# Patient Record
Sex: FEMALE | Race: WHITE | NOT HISPANIC OR LATINO | Employment: OTHER | URBAN - METROPOLITAN AREA
[De-identification: names, ages, dates, MRNs, and addresses within clinical notes are randomized per-mention and may not be internally consistent; named-entity substitution may affect disease eponyms.]

---

## 2017-04-05 ENCOUNTER — ALLSCRIPTS OFFICE VISIT (OUTPATIENT)
Dept: OTHER | Facility: OTHER | Age: 82
End: 2017-04-05

## 2017-09-01 DIAGNOSIS — E55.9 VITAMIN D DEFICIENCY: ICD-10-CM

## 2017-09-01 DIAGNOSIS — E83.52 HYPERCALCEMIA: ICD-10-CM

## 2017-09-01 DIAGNOSIS — E78.5 HYPERLIPIDEMIA: ICD-10-CM

## 2017-09-01 DIAGNOSIS — I10 ESSENTIAL (PRIMARY) HYPERTENSION: ICD-10-CM

## 2017-09-11 ENCOUNTER — APPOINTMENT (OUTPATIENT)
Dept: LAB | Facility: CLINIC | Age: 82
End: 2017-09-11
Payer: MEDICARE

## 2017-09-11 ENCOUNTER — TRANSCRIBE ORDERS (OUTPATIENT)
Dept: LAB | Facility: CLINIC | Age: 82
End: 2017-09-11

## 2017-09-11 DIAGNOSIS — E55.9 VITAMIN D DEFICIENCY: ICD-10-CM

## 2017-09-11 DIAGNOSIS — E83.52 HYPERCALCEMIA: ICD-10-CM

## 2017-09-11 DIAGNOSIS — E78.5 HYPERLIPIDEMIA: ICD-10-CM

## 2017-09-11 DIAGNOSIS — I10 ESSENTIAL (PRIMARY) HYPERTENSION: ICD-10-CM

## 2017-09-11 LAB
25(OH)D3 SERPL-MCNC: 34.6 NG/ML (ref 30–100)
ALBUMIN SERPL BCP-MCNC: 4.2 G/DL (ref 3.5–5)
ALP SERPL-CCNC: 89 U/L (ref 46–116)
ALT SERPL W P-5'-P-CCNC: 14 U/L (ref 12–78)
ANION GAP SERPL CALCULATED.3IONS-SCNC: 6 MMOL/L (ref 4–13)
AST SERPL W P-5'-P-CCNC: 12 U/L (ref 5–45)
BILIRUB SERPL-MCNC: 0.48 MG/DL (ref 0.2–1)
BUN SERPL-MCNC: 25 MG/DL (ref 5–25)
CALCIUM ALBUM COR SERPL-MCNC: 10.6 MG/DL (ref 8.3–10.1)
CALCIUM SERPL-MCNC: 10.8 MG/DL (ref 8.3–10.1)
CHLORIDE SERPL-SCNC: 105 MMOL/L (ref 100–108)
CHOLEST SERPL-MCNC: 214 MG/DL (ref 50–200)
CO2 SERPL-SCNC: 27 MMOL/L (ref 21–32)
CREAT SERPL-MCNC: 1.09 MG/DL (ref 0.6–1.3)
GFR SERPL CREATININE-BSD FRML MDRD: 45 ML/MIN/1.73SQ M
GLUCOSE P FAST SERPL-MCNC: 92 MG/DL (ref 65–99)
HDLC SERPL-MCNC: 37 MG/DL (ref 40–60)
LDLC SERPL CALC-MCNC: 132 MG/DL (ref 0–100)
POTASSIUM SERPL-SCNC: 4.2 MMOL/L (ref 3.5–5.3)
PROT SERPL-MCNC: 7.6 G/DL (ref 6.4–8.2)
SODIUM SERPL-SCNC: 138 MMOL/L (ref 136–145)
TRIGL SERPL-MCNC: 224 MG/DL
TSH SERPL DL<=0.05 MIU/L-ACNC: 2.41 UIU/ML (ref 0.36–3.74)

## 2017-09-11 PROCEDURE — 80061 LIPID PANEL: CPT

## 2017-09-11 PROCEDURE — 82306 VITAMIN D 25 HYDROXY: CPT

## 2017-09-11 PROCEDURE — 36415 COLL VENOUS BLD VENIPUNCTURE: CPT

## 2017-09-11 PROCEDURE — 84443 ASSAY THYROID STIM HORMONE: CPT

## 2017-09-11 PROCEDURE — 80053 COMPREHEN METABOLIC PANEL: CPT

## 2017-10-03 ENCOUNTER — ALLSCRIPTS OFFICE VISIT (OUTPATIENT)
Dept: OTHER | Facility: OTHER | Age: 82
End: 2017-10-03

## 2017-10-03 DIAGNOSIS — M25.561 PAIN IN RIGHT KNEE: ICD-10-CM

## 2017-10-03 DIAGNOSIS — Z13.820 ENCOUNTER FOR SCREENING FOR OSTEOPOROSIS: ICD-10-CM

## 2017-10-03 DIAGNOSIS — M25.562 PAIN IN LEFT KNEE: ICD-10-CM

## 2017-10-04 NOTE — PROGRESS NOTES
Assessment  1  Screening for osteoporosis (V82 81) (Z13 820)   2  Chronic pain of both knees (171 84,893 56) (M25 561,M25 562,G89 29)   3  Benign essential hypertension (401 1) (I10)   4  Hyperlipidemia (272 4) (E78 5)   5  Hypercalcemia (275 42) (E83 52)    Plan  Benign essential hypertension    · Lisinopril 40 MG Oral Tablet; Take 1 tablet daily  Chronic pain of both knees    · * XR KNEE 3 VW LEFT NON INJURY; Status:Active; Requested MEQ:[id number];    · * XR KNEE 3 VW RIGHT NON INJURY; Status:Active; Requested OII:[id number];    · 1 - James LUCIANO, Zena Silva (Orthopedic Surgery) Co-Management  *  Status: Active   Requested for: 54VUU6280  Care Summary provided  : Yes  Gout    · Gabapentin 300 MG Oral Capsule; take 1 capsule by mouth at bedtime  Health Maintenance    · Doxepin HCl - 75 MG Oral Capsule; one p o  at bedtime  Hyperlipidemia    · Ezetimibe 10 MG Oral Tablet; take 1 tablet by mouth daily  Need for influenza vaccination    · Fluzone High-Dose 0 5 ML Intramuscular Suspension Prefilled Syringe  Peripheral neuropathy    · Gabapentin 100 MG Oral Capsule; TAKE two  CAPSULES IN AM and 4 pm  Screening for osteoporosis    · * DXA BONE DENSITY SPINE HIP AND PELVIS; Status:Hold For - Scheduling; Requested  for:03Oct2017;     Discussion/Summary    Patient is here for blood pressure check  Labs are up-to-date and reviewed  HDL is low triglycerides are slightly elevated but improved  Patient's main complaint is knee symptoms  Discussed x-ray and updating baseline DEXA scan  Patient will be referred to Dr Sharri Da Silva of [address]  The patient, patient's family was counseled regarding instructions for management,-impressions,-importance of compliance with treatment  total time of encounter was 30 minutes-and-50% minutes was spent counseling  The treatment plan was reviewed with the patient/guardian   The patient/guardian understands and agrees with the treatment plan      Chief Complaint  flu shot, wants to discuss surgery for her knees  Patient is here for blood pressure checkup and to review labs  Patient is here today for follow up of chronic conditions described in HPI  History of Present Illness  The patient is being seen for Bilateral a knee problem , initial evaluation  She sustained an injury to the left knee, right knee and No injury  She was previously evaluated by me and First evaluation  Symptoms:  pain,-popping sound at injury-and-instability, but-no swelling-and-no redness  The patient is currently experiencing symptoms  Knee pain: Symptoms are located in the bilateral knees  The patient describes the pain as dull and aching  Onset was gradual  The symptoms occur intermittently  The patient describes symptoms as moderate in severity-and-worsening  Exacerbating factors:  walking  Relieving factors:  rest-and-Patient has utilized Biofreeze and Voltaren gel with some improvement  Current treatment includes use of a walker  By report, there is fair symptom control  The patient is currently able to do activities of daily living with limitations  Patient complains of bilateral knee discomfort and decreased difficulty with ambulation  She is using a walker and this has helped avoid any falls  Labs are reviewed everything is within normal limits except a low HDL and slight elevation of the triglycerides  Patient is limited in her mobility due to her knee problems  She has never seen Orthopedic and has had not x-rays previously  Calcium level is elevated but patient has a history of familial hyper calcium me a  She has been evaluated by Nephrology for this  She states she had a test on her parathyroid gland and that was normal  She will be getting the flu shot today  The patient is being seen for a routine clinic follow-up of hypercalcemia  The patient is currently asymptomatic  Associated symptoms:  Patient will need to be screened for osteoporosis  Has not had a baseline that she can recall  , but-no blood in urine,-no bone pain-and-no kidney stones  The patient states her hyperlipidemia has been stable since the last visit  Comorbid Illnesses: hypertension  Symptoms: The patient is currently asymptomatic  Associated symptoms include no focal neurologic deficits-and-no memory loss  Medications: the patient is adherent with her medication regimen  the patient's LDL goal is 130 mg/dL  -the patient's last LDL was 132 mg/dL  Additional History: Patient is a 51-year-old and currently is not displaying any cardiac symptomatology  The patient presents for follow-up of essential hypertension  The patient states she has been stable with her blood pressure control since the last visit  Comorbid Illnesses: Hypercalcemia  Symptoms: The patient is currently asymptomatic  denies chest pain-and-denies intermittent leg claudication  Associated symptoms include no headache-and-no focal neurologic deficits  Home monitoring: The patient checks her blood pressure sporadically  Blood pressure control has been good  Lifestyle: Diet: She is on a diet and is generally adherent  The patient is due for all labs updated  Review of Systems    Constitutional: not feeling tired  Eyes: Patient had recent eye exam and Vision is actually improving, but-no eyesight problems  ENT: no nasal discharge  Cardiovascular: no chest pain-and-no palpitations  Respiratory: no cough-and-no shortness of breath during exertion  Gastrointestinal: no nausea-and-no constipation  Genitourinary: no incontinence  Musculoskeletal: as noted in HPI  Psychiatric: no sleep disturbances  ROS reviewed  Active Problems  1  Benign essential hypertension (401 1) (I10)   2  Chronic pain of right knee (978 14,546 90) (M25 561,G89 29)   3  Esophageal reflux (530 81) (K21 9)   4  Gout (274 9) (M10 9)   5  Hypercalcemia (275 42) (E83 52)   6  Hyperlipidemia (272 4) (E78 5)   7  Macular degeneration (362 50) (H35 30)   8  Osteoarthritis (715 90) (M19 90)   9  Peripheral neuropathy (356 9) (G62 9)   10  Vitamin D deficiency (268 9) (E55 9)    Past Medical History  1  History of Colon cancer screening (V76 51) (Z12 11)   2  History of Cough (786 2) (R05)   3  History of Depression (311) (F32 9)   4  History of Encounter for screening mammogram for malignant neoplasm of breast   (V76 12) (Z12 31)   5  History of Glaucoma screening (V80 1) (Z13 5)   6  History of H/O vaginal hysterectomy (V88 01) (Z90 710)   7  History of fever (V13 89) (Z87 898)   8  History of gout (V12 29) (Z87 39)   9  History of shortness of breath (V13 89) (Z87 898)   10  History of upper respiratory infection (V12 09) (Z87 09)   11  History of Need for influenza vaccination (V04 81) (Z23)   12  History of Skin cancer (173 90) (C44 90)   13  History of Sore throat (462) (J02 9)    The active problems and past medical history were reviewed and updated today  Surgical History  1  History of Cataract Surgery   2  History of Total Abdominal Hysterectomy With Removal Of Both Ovaries    The surgical history was reviewed and updated today  Family History  Mother    1  Family history of myocardial infarction (V17 3) (Z82 49)  Father    2  Family history of myocardial infarction (V17 3) (Z82 49)  Sister    3  Family history of hypertension (V17 49) (Z82 49)   4  Family history of malignant neoplasm of stomach (V16 0) (Z80 0)  Brother    5  Family history of hypertension (V17 49) (Z82 49)    The family history was reviewed and updated today  Social History   · Never a smoker   · No alcohol use   ·   The social history was reviewed and updated today  Current Meds   1  Aspirin 81 MG TABS; Therapy: 78GKZ7942 to Recorded   2  CholestOff Plus CAPS; take 1 capsule daily; Therapy: (Recorded:03Oct2017) to Recorded   3  CoQ-10 10 MG CAPS; Therapy: 54NDA5058 to Recorded   4   Diclofenac Sodium 1 % Transdermal Gel; aply 2-3 times daily as directed; Therapy: 98XOG8671 to (Last Rx:30Jan2017)  Requested for: 30Jan2017 Ordered   5  Doxepin HCl - 75 MG Oral Capsule; one p o  at bedtime; Therapy: 40AKW3084 to (Leia Tran)  Requested for: 05Apr2017; Last   Rx:05Apr2017 Ordered   6  Ezetimibe 10 MG Oral Tablet; take 1 tablet by mouth daily; Therapy: 64FBH2600 to (Jean Pierre Simms)  Requested for: 96Jjx8315; Last   Rx:75Sum6761 Ordered   7  Gabapentin 100 MG Oral Capsule; TAKE two  CAPSULES IN AM and 4 pm;   Therapy: 12EGC9916 to (Evaluate:31Mar2018)  Requested for: 74SFB6304; Last   Rx:05Apr2017 Ordered   8  Gabapentin 300 MG Oral Capsule; take 1 capsule by mouth at bedtime; Therapy: 32OXF6027 to (Leia Tran)  Requested for: 05Apr2017; Last   Rx:05Apr2017 Ordered   9  Lisinopril 40 MG Oral Tablet; Take 1 tablet daily; Therapy: 45PXG7571 to (Evaluate:31Mar2018)  Requested for: 05Apr2017; Last   Rx:05Apr2017 Ordered   10  Multiple Vitamins Oral Tablet; Therapy: 67SBQ4508 to Recorded   11  Omeprazole 20 MG Oral Capsule Delayed Release; TAKE 1 CAPSULE BY MOUTH    EVERY DAY; Therapy: 87QRD5616 to (Leia Tran)  Requested for: 05Apr2017; Last    Rx:05Apr2017 Ordered   12  PreserVision AREDS 2+Multi Vit Oral Capsule; Take 1 capsule twice daily; Therapy: () to Recorded   13  Probiotic Oral Capsule; Therapy: 74QUG3977 to Recorded   14  Vitamin C 500 MG Oral Capsule; Therapy: 38EGK7226 to Recorded   15  Vitamin D3 1000 UNIT Oral Capsule; Therapy: 83NYB7968 to Recorded    The medication list was reviewed and updated today  Allergies  1  Allopurinol TABS    Vitals  Vital Signs    Recorded: 33OSR0934 09:47AM Recorded: 96OEM0390 18:69EE   Systolic 159    Diastolic 90    Height  5 ft 2 in   Weight  151 lb 4 oz   BMI Calculated  27 66   BSA Calculated  1 7     Physical Exam    Constitutional   General appearance: No acute distress, well appearing and well nourished    well developed,-overweight-and-appearance reflects stated age  Ears, Nose, Mouth, and Throat   Otoscopic examination: Tympanic membranes translucent with normal light reflex  Canals patent without erythema  Oropharynx: Normal with no erythema, edema, exudate or lesions  Pulmonary   Auscultation of lungs: Clear to auscultation  Cardiovascular   Auscultation of heart: Normal rate and rhythm, normal S1 and S2, without murmurs  A grade 2 systolic murmur was heard at the LLSB  Abdomen   Abdomen: Non-tender, no masses  Liver and spleen: No hepatomegaly or splenomegaly  Musculoskeletal   Gait and station: Abnormal   Gait evaluation demonstrated antalgia bilaterally  Inspection/palpation of joints, bones, and muscles: Abnormal   Palpation - bilateral knee crepitus -Valgus gait  Patient has bilateral flat feet  Significant supination at ankle  Distal pulses are intact  Neurologic   Sensation: No sensory loss  Psychiatric   Orientation to person, place, and time: Normal     Mood and affect: Normal          Results/Data  PHQ-2 Adult Depression Screening 18LCQ7514 09:30AM User, [employer]     Test Name Result Flag Reference   PHQ-2 Adult Depression Score 0     Over the last two weeks, how often have you been bothered by any of the following problems?   Little interest or pleasure in doing things: Not at all - 0  Feeling down, depressed, or hopeless: Not at all - 0   PHQ-2 Adult Depression Screening Negative       (1) COMPREHENSIVE METABOLIC PANEL 30EJR8518 64:03TA Caitlyn Pulse Order Number: [id number]     Test Name Result Flag Reference   SODIUM 138 mmol/L  136-145   POTASSIUM 4 2 mmol/L  3 5-5 3   CHLORIDE 105 mmol/L  100-108   CARBON DIOXIDE 27 mmol/L  21-32   ANION GAP (CALC) 6 mmol/L  4-13   BLOOD UREA NITROGEN 25 mg/dL  5-25   CREATININE 1 09 mg/dL  0 60-1 30   Standardized to IDMS reference method   CALCIUM 10 8 mg/dL H 8 3-10 1   BILI, TOTAL 0 48 mg/dL  0 20-1 00   ALK PHOSPHATAS 89 U/L     ALT (SGPT) 14 U/L  12-78 Specimen collection should occur prior to Sulfasalazine and/or Sulfapyridine administration due to the potential for falsely depressed results  AST(SGOT) 12 U/L  5-45   Specimen collection should occur prior to Sulfasalazine administration due to the potential for falsely depressed results  ALBUMIN 4 2 g/dL  3 5-5 0   TOTAL PROTEIN 7 6 g/dL  6 4-8 2   CORRECTED CALCIUM 10 6 mg/dL H 8 3-10 1   eGFR 45 ml/min/1 73sq m     [facility] Disease Education Program recommendations are as follows:  GFR calculation is accurate only with a steady state creatinine  Chronic Kidney disease less than 60 ml/min/1 73 sq  meters  Kidney failure less than 15 ml/min/1 73 sq  meters  GLUCOSE FASTING 92 mg/dL  65-99   Specimen collection should occur prior to Sulfasalazine administration due to the potential for falsely depressed results  Specimen collection should occur prior to Sulfapyridine administration due to the potential for falsely elevated results  (1) LIPID PANEL, FASTING 70BMY0156 79:26DK Khalif Estrellao Order Number: [id number]     Test Name Result Flag Reference   CHOLESTEROL 214 mg/dL H    HDL,DIRECT 37 mg/dL L 40-60   Specimen collection should occur prior to Metamizole administration due to the potential for falsley depressed results  LDL CHOLESTEROL CALCULATED 132 mg/dL H 0-100   - Patient Instructions: This is a fasting blood test  Water,black tea or black  coffee only after 9:00pm the night before test   Drink 2 glasses of water the morning of test       Triglyceride:        Normal ??? ??? ??? ??? ??? ??? ??? <150 mg/dl   ??? ??? ???Borderline High ??? ??? 150-199 mg/dl   ??? ??? ? ?? High ??? ??? ??? ??? ??? ??? ??? 200-499 mg/dl   ??? ??? ? ??Very High ??? ??? ??? ??? ??? >499 mg/dl      Cholesterol:       Desirable ??? ??? ??? ??? <200 mg/dl   ??? ??? Borderline High ??? 200-239 mg/dl   ??? ???  High ??? ??? ??? ??? ??? ??? >239 mg/dl      HDL Cholesterol:       High ??? ???>59 mg/dL   ??? ??? Low ??? ??? <41 mg/dL      This screening LDL is a calculated result  It does not have the accuracy of the Direct Measured LDL in the monitoring of patients with hyperlipidemia and/or statin therapy  Direct Measure LDL (WZR113) must be ordered separately in these patients  TRIGLYCERIDES 224 mg/dL H <=150   Specimen collection should occur prior to N-Acetylcysteine or Metamizole administration due to the potential for falsely depressed results  (1) TSH 24KPV6437 91:35ST [facility] Order Number: [id number]     Test Name Result Flag Reference   TSH 2 410 uIU/mL  0 358-3 740   - Patient Instructions: This bloodwork is non-fasting  Please drink two glasses of water morning of bloodwork  Patients undergoing fluorescein dye angiography may retain small amounts of fluorescein in the body for 48-72 hours post procedure  Samples containing fluorescein can produce falsely depressed TSH values  If the patient had this procedure,a specimen should be resubmitted post fluorescein clearance  The recommended reference ranges for TSH during pregnancy are as follows:  First trimester 0 1 to 2 5 uIU/mL  Second trimester  0 2 to 3 0 uIU/mL  Third trimester 0 3 to 3 0 uIU/m     (1) VITAMIN D 25-HYDROXY 34AOL8986 66:35KZ [facility] Order Number: [id number]     Test Name Result Flag Reference   VIT D 25-HYDROX 34 6 ng/mL  30 0-100 0   This assay is a certified procedure of the CDC Vitamin D Standardization Certification Program (VDSCP)     Deficiency <20ng/ml   Insufficiency 20-30ng/ml   Sufficient  ng/ml     *Patients undergoing fluorescein dye angiography may retain small amounts of fluorescein in the body for 48-72 hours post procedure  Samples containing fluorescein can produce falsely elevated Vitamin D values  If the patient had this procedure, a specimen should be resubmitted post fluorescein clearance       (1) CBC/ PLT (NO DIFF) 46MPT5401 01: 99FP Kira Gallardo Order Number: [id number]     Test Name Result Flag Reference   HEMATOCRIT 36 6 %  34 8-46 1   HEMOGLOBIN 12 1 g/dL  11 5-15 4   MCHC 33 1 g/dL  31 4-37 4   MCH 27 4 pg  26 8-34 3   MCV 83 fL  82-98   PLATELET COUNT 104 Thousands/uL  149-390   RBC COUNT 4 42 Million/uL  3 81-5 12   RDW 18 0 % H 11 6-15 1   WBC COUNT 4 43 Thousand/uL  4 31-10 16   MPV 12 0 fL  8 9-12 7     Signatures   Electronically signed by : Brendon Haynes DO; Oct  3 7649 10:22AM EST                       (Author)

## 2017-10-05 ENCOUNTER — HOSPITAL ENCOUNTER (OUTPATIENT)
Dept: BONE DENSITY | Facility: MEDICAL CENTER | Age: 82
Discharge: HOME | End: 2017-10-05
Payer: MEDICARE

## 2017-10-05 ENCOUNTER — APPOINTMENT (OUTPATIENT)
Dept: RADIOLOGY | Facility: MEDICAL CENTER | Age: 82
End: 2017-10-05
Payer: MEDICARE

## 2017-10-05 DIAGNOSIS — Z13.820 ENCOUNTER FOR SCREENING FOR OSTEOPOROSIS: ICD-10-CM

## 2017-10-05 DIAGNOSIS — M25.561 PAIN IN RIGHT KNEE: ICD-10-CM

## 2017-10-05 DIAGNOSIS — M25.562 PAIN IN LEFT KNEE: ICD-10-CM

## 2017-10-05 PROCEDURE — 73562 X-RAY EXAM OF KNEE 3: CPT

## 2017-10-05 PROCEDURE — 77080 DXA BONE DENSITY AXIAL: CPT

## 2017-10-16 ENCOUNTER — ALLSCRIPTS OFFICE VISIT (OUTPATIENT)
Dept: OTHER | Facility: OTHER | Age: 82
End: 2017-10-16

## 2017-10-17 NOTE — PROGRESS NOTES
Assessment  1  Primary localized osteoarthritis of right knee (715 16) (M17 11)   2  Primary localized osteoarthritis of left knee (715 16) (M17 12)    Plan  Primary localized osteoarthritis of left knee    · Depo-Medrol 40 MG/ML Injection Suspension (MethylPREDNISolone  Acetate)  Primary localized osteoarthritis of left knee, Primary localized osteoarthritis of right knee    · Follow-up visit in 6 weeks Evaluation and Treatment  Follow-up  Status: Hold For -  Scheduling  Requested for: [id number]   · Physical Therapy Home Safety Evaluation and Strengthening Exercises, PT OT Evaluate  and Treat Co-Management  *  Status: Active  Requested for: [id number]  Care Summary provided  : Yes    Discussion/Summary    This sellar received bilateral steroid injections today  She should ice her knees avoid strenuous activity to 2 days if needed  She will continue with Tylenol as needed for pain control  She will have home visiting physical therapy  She will follow up in 6 weeks or sooner if needed  Chief Complaint  Complains of bilateral knee pain      History of Present Illness  HPI: 80-year-old female presents with bilateral knee pain  Her left knee pain is worse than her right knee  She denies any falls or trauma  Her left knee pain started about 2 years ago  He has been gradually worsening  Initially she used a cane for her left knee pain and now she is a walker she feels unsteady  She admits instability  Her pain is generalized and constant  Her right knee pain began last summer  It is generalized she describes as an achy pain  Getting up from a seated position worsens her pain  She also feels rubbing  She does stairs infrequently  She has not had physical therapy  She has not had injections or surgery on her bilateral knees  She has tried Tylenol without relief  She recently received Voltaren gel but has not tried yet  She has tried a knee brace in the past  She has a history of gout        Review of Systems    Constitutional: no fever  Cardiovascular: no chest pain  Respiratory: no shortness of breath  Gastrointestinal: no constipation  Genitourinary: no incontinence  Musculoskeletal: as noted in HPI  Integumentary: no rashes  Neurological: no dizziness  Endocrine: no excessive thirst    Psychiatric: no depression  Active Problems  1  Benign essential hypertension (401 1) (I10)   2  Chronic pain of both knees (628 88,511 38) (M25 561,M25 562,G89 29)   3  Chronic pain of right knee (843 71,212 50) (M25 561,G89 29)   4  Esophageal reflux (530 81) (K21 9)   5  Gout (274 9) (M10 9)   6  Hypercalcemia (275 42) (E83 52)   7  Hyperlipidemia (272 4) (E78 5)   8  Macular degeneration (362 50) (H35 30)   9  Need for influenza vaccination (V04 81) (Z23)   10  Osteoarthritis (715 90) (M19 90)   11  Peripheral neuropathy (356 9) (G62 9)   12  Screening for osteoporosis (V82 81) (Z13 820)   13  Vitamin D deficiency (268 9) (E55 9)    Past Medical History   · History of Colon cancer screening (V76 51) (Z12 11)   · History of Cough (786 2) (R05)   · History of Depression (311) (F32 9)   · History of Encounter for screening mammogram for malignant neoplasm of breast  (V76 12) (Z12 31)   · History of Glaucoma screening (V80 1) (Z13 5)   · History of H/O vaginal hysterectomy (V88 01) (Z90 710)   · History of fever (V13 89) (Z40 216)   · History of gout (V12 29) (Z87 39)   · History of shortness of breath (V13 89) (R98 008)   · History of upper respiratory infection (V12 09) (Z87 09)   · History of Need for influenza vaccination (V04 81) (Z23)   · History of Skin cancer (173 90) (C44 90)   · History of Sore throat (462) (J02 9)    The active problems and past medical history were reviewed and updated today  Surgical History   · History of Cataract Surgery   · History of Total Abdominal Hysterectomy With Removal Of Both Ovaries    The surgical history was reviewed and updated today         Family History  Mother    · Family history of myocardial infarction (V17 3) (Z80 55)  Father    · Family history of myocardial infarction (V17 3) (Z82 49)  Sister    · Family history of hypertension (V17 49) (Z82 49)   · Family history of malignant neoplasm of stomach (V16 0) (Z80 0)  Brother    · Family history of hypertension (V17 49) (Z82 49)    The family history was reviewed and updated today  Social History   · Never a smoker   · No alcohol use   ·   The social history was reviewed and updated today  Current Meds   1  Aspirin 81 MG TABS; Therapy: 25OEE6109 to Recorded   2  CholestOff Plus CAPS; take 1 capsule daily; Therapy: (Recorded:03Oct2017) to Recorded   3  CoQ-10 10 MG CAPS; Therapy: 13FQH3894 to Recorded   4  Diclofenac Sodium 1 % Transdermal Gel; aply 2-3 times daily as directed; Therapy: 03EGX9057 to (Last Rx:30Jan2017)  Requested for: 30Jan2017 Ordered   5  Doxepin HCl - 75 MG Oral Capsule; one p o  at bedtime; Therapy: 46NKX8176 to (Evaluate:28Sep2018)  Requested for: 25GRA4080; Last   Rx:03Oct2017 Ordered   6  Ezetimibe 10 MG Oral Tablet; take 1 tablet by mouth daily; Therapy: 51AJE9376 to (Evaluate:01Jan2018)  Requested for: 31XZZ3413; Last   Rx:03Oct2017 Ordered   7  Gabapentin 100 MG Oral Capsule; TAKE two  CAPSULES IN AM and 4 pm;   Therapy: 53DBI4210 to (Evaluate:28Sep2018)  Requested for: 78VEM1471; Last   Rx:03Oct2017 Ordered   8  Gabapentin 300 MG Oral Capsule; take 1 capsule by mouth at bedtime; Therapy: 65JWJ0365 to (Evaluate:28Sep2018)  Requested for: 54SXT4427; Last   Rx:03Oct2017 Ordered   9  Lisinopril 40 MG Oral Tablet; Take 1 tablet daily; Therapy: 14PQF4368 to (Evaluate:28Sep2018)  Requested for: 92APW2054; Last   Rx:03Oct2017 Ordered   10  Multiple Vitamins Oral Tablet; Therapy: 83CGM0447 to Recorded   11  Omeprazole 20 MG Oral Capsule Delayed Release; TAKE 1 CAPSULE BY MOUTH    EVERY DAY;     Therapy: 75EQX3413 to Marleni Fox)  Requested for: 39NUK5129; Last    Rx:20Zrw8776 Ordered   12  PreserVision AREDS 2+Multi Vit Oral Capsule; Take 1 capsule twice daily; Therapy: [phone]) to Recorded   13  Probiotic Oral Capsule; Therapy: 48ZIL3029 to Recorded   14  Vitamin C 500 MG Oral Capsule; Therapy: 61PKL0244 to Recorded   15  Vitamin D3 1000 UNIT Oral Capsule; Therapy: 85AWG0718 to Recorded    The medication list was reviewed and updated today  Allergies  1  Allopurinol TABS    Vitals  Signs   Heart Rate: 74  Systolic: 832  Diastolic: 82  Height: 5 ft 2 in  Weight: 152 lb   BMI Calculated: 27 8  BSA Calculated: 1 7    Physical Exam  Hearing intact, no audible wheezing, regular rate, no discharge from eyes   Right Knee: Appearance: Normal except mild swelling  Tenderness: not over the lateral joint line-- and-- not over the medial joint line  Palpatory findings include crepitus-- and-- no warmth  ROM: Full  Motor: Normal  Special Test: no laxity on Valgus Stress-- and-- no laxity on Varus Stress  (no hip pain with ROM)    (no hip pain with ROM)      Left Knee: Appearance: Normal except mild swelling  Tenderness: not over the lateral joint line-- and-- not over the medial joint line  Palpatory findings include crepitus-- and-- no warmth  ROM: Full  Motor: Normal except as noted: decreased DF  Special Test: no laxity on Valgus Stress-- and-- no laxity on Varus Stress  Constitutional - General appearance: Normal    Musculoskeletal - Lower extremity compartments: Normal    Cardiovascular - Pulses: Normal  Dorsalis pedis pulse was 2+ on the left  Neurologic - Lower extremity peripheral neuro exam: Normal  Peripheral sensation findings: light touch intact on both lower legs, ankles, and feet  Neuromuscular strength examination findings: decreased DF L foot  Psychiatric - Orientation to person, place, and time: Normal -- Mood and affect: Normal       Results/Data  I personally reviewed the films/images/results in the office today  My interpretation follows  X-ray Review X-ray right knee: Severe DJDleft knee: Severe DJD with deformity  Procedure    Procedure: Injection of bilateral knee joint  Indication:  Joint pain-- and-- Osteoarthritis  Potential complications include bleeding,-- infection-- and-- allergic reaction  Risk, benefits and alternatives were discussed with the patient  Verbal consent was obtained prior to the procedure  Alcohol was used to prep the area  ethyl chloride spray was used as a topical anesthetic  Using sterile technique, the aspiration/injection needle was then directed from a Anterolateral aspect  A 22-gauge was used to inject 3mL x 2 mL of 1% Lidocaine-- and-- 2mL x 2 mL of 40mg/mL methylprednisolone  A bandage was applied  the patient tolerated the procedure well  Complications: none  Patient instructed to avoid strenuous activity for 1-2 day(s)        Future Appointments    Date/Time Provider Specialty Site   47/03/2740 26:56 AM Anna Mcbride DO Family Medicine [facility]     Signatures   Electronically signed by : Maryann Chicas DO; Oct 16 2017 10:53AM EST                       (Author)

## 2018-01-13 VITALS
DIASTOLIC BLOOD PRESSURE: 82 MMHG | BODY MASS INDEX: 27.97 KG/M2 | HEART RATE: 74 BPM | WEIGHT: 152 LBS | SYSTOLIC BLOOD PRESSURE: 137 MMHG | HEIGHT: 62 IN

## 2018-01-14 VITALS
BODY MASS INDEX: 27.83 KG/M2 | SYSTOLIC BLOOD PRESSURE: 160 MMHG | HEIGHT: 62 IN | WEIGHT: 151.25 LBS | DIASTOLIC BLOOD PRESSURE: 90 MMHG

## 2018-01-14 VITALS
WEIGHT: 160.25 LBS | BODY MASS INDEX: 29.49 KG/M2 | DIASTOLIC BLOOD PRESSURE: 68 MMHG | SYSTOLIC BLOOD PRESSURE: 158 MMHG | HEIGHT: 62 IN

## 2018-02-25 DIAGNOSIS — E78.01 FAMILIAL HYPERCHOLESTEROLEMIA: Primary | ICD-10-CM

## 2018-02-25 RX ORDER — EZETIMIBE 10 MG/1
TABLET ORAL
Qty: 90 TABLET | Refills: 0 | Status: SHIPPED | OUTPATIENT
Start: 2018-02-25 | End: 2018-04-04 | Stop reason: SDUPTHER

## 2018-04-03 RX ORDER — GABAPENTIN 300 MG/1
1 CAPSULE ORAL
COMMUNITY
Start: 2014-10-28 | End: 2018-04-04 | Stop reason: SDUPTHER

## 2018-04-03 RX ORDER — DOXEPIN HYDROCHLORIDE 75 MG/1
CAPSULE ORAL
COMMUNITY
Start: 2015-02-27 | End: 2018-04-04 | Stop reason: SDUPTHER

## 2018-04-03 RX ORDER — MULTIVIT-MIN/IRON/FOLIC ACID/K 18-600-40
CAPSULE ORAL
COMMUNITY
Start: 2015-03-02 | End: 2019-12-20

## 2018-04-03 RX ORDER — GABAPENTIN 100 MG/1
CAPSULE ORAL
COMMUNITY
Start: 2015-03-02 | End: 2018-04-04 | Stop reason: DRUGHIGH

## 2018-04-03 RX ORDER — BIOTIN 1 MG
TABLET ORAL
COMMUNITY
Start: 2015-03-02 | End: 2019-07-30 | Stop reason: HOSPADM

## 2018-04-03 RX ORDER — OMEPRAZOLE 20 MG/1
1 CAPSULE, DELAYED RELEASE ORAL DAILY
COMMUNITY
Start: 2014-10-28 | End: 2018-04-04 | Stop reason: SDUPTHER

## 2018-04-03 RX ORDER — LISINOPRIL 40 MG/1
1 TABLET ORAL DAILY
COMMUNITY
Start: 2015-02-13 | End: 2018-04-04 | Stop reason: SDUPTHER

## 2018-04-04 ENCOUNTER — OFFICE VISIT (OUTPATIENT)
Dept: FAMILY MEDICINE CLINIC | Facility: CLINIC | Age: 83
End: 2018-04-04
Payer: MEDICARE

## 2018-04-04 VITALS
DIASTOLIC BLOOD PRESSURE: 80 MMHG | BODY MASS INDEX: 27.53 KG/M2 | WEIGHT: 149.6 LBS | SYSTOLIC BLOOD PRESSURE: 158 MMHG | HEIGHT: 62 IN

## 2018-04-04 DIAGNOSIS — I10 BENIGN ESSENTIAL HYPERTENSION: ICD-10-CM

## 2018-04-04 DIAGNOSIS — E78.2 MIXED HYPERLIPIDEMIA: ICD-10-CM

## 2018-04-04 DIAGNOSIS — M15.9 PRIMARY OSTEOARTHRITIS INVOLVING MULTIPLE JOINTS: ICD-10-CM

## 2018-04-04 DIAGNOSIS — K21.9 GASTROESOPHAGEAL REFLUX DISEASE WITHOUT ESOPHAGITIS: ICD-10-CM

## 2018-04-04 DIAGNOSIS — G60.9 IDIOPATHIC PERIPHERAL NEUROPATHY: ICD-10-CM

## 2018-04-04 DIAGNOSIS — Z00.00 ENCOUNTER FOR MEDICARE ANNUAL WELLNESS EXAM: Primary | ICD-10-CM

## 2018-04-04 DIAGNOSIS — F32.A DEPRESSION, UNSPECIFIED DEPRESSION TYPE: ICD-10-CM

## 2018-04-04 DIAGNOSIS — E78.01 FAMILIAL HYPERCHOLESTEROLEMIA: ICD-10-CM

## 2018-04-04 DIAGNOSIS — E55.9 VITAMIN D DEFICIENCY: ICD-10-CM

## 2018-04-04 PROBLEM — G89.29 CHRONIC PAIN OF BOTH KNEES: Status: ACTIVE | Noted: 2017-10-03

## 2018-04-04 PROBLEM — M25.562 CHRONIC PAIN OF BOTH KNEES: Status: ACTIVE | Noted: 2017-10-03

## 2018-04-04 PROBLEM — M17.12 PRIMARY LOCALIZED OSTEOARTHRITIS OF LEFT KNEE: Status: ACTIVE | Noted: 2017-10-16

## 2018-04-04 PROBLEM — M17.11 PRIMARY LOCALIZED OSTEOARTHRITIS OF RIGHT KNEE: Status: ACTIVE | Noted: 2017-10-16

## 2018-04-04 PROBLEM — M25.561 CHRONIC PAIN OF BOTH KNEES: Status: ACTIVE | Noted: 2017-10-03

## 2018-04-04 PROCEDURE — G0439 PPPS, SUBSEQ VISIT: HCPCS | Performed by: FAMILY MEDICINE

## 2018-04-04 RX ORDER — LISINOPRIL 40 MG/1
40 TABLET ORAL DAILY
Qty: 90 TABLET | Refills: 3 | Status: SHIPPED | OUTPATIENT
Start: 2018-04-04 | End: 2018-11-06 | Stop reason: SDUPTHER

## 2018-04-04 RX ORDER — DOXEPIN HYDROCHLORIDE 75 MG/1
75 CAPSULE ORAL
Qty: 90 CAPSULE | Refills: 3 | Status: SHIPPED | OUTPATIENT
Start: 2018-04-04 | End: 2019-04-04 | Stop reason: SDUPTHER

## 2018-04-04 RX ORDER — GABAPENTIN 300 MG/1
300 CAPSULE ORAL 3 TIMES DAILY
Qty: 270 CAPSULE | Refills: 3 | Status: SHIPPED | OUTPATIENT
Start: 2018-04-04 | End: 2019-04-04 | Stop reason: SDUPTHER

## 2018-04-04 RX ORDER — OMEPRAZOLE 20 MG/1
20 CAPSULE, DELAYED RELEASE ORAL DAILY
Qty: 90 CAPSULE | Refills: 3 | Status: SHIPPED | OUTPATIENT
Start: 2018-04-04 | End: 2019-05-18 | Stop reason: SDUPTHER

## 2018-04-04 RX ORDER — EZETIMIBE 10 MG/1
10 TABLET ORAL DAILY
Qty: 90 TABLET | Refills: 3 | Status: SHIPPED | OUTPATIENT
Start: 2018-04-04 | End: 2019-05-18 | Stop reason: SDUPTHER

## 2018-04-04 RX ORDER — PREDNISONE 1 MG/1
5 TABLET ORAL DAILY
Qty: 90 TABLET | Refills: 0 | Status: SHIPPED | OUTPATIENT
Start: 2018-04-04 | End: 2019-04-04 | Stop reason: ALTCHOICE

## 2018-04-04 NOTE — PROGRESS NOTES
HPI:  Ramya Sheppard is a 80 y o  female here for her Subsequent Wellness Visit      Patient Active Problem List   Diagnosis    Mixed hyperlipidemia    Benign essential hypertension    Chronic kidney disease, stage II (mild)    Chronic pain of both knees    Esophageal reflux    Gout    Hypercalcemia    Macular degeneration    Osteoarthritis    Peripheral neuropathy    Primary localized osteoarthritis of left knee    Primary localized osteoarthritis of right knee    Vitamin D deficiency    Depression     Past Medical History:   Diagnosis Date    Depression     Gout     H/O vaginal hysterectomy     resolved-4/17/2015    Skin cancer      Past Surgical History:   Procedure Laterality Date    CATARACT EXTRACTION      TOTAL ABDOMINAL HYSTERECTOMY      with removal of both ovaries    VAGINAL HYSTERECTOMY      resolved-4/17/2015     Family History   Problem Relation Age of Onset    Heart attack Mother      MI    Heart attack Father      MI    Hypertension Sister     Stomach cancer Sister     Hypertension Brother      History   Smoking Status    Never Smoker   Smokeless Tobacco    Not on file     History   Alcohol Use No      History   Drug use: Unknown     /80   Ht 5' 2" (1 575 m)   Wt 67 9 kg (149 lb 9 6 oz)   BMI 27 36 kg/m²       Current Outpatient Prescriptions   Medication Sig Dispense Refill    Ascorbic Acid (VITAMIN C) 500 MG CAPS Take by mouth      aspirin 81 MG tablet Take by mouth      BIOTIN PO Take by mouth      Cholecalciferol (VITAMIN D3) 1000 units CAPS Take by mouth      Coenzyme Q10 (COQ-10) 10 MG CAPS Take by mouth      diclofenac sodium (VOLTAREN) 1 % Apply 2 g topically 4 (four) times a day 100 g 5    doxepin (SINEquan) 75 MG capsule Take 1 capsule (75 mg total) by mouth daily at bedtime 90 capsule 3    ezetimibe (ZETIA) 10 mg tablet Take 1 tablet (10 mg total) by mouth daily 90 tablet 3    Flaxseed, Linseed, (FLAX SEED OIL PO) Take by mouth      gabapentin (NEURONTIN) 300 mg capsule Take 1 capsule (300 mg total) by mouth 3 (three) times a day 270 capsule 3    lisinopril (ZESTRIL) 40 mg tablet Take 1 tablet (40 mg total) by mouth daily 90 tablet 3    MAGNESIUM CITRATE PO Take by mouth      Multiple Vitamins-Minerals (PRESERVISION AREDS 2+MULTI VIT PO) Take 1 capsule by mouth 2 (two) times a day      omeprazole (PriLOSEC) 20 mg delayed release capsule Take 1 capsule (20 mg total) by mouth daily 90 capsule 3    Plant Sterols and Stanols (CHOLESTOFF PLUS) 450 MG CAPS Take 1 capsule by mouth daily      Probiotic Product (PROBIOTIC-10) CAPS Take by mouth      predniSONE 5 mg tablet Take 1 tablet (5 mg total) by mouth daily 90 tablet 0     No current facility-administered medications for this visit  Allergies   Allergen Reactions    Allopurinol Rash     Category:  Allergy;      Immunization History   Administered Date(s) Administered    Influenza Split High Dose Preservative Free IM 10/20/2014, 10/02/2015, 09/28/2016, 10/03/2017    Pneumococcal Polysaccharide PPV23 04/20/2005, 04/20/2015    Zoster 03/25/2008, 04/20/2011       Patient Care Team:  Magdy Weathers, DO as PCP - General  Maryann Chicas DO    Medicare Screening Tests and Risk Assessments:  AW Clinical     ISAR:   Previous hospitalizations?:  No       Once in a Lifetime Medicare Screening:       Medicare Screening Tests and Risk Assessment:   AAA Risk Assessment     Family history of AAA:  Yes   Osteoporosis Risk Assessment    HIV Risk Assessment        Drug and Alcohol Use:   Tobacco use    Cigarettes:  never smoker    Smokeless:  never used smokeless tobacco    Tobacco use duration    Tobacco Cessation Readiness    Alcohol use    Alcohol use:  never    Alcohol Treatment Readiness   Illicit Drug Use    Drug use:  never        Diet & Exercise:   Diet   What is your diet?:  Regular   How many servings a day of the following:   Fruits and Vegetables:  3-4 Meat:  1-2   Whole Grains:  2 Simple Carbs:  0   Dairy:  1 Soda:  0   Coffee:  1 Tea:  1   Exercise    Do you currently exercise?:  unable to exercise       Cognitive Impairment Screening:   Cognitive Impairment Screening    Do you have difficulty learning or retaining new information?:  Yes Do you have difficulty handling new tasks?:  No   Do you have difficulty with reasoning?:  No Do you have difficulty with spatial ability and orientation?:  No   Do you have difficulty with language?:  No Do you have difficulty with behavior?:  No       Functional Ability/Level of Safety:   Hearing    Hearing difficulties:  Yes    Left:  slightly decreased Right:  significantly decreased   Hearing aid:  No    Hearing Impairment Assessment    Hearing status:  No impairment   Do your family members ever complain that you turn on the radio or [employer]  too loudly?:  No Do you find that other people have to repeat themselves when talking to you?:  Yes   Do you have difficulty hearing while talking on the phone?:  No Has anyone ever told you that you are speaking too loudly when talking with them?:  No   Do you have trouble hearing the doorbell or phone ringing?:  No Do you have difficulty hearing such that you feel frustrated talking to people?:  Yes   Do you feel sad because you cannot hear well?:  No Do you feel inconvienced due to your hearing problem?:  No   Do you think you would be a happier person if you could hear better?:  No Would you be willing to go for a hearing aid fitting if suggested?:  No   Current Activities    Help needed with the folllowing:    Using the phone:  No Transportation:  No   Shopping:  No Preparing Meals:  No   Doing Housework:  No Doing Laundry:  No   Managing Medications:  No Managing Money:  No   ADL    Feeding:  Independant   Oral hygiene and Facial grooming:  Independant   Bathing:  Independant   Upper Body Dressing:  Independant   Lower Body Dressing:  Independant   Toileting:  Independant   Bed Mobility:  Independant   Fall Risk Have you fallen in the last 12 months?:  No Are you unsteady on your feet?:  Yes    Are you taking any medications that may cause fatigue or dizziness?:  No   Do you have any chronic conditions that may contribute to a fall?:  Arthritis, Joint disease, Visual Disturbances Do you rush to the bathroom potentially risking a fall?:  No   Injury History       Home Safety:   Are there hazards in your environment?:  No   If you fell, would you need help to get back up from the ground?:  Yes Do you have problems or concerns getting in/out of a bed, chair, tub, or toilet?:  No   Do you feel unsteady when walking?:  Yes Is your activity limited by pain?:  Yes   Do you have handrails and grab-bars in the home?:  No Are emergency numbers kept by the phone and regularly updated?:  Yes   Are you and/or family members aware of the dangers of smoking in bed?:  Yes    Do you have working smoke alarms and fire extinguisher?:  Yes Do all household members know how to use them?:  Yes   Have you left the stove on unsupervised?:  No    Home Safety Risk Factors   Unfamilar with surroundings:  No Uneven floors:  No   Stairs or handrail saftey risk:  Yes Loose rugs:  No   Household clutter:  No Poor household lighting:  No   No grab bars in bathroom:  Yes Further evaluation needed:  No       Advanced Directives:   Advanced Directives    Living Will:  Yes Durable POA for healthcare:   Yes   Advanced directive:  Yes    Patient's End of Life Decisions        Urinary Incontinence:   Do you have urinary incontinence?:  Yes Do you have incomplete emptying?:  No   Do you urinate frequently?:  No Do you have urinary urgency?:  Yes   Do you have urinary hesitancy?:  Yes Do you have dysuria (painful and/or difficult urination)?:  No   Do you have nocturia (waking up to urinate)?:  Yes Do you strain when urinating (have to push to urinate)?:  No   Do you have a weak stream when urinating?:  No Do you have intermittent streaming when urinating?:  No Do you dribble urine after finishing?:  No    Do you have vaginal pressure?:  No Do you have vaginal dryness?:  No       Glaucoma:            Provider Screening     Preventative Screening/Counseling:   Cardiovascular Screening/Counseling:   (Labs Q5 years, EKG optional one-time)   General:  Screening Current           Diabetes Screening/Counseling:   (2 tests/year if Pre-Diabetes or 1 test/year if no Diabetes)   General:  Screening Current           Colorectal Cancer Screening/Counseling:   (FOBT Q1 yr; Flex Sig Q4 yrs or Q10 yrs after Screening Colonoscopy; Screening Colonoscpy Q2 yrs High Risk or Q10 yrs Low Risk; Barium Enema Q2 yrs High Risk or Q4 yrs Low Risk)   General:  Screening Not Indicated           Prostate Cancer Screening/Counseling:   (Annual)          Breast Cancer Screening/Counseling:   (Baseline Age 28 - 43; Annual Age 36+)   General:  Patient Declines          Cervical Cancer Screening/Counseling:   (Annual for High Risk or Childbearing Age with Abnormal Pap in Last 3 yrs; Every 2 all others)   General:  Screening Not Indicated           Osteoporosis Screening/Counseling:   (Every 2 Yrs if at risk or more if medically necessary)   General:  Screening Current           AAA Screening/Counseling:   (Once per Lifetime with risk factors)    Family History of AAA:  Yes     General:  Patient Declines           Glaucoma Screening/Counseling:   (Annual)   General:  Risks and Benefits Discussed          HIV Screening/Counseling:   (Voluntary; Once annually for high risk OR 3 times for Pregnancy at diagnosis of IUP; 3rd trimester; and at Labor         Hepatitis C Screening:   Hepatitis C Counseling Provided: Yes               Immunizations:        Other Preventative Couseling (Non-Medicare Wellness Visit Required):       Referrals (Non-Medicare Wellness Visit Required):       Medical Equipment/Suppliers:           No exam data present    Physical Exam :  Vitals:    04/04/18 0955 04/04/18 1053   BP: 158/80   Weight: 67 9 kg (149 lb 9 6 oz)    Height: 5' 2" (1 575 m)      Physical Exam    Reviewed Updated [facility] Prior Wellness Visits:   Last Medicare wellness visit information was reviewed, patient interviewed , no change since last AWVyes  Last Medicare wellness visit information was reviewed, patient interviewed and updates made to the record today no    Assessment and Plan:  1  Encounter for Medicare annual wellness exam     2  Primary osteoarthritis involving multiple joints  predniSONE 5 mg tablet   3  Depression, unspecified depression type  diclofenac sodium (VOLTAREN) 1 %    doxepin (SINEquan) 75 MG capsule   4  Familial hypercholesterolemia  ezetimibe (ZETIA) 10 mg tablet   5  Mixed hyperlipidemia  ezetimibe (ZETIA) 10 mg tablet    Lipid Panel with Direct LDL reflex   6  Idiopathic peripheral neuropathy  gabapentin (NEURONTIN) 300 mg capsule    Vitamin B12   7  Benign essential hypertension  lisinopril (ZESTRIL) 40 mg tablet    Comprehensive metabolic panel   8  Gastroesophageal reflux disease without esophagitis  omeprazole (PriLOSEC) 20 mg delayed release capsule   9   Vitamin D deficiency  Vitamin D 25 hydroxy       Health Maintenance Due   Topic Date Due    DTaP,Tdap,and Td Vaccines (1 - Tdap) 10/04/1949

## 2018-09-26 ENCOUNTER — APPOINTMENT (OUTPATIENT)
Dept: LAB | Facility: CLINIC | Age: 83
End: 2018-09-26
Payer: MEDICARE

## 2018-09-26 DIAGNOSIS — E55.9 VITAMIN D DEFICIENCY: ICD-10-CM

## 2018-09-26 DIAGNOSIS — G60.9 IDIOPATHIC PERIPHERAL NEUROPATHY: ICD-10-CM

## 2018-09-26 DIAGNOSIS — E78.2 MIXED HYPERLIPIDEMIA: ICD-10-CM

## 2018-09-26 DIAGNOSIS — I10 BENIGN ESSENTIAL HYPERTENSION: ICD-10-CM

## 2018-09-26 LAB
25(OH)D3 SERPL-MCNC: 38.2 NG/ML (ref 30–100)
CHOLEST SERPL-MCNC: 203 MG/DL (ref 50–200)
HDLC SERPL-MCNC: 40 MG/DL (ref 40–60)
LDLC SERPL CALC-MCNC: 120 MG/DL (ref 0–100)
TRIGL SERPL-MCNC: 216 MG/DL
VIT B12 SERPL-MCNC: 3493 PG/ML (ref 100–900)

## 2018-09-26 PROCEDURE — 82306 VITAMIN D 25 HYDROXY: CPT

## 2018-09-26 PROCEDURE — 82607 VITAMIN B-12: CPT

## 2018-09-26 PROCEDURE — 36415 COLL VENOUS BLD VENIPUNCTURE: CPT

## 2018-09-26 PROCEDURE — 80061 LIPID PANEL: CPT

## 2018-10-04 ENCOUNTER — OFFICE VISIT (OUTPATIENT)
Dept: FAMILY MEDICINE CLINIC | Facility: CLINIC | Age: 83
End: 2018-10-04
Payer: MEDICARE

## 2018-10-04 ENCOUNTER — TELEPHONE (OUTPATIENT)
Dept: OBGYN CLINIC | Facility: HOSPITAL | Age: 83
End: 2018-10-04

## 2018-10-04 ENCOUNTER — TELEPHONE (OUTPATIENT)
Dept: FAMILY MEDICINE CLINIC | Facility: CLINIC | Age: 83
End: 2018-10-04

## 2018-10-04 VITALS
WEIGHT: 156.4 LBS | BODY MASS INDEX: 28.78 KG/M2 | SYSTOLIC BLOOD PRESSURE: 152 MMHG | HEIGHT: 62 IN | DIASTOLIC BLOOD PRESSURE: 78 MMHG

## 2018-10-04 DIAGNOSIS — M25.561 CHRONIC PAIN OF BOTH KNEES: ICD-10-CM

## 2018-10-04 DIAGNOSIS — I10 BENIGN ESSENTIAL HYPERTENSION: Primary | ICD-10-CM

## 2018-10-04 DIAGNOSIS — Z23 NEED FOR INFLUENZA VACCINATION: ICD-10-CM

## 2018-10-04 DIAGNOSIS — Z23 NEED FOR PNEUMOCOCCAL VACCINATION: ICD-10-CM

## 2018-10-04 DIAGNOSIS — M25.511 CHRONIC RIGHT SHOULDER PAIN: ICD-10-CM

## 2018-10-04 DIAGNOSIS — G89.29 CHRONIC RIGHT SHOULDER PAIN: ICD-10-CM

## 2018-10-04 DIAGNOSIS — M25.562 CHRONIC PAIN OF BOTH KNEES: ICD-10-CM

## 2018-10-04 DIAGNOSIS — Z23 NEED FOR SHINGLES VACCINE: ICD-10-CM

## 2018-10-04 DIAGNOSIS — G89.29 CHRONIC PAIN OF BOTH KNEES: ICD-10-CM

## 2018-10-04 PROCEDURE — G0009 ADMIN PNEUMOCOCCAL VACCINE: HCPCS | Performed by: FAMILY MEDICINE

## 2018-10-04 PROCEDURE — 99214 OFFICE O/P EST MOD 30 MIN: CPT | Performed by: FAMILY MEDICINE

## 2018-10-04 PROCEDURE — 90662 IIV NO PRSV INCREASED AG IM: CPT | Performed by: FAMILY MEDICINE

## 2018-10-04 PROCEDURE — 90670 PCV13 VACCINE IM: CPT | Performed by: FAMILY MEDICINE

## 2018-10-04 PROCEDURE — 20605 DRAIN/INJ JOINT/BURSA W/O US: CPT | Performed by: FAMILY MEDICINE

## 2018-10-04 PROCEDURE — G0008 ADMIN INFLUENZA VIRUS VAC: HCPCS | Performed by: FAMILY MEDICINE

## 2018-10-04 RX ORDER — TRAMADOL HYDROCHLORIDE 50 MG/1
TABLET ORAL
Qty: 30 TABLET | Refills: 0 | Status: SHIPPED | OUTPATIENT
Start: 2018-10-04 | End: 2019-04-04 | Stop reason: ALTCHOICE

## 2018-10-04 NOTE — PROGRESS NOTES
Assessment/Plan:     Diagnoses and all orders for this visit:    Benign essential hypertension    Chronic right shoulder pain  -     traMADol (ULTRAM) 50 mg tablet; Take 1/2 or 1 nightly prn OA  -     Medium joint arthrocentesis    Chronic pain of both knees  -     Cancel: Ambulatory referral to Orthopedic Surgery; Future  -     Ambulatory referral to Orthopedic Surgery; Future  -     traMADol (ULTRAM) 50 mg tablet; Take 1/2 or 1 nightly prn OA    Need for influenza vaccination  -     influenza vaccine, 8213-1629, high-dose, PF 0 5 mL, for patients 65 yr+ (FLUZONE HIGH-DOSE)    Need for pneumococcal vaccination  -     PNEUMOCOCCAL CONJUGATE VACCINE 13-VALENT GREATER THAN 6 MONTHS    Need for shingles vaccine  -     Zoster Vac Recomb Adjuvanted ([facility]) 50 MCG SUSR; Inject 50 mcg into a muscle once for 1 dose          Subjective:   Chief Complaint   Patient presents with    Follow-up     6 month checkup  Would like to discuss pain in buttocks  Also has pain in right shoulder   Flu Vaccine     would like flu, and wants to discuss pneumonia vaccine and tetanus      Patient ID: Manuel Johnston is a 80 y o  female      HPI    The following portions of the patient's history were reviewed and updated as appropriate: allergies, current medications, past family history, past medical history, past social history, past surgical history and problem list       Review of Systems      Objective:  /78   Ht 5' 1 5" (1 562 m)   Wt 70 9 kg (156 lb 6 4 oz)   BMI 29 07 kg/m²        Physical Exam  Medium joint arthrocentesis  Date/Time: 10/4/2018 1:35 PM  Consent given by: patient  Supporting Documentation  Indications: pain   Procedure Details  Location: shoulder - R acromioclavicular  Preparation: Patient was prepped and draped in the usual sterile fashion  Needle size: 22 G  Ultrasound guidance: no  Approach: anteromedial  Medication group details: depomedrol 40mg with 1/2 cc Lidocaine 1%    Patient tolerance: patient tolerated the procedure well with no immediate complications  Dressing:  Sterile dressing applied

## 2018-10-04 NOTE — TELEPHONE ENCOUNTER
Caller- patients   - [phone]  Caller reports patients family doctor sent electronic correspondence about the patient needing gel shots for both of her knee's

## 2018-10-04 NOTE — TELEPHONE ENCOUNTER
Pepito Bueno from [facility] called and states that the Tramadol and Doxepin have an interaction  Ok to still take?     MR#[phone]

## 2018-10-05 NOTE — TELEPHONE ENCOUNTER
Please schedule a follow up for patient if she is having knee pain before we order any injections   Pt hasn't been seen for a year

## 2018-10-06 NOTE — TELEPHONE ENCOUNTER
Called and left a v/m this morning for patient advising her to make a f/u appt with Dr Shandra Farley  Left scheduling # [phone] to make a f/u appt next week incase she cannot reach me directly  She is to call back & schedule f/u appt at her earliest convenience

## 2018-10-11 ENCOUNTER — APPOINTMENT (OUTPATIENT)
Dept: RADIOLOGY | Facility: CLINIC | Age: 83
End: 2018-10-11
Payer: MEDICARE

## 2018-10-11 ENCOUNTER — OFFICE VISIT (OUTPATIENT)
Dept: OBGYN CLINIC | Facility: MEDICAL CENTER | Age: 83
End: 2018-10-11
Payer: MEDICARE

## 2018-10-11 VITALS
HEART RATE: 68 BPM | WEIGHT: 156.2 LBS | SYSTOLIC BLOOD PRESSURE: 120 MMHG | BODY MASS INDEX: 28.74 KG/M2 | DIASTOLIC BLOOD PRESSURE: 74 MMHG | HEIGHT: 62 IN

## 2018-10-11 DIAGNOSIS — M17.11 PRIMARY OSTEOARTHRITIS OF RIGHT KNEE: ICD-10-CM

## 2018-10-11 DIAGNOSIS — M25.562 LEFT KNEE PAIN, UNSPECIFIED CHRONICITY: ICD-10-CM

## 2018-10-11 DIAGNOSIS — M17.12 PRIMARY OSTEOARTHRITIS OF LEFT KNEE: ICD-10-CM

## 2018-10-11 DIAGNOSIS — M25.561 RIGHT KNEE PAIN, UNSPECIFIED CHRONICITY: Primary | ICD-10-CM

## 2018-10-11 DIAGNOSIS — M25.561 RIGHT KNEE PAIN, UNSPECIFIED CHRONICITY: ICD-10-CM

## 2018-10-11 PROCEDURE — 99213 OFFICE O/P EST LOW 20 MIN: CPT | Performed by: ORTHOPAEDIC SURGERY

## 2018-10-11 PROCEDURE — 73564 X-RAY EXAM KNEE 4 OR MORE: CPT

## 2018-10-11 NOTE — PROGRESS NOTES
Assessment/Plan:  1  Right knee pain, unspecified chronicity    2  Left knee pain, unspecified chronicity    3  Primary osteoarthritis of right knee    4  Primary osteoarthritis of left knee      Orders Placed This Encounter   Procedures    XR knee 4+ vw left injury    XR knee 4+ vw right injury    Injection procedure prior authorization   · Continue with stretching exercises to keep the ROM going  · Continuing using heat for pain relief, can try ice as well  · Will contact patient when [facility]rel League is done for Eufflexxa injection for B/L knee     Return for Will contant patient when Garrel League has been approved  I answered all of the patient's questions during the visit and provided education of the patient's condition during the visit  The patient verbalized understanding of the information given and agrees with the plan  This note was dictated using [employer] software  It may contain errors including improperly dictated words  Please contact physician directly for any questions  Subjective   Chief Complaint:   Chief Complaint   Patient presents with    Left Knee - Follow-up    Right Knee - Follow-up       Mickey Burdick is a 80 y o  female who presents for follow up for right knee pain  Patient states she had no relief from the right knee injection on 10/16/2017  Patient states her pain has been getting worse bilateral knee  She was wearing a knee brace with no relief  Patient was on Prednisone for arthritis and other problems which also gave her no relief  Her pain is with activity but no pain at rest  She is taking Tramadol for pain with not much relief  She has been using heat which is helping  Review of Systems  ROS:    See HPI for musculoskeletal review     All other systems reviewed are negative     History:  Past Medical History:   Diagnosis Date    Depression     Gout     H/O vaginal hysterectomy     resolved-4/17/2015    Skin cancer      Past Surgical History:   Procedure Laterality Date    CATARACT EXTRACTION      TOTAL ABDOMINAL HYSTERECTOMY      with removal of both ovaries    VAGINAL HYSTERECTOMY      resolved-4/17/2015     Social History   History   Alcohol Use No     History   Drug Use No     History   Smoking Status    Never Smoker   Smokeless Tobacco    Never Used     Family History:   Family History   Problem Relation Age of Onset    Heart attack Mother         MI    Heart attack Father         MI    Hypertension Sister     Stomach cancer Sister     Hypertension Brother        Meds/Allergies     (Not in a hospital admission)  Allergies   Allergen Reactions    Allopurinol Rash     Category:  Allergy;           Objective     /74   Pulse 68   Ht 5' 1 5" (1 562 m)   Wt 70 9 kg (156 lb 3 2 oz)   BMI 29 04 kg/m²      PE:  AAOx 3  WDWN  Hearing intact, no drainage from eyes  no audible wheezing  no abdominal distension  LE compartments soft, skin intact    Ortho Exam:  right Knee:   No erythema  No generalized swelling  no effusion  no warmth  AROM:   Stable to varus/valgus stress  Valgus Deformity     left Knee:   No erythema  Mild generalized  swelling  no effusion  no warmth  AROM:   Stable to varus/valgus stress  Varus Deformity     Imaging Studies: I have personally reviewed pertinent films in PACS  XR Bilateral knee:  Severe DJD

## 2018-10-16 ENCOUNTER — TELEPHONE (OUTPATIENT)
Dept: OBGYN CLINIC | Facility: MEDICAL CENTER | Age: 83
End: 2018-10-16

## 2018-10-16 NOTE — TELEPHONE ENCOUNTER
Pt's bilateral knee Euflexxa has been received in Ortho office today   Pt has all 3 of her appts scheduled starting on 10/18/18

## 2018-10-18 ENCOUNTER — OFFICE VISIT (OUTPATIENT)
Dept: OBGYN CLINIC | Facility: MEDICAL CENTER | Age: 83
End: 2018-10-18
Payer: MEDICARE

## 2018-10-18 VITALS
DIASTOLIC BLOOD PRESSURE: 76 MMHG | BODY MASS INDEX: 28.71 KG/M2 | HEART RATE: 76 BPM | WEIGHT: 156 LBS | HEIGHT: 62 IN | SYSTOLIC BLOOD PRESSURE: 144 MMHG

## 2018-10-18 DIAGNOSIS — M17.12 PRIMARY LOCALIZED OSTEOARTHRITIS OF LEFT KNEE: Primary | ICD-10-CM

## 2018-10-18 DIAGNOSIS — M17.11 PRIMARY LOCALIZED OSTEOARTHRITIS OF RIGHT KNEE: ICD-10-CM

## 2018-10-18 PROCEDURE — 20610 DRAIN/INJ JOINT/BURSA W/O US: CPT | Performed by: PHYSICIAN ASSISTANT

## 2018-10-18 PROCEDURE — 20610 DRAIN/INJ JOINT/BURSA W/O US: CPT | Performed by: FAMILY MEDICINE

## 2018-10-18 RX ORDER — HYALURONATE SODIUM 10 MG/ML
20 SYRINGE (ML) INTRAARTICULAR
Status: COMPLETED | OUTPATIENT
Start: 2018-10-18 | End: 2018-10-18

## 2018-10-18 RX ADMIN — Medication 20 MG: at 13:28

## 2018-10-18 RX ADMIN — Medication 20 MG: at 13:27

## 2018-10-18 NOTE — PROGRESS NOTES
Assessment/Plan:  1  Primary localized osteoarthritis of left knee    2  Primary localized osteoarthritis of right knee      Orders Placed This Encounter   Procedures    Large joint arthrocentesis    Large joint arthrocentesis     -patient received bilateral Euflexxa injections today  She should avoid strenuous activities rest and ice her knee for 1-2 days   -continue with Tylenol and tramadol as needed for pain control  -continue with home exercises  Return for 1 week   Subjective   Chief Complaint:   Chief Complaint   Patient presents with    Left Knee - Follow-up    Right Knee - Follow-up       Lydia Weston is a 80 y o  female who presents for follow up for bilateral knee pain today  She states her knee pain is all over the anterior knee  Her left is worse than her right  Her left knee is unstable  Her right knee is not unstable  She does walk with a walker at baseline  She takes tramadol and Tylenol as needed for pain control  She was doing some stretching and home exercises which caused her knee pain to increase  He has had a steroid injection in the past without relief  Review of Systems  ROS:    See HPI for musculoskeletal review     All other systems reviewed are negative     History:  Past Medical History:   Diagnosis Date    Depression     Gout     H/O vaginal hysterectomy     resolved-4/17/2015    Skin cancer      Past Surgical History:   Procedure Laterality Date    CATARACT EXTRACTION      TOTAL ABDOMINAL HYSTERECTOMY      with removal of both ovaries    VAGINAL HYSTERECTOMY      resolved-4/17/2015     Social History   History   Alcohol Use No     History   Drug Use No     History   Smoking Status    Never Smoker   Smokeless Tobacco    Never Used     Family History:   Family History   Problem Relation Age of Onset    Heart attack Mother         MI    Heart attack Father         MI    Hypertension Sister     Stomach cancer Sister     Hypertension Brother Meds/Allergies     (Not in a hospital admission)  Allergies   Allergen Reactions    Allopurinol Rash     Category:  Allergy;           Objective     /76   Pulse 76   Ht 5' 1 5" (1 562 m)   Wt 70 8 kg (156 lb)   BMI 29 00 kg/m²      PE:  AAOx 3  WDWN  Hearing intact, no drainage from eyes  no audible wheezing  no abdominal distension  LE compartments soft, skin intact    Ortho Exam:  bilateral Knee:   No erythema  no swelling  no effusion  no warmth  AROM: full  TTP over lateral joint line   Stable to varus/valgus stress  Large joint arthrocentesis  Date/Time: 10/18/2018 1:27 PM  Consent given by: patient  Site marked: site marked  Timeout: Immediately prior to procedure a time out was called to verify the correct patient, procedure, equipment, support staff and site/side marked as required   Supporting Documentation  Indications: pain   Procedure Details  Location: knee - R knee  Preparation: Patient was prepped and draped in the usual sterile fashion  Needle size: 22 G  Ultrasound guidance: no  Approach: anterolateral  Medications administered: 20 mg Sodium Hyaluronate 20 MG/2ML    Patient tolerance: patient tolerated the procedure well with no immediate complications  Dressing:  Sterile dressing applied  Large joint arthrocentesis  Date/Time: 10/18/2018 1:28 PM  Consent given by: patient  Site marked: site marked  Timeout: Immediately prior to procedure a time out was called to verify the correct patient, procedure, equipment, support staff and site/side marked as required   Supporting Documentation  Indications: pain   Procedure Details  Location: knee - L knee  Preparation: Patient was prepped and draped in the usual sterile fashion  Needle size: 22 G  Ultrasound guidance: no  Approach: anterolateral  Medications administered: 20 mg Sodium Hyaluronate 20 MG/2ML    Patient tolerance: patient tolerated the procedure well with no immediate complications  Dressing:  Sterile dressing applied

## 2018-10-25 ENCOUNTER — OFFICE VISIT (OUTPATIENT)
Dept: OBGYN CLINIC | Facility: MEDICAL CENTER | Age: 83
End: 2018-10-25
Payer: MEDICARE

## 2018-10-25 VITALS
DIASTOLIC BLOOD PRESSURE: 84 MMHG | SYSTOLIC BLOOD PRESSURE: 148 MMHG | BODY MASS INDEX: 28.71 KG/M2 | HEART RATE: 84 BPM | WEIGHT: 156 LBS | HEIGHT: 62 IN

## 2018-10-25 DIAGNOSIS — M17.12 PRIMARY OSTEOARTHRITIS OF LEFT KNEE: ICD-10-CM

## 2018-10-25 DIAGNOSIS — M17.12 PRIMARY LOCALIZED OSTEOARTHRITIS OF LEFT KNEE: Primary | ICD-10-CM

## 2018-10-25 DIAGNOSIS — M17.11 PRIMARY OSTEOARTHRITIS OF RIGHT KNEE: ICD-10-CM

## 2018-10-25 DIAGNOSIS — M17.11 PRIMARY LOCALIZED OSTEOARTHRITIS OF RIGHT KNEE: ICD-10-CM

## 2018-10-25 PROCEDURE — 20610 DRAIN/INJ JOINT/BURSA W/O US: CPT | Performed by: ORTHOPAEDIC SURGERY

## 2018-10-25 RX ORDER — HYALURONATE SODIUM 10 MG/ML
20 SYRINGE (ML) INTRAARTICULAR
Status: COMPLETED | OUTPATIENT
Start: 2018-10-25 | End: 2018-10-25

## 2018-10-25 RX ADMIN — Medication 20 MG: at 12:46

## 2018-10-25 NOTE — PROGRESS NOTES
Assessment/Plan:  1  Primary localized osteoarthritis of left knee    2  Primary localized osteoarthritis of right knee    3  Primary osteoarthritis of right knee    4  Primary osteoarthritis of left knee      No orders of the defined types were placed in this encounter  Received 2nd out of 3 Euflexxa injections today bilateral knees  Patient would ice and elevate as needed  Continue Tylenol as needed for pain control  She will return in 1 week for her 3rd injection  Return in about 1 week (around 11/1/2018) for for 3rd Euflexxa injection        I answered all of the patient's questions during the visit and provided education of the patient's condition during the visit  The patient verbalized understanding of the information given and agrees with the plan  This note was dictated using [employer] software  It may contain errors including improperly dictated words  Please contact physician directly for any questions  Subjective   Chief Complaint:   Chief Complaint   Patient presents with    Left Knee - Follow-up    Right Knee - Follow-up       Pineda Aldana is a 80 y o  female who presents for follow up for bilateral knee pain and 2nd bilateral Euflexxa injection  Today she complains of left knee burning sensation and lesser right knee pain  She can feel unstable with both knees  She did tolerate the previous Euflexxa injection yet has not found any relief  She does use a walker for ambulation  She does use tylenol for her pain  She had steroid injections in the past without relief  Review of Systems  ROS:    See HPI for musculoskeletal review     All other systems reviewed are negative     History:  Past Medical History:   Diagnosis Date    Depression     Gout     H/O vaginal hysterectomy     resolved-4/17/2015    Skin cancer      Past Surgical History:   Procedure Laterality Date    CATARACT EXTRACTION      TOTAL ABDOMINAL HYSTERECTOMY      with removal of both ovaries    VAGINAL HYSTERECTOMY      resolved-4/17/2015     Social History   History   Alcohol Use No     History   Drug Use No     History   Smoking Status    Never Smoker   Smokeless Tobacco    Never Used     Family History:   Family History   Problem Relation Age of Onset    Heart attack Mother         MI    Heart attack Father         MI    Hypertension Sister     Stomach cancer Sister     Hypertension Brother        Meds/Allergies     (Not in a hospital admission)  Allergies   Allergen Reactions    Allopurinol Rash     Category:  Allergy;           Objective     /84   Pulse 84   Ht 5' 1 5" (1 562 m)   Wt 70 8 kg (156 lb)   BMI 29 00 kg/m²      PE:  AAOx 3  WDWN  Hearing intact, no drainage from eyes  no audible wheezing  no abdominal distension  LE compartments soft, skin intact    Ortho Exam:  bilateral Knee:   No erythema  no swelling  no effusion  no warmth      Large joint arthrocentesis  Date/Time: 10/25/2018 12:46 PM  Consent given by: patient  Site marked: site marked  Timeout: Immediately prior to procedure a time out was called to verify the correct patient, procedure, equipment, support staff and site/side marked as required   Supporting Documentation  Indications: pain   Procedure Details  Location: knee - L knee  Preparation: Patient was prepped and draped in the usual sterile fashion  Needle size: 22 G  Ultrasound guidance: no  Approach: anterolateral  Medications administered: 20 mg Sodium Hyaluronate 20 MG/2ML    Patient tolerance: patient tolerated the procedure well with no immediate complications  Dressing:  Sterile dressing applied  Large joint arthrocentesis  Date/Time: 10/25/2018 12:46 PM  Consent given by: patient  Site marked: site marked  Timeout: Immediately prior to procedure a time out was called to verify the correct patient, procedure, equipment, support staff and site/side marked as required   Supporting Documentation  Indications: pain   Procedure Details  Location: knee - R knee  Preparation: Patient was prepped and draped in the usual sterile fashion  Needle size: 22 G  Ultrasound guidance: no  Approach: anterolateral  Medications administered: 20 mg Sodium Hyaluronate 20 MG/2ML    Patient tolerance: patient tolerated the procedure well with no immediate complications  Dressing:  Sterile dressing applied

## 2018-11-01 ENCOUNTER — OFFICE VISIT (OUTPATIENT)
Dept: OBGYN CLINIC | Facility: MEDICAL CENTER | Age: 83
End: 2018-11-01
Payer: MEDICARE

## 2018-11-01 VITALS
SYSTOLIC BLOOD PRESSURE: 142 MMHG | HEART RATE: 76 BPM | DIASTOLIC BLOOD PRESSURE: 84 MMHG | HEIGHT: 62 IN | BODY MASS INDEX: 28.71 KG/M2 | WEIGHT: 156 LBS

## 2018-11-01 DIAGNOSIS — M25.561 RIGHT KNEE PAIN, UNSPECIFIED CHRONICITY: ICD-10-CM

## 2018-11-01 DIAGNOSIS — M25.562 LEFT KNEE PAIN, UNSPECIFIED CHRONICITY: ICD-10-CM

## 2018-11-01 DIAGNOSIS — M17.12 PRIMARY LOCALIZED OSTEOARTHRITIS OF LEFT KNEE: Primary | ICD-10-CM

## 2018-11-01 DIAGNOSIS — M17.11 PRIMARY LOCALIZED OSTEOARTHRITIS OF RIGHT KNEE: ICD-10-CM

## 2018-11-01 PROCEDURE — 20610 DRAIN/INJ JOINT/BURSA W/O US: CPT | Performed by: ORTHOPAEDIC SURGERY

## 2018-11-01 RX ORDER — HYALURONATE SODIUM 10 MG/ML
20 SYRINGE (ML) INTRAARTICULAR
Status: COMPLETED | OUTPATIENT
Start: 2018-11-01 | End: 2018-11-01

## 2018-11-01 RX ADMIN — Medication 20 MG: at 11:57

## 2018-11-01 NOTE — PROGRESS NOTES
Assessment/Plan:  1  Primary localized osteoarthritis of left knee    2  Primary localized osteoarthritis of right knee    3  Left knee pain, unspecified chronicity    4  Right knee pain, unspecified chronicity      Orders Placed This Encounter   Procedures    Large joint arthrocentesis    Large joint arthrocentesis     · Discussed the possibility of TKA and risks associated  · 3rd Bilateral Euflexxa injection today  · Patient declines physical therapy-previously tried with aggravation of symptoms  · Follow up PRN    Return if symptoms worsen or fail to improve  I answered all of the patient's questions during the visit and provided education of the patient's condition during the visit  The patient verbalized understanding of the information given and agrees with the plan  This note was dictated using [employer] software  It may contain errors including improperly dictated words  Please contact physician directly for any questions  Subjective   Chief Complaint:   Chief Complaint   Patient presents with    Left Knee - Follow-up    Right Knee - Follow-up       Yas Varela is a 80 y o  female who presents for follow up for 3rd bilateral Euflexxa injection  Today she complains of bilateral knee pain  Her right knee feels worse  She can feel unstable and uses a walker to ambulate  She did tolerate the previous Euflexxa injections yet has not found any relief  She does use tyelnol for her pain  Again, she had previous steroid injections without benefit  Review of Systems  ROS:    See HPI for musculoskeletal review     All other systems reviewed are negative     History:  Past Medical History:   Diagnosis Date    Depression     Gout     H/O vaginal hysterectomy     resolved-4/17/2015    Skin cancer      Past Surgical History:   Procedure Laterality Date    CATARACT EXTRACTION      TOTAL ABDOMINAL HYSTERECTOMY      with removal of both ovaries    VAGINAL HYSTERECTOMY      resolved-4/17/2015 Social History   History   Alcohol Use No     History   Drug Use No     History   Smoking Status    Never Smoker   Smokeless Tobacco    Never Used     Family History:   Family History   Problem Relation Age of Onset    Heart attack Mother         MI    Heart attack Father         MI    Hypertension Sister     Stomach cancer Sister     Hypertension Brother        Meds/Allergies     (Not in a hospital admission)  Allergies   Allergen Reactions    Allopurinol Rash     Category:  Allergy;           Objective     /84   Pulse 76   Ht 5' 1 5" (1 562 m)   Wt 70 8 kg (156 lb)   BMI 29 00 kg/m²      PE:  AAOx 3  WDWN  Hearing intact, no drainage from eyes  no audible wheezing  no abdominal distension  LE compartments soft, skin intact    Ortho Exam:  bilateral Knee:   No erythema  no swelling  no effusion  no warmth    Imaging Studies: none    Large joint arthrocentesis  Date/Time: 11/1/2018 11:57 AM  Consent given by: patient  Site marked: site marked  Timeout: Immediately prior to procedure a time out was called to verify the correct patient, procedure, equipment, support staff and site/side marked as required   Supporting Documentation  Indications: pain   Procedure Details  Location: knee - R knee  Needle size: 22 G  Approach: anterolateral  Medications administered: 20 mg Sodium Hyaluronate 20 MG/2ML    Patient tolerance: patient tolerated the procedure well with no immediate complications  Dressing:  Sterile dressing applied  Large joint arthrocentesis  Date/Time: 11/1/2018 11:57 AM  Consent given by: patient  Site marked: site marked  Timeout: Immediately prior to procedure a time out was called to verify the correct patient, procedure, equipment, support staff and site/side marked as required   Supporting Documentation  Indications: pain   Procedure Details  Location: knee - L knee  Needle size: 22 G  Approach: anterolateral  Medications administered: 20 mg Sodium Hyaluronate 20 MG/2ML    Patient tolerance: patient tolerated the procedure well with no immediate complications  Dressing:  Sterile dressing applied        Scribe Attestation    I,:   Chaz Boyer am acting as a scribe while in the presence of the attending physician :        I,:   Cassia Hand DO personally performed the services described in this documentation    as scribed in my presence :

## 2018-11-06 DIAGNOSIS — I10 BENIGN ESSENTIAL HYPERTENSION: ICD-10-CM

## 2018-11-06 RX ORDER — LISINOPRIL 40 MG/1
40 TABLET ORAL DAILY
Qty: 90 TABLET | Refills: 3 | Status: SHIPPED | OUTPATIENT
Start: 2018-11-06 | End: 2019-04-04 | Stop reason: SINTOL

## 2018-12-10 DIAGNOSIS — F32.A DEPRESSION, UNSPECIFIED DEPRESSION TYPE: ICD-10-CM

## 2019-04-04 ENCOUNTER — OFFICE VISIT (OUTPATIENT)
Dept: FAMILY MEDICINE CLINIC | Facility: CLINIC | Age: 84
End: 2019-04-04
Payer: MEDICARE

## 2019-04-04 DIAGNOSIS — E78.2 MIXED HYPERLIPIDEMIA: ICD-10-CM

## 2019-04-04 DIAGNOSIS — G60.9 IDIOPATHIC PERIPHERAL NEUROPATHY: ICD-10-CM

## 2019-04-04 DIAGNOSIS — M75.01 ADHESIVE CAPSULITIS OF RIGHT SHOULDER: ICD-10-CM

## 2019-04-04 DIAGNOSIS — F32.A DEPRESSION, UNSPECIFIED DEPRESSION TYPE: ICD-10-CM

## 2019-04-04 DIAGNOSIS — E55.9 VITAMIN D DEFICIENCY: ICD-10-CM

## 2019-04-04 DIAGNOSIS — E53.8 VITAMIN B 12 DEFICIENCY: ICD-10-CM

## 2019-04-04 DIAGNOSIS — I10 BENIGN ESSENTIAL HYPERTENSION: Primary | ICD-10-CM

## 2019-04-04 PROCEDURE — 99214 OFFICE O/P EST MOD 30 MIN: CPT | Performed by: FAMILY MEDICINE

## 2019-04-04 RX ORDER — DOXEPIN HYDROCHLORIDE 50 MG/1
50 CAPSULE ORAL
Qty: 90 CAPSULE | Refills: 3 | Status: SHIPPED | OUTPATIENT
Start: 2019-04-04 | End: 2019-09-09 | Stop reason: SDUPTHER

## 2019-04-04 RX ORDER — TELMISARTAN 20 MG/1
20 TABLET ORAL DAILY
Qty: 90 TABLET | Refills: 3 | Status: SHIPPED | OUTPATIENT
Start: 2019-04-04 | End: 2019-09-20 | Stop reason: SDUPTHER

## 2019-04-04 RX ORDER — GABAPENTIN 300 MG/1
300 CAPSULE ORAL 3 TIMES DAILY
Qty: 360 CAPSULE | Refills: 3 | Status: SHIPPED | OUTPATIENT
Start: 2019-04-04 | End: 2020-05-12 | Stop reason: SDUPTHER

## 2019-04-14 VITALS
DIASTOLIC BLOOD PRESSURE: 78 MMHG | WEIGHT: 165 LBS | BODY MASS INDEX: 30.36 KG/M2 | HEIGHT: 62 IN | SYSTOLIC BLOOD PRESSURE: 122 MMHG

## 2019-05-18 DIAGNOSIS — E78.01 FAMILIAL HYPERCHOLESTEROLEMIA: ICD-10-CM

## 2019-05-18 DIAGNOSIS — E78.2 MIXED HYPERLIPIDEMIA: ICD-10-CM

## 2019-05-18 DIAGNOSIS — K21.9 GASTROESOPHAGEAL REFLUX DISEASE WITHOUT ESOPHAGITIS: ICD-10-CM

## 2019-05-20 RX ORDER — OMEPRAZOLE 20 MG/1
CAPSULE, DELAYED RELEASE ORAL
Qty: 90 CAPSULE | Refills: 3 | Status: SHIPPED | OUTPATIENT
Start: 2019-05-20 | End: 2019-10-21 | Stop reason: SDUPTHER

## 2019-05-20 RX ORDER — EZETIMIBE 10 MG/1
TABLET ORAL
Qty: 90 TABLET | Refills: 3 | Status: SHIPPED | OUTPATIENT
Start: 2019-05-20 | End: 2019-09-09 | Stop reason: SDUPTHER

## 2019-07-17 ENCOUNTER — APPOINTMENT (OUTPATIENT)
Dept: RADIOLOGY | Age: 84
End: 2019-07-17
Payer: MEDICARE

## 2019-07-17 DIAGNOSIS — M75.01 ADHESIVE CAPSULITIS OF RIGHT SHOULDER: ICD-10-CM

## 2019-07-17 PROCEDURE — 73030 X-RAY EXAM OF SHOULDER: CPT

## 2019-07-22 ENCOUNTER — TELEPHONE (OUTPATIENT)
Dept: FAMILY MEDICINE CLINIC | Facility: CLINIC | Age: 84
End: 2019-07-22

## 2019-07-22 DIAGNOSIS — S46.001A INJURY OF RIGHT ROTATOR CUFF, INITIAL ENCOUNTER: Primary | ICD-10-CM

## 2019-07-22 NOTE — TELEPHONE ENCOUNTER
----- Message from Gamaliel Dewitt DO sent at 6/72/9703  9:13 AM EDT -----  Regarding: X-ray report  Right shoulder x-ray was done  It was ordered at her last visit  It is positive for likely right rotator cuff injury with joint effusion  She has seen [place] [facility] Dr Елена Hansen  I put a referral in already    Advise the patient/son

## 2019-07-22 NOTE — TELEPHONE ENCOUNTER
That the tough call  Radiologist makes no mention either way  There certainly is some significant degenerative changes which do not happen overnight  There is fluid and sometimes that is more recent but orthopedics will probably be able to given a better opinion

## 2019-07-22 NOTE — TELEPHONE ENCOUNTER
Pt's son  Jayro Dodd was given message was asking if the fluid and injury are two different problems I informed him I can not answer that I can certainly ask dr Regan he stated he will ask orthopedics

## 2019-07-22 NOTE — TELEPHONE ENCOUNTER
Pt's son Cierra Hammer was given the details and the number for Dr Nolvia Hammer was asking if you could give your opinion if her right rotator cuff injury is new and or old?  His number is [phone]

## 2019-07-23 ENCOUNTER — APPOINTMENT (INPATIENT)
Dept: MRI IMAGING | Facility: HOSPITAL | Age: 84
DRG: 101 | End: 2019-07-23
Payer: MEDICARE

## 2019-07-23 ENCOUNTER — HOSPITAL ENCOUNTER (INPATIENT)
Facility: HOSPITAL | Age: 84
LOS: 2 days | DRG: 101 | End: 2019-07-25
Attending: EMERGENCY MEDICINE | Admitting: INTERNAL MEDICINE
Payer: MEDICARE

## 2019-07-23 ENCOUNTER — APPOINTMENT (EMERGENCY)
Dept: RADIOLOGY | Facility: HOSPITAL | Age: 84
DRG: 101 | End: 2019-07-23
Payer: MEDICARE

## 2019-07-23 ENCOUNTER — APPOINTMENT (EMERGENCY)
Dept: CT IMAGING | Facility: HOSPITAL | Age: 84
DRG: 101 | End: 2019-07-23
Payer: MEDICARE

## 2019-07-23 DIAGNOSIS — I63.9 CVA (CEREBRAL VASCULAR ACCIDENT) (HCC): Primary | ICD-10-CM

## 2019-07-23 DIAGNOSIS — I10 HIGH BLOOD PRESSURE: ICD-10-CM

## 2019-07-23 DIAGNOSIS — R47.01 APHASIA: ICD-10-CM

## 2019-07-23 DIAGNOSIS — R47.9 SPEECH DISTURBANCE: ICD-10-CM

## 2019-07-23 DIAGNOSIS — N28.9 RENAL INSUFFICIENCY: ICD-10-CM

## 2019-07-23 PROBLEM — I44.1 MOBITZ TYPE 2 SECOND DEGREE AV BLOCK: Status: ACTIVE | Noted: 2019-07-23

## 2019-07-23 PROBLEM — R41.0 CONFUSED: Status: ACTIVE | Noted: 2019-07-23

## 2019-07-23 PROBLEM — N17.9 AKI (ACUTE KIDNEY INJURY) (HCC): Status: ACTIVE | Noted: 2019-07-23

## 2019-07-23 PROBLEM — R77.8 ELEVATED TROPONIN I LEVEL: Status: ACTIVE | Noted: 2019-07-23

## 2019-07-23 PROBLEM — R79.89 ELEVATED TROPONIN I LEVEL: Status: ACTIVE | Noted: 2019-07-23
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
## 2019-07-23 PROBLEM — N18.9 ACUTE RENAL FAILURE SUPERIMPOSED ON CHRONIC KIDNEY DISEASE (HCC): Status: ACTIVE | Noted: 2019-07-23

## 2019-07-23 LAB
ALBUMIN SERPL BCP-MCNC: 4.4 G/DL (ref 3.5–5)
ALP SERPL-CCNC: 87 U/L (ref 46–116)
ALT SERPL W P-5'-P-CCNC: 18 U/L (ref 12–78)
ANION GAP SERPL CALCULATED.3IONS-SCNC: 12 MMOL/L (ref 4–13)
APAP SERPL-MCNC: <2 UG/ML (ref 10–20)
APTT PPP: 30 SECONDS (ref 23–37)
AST SERPL W P-5'-P-CCNC: 17 U/L (ref 5–45)
BASOPHILS # BLD AUTO: 0.03 THOUSANDS/ΜL (ref 0–0.1)
BASOPHILS NFR BLD AUTO: 1 % (ref 0–1)
BILIRUB SERPL-MCNC: 0.63 MG/DL (ref 0.2–1)
BUN SERPL-MCNC: 26 MG/DL (ref 5–25)
CALCIUM SERPL-MCNC: 11.1 MG/DL (ref 8.3–10.1)
CHLORIDE SERPL-SCNC: 106 MMOL/L (ref 100–108)
CK MB SERPL-MCNC: 1.9 % (ref 0–2.5)
CK MB SERPL-MCNC: 2.7 NG/ML (ref 0–5)
CK SERPL-CCNC: 140 U/L (ref 26–192)
CO2 SERPL-SCNC: 24 MMOL/L (ref 21–32)
CREAT SERPL-MCNC: 1.32 MG/DL (ref 0.6–1.3)
EOSINOPHIL # BLD AUTO: 0.02 THOUSAND/ΜL (ref 0–0.61)
EOSINOPHIL NFR BLD AUTO: 0 % (ref 0–6)
ERYTHROCYTE [DISTWIDTH] IN BLOOD BY AUTOMATED COUNT: 14.8 % (ref 11.6–15.1)
ETHANOL SERPL-MCNC: <3 MG/DL (ref 0–3)
GFR SERPL CREATININE-BSD FRML MDRD: 36 ML/MIN/1.73SQ M
GLUCOSE SERPL-MCNC: 112 MG/DL (ref 65–140)
HCT VFR BLD AUTO: 36.7 % (ref 34.8–46.1)
HGB BLD-MCNC: 11.7 G/DL (ref 11.5–15.4)
IMM GRANULOCYTES # BLD AUTO: 0.02 THOUSAND/UL (ref 0–0.2)
IMM GRANULOCYTES NFR BLD AUTO: 0 % (ref 0–2)
INR PPP: 1.03 (ref 0.84–1.19)
LYMPHOCYTES # BLD AUTO: 0.78 THOUSANDS/ΜL (ref 0.6–4.47)
LYMPHOCYTES NFR BLD AUTO: 14 % (ref 14–44)
MCH RBC QN AUTO: 29.2 PG (ref 26.8–34.3)
MCHC RBC AUTO-ENTMCNC: 31.9 G/DL (ref 31.4–37.4)
MCV RBC AUTO: 92 FL (ref 82–98)
MONOCYTES # BLD AUTO: 0.38 THOUSAND/ΜL (ref 0.17–1.22)
MONOCYTES NFR BLD AUTO: 7 % (ref 4–12)
NEUTROPHILS # BLD AUTO: 4.22 THOUSANDS/ΜL (ref 1.85–7.62)
NEUTS SEG NFR BLD AUTO: 78 % (ref 43–75)
NRBC BLD AUTO-RTO: 0 /100 WBCS
PLATELET # BLD AUTO: 160 THOUSANDS/UL (ref 149–390)
PMV BLD AUTO: 11.7 FL (ref 8.9–12.7)
POTASSIUM SERPL-SCNC: 4.5 MMOL/L (ref 3.5–5.3)
PROT SERPL-MCNC: 7.7 G/DL (ref 6.4–8.2)
PROTHROMBIN TIME: 13.6 SECONDS (ref 11.6–14.5)
RBC # BLD AUTO: 4.01 MILLION/UL (ref 3.81–5.12)
SALICYLATES SERPL-MCNC: 3.1 MG/DL (ref 3–20)
SODIUM SERPL-SCNC: 142 MMOL/L (ref 136–145)
TROPONIN I SERPL-MCNC: 0.11 NG/ML
TROPONIN I SERPL-MCNC: 0.16 NG/ML
TROPONIN I SERPL-MCNC: 0.17 NG/ML
WBC # BLD AUTO: 5.45 THOUSAND/UL (ref 4.31–10.16)

## 2019-07-23 PROCEDURE — 80320 DRUG SCREEN QUANTALCOHOLS: CPT | Performed by: PHYSICIAN ASSISTANT

## 2019-07-23 PROCEDURE — 71046 X-RAY EXAM CHEST 2 VIEWS: CPT

## 2019-07-23 PROCEDURE — 99285 EMERGENCY DEPT VISIT HI MDM: CPT | Performed by: EMERGENCY MEDICINE

## 2019-07-23 PROCEDURE — 82550 ASSAY OF CK (CPK): CPT | Performed by: EMERGENCY MEDICINE

## 2019-07-23 PROCEDURE — 93005 ELECTROCARDIOGRAM TRACING: CPT

## 2019-07-23 PROCEDURE — 80053 COMPREHEN METABOLIC PANEL: CPT | Performed by: EMERGENCY MEDICINE

## 2019-07-23 PROCEDURE — 84484 ASSAY OF TROPONIN QUANT: CPT | Performed by: PHYSICIAN ASSISTANT

## 2019-07-23 PROCEDURE — 70450 CT HEAD/BRAIN W/O DYE: CPT

## 2019-07-23 PROCEDURE — 1123F ACP DISCUSS/DSCN MKR DOCD: CPT | Performed by: EMERGENCY MEDICINE

## 2019-07-23 PROCEDURE — 36415 COLL VENOUS BLD VENIPUNCTURE: CPT | Performed by: EMERGENCY MEDICINE

## 2019-07-23 PROCEDURE — 99223 1ST HOSP IP/OBS HIGH 75: CPT | Performed by: INTERNAL MEDICINE

## 2019-07-23 PROCEDURE — 85610 PROTHROMBIN TIME: CPT | Performed by: EMERGENCY MEDICINE

## 2019-07-23 PROCEDURE — 82553 CREATINE MB FRACTION: CPT | Performed by: EMERGENCY MEDICINE

## 2019-07-23 PROCEDURE — 84484 ASSAY OF TROPONIN QUANT: CPT | Performed by: EMERGENCY MEDICINE

## 2019-07-23 PROCEDURE — 80329 ANALGESICS NON-OPIOID 1 OR 2: CPT | Performed by: PHYSICIAN ASSISTANT

## 2019-07-23 PROCEDURE — 96360 HYDRATION IV INFUSION INIT: CPT

## 2019-07-23 PROCEDURE — 85025 COMPLETE CBC W/AUTO DIFF WBC: CPT | Performed by: EMERGENCY MEDICINE

## 2019-07-23 PROCEDURE — 99285 EMERGENCY DEPT VISIT HI MDM: CPT

## 2019-07-23 PROCEDURE — 72125 CT NECK SPINE W/O DYE: CPT

## 2019-07-23 PROCEDURE — 85730 THROMBOPLASTIN TIME PARTIAL: CPT | Performed by: EMERGENCY MEDICINE

## 2019-07-23 RX ORDER — ASPIRIN 81 MG/1
81 TABLET, CHEWABLE ORAL DAILY
Status: DISCONTINUED | OUTPATIENT
Start: 2019-07-24 | End: 2019-07-25 | Stop reason: HOSPADM

## 2019-07-23 RX ORDER — DOXEPIN HYDROCHLORIDE 50 MG/1
50 CAPSULE ORAL
Status: DISCONTINUED | OUTPATIENT
Start: 2019-07-23 | End: 2019-07-24

## 2019-07-23 RX ORDER — ATORVASTATIN CALCIUM 40 MG/1
40 TABLET, FILM COATED ORAL EVERY EVENING
Status: DISCONTINUED | OUTPATIENT
Start: 2019-07-23 | End: 2019-07-25 | Stop reason: HOSPADM

## 2019-07-23 RX ORDER — SODIUM CHLORIDE 9 MG/ML
50 INJECTION, SOLUTION INTRAVENOUS CONTINUOUS
Status: DISCONTINUED | OUTPATIENT
Start: 2019-07-23 | End: 2019-07-25 | Stop reason: HOSPADM

## 2019-07-23 RX ORDER — PANTOPRAZOLE SODIUM 20 MG/1
20 TABLET, DELAYED RELEASE ORAL
Status: DISCONTINUED | OUTPATIENT
Start: 2019-07-24 | End: 2019-07-25 | Stop reason: HOSPADM

## 2019-07-23 RX ORDER — LORAZEPAM 2 MG/ML
0.5 INJECTION INTRAMUSCULAR ONCE
Status: DISCONTINUED | OUTPATIENT
Start: 2019-07-23 | End: 2019-07-23

## 2019-07-23 RX ORDER — HEPARIN SODIUM 5000 [USP'U]/ML
5000 INJECTION, SOLUTION INTRAVENOUS; SUBCUTANEOUS EVERY 8 HOURS SCHEDULED
Status: DISCONTINUED | OUTPATIENT
Start: 2019-07-23 | End: 2019-07-25 | Stop reason: HOSPADM

## 2019-07-23 RX ORDER — LOSARTAN POTASSIUM 25 MG/1
25 TABLET ORAL ONCE
Status: COMPLETED | OUTPATIENT
Start: 2019-07-23 | End: 2019-07-23

## 2019-07-23 RX ORDER — LORAZEPAM 2 MG/ML
0.5 INJECTION INTRAMUSCULAR ONCE
Status: COMPLETED | OUTPATIENT
Start: 2019-07-23 | End: 2019-07-23

## 2019-07-23 RX ORDER — ASPIRIN 81 MG/1
324 TABLET, CHEWABLE ORAL ONCE
Status: COMPLETED | OUTPATIENT
Start: 2019-07-23 | End: 2019-07-23

## 2019-07-23 RX ADMIN — SODIUM CHLORIDE 50 ML/HR: 0.9 INJECTION, SOLUTION INTRAVENOUS at 18:56

## 2019-07-23 RX ADMIN — ATORVASTATIN CALCIUM 40 MG: 40 TABLET, FILM COATED ORAL at 18:56

## 2019-07-23 RX ADMIN — HEPARIN SODIUM 5000 UNITS: 5000 INJECTION INTRAVENOUS; SUBCUTANEOUS at 18:56

## 2019-07-23 RX ADMIN — ASPIRIN 81 MG 324 MG: 81 TABLET ORAL at 14:39

## 2019-07-23 RX ADMIN — LORAZEPAM 0.5 MG: 2 INJECTION INTRAMUSCULAR; INTRAVENOUS at 21:22

## 2019-07-23 RX ADMIN — DOXEPIN HYDROCHLORIDE 50 MG: 50 CAPSULE ORAL at 21:44

## 2019-07-23 RX ADMIN — SODIUM CHLORIDE 1000 ML: 0.9 INJECTION, SOLUTION INTRAVENOUS at 14:42

## 2019-07-23 RX ADMIN — LOSARTAN POTASSIUM 25 MG: 25 TABLET, FILM COATED ORAL at 14:58

## 2019-07-23 NOTE — ASSESSMENT & PLAN NOTE
Suspected on heart monitor  Given bifascicular block and pt being found down consider possible syncopal episode  Monitor on telemetry, consult cardiology for evaluation

## 2019-07-23 NOTE — ED PROVIDER NOTES
History  Chief Complaint   Patient presents with    Aphasia     Pt lives alone, pt son went to visit pt and found her on the ground by her bed  Per ems pt was last known well 18 hours ago  Pt is usually alert x 4 but now has aphasia  Per ems pt is not on blood thinners      5year-old female presents for evaluation of aphasia and altered mental status  The patient was found on the ground next to her bed  She lives alone and her son visited her today  She was last known to be normal approximately 18 hours ago  Reportedly, the patient is normally awake alert and oriented  The patient was noted to have a marked aphasia per EMS  Prior to Admission Medications   Prescriptions Last Dose Informant Patient Reported? Taking?    Ascorbic Acid (VITAMIN C) 500 MG CAPS   Yes No   Sig: Take by mouth   BIOTIN PO  Self Yes No   Sig: Take by mouth   Cholecalciferol (VITAMIN D3) 1000 units CAPS   Yes No   Sig: Take by mouth   Coenzyme Q10 (COQ-10) 10 MG CAPS   Yes No   Sig: Take by mouth   Flaxseed, Linseed, (FLAX SEED OIL PO)  Self Yes No   Sig: Take by mouth   MAGNESIUM CITRATE PO  Self Yes No   Sig: Take by mouth   Multiple Vitamins-Minerals (PRESERVISION AREDS 2+MULTI VIT PO)   Yes No   Sig: Take 1 capsule by mouth 2 (two) times a day   Plant Sterols and Stanols (CHOLESTOFF PLUS) 450 MG CAPS   Yes No   Sig: Take 1 capsule by mouth daily   Probiotic Product (PROBIOTIC-10) CAPS   Yes No   Sig: Take by mouth   aspirin 81 MG tablet   Yes No   Sig: Take by mouth   diclofenac sodium (VOLTAREN) 1 %   No No   Sig: Apply 2 g topically 4 (four) times a day   doxepin (SINEquan) 50 mg capsule   No No   Sig: Take 1 capsule (50 mg total) by mouth daily at bedtime   ezetimibe (ZETIA) 10 mg tablet   No No   Sig: TAKE 1 TABLET BY MOUTH EVERY DAY   gabapentin (NEURONTIN) 300 mg capsule   No No   Sig: Take 1 capsule (300 mg total) by mouth 3 (three) times a day And may add an extra daily p r n    omeprazole (PriLOSEC) 20 mg delayed release capsule   No No   Sig: TAKE 1 CAPSULE BY MOUTH EVERY DAY   telmisartan (MICARDIS) 20 MG tablet   No No   Sig: Take 1 tablet (20 mg total) by mouth daily      Facility-Administered Medications: None       Past Medical History:   Diagnosis Date    Depression     Gout     H/O vaginal hysterectomy     resolved-4/17/2015    Skin cancer        Past Surgical History:   Procedure Laterality Date    CATARACT EXTRACTION      TOTAL ABDOMINAL HYSTERECTOMY      with removal of both ovaries    VAGINAL HYSTERECTOMY      resolved-4/17/2015       Family History   Problem Relation Age of Onset    Heart attack Mother         MI    Heart attack Father         MI    Hypertension Sister     Stomach cancer Sister     Hypertension Brother      I have reviewed and agree with the history as documented  Social History     Tobacco Use    Smoking status: Never Smoker    Smokeless tobacco: Never Used   Substance Use Topics    Alcohol use: No    Drug use: No        Review of Systems   Constitutional: Negative for chills and fever  Respiratory: Negative for chest tightness and shortness of breath  Cardiovascular: Negative for chest pain  Gastrointestinal: Negative for abdominal pain and vomiting  Neurological: Negative for headaches  All other systems reviewed and are negative  Physical Exam  Physical Exam   Constitutional: She appears well-developed and well-nourished  No distress  HENT:   Head: Normocephalic and atraumatic  Right Ear: External ear normal    Left Ear: External ear normal    Eyes: Pupils are equal, round, and reactive to light  Conjunctivae and EOM are normal  No scleral icterus  Neck: Normal range of motion  Cardiovascular: Normal rate, regular rhythm and normal heart sounds  Pulmonary/Chest: Effort normal and breath sounds normal  No respiratory distress  Abdominal: Soft  Bowel sounds are normal  There is no tenderness  There is no rebound and no guarding     Musculoskeletal: Normal range of motion  She exhibits no edema  Neurological: She is alert  She is disoriented ( Time)  A cranial nerve deficit is present  No sensory deficit  GCS eye subscore is 4  GCS verbal subscore is 5  GCS motor subscore is 6  Patient with bilateral lower extremity weakness as she is unable to lift them off the bed  She has no sensory deficit lower legs  The patient is able to speak appropriately in complete sentences however she has difficulty with word finding  She was unable to name a pen or a wrist watch on direct questioning   Skin: Skin is warm and dry  No rash noted  Psychiatric: She has a normal mood and affect  Nursing note and vitals reviewed        Vital Signs  ED Triage Vitals   Temperature Pulse Respirations Blood Pressure SpO2   07/23/19 1228 07/23/19 1226 07/23/19 1226 07/23/19 1226 07/23/19 1226   98 8 °F (37 1 °C) 74 18 169/71 94 %      Temp Source Heart Rate Source Patient Position - Orthostatic VS BP Location FiO2 (%)   07/23/19 1228 07/23/19 1226 07/23/19 1226 07/23/19 1226 --   Oral Monitor Sitting Right arm       Pain Score       07/23/19 1357       5           Vitals:    07/24/19 1504 07/24/19 1938 07/24/19 2005 07/25/19 0002   BP: 160/83 (!) 185/86 164/82 146/78   Pulse: 77 78  (!) 51   Patient Position - Orthostatic VS: Lying Lying Lying Lying         Visual Acuity  Visual Acuity      Most Recent Value   L Pupil Size (mm)  3   R Pupil Size (mm)  3   L Pupil Shape  Round   R Pupil Shape  Round          ED Medications  Medications   pantoprazole (PROTONIX) EC tablet 20 mg (20 mg Oral Given 7/24/19 0530)   atorvastatin (LIPITOR) tablet 40 mg (40 mg Oral Given 7/24/19 1649)   sodium chloride 0 9 % infusion (50 mL/hr Intravenous New Bag 7/24/19 1719)   aspirin chewable tablet 81 mg (81 mg Oral Given 7/24/19 1156)   heparin (porcine) subcutaneous injection 5,000 Units (5,000 Units Subcutaneous Given 7/24/19 2151)   LORazepam (ATIVAN) 2 mg/mL injection 1 mg (1 mg Intravenous Not Given 7/24/19 1153)   levETIRAcetam (KEPPRA) tablet 375 mg (375 mg Oral Given 7/24/19 2150)   doxepin (SINEquan) capsule 25 mg (25 mg Oral Given 7/24/19 2150)   acetaminophen (TYLENOL) tablet 650 mg (has no administration in time range)   gabapentin (NEURONTIN) capsule 300 mg (300 mg Oral Given 7/24/19 2151)   sodium chloride 0 9 % bolus 1,000 mL (0 mL Intravenous Stopped 7/23/19 1537)   aspirin chewable tablet 324 mg (324 mg Oral Given 7/23/19 1439)   losartan (COZAAR) tablet 25 mg (25 mg Oral Given 7/23/19 1458)   LORazepam (ATIVAN) 2 mg/mL injection 0 5 mg (0 5 mg Intravenous Given 7/23/19 2122)   levETIRAcetam (KEPPRA) 1,000 mg in sodium chloride 0 9 % 100 mL IVPB (1,000 mg Intravenous New Bag 7/24/19 1625)       Diagnostic Studies  Results Reviewed     Procedure Component Value Units Date/Time    CKMB [[id number]]  (Normal) Collected:  07/23/19 1254    Lab Status:  Final result Specimen:  Blood from Arm, Right Updated:  07/23/19 1447     CK-MB Index 1 9 %      CK-MB 2 7 ng/mL     CK (with reflex to MB) [[id number]]  (Normal) Collected:  07/23/19 1254    Lab Status:  Final result Specimen:  Blood from Arm, Right Updated:  07/23/19 1446     Total  U/L     Comprehensive metabolic panel [[id number]]  (Abnormal) Collected:  07/23/19 1254    Lab Status:  Final result Specimen:  Blood from Arm, Right Updated:  07/23/19 1404     Sodium 142 mmol/L      Potassium 4 5 mmol/L      Chloride 106 mmol/L      CO2 24 mmol/L      ANION GAP 12 mmol/L      BUN 26 mg/dL      Creatinine 1 32 mg/dL      Glucose 112 mg/dL      Calcium 11 1 mg/dL      AST 17 U/L      ALT 18 U/L      Alkaline Phosphatase 87 U/L      Total Protein 7 7 g/dL      Albumin 4 4 g/dL      Total Bilirubin 0 63 mg/dL      eGFR 36 ml/min/1 73sq m     Narrative:       [place] guidelines for Chronic Kidney Disease (CKD):     Stage 1 with normal or high GFR (GFR > 90 mL/min/1 73 square meters)    Stage 2 Mild CKD (GFR = 60-89 mL/min/1 73 square meters)    Stage 3A Moderate CKD (GFR = 45-59 mL/min/1 73 square meters)    Stage 3B Moderate CKD (GFR = 30-44 mL/min/1 73 square meters)    Stage 4 Severe CKD (GFR = 15-29 mL/min/1 73 square meters)    Stage 5 End Stage CKD (GFR <15 mL/min/1 73 square meters)  Note: GFR calculation is accurate only with a steady state creatinine    Troponin I [[id number]]  (Abnormal) Collected:  07/23/19 1254    Lab Status:  Final result Specimen:  Blood from Arm, Right Updated:  07/23/19 1325     Troponin I 0 11 ng/mL     Protime-INR [[id number]]  (Normal) Collected:  07/23/19 1254    Lab Status:  Final result Specimen:  Blood from Arm, Right Updated:  07/23/19 1311     Protime 13 6 seconds      INR 1 03    APTT [[id number]]  (Normal) Collected:  07/23/19 1254    Lab Status:  Final result Specimen:  Blood from Arm, Right Updated:  07/23/19 1311     PTT 30 seconds     CBC and differential [[id number]]  (Abnormal) Collected:  07/23/19 1254    Lab Status:  Final result Specimen:  Blood from Arm, Right Updated:  07/23/19 1301     WBC 5 45 Thousand/uL      RBC 4 01 Million/uL      Hemoglobin 11 7 g/dL      Hematocrit 36 7 %      MCV 92 fL      MCH 29 2 pg      MCHC 31 9 g/dL      RDW 14 8 %      MPV 11 7 fL      Platelets 318 Thousands/uL      nRBC 0 /100 WBCs      Neutrophils Relative 78 %      Immat GRANS % 0 %      Lymphocytes Relative 14 %      Monocytes Relative 7 %      Eosinophils Relative 0 %      Basophils Relative 1 %      Neutrophils Absolute 4 22 Thousands/µL      Immature Grans Absolute 0 02 Thousand/uL      Lymphocytes Absolute 0 78 Thousands/µL      Monocytes Absolute 0 38 Thousand/µL      Eosinophils Absolute 0 02 Thousand/µL      Basophils Absolute 0 03 Thousands/µL                  MRI brain wo contrast   Final Result by Jmiena Montgomery MD (07/24 1202)      No acute disease  Minor, age-appropriate volume loss, mild degree of chronic small vessel disease        Workstation performed: [id number] CT head without contrast   Final Result by Tori Odom MD (07/23 1411)      No acute intracranial abnormality  Workstation performed: [id number]         CT cervical spine without contrast   Final Result by Tori Odom MD (07/23 1413)      No cervical spine fracture or traumatic malalignment  Workstation performed: [id number]         XR chest 2 views   ED Interpretation by Sanna Buitrago DO (07/23 1407)   ED Provider X-ray Interpretation      My X-ray interpretation of the Chest: was negative for infiltrate, effusion, pneumothorax, or wide mediastinum      Sanna Buitrago DO      Final Result by Brayton Osgood, MD (07/23 1429)      No acute cardiopulmonary disease  Workstation performed: [id number]                    Procedures  ECG 12 Lead Documentation Only  Date/Time: 7/23/2019 12:44 PM  Performed by: Sanna Buitrago DO  Authorized by: Sanna Buitrago DO     ECG reviewed by me, the ED Provider: yes    Patient location:  ED  Previous ECG:     Previous ECG:  Compared to current    Comparison ECG info:  7/23/2013    Similarity:  No change  Interpretation:     Interpretation: abnormal    Rate:     ECG rate:  69    ECG rate assessment: normal    Rhythm:     Rhythm: sinus rhythm    Ectopy:     Ectopy: none    QRS:     QRS axis:  Normal    QRS intervals:  Normal  Conduction:     Conduction: abnormal      Abnormal conduction: bifascicular block    ST segments:     ST segments:  Normal  T waves:     T waves: non-specific             ED Course  ED Course as of Jul 25 0709   Tue Jul 23, 2019   1437 Patient's BG improved but not resolved  The patient still has speech difficulties per the son  I discussed the plan for admission as patient also has an elevated troponin  I will give aspirin for that  The patient's blood pressure is elevated 199/95 and antihypertensive will be given    Fluids for the elevated creatinine      1520 I discussed the case with the hospitalist  We reviewed the HPI, pertinent PMH, ED course and workup  Hospitalist agreed with plan and will admit the patient to the hospital                 Stroke Assessment     Row Name 07/23/19 1241             NIH Stroke Scale    Interval  Baseline      Level of Consciousness (1a )  0      LOC Questions (1b )  1      LOC Commands (1c )  0      Best Gaze (2 )  0      Visual (3 )  0      Facial Palsy (4 )  0      Motor Arm, Left (5a )  0      Motor Arm, Right (5b )  0      Motor Leg, Left (6a )  2      Motor Leg, Right (6b )  2      Limb Ataxia (7 )  0      Sensory (8 )  0      Best Language (9 )  1      Dysarthria (10 )  0      Extinction and Inattention (11 ) (Formerly Neglect)  0      Total  6          First Filed Value   TPA Decision  Patient not a TPA candidate  Patient is not a candidate options  Unclear time of onset outside appropriate time window  MDM  Number of Diagnoses or Management Options  CVA (cerebral vascular accident) [facility]): new and requires workup  High blood pressure: new and requires workup  Renal insufficiency: new and requires workup  Speech disturbance: new and requires workup  Diagnosis management comments:  The plan is to evaluate the patient for stroke, rhabdomyolysis, infection, dehydration, myocardial infarction, traumatic head injury, other    All labs reviewed and utilized in the medical decision making process    All radiology studies independently viewed by me and interpreted by the radiologist          Amount and/or Complexity of Data Reviewed  Clinical lab tests: ordered and reviewed  Tests in the radiology section of CPT®: reviewed and ordered  Tests in the medicine section of CPT®: reviewed and ordered  Review and summarize past medical records: yes  Discuss the patient with other providers: yes  Independent visualization of images, tracings, or specimens: yes        Disposition  Final diagnoses:   CVA (cerebral vascular accident) ([facility] [address] )   Speech disturbance   High blood pressure   Renal insufficiency     Time reflects when diagnosis was documented in both MDM as applicable and the Disposition within this note     Time User Action Codes Description Comment    7/23/2019  3:21 PM Azalea Wilson Add [I63 9] CVA (cerebral vascular accident) ([facility] [address] )     7/23/2019  3:21 PM Azalea Wilson Add [R47 9] Speech disturbance     7/23/2019  3:21 PM Azalea Wilson Add [I10] High blood pressure     7/23/2019  3:21 PM Azalea Wilson Add [N28 9] Renal insufficiency     7/23/2019  3:27 PM Jade Bloom Add [R47 01] Aphasia       ED Disposition     ED Disposition Condition Date/Time Comment    Admit Stable Tue Jul 23, 2019  3:21 PM Case was discussed with Dr Lyle Tucker and the patient's admission status was agreed to be Admission Status: inpatient status to the service of Dr Lyle Tucker   Follow-up Information    None         Current Discharge Medication List      CONTINUE these medications which have NOT CHANGED    Details   Ascorbic Acid (VITAMIN C) 500 MG CAPS Take by mouth      aspirin 81 MG tablet Take by mouth      BIOTIN PO Take by mouth      Cholecalciferol (VITAMIN D3) 1000 units CAPS Take by mouth      Coenzyme Q10 (COQ-10) 10 MG CAPS Take by mouth      diclofenac sodium (VOLTAREN) 1 % Apply 2 g topically 4 (four) times a day  Qty: 100 g, Refills: 1    Associated Diagnoses: Depression, unspecified depression type      doxepin (SINEquan) 50 mg capsule Take 1 capsule (50 mg total) by mouth daily at bedtime  Qty: 90 capsule, Refills: 3    Comments: New dose  Associated Diagnoses: Depression, unspecified depression type      ezetimibe (ZETIA) 10 mg tablet TAKE 1 TABLET BY MOUTH EVERY DAY  Qty: 90 tablet, Refills: 3    Associated Diagnoses: Familial hypercholesterolemia;  Mixed hyperlipidemia      Flaxseed, Linseed, (FLAX SEED OIL PO) Take by mouth      gabapentin (NEURONTIN) 300 mg capsule Take 1 capsule (300 mg total) by mouth 3 (three) times a day And may add an extra daily p r n  Qty: 360 capsule, Refills: 3    Associated Diagnoses: Idiopathic peripheral neuropathy      MAGNESIUM CITRATE PO Take by mouth      Multiple Vitamins-Minerals (PRESERVISION AREDS 2+MULTI VIT PO) Take 1 capsule by mouth 2 (two) times a day      omeprazole (PriLOSEC) 20 mg delayed release capsule TAKE 1 CAPSULE BY MOUTH EVERY DAY  Qty: 90 capsule, Refills: 3    Associated Diagnoses: Gastroesophageal reflux disease without esophagitis      Plant Sterols and Stanols (CHOLESTOFF PLUS) 450 MG CAPS Take 1 capsule by mouth daily      Probiotic Product (PROBIOTIC-10) CAPS Take by mouth      telmisartan (MICARDIS) 20 MG tablet Take 1 tablet (20 mg total) by mouth daily  Qty: 90 tablet, Refills: 3    Comments: Discontinuing lisinopril  Associated Diagnoses: Benign essential hypertension           No discharge procedures on file      ED Provider  Electronically Signed by           Judson Correa DO  07/25/19 6622

## 2019-07-23 NOTE — ED NOTES
Orthostatic vitals were completed, excluding standing orthostatic vital signs due to Pt  Not being able to stand  Pt 's family member stated that she normally cannot stand        Deejay Perez  07/23/19 3329

## 2019-07-23 NOTE — ASSESSMENT & PLAN NOTE
W/o cp  May be nonspecific elevation vs type 2 MI from htn  ekg is nonischemic   Demonstrates stable rbbb and lafb and questionable type 2 mobitz  Trend troponins for completeness

## 2019-07-23 NOTE — H&P
H&P- Eloy Meigs 10/4/1928, 80 y o  female MRN: [id number]    Unit/Bed#: E4 -01 Encounter: [id number]    Primary Care Provider: Trini Fraser DO   Date and time admitted to hospital: 7/23/2019 12:23 PM      History and Physical - [facility] Internal Medicine    Patient Information: Eloy Meigs 80 y o  female MRN: [id number]  Unit/Bed#: E4 -01 Encounter: [id number]  Admitting Physician: Abelino Ballard PA-C  PCP: Trini Fraser DO  Date of Admission:  07/23/19    Assessment/Plan:    Hospital Problem List:     Principal Problem:    Aphasia  Active Problems:    Fall    Elevated troponin I level    Confused    Mobitz type 2 second degree AV block    Mixed hyperlipidemia    Benign essential hypertension    Chronic pain of both knees    Acute renal failure superimposed on chronic kidney disease ([facility] [address] )        * Aphasia  Assessment & Plan  Word finding difficulties w/pt being found down for unknown time  Superimposed on hard of hearing status  May be stroke vs acute metabolic encephalopathy vs concussion  Pt is alert and oriented to hospital by name and knows president  She demonstrates echolalia  No other focal neurodeficits although exam limited by knee pain and right shoulder pain which are both chronic  Ct head negative for ischemia/hemorrhage/midline shift  For now given rf admit for stroke workup  rec'd asa 325mg in ed  Hold bp meds, start neurochecks, check mri flp hgb a1c and tte  Consult neuro      Confused  Assessment & Plan  May be 2* stroke vs concussion vs acute metabolic encephalopathy  Ct head negative for ischemia  Workup as above      Elevated troponin I level  Assessment & Plan  W/o cp  May be nonspecific elevation vs type 2 MI from htn  ekg is nonischemic  Demonstrates stable rbbb and lafb and questionable type 2 mobitz  Trend troponins for completeness    Fall  Assessment & Plan  Pt found on knees facing bed w/arms on bed unable to get up down for max of 16h    Ck negative, ct head negative no evidence of trauma on skeletal survey by myself  Given unwitnessed fall and confusion cannot r/o syncope  Consult pt/ot  Monitor on tele, check orthostatics    Mobitz type 2 second degree AV block  Assessment & Plan  Suspected on heart monitor  Given bifascicular block and pt being found down consider possible syncopal episode  Monitor on telemetry, consult cardiology for evaluation    Acute renal failure superimposed on chronic kidney disease ([facility] [address] )  Assessment & Plan  Baseline creatinine is 1 0  Monitor w/gentle ivf hydration    Chronic pain of both knees  Assessment & Plan  Hold gabapentin given confusion word finding difficulties and renal dysfn    Benign essential hypertension  Assessment & Plan  Hold arb for permissive htn    Mixed hyperlipidemia  Assessment & Plan  Hold zetia given david        ·     VTE Prophylaxis: Heparin  / sequential compression device   Code Status: level 3 dni/dnr  POLST: There is no POLST form on file for this patient (pre-hospital)    Anticipated Length of Stay:  Patient will be admitted on an Inpatient basis with an anticipated length of stay of  Greater than 2 midnights  Justification for Hospital Stay: cva    Total Time for Visit, including Counseling / Coordination of Care: 45 minutes  Greater than 50% of this total time spent on direct patient counseling and coordination of care  Chief Complaint:   ams speech difficulties found down on knees by bed    History of Present Illness:    Nyle Hamman is a 80 y o  female who presents with PMH of hypertension, chronic knee pain coming to the hospital for word-finding difficulties and confusion  Patient is presently a poor historian  HPI is constructed by discussion with the patient's son who is at bedside  Patient was last seen well in the afternoon of the day pta    The morning of the admission pt was found next to her bed on her knees with her arms on the bed, facing the bed unable to get up from the floor  She was unable to tell her son what had happened  She did not recall getting on the floor  She cannot recall anything of what happened the day prior  She reports only b/l knee pain which is chronic when asked about any pain post fall  She has no cp/sob/n/v/f/c  At baseline pt is rather functional   Per son she can do her own checkbook  She occasionally goes shopping with her daughter  No hx of cva  Review of Systems:    Review of Systems   Respiratory: Negative for shortness of breath  Cardiovascular: Negative for chest pain  Gastrointestinal: Negative for abdominal pain, diarrhea, nausea and vomiting  Psychiatric/Behavioral: Positive for confusion  All other systems reviewed and are negative  Past Medical and Surgical History:     Past Medical History:   Diagnosis Date    Depression     Gout     H/O vaginal hysterectomy     resolved-4/17/2015    Skin cancer        Past Surgical History:   Procedure Laterality Date    CATARACT EXTRACTION      TOTAL ABDOMINAL HYSTERECTOMY      with removal of both ovaries    VAGINAL HYSTERECTOMY      resolved-4/17/2015       Meds/Allergies:    Prior to Admission medications    Medication Sig Start Date End Date Taking?  Authorizing Provider   Ascorbic Acid (VITAMIN C) 500 MG CAPS Take by mouth 3/2/15   Historical Provider, MD   aspirin 81 MG tablet Take by mouth 3/2/15   Historical Provider, MD   BIOTIN PO Take by mouth    Historical Provider, MD   Cholecalciferol (VITAMIN D3) 1000 units CAPS Take by mouth 3/2/15   Historical Provider, MD   Coenzyme Q10 (COQ-10) 10 MG CAPS Take by mouth 3/2/15   Historical Provider, MD   diclofenac sodium (VOLTAREN) 1 % Apply 2 g topically 4 (four) times a day 32/74/90   Galen Croft DO   doxepin (SINEquan) 50 mg capsule Take 1 capsule (50 mg total) by mouth daily at bedtime 9/7/27   Galen Croft DO   ezetimibe (ZETIA) 10 mg tablet TAKE 1 TABLET BY MOUTH EVERY DAY 0/40/69   Galen Croft DO Flaxseed, Linseed, (FLAX SEED OIL PO) Take by mouth    Historical Provider, MD   gabapentin (NEURONTIN) 300 mg capsule Take 1 capsule (300 mg total) by mouth 3 (three) times a day And may add an extra daily p r n  5/4/62   Galen Croft DO   MAGNESIUM CITRATE PO Take by mouth    Historical Provider, MD   Multiple Vitamins-Minerals (PRESERVISION AREDS 2+MULTI VIT PO) Take 1 capsule by mouth 2 (two) times a day    Historical Provider, MD   omeprazole (PriLOSEC) 20 mg delayed release capsule TAKE 1 CAPSULE BY MOUTH EVERY DAY 8/20/08   Galen Croft DO   Plant Sterols and Stanols (CHOLESTOFF PLUS) 450 MG CAPS Take 1 capsule by mouth daily    Historical Provider, MD   Probiotic Product (PROBIOTIC-10) CAPS Take by mouth 3/2/15   Historical Provider, MD   telmisartan (MICARDIS) 20 MG tablet Take 1 tablet (20 mg total) by mouth daily 6/3/99   Galen Croft DO     I have reviewed home medications with patient personally  Allergies: Allergies   Allergen Reactions    Allopurinol Rash     Category: Allergy;        Social History:     Marital Status:     Occupation:   Patient Pre-hospital Living Situation:   Patient Pre-hospital Level of Mobility:   Patient Pre-hospital Diet Restrictions:   Substance Use History:   Social History     Substance and Sexual Activity   Alcohol Use No     Social History     Tobacco Use   Smoking Status Never Smoker   Smokeless Tobacco Never Used     Social History     Substance and Sexual Activity   Drug Use No       Family History:    Family History   Problem Relation Age of Onset    Heart attack Mother         MI    Heart attack Father         MI    Hypertension Sister     Stomach cancer Sister     Hypertension Brother        Physical Exam:     Vitals:   Blood Pressure: (!) 195/89 (07/23/19 1523)  Pulse: 59 (07/23/19 1523)  Temperature: 98 8 °F (37 1 °C) (07/23/19 1228)  Temp Source: Oral (07/23/19 1228)  Respirations: 22 (07/23/19 1523)  Weight - Scale: 70 4 kg (155 lb 3 3 oz) (07/23/19 1226)  SpO2: 99 % (07/23/19 1523)    Physical Exam   Constitutional: She appears well-developed and well-nourished  No distress  HENT:   Head: Normocephalic and atraumatic  Right Ear: External ear normal    Left Ear: External ear normal    Eyes: Conjunctivae are normal    Neck: Normal range of motion  Cardiovascular: Regular rhythm  Exam reveals no gallop and no friction rub  Murmur heard  Bradycardiac and slightly irregular   Pulmonary/Chest: Effort normal  No respiratory distress  She has no wheezes  She has no rales  Abdominal: Soft  She exhibits no distension and no mass  There is no tenderness  There is no guarding  Musculoskeletal: She exhibits no edema  Neurological: She is alert  She exhibits abnormal muscle tone ( strength 4/5 b/l in UE shoulder strength limited by shoulder pain     right leg weakness limited to knee pain)  Bradyphrenic  Single episode of echolalia  Unable to have pt perform pronator drift due to shoulder pain   Pt unable to verbalize either side of body parts examined under extinction testing   Skin: Skin is warm and dry  She is not diaphoretic  Psychiatric: She has a normal mood and affect  Vitals reviewed  CN II/III PERRL  CN III/IV/VI EOMs intact w/no nystagmus or palsy or ptosis  CN V crude sensation in v1-3 intact good perioral strength  CN VII facial symmetry w/expression  CN VIII deferred  CN IX/X/XII soft palate rise intact  No noted tongue or uvula deviation  CN XI SCM strength is equal b/l  Equal shoulder shrug    (  Be Sure to Include Physical Exam: Delete this entire line when you have entered your exam)    Additional Data:     Lab Results: I have personally reviewed pertinent reports        Results from last 7 days   Lab Units 07/23/19  1254   WBC Thousand/uL 5 45   HEMOGLOBIN g/dL 11 7   HEMATOCRIT % 36 7   PLATELETS Thousands/uL 160   NEUTROS PCT % 78*   LYMPHS PCT % 14   MONOS PCT % 7   EOS PCT % 0     Results from last 7 days   Lab Units 07/23/19  1254   POTASSIUM mmol/L 4 5   CHLORIDE mmol/L 106   CO2 mmol/L 24   BUN mg/dL 26*   CREATININE mg/dL 1 32*   CALCIUM mg/dL 11 1*   ALK PHOS U/L 87   ALT U/L 18   AST U/L 17     Results from last 7 days   Lab Units 07/23/19  1254   INR  1 03       Imaging: I have personally reviewed pertinent reports  Xr Chest 2 Views    Result Date: 7/23/2019  Narrative: CHEST INDICATION:   weakness  COMPARISON:  July 22, 2013 EXAM PERFORMED/VIEWS:  XR CHEST PA & LATERAL FINDINGS: Cardiomediastinal silhouette appears unremarkable  The lungs are clear  No pneumothorax or pleural effusion  Osseous structures appear within normal limits for patient age  Impression: No acute cardiopulmonary disease  Workstation performed: [id number]     Xr Shoulder 2+ Vw Right    Result Date: 7/22/2019  Narrative: RIGHT SHOULDER INDICATION:   M75 01: Adhesive capsulitis of right shoulder  Pain for one year  COMPARISON:  None VIEWS:  XR SHOULDER 2+ VW RIGHT Images: 3 FINDINGS: There is no acute fracture or dislocation  Osseous structures demineralized  Severe narrowing of the glenohumeral articulation  Subchondral sclerosis and subchondral cystic changes in the humeral head and glenoid  Osteophytosis of the inferior glenoid  Superior elevation of the humeral head with respect to the glenoid  Narrowing of the acromiohumeral distance  Hypertrophic changes at the acromioclavicular joint  No lytic or blastic lesions are seen  Significant soft tissue swelling overlying the lateral aspect of the deltoid region  Findings likely represent underlying joint effusion  Impression: 1  Advanced degenerative changes of the right shoulder and acromioclavicular joint  Findings suggest the presence of underlying rotator cuff injury  2   Significant soft tissue swelling overlying the lateral deltoid region  Findings likely represent underlying joint effusion  Follow-up orthopedic consultation recommended    Consider follow-up MRI  The study was marked in EPIC for significant notification  Workstation performed: [id number]     Ct Head Without Contrast    Result Date: 7/23/2019  Narrative: CT BRAIN - WITHOUT CONTRAST INDICATION:   found on ground, mild aphasia  COMPARISON:  None  TECHNIQUE:  CT examination of the brain was performed  In addition to axial images, coronal 2D reformatted images were created and submitted for interpretation  Radiation dose length product (DLP) for this visit:  967 mGy-cm   This examination, like all CT scans performed in the [facility], was performed utilizing techniques to minimize radiation dose exposure, including the use of iterative reconstruction and automated exposure control  IMAGE QUALITY:  Diagnostic  FINDINGS: PARENCHYMA: Decreased attenuation is noted in periventricular and subcortical white matter demonstrating an appearance that is statistically most likely to represent mild microangiopathic change  No CT signs of acute infarction  No intracranial mass, mass effect or midline shift  No acute parenchymal hemorrhage  VENTRICLES AND EXTRA-AXIAL SPACES:  Normal for the patient's age  VISUALIZED ORBITS AND PARANASAL SINUSES:  Unremarkable  CALVARIUM AND EXTRACRANIAL SOFT TISSUES:  Normal      Impression: No acute intracranial abnormality  Workstation performed: [id number]     Ct Cervical Spine Without Contrast    Result Date: 7/23/2019  Narrative: CT CERVICAL SPINE - WITHOUT CONTRAST INDICATION:   found on ground  COMPARISON:  None  TECHNIQUE:  CT examination of the cervical spine was performed without intravenous contrast   Contiguous axial images were obtained  Sagittal and coronal reconstructions were performed  Radiation dose length product (DLP) for this visit:  500 mGy-cm     This examination, like all CT scans performed in the [facility], was performed utilizing techniques to minimize radiation dose exposure, including the use of iterative reconstruction and automated exposure control  IMAGE QUALITY:  Diagnostic  FINDINGS: ALIGNMENT:  Normal alignment of the cervical spine  No subluxation  VERTEBRAL BODIES:  No fracture  DEGENERATIVE CHANGES:  Moderate multilevel cervical degenerative changes are noted  No critical central canal stenosis  PREVERTEBRAL AND PARASPINAL SOFT TISSUES:  Unremarkable  THORACIC INLET:  Normal      Impression: No cervical spine fracture or traumatic malalignment  Workstation performed: [id number]       EKG, Pathology, and Other Studies Reviewed on Admission:   · EKG: lafb and rbbb  Questionable second degree heart block    Allscripts / Epic Records Reviewed: Yes     ** Please Note: This note has been constructed using a voice recognition system   **

## 2019-07-23 NOTE — ASSESSMENT & PLAN NOTE
Pt found on knees facing bed w/arms on bed unable to get up down for max of 16h    Ck negative, ct head negative no evidence of trauma on skeletal survey by myself  Given unwitnessed fall and confusion cannot r/o syncope  Consult pt/ot  Monitor on tele, check orthostatics

## 2019-07-23 NOTE — PLAN OF CARE
Problem: Potential for Falls  Goal: Patient will remain free of falls  Description  INTERVENTIONS:  - Assess patient frequently for physical needs  -  Identify cognitive and physical deficits and behaviors that affect risk of falls    -  [place] fall precautions as indicated by assessment   - Educate patient/family on patient safety including physical limitations  - Instruct patient to call for assistance with activity based on assessment  - Modify environment to reduce risk of injury  - Consider OT/PT consult to assist with strengthening/mobility  Outcome: Progressing     Problem: Prexisting or High Potential for Compromised Skin Integrity  Goal: Skin integrity is maintained or improved  Description  INTERVENTIONS:  - Identify patients at risk for skin breakdown  - Assess and monitor skin integrity  - Assess and monitor nutrition and hydration status  - Monitor labs (i e  albumin)  - Assess for incontinence   - Turn and reposition patient  - Assist with mobility/ambulation  - Relieve pressure over bony prominences  - Avoid friction and shearing  - Provide appropriate hygiene as needed including keeping skin clean and dry  - Evaluate need for skin moisturizer/barrier cream  - Collaborate with interdisciplinary team (i e  Nutrition, Rehabilitation, etc )   - Patient/family teaching  Outcome: Progressing     Problem: PAIN - ADULT  Goal: Verbalizes/displays adequate comfort level or baseline comfort level  Description  Interventions:  - Encourage patient to monitor pain and request assistance  - Assess pain using appropriate pain scale  - Administer analgesics based on type and severity of pain and evaluate response  - Implement non-pharmacological measures as appropriate and evaluate response  - Consider cultural and social influences on pain and pain management  - Notify physician/advanced practitioner if interventions unsuccessful or patient reports new pain  Outcome: Progressing     Problem: SAFETY ADULT  Goal: Maintain or return to baseline ADL function  Description  INTERVENTIONS:  -  Assess patient's ability to carry out ADLs; assess patient's baseline for ADL function and identify physical deficits which impact ability to perform ADLs (bathing, care of mouth/teeth, toileting, grooming, dressing, etc )  - Assess/evaluate cause of self-care deficits   - Assess range of motion  - Assess patient's mobility; develop plan if impaired  - Assess patient's need for assistive devices and provide as appropriate  - Encourage maximum independence but intervene and supervise when necessary  ¯ Involve family in performance of ADLs  ¯ Assess for home care needs following discharge   ¯ Request OT consult to assist with ADL evaluation and planning for discharge  ¯ Provide patient education as appropriate  Outcome: Progressing  Goal: Maintain or return mobility status to optimal level  Description  INTERVENTIONS:  - Assess patient's baseline mobility status (ambulation, transfers, stairs, etc )    - Identify cognitive and physical deficits and behaviors that affect mobility  - Identify mobility aids required to assist with transfers and/or ambulation (gait belt, sit-to-stand, lift, walker, cane, etc )  - [place] fall precautions as indicated by assessment  - Record patient progress and toleration of activity level on Mobility SBAR; progress patient to next Phase/Stage  - Instruct patient to call for assistance with activity based on assessment  - Request Rehabilitation consult to assist with strengthening/weightbearing, etc   Outcome: Progressing     Problem: DISCHARGE PLANNING  Goal: Discharge to home or other facility with appropriate resources  Description  INTERVENTIONS:  - Identify barriers to discharge w/patient and caregiver  - Arrange for needed discharge resources and transportation as appropriate  - Identify discharge learning needs (meds, wound care, etc )  - Arrange for interpretive services to assist at discharge as needed  - Refer to Case Management Department for coordinating discharge planning if the patient needs post-hospital services based on physician/advanced practitioner order or complex needs related to functional status, cognitive ability, or social support system  Outcome: Progressing     Problem: Knowledge Deficit  Goal: Patient/family/caregiver demonstrates understanding of disease process, treatment plan, medications, and discharge instructions  Description  Complete learning assessment and assess knowledge base  Interventions:  - Provide teaching at level of understanding  - Provide teaching via preferred learning methods  Outcome: Progressing     Problem: Neurological Deficit  Goal: Neurological status is stable or improving  Description  Interventions:  - Monitor and assess patient's level of consciousness, motor function, sensory function, and level of assistance needed for ADLs  - Monitor and report changes from baseline  Collaborate with interdisciplinary team to initiate plan and implement interventions as ordered  - Provide and maintain a safe environment  - Utilize seizure precautions  - Utilize fall precautions  - Utilize aspiration precautions  - Utilize bleeding precautions  Outcome: Progressing     Problem: Activity Intolerance/Impaired Mobility  Goal: Mobility/activity is maintained at optimum level for patient  Description  Interventions:  - Assess and monitor patient  barriers to mobility and need for assistive/adaptive devices  - Assess patient's emotional response to limitations  - Collaborate with interdisciplinary team and initiate plans and interventions as ordered  - Encourage independent activity per ability   - Maintain proper body alignment  - Perform active/passive rom as tolerated/ordered    - Plan activities to conserve energy   - Turn patient  Outcome: Progressing

## 2019-07-23 NOTE — ASSESSMENT & PLAN NOTE
May be 2* stroke vs concussion vs acute metabolic encephalopathy  Ct head negative for ischemia  Workup as above

## 2019-07-23 NOTE — PLAN OF CARE
Problem: Potential for Falls  Goal: Patient will remain free of falls  Description  INTERVENTIONS:  - Assess patient frequently for physical needs  -  Identify cognitive and physical deficits and behaviors that affect risk of falls    -  [place] fall precautions as indicated by assessment   - Educate patient/family on patient safety including physical limitations  - Instruct patient to call for assistance with activity based on assessment  - Modify environment to reduce risk of injury  - Consider OT/PT consult to assist with strengthening/mobility  Outcome: Progressing     Problem: Prexisting or High Potential for Compromised Skin Integrity  Goal: Skin integrity is maintained or improved  Description  INTERVENTIONS:  - Identify patients at risk for skin breakdown  - Assess and monitor skin integrity  - Assess and monitor nutrition and hydration status  - Monitor labs (i e  albumin)  - Assess for incontinence   - Turn and reposition patient  - Assist with mobility/ambulation  - Relieve pressure over bony prominences  - Avoid friction and shearing  - Provide appropriate hygiene as needed including keeping skin clean and dry  - Evaluate need for skin moisturizer/barrier cream  - Collaborate with interdisciplinary team (i e  Nutrition, Rehabilitation, etc )   - Patient/family teaching  Outcome: Progressing     Problem: PAIN - ADULT  Goal: Verbalizes/displays adequate comfort level or baseline comfort level  Description  Interventions:  - Encourage patient to monitor pain and request assistance  - Assess pain using appropriate pain scale  - Administer analgesics based on type and severity of pain and evaluate response  - Implement non-pharmacological measures as appropriate and evaluate response  - Consider cultural and social influences on pain and pain management  - Notify physician/advanced practitioner if interventions unsuccessful or patient reports new pain  Outcome: Progressing     Problem: SAFETY ADULT  Goal: Maintain or return to baseline ADL function  Description  INTERVENTIONS:  -  Assess patient's ability to carry out ADLs; assess patient's baseline for ADL function and identify physical deficits which impact ability to perform ADLs (bathing, care of mouth/teeth, toileting, grooming, dressing, etc )  - Assess/evaluate cause of self-care deficits   - Assess range of motion  - Assess patient's mobility; develop plan if impaired  - Assess patient's need for assistive devices and provide as appropriate  - Encourage maximum independence but intervene and supervise when necessary  ¯ Involve family in performance of ADLs  ¯ Assess for home care needs following discharge   ¯ Request OT consult to assist with ADL evaluation and planning for discharge  ¯ Provide patient education as appropriate  Outcome: Progressing  Goal: Maintain or return mobility status to optimal level  Description  INTERVENTIONS:  - Assess patient's baseline mobility status (ambulation, transfers, stairs, etc )    - Identify cognitive and physical deficits and behaviors that affect mobility  - Identify mobility aids required to assist with transfers and/or ambulation (gait belt, sit-to-stand, lift, walker, cane, etc )  - [place] fall precautions as indicated by assessment  - Record patient progress and toleration of activity level on Mobility SBAR; progress patient to next Phase/Stage  - Instruct patient to call for assistance with activity based on assessment  - Request Rehabilitation consult to assist with strengthening/weightbearing, etc   Outcome: Progressing     Problem: DISCHARGE PLANNING  Goal: Discharge to home or other facility with appropriate resources  Description  INTERVENTIONS:  - Identify barriers to discharge w/patient and caregiver  - Arrange for needed discharge resources and transportation as appropriate  - Identify discharge learning needs (meds, wound care, etc )  - Arrange for interpretive services to assist at discharge as needed  - Refer to Case Management Department for coordinating discharge planning if the patient needs post-hospital services based on physician/advanced practitioner order or complex needs related to functional status, cognitive ability, or social support system  Outcome: Progressing     Problem: Knowledge Deficit  Goal: Patient/family/caregiver demonstrates understanding of disease process, treatment plan, medications, and discharge instructions  Description  Complete learning assessment and assess knowledge base  Interventions:  - Provide teaching at level of understanding  - Provide teaching via preferred learning methods  Outcome: Progressing     Problem: Neurological Deficit  Goal: Neurological status is stable or improving  Description  Interventions:  - Monitor and assess patient's level of consciousness, motor function, sensory function, and level of assistance needed for ADLs  - Monitor and report changes from baseline  Collaborate with interdisciplinary team to initiate plan and implement interventions as ordered  - Provide and maintain a safe environment  - Utilize seizure precautions  - Utilize fall precautions  - Utilize aspiration precautions  - Utilize bleeding precautions  Outcome: Progressing     Problem: Activity Intolerance/Impaired Mobility  Goal: Mobility/activity is maintained at optimum level for patient  Description  Interventions:  - Assess and monitor patient  barriers to mobility and need for assistive/adaptive devices  - Assess patient's emotional response to limitations  - Collaborate with interdisciplinary team and initiate plans and interventions as ordered  - Encourage independent activity per ability   - Maintain proper body alignment  - Perform active/passive rom as tolerated/ordered    - Plan activities to conserve energy   - Turn patient  Outcome: Progressing

## 2019-07-24 ENCOUNTER — APPOINTMENT (INPATIENT)
Dept: MRI IMAGING | Facility: HOSPITAL | Age: 84
DRG: 101 | End: 2019-07-24
Payer: MEDICARE

## 2019-07-24 ENCOUNTER — APPOINTMENT (INPATIENT)
Dept: NON INVASIVE DIAGNOSTICS | Facility: HOSPITAL | Age: 84
DRG: 101 | End: 2019-07-24
Payer: MEDICARE

## 2019-07-24 LAB
ATRIAL RATE: 72 BPM
ATRIAL RATE: 80 BPM
ATRIAL RATE: 91 BPM
CHOLEST SERPL-MCNC: 157 MG/DL (ref 50–200)
EST. AVERAGE GLUCOSE BLD GHB EST-MCNC: 108 MG/DL
HBA1C MFR BLD: 5.4 % (ref 4.2–6.3)
HDLC SERPL-MCNC: 32 MG/DL (ref 40–60)
LDLC SERPL CALC-MCNC: 89 MG/DL (ref 0–100)
P AXIS: 75 DEGREES
P AXIS: 75 DEGREES
P AXIS: 76 DEGREES
PR INTERVAL: 194 MS
QRS AXIS: -57 DEGREES
QRS AXIS: -61 DEGREES
QRS AXIS: -61 DEGREES
QRSD INTERVAL: 128 MS
QRSD INTERVAL: 130 MS
QRSD INTERVAL: 134 MS
QT INTERVAL: 424 MS
QT INTERVAL: 460 MS
QT INTERVAL: 468 MS
QTC INTERVAL: 451 MS
QTC INTERVAL: 464 MS
QTC INTERVAL: 475 MS
T WAVE AXIS: -22 DEGREES
T WAVE AXIS: -47 DEGREES
T WAVE AXIS: -55 DEGREES
TRIGL SERPL-MCNC: 182 MG/DL
VENTRICULAR RATE: 58 BPM
VENTRICULAR RATE: 62 BPM
VENTRICULAR RATE: 72 BPM

## 2019-07-24 PROCEDURE — 92523 SPEECH SOUND LANG COMPREHEN: CPT

## 2019-07-24 PROCEDURE — G8988 SELF CARE GOAL STATUS: HCPCS

## 2019-07-24 PROCEDURE — G8978 MOBILITY CURRENT STATUS: HCPCS | Performed by: PHYSICAL THERAPIST

## 2019-07-24 PROCEDURE — 92610 EVALUATE SWALLOWING FUNCTION: CPT

## 2019-07-24 PROCEDURE — 97163 PT EVAL HIGH COMPLEX 45 MIN: CPT | Performed by: PHYSICAL THERAPIST

## 2019-07-24 PROCEDURE — 99232 SBSQ HOSP IP/OBS MODERATE 35: CPT | Performed by: INTERNAL MEDICINE

## 2019-07-24 PROCEDURE — G8996 SWALLOW CURRENT STATUS: HCPCS

## 2019-07-24 PROCEDURE — G8987 SELF CARE CURRENT STATUS: HCPCS

## 2019-07-24 PROCEDURE — 80061 LIPID PANEL: CPT | Performed by: PHYSICIAN ASSISTANT

## 2019-07-24 PROCEDURE — 70551 MRI BRAIN STEM W/O DYE: CPT

## 2019-07-24 PROCEDURE — 99223 1ST HOSP IP/OBS HIGH 75: CPT | Performed by: PSYCHIATRY & NEUROLOGY

## 2019-07-24 PROCEDURE — G8997 SWALLOW GOAL STATUS: HCPCS

## 2019-07-24 PROCEDURE — 97167 OT EVAL HIGH COMPLEX 60 MIN: CPT

## 2019-07-24 PROCEDURE — G8979 MOBILITY GOAL STATUS: HCPCS | Performed by: PHYSICAL THERAPIST

## 2019-07-24 PROCEDURE — 83036 HEMOGLOBIN GLYCOSYLATED A1C: CPT | Performed by: PHYSICIAN ASSISTANT

## 2019-07-24 PROCEDURE — 93010 ELECTROCARDIOGRAM REPORT: CPT | Performed by: INTERNAL MEDICINE

## 2019-07-24 RX ORDER — DOXEPIN HYDROCHLORIDE 25 MG/1
25 CAPSULE ORAL
Status: DISCONTINUED | OUTPATIENT
Start: 2019-07-24 | End: 2019-07-25 | Stop reason: HOSPADM

## 2019-07-24 RX ORDER — LEVETIRACETAM 250 MG/1
375 TABLET ORAL EVERY 12 HOURS SCHEDULED
Status: DISCONTINUED | OUTPATIENT
Start: 2019-07-24 | End: 2019-07-25 | Stop reason: HOSPADM

## 2019-07-24 RX ORDER — ACETAMINOPHEN 325 MG/1
650 TABLET ORAL EVERY 6 HOURS PRN
Status: DISCONTINUED | OUTPATIENT
Start: 2019-07-24 | End: 2019-07-25 | Stop reason: HOSPADM

## 2019-07-24 RX ORDER — GABAPENTIN 300 MG/1
300 CAPSULE ORAL 3 TIMES DAILY
Status: DISCONTINUED | OUTPATIENT
Start: 2019-07-24 | End: 2019-07-25 | Stop reason: HOSPADM

## 2019-07-24 RX ORDER — LORAZEPAM 2 MG/ML
1 INJECTION INTRAMUSCULAR ONCE
Status: DISCONTINUED | OUTPATIENT
Start: 2019-07-24 | End: 2019-07-25 | Stop reason: HOSPADM

## 2019-07-24 RX ADMIN — ASPIRIN 81 MG 81 MG: 81 TABLET ORAL at 11:56

## 2019-07-24 RX ADMIN — LEVETIRACETAM 375 MG: 250 TABLET, FILM COATED ORAL at 21:50

## 2019-07-24 RX ADMIN — GABAPENTIN 300 MG: 300 CAPSULE ORAL at 17:27

## 2019-07-24 RX ADMIN — GABAPENTIN 300 MG: 300 CAPSULE ORAL at 21:51

## 2019-07-24 RX ADMIN — DOXEPIN HYDROCHLORIDE 25 MG: 25 CAPSULE ORAL at 21:50

## 2019-07-24 RX ADMIN — ATORVASTATIN CALCIUM 40 MG: 40 TABLET, FILM COATED ORAL at 16:49

## 2019-07-24 RX ADMIN — HEPARIN SODIUM 5000 UNITS: 5000 INJECTION INTRAVENOUS; SUBCUTANEOUS at 21:51

## 2019-07-24 RX ADMIN — PANTOPRAZOLE SODIUM 20 MG: 20 TABLET, DELAYED RELEASE ORAL at 05:30

## 2019-07-24 RX ADMIN — HEPARIN SODIUM 5000 UNITS: 5000 INJECTION INTRAVENOUS; SUBCUTANEOUS at 13:39

## 2019-07-24 RX ADMIN — LEVETIRACETAM 1000 MG: 100 INJECTION, SOLUTION INTRAVENOUS at 16:25

## 2019-07-24 RX ADMIN — SODIUM CHLORIDE 50 ML/HR: 0.9 INJECTION, SOLUTION INTRAVENOUS at 17:19

## 2019-07-24 RX ADMIN — HEPARIN SODIUM 5000 UNITS: 5000 INJECTION INTRAVENOUS; SUBCUTANEOUS at 05:30

## 2019-07-24 NOTE — PLAN OF CARE
Problem: PHYSICAL THERAPY ADULT  Goal: Performs mobility at highest level of function for planned discharge setting  See evaluation for individualized goals  Description             See flowsheet documentation for full assessment, interventions and recommendations  Note:   Prognosis: Fair  Problem List: Decreased strength, Decreased range of motion, Decreased endurance, Impaired balance, Decreased mobility, Decreased coordination, Decreased safety awareness, Impaired vision, Impaired hearing, Pain, Impaired judgement, Decreased cognition  Assessment: Matthias Caldwell is a 80 y o  female who presents to IP PT secondary to fall where patient unable to recall how she fell and circumstances  Pt PLOF living alone in single story home and independent with ADL's, received help from son and daughter for transportation, with pt son checking on pt every day  PT evaluated patient while in bed, pt did not want to get out of bed in order to assess transfers and gait at this time, defer to OT evaluation for information on transfers  Patient presents with the following deficits: increased pain and fall risk, decreased cognition, decreased strength, endurance, balance, and tolerance to activities  Patient would benefit from skilled PT in order to address these deficits in order to be discharged to rehab facility when medically stable  Barriers to Discharge: Decreased caregiver support     Recommendation: (rehab when medically stable)          See flowsheet documentation for full assessment

## 2019-07-24 NOTE — PHYSICIAN ADVISOR
Current patient class: Inpatient  The patient is currently on Hospital Day: 2 at [facility]      The patient was admitted to the hospital at [phone] on 7/23/19 for the following diagnosis:  Aphasia [R47 01]  High blood pressure [I10]  Speech disturbance [R47 9]  Confused [R41 0]  CVA (cerebral vascular accident) ([address] ) [I63 9]  Renal insufficiency [N28 9]       There is documentation in the medical record of an expected length of stay of at least 2 midnights  The patient is therefore expected to satisfy the 2 midnight benchmark and given the 2 midnight presumption is appropriate for INPATIENT ADMISSION  Given this expectation of a satisfying stay, CMS instructs us that the patient is most often appropriate for inpatient admission under part A provided medical necessity is documented in the chart  After review of the relevant documentation, labs, vital signs and test results, the patient is appropriate for INPATIENT ADMISSION  Admission to the hospital as an inpatient is a complex decision making process which requires the practitioner to consider the patients presenting complaint, history and physical examination and all relevant testing  With this in mind, in this case, the patient was deemed appropriate for INPATIENT ADMISSION  After review of the documentation and testing available at the time of the admission I concur with this clinical determination of medical necessity  Rationale is as follows:    Santos Kaur is a 80 y o  female who presents with PMH of hypertension, chronic knee pain coming to the hospital for word-finding difficulties and confusion  Patient is presently a poor historian  HPI is constructed by discussion with the patient's son who is at bedside  Patient was last seen well in the afternoon of the day pta  The morning of the admission pt was found next to her bed on her knees with her arms on the bed, facing the bed unable to get up from the floor  She was unable to tell her son what had happened  She did not recall getting on the floor  She cannot recall anything of what happened the day prior  She reports only b/l knee pain which is chronic when asked about any pain post fall  She has no cp/sob/n/v/f/c        At baseline pt is rather functional   Per son she can do her own checkbook  She occasionally goes shopping with her daughter  No hx of cva  She was slightly hypertensive upon admission and labs showed an elevated troponin and cr elevated to 1 32 BUN 26  CT head showed no acute abnormality and neurology was consulted for possible stroke  MRI brain  was ordered and she was started lipitor and asa continued and stroke path  Troponins will be trended and with possible [place] type 2 second degree heart block cardiology consult is considered  PT/OT and speech consults are pending        The patients vitals on arrival were ED Triage Vitals   Temperature Pulse Respirations Blood Pressure SpO2   07/23/19 1228 07/23/19 1226 07/23/19 1226 07/23/19 1226 07/23/19 1226   98 8 °F (37 1 °C) 74 18 169/71 94 %      Temp Source Heart Rate Source Patient Position - Orthostatic VS BP Location FiO2 (%)   07/23/19 1228 07/23/19 1226 07/23/19 1226 07/23/19 1226 --   Oral Monitor Sitting Right arm       Pain Score       07/23/19 1357       5           Past Medical History:   Diagnosis Date    Depression     Gout     H/O vaginal hysterectomy     resolved-4/17/2015    Skin cancer      Past Surgical History:   Procedure Laterality Date    CATARACT EXTRACTION      TOTAL ABDOMINAL HYSTERECTOMY      with removal of both ovaries    VAGINAL HYSTERECTOMY      resolved-4/17/2015           Consults have been placed to:   IP CONSULT TO NEUROLOGY  IP CONSULT TO CASE MANAGEMENT  IP CONSULT TO NUTRITION SERVICES    Vitals:    07/24/19 0245 07/24/19 0445 07/24/19 0659 07/24/19 1100   BP: 146/76 (!) 193/77 151/77 165/79   BP Location: Left arm Left arm Left arm Left arm Pulse: (!) 53 56 77 77   Resp: 20 20 18 22   Temp: 98 °F (36 7 °C) 98 5 °F (36 9 °C) 97 9 °F (36 6 °C) 97 9 °F (36 6 °C)   TempSrc: Tympanic Tympanic Temporal Temporal   SpO2: 95% 96% 96% 99%   Weight:       Height:           Most recent labs:    Recent Labs     07/23/19  1254  07/23/19  2313   WBC 5 45  --   --    HGB 11 7  --   --    HCT 36 7  --   --      --   --    K 4 5  --   --    CALCIUM 11 1*  --   --    BUN 26*  --   --    CREATININE 1 32*  --   --    INR 1 03  --   --    TROPONINI 0 11*   < > 0 17*   CKTOTAL 140  --   --    AST 17  --   --    ALT 18  --   --    ALKPHOS 87  --   --     < > = values in this interval not displayed  Scheduled Meds:  Current Facility-Administered Medications:  aspirin 81 mg Oral Daily Diana Potter PA-C    atorvastatin 40 mg Oral QPM Diana Potter PA-C    doxepin 50 mg Oral HS Diana Potter PA-C    heparin (porcine) 5,000 Units Subcutaneous [facility] Diana Potter PA-C    LORazepam 1 mg Intravenous Once Atmos MARIELA Maxwell    pantoprazole 20 mg Oral Early Morning Diana Potter PA-C    sodium chloride 50 mL/hr Intravenous Continuous Diana Potter PA-C Last Rate: Stopped (07/24/19 9137)     Continuous Infusions:  sodium chloride 50 mL/hr Last Rate: Stopped (07/24/19 0956)     PRN Meds:      Surgical procedures (if appropriate):

## 2019-07-24 NOTE — CONSULTS
Consultation - Neurology   Jacy Alcaraz 80 y o  female MRN: [id number]  Unit/Bed#: E4 -01 Encounter: [id number]      Assessment/Plan   Assessment:  3 80year old with a history of HTN and HLD was found down by her son and was experiencing altered mental status and aphasia  Patients last known well was 18 hours prior to admission  CT head was unremarkable  Patient's speech improved last night, but worsened again this morning  MRI brain was negative for acute infarction, suspect possible intermittent partial seizure activity  Will initiate Keppra and obtain routine EEG and to further evaluate  Plan:  1  Recommend routine EEG  2  Will give Keppra 1000mg X 1, and then start maintenance dose of Keppra 375mg BID  3  Continue Aspirin 81mg   4  Continue Atorvastatin 40mg  5  Echocardiogram pending   6  Telemetry  7  PT/OT/ST  8  Seizure precautions  9  Stat CT head with acute worsening in neuro exam/mental status  10  Notify neurology with any changes in examination  Results:   1  CT head: no acute intracranial abnormality  2  Lipid panel: LDL of 89  3  HgbA1c: 5 4    History of Present Illness     Reason for Consult / Principal Problem: Aphasia  Hx and PE limited by: Lenore historian, aphasia  HPI: Jacy Alcaraz is a 80 y o  female with a PMH of HTN, HLD, CKD, OA, macular degeneration, and depression who presented to [address] [place] [address] on 7/23 for aphasia and altered mental status  Patient lives alone and son came to visit her and found her on her knees with her arms on the bed  Patient's son believes she was attempting to make her bed  Patient kept repeating "get me up" Patient's last known normal was 18 hours prior to ED arrival  EMS noted aphasia  Patient does not recall how she got to the floor or recall prior events leading up to ED arrival  Patient was continuing to exhibit word finding difficulties in the ED and was unable to name pen or wrist watch   Patient was admitted to the stroke pathway and received ASA 325mg  CT head was unremarkable  Patient's son reports that her speech and memory improved significantly last night  MRI brain was attempted to be obtained and Ativan 0 5mg IV was given but patient was unable to tolerate the procedure  Patient's son reports that at baseline she lives alone and is very functional and independent  He denies any memory issues at baseline  Today, patient is alert and is only able to state her first name  Patient had trouble attempting to say her last name  Patient kept repeating her first name throughout examination  Patient can name simple objects like rubber gloves and colors  She is unable to state the location or date  As per patient's son, patient's speech has significantly worsened compared to last night  Upon reassessment with attending physician, patient's speech has improved  Patient is able to state her name, location, name of the hospital, children's names, and grandchildren's names  Patient is able to name simple objects appropriately  Inpatient consult to Neurology  Consult performed by: Norma Khan PA-C  Consult ordered by: Elida Cruz PA-C        Review of Systems   Reason unable to perform ROS: A 12 point review of systems was performed but difficult to assess due to altered mental status and aphasia         Historical Information   Past Medical History:   Diagnosis Date    Depression     Gout     H/O vaginal hysterectomy     resolved-4/17/2015    Skin cancer      Past Surgical History:   Procedure Laterality Date    CATARACT EXTRACTION      TOTAL ABDOMINAL HYSTERECTOMY      with removal of both ovaries    VAGINAL HYSTERECTOMY      resolved-4/17/2015     Social History   Social History     Substance and Sexual Activity   Alcohol Use No     Social History     Substance and Sexual Activity   Drug Use No     Social History     Tobacco Use   Smoking Status Never Smoker   Smokeless Tobacco Never Used     Family History:   Family History Problem Relation Age of Onset    Heart attack Mother         MI    Heart attack Father         MI    Hypertension Sister     Stomach cancer Sister     Hypertension Brother        Review of previous medical records was completed  Meds/Allergies   current meds:   Current Facility-Administered Medications   Medication Dose Route Frequency    aspirin chewable tablet 81 mg  81 mg Oral Daily    atorvastatin (LIPITOR) tablet 40 mg  40 mg Oral QPM    doxepin (SINEquan) capsule 50 mg  50 mg Oral HS    heparin (porcine) subcutaneous injection 5,000 Units  5,000 Units Subcutaneous Q[address]H [address]    pantoprazole (PROTONIX) EC tablet 20 mg  20 mg Oral Early Morning    sodium chloride 0 9 % infusion  50 mL/hr Intravenous Continuous    and PTA meds:   Prior to Admission Medications   Prescriptions Last Dose Informant Patient Reported? Taking?    Ascorbic Acid (VITAMIN C) 500 MG CAPS   Yes No   Sig: Take by mouth   BIOTIN PO  Self Yes No   Sig: Take by mouth   Cholecalciferol (VITAMIN D3) 1000 units CAPS   Yes No   Sig: Take by mouth   Coenzyme Q10 (COQ-10) 10 MG CAPS   Yes No   Sig: Take by mouth   Flaxseed, Linseed, (FLAX SEED OIL PO)  Self Yes No   Sig: Take by mouth   MAGNESIUM CITRATE PO  Self Yes No   Sig: Take by mouth   Multiple Vitamins-Minerals (PRESERVISION AREDS 2+MULTI VIT PO)   Yes No   Sig: Take 1 capsule by mouth 2 (two) times a day   Plant Sterols and Stanols (CHOLESTOFF PLUS) 450 MG CAPS   Yes No   Sig: Take 1 capsule by mouth daily   Probiotic Product (PROBIOTIC-10) CAPS   Yes No   Sig: Take by mouth   aspirin 81 MG tablet   Yes No   Sig: Take by mouth   diclofenac sodium (VOLTAREN) 1 %   No No   Sig: Apply 2 g topically 4 (four) times a day   doxepin (SINEquan) 50 mg capsule   No No   Sig: Take 1 capsule (50 mg total) by mouth daily at bedtime   ezetimibe (ZETIA) 10 mg tablet   No No   Sig: TAKE 1 TABLET BY MOUTH EVERY DAY   gabapentin (NEURONTIN) 300 mg capsule   No No   Sig: Take 1 capsule (300 mg total) by mouth 3 (three) times a day And may add an extra daily p r n    omeprazole (PriLOSEC) 20 mg delayed release capsule   No No   Sig: TAKE 1 CAPSULE BY MOUTH EVERY DAY   telmisartan (MICARDIS) 20 MG tablet   No No   Sig: Take 1 tablet (20 mg total) by mouth daily      Facility-Administered Medications: None       Allergies   Allergen Reactions    Fish-Derived Products      GI upset    Allopurinol Rash     Category: Allergy;        Objective   Vitals:Blood pressure 151/77, pulse 77, temperature 97 9 °F (36 6 °C), temperature source Temporal, resp  rate 18, height 5' 2" (1 575 m), weight 70 1 kg (154 lb 8 7 oz), SpO2 96 %  ,Body mass index is 28 27 kg/m²  Intake/Output Summary (Last 24 hours) at 7/24/2019 0835  Last data filed at 7/24/2019 0631  Gross per 24 hour   Intake 804 17 ml   Output 750 ml   Net 54 17 ml       Invasive Devices: Invasive Devices     Peripheral Intravenous Line            Peripheral IV 07/23/19 Right Antecubital less than 1 day              Physical Exam   Constitutional:   Patient is sitting comfortably in bedside chair  HENT:   Head: Normocephalic and atraumatic  Right Ear: External ear normal    Left Ear: External ear normal    Nose: Nose normal    Eyes: Pupils are equal, round, and reactive to light  EOM are normal  Right eye exhibits no discharge  Left eye exhibits no discharge  Neck: Neck supple  Cardiovascular: Normal rate and normal heart sounds  Pulmonary/Chest: Effort normal  No respiratory distress  Musculoskeletal: She exhibits tenderness  Neurological: She is alert  Reflex Scores:       Bicep reflexes are 2+ on the right side and 2+ on the left side  Brachioradialis reflexes are 2+ on the right side and 2+ on the left side  Patellar reflexes are 0 on the right side and 0 on the left side  Achilles reflexes are 0 on the right side and 0 on the left side  Skin: Skin is warm and dry       Neurologic Exam     Mental Status   Patient is alert and oriented to first name  Patient continued to repeat her first name throughout mental status examination  Patient had trouble trying to state her last name  Patient was unable to state location, month, year, season  Patient was able to name rubber gloves and blue  Patient was able to state son's name, but unable to state that he was her son  Exhibited mixed expressive and receptive aphasia    Upon reassessment with attending physician later in the day, patient did not exhibit aphasia  Patient is able to state name, children's names, grandchildren's names, able to state that we are at [address]       Cranial Nerves     CN III, IV, VI   Pupils are equal, round, and reactive to light  Extraocular motions are normal      CN VII   Right facial weakness: Slight right facial asymmetry  CN VIII   Hearing: impaired    CN IX, X   CN IX normal      CN XI   Right trapezius strength: normal  Left trapezius strength: normal    CN XII   Tongue deviation: right  Unclear if patient has a R visual field deficit due to aphasia     Motor Exam   Left arm pronator drift: absentUnable to hold RUE lifted against gravity, likely secondary to R shoulder pain    R biceps and triceps strength: 5/5  L biceps and triceps strength: 5/5  Bilateral  strength: 5-/5    Bilateral hip flexion strength: 4+/5     Sensory Exam   Difficult to assess sensation secondary to aphasia     Gait, Coordination, and Reflexes     Tremor   Resting tremor: absent    Reflexes   Right brachioradialis: 2+  Left brachioradialis: 2+  Right biceps: 2+  Left biceps: 2+  Right patellar: 0  Left patellar: 0  Right achilles: 0  Left achilles: 0    Lab Results: I have personally reviewed pertinent reports  Imaging Studies: I have personally reviewed pertinent reports  and I have personally reviewed pertinent films in PACS  EKG, Pathology, and Other Studies: I have personally reviewed pertinent reports      VTE Prophylaxis: Sequential compression device (Venodyne)  and Heparin    Code Status: Level 1 - Full Code  Advance Directive and Living Will: Yes    Power of :    POLST:

## 2019-07-24 NOTE — PLAN OF CARE
Problem: Potential for Falls  Goal: Patient will remain free of falls  Description  INTERVENTIONS:  - Assess patient frequently for physical needs  -  Identify cognitive and physical deficits and behaviors that affect risk of falls    -  [place] fall precautions as indicated by assessment   - Educate patient/family on patient safety including physical limitations  - Instruct patient to call for assistance with activity based on assessment  - Modify environment to reduce risk of injury  - Consider OT/PT consult to assist with strengthening/mobility  Outcome: Progressing     Problem: Prexisting or High Potential for Compromised Skin Integrity  Goal: Skin integrity is maintained or improved  Description  INTERVENTIONS:  - Identify patients at risk for skin breakdown  - Assess and monitor skin integrity  - Assess and monitor nutrition and hydration status  - Monitor labs (i e  albumin)  - Assess for incontinence   - Turn and reposition patient  - Assist with mobility/ambulation  - Relieve pressure over bony prominences  - Avoid friction and shearing  - Provide appropriate hygiene as needed including keeping skin clean and dry  - Evaluate need for skin moisturizer/barrier cream  - Collaborate with interdisciplinary team (i e  Nutrition, Rehabilitation, etc )   - Patient/family teaching  Outcome: Progressing     Problem: PAIN - ADULT  Goal: Verbalizes/displays adequate comfort level or baseline comfort level  Description  Interventions:  - Encourage patient to monitor pain and request assistance  - Assess pain using appropriate pain scale  - Administer analgesics based on type and severity of pain and evaluate response  - Implement non-pharmacological measures as appropriate and evaluate response  - Consider cultural and social influences on pain and pain management  - Notify physician/advanced practitioner if interventions unsuccessful or patient reports new pain  Outcome: Progressing     Problem: SAFETY ADULT  Goal: Maintain or return to baseline ADL function  Description  INTERVENTIONS:  -  Assess patient's ability to carry out ADLs; assess patient's baseline for ADL function and identify physical deficits which impact ability to perform ADLs (bathing, care of mouth/teeth, toileting, grooming, dressing, etc )  - Assess/evaluate cause of self-care deficits   - Assess range of motion  - Assess patient's mobility; develop plan if impaired  - Assess patient's need for assistive devices and provide as appropriate  - Encourage maximum independence but intervene and supervise when necessary  ¯ Involve family in performance of ADLs  ¯ Assess for home care needs following discharge   ¯ Request OT consult to assist with ADL evaluation and planning for discharge  ¯ Provide patient education as appropriate  Outcome: Progressing  Goal: Maintain or return mobility status to optimal level  Description  INTERVENTIONS:  - Assess patient's baseline mobility status (ambulation, transfers, stairs, etc )    - Identify cognitive and physical deficits and behaviors that affect mobility  - Identify mobility aids required to assist with transfers and/or ambulation (gait belt, sit-to-stand, lift, walker, cane, etc )  - [place] fall precautions as indicated by assessment  - Record patient progress and toleration of activity level on Mobility SBAR; progress patient to next Phase/Stage  - Instruct patient to call for assistance with activity based on assessment  - Request Rehabilitation consult to assist with strengthening/weightbearing, etc   Outcome: Progressing     Problem: DISCHARGE PLANNING  Goal: Discharge to home or other facility with appropriate resources  Description  INTERVENTIONS:  - Identify barriers to discharge w/patient and caregiver  - Arrange for needed discharge resources and transportation as appropriate  - Identify discharge learning needs (meds, wound care, etc )  - Arrange for interpretive services to assist at discharge as needed  - Refer to Case Management Department for coordinating discharge planning if the patient needs post-hospital services based on physician/advanced practitioner order or complex needs related to functional status, cognitive ability, or social support system  Outcome: Progressing     Problem: Knowledge Deficit  Goal: Patient/family/caregiver demonstrates understanding of disease process, treatment plan, medications, and discharge instructions  Description  Complete learning assessment and assess knowledge base  Interventions:  - Provide teaching at level of understanding  - Provide teaching via preferred learning methods  Outcome: Progressing     Problem: Neurological Deficit  Goal: Neurological status is stable or improving  Description  Interventions:  - Monitor and assess patient's level of consciousness, motor function, sensory function, and level of assistance needed for ADLs  - Monitor and report changes from baseline  Collaborate with interdisciplinary team to initiate plan and implement interventions as ordered  - Provide and maintain a safe environment  - Utilize seizure precautions  - Utilize fall precautions  - Utilize aspiration precautions  - Utilize bleeding precautions  Outcome: Progressing     Problem: Activity Intolerance/Impaired Mobility  Goal: Mobility/activity is maintained at optimum level for patient  Description  Interventions:  - Assess and monitor patient  barriers to mobility and need for assistive/adaptive devices  - Assess patient's emotional response to limitations  - Collaborate with interdisciplinary team and initiate plans and interventions as ordered  - Encourage independent activity per ability   - Maintain proper body alignment  - Perform active/passive rom as tolerated/ordered    - Plan activities to conserve energy   - Turn patient  Outcome: Progressing

## 2019-07-24 NOTE — SPEECH THERAPY NOTE
Speech Language/Pathology  Speech/Language Pathology  Assessment    Patient Name: Rich Loomis  RJBJE'V Date: 7/24/2019     Problem List  Patient Active Problem List   Diagnosis    Mixed hyperlipidemia    Benign essential hypertension    Chronic kidney disease, stage II (mild)    Chronic pain of both knees    Esophageal reflux    Gout    Hypercalcemia    Macular degeneration    Osteoarthritis    Peripheral neuropathy    Primary localized osteoarthritis of left knee    Primary localized osteoarthritis of right knee    Vitamin D deficiency    Depression    Fall    Aphasia    Elevated troponin I level    Acute renal failure superimposed on chronic kidney disease (HCC)    Confused    Mobitz type 2 second degree AV block     Past Medical History  Past Medical History:   Diagnosis Date    Depression     Gout     H/O vaginal hysterectomy     resolved-4/17/2015    Skin cancer      Past Surgical History  Past Surgical History:   Procedure Laterality Date    CATARACT EXTRACTION      TOTAL ABDOMINAL HYSTERECTOMY      with removal of both ovaries    VAGINAL HYSTERECTOMY      resolved-4/17/2015     Please refer to bedside swallow eval for pmh and admit info  Pt presenting w/ receptive and expressive aphasia, however MRI was negative  ? Source  Speech-Language Evaluation    Impression:  Pt presents w/ mod/severe receptive and expressive language deficits  Noted MRI negative  ? etiology  Recommendation:  F/u for language impairment if no improvement in status    Therapy Prognosis:unknown at this time  Pt is very pleasant and cooperative however  family appear very supportive  Frequency: tbd    Patient's goal:  None stated  Goals: To follow   (for improved basic comprehension and expression)  For now, son educated on keeping communication very basic  Asking y/n ?;s or giving choice of 2  Rephrasing ?'s if pt does not understand  Adding environmental cues/visual cues to improve comprehension  Pt is a 80 yof admitted after being found down by son  Social:  Home alone  Independent  Orientation:  City:responded "yes" to [place]  Month: unable to state  WM:"saturday" incorrect  Hospital:no response  Reason for hospitalization: no response  Able to tell me her month of birth w/ choice of 2  Auditory Comprehension:  One step commands: unable to follow oral [facility] command  Personal y/n ?'s: responded "yes" when asked if she was a man, then started laughing  Chance level /50%  Basic y/n ?'s: inconsistent/chance    Reading Comprehension:  Name recognition:)oral reading)  Able to read her name and son's name when presented one word at a time w/ large print  Simple direction following: absent w/out cue    Oral Expression:  Auto Sequences:  able to count 1-9 w/ visual cue of fingers and prompt to begin  Perseverated on "6' after counting up to 9  WM:2/7 w/ cues  Word Repetition: able to select a word in choice of 2 but not repeating on request  Object Namin/5  Conversation: unable to finish a sentence   Neologisms, paraphasias, word-finding deficits, word substitutions,  perseveration    Written Expression:  dnt  Math:  wnt  Motor Speech:  Dysarthria:none  Also noted:  ? Gaze preference to the left  Phonemic paraphasias  Semantic paraphasias  Perseveration  Partially aware of deficits:    Results discussed with:  Family/nurse

## 2019-07-24 NOTE — PLAN OF CARE
Problem: Potential for Falls  Goal: Patient will remain free of falls  Description  INTERVENTIONS:  - Assess patient frequently for physical needs  -  Identify cognitive and physical deficits and behaviors that affect risk of falls    -  [place] fall precautions as indicated by assessment   - Educate patient/family on patient safety including physical limitations  - Instruct patient to call for assistance with activity based on assessment  - Modify environment to reduce risk of injury  - Consider OT/PT consult to assist with strengthening/mobility  Outcome: Progressing     Problem: Prexisting or High Potential for Compromised Skin Integrity  Goal: Skin integrity is maintained or improved  Description  INTERVENTIONS:  - Identify patients at risk for skin breakdown  - Assess and monitor skin integrity  - Assess and monitor nutrition and hydration status  - Monitor labs (i e  albumin)  - Assess for incontinence   - Turn and reposition patient  - Assist with mobility/ambulation  - Relieve pressure over bony prominences  - Avoid friction and shearing  - Provide appropriate hygiene as needed including keeping skin clean and dry  - Evaluate need for skin moisturizer/barrier cream  - Collaborate with interdisciplinary team (i e  Nutrition, Rehabilitation, etc )   - Patient/family teaching  Outcome: Progressing     Problem: PAIN - ADULT  Goal: Verbalizes/displays adequate comfort level or baseline comfort level  Description  Interventions:  - Encourage patient to monitor pain and request assistance  - Assess pain using appropriate pain scale  - Administer analgesics based on type and severity of pain and evaluate response  - Implement non-pharmacological measures as appropriate and evaluate response  - Consider cultural and social influences on pain and pain management  - Notify physician/advanced practitioner if interventions unsuccessful or patient reports new pain  Outcome: Progressing     Problem: SAFETY ADULT  Goal: Maintain or return to baseline ADL function  Description  INTERVENTIONS:  -  Assess patient's ability to carry out ADLs; assess patient's baseline for ADL function and identify physical deficits which impact ability to perform ADLs (bathing, care of mouth/teeth, toileting, grooming, dressing, etc )  - Assess/evaluate cause of self-care deficits   - Assess range of motion  - Assess patient's mobility; develop plan if impaired  - Assess patient's need for assistive devices and provide as appropriate  - Encourage maximum independence but intervene and supervise when necessary  ¯ Involve family in performance of ADLs  ¯ Assess for home care needs following discharge   ¯ Request OT consult to assist with ADL evaluation and planning for discharge  ¯ Provide patient education as appropriate  Outcome: Progressing  Goal: Maintain or return mobility status to optimal level  Description  INTERVENTIONS:  - Assess patient's baseline mobility status (ambulation, transfers, stairs, etc )    - Identify cognitive and physical deficits and behaviors that affect mobility  - Identify mobility aids required to assist with transfers and/or ambulation (gait belt, sit-to-stand, lift, walker, cane, etc )  - [place] fall precautions as indicated by assessment  - Record patient progress and toleration of activity level on Mobility SBAR; progress patient to next Phase/Stage  - Instruct patient to call for assistance with activity based on assessment  - Request Rehabilitation consult to assist with strengthening/weightbearing, etc   Outcome: Progressing     Problem: DISCHARGE PLANNING  Goal: Discharge to home or other facility with appropriate resources  Description  INTERVENTIONS:  - Identify barriers to discharge w/patient and caregiver  - Arrange for needed discharge resources and transportation as appropriate  - Identify discharge learning needs (meds, wound care, etc )  - Arrange for interpretive services to assist at discharge as needed  - Refer to Case Management Department for coordinating discharge planning if the patient needs post-hospital services based on physician/advanced practitioner order or complex needs related to functional status, cognitive ability, or social support system  Outcome: Progressing     Problem: Knowledge Deficit  Goal: Patient/family/caregiver demonstrates understanding of disease process, treatment plan, medications, and discharge instructions  Description  Complete learning assessment and assess knowledge base  Interventions:  - Provide teaching at level of understanding  - Provide teaching via preferred learning methods  Outcome: Progressing     Problem: Neurological Deficit  Goal: Neurological status is stable or improving  Description  Interventions:  - Monitor and assess patient's level of consciousness, motor function, sensory function, and level of assistance needed for ADLs  - Monitor and report changes from baseline  Collaborate with interdisciplinary team to initiate plan and implement interventions as ordered  - Provide and maintain a safe environment  - Utilize seizure precautions  - Utilize fall precautions  - Utilize aspiration precautions  - Utilize bleeding precautions  Outcome: Progressing     Problem: Activity Intolerance/Impaired Mobility  Goal: Mobility/activity is maintained at optimum level for patient  Description  Interventions:  - Assess and monitor patient  barriers to mobility and need for assistive/adaptive devices  - Assess patient's emotional response to limitations  - Collaborate with interdisciplinary team and initiate plans and interventions as ordered  - Encourage independent activity per ability   - Maintain proper body alignment  - Perform active/passive rom as tolerated/ordered    - Plan activities to conserve energy   - Turn patient  Outcome: Progressing

## 2019-07-24 NOTE — PROGRESS NOTES
Progress Note - Lissette Díaz 80 y o  female MRN: [id number]    Unit/Bed#: E4 -01 Encounter: [id number]      Subjective: The patient has some difficulty with word finding at the moment  She says that she is feeling well however  She denies chest pain, shortness of breath, dizziness, diplopia, or focal weakness  Physical Exam:   Temp:  [97 3 °F (36 3 °C)-98 8 °F (37 1 °C)] 98 8 °F (37 1 °C)  HR:  [45-81] 77  Resp:  [18-22] 20  BP: (135-193)/() 160/83    Gen:  Well-developed, frail, in no distress  Neck:  Supple  No lymphadenopathy, goiter, or bruit  Heart:  Regular rhythm  No murmur, gallop, or rub  Lungs:  Clear to auscultation and percussion  No wheezing, rales, or rhonchi    Abd:  Soft with active bowel sounds  No mass, tenderness, or organomegaly  Extremities:  No clubbing, cyanosis, or edema  No calf tenderness  Neuro:  Alert  She knew that she was in the hospital   She knew that it was the summer  She seemed know that it was July but had trouble getting the word out  She moves all limbs equally  Skin:  Warm and dry      LABS:  No new labs    Patient Active Problem List   Diagnosis    Mixed hyperlipidemia    Benign essential hypertension    Chronic kidney disease, stage II (mild)    Chronic pain of both knees    Esophageal reflux    Gout    Hypercalcemia    Macular degeneration    Osteoarthritis    Peripheral neuropathy    Primary localized osteoarthritis of left knee    Primary localized osteoarthritis of right knee    Vitamin D deficiency    Depression    Fall    Aphasia    Elevated troponin I level    Acute renal failure superimposed on chronic kidney disease (HCC)    Confused    Mobitz type 2 second degree AV block       Assessment/Plan:  1  Speech disturbance, rule out stroke  2  Second-degree heart block, Mobitz type 2  3  Hypertension  4  Hyperlipidemia  5  Hypercalcemia, likely primary hyperparathyroidism  6  History of gout  7   Mildly elevated creatinine, chronicity unknown    The patient will be getting an MRI  Neurology evaluation has been requested  She is currently on antiplatelet therapy  We are monitoring her heart rhythm because of her second-degree heart block  She will continue atorvastatin  Further intervention will be planned after discussing the case with Neurology  Parathyroid hormone level will be obtained and we will continue to monitor her calcium      VTE Pharmacologic Prophylaxis: Heparin  VTE Mechanical Prophylaxis: sequential compression device

## 2019-07-24 NOTE — OCCUPATIONAL THERAPY NOTE
[address]  Evaluation     Patient Name: Jeane Dumont  [id number] Date: 7/24/2019  Problem List  Patient Active Problem List   Diagnosis    Mixed hyperlipidemia    Benign essential hypertension    Chronic kidney disease, stage II (mild)    Chronic pain of both knees    Esophageal reflux    Gout    Hypercalcemia    Macular degeneration    Osteoarthritis    Peripheral neuropathy    Primary localized osteoarthritis of left knee    Primary localized osteoarthritis of right knee    Vitamin D deficiency    Depression    Fall    Aphasia    Elevated troponin I level    Acute renal failure superimposed on chronic kidney disease (HCC)    Confused    Mobitz type 2 second degree AV block     Past Medical History  Past Medical History:   Diagnosis Date    Depression     Gout     H/O vaginal hysterectomy     resolved-4/17/2015    Skin cancer      Past Surgical History  Past Surgical History:   Procedure Laterality Date    CATARACT EXTRACTION      TOTAL ABDOMINAL HYSTERECTOMY      with removal of both ovaries    VAGINAL HYSTERECTOMY      resolved-4/17/2015 07/24/19 4220   Note Type   Note type Eval/Treat   Restrictions/Precautions   Weight Bearing Precautions Per Order No   Other Precautions Chair Alarm;Cognitive; Bed Alarm; Fall Risk;Pain;Multiple lines;Telemetry;Hard of hearing;Visual impairment   Pain Assessment   Pain Assessment FLACC   Pain Location Knee   Pain Orientation Bilateral   Hospital Pain Intervention(s) Ambulation/increased activity;Repositioned; Emotional support   Response to Interventions tolerated   Pain Rating: FLACC (Rest) - Face 1   Pain Rating: FLACC (Rest) - Legs 1   Pain Rating: FLACC (Rest) - Activity 1   Pain Rating: FLACC (Rest) - Cry 1   Pain Rating: FLACC (Rest) - Consolability 1   Score: FLACC (Rest) 5   Pain Rating: FLACC (Activity) - Face 1   Pain Rating: FLACC (Activity) - Legs 1   Pain Rating: FLACC (Activity) - Activity 1   Pain Rating: FLACC (Activity) - Cry 1   Pain Rating: FLACC (Activity) - Consolability 1   Score: FLACC (Activity) 5   Home Living   Type of [address] One level; Laundry in basement  (stairglide to basement)   Bathroom Shower/Tub Walk-in shower   Bathroom Toilet Standard   Bathroom Equipment Grab bars in shower; Shower chair;Grab bars around toilet   [address] Walker;Electric scooter   Additional Comments pt lives alone; son checks on daily; per son pt does own medications and laundry   Prior Function   Level of [place] Independent with ADLs and functional mobility   Lives With [address] [place] Help From Family   ADL Assistance Independent   IADLs Independent   Falls in the last 6 months 1 to 4   Vocational Retired   Comments pt son and daughter provide transportation; pt w/ use of RW short distances in the home and use of scooter for longer distances due to OA pain   Lifestyle   Autonomy per pt independent w/ ADLS, independent w/ functional transfers and mobility w/ RW or scooter, independent w/ IADLs, assist transportation   Reciprocal Relationships son and daughter   Service to Others retired housewife   Intrinsic Gratification sleeping   Subjective   Subjective "That was a workout"   ADL   Where Assessed Edge of bed   Eating Assistance 4  Minimal Assistance   Grooming Assistance 3  Moderate Assistance   UB [address] 3  Moderate Assistance   LB [address] 2  Maximal [facility] 3  Moderate Assistance   LB Dressing Assistance 2  Maximal [address]  2  Maximal Assistance   Toileting Deficit Setup;Steadying;Verbal cueing;Supervison/safety; Increased time to complete;Clothing management down;Clothing management up;Grab bar use;Perineal hygiene   Bed Mobility   Supine to Sit 4  Minimal assistance   Additional items Assist x 1; Increased time required;Verbal cues;LE management; Bedrails;HOB elevated; Impulsive  (increased time and cues)   Sit to Supine 3  Moderate assistance   Additional items Assist x 1; Increased time required;Verbal cues;LE management   Additional Comments increased time to reach EOB; pt w/ mission in mind of going to [facility], decreased assistance needed   Transfers   Sit to Stand 3  Moderate assistance   Additional items Assist x 1; Increased time required;Verbal cues; Bedrails;Assist x 2  (SBA of 2nd for safety)   Stand to Sit 3  Moderate assistance   Additional items Assist x 1; Increased time required;Verbal cues;Armrests  (SBA of 2nd for safety)   Toilet transfer 3  Moderate assistance   Additional items Assist x 1; Increased time required;Verbal cues;Standard toilet;Commode;Armrests;Assist x 2  (MAX VCs; SBA of 2nd for safety)   Additional Comments cues for hand placement and positioning; increased time and momentum needed to achieve stance; pt w/ increased processing time and difficulty understanding concept of BSC and then ambulated to bathroom w/ assist to transfer to toilet   Functional Mobility   Functional Mobility 3  Moderate assistance   Additional Comments assist x1-2 w/ assist for line management; assist w/ maneuvering RW, increased time to complete   Additional items Rolling walker   Balance   Static Sitting Fair   Dynamic Sitting Fair -   Static Standing Poor +   Dynamic Standing Poor   Ambulatory Poor   Activity Tolerance   Activity Tolerance Patient limited by pain; Patient limited by fatigue;Treatment limited secondary to medical complications (Comment)  (expressive aphasia)   Nurse Made Aware appropriate to see per RN, Libia Valente present and assisting w/ transfers and mobility   RUE Assessment   RUE Assessment X  (limited elevation 2* RTC &arthritis, distal 3+/5,  3+/5)   LUE Assessment   LUE Assessment X  (distal 3+/5,  3+/5, limited shoulder elevation)   Hand Function   Gross Motor Coordination Functional  (increased time on L UE, slight dysmetria noted)   Fine Motor Coordination (difficult finger to thumb opposition; dysdiadochokinesia L)   Sensation   Light Touch   (unable to accurately assess 2* aphasia)   Proprioception   Proprioception   (unable to accurately assess 2* aphasia)   Vision-Basic Assessment   Visual History Macular degeneration   Patient Visual Report   (son reports pt w/ poor vision at baseline)   Vision - Complex Assessment   Ocular Range of Motion WFL   Head Position WDL   Tracking   (difficulty at times)   Acuity Able to read clock/calendar on wall without difficulty  (w/ increased time able to state time on clock)   Additional Comments pt w/ gaze to left, appears w/ possible vision deficits on R side   Perception   Inattention/Neglect Cues to attend left visual field  (w/ gaze to left)   Motor Planning   (increased time and cues)   Cognition   Overall Cognitive Status Impaired   Arousal/Participation Responsive; Cooperative   Attention Attends with cues to redirect   Orientation Level Oriented to person;Oriented to place; Disoriented to time;Disoriented to situation  (oriented to month and year; not oriented to specific date)   Memory Decreased recall of precautions;Decreased recall of recent events;Decreased short term memory   Following Commands Follows one step commands with increased time or repetition   Comments pt w/ expressive aphasia, increased processing time, decreased insight and safety awareness, decreased carryover   Assessment   Limitation Decreased ADL status; Decreased UE strength;Decreased Safe judgement during ADL;Decreased endurance;Decreased cognition;Decreased high-level ADLs; Decreased self-care trans;Decreased fine motor control;Visual deficit; Decreased sensation;Decreased UE ROM   Prognosis Fair;Good   Assessment Pt is a 80 y o  female seen for OT evaluation s/p admit to [facility] on 7/23/2019 w/ expressive Aphasia and was found down on ground by son at home  Comorbidities affecting pt's functional performance at time of assessment include:  JEN on CKD II, macular degeneration, OA b/l knees, peripheral neuropathy, adhesive capsulitis of R shoulder, morbitz type 2 2nd degree AV block  Personal factors affecting pt at time of IE include: lives alone  Prior to admission, pt was living alone and reports independent w/ ADLs, independent w/ functional transfers and mobility w/ RW and long distance mobility w/ scooter, independent w/ IADLs, son assists w/ heavier IADLs at times  Upon evaluation: Pt requires MIN assist sit>supine bed mobility w/ increased time to complete, MOD assist x1-2 sit<>Stand w/ VCs for hand placement and positioning, MOD assist x1-2 functional mobility w/ RW and assist for maneuvering RW, MAX assist toileting, MAX assist LB ADLS, MOD assist UB ADLs, MOD assist grooming 2* the following deficits impacting occupational performance: decreased strength and endurance, limited shoulder elevation 2* RTC and arthritis, impaired balance, possible visual deficits w/ gaze to left noted, dysdiadochokinesia, increased time and slight dysmetria noted L UE, impaired functional reach, impaired cognition (impulsive, decreased safety awareness, impaired insight into deficits), expressive aphasia  Pt to benefit from continued skilled OT tx while in the hospital to address deficits as defined above and maximize level of functional independence w ADL's and functional mobility  Occupational Performance areas to address include: grooming, bathing/shower, toilet hygiene, dressing, health maintenance, functional mobility, clothing management, cleaning and meal prep, formal cognitive assessment, safety education, visual assessment  From OT standpoint, recommendation at time of d/c would be short term rehab when medically stable  Goals   Patient Goals "to go home"   LTG Time Frame 10-14   Long Term Goal please see below goals   Plan   Treatment Interventions ADL retraining;Functional transfer training; Endurance training;Cognitive reorientation;Equipment evaluation/education;Patient/family training;UE strengthening/ROM; Neuromuscular reeducation; Fine motor coordination activities; Compensatory technique education; Energy conservation; Activityengagement   Goal Expiration Date 08/07/19   OT Frequency 3-5x/wk   Recommendation   OT Discharge Recommendation Short Term Rehab   OT - OK to Discharge   (to rehab when medically stable)   Barthel Index   Feeding 5   Bathing 0   Grooming Score 0   Dressing Score 0   Bladder Score 10   Bowels Score 10   Toilet Use Score 5   Transfers (Bed/Chair) Score 5   Mobility (Level Surface) Score 0   Stairs Score 0   Barthel Index Score 35   Modified Madison Scale   Modified Madison Scale 4      Occupational Therapy Goals to be met in 10-14 days:  1) Pt will improve activity tolerance to G for min 30 min txment sessions to enhance ADLs  2) Pt will complete ADLs/self care w/ min assist  3) Pt will complete toileting w/ min assist w/ G hygiene/thoroughness using DME PRN  4) Pt will improve functional transfers on/off all surfaces using DME PRN w/ G balance/safety including toileting w/ min assist  5) Pt will improve fx'l mobility during I/ADl/leisure tasks using DME PRN w/ g balance/safety w/ min assist  6) Pt will engage in ongoing cognitive assessment w/ G participation to A w/ safe d/c planning/recommendations  7) Pt will demonstrate G carryover of pt/caregiver education and training as appropriate w/ mod I  w/ G tolerance  8) Pt will engage in depression screen/leisure interest checklist w/ G participation to monitor s/s depression and ID 3 positive coping strategies to A w/ emotional regulation and management  9) Pt will demonstrate 100% carryover of E C  techniques w/ mod I t/o fx'l I/ADL/leisure tasks w/o cues s/p skilled education  10) Pt will demonstrate improved bed mobility to supervision to enhance ADLs  11) Pt will demonstrate 100% carryover of LHAE for LB ADLs/self care and leisure s/p skilled education w/ mod I and G participation  12) Pt will demonstrate improved standing tolerance to 3-5 minutes during functional tasks w/ no LOB to enhance ADL performance  13) Pt will engage in ongoing  assessments, screens, and activities t/o functional tasks w/ good participation to assist w/ adaptation and accommodations or rule out visual perceptual impairments    Documentation completed by: Nathaniel Davies MS, OTR/L

## 2019-07-24 NOTE — UTILIZATION REVIEW
Initial Clinical Review    Admission: Date/Time/Statement: 7/23/19 @ 1522     Orders Placed This Encounter   Procedures    Inpatient Admission     Standing Status:   Standing     Number of Occurrences:   1     Order Specific Question:   Admitting Physician     Answer:   Cecilio Arnold     Order Specific Question:   Level of Care     Answer:   Med Surg [16]     Order Specific Question:   Estimated length of stay     Answer:   More than 2 Midnights     Order Specific Question:   Certification     Answer:   I certify that inpatient services are medically necessary for this patient for a duration of greater than two midnights  See H&P and MD Progress Notes for additional information about the patient's course of treatment  ED Arrival Information     Expected Arrival Acuity Means of Arrival Escorted By Service Admission Type    - 7/23/2019 12:23 Emergent Ambulance [address] [facility] Emergency    Arrival Complaint    -        Chief Complaint   Patient presents with    Aphasia     Pt lives alone, pt son went to visit pt and found her on the ground by her bed  Per ems pt was last known well 18 hours ago  Pt is usually alert x 4 but now has aphasia  Per ems pt is not on blood thinners      Assessment/Plan: 80 y o  female who presents with PMH of hypertension, chronic knee pain coming to the hospital for word-finding difficulties and confusion  Patient is presently a poor historian  HPI is constructed by discussion with the patient's son who is at bedside  Patient was last seen well in the afternoon of the day pta  The morning of the admission pt was found next to her bed on her knees with her arms on the bed, facing the bed unable to get up from the floor  She was unable to tell her son what had happened  She did not recall getting on the floor  She cannot recall anything of what happened the day prior    She reports only b/l knee pain which is chronic when asked about any pain post fall  She has no cp/sob/n/v/f/c  * Aphasia  Assessment & Plan  Word finding difficulties w/pt being found down for unknown time  Superimposed on hard of hearing status  May be stroke vs acute metabolic encephalopathy vs concussion  Pt is alert and oriented to hospital by name and knows president  She demonstrates echolalia  No other focal neurodeficits although exam limited by knee pain and right shoulder pain which are both chronic  Ct head negative for ischemia/hemorrhage/midline shift  For now given rf admit for stroke workup  rec'd asa 325mg in ed  Hold bp meds, start neurochecks, check mri flp hgb a1c and tte  Consult neuro   Confused  Assessment & Plan  May be 2* stroke vs concussion vs acute metabolic encephalopathy  Ct head negative for ischemia  Workup as above   Elevated troponin I level  Assessment & Plan  W/o cp  May be nonspecific elevation vs type 2 MI from htn  ekg is nonischemic  Demonstrates stable rbbb and lafb and questionable type 2 mobitz  Trend troponins for completeness   Fall  Assessment & Plan  Pt found on knees facing bed w/arms on bed unable to get up down for max of 16h    Ck negative, ct head negative no evidence of trauma on skeletal survey by myself  Given unwitnessed fall and confusion cannot r/o syncope  Consult pt/ot  Monitor on tele, check orthostatics     Mobitz type 2 second degree AV block  Assessment & Plan  Suspected on heart monitor  Given bifascicular block and pt being found down consider possible syncopal episode  Monitor on telemetry, consult cardiology for evaluation     Acute renal failure superimposed on chronic kidney disease ([facility] [address] )  Assessment & Plan  Baseline creatinine is 1 0  Monitor w/gentle ivf hydration   Chronic pain of both knees  Assessment & Plan  Hold gabapentin given confusion word finding difficulties and renal dysfn   Benign essential hypertension  Assessment & Plan  Hold arb for permissive htn   Mixed hyperlipidemia  Assessment & Plan  Hold zetia given david   VTE Prophylaxis: Heparin  / sequential compression device   Code Status: level 3 dni/dnr  POLST: There is no POLST form on file for this patient (pre-hospital)     Anticipated Length of Stay:  Patient will be admitted on an Inpatient basis with an anticipated length of stay of  Greater than 2 midnights     Justification for Hospital Stay: cva  ED Triage Vitals   Temperature Pulse Respirations Blood Pressure SpO2   07/23/19 1228 07/23/19 1226 07/23/19 1226 07/23/19 1226 07/23/19 1226   98 8 °F (37 1 °C) 74 18 169/71 94 %      Temp Source Heart Rate Source Patient Position - Orthostatic VS BP Location FiO2 (%)   07/23/19 1228 07/23/19 1226 07/23/19 1226 07/23/19 1226 --   Oral Monitor Sitting Right arm       Pain Score       07/23/19 1357       5        Wt Readings from Last 1 Encounters:   07/23/19 70 1 kg (154 lb 8 7 oz)     Additional Vital Signs:   07/24/19 0726            None (Room air)     07/24/19 0659  97 9 °F (36 6 °C)  77  18  151/77  96 %  None (Room air)  Lying   07/24/19 0445  98 5 °F (36 9 °C)  56  20  193/77Abnormal   96 %  None (Room air)  Lying   07/24/19 0245  98 °F (36 7 °C)  53Abnormal   20  146/76  95 %  None (Room air)  Lying   07/24/19 0045  97 9 °F (36 6 °C)  79  18  163/103Abnormal   100 %    Lying   07/23/19 2245  98 7 °F (37 1 °C)  77  18  156/73  95 %  None (Room air)  Lying   07/23/19 2045  97 8 °F (36 6 °C)  45Abnormal   20  135/66  93 %  None (Room air)  Lying   07/23/19 1945  97 3 °F (36 3 °C)Abnormal   49Abnormal   20  166/70  98 %  None (Room air)  Lying   07/23/19 1845    75    175/82Abnormal       Lying   07/23/19 1745  97 3 °F (36 3 °C)Abnormal   81  20  179/96Abnormal     None (Room air)  Lying   07/23/19 1658  98 1 °F (36 7 °C)  69  18  164/88  97 %  None (Room air)  Sitting   07/23/19 1523    59  22  195/89Abnormal   99 %  None (Room air)  Sitting   07/23/19 1408    73  22  199/95Abnormal   91 %  None (Room air)  Sitting - Orthostatic VS   07/23/19 1402   58  22  185/75Abnormal   94 %  None (Room air)  Lying - Orthostatic VS   07/23/19 1357    64  20  170/70  97 %  None (Room air)  Sitting       Pertinent Labs/Diagnostic Test Results:   7/23 CXR -wnl   7/23 CT head- wnl  7/23 CT cervical spine - wnl  7/23 EKG- lafb and rbbb  7/23 Echo -pending   7/24 MRI brain -pending   Results from last 7 days   Lab Units 07/23/19  1254   WBC Thousand/uL 5 45   HEMOGLOBIN g/dL 11 7   HEMATOCRIT % 36 7   PLATELETS Thousands/uL 160   NEUTROS ABS Thousands/µL 4 22     Results from last 7 days   Lab Units 07/23/19  1254   SODIUM mmol/L 142   POTASSIUM mmol/L 4 5   CHLORIDE mmol/L 106   CO2 mmol/L 24   ANION GAP mmol/L 12   BUN mg/dL 26*   CREATININE mg/dL 1 32*   EGFR ml/min/1 73sq m 36   CALCIUM mg/dL 11 1*     Results from last 7 days   Lab Units 07/23/19  1254   AST U/L 17   ALT U/L 18   ALK PHOS U/L 87   TOTAL PROTEIN g/dL 7 7   ALBUMIN g/dL 4 4   TOTAL BILIRUBIN mg/dL 0 63     Results from last 7 days   Lab Units 07/23/19  1254   GLUCOSE RANDOM mg/dL 112     Results from last 7 days   Lab Units 07/23/19  1254   CK TOTAL U/L 140   CK MB INDEX % 1 9   CK MB ng/mL 2 7     Results from last 7 days   Lab Units 07/23/19  2313 07/23/19 2004 07/23/19  1254   TROPONIN I ng/mL 0 17* 0 16* 0 11*     Results from last 7 days   Lab Units 07/23/19  1254   PROTIME seconds 13 6   INR  1 03   PTT seconds 30       Results from last 7 days   Lab Units 07/23/19 2004 07/23/19  1901   ETHANOL LVL mg/dL <3  --    ACETAMINOPHEN LVL ug/mL  --  <2*   SALICYLATE LVL mg/dL  --  3 1       ED Treatment:   Medication Administration from 07/23/2019 1223 to 07/23/2019 1639       Date/Time Order Dose Route Action     07/23/2019 1442 sodium chloride 0 9 % bolus 1,000 mL 1,000 mL Intravenous New Bag     07/23/2019 1439 aspirin chewable tablet 324 mg 324 mg Oral Given     07/23/2019 1458 losartan (COZAAR) tablet 25 mg 25 mg Oral Given        Past Medical History:   Diagnosis Date    Depression     Gout     H/O vaginal hysterectomy     resolved-4/17/2015    Skin cancer      Present on Admission:   Mixed hyperlipidemia   Benign essential hypertension   Chronic pain of both knees      Admitting Diagnosis: Aphasia [R47 01]  High blood pressure [I10]  Speech disturbance [R47 9]  Confused [R41 0]  CVA (cerebral vascular accident) ([facility] [address] ) [I63 9]  Renal insufficiency [N28 9]  Age/Sex: 80 y o  female  Admission Orders:    Current Facility-Administered Medications:  aspirin 81 mg Oral Daily     atorvastatin 40 mg Oral QPM     doxepin 50 mg Oral HS     heparin (porcine) 5,000 Units Subcutaneous Q8H [facility]     pantoprazole 20 mg Oral Early Morning     sodium chloride 50 mL/hr Intravenous Continuous       SCD  Neuro checks   OT PT eval   Speech eval   Reg diet  IS   Tele   IP CONSULT TO NEUROLOGY  IP CONSULT TO CASE MANAGEMENT  IP CONSULT TO [address] [place] [address] Utilization Review Department  Phone: [phone]; Fax [phone]  [email or web address]  ATTENTION: Please call with any questions or concerns to [phone]  and carefully listen to the prompts so that you are directed to the right person  Send all requests for admission clinical reviews, approved or denied determinations and any other requests to fax [phone]   All voicemails are confidential

## 2019-07-24 NOTE — CONSULTS
Stroke consult  Difficulty obtaining diet hx d/t aphasia, but pt stated "I don't eat too much" multiple times  Reviewed wt hx records, no significant wt changes  Pt reported no issues chewing or swallowing  Pending ST evaluation  Pt reported eating a muffin, fruit, juice, coffee for B, observed pt's lunch was untouched, pt stated "I'm not hungry"  Offered supplements to assist w/ meeting pro, jackelin needs during times of decreased appetite and pt agreed  Ordered ensure enlive BID  Pt wasn't appropriate for diet education  Will continue to monitor po intake

## 2019-07-24 NOTE — PHYSICAL THERAPY NOTE
PHYSICAL THERAPY NOTE          Patient Name: Maria Esther Forrester  DQGUS'I Date: 7/24/2019  Time In: 1545  Time Out: 1600       07/24/19 1500   Note Type   Note type Eval only   Pain Assessment   Pain Location Knee   Pain Orientation Bilateral   Home Living   Type of [address] One level   Bathroom Shower/Tub Walk-in shower   Bathroom Toilet Standard   Bathroom Equipment Grab bars in shower; Shower chair;Grab bars around toilet   [address] Walker;Electric scooter   Additional Comments pt lives alone, but son does check on her daily   Prior Function   Level of [place] Independent with ADLs and functional mobility   Lives With Alone   Receives Help From Family   ADL Assistance Independent   IADLs Independent   Falls in the last 6 months 1 to 4   Vocational Retired   Comments pt son and daughter transport pt as needed   Restrictions/Precautions   Other Precautions Cognitive; Chair Alarm; Bed Alarm; Fall Risk;Pain;Hard of hearing;Visual impairment   Cognition   Overall Cognitive Status Impaired   Arousal/Participation Lethargic   Attention Attends with cues to redirect   Orientation Level Oriented to person;Oriented to place; Disoriented to time;Disoriented to situation   Memory Decreased short term memory;Decreased recall of recent events;Decreased recall of precautions   Following Commands Follows one step commands with increased time or repetition   Comments expressive aphasia, processing time   RLE Assessment   RLE Assessment X   Strength RLE   R Hip Flexion 2/5   R Hip ABduction 2/5   R Hip ADduction 2/5   R Knee Flexion 2+/5   R Knee Extension 2+/5   R Ankle Dorsiflexion 2/5   R Ankle Plantar Flexion 3/5   LLE Assessment   LLE Assessment X   Strength LLE   L Hip Flexion 3/5   L Hip ABduction 2+/5   L Hip ADduction 2+/5   L Knee Flexion 2+/5   L Knee Extension 2+/5   L Ankle Dorsiflexion 3/5   L Ankle Plantar Flexion 3/5   Bed Mobility   Additional Comments Pt did not want to get out of bed, all MMT performed with HOB elevated, unable to assess transfers   Transfers   Additional Comments Pt did not want to get out of bed, all MMT performed with HOB elevated, unable to assess transfers   Ambulation/Elevation   Additional items Not tested  (Pt did not want to get out of bed, gait not performed)   Balance   Static Sitting   (Pt did not want to get out of bed, unable to assess balance)   Activity Tolerance   Activity Tolerance   (Pt refused to get out of bed, IE limited as a result)   Medical Staff Made Aware [address]   Nurse Made Aware [facility], [place]   Assessment   Prognosis Fair   Problem List Decreased strength;Decreased range of motion;Decreased endurance; Impaired balance;Decreased mobility; Decreased coordination;Decreased safety awareness; Impaired vision; Impaired hearing;Pain; Impaired judgement;Decreased cognition   Assessment Devi Katz is a 80 y o  female who presents to IP PT secondary to fall where patient unable to recall how she fell and circumstances  Pt PLOF living alone in single story home and independent with ADL's, received help from son and daughter for transportation, with pt son checking on pt every day  PT evaluated patient while in bed, pt did not want to get out of bed in order to assess transfers and gait at this time, defer to OT evaluation for information on transfers  Patient presents with the following deficits: increased pain and fall risk, decreased cognition, decreased strength, endurance, balance, and tolerance to activities  Patient would benefit from skilled PT in order to address these deficits in order to be discharged to rehab facility when medically stable  Barriers to Discharge Decreased caregiver support   Goals   STG Expiration Date 08/05/19   Short Term Goal #1 1)    Pt will perform bed mobility with min A demonstrating appropriate technique the time in order to improve function  2)  Perform all transfers with min A demonstrating safe and appropriate technique in order to improve ability to negotiate safely in home environment  3) Amb with least restrictive AD > 150'x1 with min A in order to demonstrate ability to negotiate in home environment  4)  Improve overall strength and balance 1/2 grade in order to optimize ability to perform functional tasks and reduce fall risk  5) Increase activity tolerance to 45 minutes in order to improve endurance to functional tasks  6)  Negotiate stairs using most appropriate technique and S in order to be able to negotiate safely in home environment  7) PT for ongoing patient and family/caregiver education, DME needs and d/c planning in order to promote highest level of function in least restrictive environment     Recommendation   Recommendation   (rehab when medically stable)   Barthel Index   Feeding 5   Bathing 0   Grooming Score 0   Dressing Score 0   Bladder Score 10   Bowels Score 10   Toilet Use Score 5   Transfers (Bed/Chair) Score 5   Mobility (Level Surface) Score 0   Stairs Score 0   Barthel Index Score 35     Hx/personal factors: co-morbidities, dec caregiver support, home alone, advanced age, use of AD, dec cognition, pain, h/o of falls and fall risk  Examination: dec mobility, dec balance, dec endurance, dec amb, high fall risk, pain  Clinical: unpredictable (ongoing medical status, abnormal lab values and high fall risk)  Complexity: high     Carito Pickup, PT

## 2019-07-24 NOTE — PLAN OF CARE
Problem: Potential for Falls  Goal: Patient will remain free of falls  Description  INTERVENTIONS:  - Assess patient frequently for physical needs  -  Identify cognitive and physical deficits and behaviors that affect risk of falls    -  [place] fall precautions as indicated by assessment   - Educate patient/family on patient safety including physical limitations  - Instruct patient to call for assistance with activity based on assessment  - Modify environment to reduce risk of injury  - Consider OT/PT consult to assist with strengthening/mobility  7/24/2019 1151 by Melita Mccoy RN  Outcome: Progressing  7/24/2019 1150 by Melita Mccoy RN  Outcome: Progressing     Problem: Prexisting or High Potential for Compromised Skin Integrity  Goal: Skin integrity is maintained or improved  Description  INTERVENTIONS:  - Identify patients at risk for skin breakdown  - Assess and monitor skin integrity  - Assess and monitor nutrition and hydration status  - Monitor labs (i e  albumin)  - Assess for incontinence   - Turn and reposition patient  - Assist with mobility/ambulation  - Relieve pressure over bony prominences  - Avoid friction and shearing  - Provide appropriate hygiene as needed including keeping skin clean and dry  - Evaluate need for skin moisturizer/barrier cream  - Collaborate with interdisciplinary team (i e  Nutrition, Rehabilitation, etc )   - Patient/family teaching  7/24/2019 1151 by Melita Mccoy RN  Outcome: Progressing  7/24/2019 1150 by Melita Mccoy RN  Outcome: Progressing     Problem: PAIN - ADULT  Goal: Verbalizes/displays adequate comfort level or baseline comfort level  Description  Interventions:  - Encourage patient to monitor pain and request assistance  - Assess pain using appropriate pain scale  - Administer analgesics based on type and severity of pain and evaluate response  - Implement non-pharmacological measures as appropriate and evaluate response  - Consider cultural and social influences on pain and pain management  - Notify physician/advanced practitioner if interventions unsuccessful or patient reports new pain  7/24/2019 1151 by Aidan Griggs RN  Outcome: Progressing  7/24/2019 1150 by Aidan Griggs RN  Outcome: Progressing     Problem: SAFETY ADULT  Goal: Maintain or return to baseline ADL function  Description  INTERVENTIONS:  -  Assess patient's ability to carry out ADLs; assess patient's baseline for ADL function and identify physical deficits which impact ability to perform ADLs (bathing, care of mouth/teeth, toileting, grooming, dressing, etc )  - Assess/evaluate cause of self-care deficits   - Assess range of motion  - Assess patient's mobility; develop plan if impaired  - Assess patient's need for assistive devices and provide as appropriate  - Encourage maximum independence but intervene and supervise when necessary  ¯ Involve family in performance of ADLs  ¯ Assess for home care needs following discharge   ¯ Request OT consult to assist with ADL evaluation and planning for discharge  ¯ Provide patient education as appropriate  7/24/2019 1151 by Aidan Griggs RN  Outcome: Progressing  7/24/2019 1150 by Aidan Griggs RN  Outcome: Progressing  Goal: Maintain or return mobility status to optimal level  Description  INTERVENTIONS:  - Assess patient's baseline mobility status (ambulation, transfers, stairs, etc )    - Identify cognitive and physical deficits and behaviors that affect mobility  - Identify mobility aids required to assist with transfers and/or ambulation (gait belt, sit-to-stand, lift, walker, cane, etc )  - [place] fall precautions as indicated by assessment  - Record patient progress and toleration of activity level on Mobility SBAR; progress patient to next Phase/Stage  - Instruct patient to call for assistance with activity based on assessment  - Request Rehabilitation consult to assist with strengthening/weightbearing, etc   7/24/2019 1151 by [place], RN  Outcome: Progressing  7/24/2019 1150 by Prasanna Albarran RN  Outcome: Progressing     Problem: DISCHARGE PLANNING  Goal: Discharge to home or other facility with appropriate resources  Description  INTERVENTIONS:  - Identify barriers to discharge w/patient and caregiver  - Arrange for needed discharge resources and transportation as appropriate  - Identify discharge learning needs (meds, wound care, etc )  - Arrange for interpretive services to assist at discharge as needed  - Refer to Case Management Department for coordinating discharge planning if the patient needs post-hospital services based on physician/advanced practitioner order or complex needs related to functional status, cognitive ability, or social support system  7/24/2019 1151 by Prasanna Albarran RN  Outcome: Progressing  7/24/2019 1150 by Prasanna Albarran RN  Outcome: Progressing     Problem: Knowledge Deficit  Goal: Patient/family/caregiver demonstrates understanding of disease process, treatment plan, medications, and discharge instructions  Description  Complete learning assessment and assess knowledge base  Interventions:  - Provide teaching at level of understanding  - Provide teaching via preferred learning methods  7/24/2019 1151 by Prasanna Albarran RN  Outcome: Progressing  7/24/2019 1150 by Prasanna Albarran RN  Outcome: Progressing     Problem: Neurological Deficit  Goal: Neurological status is stable or improving  Description  Interventions:  - Monitor and assess patient's level of consciousness, motor function, sensory function, and level of assistance needed for ADLs  - Monitor and report changes from baseline  Collaborate with interdisciplinary team to initiate plan and implement interventions as ordered  - Provide and maintain a safe environment  - Utilize seizure precautions  - Utilize fall precautions  - Utilize aspiration precautions  - Utilize bleeding precautions    7/24/2019 1151 by Prasanna Albarran RN  Outcome: Progressing  7/24/2019 1150 by Sukhdev Hobbs RN  Outcome: Progressing     Problem: Activity Intolerance/Impaired Mobility  Goal: Mobility/activity is maintained at optimum level for patient  Description  Interventions:  - Assess and monitor patient  barriers to mobility and need for assistive/adaptive devices  - Assess patient's emotional response to limitations  - Collaborate with interdisciplinary team and initiate plans and interventions as ordered  - Encourage independent activity per ability   - Maintain proper body alignment  - Perform active/passive rom as tolerated/ordered    - Plan activities to conserve energy   - Turn patient  7/24/2019 1151 by Sukhdev Hobbs RN  Outcome: Progressing  7/24/2019 1150 by Sukhdev Hobbs RN  Outcome: Progressing

## 2019-07-24 NOTE — NURSING NOTE
Pt taken down for MRI and per MRI staff pt upset and tearful sounding like she was trying to express she was claustrophobic  Admitting SLIM MARIELA notified and 0 5mg of IV Ativan ordered and administered  MRI staff attempted MRI again with no success  Pt brought back into room tearful but relieved to not be in the machine  Call bell within reach, will continue to monitor closely

## 2019-07-24 NOTE — SPEECH THERAPY NOTE
Speech Language/Pathology  Speech/Language Pathology  Assessment    Patient Name: Wally Alcala  LILPI'B Date: 7/24/2019     Problem List  Patient Active Problem List   Diagnosis    Mixed hyperlipidemia    Benign essential hypertension    Chronic kidney disease, stage II (mild)    Chronic pain of both knees    Esophageal reflux    Gout    Hypercalcemia    Macular degeneration    Osteoarthritis    Peripheral neuropathy    Primary localized osteoarthritis of left knee    Primary localized osteoarthritis of right knee    Vitamin D deficiency    Depression    Fall    Aphasia    Elevated troponin I level    Acute renal failure superimposed on chronic kidney disease (HCC)    Confused    Mobitz type 2 second degree AV block     Past Medical History  Past Medical History:   Diagnosis Date    Depression     Gout     H/O vaginal hysterectomy     resolved-4/17/2015    Skin cancer      Past Surgical History  Past Surgical History:   Procedure Laterality Date    CATARACT EXTRACTION      TOTAL ABDOMINAL HYSTERECTOMY      with removal of both ovaries    VAGINAL HYSTERECTOMY      resolved-4/17/2015     Chief Complaint:   ams speech difficulties found down on knees by bed  History of Present Illness:  Wally Alcala is a 80 y o  female who presents with PMH of hypertension, chronic knee pain coming to the hospital for word-finding difficulties and confusion  Patient is presently a poor historian  HPI is constructed by discussion with the patient's son who is at bedside  Patient was last seen well in the afternoon of the day pta  The morning of the admission pt was found next to her bed on her knees with her arms on the bed, facing the bed unable to get up from the floor  She was unable to tell her son what had happened  She did not recall getting on the floor  She cannot recall anything of what happened the day prior    She reports only b/l knee pain which is chronic when asked about any pain post fall  She has no cp/sob/n/v/f/c  At baseline pt is rather functional   Per son she can do her own checkbook  She occasionally goes shopping with her daughter  No hx of cva  Assessment/Plan:  Hospital Problem List:   Principal Problem:    Aphasia  Active Problems:    Fall    Elevated troponin I level    Confused    Mobitz type 2 second degree AV block    Mixed hyperlipidemia    Benign essential hypertension    Chronic pain of both knees    Acute renal failure superimposed on chronic kidney disease (HCC)      Ct and MRI head neg acute  CXR nad      ? Seizure       Bedside Swallow Evaluation:    Summary:  Pt presented w/ very reduced intake at lunch but no s/s oral or pharyngeal dysphagia w/ hard solids and thin liquids taken  Nurse reported breakfast was tolerated this am      Recommendations:  Diet:regular  ? F/u x 1  Liquid:thin  Meds:as tolerated  Supervision:close for now  Positioning:Upright  Strategies: Pt to take PO/Meds only when fully alert and upright  Oral care  Aspiration precautions  Reflux precautions    Therapy Prognosis: favorable  Prognosis considerations: no issues reported, though son stated Rigo Canales declined since she was brought in  Frequency: ? F/u x 1 for swallow  See language eval      Patient's goal: none stated    Consider consult w/:  Rehab  Nutrition    Reason for consult:  R/o aspiration  Determine safest and least restrictive diet  Change in mental status  New neuro event  Current diet:  regular  Premorbid diet[de-identified]  regular  Previous VBS:  none  O2 requirement:  none  Voice/Speech:  receptive and expressive aphasia, ? Etiology  No dysarthria     Social:  Home alone  Follows commands:  Poorly/inconsistent/needs cues                       Cognitive Status:  Alert, laughs w/ speech errors  Oral mech exam:  Dentition:natural  Labial strength and ROM:unable to follow oral mech exam but appears wnl  Lingual strength and ROM:+  Mandibular strength and ROM:+  Secretion management:+    Items administered:  Refused soup and sandwich on lunch tray  agreeable to lucero carrillo and jairo  family report she likes sweet things  Oral stage:  wnl w/ min amount  Lip closure:+  Mastication:+  Bolus formation:+  Bolus control:+  Transfer:+  Oral residue:-  Pocketing:-    Pharyngeal stage:  No overt s/s  Swallow promptness: +  Laryngeal rise:+  Wet voice:-  Throat clear:-  Cough:-  Secondary swallows:-  Audible swallows:-  No overt s/s aspiration    Esophageal stage:  No s/s reported  -  Aspiration precautions posted    Results d/w:  Pt, nursing, family    Goal(s):  Pt will tolerate least restrictive diet w/out s/s aspiration or oral/pharyngeal difficulties

## 2019-07-24 NOTE — PLAN OF CARE
Problem: OCCUPATIONAL THERAPY ADULT  Goal: Performs self-care activities at highest level of function for planned discharge setting  See evaluation for individualized goals  Description  Treatment Interventions: ADL retraining, Functional transfer training, Endurance training, Cognitive reorientation, Equipment evaluation/education, Patient/family training, UE strengthening/ROM, Neuromuscular reeducation, Fine motor coordination activities, Compensatory technique education, Energy conservation, Activityengagement          See flowsheet documentation for full assessment, interventions and recommendations  Note:   Limitation: Decreased ADL status, Decreased UE strength, Decreased Safe judgement during ADL, Decreased endurance, Decreased cognition, Decreased high-level ADLs, Decreased self-care trans, Decreased fine motor control, Visual deficit, Decreased sensation, Decreased UE ROM  Prognosis: Fair, Good  Assessment: Pt is a 80 y o  female seen for OT evaluation s/p admit to [facility] on 7/23/2019 w/ expressive Aphasia and was found down on ground by son at home  Comorbidities affecting pt's functional performance at time of assessment include: JEN on CKD II, macular degeneration, OA b/l knees, peripheral neuropathy, adhesive capsulitis of R shoulder, morbitz type 2 2nd degree AV block  Personal factors affecting pt at time of IE include: lives alone  Prior to admission, pt was living alone and reports independent w/ ADLs, independent w/ functional transfers and mobility w/ RW and long distance mobility w/ scooter, independent w/ IADLs, son assists w/ heavier IADLs at times   Upon evaluation: Pt requires MIN assist sit>supine bed mobility w/ increased time to complete, MOD assist x1-2 sit<>Stand w/ VCs for hand placement and positioning, MOD assist x1-2 functional mobility w/ RW and assist for maneuvering RW, MAX assist toileting, MAX assist LB ADLS, MOD assist UB ADLs, MOD assist grooming 2* the following deficits impacting occupational performance: decreased strength and endurance, limited shoulder elevation 2* RTC and arthritis, impaired balance, possible visual deficits w/ gaze to left noted, dysdiadochokinesia, increased time and slight dysmetria noted L UE, impaired functional reach, impaired cognition (impulsive, decreased safety awareness, impaired insight into deficits), expressive aphasia  Pt to benefit from continued skilled OT tx while in the hospital to address deficits as defined above and maximize level of functional independence w ADL's and functional mobility  Occupational Performance areas to address include: grooming, bathing/shower, toilet hygiene, dressing, health maintenance, functional mobility, clothing management, cleaning and meal prep, formal cognitive assessment, safety education, visual assessment  From OT standpoint, recommendation at time of d/c would be short term rehab when medically stable         OT Discharge Recommendation: Short Term Rehab  OT - OK to Discharge: (to rehab when medically stable)

## 2019-07-25 ENCOUNTER — HOSPITAL ENCOUNTER (INPATIENT)
Facility: HOSPITAL | Age: 84
LOS: 5 days | Discharge: NURSING, REHAB OR LONG-TERM CARE | DRG: 101 | End: 2019-07-30
Attending: INTERNAL MEDICINE | Admitting: INTERNAL MEDICINE
Payer: MEDICARE

## 2019-07-25 ENCOUNTER — APPOINTMENT (INPATIENT)
Dept: NEUROLOGY | Facility: HOSPITAL | Age: 84
DRG: 101 | End: 2019-07-25
Payer: MEDICARE

## 2019-07-25 ENCOUNTER — APPOINTMENT (INPATIENT)
Dept: NON INVASIVE DIAGNOSTICS | Facility: HOSPITAL | Age: 84
DRG: 101 | End: 2019-07-25
Payer: MEDICARE

## 2019-07-25 VITALS
HEART RATE: 80 BPM | OXYGEN SATURATION: 98 % | TEMPERATURE: 100.3 F | DIASTOLIC BLOOD PRESSURE: 81 MMHG | RESPIRATION RATE: 20 BRPM | BODY MASS INDEX: 28.44 KG/M2 | HEIGHT: 62 IN | WEIGHT: 154.54 LBS | SYSTOLIC BLOOD PRESSURE: 164 MMHG

## 2019-07-25 DIAGNOSIS — M25.511 RIGHT SHOULDER PAIN: ICD-10-CM

## 2019-07-25 DIAGNOSIS — N18.2 CHRONIC KIDNEY DISEASE, STAGE II (MILD): ICD-10-CM

## 2019-07-25 DIAGNOSIS — R06.89 ACUTE RESPIRATORY INSUFFICIENCY: ICD-10-CM

## 2019-07-25 DIAGNOSIS — R47.01 APHASIA: ICD-10-CM

## 2019-07-25 DIAGNOSIS — G89.29 CHRONIC PAIN OF BOTH KNEES: ICD-10-CM

## 2019-07-25 DIAGNOSIS — M25.562 CHRONIC PAIN OF BOTH KNEES: ICD-10-CM

## 2019-07-25 DIAGNOSIS — R41.0 CONFUSED: Primary | ICD-10-CM

## 2019-07-25 DIAGNOSIS — M25.561 CHRONIC PAIN OF BOTH KNEES: ICD-10-CM

## 2019-07-25 PROCEDURE — RECHECK: Performed by: INTERNAL MEDICINE

## 2019-07-25 PROCEDURE — 95819 EEG AWAKE AND ASLEEP: CPT | Performed by: PSYCHIATRY & NEUROLOGY

## 2019-07-25 PROCEDURE — 95816 EEG AWAKE AND DROWSY: CPT

## 2019-07-25 PROCEDURE — 99239 HOSP IP/OBS DSCHRG MGMT >30: CPT | Performed by: INTERNAL MEDICINE

## 2019-07-25 PROCEDURE — 99233 SBSQ HOSP IP/OBS HIGH 50: CPT | Performed by: PSYCHIATRY & NEUROLOGY

## 2019-07-25 RX ORDER — DOXEPIN HYDROCHLORIDE 25 MG/1
25 CAPSULE ORAL
Status: CANCELLED | OUTPATIENT
Start: 2019-07-25

## 2019-07-25 RX ORDER — ACETAMINOPHEN 325 MG/1
650 TABLET ORAL EVERY 6 HOURS PRN
Status: DISCONTINUED | OUTPATIENT
Start: 2019-07-25 | End: 2019-07-30 | Stop reason: HOSPADM

## 2019-07-25 RX ORDER — PANTOPRAZOLE SODIUM 20 MG/1
20 TABLET, DELAYED RELEASE ORAL
Status: DISCONTINUED | OUTPATIENT
Start: 2019-07-26 | End: 2019-07-30 | Stop reason: HOSPADM

## 2019-07-25 RX ORDER — GABAPENTIN 300 MG/1
300 CAPSULE ORAL 3 TIMES DAILY
Status: CANCELLED | OUTPATIENT
Start: 2019-07-25

## 2019-07-25 RX ORDER — DOXEPIN HYDROCHLORIDE 25 MG/1
25 CAPSULE ORAL
Status: DISCONTINUED | OUTPATIENT
Start: 2019-07-26 | End: 2019-07-30 | Stop reason: HOSPADM

## 2019-07-25 RX ORDER — GABAPENTIN 300 MG/1
300 CAPSULE ORAL 3 TIMES DAILY
Status: DISCONTINUED | OUTPATIENT
Start: 2019-07-26 | End: 2019-07-30 | Stop reason: HOSPADM

## 2019-07-25 RX ORDER — LOSARTAN POTASSIUM 50 MG/1
50 TABLET ORAL DAILY
Status: CANCELLED | OUTPATIENT
Start: 2019-07-25

## 2019-07-25 RX ORDER — HEPARIN SODIUM 5000 [USP'U]/ML
5000 INJECTION, SOLUTION INTRAVENOUS; SUBCUTANEOUS EVERY 8 HOURS SCHEDULED
Status: CANCELLED | OUTPATIENT
Start: 2019-07-25

## 2019-07-25 RX ORDER — LOSARTAN POTASSIUM 50 MG/1
50 TABLET ORAL DAILY
Status: DISCONTINUED | OUTPATIENT
Start: 2019-07-26 | End: 2019-07-30 | Stop reason: HOSPADM

## 2019-07-25 RX ORDER — ATORVASTATIN CALCIUM 40 MG/1
40 TABLET, FILM COATED ORAL EVERY EVENING
Status: CANCELLED | OUTPATIENT
Start: 2019-07-25

## 2019-07-25 RX ORDER — ATORVASTATIN CALCIUM 40 MG/1
40 TABLET, FILM COATED ORAL EVERY EVENING
Status: DISCONTINUED | OUTPATIENT
Start: 2019-07-26 | End: 2019-07-30 | Stop reason: HOSPADM

## 2019-07-25 RX ORDER — ASPIRIN 81 MG/1
81 TABLET, CHEWABLE ORAL DAILY
Status: CANCELLED | OUTPATIENT
Start: 2019-07-26

## 2019-07-25 RX ORDER — SODIUM CHLORIDE 9 MG/ML
50 INJECTION, SOLUTION INTRAVENOUS CONTINUOUS
Status: DISCONTINUED | OUTPATIENT
Start: 2019-07-26 | End: 2019-07-26

## 2019-07-25 RX ORDER — ACETAMINOPHEN 325 MG/1
650 TABLET ORAL EVERY 6 HOURS PRN
Status: CANCELLED | OUTPATIENT
Start: 2019-07-25

## 2019-07-25 RX ORDER — HEPARIN SODIUM 5000 [USP'U]/ML
5000 INJECTION, SOLUTION INTRAVENOUS; SUBCUTANEOUS EVERY 8 HOURS SCHEDULED
Status: DISCONTINUED | OUTPATIENT
Start: 2019-07-26 | End: 2019-07-30 | Stop reason: HOSPADM

## 2019-07-25 RX ORDER — LEVETIRACETAM 750 MG/1
375 TABLET ORAL EVERY 12 HOURS SCHEDULED
Status: DISCONTINUED | OUTPATIENT
Start: 2019-07-26 | End: 2019-07-30 | Stop reason: HOSPADM

## 2019-07-25 RX ORDER — PANTOPRAZOLE SODIUM 20 MG/1
20 TABLET, DELAYED RELEASE ORAL
Status: CANCELLED | OUTPATIENT
Start: 2019-07-26

## 2019-07-25 RX ORDER — LOSARTAN POTASSIUM 50 MG/1
50 TABLET ORAL DAILY
Status: DISCONTINUED | OUTPATIENT
Start: 2019-07-25 | End: 2019-07-25 | Stop reason: HOSPADM

## 2019-07-25 RX ORDER — ASPIRIN 81 MG/1
81 TABLET, CHEWABLE ORAL DAILY
Status: DISCONTINUED | OUTPATIENT
Start: 2019-07-26 | End: 2019-07-30 | Stop reason: HOSPADM

## 2019-07-25 RX ORDER — SODIUM CHLORIDE 9 MG/ML
50 INJECTION, SOLUTION INTRAVENOUS CONTINUOUS
Status: CANCELLED | OUTPATIENT
Start: 2019-07-25

## 2019-07-25 RX ORDER — LEVETIRACETAM 250 MG/1
375 TABLET ORAL EVERY 12 HOURS SCHEDULED
Status: CANCELLED | OUTPATIENT
Start: 2019-07-25

## 2019-07-25 RX ADMIN — ASPIRIN 81 MG 81 MG: 81 TABLET ORAL at 08:43

## 2019-07-25 RX ADMIN — DOXEPIN HYDROCHLORIDE 25 MG: 25 CAPSULE ORAL at 21:28

## 2019-07-25 RX ADMIN — ATORVASTATIN CALCIUM 40 MG: 40 TABLET, FILM COATED ORAL at 17:18

## 2019-07-25 RX ADMIN — GABAPENTIN 300 MG: 300 CAPSULE ORAL at 21:28

## 2019-07-25 RX ADMIN — HEPARIN SODIUM 5000 UNITS: 5000 INJECTION INTRAVENOUS; SUBCUTANEOUS at 15:00

## 2019-07-25 RX ADMIN — LOSARTAN POTASSIUM 50 MG: 50 TABLET ORAL at 17:28

## 2019-07-25 RX ADMIN — SODIUM CHLORIDE 50 ML/HR: 0.9 INJECTION, SOLUTION INTRAVENOUS at 17:23

## 2019-07-25 RX ADMIN — LEVETIRACETAM 375 MG: 250 TABLET, FILM COATED ORAL at 21:28

## 2019-07-25 RX ADMIN — LEVETIRACETAM 375 MG: 250 TABLET, FILM COATED ORAL at 08:43

## 2019-07-25 RX ADMIN — HEPARIN SODIUM 5000 UNITS: 5000 INJECTION INTRAVENOUS; SUBCUTANEOUS at 21:29

## 2019-07-25 RX ADMIN — GABAPENTIN 300 MG: 300 CAPSULE ORAL at 08:43

## 2019-07-25 RX ADMIN — GABAPENTIN 300 MG: 300 CAPSULE ORAL at 17:00

## 2019-07-25 NOTE — SPEECH THERAPY NOTE
Speech Language/Pathology  Entered pt's room this am  Tray was in front of her  Noted she had not eaten anything  Some OJ was taken  I asked if she wanted some breakfast, stated "no!" and closed her eyes  EEG tech was then coming in  Will f/u as able

## 2019-07-25 NOTE — PROGRESS NOTES
Progress Note - Neurology   Crescencio Nunez 80 y o  female [id number]  Unit/Bed#: [facility] 4 /E4 MS 26-*    Assessment:  3 80year-old female with a PMH of HTN and HLD who presented with altered mental status and aphasia  Patient's mental status and aphasia continues to fluctuate  Imaging was negative for acute infarction  Concern for possible intermittent partial seizure activity, however, routine EEG was normal  Will continue Keppra and consider transfer for vEEG monitoring  Plan:  1  Continue Keppra 375mg BID  2  Continue aspirin and statin therapy  3  Seizure precautions   4  Telemetry  5  PT/OT/ST  6  Encourage good sleep wake cycles  7  Delirium precautions  8  Notify Neurology with any changes in neuro examination  9  Further recommendations as per attending Neurologist    Results:  1  Routine EEG: This Routine EEG recorded during drowsiness, sleep and brief wakefulness is normal  A few sharply contoured theta activities over the left or right temporal regions are benign variants  2  MRI brain: No acute disease  Minor, age-appropriate volume loss, mild degree of chronic small vessel disease  Subjective:   As per nursing staff, report patient has been waxing and waning with orientation and mood  Nurses state patient was agitated and combative overnight  Nurses report that last night it was patient was A&O X 4, however, today the nurse reports that patient is alert and only oriented to self  Today on examination, patient is alert and pleasant  She is oriented to self and date  Patient is able to answer most questions correctly and appropriately  Decreased concentration observed  Patient denies experiencing a headache  She reports she ate turkey and chocolate pudding today       Past Medical History:   Diagnosis Date    Depression     Gout     H/O vaginal hysterectomy     resolved-4/17/2015    Skin cancer      Past Surgical History:   Procedure Laterality Date    CATARACT EXTRACTION      TOTAL ABDOMINAL HYSTERECTOMY      with removal of both ovaries    VAGINAL HYSTERECTOMY      resolved-4/17/2015     Family History   Problem Relation Age of Onset    Heart attack Mother         MI    Heart attack Father         MI    Hypertension Sister     Stomach cancer Sister     Hypertension Brother      Social History     Socioeconomic History    Marital status:      Spouse name: None    Number of children: None    Years of education: None    Highest education level: None   Occupational History    None   Social Needs    Financial resource strain: None    Food insecurity:     Worry: None     Inability: None    Transportation needs:     Medical: None     Non-medical: None   Tobacco Use    Smoking status: Never Smoker    Smokeless tobacco: Never Used   Substance and Sexual Activity    Alcohol use: No    Drug use: No    Sexual activity: None   Lifestyle    Physical activity:     Days per week: None     Minutes per session: None    Stress: None   Relationships    Social connections:     Talks on phone: None     Gets together: None     Attends Caodaism service: None     Active member of club or organization: None     Attends meetings of clubs or organizations: None     Relationship status: None    Intimate partner violence:     Fear of current or ex partner: None     Emotionally abused: None     Physically abused: None     Forced sexual activity: None   Other Topics Concern    None   Social History Narrative    None     Medications:   All current active meds have been reviewed and current meds:  Scheduled Meds:  Current Facility-Administered Medications:  acetaminophen 650 mg Oral Q6H PRN Angela Cao MD    aspirin 81 mg Oral Daily Diana Potter PA-C    atorvastatin 40 mg Oral QPM Diana Potter PA-C    doxepin 25 mg Oral HS Angela Cao MD    gabapentin 300 mg Oral TID Angela Cao MD    heparin (porcine) 5,000 Units Subcutaneous Q8H [facility] levETIRAcetam 375 mg Oral Q12H [facility] Juana Link PA-C    LORazepam 1 mg Intravenous Once Colonel Alphonso PA-C    pantoprazole 20 mg Oral Early Morning Diana Potter PA-C    sodium chloride 50 mL/hr Intravenous Continuous Diana Potter PA-C Last Rate: 50 mL/hr (07/24/19 1719)     Continuous Infusions:  sodium chloride 50 mL/hr Last Rate: 50 mL/hr (07/24/19 1719)     PRN Meds:   acetaminophen     ROS:   A 12 point ROS was completed  Other than the above mentioned complaints in the HPI, all remaining systems were negative  Vitals:   /65 (BP Location: Left arm)   Pulse 81   Temp 97 9 °F (36 6 °C) (Tympanic)   Resp (!) 24   Ht 5' 2" (1 575 m)   Wt 70 1 kg (154 lb 8 7 oz)   SpO2 96%   BMI 28 27 kg/m²     Physical Exam:   Physical Exam   Constitutional: No distress  HENT:   Right Ear: External ear normal    Left Ear: External ear normal    Nose: Nose normal    Eyes: EOM are normal    Neck: Neck supple  Cardiovascular: Normal rate  Pulmonary/Chest: Effort normal  No respiratory distress  Neurological: She is alert  She has a normal Finger-Nose-Finger Test    Skin: Skin is warm and dry  She is not diaphoretic  Psychiatric:   Pleasant and cooperative     Neurologic Exam     Mental Status   Concentration: decreased  Patient is alert and oriented to self, month, day, and year  First, patient stated that she was in a nursing home  When given multiple choice, patient was able to [facility] hospital  Patient is able to recognize son  Patient is able to state how many kid's she has, which child is older, and able to state her son's birthday  Patient is able to do simple calculations and repeat simple sentences but is not able to do complex calculations or sentences  Patient is able to name simple objects  Patient follows most commands appropriately  No evidence of aphasia noted on examination     Cranial Nerves     CN II   Visual fields full to confrontation       CN III, IV, VI Extraocular motions are normal    Nystagmus: none   Conjugate gaze: present    CN VII   Facial expression full, symmetric  CN VIII   Hearing: impaired    CN XII   CN XII normal      Motor Exam   Muscle bulk: normal  Overall muscle tone: normal  Normal and symmetric BUE strength  No abnormal movements or convulsions observed during examination     Sensory Exam   Light touch normal      Gait, Coordination, and Reflexes     Coordination   Finger to nose coordination: normal    Tremor   Resting tremor: absent    Labs: I have personally reviewed pertinent reports  No results found for this or any previous visit (from the past 24 hour(s))  Imaging: I have personally reviewed pertinent imaging and I have personally reviewed PACS reports  EKG, Pathology, and Other Studies: I have personally reviewed pertinent reports  VTE Prophylaxis: Sequential compression device (Venodyne)  and Heparin    Total time spent today 30 minutes  Greater than 50% of total time was spent with the patient and / or family counseling and / or coordination of care   A description of the counseling / coordination of care: Discussed with patient and son: medication regimen, routine EEG testing, plans of care

## 2019-07-25 NOTE — SOCIAL WORK
Met with son to explain role and complete assessment  PCP is Dr Seb Cain  Pt lives in [facility] alone in a 3 story house with no steps to enter  Pt has first floor set up with bathroom  Pt uses chair lift to go to basement to do wash  Pt was independent with ADLs, meals and her own laundry  Pt uses scooter in house because pt had knee issues  DME:  RW, scooter, SPC, and chair lift  Pt was no open with any VNA or HHA  Pt uses [employer] [address]  Pt hs hx of depression, but has never had [facility] Mental [facility] stay  No hx of D&A  Pt has POA  [place] Will - delia French Yamel   is delia Antunez and he will transport home  Pt has been confused in the hospital and waiting on EEG results and PT/OT evals  Will continue to follow for discharge planning

## 2019-07-25 NOTE — PLAN OF CARE
Problem: Potential for Falls  Goal: Patient will remain free of falls  Description  INTERVENTIONS:  - Assess patient frequently for physical needs  -  Identify cognitive and physical deficits and behaviors that affect risk of falls  -  Evart fall precautions as indicated by assessment   - Educate patient/family on patient safety including physical limitations  - Instruct patient to call for assistance with activity based on assessment  - Modify environment to reduce risk of injury  - Consider OT/PT consult to assist with strengthening/mobility  Outcome: Progressing     Problem: Prexisting or High Potential for Compromised Skin Integrity  Goal: Skin integrity is maintained or improved  Description  INTERVENTIONS:  - Identify patients at risk for skin breakdown  - Assess and monitor skin integrity  - Assess and monitor nutrition and hydration status  - Monitor labs (i e  albumin)  - Assess for incontinence   - Turn and reposition patient  - Assist with mobility/ambulation  - Relieve pressure over bony prominences  - Avoid friction and shearing  - Provide appropriate hygiene as needed including keeping skin clean and dry  - Evaluate need for skin moisturizer/barrier cream  - Collaborate with interdisciplinary team (i e  Nutrition, Rehabilitation, etc )   - Patient/family teaching  Outcome: Progressing     Problem: Neurological Deficit  Goal: Neurological status is stable or improving  Description  Interventions:  - Monitor and assess patient's level of consciousness, motor function, sensory function, and level of assistance needed for ADLs  - Monitor and report changes from baseline  Collaborate with interdisciplinary team to initiate plan and implement interventions as ordered  - Provide and maintain a safe environment  - Utilize seizure precautions  - Utilize fall precautions  - Utilize aspiration precautions  - Utilize bleeding precautions    Outcome: Not Progressing     Problem: Nutrition/Hydration-ADULT  Goal: Nutrient/Hydration intake appropriate for improving, restoring or maintaining nutritional needs  Description  Monitor and assess patient's nutrition/hydration status for malnutrition (ex- brittle hair, bruises, dry skin, pale skin and conjunctiva, muscle wasting, smooth red tongue, and disorientation)  Collaborate with interdisciplinary team and initiate plan and interventions as ordered  Monitor patient's weight and dietary intake as ordered or per policy  Utilize nutrition screening tool and intervene per policy  Determine patient's food preferences and provide high-protein, high-caloric foods as appropriate       INTERVENTIONS:  - Monitor oral intake, urinary output, labs, and treatment plans  - Assess nutrition and hydration status and recommend course of action  - Evaluate amount of meals eaten  - Assist patient with eating if necessary   - Allow adequate time for meals  - Recommend/ encourage appropriate diets, oral nutritional supplements, and vitamin/mineral supplements  - Order, calculate, and assess calorie counts as needed  - Recommend, monitor, and adjust tube feedings and TPN/PPN based on assessed needs  - Assess need for intravenous fluids  - Provide specific nutrition/hydration education as appropriate  - Include patient/family/caregiver in decisions related to nutrition  Outcome: Progressing     Problem: DISCHARGE PLANNING  Goal: Discharge to home or other facility with appropriate resources  Description  INTERVENTIONS:  - Identify barriers to discharge w/patient and caregiver  - Arrange for needed discharge resources and transportation as appropriate  - Identify discharge learning needs (meds, wound care, etc )  - Arrange for interpretive services to assist at discharge as needed  - Refer to Case Management Department for coordinating discharge planning if the patient needs post-hospital services based on physician/advanced practitioner order or complex needs related to functional status, cognitive ability, or social support system  Outcome: Progressing

## 2019-07-25 NOTE — DISCHARGE SUMMARY
Discharge Summary - Berlin Sequeira 10/4/1928, [id number]        Admission Date: 7/23/2019  Discharge Date:  7/25/2019    Admitting Diagnosis: Aphasia [R47 01]  High blood pressure [I10]  Speech disturbance [R47 9]  Confused [R41 0]  CVA (cerebral vascular accident) [facility]) [I63 9]  Renal insufficiency [N28 9]    Discharge Diagnosis:   1  Intermittent aphasia, rule out seizure  2  Second-degree heart block, Mobitz type 2  3  Hypertension  4  Hyperlipidemia  5  Hypercalcemia, likely primary hyperparathyroidism  6  Gout  7  Mildly elevated creatinine, chronicity uncertain     Consulting Physicians:  Dr Rupesh Khan, neurology    Procedures Performed:   None    HPI:  The patient is a 68-year-old woman who was in her usual state until the day of admission  She was found by her son to be confused and having difficulty with word finding  She was brought to the emergency room for further evaluation  She was admitted for further evaluation of stroke  Hospital Course: The patient was admitted to the hospital and observed carefully on telemetry  She was noted to have second-degree heart block, Mobitz type 2, which was asymptomatic  Her aphasia seem to wax and wane during her hospitalization  Stroke evaluation was negative  She was seen in consultation by Dr Her jones but who raise the possibility of seizure  After discussing the situation with the patient's family, empiric Keppra was started  This was not immediately successful in improving the patient's symptoms  Therefore, it was elected to transfer the patient to [facility] for epilepsy monitoring before deciding about additional pharmacologic intervention  Patient has a history of hypertension and is usually on telmisartan  Losartan was substituted because of formulary considerations  The patient was noted to be mildly hypercalcemic    I suspect that she has primary hyperparathyroidism in a parathyroid hormone level has been requested    The patient's creatinine was mildly elevated  It is not clear if this is chronic or acute  A repeat creatinine was requested  On the day of discharge the patient appeared comfortable  She was able to answer some questions but seemed in attentive  Vital signs were stable  Lungs were clear  Cardiac exam revealed an irregular rhythm  The abdomen was soft and nontender  There was no edema  She is able to move all limbs  Disposition:  The patient was transferred to [facility] for further epilepsy evaluation on July 25th  She will be out of bed with assistance  Her diet will be as tolerated  When she is ultimately discharged home, she will resume primary care with Dr Katherine Thapa  Discharge instructions/Information to patient and family:   See after visit summary for information provided to patient and family  Provisions for Follow-Up Care:  See after visit summary for information related to follow-up care and any pertinent home health orders  Planned Readmission: No    Discharge Statement   I spent 45 minutes discharging the patient  This time was spent on the day of discharge  I had direct contact with the patient on the day of discharge  Discharge Medications:  See after visit summary for reconciled discharge medications provided to patient and family

## 2019-07-25 NOTE — PROGRESS NOTES
Progress Note - Frances Zacarias 80 y o  female MRN: [id number]    Unit/Bed#: E4 -01 Encounter: [id number]      Subjective: The patient states that she feels okay  However, she was somewhat inattentive during my interview  Her alertness waxed and waned  She appeared comfortable  Physical Exam:   Temp:  [97 9 °F (36 6 °C)-99 1 °F (37 3 °C)] 98 4 °F (36 9 °C)  HR:  [50-81] 76  Resp:  [18-24] 22  BP: (146-197)/(65-91) 164/65    Gen:  Well-developed, well-nourished, in no distress  Neck:  Supple  No lymphadenopathy, goiter, or bruit  Heart:  Regular rhythm  No murmur, gallop, or rub  Lungs:  Clear to auscultation and percussion  No wheezing, rales, or rhonchi    Abd:  Soft with active bowel sounds  No mass, tenderness, organomegaly  Extremities:  No clubbing, cyanosis, or edema  No calf tenderness  Neuro:  Alert and oriented  No focal sign  Skin:  Warm and dry      LABS:  No new labs        Assessment/Plan:  1  Intermittent speech disturbance, rule out seizure  2  Second-degree heart block, Mobitz type 2, asymptomatic  3  Hypertension  4  Hyperlipidemia  5  Hypercalcemia, likely hyperparathyroidism  6  Gout  7  Mildly elevated creatinine, chronicity uncertain    Neurology recommendations are much appreciated  She is now on Keppra but clinically things have not changed much  The possibility of transfer to [place] for epilepsy monitoring is being investigated  Parathyroid hormone level is pending  Calcium and creatinine will be recheck tomorrow        VTE Pharmacologic Prophylaxis: Heparin  VTE Mechanical Prophylaxis: sequential compression device

## 2019-07-26 ENCOUNTER — APPOINTMENT (INPATIENT)
Dept: NEUROLOGY | Facility: AMBULATORY SURGERY CENTER | Age: 84
DRG: 101 | End: 2019-07-26
Payer: MEDICARE

## 2019-07-26 ENCOUNTER — APPOINTMENT (INPATIENT)
Dept: RADIOLOGY | Facility: HOSPITAL | Age: 84
DRG: 101 | End: 2019-07-26
Payer: MEDICARE

## 2019-07-26 PROBLEM — R06.4 HYPERVENTILATION: Status: ACTIVE | Noted: 2019-07-26

## 2019-07-26 LAB
ANION GAP SERPL CALCULATED.3IONS-SCNC: 7 MMOL/L (ref 4–13)
ARTERIAL PATENCY WRIST A: YES
BACTERIA UR QL AUTO: ABNORMAL /HPF
BASE EXCESS BLDA CALC-SCNC: -1.5 MMOL/L
BILIRUB UR QL STRIP: NEGATIVE
BUN SERPL-MCNC: 17 MG/DL (ref 5–25)
CALCIUM SERPL-MCNC: 9.5 MG/DL (ref 8.3–10.1)
CHLORIDE SERPL-SCNC: 110 MMOL/L (ref 100–108)
CLARITY UR: CLEAR
CO2 SERPL-SCNC: 24 MMOL/L (ref 21–32)
COLOR UR: YELLOW
CREAT SERPL-MCNC: 0.96 MG/DL (ref 0.6–1.3)
FOLATE SERPL-MCNC: >20 NG/ML (ref 3.1–17.5)
GFR SERPL CREATININE-BSD FRML MDRD: 52 ML/MIN/1.73SQ M
GLUCOSE SERPL-MCNC: 103 MG/DL (ref 65–140)
GLUCOSE UR STRIP-MCNC: NEGATIVE MG/DL
HCO3 BLDA-SCNC: 21.3 MMOL/L (ref 22–28)
HGB UR QL STRIP.AUTO: ABNORMAL
HYALINE CASTS #/AREA URNS LPF: ABNORMAL /LPF
KETONES UR STRIP-MCNC: NEGATIVE MG/DL
LEUKOCYTE ESTERASE UR QL STRIP: NEGATIVE
NITRITE UR QL STRIP: NEGATIVE
NON VENT ROOM AIR: 21 %
NON-SQ EPI CELLS URNS QL MICRO: ABNORMAL /HPF
O2 CT BLDA-SCNC: 15 ML/DL (ref 16–23)
OXYHGB MFR BLDA: 94.7 % (ref 94–97)
PCO2 BLDA: 29.8 MM HG (ref 36–44)
PH BLDA: 7.47 [PH] (ref 7.35–7.45)
PH UR STRIP.AUTO: 6 [PH]
PLATELET # BLD AUTO: 133 THOUSANDS/UL (ref 149–390)
PMV BLD AUTO: 13.2 FL (ref 8.9–12.7)
PO2 BLDA: 73.1 MM HG (ref 75–129)
POTASSIUM SERPL-SCNC: 3.1 MMOL/L (ref 3.5–5.3)
PROT UR STRIP-MCNC: ABNORMAL MG/DL
PTH-INTACT SERPL-MCNC: 78.6 PG/ML (ref 18.4–80.1)
RBC #/AREA URNS AUTO: ABNORMAL /HPF
SODIUM SERPL-SCNC: 141 MMOL/L (ref 136–145)
SP GR UR STRIP.AUTO: 1.01 (ref 1–1.03)
SPECIMEN SOURCE: ABNORMAL
TSH SERPL DL<=0.05 MIU/L-ACNC: 1.68 UIU/ML (ref 0.36–3.74)
UROBILINOGEN UR QL STRIP.AUTO: 0.2 E.U./DL
VIT B12 SERPL-MCNC: 2583 PG/ML (ref 100–900)
WBC #/AREA URNS AUTO: ABNORMAL /HPF

## 2019-07-26 PROCEDURE — G9163 LANG EXPRESS GOAL STATUS: HCPCS

## 2019-07-26 PROCEDURE — 82607 VITAMIN B-12: CPT | Performed by: PHYSICIAN ASSISTANT

## 2019-07-26 PROCEDURE — 95951 HB EEG MONITORING/VIDEORECORD: CPT

## 2019-07-26 PROCEDURE — 36600 WITHDRAWAL OF ARTERIAL BLOOD: CPT

## 2019-07-26 PROCEDURE — G8979 MOBILITY GOAL STATUS: HCPCS

## 2019-07-26 PROCEDURE — 99222 1ST HOSP IP/OBS MODERATE 55: CPT | Performed by: INTERNAL MEDICINE

## 2019-07-26 PROCEDURE — 83918 ORGANIC ACIDS TOTAL QUANT: CPT | Performed by: PHYSICIAN ASSISTANT

## 2019-07-26 PROCEDURE — 97163 PT EVAL HIGH COMPLEX 45 MIN: CPT

## 2019-07-26 PROCEDURE — 80048 BASIC METABOLIC PNL TOTAL CA: CPT | Performed by: INTERNAL MEDICINE

## 2019-07-26 PROCEDURE — 84443 ASSAY THYROID STIM HORMONE: CPT | Performed by: PHYSICIAN ASSISTANT

## 2019-07-26 PROCEDURE — G8987 SELF CARE CURRENT STATUS: HCPCS

## 2019-07-26 PROCEDURE — 99232 SBSQ HOSP IP/OBS MODERATE 35: CPT | Performed by: PSYCHIATRY & NEUROLOGY

## 2019-07-26 PROCEDURE — G8988 SELF CARE GOAL STATUS: HCPCS

## 2019-07-26 PROCEDURE — G8978 MOBILITY CURRENT STATUS: HCPCS

## 2019-07-26 PROCEDURE — 82746 ASSAY OF FOLIC ACID SERUM: CPT | Performed by: PHYSICIAN ASSISTANT

## 2019-07-26 PROCEDURE — 85049 AUTOMATED PLATELET COUNT: CPT | Performed by: INTERNAL MEDICINE

## 2019-07-26 PROCEDURE — 83970 ASSAY OF PARATHORMONE: CPT | Performed by: INTERNAL MEDICINE

## 2019-07-26 PROCEDURE — 92523 SPEECH SOUND LANG COMPREHEN: CPT

## 2019-07-26 PROCEDURE — 71045 X-RAY EXAM CHEST 1 VIEW: CPT

## 2019-07-26 PROCEDURE — 81001 URINALYSIS AUTO W/SCOPE: CPT | Performed by: NURSE PRACTITIONER

## 2019-07-26 PROCEDURE — NC001 PR NO CHARGE: Performed by: PSYCHIATRY & NEUROLOGY

## 2019-07-26 PROCEDURE — G9164 LANG EXPRESS D/C STATUS: HCPCS

## 2019-07-26 PROCEDURE — 97167 OT EVAL HIGH COMPLEX 60 MIN: CPT

## 2019-07-26 PROCEDURE — G9162 LANG EXPRESS CURRENT STATUS: HCPCS

## 2019-07-26 PROCEDURE — 86592 SYPHILIS TEST NON-TREP QUAL: CPT | Performed by: PHYSICIAN ASSISTANT

## 2019-07-26 PROCEDURE — 99232 SBSQ HOSP IP/OBS MODERATE 35: CPT | Performed by: NURSE PRACTITIONER

## 2019-07-26 PROCEDURE — 82805 BLOOD GASES W/O2 SATURATION: CPT | Performed by: NURSE PRACTITIONER

## 2019-07-26 RX ORDER — POTASSIUM CHLORIDE 20 MEQ/1
40 TABLET, EXTENDED RELEASE ORAL ONCE
Status: COMPLETED | OUTPATIENT
Start: 2019-07-26 | End: 2019-07-26

## 2019-07-26 RX ADMIN — GABAPENTIN 300 MG: 300 CAPSULE ORAL at 09:21

## 2019-07-26 RX ADMIN — SODIUM CHLORIDE 50 ML/HR: 0.9 INJECTION, SOLUTION INTRAVENOUS at 00:25

## 2019-07-26 RX ADMIN — GABAPENTIN 300 MG: 300 CAPSULE ORAL at 16:15

## 2019-07-26 RX ADMIN — ASPIRIN 81 MG 81 MG: 81 TABLET ORAL at 09:21

## 2019-07-26 RX ADMIN — PANTOPRAZOLE SODIUM 20 MG: 40 TABLET, DELAYED RELEASE ORAL at 05:41

## 2019-07-26 RX ADMIN — GABAPENTIN 300 MG: 300 CAPSULE ORAL at 21:12

## 2019-07-26 RX ADMIN — LOSARTAN POTASSIUM 50 MG: 50 TABLET ORAL at 09:21

## 2019-07-26 RX ADMIN — ATORVASTATIN CALCIUM 40 MG: 40 TABLET, FILM COATED ORAL at 16:16

## 2019-07-26 RX ADMIN — HEPARIN SODIUM 5000 UNITS: 5000 INJECTION INTRAVENOUS; SUBCUTANEOUS at 05:41

## 2019-07-26 RX ADMIN — LEVETIRACETAM 375 MG: 750 TABLET ORAL at 21:12

## 2019-07-26 RX ADMIN — ACETAMINOPHEN 650 MG: 325 TABLET ORAL at 08:00

## 2019-07-26 RX ADMIN — HEPARIN SODIUM 5000 UNITS: 5000 INJECTION INTRAVENOUS; SUBCUTANEOUS at 21:13

## 2019-07-26 RX ADMIN — DOXEPIN HYDROCHLORIDE 25 MG: 25 CAPSULE ORAL at 21:13

## 2019-07-26 RX ADMIN — LEVETIRACETAM 375 MG: 750 TABLET ORAL at 09:21

## 2019-07-26 RX ADMIN — POTASSIUM CHLORIDE 40 MEQ: 1500 TABLET, EXTENDED RELEASE ORAL at 16:15

## 2019-07-26 RX ADMIN — HEPARIN SODIUM 5000 UNITS: 5000 INJECTION INTRAVENOUS; SUBCUTANEOUS at 16:15

## 2019-07-26 NOTE — PLAN OF CARE
Problem: PHYSICAL THERAPY ADULT  Goal: Performs mobility at highest level of function for planned discharge setting  See evaluation for individualized goals  Description  Treatment/Interventions: Functional transfer training, LE strengthening/ROM, Therapeutic exercise, Endurance training, Patient/family training, Equipment eval/education, Bed mobility, Gait training, Spoke to nursing, OT  Equipment Recommended: Kylie Miller, Wheelchair       See flowsheet documentation for full assessment, interventions and recommendations  Note:   Prognosis: Fair  Problem List: Decreased strength, Decreased range of motion, Decreased endurance, Impaired balance, Decreased mobility, Decreased cognition, Decreased safety awareness, Pain, Impaired hearing  Assessment: Pt is a 80 y o  Female admitted to [facility] on 7/25/19 after being found by family member to be acutely confused with expressive aphasia, imaging negative for acute infarction, suspecting seizure activity  Pt on continuous video EEG monitoring during evaluation  She has a significant PMH including depression, gout, and skin cancer  Pt was seen today for a high complexity PT evaluation, found supine in bed agreeable to therapy  She lives in a 1 story home alone and is completely ind with ADLs and mobility with use of RW and power scooter PTA  Pts son provides daily checks and her dtr visits from out of state every week or two to take her to the grocery store  She is currently functioning at a supervision level for bed mobility and a mod A level for transfers and ambulation bed to chair  She demonstrates COG deficits including decreased recall of recent events, decreased processing and decreased command following  She requires mod VC during functional mobility  Pt was left in bed side recliner with all needs within reach and all questions answered chair alarm in place and functioning  Recommend use of RW for transfers and short distance ambulation   PT recommendation is to d/c to inpt rehab  Pt is OK to d/c to rehab when medically cleared  Barriers to Discharge: Decreased caregiver support  Barriers to Discharge Comments: lives alone  Recommendation: (inpt rehab)     PT - OK to Discharge: Yes    See flowsheet documentation for full assessment

## 2019-07-26 NOTE — OCCUPATIONAL THERAPY NOTE
[address]  Evaluation     Patient Name: Pola Lugo  RWZBN'S Date: 7/26/2019  Problem List  Patient Active Problem List   Diagnosis    Mixed hyperlipidemia    Benign essential hypertension    Chronic kidney disease, stage II (mild)    Chronic pain of both knees    Esophageal reflux    Gout    Hypercalcemia    Macular degeneration    Osteoarthritis    Peripheral neuropathy    Primary localized osteoarthritis of left knee    Primary localized osteoarthritis of right knee    Vitamin D deficiency    Depression    Fall    Aphasia    Elevated troponin I level    Acute renal failure superimposed on chronic kidney disease (HCC)    Confused    Mobitz type 2 second degree AV block    Cerebrovascular accident (CVA) ([facility] [address] )     Past Medical History  Past Medical History:   Diagnosis Date    Depression     Gout     H/O vaginal hysterectomy     resolved-4/17/2015    Skin cancer      Past Surgical History  Past Surgical History:   Procedure Laterality Date    CATARACT EXTRACTION      TOTAL ABDOMINAL HYSTERECTOMY      with removal of both ovaries    VAGINAL HYSTERECTOMY      resolved-4/17/2015 07/26/19 1103   Note Type   Note type Eval only   Restrictions/Precautions   Weight Bearing Precautions Per Order No   Other Precautions Cognitive; Chair Alarm; Bed Alarm;Multiple lines;Telemetry; Fall Risk;Pain;Hard of hearing   Pain Assessment   Pain Assessment 0-10   Pain Score 3   Pain Type Chronic pain   Pain Location Shoulder   Pain Orientation Right   Home Living   [place]  [facility]   [place] One level; Laundry in basement  (stair glide to basement)   Bathroom Shower/Tub Walk-in shower   Bathroom Toilet Standard   Bathroom Equipment Grab bars in shower; Shower chair;Grab bars around toilet   [address] Walker;Electric scooter   Additional Comments Information obtained from chart 2* pt's cognitive deficits and questionable historian    Pt resides alone in a [facility] and her son provides her with daily checks  Prior Function   Level of [place] Independent with ADLs and functional mobility   Lives With Alone   Receives Help From Family   ADL Assistance Independent   IADLs Independent   Falls in the last 6 months 1 to 4   Vocational Retired   Lifestyle   Autonomy Pt reports being I with ADLs, IADLs, and functional mobility without use of DME PTA  Pt is not a   Reciprocal Relationships son checks daily; daughter lives in [place] but drives her to the grocery store every 2 weeks   Service to Others retired   Intrinsic Gratification sedentary   Psychosocial   Psychosocial (WDL) [address]  4  Minimal Assistance   Grooming Assistance 4  [facility] 4  Minimal Assistance   LB [address] 3  Moderate Assistance   [address] 3  Moderate Assistance   [address] 2  Maximal [address]  4  Minimal Assistance   Bed Mobility   Supine to Sit 5  Supervision   Additional items Increased time required;Assist x 1   Additional Comments Pt left seated in recliner with all needs within reach, SCDs reapplied, and chair alarm activated  Transfers   Sit to Stand 3  Moderate assistance   Additional items Assist x 1; Increased time required;Verbal cues   Stand to Sit 3  Moderate assistance   Additional items Assist x 1; Increased time required;Verbal cues   Stand pivot 3  Moderate assistance   Additional items Assist x 1; Increased time required;Verbal cues   Balance   Static Sitting Good   Dynamic Sitting Fair +   Static Standing Fair   Dynamic Standing [address] 3,4, & 5 -   Activity Tolerance   Activity Tolerance Patient tolerated treatment well   Medical Staff Made Aware PT Rella Bamberger, PT student [employer]; spoke with CM   Nurse Made Aware Pt cleared by RN prior to OT evaluation   RUE Assessment   RUE Assessment X  (AROM shoulder ~40 deg, strength 3/5 grossly)   LUE Assessment   LUE Assessment X  (4-/5 grossly)   Hand Function   Gross Motor Coordination Functional   Sensation   Light Touch No apparent deficits   Vision - Complex Assessment   Ocular Range of Motion WFL   Cognition   Overall Cognitive Status Impaired   Arousal/Participation Alert; Cooperative;Responsive   Attention Attends with cues to redirect   Orientation Level Oriented to person;Oriented to place;Oriented to time;Disoriented to situation   Memory Decreased recall of precautions;Decreased recall of recent events;Decreased short term memory   Following Commands Follows one step commands with increased time or repetition   Comments Pt is slow to respond and requires cues for attention  Plan to complete cognitive assessment during pt's POC   Assessment   Limitation Decreased ADL status; Decreased UE strength;Decreased Safe judgement during ADL;Decreased UE ROM; Decreased cognition;Decreased endurance;Decreased self-care trans;Decreased high-level ADLs   Prognosis Fair   Assessment Pt is a R- handed 80year old female seen for initial OT evaluation s/p admission to [facility] 7/25/19 with confusion, R gaze preference, and possible aphasia  Imaging negative for acute CVA  Etiology r/o seizures  Pt on continuous video EEG monitoring during OT evaluation  PMHx includes HTN, HLD, gout, primary hyperparathyroidism, and CKD  Pt with active OT orders and cleared by RN for OT evaluation and OOB mobility  Pt is a questionable historian secondary to cognitive deficits, and home set-up obtained from chart  Pt resides alone in a [facility] and was completely I with ADLs, IADLs, and functional mobility with use of rw and electric scooter  Pt's son provides daily checks  Pt is not a   Pt is currently demonstrating the following occupational deficits: eating with Sharri, grooming with Sharri, UB bathing with Sharri, LB bathing with modA, UB dressing with modA, LB dressing with maxA, toileting with Sharri, bed mobility with supervision, and functional transfers with Marcela Tesfaye  Factors currently limiting pt's independence in these areas include: cognitive deficits, generalized weakness, functional mobility, endurance, and unsupportive home environment  Overall, pt scored 25/100 on the Barthel Index  From an OT standpoint recommend STR upon d/c  Pt is to continue to benefit from skilled occupational therapy services while in the hospital to maximize functioning and independence with daily activities  See below for OT goals to be addressed during pt's POC  Goals   Patient Goals none stated   Plan   Treatment Interventions ADL retraining;Functional transfer training;UE strengthening/ROM; Endurance training;Cognitive reorientation;Patient/family training;Equipment evaluation/education; Compensatory technique education;Continued evaluation; Activityengagement   Goal Expiration Date 08/06/19   Treatment Day 1   OT Frequency 3-5x/wk   Recommendation   OT Discharge Recommendation Short Term Rehab   OT - OK to Discharge Yes  (to STR )   Barthel Index   Feeding 5   Bathing 0   Grooming Score 0   Dressing Score 0   Bladder Score 5   Bowels Score 5   Toilet Use Score 5   Transfers (Bed/Chair) Score 5   Mobility (Level Surface) Score 0   Stairs Score 0   Barthel Index Score 25     GOALS    1) Pt will improve activity tolerance to G for min 30 min txment sessions for increase engagement in functional tasks    2) Pt will complete UB/LB dressing/self care w/ mod I using adaptive device and DME as needed    3) Pt will complete bathing w/ Mod I w/ use of AE and DME as needed    4) Pt will complete toileting w/ mod I w/ G hygiene/thoroughness using DME as needed    5) Pt will improve functional transfers to Mod I on/off all surfaces using DME as needed w/ G balance/safety     6) Pt will improve functional mobility during ADL/IADL/leisure tasks to Mod I using DME as needed w/ G balance/safety     7) Pt will participate in simulated IADL management task to increase independence to Mod I w/ G safety and endurance    8) Pt will be attentive 100% of the time during ongoing cognitive assessment w/ G participation to assist w/ safe d/c planning/recommendations    9) Pt will demonstrate G carryover of pt/caregiver education and training as appropriate w/o cues w/ good tolerance to increase safety during functional tasks    10) Pt will be alert and oriented x4 for all OT treatment sessions with use of environmental cues as necessary  11) Pt will attend to a functional activity for at least 10 minutes with no more than 2 cues for attention/redirection  12) Pt will maintain standing with SBA for at least 10 minutes while completing a dynamic functional activity with good safety, balance, and endurance      Charley Holstein, MOT, OTR/L

## 2019-07-26 NOTE — SPEECH THERAPY NOTE
Speech/Language Assessment    Patient Name: Emmanuel Hernandez 80 y o  Today's Date: 7/26/2019    Problem List  Patient Active Problem List   Diagnosis    Mixed hyperlipidemia    Benign essential hypertension    Chronic kidney disease, stage II (mild)    Chronic pain of both knees    Esophageal reflux    Gout    Hypercalcemia    Macular degeneration    Osteoarthritis    Peripheral neuropathy    Primary localized osteoarthritis of left knee    Primary localized osteoarthritis of right knee    Vitamin D deficiency    Depression    Fall    Aphasia    Elevated troponin I level    Acute renal failure superimposed on chronic kidney disease (HCC)    Confused    Mobitz type 2 second degree AV block    Cerebrovascular accident (CVA) ([address] )     Past Medical History  Past Medical History:   Diagnosis Date    Depression     Gout     H/O vaginal hysterectomy     resolved-4/17/2015    Skin cancer      Past Surgical History  Past Surgical History:   Procedure Laterality Date    CATARACT EXTRACTION      TOTAL ABDOMINAL HYSTERECTOMY      with removal of both ovaries    VAGINAL HYSTERECTOMY      resolved-4/17/2015       CURRENT MEDICAL per MARIELA Hadley's neuro progress note 7/26/2019: In summary, the patient is a 24-year-old female with vascular risk factors who originally present and with altered mental status and aphasia  Her symptoms have fluctuated  They did not improve with addition of Keppra  She has since been transferred to [facility] for video monitoring  She has been hypertensive since admission  CURRENT COGNITIVE:  Reported change in mental status, pt was alert and cooperative for ST evaluation     CURRENT PAIN & RESPONSE TO INTERVENTIONS:  No indication of pain     Pt was assessed using the Western Aphasia Battery revised Bedside with the following findings/results:     AUDITORY COMPREHENSION:  Body part ID:100%  Object/Picture ID: 100%  Personal yes/no ? 's: 100%  Simple yes/no ?'s: 100%  Complex y/n ?'s: 75%  One step commands: 100%  Two step commands: 50%  Complex or 3 step commands: 100%     READING COMPREHENSION/FLUENCY:  Unable to read paragraph due to poor eye sight  Able to read large print from menu in room [facility]     VERBAL EXPRESSION/EXPR LANGUAGE:  Word/phrase repetition: 80%  Confrontation Namin%  Picture Description: 80%  Conversation: Spontaneous speech fluency judged to be nearly WNL, occasional slight hesitations   Ability to make needs known: WFL     WRITTEN EXPRESSION:  Name: 100%  Sentence to dictation: 100%  Sentence formulation: 90%     ORAL MOTOR/SPEECH MECHANISM EXAM:  WFL     MOTOR SPEECH:  Dysarthria:              Imprecise artic: no              Rate: WFL              Nasality: WFL              Breath support: WFL              Volume:WFL  Apraxia:              Oral: no              Verbal: no     Needs further motor speech evaluation: No    Symptoms noted:  Aware of deficits: partially     WAB Bedside:  Bedside Aphasia Score 85/100  Bedside Language Score - incomplete assessment d/t inability to complete reading portion of evaluation     Summary/Impression:  Pt presents with mild expressive/receptive aphasia vs communication difficulty due to poor hearing and limited vision  Communication overall is [facility] for expression of wants/needs  Pt is occasionally slow to respond, again suspect due to hearing loss  Occasional mild paraphasias noted which pt was able to self-correct (I e  "He's falling off the ladder, I mean chair ")  In addition to suspected mild aphasia, suspect pt may require more assistance at home at this time  She was unable to read print unless very large/bold, yet stated she is still managing her medications and bills   Pt stated "my bank book is a mess " When discussing concern for pt taking pills without being able to read the bottles, she said "I have them memorized by how they look and how many I take "      Treatment Recommended and Frequency:   No additional ST f/u recommended as communication is felt to be functional, although pt may require slightly more time when communicating  Pt benefits from loud, direct speech and simplified questions/directions   Would recommend increased assistance at home

## 2019-07-26 NOTE — SOCIAL WORK
This CM was not aware pt was transfer to [facility] for epilepsy monitoring yesterday evening  CM completed the medical necessity form in Epic today

## 2019-07-26 NOTE — PLAN OF CARE
Problem: Potential for Falls  Goal: Patient will remain free of falls  Description  INTERVENTIONS:  - Assess patient frequently for physical needs  -  Identify cognitive and physical deficits and behaviors that affect risk of falls    -  [place] fall precautions as indicated by assessment   - Educate patient/family on patient safety including physical limitations  - Instruct patient to call for assistance with activity based on assessment  - Modify environment to reduce risk of injury  - Consider OT/PT consult to assist with strengthening/mobility  Outcome: Progressing     Problem: Prexisting or High Potential for Compromised Skin Integrity  Goal: Skin integrity is maintained or improved  Description  INTERVENTIONS:  - Identify patients at risk for skin breakdown  - Assess and monitor skin integrity  - Assess and monitor nutrition and hydration status  - Monitor labs (i e  albumin)  - Assess for incontinence   - Turn and reposition patient  - Assist with mobility/ambulation  - Relieve pressure over bony prominences  - Avoid friction and shearing  - Provide appropriate hygiene as needed including keeping skin clean and dry  - Evaluate need for skin moisturizer/barrier cream  - Collaborate with interdisciplinary team (i e  Nutrition, Rehabilitation, etc )   - Patient/family teaching  Outcome: Progressing

## 2019-07-26 NOTE — ASSESSMENT & PLAN NOTE
Along with reported "confusion"   Questionable differential diagnosis of possible seizure  The patient was loaded and continued with Keppra, continue till complet  However transferred here to [facility] for further monitoring, seizure protocol and for video EEG  Continued neurology consult  Seizure precautions  EEG completed: This Routine EEG recorded during drowsiness, sleep and brief wakefulness is normal    A few sharply contoured theta activities over the left or right temporal regions are benign variants

## 2019-07-26 NOTE — SOCIAL WORK
Met with pt and explained CM role  Pt live alone in a 2 story house with 2 YOAV and 1st floor setup  Pt was independent PTA with ADL and ambulation, but also uses a scooter  Pt does not drive  Pt's PCP is Dr Nikolas Castañeda  Pt has a chair lift to basement, RW, cane, and scooter  No hx of HHC or rehab  Pt has a hx of depression and denies inpt BH stay  No hx of drug or alcohol use  Pt has a prescription plan and uses [facility] [address] in [facility] for her prescriptions  Primary contact is pt's son Anna Kapadia, contact # H) [phone] or (c) [phone]  Pt's son will provide her with transportation home upon discharge  Pt's son Ace Trujillo and pt's dgt Loraine Alejandro are her POAs  Document in pt's medical record  CM reviewed d/c planning process including the following: identifying help at home, patient preference for d/c planning needs, Discharge Lounge, [facility] Meds to Bed program, availability of treatment team to discuss questions or concerns patient and/or family may have regarding understanding medications and recognizing signs and symptoms once discharged  CM also encouraged patient to follow up with all recommended appointments after discharge  Patient advised of importance for patient and family to participate in managing patients medical well being  Patient/caregiver received discharge checklist  Content reviewed  Patient/caregiver encouraged to participate in discharge plan of care prior to discharge home

## 2019-07-26 NOTE — PROGRESS NOTES
Progress Note - Nyle Hamman 10/4/1928, 80 y o  female MRN: [id number]    Unit/Bed#: [facility] 720-01 Encounter: [id number]    Primary Care Provider: Gabriella Mckeon DO   Date and time admitted to hospital: 7/25/2019 11:41 PM        * Aphasia  Assessment & Plan  Along with reported "confusion"   Questionable differential diagnosis of possible seizure  The patient was loaded and continued with Keppra, continue till complet  However transferred here to [facility] for further monitoring, seizure protocol and for video EEG  Continued neurology consult  Seizure precautions  EEG completed: This Routine EEG recorded during drowsiness, sleep and brief wakefulness is normal    A few sharply contoured theta activities over the left or right temporal regions are benign variants  Hyperventilation  Assessment & Plan  Pt notably blowing off air at this time on room air 99%  Pt has forced exp wheeze   Ordered resp protocol  Stat portable chest xray reviewed   abg ordered   ivf fluids stopped with noted murmur     Peripheral neuropathy  Assessment & Plan  Continue Neurontin 300 mg t i d  Chronic kidney disease, stage II (mild)  Assessment & Plan  Stable creatinine at 1 32  Now 0 96  With hypokalemia    Benign essential hypertension  Assessment & Plan  Currently on Cozaar 50 mg daily    Mixed hyperlipidemia  Assessment & Plan  Continue Lipitor and Zetia  VTE Pharmacologic Prophylaxis:   Pharmacologic: Heparin  Mechanical VTE Prophylaxis in Place: Yes    Patient Centered Rounds: I have performed bedside rounds with nursing staff today  Discussions with Specialists or Other Care Team Provider: nursing     Education and Discussions with Family / Patient: patient     Time Spent for Care: 45 minutes  More than 50% of total time spent on counseling and coordination of care as described above  Current Length of Stay: 1 day(s)    Current Patient Status: Inpatient   Certification Statement:  The patient will continue to require additional inpatient hospital stay due to ongoing workup and treatment     Discharge Plan: on video eeg     Code Status: Level 1 - Full Code      Subjective:   Pt is sitting in chair blowing outwards with exp wheeze   Son at bedside reports she doesn't usually do this   Pt on iv fluids stopped at this time pt with loud murmur  Pt is confused but pleasant  She denies sob she is [place]   Objective:     Vitals:   Temp (24hrs), Av °F (37 2 °C), Min:97 7 °F (36 5 °C), Max:100 9 °F (38 3 °C)    Temp:  [97 7 °F (36 5 °C)-100 9 °F (38 3 °C)] 100 9 °F (38 3 °C)  HR:  [55-88] 55  Resp:  [18-20] 20  BP: (126-169)/(61-92) 126/61  SpO2:  [96 %-99 %] 96 %  There is no height or weight on file to calculate BMI  Input and Output Summary (last 24 hours): Intake/Output Summary (Last 24 hours) at 20193  Last data filed at 2019 1947  Gross per 24 hour   Intake 320 ml   Output 600 ml   Net -280 ml       Physical Exam:     Physical Exam   Constitutional: No distress  HENT:   Head: Normocephalic and atraumatic  With eeg leads on    Eyes: Right eye exhibits no discharge  Left eye exhibits no discharge  No scleral icterus  Neck: No tracheal deviation present  No thyromegaly present  Cardiovascular: Exam reveals no gallop and no friction rub  Murmur heard  Pulmonary/Chest: No stridor  She is in respiratory distress (pt appears to be blowing off air )  She has wheezes (exp wheeze)  She has no rales  She exhibits no tenderness  Abdominal: She exhibits no distension and no mass  There is no tenderness  There is no rebound and no guarding  No hernia  Musculoskeletal: She exhibits no edema, tenderness or deformity  Lymphadenopathy:     She has no cervical adenopathy  Neurological: She is alert  Pt is clearly confused but able to hold some sentence   Skin: No rash noted  She is not diaphoretic  No erythema  No pallor  Psychiatric: She has a normal mood and affect  Pt with episodes of staring during evaluation          Additional Data:     Labs:    Results from last 7 days   Lab Units 07/26/19  0538 07/23/19  1254   WBC Thousand/uL  --  5 45   HEMOGLOBIN g/dL  --  11 7   HEMATOCRIT %  --  36 7   PLATELETS Thousands/uL 133* 160   NEUTROS PCT %  --  78*   LYMPHS PCT %  --  14   MONOS PCT %  --  7   EOS PCT %  --  0     Results from last 7 days   Lab Units 07/26/19  0537 07/23/19  1254   SODIUM mmol/L 141 142   POTASSIUM mmol/L 3 1* 4 5   CHLORIDE mmol/L 110* 106   CO2 mmol/L 24 24   BUN mg/dL 17 26*   CREATININE mg/dL 0 96 1 32*   ANION GAP mmol/L 7 12   CALCIUM mg/dL 9 5 11 1*   ALBUMIN g/dL  --  4 4   TOTAL BILIRUBIN mg/dL  --  0 63   ALK PHOS U/L  --  87   ALT U/L  --  18   AST U/L  --  17   GLUCOSE RANDOM mg/dL 103 112     Results from last 7 days   Lab Units 07/23/19  1254   INR  1 03         Results from last 7 days   Lab Units 07/24/19  0504   HEMOGLOBIN A1C % 5 4               * I Have Reviewed All Lab Data Listed Above  * Additional Pertinent Lab Tests Reviewed: All Labs Within Last 24 Hours Reviewed    Imaging:    Imaging Reports Reviewed Today Include: reviewed     Recent Cultures (last 7 days):           Last 24 Hours Medication List:     Current Facility-Administered Medications:  acetaminophen 650 mg Oral Q6H PRN Darius Smith MD   aspirin 81 mg Oral Daily Darius Smith MD   atorvastatin 40 mg Oral QPM Darius Smith MD   doxepin 25 mg Oral HS Darius Smith MD   gabapentin 300 mg Oral TID Darius Smith MD   heparin (porcine) 5,000 Units Subcutaneous Q8H [address] Darius Smith MD   levETIRAcetam 375 mg Oral Q12H [address] Darius Smith MD   losartan 50 mg Oral Daily Darius Smith MD   pantoprazole 20 mg Oral Early Morning Darius Smith MD        Today, Patient Was Seen By: DEANDRA Hairston    ** Please Note: Dictation voice to text software may have been used in the creation of this document   **

## 2019-07-26 NOTE — PROGRESS NOTES
Progress Note - Neurology   Loly Smiley 80 y o  female [id number]  [facility]    Assessment:  Loly Smiley is a 80 y o  female who presented after being found down by her son, with altered mental status and aphasia that has fluctuated in severity since admission  Stroke workup unremarkable, and no improvement in fluctuations with initiation of Keppra  Patient currently on video monitoring  1  Fluctuating Altered Mental Status, Aphasia- rule out intermittent partial seizures  - Continue Keppra 375 mg BID   - Continue video EEG monitoring - Per Dr Donald Hart, no abnormalities on EEG since initiation this morning    - Labs pending: Vitamin B12, Folate, RPR, MMA, TSH   - Conservative management of delirium - minimize noise, maintain day/night cycle, provide frequent redirection, avoid restraints if possible, etc   - Seizure precautions  - Continue telemetry   - PT/OT/Speech   - STAT CT with any acute neurologic changes     2  Peripheral Neuropathy  - Continue gabapentin 300 mg TID    3  Hypertension   - Continue Cozaar   - Medical management per primary team     4  Hyperlipidemia  - Continue Lipitor, Zetia     Subjective: In summary, the patient is a 49-year-old female with vascular risk factors who originally present and with altered mental status and aphasia  Her symptoms have fluctuated  They did not improve with addition of Keppra  She has since been transferred to [facility] for video monitoring  She has been hypertensive since admission  Patient examined with son at bedside  The son at bedside was the person who found her originally before admission with altered mental status  He states he has not seen anything to the degree that she was at home since admission  Since he has been with her today, he has not noted any fluctuations in her mental status or her speech  The patient reports that she does feel somewhat confused at times    She is mostly concerned about the weakness in her bilateral legs today  She denies any side effects from the [address]  Past Medical History:   Diagnosis Date    Depression     Gout     H/O vaginal hysterectomy     resolved-4/17/2015    Skin cancer      Past Surgical History:   Procedure Laterality Date    CATARACT EXTRACTION      TOTAL ABDOMINAL HYSTERECTOMY      with removal of both ovaries    VAGINAL HYSTERECTOMY      resolved-4/17/2015     Family History   Problem Relation Age of Onset    Heart attack Mother         MI    Heart attack Father         MI    Hypertension Sister     Stomach cancer Sister     Hypertension Brother      Social History     Socioeconomic History    Marital status:       Spouse name: Not on file    Number of children: Not on file    Years of education: Not on file    Highest education level: Not on file   Occupational History    Not on file   Social Needs    Financial resource strain: Not on file    Food insecurity:     Worry: Not on file     Inability: Not on file    Transportation needs:     Medical: Not on file     Non-medical: Not on file   Tobacco Use    Smoking status: Never Smoker    Smokeless tobacco: Never Used   Substance and Sexual Activity    Alcohol use: Not Currently    Drug use: No    Sexual activity: Not on file   Lifestyle    Physical activity:     Days per week: Not on file     Minutes per session: Not on file    Stress: Not on file   Relationships    Social connections:     Talks on phone: Not on file     Gets together: Not on file     Attends Cheondo[employer] service: Not on file     Active member of club or organization: Not on file     Attends meetings of clubs or organizations: Not on file     Relationship status: Not on file    Intimate partner violence:     Fear of current or ex partner: Not on file     Emotionally abused: Not on file     Physically abused: Not on file     Forced sexual activity: Not on file   Other Topics Concern    Not on file   Social History Narrative    Not on file         Medications: All current active meds have been reviewed and current meds:  Scheduled Meds:  Current Facility-Administered Medications:  acetaminophen 650 mg Oral Q6H PRN Marcella Coronado MD    aspirin 81 mg Oral Daily Marcella Coronado MD    atorvastatin 40 mg Oral QPM Marcella Coronado MD    doxepin 25 mg Oral HS Marcella Coronado MD    gabapentin 300 mg Oral TID Marcella Coronado MD    heparin (porcine) 5,000 Units Subcutaneous [facility] Marcella Coronado MD    levETIRAcetam 375 mg Oral Q12H [facility] Marcella Coronado MD    losartan 50 mg Oral Daily Marcella Coronado MD    pantoprazole 20 mg Oral Early Morning Marcella Coronado MD    sodium chloride 50 mL/hr Intravenous Continuous Marcella Coronado MD Last Rate: 50 mL/hr (07/26/19 0901)     Continuous Infusions:  sodium chloride 50 mL/hr Last Rate: 50 mL/hr (07/26/19 0901)     PRN Meds:   acetaminophen       ROS:   Review of Systems  A 12 point ROS was completed  Other than the above mentioned complaints in the HPI and those commented on below, all remaining systems were negative:  - Intermittent episodes of confusion   - Bilateral leg weakness  - Hearing impaired         Vitals:   /84   Pulse 76   Temp 97 7 °F (36 5 °C)   Resp 19   SpO2 97%     Physical Exam:   Physical Exam   Constitutional: She is oriented to person, place, and time  She appears well-developed and well-nourished  No distress  HENT:   Head: Normocephalic and atraumatic  Right Ear: External ear normal    Left Ear: External ear normal    Nose: Nose normal    Mouth/Throat: Oropharynx is clear and moist    EEG leads in place  Eyes: Pupils are equal, round, and reactive to light  Conjunctivae and EOM are normal  Right eye exhibits no discharge  Left eye exhibits no discharge  No scleral icterus  Neck: Normal range of motion  Neck supple  Cardiovascular: Normal rate, regular rhythm and normal heart sounds  Exam reveals no gallop and no friction rub     No murmur heard   Pulmonary/Chest: Effort normal and breath sounds normal  No stridor  No respiratory distress  She has no wheezes  Musculoskeletal: Normal range of motion  She exhibits no edema, tenderness or deformity  Neurological: She is alert and oriented to person, place, and time  No sensory deficit  She has a normal Finger-Nose-Finger Test    Skin: Skin is warm and dry  No rash noted  She is not diaphoretic  No erythema  No pallor  Psychiatric: She has a normal mood and affect  Her behavior is normal  Judgment and thought content normal    Nursing note and vitals reviewed  Neurologic Exam     Mental Status   Oriented to person, place, and time  Patient is awake, alert, and oriented to person, place, month, and year  Able to state the current and past president  Able to name simple objects and repeat phrases  Able to spell WORLD forwards and backwards  No dysarthria noted  No aphasia  Cranial Nerves     CN II   Visual acuity: (Grossly intact)    CN III, IV, VI   Pupils are equal, round, and reactive to light  Extraocular motions are normal    Nystagmus: none   Conjugate gaze: present    CN V   Facial sensation intact  CN VII   Facial expression full, symmetric  CN VIII   Hearing: impaired    CN XI   Right sternocleidomastoid strength: normal  Left sternocleidomastoid strength: normal    CN XII   Tongue: not atrophic  Fasciculations: absent  Tongue deviation: none    Motor Exam   Muscle bulk: normal  Full strength B/L UE  Bilateral weakness in lower extremities  Difficult to elevate legs off chair  Limited ROM of left ankle  Sensory Exam   Light touch normal      Gait, Coordination, and Reflexes     Coordination   Finger to nose coordination: normal    Tremor   Resting tremor: absent  Intention tremor: absent  Action tremor: absent          Labs: I have personally reviewed pertinent reports     Recent Results (from the past 24 hour(s))   Basic metabolic panel    Collection Time: 07/26/19  5:37 AM   Result Value Ref Range    Sodium 141 136 - 145 mmol/L    Potassium 3 1 (L) 3 5 - 5 3 mmol/L    Chloride 110 (H) 100 - 108 mmol/L    CO2 24 21 - 32 mmol/L    ANION GAP 7 4 - 13 mmol/L    BUN 17 5 - 25 mg/dL    Creatinine 0 96 0 60 - 1 30 mg/dL    Glucose 103 65 - 140 mg/dL    Calcium 9 5 8 3 - 10 1 mg/dL    eGFR 52 ml/min/1 73sq m   Platelet count    Collection Time: 07/26/19  5:38 AM   Result Value Ref Range    Platelets 404 (L) 547 - 390 Thousands/uL    MPV 13 2 (H) 8 9 - 12 7 fL       Imaging: I have personally reviewed pertinent imaging and I have personally reviewed PACS reports  EKG, Pathology, and Other Studies: I have personally reviewed pertinent reports  VTE Prophylaxis: Heparin        Counseling / Coordination of Care  Total time spent today 25 minutes  Greater than 50% of total time was spent with the patient and/or family counseling and/or coordination of care  A description of the counseling/coordination of care:  Patient was seen and evaluated  Discussed with patient and son at bedside the purpose of vEEG monitoring and use of Keppra  Chart reviewed thoroughly including laboratory and imaging studies    Plan of care discussed with attending neurologist and primary team

## 2019-07-26 NOTE — H&P
H&P- Eloy Meigs 10/4/1928, 80 y o  female MRN: [id number]    Unit/Bed#: [facility] 720-01 Encounter: [id number]    Primary Care Provider: Trini Fraser DO   Date and time admitted to hospital: 7/25/2019 11:41 PM        Aphasia  Assessment & Plan  Questionable differential diagnosis of possible seizure  The patient was loaded and continued with Keppra  However transferred here to [facility] for further monitoring, seizure protocol and for video EEG  Continued neurology consult  Seizure precautions  Peripheral neuropathy  Assessment & Plan  Continue Neurontin 300 mg t i d  Chronic kidney disease, stage II (mild)  Assessment & Plan  Stable creatinine at 1 32  Benign essential hypertension  Assessment & Plan  Currently on Cozaar 50 mg daily    Mixed hyperlipidemia  Assessment & Plan  Continue Lipitor and Zetia  VTE Prophylaxis: Heparin  / sequential compression device   Code Status: Level 1 - Full Code   POLST: There is no POLST form on file for this patient (pre-hospital)    Anticipated Length of Stay:  Patient will be admitted on an Inpatient basis with an anticipated length of stay of  greater than 2 midnights  Justification for Hospital Stay: Please see detailed plans noted above  Chief Complaint:     Intermittent aphasic episodes  History of Present Illness:  Eloy Meigs is a 80 y o  female who has a past medical history significant for hypertension, hyperlipidemia, gout, primary hyperparathyroidism and CKD that is mild  Patient comes from [facility] where she was first admitted as there were complaints of becoming confused or possible aphasia  She was brought in by her son who noted that she was having difficulty with word finding  She was also seen to have bradycardia however deemed asymptomatic  Patient was seen by Neurology (Dr Cely Soria); and advocated putting patient on Keppra as it may be seizure    The patient was therefore sent [facility] and seizure precautions for video EEG  Currently, patient communicative although slow which she claims to be secondary to hard of hearing  She had somewhat has a preferential gaze towards the right side although visual fields seem to be intact when tested  She denies any fever or cough or cold or abdominal pain she has shoulder pain and hip pain which she says has been bothering her for the longest time (nothing new)      Review of Systems:    Constitutional:  Denies fever or chills   Eyes:  Denies change in visual acuity   HENT:  Denies nasal congestion or sore throat   Respiratory:  Denies cough or shortness of breath   Cardiovascular:  Denies chest pain or edema   GI:  Denies abdominal pain, nausea, vomiting, bloody stools or diarrhea   :  Denies dysuria   Musculoskeletal:  Joint pain located at the right shoulder as well as right knee  Integument:  Denies rash   Neurologic:  Denies headache, focal weakness or sensory changes   Endocrine:  Denies polyuria or polydipsia   Lymphatic:  Denies swollen glands   Psychiatric:  Denies depression or anxiety     Past Medical and Surgical History:   Past Medical History:   Diagnosis Date    Depression     Gout     H/O vaginal hysterectomy     resolved-4/17/2015    Skin cancer      Past Surgical History:   Procedure Laterality Date    CATARACT EXTRACTION      TOTAL ABDOMINAL HYSTERECTOMY      with removal of both ovaries    VAGINAL HYSTERECTOMY      resolved-4/17/2015       Meds/Allergies:  Medications Prior to Admission   Medication    Ascorbic Acid (VITAMIN C) 500 MG CAPS    aspirin 81 MG tablet    BIOTIN PO    Cholecalciferol (VITAMIN D3) 1000 units CAPS    Coenzyme Q10 (COQ-10) 10 MG CAPS    diclofenac sodium (VOLTAREN) 1 %    doxepin (SINEquan) 50 mg capsule    ezetimibe (ZETIA) 10 mg tablet    Flaxseed, Linseed, (FLAX SEED OIL PO)    gabapentin (NEURONTIN) 300 mg capsule    MAGNESIUM CITRATE PO    Multiple Vitamins-Minerals (PRESERVISION AREDS 2+MULTI VIT PO)    omeprazole (PriLOSEC) 20 mg delayed release capsule    Plant Sterols and Stanols (CHOLESTOFF PLUS) 450 MG CAPS    Probiotic Product (PROBIOTIC-10) CAPS    telmisartan (MICARDIS) 20 MG tablet       Allergies: Allergies   Allergen Reactions    Fish-Derived Products      GI upset    Allopurinol Rash     Category: Allergy; History:  Marital Status:    Occupation:  Worked as a seamstress  Patient Pre-hospital Living Situation:  Lives at home  Patient Pre-hospital Level of Mobility:  Wheelchair bound  Patient Pre-hospital Diet Restrictions:  Regular  Substance Use History:   Social History     Substance and Sexual Activity   Alcohol Use Not Currently     Social History     Tobacco Use   Smoking Status Never Smoker   Smokeless Tobacco Never Used     Social History     Substance and Sexual Activity   Drug Use No       Family History:  Family History   Problem Relation Age of Onset    Heart attack Mother         MI    Heart attack Father         MI    Hypertension Sister     Stomach cancer Sister     Hypertension Brother        Physical Exam:     Vitals:   Blood Pressure: 166/84 (07/25/19 2353)  Temperature: 98 6 °F (37 °C) (07/25/19 2353)  Temp Source: Oral (07/25/19 2353)  Respirations: 18 (07/25/19 2353)    Constitutional:  Well developed, well nourished, no acute distress, non-toxic appearance and hard of hearing  Eyes:  PERRL, conjunctiva normal ; noted pupils are equal reactive however has preferential gaze towards the right side than left  She can move her neck from side to side when asked  HENT:  Atraumatic, external ears normal, nose normal, oropharynx moist, no pharyngeal exudates   Neck- normal range of motion, no tenderness, supple   Respiratory:  No respiratory distress, normal breath sounds, no rales, no wheezing   Cardiovascular:  Normal rate, normal rhythm, no murmurs, no gallops, no rubs   GI:  Soft, nondistended, normal bowel sounds, nontender, no organomegaly, no mass, no rebound, no guarding   :  No costovertebral angle tenderness   Musculoskeletal:  No edema, no tenderness, no deformities  Back- no tenderness  Integument:  Well hydrated, no rash   Lymphatic:  No lymphadenopathy noted   Neurologic:  Alert &awake, communicative, CN 2-12 normal, no preferential movement noted  Able to put bilateral arms forward but with difficulty on the right side due to frozen shoulder? Psychiatric:  Speech and behavior appropriate for age      Lab Results: I have personally reviewed pertinent reports  Results from last 7 days   Lab Units 07/23/19  1254   WBC Thousand/uL 5 45   HEMOGLOBIN g/dL 11 7   HEMATOCRIT % 36 7   PLATELETS Thousands/uL 160   NEUTROS PCT % 78*   LYMPHS PCT % 14   MONOS PCT % 7   EOS PCT % 0     Results from last 7 days   Lab Units 07/23/19  1254   POTASSIUM mmol/L 4 5   CHLORIDE mmol/L 106   CO2 mmol/L 24   BUN mg/dL 26*   CREATININE mg/dL 1 32*   CALCIUM mg/dL 11 1*   ALK PHOS U/L 87   ALT U/L 18   AST U/L 17     Results from last 7 days   Lab Units 07/23/19  1254   INR  1 03       EKG:  Normal sinus rhythm with right bundle branch block and left anterior fascicular block; nonspecific T-wave abnormalities  Imaging: I have personally reviewed pertinent films in PACS    Xr Chest 2 Views    Result Date: 7/23/2019  Narrative: CHEST INDICATION:   weakness  COMPARISON:  July 22, 2013 EXAM PERFORMED/VIEWS:  XR CHEST PA & LATERAL FINDINGS: Cardiomediastinal silhouette appears unremarkable  The lungs are clear  No pneumothorax or pleural effusion  Osseous structures appear within normal limits for patient age  Impression: No acute cardiopulmonary disease  Workstation performed: [id number]     Xr Shoulder 2+ Vw Right    Result Date: 7/22/2019  Narrative: RIGHT SHOULDER INDICATION:   M75 01: Adhesive capsulitis of right shoulder  Pain for one year   COMPARISON:  None VIEWS:  XR SHOULDER 2+ VW RIGHT Images: 3 FINDINGS: There is no acute fracture or dislocation  Osseous structures demineralized  Severe narrowing of the glenohumeral articulation  Subchondral sclerosis and subchondral cystic changes in the humeral head and glenoid  Osteophytosis of the inferior glenoid  Superior elevation of the humeral head with respect to the glenoid  Narrowing of the acromiohumeral distance  Hypertrophic changes at the acromioclavicular joint  No lytic or blastic lesions are seen  Significant soft tissue swelling overlying the lateral aspect of the deltoid region  Findings likely represent underlying joint effusion  Impression: 1  Advanced degenerative changes of the right shoulder and acromioclavicular joint  Findings suggest the presence of underlying rotator cuff injury  2   Significant soft tissue swelling overlying the lateral deltoid region  Findings likely represent underlying joint effusion  Follow-up orthopedic consultation recommended  Consider follow-up MRI  The study was marked in EPIC for significant notification  Workstation performed: [id number]     Ct Head Without Contrast    Result Date: 7/23/2019  Narrative: CT BRAIN - WITHOUT CONTRAST INDICATION:   found on ground, mild aphasia  COMPARISON:  None  TECHNIQUE:  CT examination of the brain was performed  In addition to axial images, coronal 2D reformatted images were created and submitted for interpretation  Radiation dose length product (DLP) for this visit:  967 mGy-cm   This examination, like all CT scans performed in the [facility], was performed utilizing techniques to minimize radiation dose exposure, including the use of iterative reconstruction and automated exposure control  IMAGE QUALITY:  Diagnostic  FINDINGS: PARENCHYMA: Decreased attenuation is noted in periventricular and subcortical white matter demonstrating an appearance that is statistically most likely to represent mild microangiopathic change   No CT signs of acute infarction  No intracranial mass, mass effect or midline shift  No acute parenchymal hemorrhage  VENTRICLES AND EXTRA-AXIAL SPACES:  Normal for the patient's age  VISUALIZED ORBITS AND PARANASAL SINUSES:  Unremarkable  CALVARIUM AND EXTRACRANIAL SOFT TISSUES:  Normal      Impression: No acute intracranial abnormality  Workstation performed: [id number]     Ct Cervical Spine Without Contrast    Result Date: 7/23/2019  Narrative: CT CERVICAL SPINE - WITHOUT CONTRAST INDICATION:   found on ground  COMPARISON:  None  TECHNIQUE:  CT examination of the cervical spine was performed without intravenous contrast   Contiguous axial images were obtained  Sagittal and coronal reconstructions were performed  Radiation dose length product (DLP) for this visit:  500 mGy-cm   This examination, like all CT scans performed in the [facility], was performed utilizing techniques to minimize radiation dose exposure, including the use of iterative reconstruction and automated exposure control  IMAGE QUALITY:  Diagnostic  FINDINGS: ALIGNMENT:  Normal alignment of the cervical spine  No subluxation  VERTEBRAL BODIES:  No fracture  DEGENERATIVE CHANGES:  Moderate multilevel cervical degenerative changes are noted  No critical central canal stenosis  PREVERTEBRAL AND PARASPINAL SOFT TISSUES:  Unremarkable  THORACIC INLET:  Normal      Impression: No cervical spine fracture or traumatic malalignment  Workstation performed: [id number]     Mri Brain Wo Contrast    Result Date: 7/24/2019  Narrative: MRI BRAIN WITHOUT CONTRAST INDICATION: r/o cva, aphasi  Confusion COMPARISON:   CT 7/23/2019 TECHNIQUE:  Sagittal T1, axial T2, axial FLAIR, axial T1, axial T2* and axial diffusion imaging  IMAGE QUALITY:  Diagnostic  FINDINGS: BRAIN PARENCHYMA:  No acute disease  There is no acute ischemia, parenchymal mass or mass effect  No edema    Minor, age-appropriate volume loss and relatively minor degree of chronic small vessel disease  There is a 11 x 5 mm right posterior parietal bone extra-axial mass without significant mass effect, series 7 image 22  This may represent an enostosis versus a small incidental meningioma  VENTRICLES:  The ventricles are normal in size and contour  SELLA AND PITUITARY GLAND:  Normal  ORBITS:  Normal  PARANASAL SINUSES:  Trace sinus mucosal disease  VASCULATURE:  Evaluation of the major intracranial vasculature demonstrates appropriate flow voids  CALVARIUM AND SKULL BASE:  Minor pannus, severe degenerative change right temporomandibular joint  EXTRACRANIAL SOFT TISSUES:  Normal      Impression: No acute disease  Minor, age-appropriate volume loss, mild degree of chronic small vessel disease  Workstation performed: [id number]         ** Please Note: Dragon 360 Dictation voice to text software was used in the creation of this document   **

## 2019-07-26 NOTE — PLAN OF CARE
Problem: OCCUPATIONAL THERAPY ADULT  Goal: Performs self-care activities at highest level of function for planned discharge setting  See evaluation for individualized goals  Description  Treatment Interventions: ADL retraining, Functional transfer training, UE strengthening/ROM, Endurance training, Cognitive reorientation, Patient/family training, Equipment evaluation/education, Compensatory technique education, Continued evaluation, Activityengagement          See flowsheet documentation for full assessment, interventions and recommendations  Note:   Limitation: Decreased ADL status, Decreased UE strength, Decreased Safe judgement during ADL, Decreased UE ROM, Decreased cognition, Decreased endurance, Decreased self-care trans, Decreased high-level ADLs  Prognosis: Fair  Assessment: Pt is a R- handed 80year old female seen for initial OT evaluation s/p admission to [facility] 7/25/19 with confusion, R gaze preference, and possible aphasia  Imaging negative for acute CVA  Etiology r/o seizures  Pt on continuous video EEG monitoring during OT evaluation  PMHx includes HTN, HLD, gout, primary hyperparathyroidism, and CKD  Pt with active OT orders and cleared by RN for OT evaluation and OOB mobility  Pt is a questionable historian secondary to cognitive deficits, and home set-up obtained from chart  Pt resides alone in a [facility] and was completely I with ADLs, IADLs, and functional mobility with use of rw and electric scooter  Pt's son provides daily checks  Pt is not a   Pt is currently demonstrating the following occupational deficits: eating with Sharri, grooming with Sharri, UB bathing with Sharri, LB bathing with modA, UB dressing with modA, LB dressing with maxA, toileting with Sharri, bed mobility with supervision, and functional transfers with [place]    Factors currently limiting pt's independence in these areas include: cognitive deficits, generalized weakness, functional mobility, endurance, and unsupportive home environment  Overall, pt scored 25/100 on the Barthel Index  From an OT standpoint recommend STR upon d/c  Pt is to continue to benefit from skilled occupational therapy services while in the hospital to maximize functioning and independence with daily activities  See below for OT goals to be addressed during pt's POC       OT Discharge Recommendation: Short Term Rehab  OT - OK to Discharge: Yes(to STR )

## 2019-07-26 NOTE — RESPIRATORY THERAPY NOTE
RT Protocol Note  Darrick Stark 80 y o  female MRN: [id number]  Unit/Bed#: [facility] 720-01 Encounter: [id number]    Assessment    Principal Problem:    Aphasia  Active Problems:    Mixed hyperlipidemia    Benign essential hypertension    Chronic kidney disease, stage II (mild)    Peripheral neuropathy      Home Pulmonary Medications:  none       Past Medical History:   Diagnosis Date    Depression     Gout     H/O vaginal hysterectomy     resolved-4/17/2015    Skin cancer      Social History     Socioeconomic History    Marital status:       Spouse name: Not on file    Number of children: Not on file    Years of education: Not on file    Highest education level: Not on file   Occupational History    Not on file   Social Needs    Financial resource strain: Not on file    Food insecurity:     Worry: Not on file     Inability: Not on file    Transportation needs:     Medical: Not on file     Non-medical: Not on file   Tobacco Use    Smoking status: Never Smoker    Smokeless tobacco: Never Used   Substance and Sexual Activity    Alcohol use: Not Currently    Drug use: No    Sexual activity: Not on file   Lifestyle    Physical activity:     Days per week: Not on file     Minutes per session: Not on file    Stress: Not on file   Relationships    Social connections:     Talks on phone: Not on file     Gets together: Not on file     Attends [employer] service: Not on file     Active member of club or organization: Not on file     Attends meetings of clubs or organizations: Not on file     Relationship status: Not on file    Intimate partner violence:     Fear of current or ex partner: Not on file     Emotionally abused: Not on file     Physically abused: Not on file     Forced sexual activity: Not on file   Other Topics Concern    Not on file   Social History Narrative    Not on file       Subjective         Objective    Physical Exam:   Assessment Type: Assess only  General Appearance: Alert, Awake  Respiratory Pattern: Normal  Chest Assessment: Chest expansion symmetrical  Bilateral Breath Sounds: Clear, Diminished  R Breath Sounds: Clear, Diminished  L Breath Sounds: Clear, Diminished    Vitals:  Blood pressure 161/92, pulse 88, temperature 98 8 °F (37 1 °C), resp  rate 20, SpO2 96 %  Imaging and other studies: I have personally reviewed pertinent reports  Plan    Respiratory Plan: Discontinue Protocol        Resp Comments: Pt evaluated per resp protocol  Pt denies any plumonary history or bronchodilator use  VSS, BS clear bilateral and no dyspnea  Will d/c resp protocol

## 2019-07-26 NOTE — TRANSPORTATION MEDICAL NECESSITY
Section I - General Information    Name of Patient: Rich Loomis                 : 10/4/1928    Medicare #: [id number]  Transport Date: 2019 (PCS is valid for round trips on this date and for all repetitive trips in the 60-day range as noted below )  Origin: [address]                                                         Destination: [facility]  Is the pt's stay covered under Medicare Part A (PPS/DRG)   [x]     Closest appropriate facility? If no, why is transport to more distant facility required? Yes  If hospice pt, is this transport related to pt's terminal illness? NA       Section II - Medical Necessity Questionnaire  Ambulance transportation is medically necessary only if other means of transport are contraindicated or would be potentially harmful to the patient  To meet this requirement, the patient must either be "bed confined" or suffer from a condition such that transport by means other than ambulance is contraindicated by the patient's condition  The following questions must be answered by the medical professional signing below for this form to be valid:    1)  Describe the MEDICAL CONDITION (physical and/or mental) of this patient AT [address] [place] [address] that requires the patient to be transported in an ambulance and why transport by other means is contraindicated by the patient's condition: Patient needs to be transfer to higher level of care for epilepsy monitoring that can not be completed at current hospital  Patient has impaired balance, impaired vision, impaired judgement, decreased safety awareness, fall risk, pain, and decreased cognition  Patient is not safe to ride in w/c [facility] alone  2) Is the patient "bed confined" as defined below?      No  To be "be confined" the patient must satisfy all three of the following conditions: (1) unable to get up from bed without Assistance; AND (2) unable to ambulate; AND (3) unable to sit in a chair or wheelchair  3) Can this patient safely be transported by car or wheelchair van (i e , seated during transport without a medical attendant or monitoring)? No    4) In addition to completing questions 1-3 above, please check any of the following conditions that apply*:   *Note: supporting documentation for any boxes checked must be maintained in the patient's medical records  If hosp-hosp transfer, describe services needed at 2nd facility not available at 1st facility? Epilepsy Monitoring    Patient is confused  Moderate/severe pain on movement   Medical attendant required   Unable to tolerate seated position for time needed to transport       Section III - Signature of Physician or Healthcare Professional  I certify that the above information is true and correct based on my evaluation of this patient, and represent that the patient requires transport by ambulance and that other forms of transport are contraindicated  I understand that this information will be used by the Centers for Medicare and Medicaid Services (CMS) to support the determination of medical necessity for ambulance services, and I represent that I have personal knowledge of the patient's condition at time of transport  []  If this box is checked, I also certify that the patient is physically or mentally incapable of signing the ambulance service's claim and that the institution with which I am affiliated has furnished care, services, or assistance to the patient  My signature below is made on behalf of the patient pursuant to 42 CFR §424 36(b)(4)  In accordance with 42 CFR §424 37, the specific reason(s) that the patient is physically or mentally incapable of signing the claim form is as follows        Signature of Physician* or Healthcare Professional__Vidya Quiros____________________________________________________________  Signature Date 07/25/2019 (For scheduled repetitive transports, this form is not valid for transports performed more than 60 days after this date)    Printed Name & Credentials of Physician or Healthcare Professional (MD, DO, RN, etc )_____Vidya Best, KEV, JULIO___________________________  *Form must be signed by patient's attending physician for scheduled, repetitive transports   For non-repetitive, unscheduled ambulance transports, if unable to obtain the signature of the attending physician, any of the following may sign (choose appropriate option below)  [] Physician Assistant []  Clinical Nurse Specialist []  Registered Nurse  []  Nurse Practitioner  [x] Discharge Planner

## 2019-07-26 NOTE — PHYSICAL THERAPY NOTE
Physical Therapy Evaluation     Patient's Name: Berlin Sequeira    Admitting Diagnosis  Aphasia [R47 01]    Problem List  Patient Active Problem List   Diagnosis    Mixed hyperlipidemia    Benign essential hypertension    Chronic kidney disease, stage II (mild)    Chronic pain of both knees    Esophageal reflux    Gout    Hypercalcemia    Macular degeneration    Osteoarthritis    Peripheral neuropathy    Primary localized osteoarthritis of left knee    Primary localized osteoarthritis of right knee    Vitamin D deficiency    Depression    Fall    Aphasia    Elevated troponin I level    Acute renal failure superimposed on chronic kidney disease ([facility] [address] [facility]    Confused    Mobitz type 2 second degree AV block    Cerebrovascular accident (CVA) ([facility] [address] )       Past Medical History  Past Medical History:   Diagnosis Date    Depression     Gout     H/O vaginal hysterectomy     resolved-4/17/2015    Skin cancer        Past Surgical History  Past Surgical History:   Procedure Laterality Date    CATARACT EXTRACTION      TOTAL ABDOMINAL HYSTERECTOMY      with removal of both ovaries    VAGINAL HYSTERECTOMY      resolved-4/17/2015 07/26/19 1100   Note Type   Note type Eval only   Pain Assessment   Pain Assessment 0-10   Pain Score 3   Pain Type Chronic pain   Pain Location Shoulder   Pain Orientation Right   Hospital Pain Intervention(s) Repositioned; Ambulation/increased activity   Home Living   Type of [address] [place] [address] One level; Laundry in basement   [employer] Grab bars in shower;Grab bars around toilet; Shower chair   [address];Electric scooter;Stair glide   Additional Comments pt poor historian, info gathered from chart   Prior Function   Level of [place] Independent with ADLs and functional mobility   Lives With Alone   Receives Help From Family   ADL Assistance Independent   IADLs Independent   Falls in the last 6 months 1 to 4   Vocational Retired   Comments pts son provides daily checks and son and dtr provide transport, use of RW for short distance and scooter for longer distances due to OA pain   Restrictions/Precautions   Weight Bearing Precautions Per Order No   Other Precautions Cognitive; Chair Alarm; Bed Alarm;Multiple lines; Fall Risk;Pain;Hard of hearing   General   Family/Caregiver Present No   Cognition   Overall Cognitive Status Impaired   Arousal/Participation Arousable   Attention Attends with cues to redirect   Orientation Level Oriented to person;Oriented to time;Oriented to place; Disoriented to situation   Memory Decreased recall of precautions;Decreased recall of recent events;Decreased short term memory   Following Commands Follows one step commands with increased time or repetition   Comments pt is agreeable to therapy however slow to respond and requiring repeated instructions and cues for attention   RLE Assessment   RLE Assessment X   Strength RLE   RLE Overall Strength 2+/5   LLE Assessment   LLE Assessment X   Strength LLE   LLE Overall Strength 2+/5   Coordination   Movements are Fluid and Coordinated 1   Sensation WFL   Bed Mobility   Supine to Sit 5  Supervision   Additional items Increased time required;Verbal cues   Sit to Supine Unable to assess  (pt left seated in bedside recliner)   Transfers   Sit to Stand 3  Moderate assistance   Additional items Assist x 1; Increased time required;Verbal cues;Armrests   Stand to Sit 3  Moderate assistance   Additional items Assist x 1; Armrests; Increased time required;Verbal cues   Stand pivot 3  Moderate assistance   Additional items Assist x 1; Increased time required;Verbal cues;Armrests   Ambulation/Elevation   Gait pattern Excessively slow; Step to;Short stride; Inconsistent kaye; Shuffling;Decreased foot clearance;Narrow HÉCTOR; Forward Flexion   Gait Assistance 3  Moderate assist   Additional items Assist x 1;Verbal cues; Tactile cues   Assistive Device Rolling walker   Distance 3ft bed to chair    Stair Management Assistance Not tested   Ramp Technique Not tested   Balance   Static Sitting Good   Dynamic Sitting Fair +   Static Standing Fair   Dynamic Standing Fair -   Ambulatory Fair -   Endurance Deficit   Endurance Deficit Yes   Endurance Deficit Description LE weakness and fatigue   Activity Tolerance   Activity Tolerance Patient limited by fatigue   Medical Staff Made Aware OT   Nurse Made Aware yes, appropriate to see per nsg   Assessment   Prognosis Fair   Problem List Decreased strength;Decreased range of motion;Decreased endurance; Impaired balance;Decreased mobility; Decreased cognition;Decreased safety awareness;Pain; Impaired hearing   Assessment Pt is a 80 y o  Female admitted to [facility] on 7/25/19 after being found by family member to be acutely confused with expressive aphasia, imaging negative for acute infarction, suspecting seizure activity  Pt on continuous video EEG monitoring during evaluation  She has a significant PMH including depression, gout, and skin cancer  Pt was seen today for a high complexity PT evaluation, found supine in bed agreeable to therapy  She lives in a 1 story home alone and is completely ind with ADLs and mobility with use of RW and power scooter PTA  Pts son provides daily checks and her dtr visits from out of state every week or two to take her to the grocery store  She is currently functioning at a supervision level for bed mobility and a mod A level for transfers and ambulation bed to chair  She demonstrates COG deficits including decreased recall of recent events, decreased processing and decreased command following  She requires mod VC during functional mobility  Pt was left in bed side recliner with all needs within reach and all questions answered chair alarm in place and functioning  Recommend use of RW for transfers and short distance ambulation   PT recommendation is to d/c to inpt rehab  Pt is OK to d/c to rehab when medically cleared  Barriers to Discharge Decreased caregiver support   Barriers to Discharge Comments lives alone   Goals   Patient Goals no stated goals   STG Expiration Date 08/05/19   Short Term Goal #1 1) Pt will complete bed mobility mod I to reduce burden of care  2) Pt will transfer mod I with RW to reduce burden of care  3) Pt will ambulate 25ft supervision with RW to increase functional mobility within the home  4) Pt will improve standing balance to fair to reduce risk of falls during upright mobility  5) Pt will increase LE strength to 4/5 to improve functional mobility  Plan   Treatment/Interventions Functional transfer training;LE strengthening/ROM; Therapeutic exercise; Endurance training;Patient/family training;Equipment eval/education; Bed mobility;Gait training;Spoke to nursing;OT   PT Frequency   (3-5x/wk)   Recommendation   Recommendation   (inpt rehab)   Equipment Recommended Bertin Mu; Wheelchair   PT - OK to Discharge Yes   Additional Comments pt is OK to d/c to inpt rehab when medically stable    Modified Leyda Scale   Modified Briscoe Scale 4   Barthel Index   Feeding 5   Bathing 0   Grooming Score 0   Dressing Score 0   Bladder Score 5   Bowels Score 5   Toilet Use Score 5   Transfers (Bed/Chair) Score 5   Mobility (Level Surface) Score 0   Stairs Score 0   Barthel Index Score 25     Cuate Pinedo, SPT

## 2019-07-26 NOTE — SOCIAL WORK
Met with pt and her son Fermín Arevalo to discuss pt's discharge plan  Informed them of therapy's recommendation for inpt rehab  Discussed both acute rehab and subacute rehab  Pt's son does not feel at this time that pt will be able to tolerate the intense rehab of acute rehab  A post acute care recommendation was made by your care team for STR  Discussed [place] of Choice with patient's son  List of facilities given to patient's son via in person  patient's son aware the list is custom filtered for them by zip code location and that [facility] post acute providers are designated  Pt's son stated that he would like to review the list with his sister prior to providing CM with choices

## 2019-07-27 ENCOUNTER — APPOINTMENT (INPATIENT)
Dept: RADIOLOGY | Facility: HOSPITAL | Age: 84
DRG: 101 | End: 2019-07-27
Payer: MEDICARE

## 2019-07-27 PROBLEM — R01.1 MURMUR, CARDIAC: Status: ACTIVE | Noted: 2019-07-27

## 2019-07-27 PROBLEM — R50.9 FEVER: Status: ACTIVE | Noted: 2019-07-26

## 2019-07-27 PROBLEM — M25.511 RIGHT SHOULDER PAIN: Status: ACTIVE | Noted: 2019-07-27

## 2019-07-27 LAB
ALBUMIN SERPL BCP-MCNC: 2.9 G/DL (ref 3.5–5)
ALP SERPL-CCNC: 68 U/L (ref 46–116)
ALT SERPL W P-5'-P-CCNC: 18 U/L (ref 12–78)
ANION GAP SERPL CALCULATED.3IONS-SCNC: 10 MMOL/L (ref 4–13)
ARTERIAL PATENCY WRIST A: YES
AST SERPL W P-5'-P-CCNC: 13 U/L (ref 5–45)
BASE EXCESS BLDA CALC-SCNC: -1 MMOL/L
BASOPHILS # BLD AUTO: 0.03 THOUSANDS/ΜL (ref 0–0.1)
BASOPHILS NFR BLD AUTO: 0 % (ref 0–1)
BILIRUB SERPL-MCNC: 0.64 MG/DL (ref 0.2–1)
BUN SERPL-MCNC: 18 MG/DL (ref 5–25)
CALCIUM SERPL-MCNC: 9.4 MG/DL (ref 8.3–10.1)
CHLORIDE SERPL-SCNC: 113 MMOL/L (ref 100–108)
CO2 SERPL-SCNC: 21 MMOL/L (ref 21–32)
CREAT SERPL-MCNC: 1.1 MG/DL (ref 0.6–1.3)
EOSINOPHIL # BLD AUTO: 0.02 THOUSAND/ΜL (ref 0–0.61)
EOSINOPHIL NFR BLD AUTO: 0 % (ref 0–6)
ERYTHROCYTE [DISTWIDTH] IN BLOOD BY AUTOMATED COUNT: 15.4 % (ref 11.6–15.1)
GFR SERPL CREATININE-BSD FRML MDRD: 44 ML/MIN/1.73SQ M
GLUCOSE SERPL-MCNC: 94 MG/DL (ref 65–140)
HCO3 BLDA-SCNC: 22.2 MMOL/L (ref 22–28)
HCT VFR BLD AUTO: 28.9 % (ref 34.8–46.1)
HGB BLD-MCNC: 8.9 G/DL (ref 11.5–15.4)
IMM GRANULOCYTES # BLD AUTO: 0.06 THOUSAND/UL (ref 0–0.2)
IMM GRANULOCYTES NFR BLD AUTO: 1 % (ref 0–2)
LYMPHOCYTES # BLD AUTO: 1.41 THOUSANDS/ΜL (ref 0.6–4.47)
LYMPHOCYTES NFR BLD AUTO: 21 % (ref 14–44)
MCH RBC QN AUTO: 28.3 PG (ref 26.8–34.3)
MCHC RBC AUTO-ENTMCNC: 30.8 G/DL (ref 31.4–37.4)
MCV RBC AUTO: 92 FL (ref 82–98)
MONOCYTES # BLD AUTO: 1.5 THOUSAND/ΜL (ref 0.17–1.22)
MONOCYTES NFR BLD AUTO: 22 % (ref 4–12)
NEUTROPHILS # BLD AUTO: 3.74 THOUSANDS/ΜL (ref 1.85–7.62)
NEUTS SEG NFR BLD AUTO: 56 % (ref 43–75)
NON VENT ROOM AIR: 21 %
NRBC BLD AUTO-RTO: 0 /100 WBCS
O2 CT BLDA-SCNC: 14 ML/DL (ref 16–23)
OXYHGB MFR BLDA: 95.2 % (ref 94–97)
PCO2 BLDA: 31.7 MM HG (ref 36–44)
PH BLDA: 7.46 [PH] (ref 7.35–7.45)
PLATELET # BLD AUTO: 130 THOUSANDS/UL (ref 149–390)
PMV BLD AUTO: 13.7 FL (ref 8.9–12.7)
PO2 BLDA: 76 MM HG (ref 75–129)
POTASSIUM SERPL-SCNC: 3.5 MMOL/L (ref 3.5–5.3)
PROT SERPL-MCNC: 6.3 G/DL (ref 6.4–8.2)
RBC # BLD AUTO: 3.15 MILLION/UL (ref 3.81–5.12)
SODIUM SERPL-SCNC: 144 MMOL/L (ref 136–145)
SPECIMEN SOURCE: ABNORMAL
WBC # BLD AUTO: 6.76 THOUSAND/UL (ref 4.31–10.16)

## 2019-07-27 PROCEDURE — 82805 BLOOD GASES W/O2 SATURATION: CPT | Performed by: PHYSICIAN ASSISTANT

## 2019-07-27 PROCEDURE — 99232 SBSQ HOSP IP/OBS MODERATE 35: CPT | Performed by: PSYCHIATRY & NEUROLOGY

## 2019-07-27 PROCEDURE — 36600 WITHDRAWAL OF ARTERIAL BLOOD: CPT

## 2019-07-27 PROCEDURE — 87040 BLOOD CULTURE FOR BACTERIA: CPT | Performed by: INTERNAL MEDICINE

## 2019-07-27 PROCEDURE — 80053 COMPREHEN METABOLIC PANEL: CPT | Performed by: NURSE PRACTITIONER

## 2019-07-27 PROCEDURE — 99232 SBSQ HOSP IP/OBS MODERATE 35: CPT | Performed by: INTERNAL MEDICINE

## 2019-07-27 PROCEDURE — 95951 PR EEG MONITORING/VIDEORECORD: CPT | Performed by: PSYCHIATRY & NEUROLOGY

## 2019-07-27 PROCEDURE — 71045 X-RAY EXAM CHEST 1 VIEW: CPT

## 2019-07-27 PROCEDURE — 94760 N-INVAS EAR/PLS OXIMETRY 1: CPT

## 2019-07-27 PROCEDURE — 94640 AIRWAY INHALATION TREATMENT: CPT

## 2019-07-27 PROCEDURE — 85025 COMPLETE CBC W/AUTO DIFF WBC: CPT | Performed by: NURSE PRACTITIONER

## 2019-07-27 RX ORDER — LIDOCAINE 50 MG/G
1 PATCH TOPICAL DAILY
Status: DISCONTINUED | OUTPATIENT
Start: 2019-07-27 | End: 2019-07-30 | Stop reason: HOSPADM

## 2019-07-27 RX ORDER — LEVALBUTEROL 1.25 MG/.5ML
1.25 SOLUTION, CONCENTRATE RESPIRATORY (INHALATION) EVERY 8 HOURS PRN
Status: DISCONTINUED | OUTPATIENT
Start: 2019-07-27 | End: 2019-07-30 | Stop reason: HOSPADM

## 2019-07-27 RX ORDER — SODIUM CHLORIDE FOR INHALATION 0.9 %
3 VIAL, NEBULIZER (ML) INHALATION EVERY 8 HOURS PRN
Status: DISCONTINUED | OUTPATIENT
Start: 2019-07-27 | End: 2019-07-30 | Stop reason: HOSPADM

## 2019-07-27 RX ADMIN — DOXEPIN HYDROCHLORIDE 25 MG: 25 CAPSULE ORAL at 21:42

## 2019-07-27 RX ADMIN — ACETAMINOPHEN 650 MG: 325 TABLET ORAL at 15:11

## 2019-07-27 RX ADMIN — ATORVASTATIN CALCIUM 40 MG: 40 TABLET, FILM COATED ORAL at 17:55

## 2019-07-27 RX ADMIN — PANTOPRAZOLE SODIUM 20 MG: 40 TABLET, DELAYED RELEASE ORAL at 05:41

## 2019-07-27 RX ADMIN — HEPARIN SODIUM 5000 UNITS: 5000 INJECTION INTRAVENOUS; SUBCUTANEOUS at 21:41

## 2019-07-27 RX ADMIN — GABAPENTIN 300 MG: 300 CAPSULE ORAL at 15:15

## 2019-07-27 RX ADMIN — HEPARIN SODIUM 5000 UNITS: 5000 INJECTION INTRAVENOUS; SUBCUTANEOUS at 13:19

## 2019-07-27 RX ADMIN — LOSARTAN POTASSIUM 50 MG: 50 TABLET ORAL at 09:06

## 2019-07-27 RX ADMIN — LIDOCAINE 1 PATCH: 50 PATCH CUTANEOUS at 15:15

## 2019-07-27 RX ADMIN — LEVALBUTEROL 1.25 MG: 1.25 SOLUTION, CONCENTRATE RESPIRATORY (INHALATION) at 22:59

## 2019-07-27 RX ADMIN — LEVETIRACETAM 375 MG: 750 TABLET ORAL at 09:08

## 2019-07-27 RX ADMIN — HEPARIN SODIUM 5000 UNITS: 5000 INJECTION INTRAVENOUS; SUBCUTANEOUS at 05:41

## 2019-07-27 RX ADMIN — GABAPENTIN 300 MG: 300 CAPSULE ORAL at 09:06

## 2019-07-27 RX ADMIN — ISODIUM CHLORIDE 3 ML: 0.03 SOLUTION RESPIRATORY (INHALATION) at 22:59

## 2019-07-27 RX ADMIN — ACETAMINOPHEN 650 MG: 325 TABLET ORAL at 22:21

## 2019-07-27 RX ADMIN — GABAPENTIN 300 MG: 300 CAPSULE ORAL at 21:42

## 2019-07-27 RX ADMIN — LEVETIRACETAM 375 MG: 750 TABLET ORAL at 21:41

## 2019-07-27 RX ADMIN — ACETAMINOPHEN 650 MG: 325 TABLET ORAL at 00:20

## 2019-07-27 RX ADMIN — ASPIRIN 81 MG 81 MG: 81 TABLET ORAL at 09:07

## 2019-07-27 NOTE — PROGRESS NOTES
Progress Note - Neurology   Arcadio Dominguez 80 y o  female [id number]  Unit/Bed#: [facility]-01    Assessment:  Arcadio Dominguez is a 80 y o  female with fluctuating mental status and aphasia since admission  vEEG has been unremarkable for seizure events and patient is continuing on Keppra 375 mg BID  Suspicious for seizure as no clear toxic-metabolic process identified, especially in the setting of baseline cognitive impairment  1  Confusion, Aphasia  - Will D/C vEEG- No aphasic events have been noted while monitoring, but patient has actually improved significantly since admission    - Continue Keppra 375 mg BID  - Labs   · Vitamin B12, folate elevated  · TSH normal  · MMA pending   · RPR pending   - Conservative management of delirium: Provide frequent redirection, avoid restraints, maintain day/night cycle  - Seizure precautions   - Telemetry  - PT/OT- Will likely benefit from rehab  - Follow up with neurology as an outpatient  The neurology office will call to schedule an appointment  2  Peripheral neuropathy   - Continue gabapentin 300 mg TID- tolerating well     3  Hypertension  - Continue Cozaar  - Medical management per primary team     4  Hyperlipidemia   - Continue Lipitor, Zetia     5  Right Shoulder Pain  - Recent Shoulder XRay on 7/17 noted advanced degenerative changes, underlying rotator cuff injury, and underlying joint effusion    - Will defer medical management to primary team   - Likely follow up as an outpatient  Subjective:   No electrographic seizures noted on vEEG monitoring overnight  The patient was noted to be hyperventilating in the room on primary team's evaluation yesterday, which is not normal for her  A murmur was noted on exam and IV fluids were stopped  There was no documentation overnight regarding any significant episodes of confusion  She reports she has not noted any of those episodes overnight  She states that she mostly feels at baseline    She is reporting pain in her right shoulder today, for which she states she recently got an x-ray which revealed arthritis and fluid buildup in the shoulder region  She reports that she was planning to use see someone to evaluated it, but ended up in the hospital   Her son at bedside reports that she appears to be much better than yesterday, well rested, and demonstrating no cognitive impairments  Past Medical History:   Diagnosis Date    Depression     Gout     H/O vaginal hysterectomy     resolved-4/17/2015    Skin cancer      Past Surgical History:   Procedure Laterality Date    CATARACT EXTRACTION      TOTAL ABDOMINAL HYSTERECTOMY      with removal of both ovaries    VAGINAL HYSTERECTOMY      resolved-4/17/2015     Family History   Problem Relation Age of Onset    Heart attack Mother         MI    Heart attack Father         MI    Hypertension Sister     Stomach cancer Sister     Hypertension Brother      Social History     Socioeconomic History    Marital status:       Spouse name: Not on file    Number of children: Not on file    Years of education: Not on file    Highest education level: Not on file   Occupational History    Not on file   Social Needs    Financial resource strain: Not on file    Food insecurity:     Worry: Not on file     Inability: Not on file    Transportation needs:     Medical: Not on file     Non-medical: Not on file   Tobacco Use    Smoking status: Never Smoker    Smokeless tobacco: Never Used   Substance and Sexual Activity    Alcohol use: Not Currently    Drug use: No    Sexual activity: Not on file   Lifestyle    Physical activity:     Days per week: Not on file     Minutes per session: Not on file    Stress: Not on file   Relationships    Social connections:     Talks on phone: Not on file     Gets together: Not on file     Attends [employer] service: Not on file     Active member of club or organization: Not on file     Attends meetings of clubs or organizations: Not on file     Relationship status: Not on file    Intimate partner violence:     Fear of current or ex partner: Not on file     Emotionally abused: Not on file     Physically abused: Not on file     Forced sexual activity: Not on file   Other Topics Concern    Not on file   Social History Narrative    Not on file         Medications: All current active meds have been reviewed and current meds:  Scheduled Meds:  Current Facility-Administered Medications:  acetaminophen 650 mg Oral Q6H PRN Sacha Posada MD   aspirin 81 mg Oral Daily Sacha Posada MD   atorvastatin 40 mg Oral QPM Sacha Posada MD   doxepin 25 mg Oral HS Sacha Posada MD   gabapentin 300 mg Oral TID Sacha Posada MD   heparin (porcine) 5,000 Units Subcutaneous [facility] Sacha Posada MD   levETIRAcetam 375 mg Oral Q12H [facility] Sacha Posada MD   losartan 50 mg Oral Daily Sacha Posada MD   pantoprazole 20 mg Oral Early Morning Sacha Posada MD     Continuous Infusions:   PRN Meds:   acetaminophen       ROS:   Review of Systems   Constitutional: Negative for appetite change, chills, fatigue and fever  HENT: Negative for trouble swallowing  Eyes: Negative for photophobia and visual disturbance  Respiratory: Positive for cough  Negative for chest tightness and wheezing  Cardiovascular: Negative for chest pain and palpitations  Gastrointestinal: Negative for diarrhea, nausea and vomiting  Endocrine: Negative for cold intolerance and heat intolerance  Musculoskeletal: Positive for arthralgias, gait problem and myalgias  Negative for neck pain  Skin: Positive for wound  Negative for pallor and rash  Neurological: Positive for weakness  Negative for dizziness, tremors, syncope, facial asymmetry, speech difficulty, light-headedness, numbness and headaches  Psychiatric/Behavioral: Negative for confusion and sleep disturbance               Vitals:   /67   Pulse 84   Temp 99 °F (37 2 °C) Resp 18   Ht 5' 2" (1 575 m)   Wt 75 2 kg (165 lb 12 8 oz)   SpO2 97%   BMI 30 33 kg/m²     Physical Exam:   Physical Exam   Constitutional: She is oriented to person, place, and time  She appears well-developed and well-nourished  No distress  Resting comfortably in bed  HENT:   Head: Normocephalic and atraumatic  Right Ear: External ear normal    Left Ear: External ear normal    Nose: Nose normal    Mouth/Throat: Oropharynx is clear and moist    Eyes: Pupils are equal, round, and reactive to light  Conjunctivae and EOM are normal  Right eye exhibits no discharge  Left eye exhibits no discharge  No scleral icterus  Neck: Normal range of motion  Neck supple  Cardiovascular: Normal rate  Pulmonary/Chest: Effort normal  No respiratory distress  No evidence of hyperventilation as noted yesterday  Musculoskeletal: Normal range of motion  She exhibits tenderness  She exhibits no edema or deformity  Neurological: She is alert and oriented to person, place, and time  Oriented to person and that she is in a hospital  Neurologic exam as detailed below  Skin: Skin is warm and dry  No rash noted  She is not diaphoretic  No erythema  No pallor  Psychiatric: She has a normal mood and affect  Her behavior is normal  Judgment and thought content normal    Nursing note and vitals reviewed  Neurologic Exam     Mental Status   Oriented to person, place, and time  Level of consciousness: alert  Awake and alert  Oriented to person, place, and why she was admitted to the hospital  No dysarthria  Cranial Nerves     CN II   Visual fields full to confrontation  Visual acuity: (Grossly intact)    CN III, IV, VI   Pupils are equal, round, and reactive to light  Extraocular motions are normal    Right pupil: Shape: regular  Left pupil: Shape: regular  Nystagmus: none   Conjugate gaze: present    CN V   Facial sensation intact  CN VII   Facial expression full, symmetric       CN VIII Hearing: impaired    CN IX, X   Palate: symmetric    CN XI   Right sternocleidomastoid strength: normal  Left sternocleidomastoid strength: normal    CN XII   Tongue: not atrophic  Fasciculations: absent  Tongue deviation: none    Motor Exam Slight weakness noted in bilateral hip flexors  Able to maintain against gravity, but difficult to maintain with resistance  Limited range of motion of left ankle as noted yesterday, weak dorsiflexion  Sensory Exam   Light touch normal      Gait, Coordination, and Reflexes     Gait  Gait: (Deferred)    Tremor   Resting tremor: absent  Intention tremor: absent  Action tremor: absent          Labs: I have personally reviewed pertinent reports     Recent Results (from the past 24 hour(s))   Vitamin B12    Collection Time: 07/26/19  5:19 PM   Result Value Ref Range    Vitamin B-12 2,583 (H) 100 - 900 pg/mL   Folate    Collection Time: 07/26/19  5:19 PM   Result Value Ref Range    Folate >20 0 (H) 3 1 - 17 5 ng/mL   TSH, 3rd generation    Collection Time: 07/26/19  5:19 PM   Result Value Ref Range    TSH 3RD GENERATON 1 680 0 358 - 3 740 uIU/mL   Blood gas, arterial    Collection Time: 07/26/19  6:03 PM   Result Value Ref Range    pH, Arterial 7 472 (H) 7 350 - 7 450    pCO2, Arterial 29 8 (LL) 36 0 - 44 0 mm Hg    pO2, Arterial 73 1 (L) 75 0 - 129 0 mm Hg    HCO3, Arterial 21 3 (L) 22 0 - 28 0 mmol/L    Base Excess, Arterial -1 5 mmol/L    O2 Content, Arterial 15 0 (L) 16 0 - 23 0 mL/dL    O2 HGB,Arterial  94 7 94 0 - 97 0 %    SOURCE Radial, Right     HEIDI TEST Yes     ROOM AIR FIO2 21 %   Urinalysis with microscopic    Collection Time: 07/26/19  7:45 PM   Result Value Ref Range    Clarity, UA Clear     Color, UA Yellow     Specific [place], UA 1 015 1 003 - 1 030    pH, UA 6 0 4 5, 5 0, 5 5, 6 0, 6 5, 7 0, 7 5, 8 0    Glucose, UA Negative Negative mg/dl    Ketones, UA Negative Negative mg/dl    Blood, UA Trace (A) Negative    Protein,  (2+) (A) Negative mg/dl Nitrite, UA Negative Negative    Bilirubin, UA Negative Negative    Urobilinogen, UA 0 2 0 2, 1 0 E U /dl E U /dl    Leukocytes, UA Negative Negative    WBC, UA None Seen None Seen, 0-5, 5-55, 5-65 /hpf    RBC, UA 2-4 (A) None Seen, 0-5 /hpf    Hyaline Casts, UA None Seen None Seen /lpf    Bacteria, UA None Seen None Seen, Occasional /hpf    Epithelial Cells None Seen None Seen, Occasional /hpf   Comprehensive metabolic panel    Collection Time: 07/27/19  5:32 AM   Result Value Ref Range    Sodium 144 136 - 145 mmol/L    Potassium 3 5 3 5 - 5 3 mmol/L    Chloride 113 (H) 100 - 108 mmol/L    CO2 21 21 - 32 mmol/L    ANION GAP 10 4 - 13 mmol/L    BUN 18 5 - 25 mg/dL    Creatinine 1 10 0 60 - 1 30 mg/dL    Glucose 94 65 - 140 mg/dL    Calcium 9 4 8 3 - 10 1 mg/dL    AST 13 5 - 45 U/L    ALT 18 12 - 78 U/L    Alkaline Phosphatase 68 46 - 116 U/L    Total Protein 6 3 (L) 6 4 - 8 2 g/dL    Albumin 2 9 (L) 3 5 - 5 0 g/dL    Total Bilirubin 0 64 0 20 - 1 00 mg/dL    eGFR 44 ml/min/1 73sq m   CBC and differential    Collection Time: 07/27/19  5:32 AM   Result Value Ref Range    WBC 6 76 4 31 - 10 16 Thousand/uL    RBC 3 15 (L) 3 81 - 5 12 Million/uL    Hemoglobin 8 9 (L) 11 5 - 15 4 g/dL    Hematocrit 28 9 (L) 34 8 - 46 1 %    MCV 92 82 - 98 fL    MCH 28 3 26 8 - 34 3 pg    MCHC 30 8 (L) 31 4 - 37 4 g/dL    RDW 15 4 (H) 11 6 - 15 1 %    MPV 13 7 (H) 8 9 - 12 7 fL    Platelets 137 (L) 736 - 390 Thousands/uL    nRBC 0 /100 WBCs    Neutrophils Relative 56 43 - 75 %    Immat GRANS % 1 0 - 2 %    Lymphocytes Relative 21 14 - 44 %    Monocytes Relative 22 (H) 4 - 12 %    Eosinophils Relative 0 0 - 6 %    Basophils Relative 0 0 - 1 %    Neutrophils Absolute 3 74 1 85 - 7 62 Thousands/µL    Immature Grans Absolute 0 06 0 00 - 0 20 Thousand/uL    Lymphocytes Absolute 1 41 0 60 - 4 47 Thousands/µL    Monocytes Absolute 1 50 (H) 0 17 - 1 22 Thousand/µL    Eosinophils Absolute 0 02 0 00 - 0 61 Thousand/µL    Basophils Absolute 0 03 0 00 - 0 10 Thousands/µL       Imaging: I have personally reviewed pertinent imaging and I have personally reviewed PACS reports  EKG, Pathology, and Other Studies: I have personally reviewed pertinent reports  VTE Prophylaxis: Heparin        Counseling / Coordination of Care  Total time spent today 25 minutes  Greater than 50% of total time was spent with the patient and/or family counseling and/or coordination of care  A description of the counseling/coordination of care:  Patient was seen and evaluated  Discussed need for vEEG monitoring and results at this point, discussed use of Keppra  Chart reviewed thoroughly including laboratory and imaging studies    Plan of care discussed with attending neurologist and primary team

## 2019-07-27 NOTE — PLAN OF CARE
Problem: Potential for Falls  Goal: Patient will remain free of falls  Description  INTERVENTIONS:  - Assess patient frequently for physical needs  -  Identify cognitive and physical deficits and behaviors that affect risk of falls  -  [place] fall precautions as indicated by assessment   - Educate patient/family on patient safety including physical limitations  - Instruct patient to call for assistance with activity based on assessment  - Modify environment to reduce risk of injury  - Consider OT/PT consult to assist with strengthening/mobility  Outcome: Progressing     Problem: Prexisting or High Potential for Compromised Skin Integrity  Goal: Skin integrity is maintained or improved  Description  INTERVENTIONS:  - Identify patients at risk for skin breakdown  - Assess and monitor skin integrity  - Assess and monitor nutrition and hydration status  - Monitor labs (i e  albumin)  - Assess for incontinence   - Turn and reposition patient  - Assist with mobility/ambulation  - Relieve pressure over bony prominences  - Avoid friction and shearing  - Provide appropriate hygiene as needed including keeping skin clean and dry  - Evaluate need for skin moisturizer/barrier cream  - Collaborate with interdisciplinary team (i e  Nutrition, Rehabilitation, etc )   - Patient/family teaching  Outcome: Progressing     Problem: Nutrition/Hydration-ADULT  Goal: Nutrient/Hydration intake appropriate for improving, restoring or maintaining nutritional needs  Description  Monitor and assess patient's nutrition/hydration status for malnutrition (ex- brittle hair, bruises, dry skin, pale skin and conjunctiva, muscle wasting, smooth red tongue, and disorientation)  Collaborate with interdisciplinary team and initiate plan and interventions as ordered  Monitor patient's weight and dietary intake as ordered or per policy  Utilize nutrition screening tool and intervene per policy   Determine patient's food preferences and provide high-protein, high-caloric foods as appropriate       INTERVENTIONS:  - Monitor oral intake, urinary output, labs, and treatment plans  - Assess nutrition and hydration status and recommend course of action  - Evaluate amount of meals eaten  - Assist patient with eating if necessary   - Allow adequate time for meals  - Recommend/ encourage appropriate diets, oral nutritional supplements, and vitamin/mineral supplements  - Order, calculate, and assess calorie counts as needed  - Recommend, monitor, and adjust tube feedings and TPN/PPN based on assessed needs  - Assess need for intravenous fluids  - Provide specific nutrition/hydration education as appropriate  - Include patient/family/caregiver in decisions related to nutrition  Outcome: Progressing

## 2019-07-27 NOTE — PROGRESS NOTES
Progress Note - Rich Loomis 10/4/1928, 80 y o  female MRN: [id number]    Unit/Bed#: [facility]-01 Encounter: [id number]    Primary Care Provider: Arminda Garcia DO   Date and time admitted to hospital: 7/25/2019 11:41 PM        * Aphasia  Assessment & Plan  Along with reported "confusion"   We do EEG negative for seizures  The patient was loaded and continued with Keppra  Continue with low-dose Keppra per Neurology, appreciate Neurology input  Seizure precautions  Follow RPR and MMA  Fever  Assessment & Plan  Low-grade fever overnight at 100 9  Chest x-ray and UA negative for infection this admission  Recent right shoulder x-ray showed possible rotator cuff injury and fluid collection in patient reports right shoulder pain  Follow echocardiogram  Obtain and follow blood cultures  Incentive spirometry  Will obtain orthopedic consultation  Trend temperature curve  Monitor off antibiotics for now  Murmur, cardiac  Assessment & Plan  No echocardiogram found in chart  Obtain echocardiogram     Right shoulder pain  Assessment & Plan  Recent shoulder x-ray on 07/17 showed advanced degenerative changes with possible underlying rotator cuff injury and underlying joint effusion  Will obtain orthopedic consultation  Lidocaine patch, aqua K, Tylenol for pain control  Continue home dose gabapentin  Peripheral neuropathy  Assessment & Plan  Continue Neurontin 300 mg t i d  Chronic kidney disease, stage II (mild)  Assessment & Plan  Stable creatinine at 1 32   1 1 today  Trend BMP  Benign essential hypertension  Assessment & Plan  Slightly on the higher side  Currently on Cozaar 50 mg daily    Mixed hyperlipidemia  Assessment & Plan  Continue Lipitor and Zetia  VTE Pharmacologic Prophylaxis:   Pharmacologic: Heparin  Mechanical VTE Prophylaxis in Place: Yes    Patient Centered Rounds: I have performed bedside rounds with nursing staff today      Discussions with Specialists or Other Care Team Provider:  Neurology,     Education and Discussions with Family / Patient:  Discussed plan of care with patient and son at bedside    Time Spent for Care: 30 minutes  More than 50% of total time spent on counseling and coordination of care as described above  Current Length of Stay: 2 day(s)    Current Patient Status: Inpatient   Certification Statement: The patient will continue to require additional inpatient hospital stay due to Not medically stable    Discharge Plan:  Not medically stable    Code Status: Level 1 - Full Code      Subjective:   Noted fever of 100 9 overnight  Patient reports some dry cough but no sputum production  No urinary symptoms, no nausea, vomiting, abdominal pain, diarrhea  Reports right shoulder pain  Objective:     Vitals:   Temp (24hrs), Av 4 °F (37 4 °C), Min:98 8 °F (37 1 °C), Max:100 9 °F (38 3 °C)    Temp:  [98 8 °F (37 1 °C)-100 9 °F (38 3 °C)] 99 °F (37 2 °C)  HR:  [55-88] 84  Resp:  [18-20] 18  BP: (126-161)/(61-92) 158/67  SpO2:  [95 %-99 %] 97 %  Body mass index is 30 33 kg/m²  Input and Output Summary (last 24 hours): Intake/Output Summary (Last 24 hours) at 2019 1452  Last data filed at 2019 1300  Gross per 24 hour   Intake 590 ml   Output 350 ml   Net 240 ml       Physical Exam:     Physical Exam   Constitutional: No distress  Eyes: Pupils are equal, round, and reactive to light  Cardiovascular: Normal rate and regular rhythm  Murmur heard  Pulmonary/Chest: Breath sounds normal  No respiratory distress  She has no wheezes  She has no rales  Abdominal: Soft  Bowel sounds are normal  She exhibits no distension  There is no tenderness  Musculoskeletal: She exhibits no edema  Neurological: She is alert  Awake and communicative  Nonfocal   Skin: Skin is warm           Additional Data:     Labs:    Results from last 7 days   Lab Units 19  0532   WBC Thousand/uL 6 76   HEMOGLOBIN g/dL 8 9*   HEMATOCRIT % 28 9*   PLATELETS Thousands/uL 130*   NEUTROS PCT % 56   LYMPHS PCT % 21   MONOS PCT % 22*   EOS PCT % 0     Results from last 7 days   Lab Units 07/27/19  0532   SODIUM mmol/L 144   POTASSIUM mmol/L 3 5   CHLORIDE mmol/L 113*   CO2 mmol/L 21   BUN mg/dL 18   CREATININE mg/dL 1 10   ANION GAP mmol/L 10   CALCIUM mg/dL 9 4   ALBUMIN g/dL 2 9*   TOTAL BILIRUBIN mg/dL 0 64   ALK PHOS U/L 68   ALT U/L 18   AST U/L 13   GLUCOSE RANDOM mg/dL 94     Results from last 7 days   Lab Units 07/23/19  1254   INR  1 03         Results from last 7 days   Lab Units 07/24/19  0504   HEMOGLOBIN A1C % 5 4               * I Have Reviewed All Lab Data Listed Above  * Additional Pertinent Lab Tests Reviewed: All Labs Within Last 24 Hours Reviewed    Imaging:    XR chest portable   Final Result by Ramírez Sanches MD (07/26 1625)      No acute cardiopulmonary disease  Workstation performed: [id number]           Recent Cultures (last 7 days):           Last 24 Hours Medication List:     Current Facility-Administered Medications:  acetaminophen 650 mg Oral Q6H PRN Ana May MD   aspirin 81 mg Oral Daily Ana May MD   atorvastatin 40 mg Oral QPM Ana May MD   doxepin 25 mg Oral HS Ana May MD   gabapentin 300 mg Oral TID Ana May MD   heparin (porcine) 5,000 Units Subcutaneous [facility] Ana May MD   levETIRAcetam 375 mg Oral Q12H [facility] Ana May MD   lidocaine 1 patch Topical Daily Katerine Gamez MD   losartan 50 mg Oral Daily Ana May MD   pantoprazole 20 mg Oral Early Morning Ana May MD        Today, Patient Was Seen By: Katerine Gamez MD    ** Please Note: Dictation voice to text software may have been used in the creation of this document   **

## 2019-07-27 NOTE — ASSESSMENT & PLAN NOTE
Pt notably blowing off air at this time on room air 99%  Pt has forced exp wheeze   Ordered resp protocol  Stat portable chest xray reviewed   abg ordered   ivf fluids stopped with noted murmur

## 2019-07-27 NOTE — ASSESSMENT & PLAN NOTE
Recent shoulder x-ray on 07/17 showed advanced degenerative changes with possible underlying rotator cuff injury and underlying joint effusion  Will obtain orthopedic consultation  Lidocaine patch, aqua K, Tylenol for pain control  Continue home dose gabapentin

## 2019-07-27 NOTE — CONSULTS
Orthopedics   Ted Garay 80 y o  female MRN: [id number]  Unit/Bed#: [facility] 720-01      Chief Complaint:   right shoulder pain    HPI:  80 y  o female complaining of chronic right shoulder pain  The patient was admitted for a stroke work up, but also reported that she has pain with movement of the shoulder and has not been able to use it well for some time  One year ago the patient received a steroid injection into the right shoulder which did not relieve the pain and the patient reports she is unable to tolerate NSAIDs  She has been followed by her PCP who 2 weeks ago took films of the right shoulder suggestive of a rotator cuff tear  She is planning on following up with her regular orthopedist who she sees for her bilateral knee arthritis  She does not have numbness, or tingling  She is a community ambulator  Review Of Systems:   · Skin: Normal  · Neuro: See HPI  · Musculoskeletal: See HPI  · 14 point review of systems negative except as stated above     Past Medical History:   Past Medical History:   Diagnosis Date    Depression     Gout     H/O vaginal hysterectomy     resolved-4/17/2015    Skin cancer        Past Surgical History:   Past Surgical History:   Procedure Laterality Date    CATARACT EXTRACTION      TOTAL ABDOMINAL HYSTERECTOMY      with removal of both ovaries    VAGINAL HYSTERECTOMY      resolved-4/17/2015       Family History:  Family history reviewed and non-contributory  Family History   Problem Relation Age of Onset    Heart attack Mother         MI    Heart attack Father         MI    Hypertension Sister     Stomach cancer Sister     Hypertension Brother        Social History:  Social History     Socioeconomic History    Marital status:       Spouse name: Not on file    Number of children: Not on file    Years of education: Not on file    Highest education level: Not on file   Occupational History    Not on file   Social Needs    Financial resource strain: Not on file    Food insecurity:     Worry: Not on file     Inability: Not on file    Transportation needs:     Medical: Not on file     Non-medical: Not on file   Tobacco Use    Smoking status: Never Smoker    Smokeless tobacco: Never Used   Substance and Sexual Activity    Alcohol use: Not Currently    Drug use: No    Sexual activity: Not on file   Lifestyle    Physical activity:     Days per week: Not on file     Minutes per session: Not on file    Stress: Not on file   Relationships    Social connections:     Talks on phone: Not on file     Gets together: Not on file     Attends Yarsani service: Not on file     Active member of club or organization: Not on file     Attends meetings of clubs or organizations: Not on file     Relationship status: Not on file    Intimate partner violence:     Fear of current or ex partner: Not on file     Emotionally abused: Not on file     Physically abused: Not on file     Forced sexual activity: Not on file   Other Topics Concern    Not on file   Social History Narrative    Not on file       Allergies: Allergies   Allergen Reactions    Fish-Derived Products      GI upset    Allopurinol Rash     Category:  Allergy;            Labs:  0   Lab Value Date/Time    HCT 28 9 (L) 07/27/2019 0532    HCT 36 7 07/23/2019 1254    HCT 36 6 09/28/2016 1310    HGB 8 9 (L) 07/27/2019 0532    HGB 11 7 07/23/2019 1254    HGB 12 1 09/28/2016 1310    INR 1 03 07/23/2019 1254    WBC 6 76 07/27/2019 0532    WBC 5 45 07/23/2019 1254    WBC 4 43 09/28/2016 1310       Meds:    Current Facility-Administered Medications:     acetaminophen (TYLENOL) tablet 650 mg, 650 mg, Oral, Q6H PRN, Tommy Saleem MD, 650 mg at 07/27/19 1511    aspirin chewable tablet 81 mg, 81 mg, Oral, Daily, Tommy Saleem MD, 81 mg at 07/27/19 0907    atorvastatin (LIPITOR) tablet 40 mg, 40 mg, Oral, QPM, Tommy Saleem MD, 40 mg at 07/26/19 1616    doxepin (SINEquan) capsule 25 mg, 25 mg, Oral, , Horace Mindy Kendall MD, 25 mg at 07/26/19 2113    gabapentin (NEURONTIN) capsule 300 mg, 300 mg, Oral, TID, Jayy Cardoso MD, 300 mg at 07/27/19 1515    heparin (porcine) subcutaneous injection 5,000 Units, 5,000 Units, Subcutaneous, Q8H [facility], 5,000 Units at 07/27/19 1319 **AND** [COMPLETED] Platelet count, , , Once, Jayy Cardoso MD    levETIRAcetam (KEPPRA) tablet 375 mg, 375 mg, Oral, Q12H [facility], Jayy Cardoso MD, 375 mg at 07/27/19 0908    lidocaine (LIDODERM) 5 % patch 1 patch, 1 patch, Topical, Daily, Marci Kussmaul, MD, 1 patch at 07/27/19 1515    losartan (COZAAR) tablet 50 mg, 50 mg, Oral, Daily, Jayy Cardoso MD, 50 mg at 07/27/19 0906    pantoprazole (PROTONIX) EC tablet 20 mg, 20 mg, Oral, Early Morning, Jayy Cardoso MD, 20 mg at 07/27/19 0541    Blood Culture:   No results found for: BLOODCX    Wound Culture:   No results found for: WOUNDCULT    Ins and Outs:  I/O last 24 hours: In: 910 [P O :910]  Out: 750 [Urine:750]          Physical Exam:   BP (!) 171/68   Pulse (!) 46   Temp 100 2 °F (37 9 °C)   Resp 22   Ht 5' 2" (1 575 m)   Wt 75 2 kg (165 lb 12 8 oz)   SpO2 96%   BMI 30 33 kg/m²   Gen: Alert and awake  HEENT: EOMI, eyes clear, moist mucus membranes, hearing intact  Respiratory: Bilateral chest rise  No audible wheezing found  Cardiovascular: non palpable arrythmias  Abdomen: soft nontender/nondistended  Musculoskeletal: right upper extremity  · Skin pink dry and intact  · Painful range of motion  · Active ROM 45 degrees abduction and 45 degrees forward flexion  · Passive ROM 60 degrees abduction and 45 degrees forward flexion  · She had weakness and pain with resisted internal and external rotation  · Sensation intact to axillary, musculocutaneous, radial, ulna, median nerves  · Motor was intact in axillary, musculocutaneous, radial, ulna, median nerves  · Remainder of exam was limited by pain  Radiology:   I personally reviewed the films    X-rays of right shoulder shows arthritic changes suggestive of rotator cuff arthropathy    _*_*_*_*_*_*_*_*_*_*_*_*_*_*_*_*_*_*_*_*_*_*_*_*_*_*_*_*_*_*_*_*_*_*_*_*_*_*_*_*_*    Assessment:  89 y  o female with chronic  right shoulder pain whose exam, imaging and history is consistent with rotator cuff arthropathy  Plan:   · WBAT RUE  · Follow up with primary orthopedist as an outpatient  · May try oral NSAIDs if tolerated or may apply home prescription voltaren gel to shoulder  · Non-operative management at this time  · Body mass index is 30 33 kg/m²  mildly obese  Recommend behavior modifications, nutrition and physical activity    · Dispo: [id number] Carolina Casillas for discharge from [facility] Duarte Salvador MD

## 2019-07-27 NOTE — ASSESSMENT & PLAN NOTE
Along with reported "confusion"   We do EEG negative for seizures  The patient was loaded and continued with Keppra  Continue with low-dose Keppra per Neurology, appreciate Neurology input  Seizure precautions  Follow RPR and MMA

## 2019-07-27 NOTE — ASSESSMENT & PLAN NOTE
Low-grade fever overnight at 100 9  Chest x-ray and UA negative for infection this admission  Recent right shoulder x-ray showed possible rotator cuff injury and fluid collection in patient reports right shoulder pain  Follow echocardiogram  Obtain and follow blood cultures  Incentive spirometry  Will obtain orthopedic consultation  Trend temperature curve  Monitor off antibiotics for now

## 2019-07-28 PROBLEM — R06.89 ACUTE RESPIRATORY INSUFFICIENCY: Status: ACTIVE | Noted: 2019-07-28

## 2019-07-28 LAB
ANION GAP SERPL CALCULATED.3IONS-SCNC: 6 MMOL/L (ref 4–13)
BASOPHILS # BLD AUTO: 0.02 THOUSANDS/ΜL (ref 0–0.1)
BASOPHILS NFR BLD AUTO: 0 % (ref 0–1)
BUN SERPL-MCNC: 24 MG/DL (ref 5–25)
CALCIUM SERPL-MCNC: 9.3 MG/DL (ref 8.3–10.1)
CHLORIDE SERPL-SCNC: 109 MMOL/L (ref 100–108)
CO2 SERPL-SCNC: 24 MMOL/L (ref 21–32)
CREAT SERPL-MCNC: 1.24 MG/DL (ref 0.6–1.3)
EOSINOPHIL # BLD AUTO: 0.03 THOUSAND/ΜL (ref 0–0.61)
EOSINOPHIL NFR BLD AUTO: 0 % (ref 0–6)
ERYTHROCYTE [DISTWIDTH] IN BLOOD BY AUTOMATED COUNT: 15.5 % (ref 11.6–15.1)
GFR SERPL CREATININE-BSD FRML MDRD: 38 ML/MIN/1.73SQ M
GLUCOSE SERPL-MCNC: 111 MG/DL (ref 65–140)
HCT VFR BLD AUTO: 28.1 % (ref 34.8–46.1)
HGB BLD-MCNC: 8.7 G/DL (ref 11.5–15.4)
IMM GRANULOCYTES # BLD AUTO: 0.07 THOUSAND/UL (ref 0–0.2)
IMM GRANULOCYTES NFR BLD AUTO: 1 % (ref 0–2)
LYMPHOCYTES # BLD AUTO: 1.83 THOUSANDS/ΜL (ref 0.6–4.47)
LYMPHOCYTES NFR BLD AUTO: 26 % (ref 14–44)
MCH RBC QN AUTO: 28.2 PG (ref 26.8–34.3)
MCHC RBC AUTO-ENTMCNC: 31 G/DL (ref 31.4–37.4)
MCV RBC AUTO: 91 FL (ref 82–98)
MONOCYTES # BLD AUTO: 1.56 THOUSAND/ΜL (ref 0.17–1.22)
MONOCYTES NFR BLD AUTO: 22 % (ref 4–12)
NEUTROPHILS # BLD AUTO: 3.64 THOUSANDS/ΜL (ref 1.85–7.62)
NEUTS SEG NFR BLD AUTO: 51 % (ref 43–75)
NRBC BLD AUTO-RTO: 0 /100 WBCS
PLATELET # BLD AUTO: 146 THOUSANDS/UL (ref 149–390)
PMV BLD AUTO: 13.5 FL (ref 8.9–12.7)
POTASSIUM SERPL-SCNC: 3.7 MMOL/L (ref 3.5–5.3)
PROCALCITONIN SERPL-MCNC: 0.33 NG/ML
RBC # BLD AUTO: 3.08 MILLION/UL (ref 3.81–5.12)
SODIUM SERPL-SCNC: 139 MMOL/L (ref 136–145)
WBC # BLD AUTO: 7.15 THOUSAND/UL (ref 4.31–10.16)

## 2019-07-28 PROCEDURE — 85025 COMPLETE CBC W/AUTO DIFF WBC: CPT | Performed by: INTERNAL MEDICINE

## 2019-07-28 PROCEDURE — 84145 PROCALCITONIN (PCT): CPT | Performed by: PHYSICIAN ASSISTANT

## 2019-07-28 PROCEDURE — NC001 PR NO CHARGE: Performed by: ORTHOPAEDIC SURGERY

## 2019-07-28 PROCEDURE — 99222 1ST HOSP IP/OBS MODERATE 55: CPT | Performed by: ORTHOPAEDIC SURGERY

## 2019-07-28 PROCEDURE — 99232 SBSQ HOSP IP/OBS MODERATE 35: CPT | Performed by: INTERNAL MEDICINE

## 2019-07-28 PROCEDURE — 80048 BASIC METABOLIC PNL TOTAL CA: CPT | Performed by: INTERNAL MEDICINE

## 2019-07-28 RX ORDER — METHYLPREDNISOLONE 4 MG/1
12 TABLET ORAL DAILY
Status: DISCONTINUED | OUTPATIENT
Start: 2019-07-31 | End: 2019-07-30 | Stop reason: HOSPADM

## 2019-07-28 RX ORDER — AZITHROMYCIN 250 MG/1
500 TABLET, FILM COATED ORAL EVERY 24 HOURS
Status: DISCONTINUED | OUTPATIENT
Start: 2019-07-28 | End: 2019-07-30 | Stop reason: HOSPADM

## 2019-07-28 RX ORDER — METHYLPREDNISOLONE 4 MG/1
4 TABLET ORAL DAILY
Status: DISCONTINUED | OUTPATIENT
Start: 2019-08-02 | End: 2019-07-30 | Stop reason: HOSPADM

## 2019-07-28 RX ORDER — LIDOCAINE 50 MG/G
1 PATCH TOPICAL DAILY
Status: DISCONTINUED | OUTPATIENT
Start: 2019-07-28 | End: 2019-07-30 | Stop reason: HOSPADM

## 2019-07-28 RX ORDER — METHYLPREDNISOLONE 16 MG/1
16 TABLET ORAL DAILY
Status: COMPLETED | OUTPATIENT
Start: 2019-07-30 | End: 2019-07-30

## 2019-07-28 RX ORDER — METHYLPREDNISOLONE 4 MG/1
8 TABLET ORAL DAILY
Status: DISCONTINUED | OUTPATIENT
Start: 2019-08-01 | End: 2019-07-30 | Stop reason: HOSPADM

## 2019-07-28 RX ADMIN — LOSARTAN POTASSIUM 50 MG: 50 TABLET ORAL at 09:27

## 2019-07-28 RX ADMIN — HEPARIN SODIUM 5000 UNITS: 5000 INJECTION INTRAVENOUS; SUBCUTANEOUS at 13:27

## 2019-07-28 RX ADMIN — DOXEPIN HYDROCHLORIDE 25 MG: 25 CAPSULE ORAL at 21:44

## 2019-07-28 RX ADMIN — GABAPENTIN 300 MG: 300 CAPSULE ORAL at 17:36

## 2019-07-28 RX ADMIN — LIDOCAINE 1 PATCH: 50 PATCH CUTANEOUS at 09:28

## 2019-07-28 RX ADMIN — HEPARIN SODIUM 5000 UNITS: 5000 INJECTION INTRAVENOUS; SUBCUTANEOUS at 21:45

## 2019-07-28 RX ADMIN — GABAPENTIN 300 MG: 300 CAPSULE ORAL at 09:27

## 2019-07-28 RX ADMIN — LEVETIRACETAM 375 MG: 750 TABLET ORAL at 09:27

## 2019-07-28 RX ADMIN — GABAPENTIN 300 MG: 300 CAPSULE ORAL at 21:44

## 2019-07-28 RX ADMIN — LIDOCAINE 1 PATCH: 50 PATCH CUTANEOUS at 14:35

## 2019-07-28 RX ADMIN — HEPARIN SODIUM 5000 UNITS: 5000 INJECTION INTRAVENOUS; SUBCUTANEOUS at 06:16

## 2019-07-28 RX ADMIN — ASPIRIN 81 MG 81 MG: 81 TABLET ORAL at 09:28

## 2019-07-28 RX ADMIN — AZITHROMYCIN 500 MG: 250 TABLET, FILM COATED ORAL at 13:27

## 2019-07-28 RX ADMIN — METHYLPREDNISOLONE 24 MG: 16 TABLET ORAL at 14:36

## 2019-07-28 RX ADMIN — ATORVASTATIN CALCIUM 40 MG: 40 TABLET, FILM COATED ORAL at 17:36

## 2019-07-28 RX ADMIN — LEVETIRACETAM 375 MG: 750 TABLET ORAL at 21:44

## 2019-07-28 NOTE — PROGRESS NOTES
Subjective:  Patient has right shoulder pain has improved  She states her pain is not significant at rest   She denies any numbness or tingling  Lab Results   Component Value Date/Time    WBC 7 15 07/28/2019 06:01 AM    HGB 8 7 (L) 07/28/2019 06:01 AM       Vitals:    07/28/19 0747   BP: 146/69   Pulse: 87   Resp: 18   Temp: 99 9 °F (37 7 °C)   SpO2: 99%     Musculoskeletal:  Right upper extremity  Palpable effusion right glenohumeral joint  Mild to moderate tenderness palpation over anterolateral aspect of shoulder  Motor and sensation grossly intact distally  Limited range of motion both active and passive secondary to pain and weakness  Palpable radial pulse    Assessment: 80 y o  female with chronic right shoulder rotator cuff arthropathy    Plan:  Weightbearing as tolerated right upper extremity  Pain control- recommend lidocaine patch p r n  To right shoulder for symptomatic relief  PT/OT  No plans for intervention  May follow up as an outpatient with provider choosing if symptoms persist or worsen  Dispo: Will sign off    Please call with questions

## 2019-07-28 NOTE — SOCIAL WORK
CM t/c to pt's son [facility] [place] [phone] as follow up on rehab choices  Kunal informed CM he may be interested in referral to [facility], however would like to wait until after speaking with medical team tomorrow before sending referral  CM to follow up

## 2019-07-28 NOTE — PLAN OF CARE
Problem: Potential for Falls  Goal: Patient will remain free of falls  Description  INTERVENTIONS:  - Assess patient frequently for physical needs  -  Identify cognitive and physical deficits and behaviors that affect risk of falls  -  [place] fall precautions as indicated by assessment   - Educate patient/family on patient safety including physical limitations  - Instruct patient to call for assistance with activity based on assessment  - Modify environment to reduce risk of injury  - Consider OT/PT consult to assist with strengthening/mobility  Outcome: Progressing     Problem: Prexisting or High Potential for Compromised Skin Integrity  Goal: Skin integrity is maintained or improved  Description  INTERVENTIONS:  - Identify patients at risk for skin breakdown  - Assess and monitor skin integrity  - Assess and monitor nutrition and hydration status  - Monitor labs (i e  albumin)  - Assess for incontinence   - Turn and reposition patient  - Assist with mobility/ambulation  - Relieve pressure over bony prominences  - Avoid friction and shearing  - Provide appropriate hygiene as needed including keeping skin clean and dry  - Evaluate need for skin moisturizer/barrier cream  - Collaborate with interdisciplinary team (i e  Nutrition, Rehabilitation, etc )   - Patient/family teaching  Outcome: Progressing     Problem: Nutrition/Hydration-ADULT  Goal: Nutrient/Hydration intake appropriate for improving, restoring or maintaining nutritional needs  Description  Monitor and assess patient's nutrition/hydration status for malnutrition (ex- brittle hair, bruises, dry skin, pale skin and conjunctiva, muscle wasting, smooth red tongue, and disorientation)  Collaborate with interdisciplinary team and initiate plan and interventions as ordered  Monitor patient's weight and dietary intake as ordered or per policy  Utilize nutrition screening tool and intervene per policy   Determine patient's food preferences and provide high-protein, high-caloric foods as appropriate       INTERVENTIONS:  - Monitor oral intake, urinary output, labs, and treatment plans  - Assess nutrition and hydration status and recommend course of action  - Evaluate amount of meals eaten  - Assist patient with eating if necessary   - Allow adequate time for meals  - Recommend/ encourage appropriate diets, oral nutritional supplements, and vitamin/mineral supplements  - Order, calculate, and assess calorie counts as needed  - Recommend, monitor, and adjust tube feedings and TPN/PPN based on assessed needs  - Assess need for intravenous fluids  - Provide specific nutrition/hydration education as appropriate  - Include patient/family/caregiver in decisions related to nutrition  Outcome: Progressing

## 2019-07-28 NOTE — RESPIRATORY THERAPY NOTE
RT Protocol Note  Alfonso Nelson 80 y o  female MRN: [id number]  [facility] Encounter: [id number]    Assessment    Principal Problem:    Aphasia  Active Problems:    Mixed hyperlipidemia    Benign essential hypertension    Chronic kidney disease, stage II (mild)    Peripheral neuropathy    Fever    Right shoulder pain    Murmur, cardiac      Home Pulmonary Medications:         Past Medical History:   Diagnosis Date    Depression     Gout     H/O vaginal hysterectomy     resolved-4/17/2015    Skin cancer      Social History     Socioeconomic History    Marital status:       Spouse name: Not on file    Number of children: Not on file    Years of education: Not on file    Highest education level: Not on file   Occupational History    Not on file   Social Needs    Financial resource strain: Not on file    Food insecurity:     Worry: Not on file     Inability: Not on file    Transportation needs:     Medical: Not on file     Non-medical: Not on file   Tobacco Use    Smoking status: Never Smoker    Smokeless tobacco: Never Used   Substance and Sexual Activity    Alcohol use: Not Currently    Drug use: No    Sexual activity: Not on file   Lifestyle    Physical activity:     Days per week: Not on file     Minutes per session: Not on file    Stress: Not on file   Relationships    Social connections:     Talks on phone: Not on file     Gets together: Not on file     Attends [employer] service: Not on file     Active member of club or organization: Not on file     Attends meetings of clubs or organizations: Not on file     Relationship status: Not on file    Intimate partner violence:     Fear of current or ex partner: Not on file     Emotionally abused: Not on file     Physically abused: Not on file     Forced sexual activity: Not on file   Other Topics Concern    Not on file   Social History Narrative    Not on file       Subjective         Objective    Physical Exam:   Assessment Type: Assess only  General Appearance: Awake  Respiratory Pattern: Tachypneic  Chest Assessment: Chest expansion symmetrical  Bilateral Breath Sounds: Expiratory wheezes(Upper airways )  Cough: Dry    Vitals:  Blood pressure (!) 171/68, pulse (!) 46, temperature 100 2 °F (37 9 °C), resp  rate (!) 32, height 5' 2" (1 575 m), weight 75 2 kg (165 lb 12 8 oz), SpO2 97 %  Results from last 7 days   Lab Units 07/27/19  2255   PH ART  7 464*   PCO2 ART mm Hg 31 7*   PO2 ART mm Hg 76 0   HCO3 ART mmol/L 22 2   BASE EXC ART mmol/L -1 0   O2 CONTENT ART mL/dL 14 0*   O2 HGB, ARTERIAL % 95 2   ABG SOURCE  Radial, Right   HEIDI TEST  Yes   NON VENT ROOM AIR % 21       Imaging and other studies: I have personally reviewed pertinent reports  Plan    Respiratory Plan: Mild Distress pathway        Resp Comments: Pt admit for DX:Aphasia  Pt developed some mild resp distress  BS essentially clear except for upper airway wheezing  HR RR/SpO2=97/32/97% (room air)  ABGs ordered and sent  CXR ordered  Pt Respiratory Protocol'd for PRN bronchodilators

## 2019-07-28 NOTE — PROGRESS NOTES
Progress Note - Ramya Sheppard 10/4/1928, 80 y o  female MRN: [id number]    Unit/Bed#: [facility] 729-01 Encounter: [id number]    Primary Care Provider: Seb Cain DO   Date and time admitted to hospital: 7/25/2019 11:41 PM        * Aphasia  Assessment & Plan  Along with reported "confusion"   We do EEG negative for seizures  The patient was loaded and continued with Keppra  Continue with low-dose Keppra per Neurology, appreciate Neurology input  Seizure precautions  Follow RPR and MMA  Fever  Assessment & Plan  Related temperature spike on 07/27  No fever overnight  Chest x-ray and UA negative for infection this admission  Procalcitonin borderline elevated  Follow echocardiogram  Follow-up blood cultures  Incentive spirometry  Trend temperature curve  Trend procalcitonin in a m  Jacqueline Mort Short course of azithromycin p o  As mentioned  Acute respiratory insufficiency  Assessment & Plan  Noted overnight acute respiratory insufficiency with wheezing  Patient is on room air this morning and breathing is comfortable but she has mild wheezing on exam   Chest x-ray obtained again which is negative for any acute per my edema or infection  Echocardiogram is pending  Patient looks euvolemic on exam   Procalcitonin borderline elevated 0 33  Will give 5 day course of p o  Azithromycin and Medrol Dosepak  Incentive spirometry  Murmur, cardiac  Assessment & Plan  No echocardiogram found in chart  Obtain echocardiogram     Right shoulder pain  Assessment & Plan  Recent shoulder x-ray on 07/17 showed advanced degenerative changes with possible underlying rotator cuff injury and underlying joint effusion  Appreciate Orthopedic input, no need for surgical intervention  Outpatient follow-up  Lidocaine patch, aqua K, Tylenol for pain control  Continue home dose gabapentin  Peripheral neuropathy  Assessment & Plan  Continue Neurontin 300 mg t i d      Chronic kidney disease, stage II (mild)  Assessment & Plan  Creatinine between 1-1 3 at baseline  1 2 today  Trend BMP  Benign essential hypertension  Assessment & Plan  Slightly on the higher side  Currently on Cozaar 50 mg daily    Mixed hyperlipidemia  Assessment & Plan  Continue Lipitor and Zetia  VTE Pharmacologic Prophylaxis:   Pharmacologic: Heparin  Mechanical VTE Prophylaxis in Place: Yes    Patient Centered Rounds: I have performed bedside rounds with nursing staff today  Discussions with Specialists or Other Care Team Provider:      Education and Discussions with Family / Patient:  Discussed plan of care with the patient and son at bedside    Time Spent for Care: 30 minutes  More than 50% of total time spent on counseling and coordination of care as described above  Current Length of Stay: 3 day(s)    Current Patient Status: Inpatient   Certification Statement:  Not medically stable for discharge  Discharge Plan:  Likely will need rehab When medically stable for discharge    Code Status: Level 1 - Full Code      Subjective:   Overnight events of mild respiratory distress noted  Per nursing staff, patient had wheezing episodes with some distress  This morning, patient is comfortable and denies any dyspnea, chest pain  She reports that she had mild wheezing overnight  She denies dysphagia or increased secretion  No respiratory secretions per nursing staff  Patient is on room air  No chills  No fever overnight  Objective:     Vitals:   Temp (24hrs), Av 3 °F (37 4 °C), Min:97 8 °F (36 6 °C), Max:100 2 °F (37 9 °C)    Temp:  [97 8 °F (36 6 °C)-100 2 °F (37 9 °C)] 99 9 °F (37 7 °C)  HR:  [46-87] 87  Resp:  [18-32] 18  BP: (146-171)/(68-72) 146/69  SpO2:  [96 %-99 %] 99 %  Body mass index is 30 33 kg/m²  Input and Output Summary (last 24 hours):        Intake/Output Summary (Last 24 hours) at 2019 1230  Last data filed at 2019 1144  Gross per 24 hour   Intake 360 ml   Output 650 ml   Net -290 ml Physical Exam:     Physical Exam  Constitutional: No distress  Eyes: Pupils are equal, round, and reactive to light  Cardiovascular: Normal rate and regular rhythm  Murmur heard  Pulmonary/Chest: Breath sounds normal  No respiratory distress  She has no wheezes  She has no rales  Abdominal: Soft  Bowel sounds are normal  She exhibits no distension  There is no tenderness  Musculoskeletal: She exhibits no edema  Neurological: She is alert  Awake and communicative  Nonfocal   Skin: Skin is warm  Additional Data:     Labs:    Results from last 7 days   Lab Units 07/28/19  0601   WBC Thousand/uL 7 15   HEMOGLOBIN g/dL 8 7*   HEMATOCRIT % 28 1*   PLATELETS Thousands/uL 146*   NEUTROS PCT % 51   LYMPHS PCT % 26   MONOS PCT % 22*   EOS PCT % 0     Results from last 7 days   Lab Units 07/28/19  0601 07/27/19  0532   SODIUM mmol/L 139 144   POTASSIUM mmol/L 3 7 3 5   CHLORIDE mmol/L 109* 113*   CO2 mmol/L 24 21   BUN mg/dL 24 18   CREATININE mg/dL 1 24 1 10   ANION GAP mmol/L 6 10   CALCIUM mg/dL 9 3 9 4   ALBUMIN g/dL  --  2 9*   TOTAL BILIRUBIN mg/dL  --  0 64   ALK PHOS U/L  --  68   ALT U/L  --  18   AST U/L  --  13   GLUCOSE RANDOM mg/dL 111 94     Results from last 7 days   Lab Units 07/23/19  1254   INR  1 03         Results from last 7 days   Lab Units 07/24/19  0504   HEMOGLOBIN A1C % 5 4     Results from last 7 days   Lab Units 07/28/19  0601   PROCALCITONIN ng/ml 0 33*           * I Have Reviewed All Lab Data Listed Above  * Additional Pertinent Lab Tests Reviewed: All Labs Within Last 24 Hours Reviewed    Imaging:    XR chest portable   Final Result by Avtar Monroe DO (07/28 4018)      No acute cardiopulmonary disease  Workstation performed: [id number]         XR chest portable   Final Result by Shon Trujillo MD (07/26 9812)      No acute cardiopulmonary disease              Workstation performed: [id number]             Recent Cultures (last 7 days):           Last 24 Hours Medication List:     Current Facility-Administered Medications:  acetaminophen 650 mg Oral Q6H PRN Jaime Jaime MD   aspirin 81 mg Oral Daily Jaime Jaime MD   atorvastatin 40 mg Oral QPM Jaime Jaime MD   azithromycin 500 mg Oral Q24H James Ordaz MD   doxepin 25 mg Oral HS Jaime Jaime MD   gabapentin 300 mg Oral TID Jaime Jaime MD   heparin (porcine) 5,000 Units Subcutaneous [facility] Jaime Jaime MD   levalbuterol 1 25 mg Nebulization Q8H PRN James Ordaz MD   levETIRAcetam 375 mg Oral Q12H [address] Jaime Jaime MD   lidocaine 1 patch Topical Daily James Ordaz MD   losartan 50 mg Oral Daily Jaime Jaime MD   methylPREDNISolone 24 mg Oral Daily James Ordaz MD   Followed by       Amado Manning ON 7/29/2019] methylPREDNISolone 20 mg Oral Daily James Ordaz MD   Followed by       Amado Manning ON 7/30/2019] methylPREDNISolone 16 mg Oral Daily James Ordaz MD   Followed by       Amado Manning ON 7/31/2019] methylPREDNISolone 12 mg Oral Daily James Ordaz MD   Followed by       Amado Manning ON 8/1/2019] methylPREDNISolone 8 mg Oral Daily James Ordaz MD   Followed by       Amado Manning ON 8/2/2019] methylPREDNISolone 4 mg Oral Daily James Ordaz MD   pantoprazole 20 mg Oral Early Morning Jaime Jaime MD   sodium chloride 3 mL Nebulization Q8H PRN James Ordaz MD        Today, Patient Was Seen By: James Ordaz MD    ** Please Note: Dictation voice to text software may have been used in the creation of this document   **

## 2019-07-28 NOTE — ASSESSMENT & PLAN NOTE
Recent shoulder x-ray on 07/17 showed advanced degenerative changes with possible underlying rotator cuff injury and underlying joint effusion  Appreciate Orthopedic input, no need for surgical intervention  Outpatient follow-up  Lidocaine patch, aqua K, Tylenol for pain control  Continue home dose gabapentin

## 2019-07-28 NOTE — PROGRESS NOTES
Pt  Noted to be tachypenic and using abd muscles for breathing  Pt  Denies any chest pain or any discomfort  Pt  O2 saturation 97% ra,  SLIM made aware

## 2019-07-28 NOTE — RESPIRATORY THERAPY NOTE
Respiratory Care      07/27/19 5117   Respiratory Assessment   Resp Comments Post bronchodilator Tx RR and BS unchanged  (32/exp wheezes)     Additional Assessments   Pulse 68   Respirations (!) 32   SpO2 97 %

## 2019-07-28 NOTE — DISCHARGE INSTRUCTIONS
Please check BMP Thursday    Discharge Instructions - Ananda Bernard 80 y o  female MRN: [id number]  Unit/Bed#: [facility] 729-01    Weight Bearing Status:                                           Weight bearing as tolerated right upper extremity    Pain:  Continue analgesics as directed  May use lidocaine patch or voltaren gel to the affected area    PT/OT:  Continue PT/OT on outpatient basis as directed    Appt Instructions:   Please follow up with your regular orthopedist

## 2019-07-28 NOTE — ASSESSMENT & PLAN NOTE
Noted overnight acute respiratory insufficiency with wheezing  Patient is on room air this morning and breathing is comfortable but she has mild wheezing on exam   Chest x-ray obtained again which is negative for any acute per my edema or infection  Echocardiogram is pending  Patient looks euvolemic on exam   Procalcitonin borderline elevated 0 33  Will give 5 day course of p o  Azithromycin and Medrol Dosepak  Incentive spirometry

## 2019-07-29 ENCOUNTER — APPOINTMENT (INPATIENT)
Dept: NON INVASIVE DIAGNOSTICS | Facility: HOSPITAL | Age: 84
DRG: 101 | End: 2019-07-29
Payer: MEDICARE

## 2019-07-29 PROBLEM — F03.90 DEMENTIA (HCC): Status: ACTIVE | Noted: 2019-07-29

## 2019-07-29 LAB
ANION GAP SERPL CALCULATED.3IONS-SCNC: 9 MMOL/L (ref 4–13)
BASOPHILS # BLD AUTO: 0.01 THOUSANDS/ΜL (ref 0–0.1)
BASOPHILS NFR BLD AUTO: 0 % (ref 0–1)
BUN SERPL-MCNC: 27 MG/DL (ref 5–25)
CALCIUM SERPL-MCNC: 10.5 MG/DL (ref 8.3–10.1)
CHLORIDE SERPL-SCNC: 110 MMOL/L (ref 100–108)
CO2 SERPL-SCNC: 24 MMOL/L (ref 21–32)
CREAT SERPL-MCNC: 1.09 MG/DL (ref 0.6–1.3)
EOSINOPHIL # BLD AUTO: 0 THOUSAND/ΜL (ref 0–0.61)
EOSINOPHIL NFR BLD AUTO: 0 % (ref 0–6)
ERYTHROCYTE [DISTWIDTH] IN BLOOD BY AUTOMATED COUNT: 15 % (ref 11.6–15.1)
GFR SERPL CREATININE-BSD FRML MDRD: 45 ML/MIN/1.73SQ M
GLUCOSE SERPL-MCNC: 154 MG/DL (ref 65–140)
HCT VFR BLD AUTO: 30 % (ref 34.8–46.1)
HGB BLD-MCNC: 9.3 G/DL (ref 11.5–15.4)
IMM GRANULOCYTES # BLD AUTO: 0.05 THOUSAND/UL (ref 0–0.2)
IMM GRANULOCYTES NFR BLD AUTO: 1 % (ref 0–2)
LYMPHOCYTES # BLD AUTO: 0.49 THOUSANDS/ΜL (ref 0.6–4.47)
LYMPHOCYTES NFR BLD AUTO: 9 % (ref 14–44)
MCH RBC QN AUTO: 28 PG (ref 26.8–34.3)
MCHC RBC AUTO-ENTMCNC: 31 G/DL (ref 31.4–37.4)
MCV RBC AUTO: 90 FL (ref 82–98)
METHYLMALONATE SERPL-SCNC: 266 NMOL/L (ref 0–378)
MONOCYTES # BLD AUTO: 0.26 THOUSAND/ΜL (ref 0.17–1.22)
MONOCYTES NFR BLD AUTO: 5 % (ref 4–12)
NEUTROPHILS # BLD AUTO: 4.38 THOUSANDS/ΜL (ref 1.85–7.62)
NEUTS SEG NFR BLD AUTO: 85 % (ref 43–75)
NRBC BLD AUTO-RTO: 0 /100 WBCS
PLATELET # BLD AUTO: 173 THOUSANDS/UL (ref 149–390)
PMV BLD AUTO: 12.9 FL (ref 8.9–12.7)
POTASSIUM SERPL-SCNC: 3.9 MMOL/L (ref 3.5–5.3)
PROCALCITONIN SERPL-MCNC: 0.28 NG/ML
RBC # BLD AUTO: 3.32 MILLION/UL (ref 3.81–5.12)
RPR SER QL: NORMAL
SL AMB DISCLAIMER: NORMAL
SODIUM SERPL-SCNC: 143 MMOL/L (ref 136–145)
WBC # BLD AUTO: 5.19 THOUSAND/UL (ref 4.31–10.16)

## 2019-07-29 PROCEDURE — 93306 TTE W/DOPPLER COMPLETE: CPT | Performed by: INTERNAL MEDICINE

## 2019-07-29 PROCEDURE — 97110 THERAPEUTIC EXERCISES: CPT

## 2019-07-29 PROCEDURE — 80048 BASIC METABOLIC PNL TOTAL CA: CPT | Performed by: INTERNAL MEDICINE

## 2019-07-29 PROCEDURE — 94760 N-INVAS EAR/PLS OXIMETRY 1: CPT

## 2019-07-29 PROCEDURE — 85025 COMPLETE CBC W/AUTO DIFF WBC: CPT | Performed by: INTERNAL MEDICINE

## 2019-07-29 PROCEDURE — 97530 THERAPEUTIC ACTIVITIES: CPT

## 2019-07-29 PROCEDURE — 99232 SBSQ HOSP IP/OBS MODERATE 35: CPT | Performed by: INTERNAL MEDICINE

## 2019-07-29 PROCEDURE — 84145 PROCALCITONIN (PCT): CPT | Performed by: INTERNAL MEDICINE

## 2019-07-29 PROCEDURE — 93306 TTE W/DOPPLER COMPLETE: CPT

## 2019-07-29 RX ADMIN — ASPIRIN 81 MG 81 MG: 81 TABLET ORAL at 08:54

## 2019-07-29 RX ADMIN — HEPARIN SODIUM 5000 UNITS: 5000 INJECTION INTRAVENOUS; SUBCUTANEOUS at 14:38

## 2019-07-29 RX ADMIN — GABAPENTIN 300 MG: 300 CAPSULE ORAL at 08:54

## 2019-07-29 RX ADMIN — LEVETIRACETAM 375 MG: 750 TABLET ORAL at 08:57

## 2019-07-29 RX ADMIN — HEPARIN SODIUM 5000 UNITS: 5000 INJECTION INTRAVENOUS; SUBCUTANEOUS at 06:36

## 2019-07-29 RX ADMIN — AZITHROMYCIN 500 MG: 250 TABLET, FILM COATED ORAL at 12:17

## 2019-07-29 RX ADMIN — ATORVASTATIN CALCIUM 40 MG: 40 TABLET, FILM COATED ORAL at 17:13

## 2019-07-29 RX ADMIN — DOXEPIN HYDROCHLORIDE 25 MG: 25 CAPSULE ORAL at 21:23

## 2019-07-29 RX ADMIN — METHYLPREDNISOLONE 20 MG: 16 TABLET ORAL at 09:02

## 2019-07-29 RX ADMIN — PANTOPRAZOLE SODIUM 20 MG: 40 TABLET, DELAYED RELEASE ORAL at 06:35

## 2019-07-29 RX ADMIN — HEPARIN SODIUM 5000 UNITS: 5000 INJECTION INTRAVENOUS; SUBCUTANEOUS at 21:21

## 2019-07-29 RX ADMIN — LEVETIRACETAM 375 MG: 750 TABLET ORAL at 21:22

## 2019-07-29 RX ADMIN — GABAPENTIN 300 MG: 300 CAPSULE ORAL at 17:13

## 2019-07-29 RX ADMIN — LIDOCAINE 1 PATCH: 50 PATCH CUTANEOUS at 08:56

## 2019-07-29 RX ADMIN — LOSARTAN POTASSIUM 50 MG: 50 TABLET ORAL at 08:54

## 2019-07-29 RX ADMIN — LIDOCAINE 1 PATCH: 50 PATCH CUTANEOUS at 08:55

## 2019-07-29 RX ADMIN — GABAPENTIN 300 MG: 300 CAPSULE ORAL at 21:23

## 2019-07-29 NOTE — PROGRESS NOTES
Progress Note - [address]a [address] 10/4/1928, 80 y o  female MRN: [id number]    [facility] Encounter: [id number]    Primary Care Provider: Sydnie Ruiz DO   Date and time admitted to hospital: 7/25/2019 11:41 PM        * Aphasia  Assessment & Plan  Along with reported "confusion"   We do EEG negative for seizures  The patient was loaded and continued with Keppra  Continue with low-dose Keppra per Neurology, appreciate Neurology input  Seizure precautions  RPR and MMA are normal       Fever  Assessment & Plan  Related temperature spike on 07/27  No fever overnight  Chest x-ray and UA negative for infection this admission  Procalcitonin borderline elevated but trending down  0 33>> 0 26  Blood cultures negative for 24 hours  Incentive spirometry  Trend temperature curve  Short course of azithromycin p o  As mentioned  Acute respiratory insufficiency  Assessment & Plan  Noted overnight acute respiratory insufficiency with wheezing  Patient is on room air this morning and breathing is comfortable but she has mild wheezing on exam   Chest x-ray obtained again which is negative for any acute per my edema or infection  Echocardiogram is pending  Patient looks euvolemic on exam   Procalcitonin down to 0 33>> 0 26  Continue 5 day course of p o  Azithromycin and Medrol Dosepak  Incentive spirometry  Suspected Dementia  Assessment & Plan  Likely early stages of dementia in setting of aphasia evidenced by mental status fluctuations and confusion  Evaluated by Neurology  Outpatient follow-up with Neurology Clinic  Murmur, cardiac  Assessment & Plan  No echocardiogram found in chart  Murmur known to the patient  Echocardiogram done this morning, follow results  Right shoulder pain  Assessment & Plan  Recent shoulder x-ray on 07/17 showed advanced degenerative changes with possible underlying rotator cuff injury and underlying joint effusion    Appreciate Orthopedic input, no need for surgical intervention  Outpatient follow-up  Lidocaine patch, aqua K, Tylenol for pain control  Continue home dose gabapentin  Peripheral neuropathy  Assessment & Plan  Continue Neurontin 300 mg t i d  Chronic kidney disease, stage II (mild)  Assessment & Plan  Creatinine between 1-1 3 at baseline  1 2 today  Trend BMP  Benign essential hypertension  Assessment & Plan  Continue Cozaar 50 mg daily  Mixed hyperlipidemia  Assessment & Plan  Continue Lipitor and Zetia  VTE Pharmacologic Prophylaxis:   Pharmacologic: Enoxaparin (Lovenox)  Mechanical VTE Prophylaxis in Place: Yes    Patient Centered Rounds: I have performed bedside rounds with nursing staff today  Education and Discussions with Family / Patient:  Discussed plan of care with patient and son at bedside    Time Spent for Care: 30 minutes  More than 50% of total time spent on counseling and coordination of care as described above  Current Length of Stay: 4 day(s)    Current Patient Status: Inpatient   Certification Statement: The patient will continue to require additional inpatient hospital stay due to Not medically stable    Discharge Plan: To rehab in next 24-48 hours    Code Status: Level 1 - Full Code      Subjective:   No overnight events  Patient feels well and denies any acute pain or discomfort  Respiratory status is better  Objective:     Vitals:   Temp (24hrs), Av 6 °F (37 6 °C), Min:98 8 °F (37 1 °C), Max:101 1 °F (38 4 °C)    Temp:  [98 8 °F (37 1 °C)-101 1 °F (38 4 °C)] 98 8 °F (37 1 °C)  HR:  [89-95] 89  Resp:  [13-22] 13  BP: (123-192)/() 152/83  SpO2:  [94 %-96 %] 96 %  Body mass index is 30 33 kg/m²  Input and Output Summary (last 24 hours): Intake/Output Summary (Last 24 hours) at 2019 1258  Last data filed at 2019 0825  Gross per 24 hour   Intake 240 ml   Output    Net 240 ml       Physical Exam:     Physical Exam  Constitutional: No distress     Eyes: Pupils are equal, round, and reactive to light  Cardiovascular: Normal rate and regular rhythm    Murmur heard  Pulmonary/Chest: Breath sounds normal  No respiratory distress  She has no wheezes  She has no rales  Abdominal: Soft  Bowel sounds are normal  She exhibits no distension  There is no tenderness  Musculoskeletal: She exhibits no edema  Neurological: She is alert    Awake and communicative  Nonfocal   Skin: Skin is warm      Additional Data:     Labs:    Results from last 7 days   Lab Units 07/29/19  0608   WBC Thousand/uL 5 19   HEMOGLOBIN g/dL 9 3*   HEMATOCRIT % 30 0*   PLATELETS Thousands/uL 173   NEUTROS PCT % 85*   LYMPHS PCT % 9*   MONOS PCT % 5   EOS PCT % 0     Results from last 7 days   Lab Units 07/29/19  0608  07/27/19  0532   SODIUM mmol/L 143   < > 144   POTASSIUM mmol/L 3 9   < > 3 5   CHLORIDE mmol/L 110*   < > 113*   CO2 mmol/L 24   < > 21   BUN mg/dL 27*   < > 18   CREATININE mg/dL 1 09   < > 1 10   ANION GAP mmol/L 9   < > 10   CALCIUM mg/dL 10 5*   < > 9 4   ALBUMIN g/dL  --   --  2 9*   TOTAL BILIRUBIN mg/dL  --   --  0 64   ALK PHOS U/L  --   --  68   ALT U/L  --   --  18   AST U/L  --   --  13   GLUCOSE RANDOM mg/dL 154*   < > 94    < > = values in this interval not displayed  Results from last 7 days   Lab Units 07/23/19  1254   INR  1 03         Results from last 7 days   Lab Units 07/24/19  0504   HEMOGLOBIN A1C % 5 4     Results from last 7 days   Lab Units 07/29/19  0608 07/28/19  0601   PROCALCITONIN ng/ml 0 28* 0 33*           * I Have Reviewed All Lab Data Listed Above  * Additional Pertinent Lab Tests Reviewed: All Labs Within Last 24 Hours Reviewed    Imaging:    XR chest portable   Final Result by Simi Hendrickson DO (07/28 2666)      No acute cardiopulmonary disease  Workstation performed: [id number]         XR chest portable   Final Result by Anna Canales MD (07/26 3742)      No acute cardiopulmonary disease              Workstation performed: [id number] Recent Cultures (last 7 days):     Results from last 7 days   Lab Units 07/28/19  1701 07/27/19  1045   BLOOD CULTURE  No Growth at 24 hrs  No Growth at 24 hrs  Last 24 Hours Medication List:     Current Facility-Administered Medications:  acetaminophen 650 mg Oral Q6H PRN Gemma No MD   aspirin 81 mg Oral Daily Gemma No MD   atorvastatin 40 mg Oral QPM Gemma No MD   azithromycin 500 mg Oral Q24H Marek Sarmiento MD   doxepin 25 mg Oral HS Gemma No MD   gabapentin 300 mg Oral TID Gemma No MD   heparin (porcine) 5,000 Units Subcutaneous [facility] Gemma No MD   levalbuterol 1 25 mg Nebulization Q8H PRN Marek Sarmiento MD   levETIRAcetam 375 mg Oral Q12H [facility] Gemma No MD   lidocaine 1 patch Topical Daily Marek Sarmiento MD   lidocaine 1 patch Topical Daily Marek Sarmiento MD   losartan 50 mg Oral Daily Gemma No MD   [START ON 7/30/2019] methylPREDNISolone 16 mg Oral Daily Marek Sarmiento MD   Followed by       Albert Mota ON 7/31/2019] methylPREDNISolone 12 mg Oral Daily Marek Sarmiento MD   Followed by       Albert Mota ON 8/1/2019] methylPREDNISolone 8 mg Oral Daily Marek Sarmiento MD   Followed by       Albert Mota ON 8/2/2019] methylPREDNISolone 4 mg Oral Daily Marek Sarmiento MD   pantoprazole 20 mg Oral Early Morning Gemma No MD   sodium chloride 3 mL Nebulization Q8H PRN Marek Sarmiento MD        Today, Patient Was Seen By: Marek Sarmiento MD    ** Please Note: Dictation voice to text software may have been used in the creation of this document   **

## 2019-07-29 NOTE — ASSESSMENT & PLAN NOTE
No echocardiogram found in chart  Murmur known to the patient  Echocardiogram done this morning, follow results

## 2019-07-29 NOTE — PLAN OF CARE
Problem: Potential for Falls  Goal: Patient will remain free of falls  Description  INTERVENTIONS:  - Assess patient frequently for physical needs  -  Identify cognitive and physical deficits and behaviors that affect risk of falls  -  [place] fall precautions as indicated by assessment   - Educate patient/family on patient safety including physical limitations  - Instruct patient to call for assistance with activity based on assessment  - Modify environment to reduce risk of injury  - Consider OT/PT consult to assist with strengthening/mobility  Outcome: Progressing     Problem: Prexisting or High Potential for Compromised Skin Integrity  Goal: Skin integrity is maintained or improved  Description  INTERVENTIONS:  - Identify patients at risk for skin breakdown  - Assess and monitor skin integrity  - Assess and monitor nutrition and hydration status  - Monitor labs (i e  albumin)  - Assess for incontinence   - Turn and reposition patient  - Assist with mobility/ambulation  - Relieve pressure over bony prominences  - Avoid friction and shearing  - Provide appropriate hygiene as needed including keeping skin clean and dry  - Evaluate need for skin moisturizer/barrier cream  - Collaborate with interdisciplinary team (i e  Nutrition, Rehabilitation, etc )   - Patient/family teaching  Outcome: Progressing     Problem: Nutrition/Hydration-ADULT  Goal: Nutrient/Hydration intake appropriate for improving, restoring or maintaining nutritional needs  Description  Monitor and assess patient's nutrition/hydration status for malnutrition (ex- brittle hair, bruises, dry skin, pale skin and conjunctiva, muscle wasting, smooth red tongue, and disorientation)  Collaborate with interdisciplinary team and initiate plan and interventions as ordered  Monitor patient's weight and dietary intake as ordered or per policy  Utilize nutrition screening tool and intervene per policy   Determine patient's food preferences and provide high-protein, high-caloric foods as appropriate       INTERVENTIONS:  - Monitor oral intake, urinary output, labs, and treatment plans  - Assess nutrition and hydration status and recommend course of action  - Evaluate amount of meals eaten  - Assist patient with eating if necessary   - Allow adequate time for meals  - Recommend/ encourage appropriate diets, oral nutritional supplements, and vitamin/mineral supplements  - Order, calculate, and assess calorie counts as needed  - Recommend, monitor, and adjust tube feedings and TPN/PPN based on assessed needs  - Assess need for intravenous fluids  - Provide specific nutrition/hydration education as appropriate  - Include patient/family/caregiver in decisions related to nutrition  Outcome: Progressing     Problem: PAIN - ADULT  Goal: Verbalizes/displays adequate comfort level or baseline comfort level  Description  Interventions:  - Encourage patient to monitor pain and request assistance  - Assess pain using appropriate pain scale  - Administer analgesics based on type and severity of pain and evaluate response  - Implement non-pharmacological measures as appropriate and evaluate response  - Consider cultural and social influences on pain and pain management  - Notify physician/advanced practitioner if interventions unsuccessful or patient reports new pain  Outcome: Progressing     Problem: INFECTION - ADULT  Goal: Absence or prevention of progression during hospitalization  Description  INTERVENTIONS:  - Assess and monitor for signs and symptoms of infection  - Monitor lab/diagnostic results  - Monitor all insertion sites, i e  indwelling lines, tubes, and drains  - Monitor endotracheal (as able) and nasal secretions for changes in amount and color  - [place] appropriate cooling/warming therapies per order  - Administer medications as ordered  - Instruct and encourage patient and family to use good hand hygiene technique  - Identify and instruct in appropriate isolation precautions for identified infection/condition  Outcome: Progressing  Goal: Absence of fever/infection during neutropenic period  Description  INTERVENTIONS:  - Monitor WBC  - Implement neutropenic guidelines  Outcome: Progressing     Problem: SAFETY ADULT  Goal: Maintain or return to baseline ADL function  Description  INTERVENTIONS:  -  Assess patient's ability to carry out ADLs; assess patient's baseline for ADL function and identify physical deficits which impact ability to perform ADLs (bathing, care of mouth/teeth, toileting, grooming, dressing, etc )  - Assess/evaluate cause of self-care deficits   - Assess range of motion  - Assess patient's mobility; develop plan if impaired  - Assess patient's need for assistive devices and provide as appropriate  - Encourage maximum independence but intervene and supervise when necessary  ¯ Involve family in performance of ADLs  ¯ Assess for home care needs following discharge   ¯ Request OT consult to assist with ADL evaluation and planning for discharge  ¯ Provide patient education as appropriate  Outcome: Progressing  Goal: Maintain or return mobility status to optimal level  Description  INTERVENTIONS:  - Assess patient's baseline mobility status (ambulation, transfers, stairs, etc )    - Identify cognitive and physical deficits and behaviors that affect mobility  - Identify mobility aids required to assist with transfers and/or ambulation (gait belt, sit-to-stand, lift, walker, cane, etc )  - [place] fall precautions as indicated by assessment  - Record patient progress and toleration of activity level on Mobility SBAR; progress patient to next Phase/Stage  - Instruct patient to call for assistance with activity based on assessment  - Request Rehabilitation consult to assist with strengthening/weightbearing, etc   Outcome: Progressing     Problem: DISCHARGE PLANNING  Goal: Discharge to home or other facility with appropriate resources  Description  INTERVENTIONS:  - Identify barriers to discharge w/patient and caregiver  - Arrange for needed discharge resources and transportation as appropriate  - Identify discharge learning needs (meds, wound care, etc )  - Arrange for interpretive services to assist at discharge as needed  - Refer to Case Management Department for coordinating discharge planning if the patient needs post-hospital services based on physician/advanced practitioner order or complex needs related to functional status, cognitive ability, or social support system  Outcome: Progressing     Problem: Knowledge Deficit  Goal: Patient/family/caregiver demonstrates understanding of disease process, treatment plan, medications, and discharge instructions  Description  Complete learning assessment and assess knowledge base    Interventions:  - Provide teaching at level of understanding  - Provide teaching via preferred learning methods  Outcome: Progressing

## 2019-07-29 NOTE — ASSESSMENT & PLAN NOTE
Related temperature spike on 07/27  No fever overnight  Chest x-ray and UA negative for infection this admission  Procalcitonin borderline elevated but trending down  0 33>> 0 26  Blood cultures negative for 24 hours  Incentive spirometry  Trend temperature curve  Short course of azithromycin p o  As mentioned

## 2019-07-29 NOTE — PHYSICAL THERAPY NOTE
Physical Therapy Progress Note     07/29/19 1457   Pain Assessment   Pain Assessment 0-10   Pain Score 2   Pain Location Knee   Pain Orientation Left   Restrictions/Precautions   Other Precautions Chair Alarm; Fall Risk;Pain;Hard of hearing   General   Family/Caregiver Present Yes  (son)   Cognition   Overall Cognitive Status WFL   Arousal/Participation Alert; Cooperative   Bed Mobility   Supine to Sit 4  Minimal assistance   Additional items Assist x 1;LE management;Verbal cues   Transfers   Sit to Stand 3  Moderate assistance   Additional items Assist x 1;Verbal cues   Stand to Sit 3  Moderate assistance   Additional items Assist x 1   Ambulation/Elevation   Gait pattern   (slow, short step length)   Gait Assistance   (min to mod assist)   Additional items Assist x 1;Verbal cues  (Chair follow)   Assistive Device Rolling walker   Distance 20 feet x 2   Balance   Static Sitting Fair -   Static Standing Fair -   Dynamic Standing Poor +   Ambulatory Poor   Endurance Deficit   Endurance Deficit Yes   Endurance Deficit Description SOB and L knee pain   Activity Tolerance   Activity Tolerance Patient tolerated treatment well;Patient limited by pain   Nurse [address] to see per RN Neelam   Exercises   Hip Flexion Sitting;20 reps;AROM; Bilateral   Knee AROM Long Arc Quad Sitting;20 reps;AROM; Bilateral   Ankle Pumps Sitting;20 reps;Right;AROM   Assessment   Prognosis Fair   Problem List Decreased strength;Decreased endurance; Impaired balance;Decreased mobility; Decreased coordination;Pain; Impaired hearing; Impaired judgement   Assessment Pt is making slow but steady progress with the use of a rolling walker  Son present today  PCA present to assist with mobility and chair follow  Min to mod assist required with the use of a rolling walker  Limited by L knee pain and general weakness  SOB as well with activity however room air > 90%  Completed seated BLE exercises to conclude session    Chair alarm active post session  Pt would benefit from continued physical therapy to maximize functional mobility and safety  Goals   Patient Goals To get stronger   STG Expiration Date 08/05/19   Short Term Goal #1 1) Pt will complete bed mobility mod I to reduce burden of care  2) Pt will transfer mod I with RW to reduce burden of care  3) Pt will ambulate 25ft supervision with RW to increase functional mobility within the home  4) Pt will improve standing balance to fair to reduce risk of falls during upright mobility  5) Pt will increase LE strength to 4/5 to improve functional mobility  Treatment Day 1   Plan   Treatment/Interventions LE strengthening/ROM; Functional transfer training; Therapeutic exercise; Endurance training;Patient/family training;Bed mobility;Gait training;Spoke to nursing   Progress Progressing toward goals   PT Frequency   (3-5x/week)   Recommendation   Recommendation   (Rehab)   Equipment Recommended [place] [facility]in  ([facility])     Metta Salines, PTA

## 2019-07-29 NOTE — PLAN OF CARE
Problem: PHYSICAL THERAPY ADULT  Goal: Performs mobility at highest level of function for planned discharge setting  See evaluation for individualized goals  Description  Treatment/Interventions: Functional transfer training, LE strengthening/ROM, Therapeutic exercise, Endurance training, Patient/family training, Equipment eval/education, Bed mobility, Gait training, Spoke to nursing, OT  Equipment Recommended: Tommy Letters, Wheelchair       See flowsheet documentation for full assessment, interventions and recommendations  Outcome: Progressing  Note:   Prognosis: Fair  Problem List: Decreased strength, Decreased endurance, Impaired balance, Decreased mobility, Decreased coordination, Pain, Impaired hearing, Impaired judgement  Assessment: Pt is making slow but steady progress with the use of a rolling walker  Son present today  PCA present to assist with mobility and chair follow  Min to mod assist required with the use of a rolling walker  Limited by L knee pain and general weakness  SOB as well with activity however room air > 90%  Completed seated BLE exercises to conclude session  Chair alarm active post session  Pt would benefit from continued physical therapy to maximize functional mobility and safety  Barriers to Discharge: Decreased caregiver support  Barriers to Discharge Comments: lives alone  Recommendation: (Rehab)     PT - OK to Discharge: Yes    See flowsheet documentation for full assessment

## 2019-07-29 NOTE — SOCIAL WORK
Met with pt and pt's son Fermín Arevalo to discuss rehab choices  Fermín Arevalo states after reviewing list he prefers referrals to 1   [facility] and 2  [facility] referrals sent

## 2019-07-29 NOTE — ASSESSMENT & PLAN NOTE
Noted overnight acute respiratory insufficiency with wheezing  Patient is on room air this morning and breathing is comfortable but she has mild wheezing on exam   Chest x-ray obtained again which is negative for any acute per my edema or infection  Echocardiogram is pending  Patient looks euvolemic on exam   Procalcitonin down to 0 33>> 0 26  Continue 5 day course of p o  Azithromycin and Medrol Dosepak  Incentive spirometry

## 2019-07-29 NOTE — ASSESSMENT & PLAN NOTE
Likely early stages of dementia in setting of aphasia evidenced by mental status fluctuations and confusion  Evaluated by Neurology  Outpatient follow-up with Neurology Clinic

## 2019-07-29 NOTE — ASSESSMENT & PLAN NOTE
Along with reported "confusion"   We do EEG negative for seizures  The patient was loaded and continued with Keppra  Continue with low-dose Keppra per Neurology, appreciate Neurology input  Seizure precautions    RPR and MMA are normal

## 2019-07-30 VITALS
TEMPERATURE: 97.8 F | RESPIRATION RATE: 20 BRPM | HEART RATE: 95 BPM | SYSTOLIC BLOOD PRESSURE: 140 MMHG | OXYGEN SATURATION: 96 % | HEIGHT: 62 IN | DIASTOLIC BLOOD PRESSURE: 62 MMHG | BODY MASS INDEX: 23.74 KG/M2 | WEIGHT: 129 LBS

## 2019-07-30 PROBLEM — R50.9 FEVER: Status: RESOLVED | Noted: 2019-07-26 | Resolved: 2019-07-30

## 2019-07-30 PROBLEM — R06.89 ACUTE RESPIRATORY INSUFFICIENCY: Status: RESOLVED | Noted: 2019-07-28 | Resolved: 2019-07-30

## 2019-07-30 PROCEDURE — 99239 HOSP IP/OBS DSCHRG MGMT >30: CPT | Performed by: GENERAL PRACTICE

## 2019-07-30 RX ORDER — LEVALBUTEROL 1.25 MG/.5ML
1.25 SOLUTION, CONCENTRATE RESPIRATORY (INHALATION) EVERY 8 HOURS PRN
Refills: 0
Start: 2019-07-30 | End: 2019-10-08 | Stop reason: ALTCHOICE

## 2019-07-30 RX ORDER — ATORVASTATIN CALCIUM 40 MG/1
40 TABLET, FILM COATED ORAL EVERY EVENING
Refills: 0
Start: 2019-07-30 | End: 2019-09-09 | Stop reason: SDUPTHER

## 2019-07-30 RX ORDER — LIDOCAINE 50 MG/G
1 PATCH TOPICAL DAILY
Qty: 30 PATCH | Refills: 0
Start: 2019-07-31 | End: 2020-10-12

## 2019-07-30 RX ORDER — LEVETIRACETAM 750 MG/1
375 TABLET ORAL EVERY 12 HOURS SCHEDULED
Refills: 0
Start: 2019-07-30 | End: 2019-09-09 | Stop reason: SDUPTHER

## 2019-07-30 RX ORDER — AZITHROMYCIN 500 MG/1
500 TABLET, FILM COATED ORAL EVERY 24 HOURS
Qty: 3 TABLET | Refills: 0
Start: 2019-07-30 | End: 2019-08-02

## 2019-07-30 RX ORDER — SODIUM CHLORIDE FOR INHALATION 0.9 %
3 VIAL, NEBULIZER (ML) INHALATION EVERY 8 HOURS PRN
Refills: 0
Start: 2019-07-30 | End: 2019-08-23 | Stop reason: ALTCHOICE

## 2019-07-30 RX ORDER — METHYLPREDNISOLONE 4 MG/1
TABLET ORAL
Qty: 1 TABLET | Refills: 0
Start: 2019-07-31 | End: 2019-08-03

## 2019-07-30 RX ORDER — LIDOCAINE 50 MG/G
1 PATCH TOPICAL DAILY
Qty: 30 PATCH | Refills: 0
Start: 2019-07-31 | End: 2019-08-23 | Stop reason: ALTCHOICE

## 2019-07-30 RX ADMIN — AZITHROMYCIN 500 MG: 250 TABLET, FILM COATED ORAL at 11:50

## 2019-07-30 RX ADMIN — LEVETIRACETAM 375 MG: 750 TABLET ORAL at 08:46

## 2019-07-30 RX ADMIN — HEPARIN SODIUM 5000 UNITS: 5000 INJECTION INTRAVENOUS; SUBCUTANEOUS at 14:45

## 2019-07-30 RX ADMIN — LIDOCAINE 1 PATCH: 50 PATCH CUTANEOUS at 08:47

## 2019-07-30 RX ADMIN — GABAPENTIN 300 MG: 300 CAPSULE ORAL at 08:45

## 2019-07-30 RX ADMIN — LOSARTAN POTASSIUM 50 MG: 50 TABLET ORAL at 08:45

## 2019-07-30 RX ADMIN — METHYLPREDNISOLONE 16 MG: 16 TABLET ORAL at 08:51

## 2019-07-30 RX ADMIN — LIDOCAINE 1 PATCH: 50 PATCH CUTANEOUS at 08:46

## 2019-07-30 RX ADMIN — PANTOPRAZOLE SODIUM 20 MG: 40 TABLET, DELAYED RELEASE ORAL at 05:58

## 2019-07-30 RX ADMIN — ASPIRIN 81 MG 81 MG: 81 TABLET ORAL at 08:45

## 2019-07-30 RX ADMIN — GABAPENTIN 300 MG: 300 CAPSULE ORAL at 16:49

## 2019-07-30 RX ADMIN — HEPARIN SODIUM 5000 UNITS: 5000 INJECTION INTRAVENOUS; SUBCUTANEOUS at 05:58

## 2019-07-30 NOTE — SOCIAL WORK
CM was informed that pt is medically stable for dc today  CM spoke to Fransico Brown at [facility] who states pt is accepted and a bed is available today  CM called and spoke to pt's son Miranda Villarreal to discuss dc plan  CM informed Kunal that [facility] and [facility] are both able to accept  Miranda Cherelle states he prefers [facility]  CM discussed WC [facility] Hashimoto cost with Miranda Villarreal; he is agreeable to cost   CM arranged with [place] [facility] Idamae Hashimoto for a 4:30pm dc to [facility]  CM notified pt, pt's son, pt's bedside RN Alyssa Byers, and Zoraida at [facility] of dc time  Facility transfer form completed  Chart copy requested

## 2019-07-30 NOTE — RESTORATIVE TECHNICIAN NOTE
Restorative Specialist Mobility Note       Activity: Ambulate in room, Stand at bedside, Ambulate in marc, Bathroom privileges, Chair, Dangle(Educated/encouraged pt to ambulate with assistance 3-4 x's/day  Chair alarm on   Pt callbell, phone/tray within reach )     Assistive Device: Front wheel walker, Other (Comment)(Assist x1-2 with chair follow)       [employer] BS, Restorative Technician, [employer]

## 2019-07-31 NOTE — DISCHARGE SUMMARY
Discharge- Jeane Ill 10/4/1928, 80 y o  female MRN: [id number]    Unit/Bed#: [facility] 729-01 Encounter: [id number]    Primary Care Provider: Kenna Vargas DO   Date and time admitted to hospital: 7/25/2019 11:41 PM        Suspected Dementia  Assessment & Plan  Likely early stages of dementia in setting of aphasia evidenced by mental status fluctuations and confusion  Evaluated by Neurology  Outpatient follow-up with Neurology Clinic  Murmur, cardiac  Assessment & Plan  Echocardiogram unremarkable    Right shoulder pain  Assessment & Plan  Recent shoulder x-ray on 07/17 showed advanced degenerative changes with possible underlying rotator cuff injury and underlying joint effusion  Appreciate Orthopedic input, no need for surgical intervention  Outpatient follow-up  Lidocaine patch, aqua K, Tylenol for pain control  Continue home dose gabapentin  Peripheral neuropathy  Assessment & Plan  Continue Neurontin 300 mg t i d  Chronic kidney disease, stage II (mild)  Assessment & Plan  Creatinine between 1-1 3 at baseline  Benign essential hypertension  Assessment & Plan  Continue ARB    Mixed hyperlipidemia  Assessment & Plan  Continue Lipitor and Zetia  * Aphasia  Assessment & Plan  Along with reported "confusion"   EEG negative for seizures  The patient was loaded and continued with Keppra  Continue with low-dose Keppra per Neurology, appreciate Neurology input  Seizure precautions  RPR and MMA are normal       Acute respiratory insufficiencyresolved as of 7/30/2019  Assessment & Plan  Noted overnight acute respiratory insufficiency with wheezing  Patient is on room air this morning and breathing is comfortable but she has mild wheezing on exam   Chest x-ray obtained again which is negative for any acute pulm edema or infection  Echocardiogram is pending  Patient looks euvolemic on exam   Procalcitonin down to 0 33>> 0 26  Continue 5 day course of p o   Azithromycin and Medrol Dosepak  Incentive spirometry  Feverresolved as of 7/30/2019  Assessment & Plan  Related temperature spike on 07/27  No fever overnight  Chest x-ray and UA negative for infection this admission  Procalcitonin borderline elevated but trending down  0 33>> 0 26  Blood cultures negative  Incentive spirometry  Trend temperature curve  Short course of azithromycin p o  As mentioned  Discharging Physician / Practitioner: Ted Christianson DO  PCP: Galen Croft DO  Admission Date:   Admission Orders (From admission, onward)     Ordered        07/25/19 2355  Inpatient Admission  Once                   Discharge Date: 07/30/19    Resolved Problems  Date Reviewed: 7/30/2019          Resolved    Fever 7/30/2019     Resolved by  Ted Christianson DO    Acute respiratory insufficiency 7/30/2019     Resolved by  Ted Christianson DO          Consultations During Hospital Stay:  · Neuro  · Nutrition  · Ortho    Procedures Performed:   · none    Significant Findings / Test Results:   · See above    Incidental Findings:   · none     Test Results Pending at Discharge (will require follow up):   · none     Outpatient Tests Requested:  · BMP Thursday    Complications:  none    Reason for Admission: seizure needing VEEG    Hospital Course:     Kodak Lee is a 80 y o  female patient who originally presented to the hospital on 7/25/2019 due to seizure needing VEEG  No seizures noted but shymptoms improved on Keppra so Keppra continued  Please see above list of diagnoses and related plan for additional information       Condition at Discharge: stable     Discharge Day Visit / Exam:     Subjective:  No acute complaints  Vitals: Blood Pressure: 140/62 (07/30/19 1510)  Pulse: 95 (07/30/19 1510)  Temperature: 97 8 °F (36 6 °C) (07/30/19 1510)  Temp Source: Oral (07/25/19 2353)  Respirations: 20 (07/30/19 1510)  Height: 5' 2" (157 5 cm) (07/27/19 0100)  Weight - Scale: 58 5 kg (129 lb) (07/29/19 1644)  SpO2: 96 % (07/30/19 1510)  Exam:   Physical Exam   Constitutional: She is oriented to person, place, and time  No distress  HENT:   Head: Normocephalic and atraumatic  Eyes: Conjunctivae and EOM are normal    Neck: Normal range of motion  Neck supple  Cardiovascular: Normal rate and regular rhythm  Pulmonary/Chest: Effort normal and breath sounds normal  She has no wheezes  She has no rales  Abdominal: Soft  Bowel sounds are normal  She exhibits no distension  There is no tenderness  Musculoskeletal: Normal range of motion  She exhibits no edema  Neurological: She is alert and oriented to person, place, and time  Skin: Skin is warm and dry  She is not diaphoretic  Discussion with Family: no    Discharge instructions/Information to patient and family:   See after visit summary for information provided to patient and family  Provisions for Follow-Up Care:  See after visit summary for information related to follow-up care and any pertinent home health orders  Disposition:     Josué Benton at [facility]  to [facility]:   · Not Applicable to this Patient - Not Applicable to this Patient    Planned Readmission: no     Discharge Statement:  I spent 35 minutes discharging the patient  This time was spent on the day of discharge  I had direct contact with the patient on the day of discharge  Greater than 50% of the total time was spent examining patient, answering all patient questions, arranging and discussing plan of care with patient as well as directly providing post-discharge instructions  Additional time then spent on discharge activities  Discharge Medications:  See after visit summary for reconciled discharge medications provided to patient and family        ** Please Note: This note has been constructed using a voice recognition system **

## 2019-07-31 NOTE — ASSESSMENT & PLAN NOTE
Along with reported "confusion"   EEG negative for seizures  The patient was loaded and continued with Keppra  Continue with low-dose Keppra per Neurology, appreciate Neurology input  Seizure precautions    RPR and MMA are normal

## 2019-07-31 NOTE — ASSESSMENT & PLAN NOTE
Related temperature spike on 07/27  No fever overnight  Chest x-ray and UA negative for infection this admission  Procalcitonin borderline elevated but trending down  0 33>> 0 26  Blood cultures negative  Incentive spirometry  Trend temperature curve  Short course of azithromycin p o  As mentioned

## 2019-07-31 NOTE — ASSESSMENT & PLAN NOTE
Noted overnight acute respiratory insufficiency with wheezing  Patient is on room air this morning and breathing is comfortable but she has mild wheezing on exam   Chest x-ray obtained again which is negative for any acute pulm edema or infection  Echocardiogram is pending  Patient looks euvolemic on exam   Procalcitonin down to 0 33>> 0 26  Continue 5 day course of p o  Azithromycin and Medrol Dosepak  Incentive spirometry

## 2019-08-01 LAB
BACTERIA BLD CULT: NORMAL
BACTERIA BLD CULT: NORMAL

## 2019-08-19 ENCOUNTER — TELEPHONE (OUTPATIENT)
Dept: FAMILY MEDICINE CLINIC | Facility: CLINIC | Age: 84
End: 2019-08-19

## 2019-08-19 ENCOUNTER — TRANSITIONAL CARE MANAGEMENT (OUTPATIENT)
Dept: FAMILY MEDICINE CLINIC | Facility: CLINIC | Age: 84
End: 2019-08-19

## 2019-08-21 ENCOUNTER — TELEPHONE (OUTPATIENT)
Dept: FAMILY MEDICINE CLINIC | Facility: CLINIC | Age: 84
End: 2019-08-21

## 2019-08-21 DIAGNOSIS — R26.2 AMBULATORY DYSFUNCTION: Primary | ICD-10-CM

## 2019-08-21 NOTE — TELEPHONE ENCOUNTER
Salima from [address] called and asked if a script for a 2 wheeled walker can be faxed to [facility]   Her demographics need to be faxed along with it      HJR#[phone]

## 2019-08-23 ENCOUNTER — TELEPHONE (OUTPATIENT)
Dept: FAMILY MEDICINE CLINIC | Facility: CLINIC | Age: 84
End: 2019-08-23

## 2019-08-23 ENCOUNTER — OFFICE VISIT (OUTPATIENT)
Dept: FAMILY MEDICINE CLINIC | Facility: CLINIC | Age: 84
End: 2019-08-23
Payer: MEDICARE

## 2019-08-23 VITALS
DIASTOLIC BLOOD PRESSURE: 76 MMHG | SYSTOLIC BLOOD PRESSURE: 138 MMHG | BODY MASS INDEX: 26.54 KG/M2 | WEIGHT: 144.2 LBS | TEMPERATURE: 98 F | OXYGEN SATURATION: 97 % | HEIGHT: 62 IN | HEART RATE: 80 BPM

## 2019-08-23 DIAGNOSIS — F03.90 DEMENTIA WITHOUT BEHAVIORAL DISTURBANCE, UNSPECIFIED DEMENTIA TYPE (HCC): ICD-10-CM

## 2019-08-23 DIAGNOSIS — R26.2 AMBULATORY DYSFUNCTION: ICD-10-CM

## 2019-08-23 DIAGNOSIS — R47.01 APHASIA: Primary | ICD-10-CM

## 2019-08-23 DIAGNOSIS — I10 BENIGN ESSENTIAL HYPERTENSION: ICD-10-CM

## 2019-08-23 DIAGNOSIS — Z09 HOSPITAL DISCHARGE FOLLOW-UP: ICD-10-CM

## 2019-08-23 PROCEDURE — 99496 TRANSJ CARE MGMT HIGH F2F 7D: CPT | Performed by: FAMILY MEDICINE

## 2019-08-23 NOTE — PROGRESS NOTES
Assessment/Plan:     Diagnoses and all orders for this visit:    Aphasia  Comments:  CVA work up/seizure work up "negative but symptoms improved with Keppra  Orders:  -     Ambulatory referral to Neurology; Future    Dementia without behavioral disturbance, unspecified dementia type  -     Ambulatory referral to Neurology; Future    Benign essential hypertension    Hospital discharge follow-up  -     Ambulatory referral to Neurology; Future    Ambulatory dysfunction  Comments:  Recommend wheeled walker          Subjective:   Chief Complaint   Patient presents with    Transition of Care Management     [address], 07/23/2019-08/11/2019, Aphasia;     TCM Call (since 8/6/2019)     Date and time call was made  8/19/2019  8:57 AM    Hospital care reviewed  Records reviewed    Patient was hospitialized at  [address]    Date of Admission  07/23/19    Date of discharge  08/11/19    Diagnosis  Aphasia     Disposition  Rehabilitation center; Home    Were the patients medications reviewed and updated  Yes      TCM Call (since 8/6/2019)     Scheduled for follow up? Yes    Did you obtain your prescribed medications  Yes    Do you need help managing your prescriptions or medications  No    Is transportation to your appointment needed  No    I have advised the patient to call PCP with any new or worsening symptoms  Dina Langford Arrangements  Family members    Counseling  Family         Patient ID: Kendal Magana is a 80 y o  female  63-year-old female here for follow-up after admission to [facility] for suspected neurologic symptoms of aphasia questionable seizure  Workup negative for CVA/seizure but patient improved with Keppra  She was then transferred to TCU at [address]   patient initially was found at home by her son Mulugeta Laird to speak  Trauma workup negative  MRI brain was negative for acute stroke  Neurology raise the question of seizures    EEG did not reveal any definitive seizure but did reveal slowing consistent with encephalopathy  Cardiac echo unremarkable  Initial calcium level was elevated but then resolved after intravenous fluids  PTH was normal                               Patient in TCU from July 30th to August 9th  Improved with ambulation  Patient now back at home with follow-up PT OT    The following portions of the patient's history were reviewed and updated as appropriate: allergies, current medications, past family history, past medical history, past social history, past surgical history and problem list     Review of Systems   Constitutional: Positive for fatigue (trying to get back to baseline)  HENT: Negative for postnasal drip  Respiratory: Negative for shortness of breath  Cardiovascular: Negative for chest pain  Gastrointestinal: Negative  Genitourinary: Negative  Musculoskeletal: Positive for arthralgias (Knees remain symptomatic)  Neurological: Negative for light-headedness and headaches  Still unsteady on her feet  Does have a motorized chair in-home  Would benefit from regular wheeled walker  Psychiatric/Behavioral: Sleep disturbance:  intermittent  Objective:      /76   Pulse 80   Temp 98 °F (36 7 °C)   Ht 5' 2" (1 575 m)   Wt 65 4 kg (144 lb 3 2 oz)   SpO2 97%   BMI 26 37 kg/m²          Physical Exam   Constitutional: She appears well-developed and well-nourished  Pleasant 80-year-old female who is here for follow-up  Appears younger than her stated age with normal BMI  Mentation today is fairly clear  Patient has good recall of hospitalization  HENT:   Right Ear: External ear normal    Left Ear: External ear normal    Nose: Nose normal    Mouth/Throat: Oropharynx is clear and moist    Eyes: Pupils are equal, round, and reactive to light  Cardiovascular: Normal rate, regular rhythm and normal heart sounds  Pulmonary/Chest: Effort normal and breath sounds normal    Abdominal: Soft  Bowel sounds are normal    Musculoskeletal: She exhibits no edema  Gait assessed and slight shuffling wide-based using non wheeled walker  Somewhat status rigid movement   Neurological: She is alert  She displays normal reflexes  She exhibits normal muscle tone  Abnormal coordination: Patient not able to perform tandem  Psychiatric: Her behavior is normal  Her mood appears not anxious  Cognition and memory are impaired (Short-term)  Slightly flat affect  Patient has fairly good recall of hospital and post hospital events  Vitals reviewed

## 2019-08-23 NOTE — TELEPHONE ENCOUNTER
Damian Crabtree called from [address] she is a therapist and pt was in earlier they were asking for an extra home visit for pt tomorrow? To nurse to access medixcal changes and fill med planner  I asked Dr Ybarra she said ok to the extra nurse visit for tomorrow I ddi inform Damian Crabtree per Dr ybarra that everything was reviewed in full and no changes

## 2019-09-02 PROBLEM — N18.9 ACUTE RENAL FAILURE SUPERIMPOSED ON CHRONIC KIDNEY DISEASE (HCC): Status: RESOLVED | Noted: 2019-07-23 | Resolved: 2019-09-02

## 2019-09-02 PROBLEM — R41.0 CONFUSED: Status: RESOLVED | Noted: 2019-07-23 | Resolved: 2019-09-02

## 2019-09-02 PROBLEM — N17.9 ACUTE RENAL FAILURE SUPERIMPOSED ON CHRONIC KIDNEY DISEASE (HCC): Status: RESOLVED | Noted: 2019-07-23 | Resolved: 2019-09-02

## 2019-09-09 DIAGNOSIS — F32.A DEPRESSION, UNSPECIFIED DEPRESSION TYPE: ICD-10-CM

## 2019-09-09 DIAGNOSIS — E78.01 FAMILIAL HYPERCHOLESTEROLEMIA: ICD-10-CM

## 2019-09-09 DIAGNOSIS — E78.2 MIXED HYPERLIPIDEMIA: ICD-10-CM

## 2019-09-09 DIAGNOSIS — R47.01 APHASIA: ICD-10-CM

## 2019-09-10 RX ORDER — LEVETIRACETAM 750 MG/1
375 TABLET ORAL EVERY 12 HOURS SCHEDULED
Qty: 90 TABLET | Refills: 1 | Status: SHIPPED | OUTPATIENT
Start: 2019-09-10 | End: 2020-02-12 | Stop reason: ALTCHOICE

## 2019-09-10 RX ORDER — ATORVASTATIN CALCIUM 40 MG/1
40 TABLET, FILM COATED ORAL EVERY EVENING
Qty: 90 TABLET | Refills: 1 | Status: SHIPPED | OUTPATIENT
Start: 2019-09-10 | End: 2020-03-22

## 2019-09-10 RX ORDER — EZETIMIBE 10 MG/1
10 TABLET ORAL DAILY
Qty: 90 TABLET | Refills: 1 | Status: SHIPPED | OUTPATIENT
Start: 2019-09-10 | End: 2020-04-23 | Stop reason: SDUPTHER

## 2019-09-10 RX ORDER — DOXEPIN HYDROCHLORIDE 50 MG/1
50 CAPSULE ORAL
Qty: 90 CAPSULE | Refills: 0 | Status: SHIPPED | OUTPATIENT
Start: 2019-09-10 | End: 2019-12-12 | Stop reason: SDUPTHER

## 2019-09-20 DIAGNOSIS — I10 BENIGN ESSENTIAL HYPERTENSION: ICD-10-CM

## 2019-09-20 RX ORDER — TELMISARTAN 20 MG/1
20 TABLET ORAL DAILY
Qty: 90 TABLET | Refills: 3 | Status: SHIPPED | OUTPATIENT
Start: 2019-09-20 | End: 2020-10-12 | Stop reason: SDUPTHER

## 2019-10-08 ENCOUNTER — OFFICE VISIT (OUTPATIENT)
Dept: FAMILY MEDICINE CLINIC | Facility: CLINIC | Age: 84
End: 2019-10-08
Payer: MEDICARE

## 2019-10-08 DIAGNOSIS — Z23 ENCOUNTER FOR IMMUNIZATION: ICD-10-CM

## 2019-10-08 DIAGNOSIS — R47.01 APHASIA: ICD-10-CM

## 2019-10-08 DIAGNOSIS — F03.90 DEMENTIA WITHOUT BEHAVIORAL DISTURBANCE, UNSPECIFIED DEMENTIA TYPE (HCC): ICD-10-CM

## 2019-10-08 DIAGNOSIS — D50.9 IRON DEFICIENCY ANEMIA, UNSPECIFIED IRON DEFICIENCY ANEMIA TYPE: ICD-10-CM

## 2019-10-08 DIAGNOSIS — I10 BENIGN ESSENTIAL HYPERTENSION: Primary | ICD-10-CM

## 2019-10-08 DIAGNOSIS — F32.A DEPRESSION, UNSPECIFIED DEPRESSION TYPE: ICD-10-CM

## 2019-10-08 PROCEDURE — G0008 ADMIN INFLUENZA VIRUS VAC: HCPCS | Performed by: FAMILY MEDICINE

## 2019-10-08 PROCEDURE — 90662 IIV NO PRSV INCREASED AG IM: CPT | Performed by: FAMILY MEDICINE

## 2019-10-08 PROCEDURE — 99214 OFFICE O/P EST MOD 30 MIN: CPT | Performed by: FAMILY MEDICINE

## 2019-10-08 RX ORDER — DORZOLAMIDE HYDROCHLORIDE AND TIMOLOL MALEATE 20; 5 MG/ML; MG/ML
SOLUTION/ DROPS OPHTHALMIC
Refills: 3 | COMMUNITY
Start: 2019-09-25

## 2019-10-08 NOTE — PROGRESS NOTES
Assessment/Plan:     Diagnoses and all orders for this visit:    Benign essential hypertension    Depression, unspecified depression type    Dementia without behavioral disturbance, unspecified dementia type ([facility] [address] )    Iron deficiency anemia, unspecified iron deficiency anemia type    Aphasia    Encounter for immunization  -     influenza vaccine, [id number], high-dose, PF 0 5 mL (FLUZONE HIGH-DOSE)    Other orders  -     dorzolamide-timolol (COSOPT) 22 3-6 8 MG/ML ophthalmic solution; INSTILL 1 DROP IN [facility] EYE EVERY DAY  -     Pediatric Multiple Vit-C-FA (FLKHANHROCHELLECARLOS GUMMIES OMEGA-3 DHA PO); Take 1 tablet by mouth          Subjective:   Chief Complaint   Patient presents with    Follow-up     6 month followup and to review labs     Flu Vaccine      Patient ID: Aundrea Rosa is a 80 y o  female  Patient is a 80-year-old female who was previous schedule this as a 6 month follow-up but had interim admission to the hospital with CVA like symptoms aphasia  Patient had in-home PT which is completed  She feels that she is back to a good baseline  She does use walker  There has been no fall since hospital discharge  She has no complaints with regards to bowels and urine  Last CBC still shows anemia  Chemistry was stable improved creatinine  GFR 41  Patient is unsure what her doxepin bedtime doses  Currently we do have it as 50 mg  Documentation from short-term rehab does indicate that she may have been given 10 mg  She will check what dosage she has at home    The following portions of the patient's history were reviewed and updated as appropriate: allergies, current medications, past family history, past medical history, past social history, past surgical history and problem list     Review of Systems      Objective:      /78   Pulse 75   Temp 98 1 °F (36 7 °C) (Temporal)   Resp 16   Ht 5' 2" (1 575 m)   Wt 67 kg (147 lb 12 8 oz)   SpO2 98%   BMI 27 03 kg/m²          Physical Exam

## 2019-10-09 ENCOUNTER — TELEPHONE (OUTPATIENT)
Dept: FAMILY MEDICINE CLINIC | Facility: CLINIC | Age: 84
End: 2019-10-09

## 2019-10-09 NOTE — TELEPHONE ENCOUNTER
Okay   Let the patient know that the doxepin that was filled at [facility] was 50 mg  I would like her to have the doxepin bottle in front of her to confirm that what she has does say 10 mg on it? ??

## 2019-10-09 NOTE — TELEPHONE ENCOUNTER
Spoke w/ pt and she states that when she was in rehab they had her taking Doxepin 10 mg 1 tablet QHS  Mary Ellen Nash  Her current bottle of Doxepin is 50 mg 1 tablet QHS

## 2019-10-09 NOTE — TELEPHONE ENCOUNTER
Sandy Reyes called the office on behalf of his mother  regarding a neurologist referral that you placed 1 month ago pt's son was asking was this mailed to pt's home I informed him I can not gurantee that was done  Dr Ybarra pt's referral was discontinued can we place another one? Pt's son stated should his mom received paper work form neurology?       Pt's son Kunal's number is [phone]

## 2019-10-09 NOTE — TELEPHONE ENCOUNTER
Can we then confirm with [facility] pharmacy what MG they dispensed? It is it is documented in her chart that 50 mg of the doxepin is but we are prescribing

## 2019-10-09 NOTE — TELEPHONE ENCOUNTER
This is absolutely true  I did review records from [facility] and she was on doxepin 10 mg  They probably wanted her to be on the lowest dose to avoid higher chance of sedation/drowsiness/dizziness/unsteadiness  Ultimately it always is a good idea to use the lowest dose possible  The doxepin is taken at bedtime historically for her to help her sleep  It really would be up to her if she wants to try lower than the 50 mg that she currently has

## 2019-10-09 NOTE — TELEPHONE ENCOUNTER
The follow-up with Neurology was recommended at the time of discharge from the hospital   That is routine policy since they did evaluate the patient during that hospitalization  It is not an absolute must that the patient must follow up with Neurology  Patient seems to be doing fairly stable on her current medical regimen had and has not had any negative or recurrent symptoms    If the son feels she is also doing relatively well then there is no need to see the neurologist

## 2019-10-17 ENCOUNTER — TELEPHONE (OUTPATIENT)
Dept: FAMILY MEDICINE CLINIC | Facility: CLINIC | Age: 84
End: 2019-10-17

## 2019-10-17 VITALS
RESPIRATION RATE: 16 BRPM | SYSTOLIC BLOOD PRESSURE: 132 MMHG | BODY MASS INDEX: 27.2 KG/M2 | HEART RATE: 75 BPM | HEIGHT: 62 IN | WEIGHT: 147.8 LBS | OXYGEN SATURATION: 98 % | TEMPERATURE: 98.1 F | DIASTOLIC BLOOD PRESSURE: 78 MMHG

## 2019-10-17 PROBLEM — D50.9 IRON DEFICIENCY ANEMIA: Status: ACTIVE | Noted: 2019-10-17

## 2019-10-17 NOTE — TELEPHONE ENCOUNTER
----- Message from Jasmyn Canchola DO sent at [phone]  8:37 AM EDT -----  Regarding: Supplement  Based on the patient's last blood work will need to replace with some additional iron supplement    I recommend daily Flintstones chewable WITH IRON

## 2019-10-21 DIAGNOSIS — K21.9 GASTROESOPHAGEAL REFLUX DISEASE WITHOUT ESOPHAGITIS: ICD-10-CM

## 2019-10-21 RX ORDER — OMEPRAZOLE 20 MG/1
20 CAPSULE, DELAYED RELEASE ORAL DAILY
Qty: 90 CAPSULE | Refills: 1 | Status: SHIPPED | OUTPATIENT
Start: 2019-10-21 | End: 2020-04-23 | Stop reason: SDUPTHER

## 2019-12-12 DIAGNOSIS — F32.A DEPRESSION, UNSPECIFIED DEPRESSION TYPE: ICD-10-CM

## 2019-12-12 RX ORDER — DOXEPIN HYDROCHLORIDE 50 MG/1
50 CAPSULE ORAL
Qty: 90 CAPSULE | Refills: 0 | Status: SHIPPED | OUTPATIENT
Start: 2019-12-12 | End: 2020-03-22

## 2019-12-20 ENCOUNTER — APPOINTMENT (EMERGENCY)
Dept: RADIOLOGY | Facility: HOSPITAL | Age: 84
DRG: 243 | End: 2019-12-20
Payer: MEDICARE

## 2019-12-20 ENCOUNTER — HOSPITAL ENCOUNTER (INPATIENT)
Facility: HOSPITAL | Age: 84
LOS: 4 days | Discharge: NURSING, REHAB OR LONG-TERM CARE | DRG: 243 | End: 2019-12-24
Attending: EMERGENCY MEDICINE | Admitting: INTERNAL MEDICINE
Payer: MEDICARE

## 2019-12-20 ENCOUNTER — OFFICE VISIT (OUTPATIENT)
Dept: FAMILY MEDICINE CLINIC | Facility: CLINIC | Age: 84
End: 2019-12-20
Payer: MEDICARE

## 2019-12-20 VITALS
OXYGEN SATURATION: 97 % | SYSTOLIC BLOOD PRESSURE: 160 MMHG | TEMPERATURE: 97.9 F | BODY MASS INDEX: 27.23 KG/M2 | DIASTOLIC BLOOD PRESSURE: 50 MMHG | HEART RATE: 44 BPM | WEIGHT: 148 LBS | HEIGHT: 62 IN

## 2019-12-20 DIAGNOSIS — I45.9 HEART BLOCK: Primary | ICD-10-CM

## 2019-12-20 DIAGNOSIS — I44.1 2ND DEGREE AV BLOCK: ICD-10-CM

## 2019-12-20 DIAGNOSIS — R00.1 BRADYCARDIA: Primary | ICD-10-CM

## 2019-12-20 DIAGNOSIS — I50.9 HEART FAILURE (HCC): ICD-10-CM

## 2019-12-20 DIAGNOSIS — I10 ESSENTIAL HYPERTENSION: ICD-10-CM

## 2019-12-20 PROBLEM — N18.9 ACUTE KIDNEY INJURY SUPERIMPOSED ON CHRONIC KIDNEY DISEASE (HCC): Status: ACTIVE | Noted: 2019-07-23

## 2019-12-20 PROBLEM — I50.33 ACUTE ON CHRONIC DIASTOLIC CONGESTIVE HEART FAILURE (HCC): Status: ACTIVE | Noted: 2019-12-20

## 2019-12-20 PROBLEM — N17.9 ACUTE KIDNEY INJURY SUPERIMPOSED ON CHRONIC KIDNEY DISEASE (HCC): Status: ACTIVE | Noted: 2019-07-23

## 2019-12-20 LAB
ALBUMIN SERPL BCP-MCNC: 4.2 G/DL (ref 3.5–5)
ALP SERPL-CCNC: 217 U/L (ref 46–116)
ALT SERPL W P-5'-P-CCNC: 164 U/L (ref 12–78)
ANION GAP SERPL CALCULATED.3IONS-SCNC: 13 MMOL/L (ref 4–13)
AST SERPL W P-5'-P-CCNC: 67 U/L (ref 5–45)
BASOPHILS # BLD AUTO: 0.02 THOUSANDS/ΜL (ref 0–0.1)
BASOPHILS NFR BLD AUTO: 0 % (ref 0–1)
BILIRUB DIRECT SERPL-MCNC: 0.27 MG/DL (ref 0–0.2)
BILIRUB SERPL-MCNC: 0.81 MG/DL (ref 0.2–1)
BUN SERPL-MCNC: 37 MG/DL (ref 5–25)
CALCIUM SERPL-MCNC: 10.5 MG/DL (ref 8.3–10.1)
CHLORIDE SERPL-SCNC: 110 MMOL/L (ref 100–108)
CO2 SERPL-SCNC: 22 MMOL/L (ref 21–32)
CREAT SERPL-MCNC: 1.31 MG/DL (ref 0.6–1.3)
EOSINOPHIL # BLD AUTO: 0.09 THOUSAND/ΜL (ref 0–0.61)
EOSINOPHIL NFR BLD AUTO: 1 % (ref 0–6)
ERYTHROCYTE [DISTWIDTH] IN BLOOD BY AUTOMATED COUNT: 15.6 % (ref 11.6–15.1)
GFR SERPL CREATININE-BSD FRML MDRD: 36 ML/MIN/1.73SQ M
GLUCOSE SERPL-MCNC: 113 MG/DL (ref 65–140)
HCT VFR BLD AUTO: 34.8 % (ref 34.8–46.1)
HGB BLD-MCNC: 10.6 G/DL (ref 11.5–15.4)
IMM GRANULOCYTES # BLD AUTO: 0.05 THOUSAND/UL (ref 0–0.2)
IMM GRANULOCYTES NFR BLD AUTO: 1 % (ref 0–2)
LYMPHOCYTES # BLD AUTO: 1.57 THOUSANDS/ΜL (ref 0.6–4.47)
LYMPHOCYTES NFR BLD AUTO: 23 % (ref 14–44)
MCH RBC QN AUTO: 28.7 PG (ref 26.8–34.3)
MCHC RBC AUTO-ENTMCNC: 30.5 G/DL (ref 31.4–37.4)
MCV RBC AUTO: 94 FL (ref 82–98)
MONOCYTES # BLD AUTO: 1.23 THOUSAND/ΜL (ref 0.17–1.22)
MONOCYTES NFR BLD AUTO: 18 % (ref 4–12)
NEUTROPHILS # BLD AUTO: 3.74 THOUSANDS/ΜL (ref 1.85–7.62)
NEUTS SEG NFR BLD AUTO: 57 % (ref 43–75)
NRBC BLD AUTO-RTO: 0 /100 WBCS
NT-PROBNP SERPL-MCNC: 7010 PG/ML
PLATELET # BLD AUTO: 120 THOUSANDS/UL (ref 149–390)
PMV BLD AUTO: 12.8 FL (ref 8.9–12.7)
POTASSIUM SERPL-SCNC: 4.1 MMOL/L (ref 3.5–5.3)
PROT SERPL-MCNC: 7.6 G/DL (ref 6.4–8.2)
RBC # BLD AUTO: 3.69 MILLION/UL (ref 3.81–5.12)
SODIUM SERPL-SCNC: 145 MMOL/L (ref 136–145)
TROPONIN I SERPL-MCNC: 0.03 NG/ML
WBC # BLD AUTO: 6.7 THOUSAND/UL (ref 4.31–10.16)

## 2019-12-20 PROCEDURE — 85049 AUTOMATED PLATELET COUNT: CPT | Performed by: NURSE PRACTITIONER

## 2019-12-20 PROCEDURE — 80076 HEPATIC FUNCTION PANEL: CPT | Performed by: EMERGENCY MEDICINE

## 2019-12-20 PROCEDURE — 80048 BASIC METABOLIC PNL TOTAL CA: CPT | Performed by: EMERGENCY MEDICINE

## 2019-12-20 PROCEDURE — 36415 COLL VENOUS BLD VENIPUNCTURE: CPT | Performed by: EMERGENCY MEDICINE

## 2019-12-20 PROCEDURE — 93000 ELECTROCARDIOGRAM COMPLETE: CPT | Performed by: NURSE PRACTITIONER

## 2019-12-20 PROCEDURE — 99215 OFFICE O/P EST HI 40 MIN: CPT | Performed by: NURSE PRACTITIONER

## 2019-12-20 PROCEDURE — 84484 ASSAY OF TROPONIN QUANT: CPT | Performed by: EMERGENCY MEDICINE

## 2019-12-20 PROCEDURE — 99285 EMERGENCY DEPT VISIT HI MDM: CPT | Performed by: EMERGENCY MEDICINE

## 2019-12-20 PROCEDURE — 85025 COMPLETE CBC W/AUTO DIFF WBC: CPT | Performed by: EMERGENCY MEDICINE

## 2019-12-20 PROCEDURE — 93005 ELECTROCARDIOGRAM TRACING: CPT

## 2019-12-20 PROCEDURE — 83880 ASSAY OF NATRIURETIC PEPTIDE: CPT | Performed by: EMERGENCY MEDICINE

## 2019-12-20 PROCEDURE — 71046 X-RAY EXAM CHEST 2 VIEWS: CPT

## 2019-12-20 PROCEDURE — 99285 EMERGENCY DEPT VISIT HI MDM: CPT

## 2019-12-20 RX ORDER — LEVETIRACETAM 750 MG/1
375 TABLET ORAL EVERY 12 HOURS SCHEDULED
Status: DISCONTINUED | OUTPATIENT
Start: 2019-12-20 | End: 2019-12-24 | Stop reason: HOSPADM

## 2019-12-20 RX ORDER — ASPIRIN 81 MG/1
81 TABLET, CHEWABLE ORAL DAILY
Status: DISCONTINUED | OUTPATIENT
Start: 2019-12-21 | End: 2019-12-24 | Stop reason: HOSPADM

## 2019-12-20 RX ORDER — HEPARIN SODIUM 5000 [USP'U]/ML
5000 INJECTION, SOLUTION INTRAVENOUS; SUBCUTANEOUS EVERY 8 HOURS SCHEDULED
Status: DISCONTINUED | OUTPATIENT
Start: 2019-12-20 | End: 2019-12-24 | Stop reason: HOSPADM

## 2019-12-20 RX ORDER — DOXEPIN HYDROCHLORIDE 50 MG/1
50 CAPSULE ORAL
Status: DISCONTINUED | OUTPATIENT
Start: 2019-12-20 | End: 2019-12-24 | Stop reason: HOSPADM

## 2019-12-20 RX ORDER — LOSARTAN POTASSIUM 25 MG/1
25 TABLET ORAL DAILY
Status: DISCONTINUED | OUTPATIENT
Start: 2019-12-21 | End: 2019-12-22

## 2019-12-20 RX ORDER — FUROSEMIDE 10 MG/ML
40 INJECTION INTRAMUSCULAR; INTRAVENOUS ONCE
Status: COMPLETED | OUTPATIENT
Start: 2019-12-20 | End: 2019-12-20

## 2019-12-20 RX ORDER — CHLORHEXIDINE GLUCONATE 0.12 MG/ML
15 RINSE ORAL EVERY 12 HOURS SCHEDULED
Status: DISCONTINUED | OUTPATIENT
Start: 2019-12-20 | End: 2019-12-24 | Stop reason: HOSPADM

## 2019-12-20 RX ORDER — HYDRALAZINE HYDROCHLORIDE 20 MG/ML
10 INJECTION INTRAMUSCULAR; INTRAVENOUS EVERY 4 HOURS PRN
Status: DISCONTINUED | OUTPATIENT
Start: 2019-12-20 | End: 2019-12-24 | Stop reason: HOSPADM

## 2019-12-20 RX ORDER — PANTOPRAZOLE SODIUM 40 MG/1
40 TABLET, DELAYED RELEASE ORAL
Status: DISCONTINUED | OUTPATIENT
Start: 2019-12-21 | End: 2019-12-24 | Stop reason: HOSPADM

## 2019-12-20 RX ORDER — ATORVASTATIN CALCIUM 40 MG/1
40 TABLET, FILM COATED ORAL EVERY EVENING
Status: DISCONTINUED | OUTPATIENT
Start: 2019-12-20 | End: 2019-12-24 | Stop reason: HOSPADM

## 2019-12-20 RX ORDER — DORZOLAMIDE HYDROCHLORIDE AND TIMOLOL MALEATE 20; 5 MG/ML; MG/ML
1 SOLUTION/ DROPS OPHTHALMIC DAILY
Status: DISCONTINUED | OUTPATIENT
Start: 2019-12-21 | End: 2019-12-24 | Stop reason: HOSPADM

## 2019-12-20 RX ORDER — GABAPENTIN 300 MG/1
300 CAPSULE ORAL 3 TIMES DAILY
Status: DISCONTINUED | OUTPATIENT
Start: 2019-12-20 | End: 2019-12-24 | Stop reason: HOSPADM

## 2019-12-20 RX ADMIN — HEPARIN SODIUM 5000 UNITS: 5000 INJECTION INTRAVENOUS; SUBCUTANEOUS at 22:48

## 2019-12-20 RX ADMIN — FUROSEMIDE 40 MG: 10 INJECTION, SOLUTION INTRAMUSCULAR; INTRAVENOUS at 20:59

## 2019-12-20 RX ADMIN — ATORVASTATIN CALCIUM 40 MG: 40 TABLET, FILM COATED ORAL at 22:48

## 2019-12-20 RX ADMIN — LEVETIRACETAM 375 MG: 750 TABLET, FILM COATED ORAL at 23:20

## 2019-12-20 RX ADMIN — HYDRALAZINE HYDROCHLORIDE 10 MG: 20 INJECTION INTRAMUSCULAR; INTRAVENOUS at 23:54

## 2019-12-20 RX ADMIN — GABAPENTIN 300 MG: 300 CAPSULE ORAL at 22:48

## 2019-12-20 RX ADMIN — DOXEPIN HYDROCHLORIDE 50 MG: 50 CAPSULE ORAL at 23:20

## 2019-12-20 NOTE — ED PROVIDER NOTES
History  Chief Complaint   Patient presents with    Shortness of Breath     Reports shortness of breath and wheezing for a couple days, had  ECG at PCP and was referred for bradycardia  79 YO female presents with SOB that has been present for the last 2 days  States this is constant, worse with exertion  She has noticed some wheezing  Pt states this began during the night 2 days prior, she woke with some chest discomfort which resolved with a cough  Since this time she has felt like she cannot get a deep breath in  Pt denies further chest pain, she has not had palpitations or lightheadedness  Pt does not some swelling in the B/L LE that began today  Pt denies similar symptoms in the past, she does not recall any cardiac issues  Pt was seen by her PCP today and sent to the ED due to a new finding of bradycardia with a 2nd degree, Mobitz 2 heart block  Pt denies CP/F/C/N/V/D/C, no dysuria, burning on urination or blood in urine  History provided by:  Patient   used: No    Shortness of Breath   Severity:  Moderate  Onset quality:  Gradual  Duration:  2 days  Timing:  Constant  Progression:  Waxing and waning  Chronicity:  New  Context: activity    Relieved by:  Nothing  Worsened by:  Exertion  Ineffective treatments:  None tried  Associated symptoms: wheezing    Associated symptoms: no abdominal pain, no chest pain, no fever, no headaches, no rash and no vomiting    Wheezing:     Severity:  Moderate    Onset quality:  Gradual    Duration:  2 days    Timing:  Intermittent    Progression:  Waxing and waning    Chronicity:  New      Prior to Admission Medications   Prescriptions Last Dose Informant Patient Reported?  Taking?   aspirin 81 MG tablet   Yes Yes   Sig: Take by mouth   atorvastatin (LIPITOR) 40 mg tablet   No Yes   Sig: Take 1 tablet (40 mg total) by mouth every evening   dorzolamide-timolol (COSOPT) 22 3-6 8 MG/ML ophthalmic solution   Yes Yes   Sig: INSTILL 1 DROP IN [facility] EYE EVERY DAY   doxepin (SINEquan) 50 mg capsule   No Yes   Sig: Take 1 capsule (50 mg total) by mouth daily at bedtime   ezetimibe (ZETIA) 10 mg tablet   No Yes   Sig: Take 1 tablet (10 mg total) by mouth daily   gabapentin (NEURONTIN) 300 mg capsule   No Yes   Sig: Take 1 capsule (300 mg total) by mouth 3 (three) times a day And may add an extra daily p r n    levETIRAcetam (KEPPRA) 750 mg tablet   No Yes   Sig: Take 0 5 tablets (375 mg total) by mouth every 12 (twelve) hours   lidocaine (LIDODERM) 5 %   No Yes   Sig: Apply 1 patch topically daily Remove & Discard patch within 12 hours or as directed by MD   omeprazole (PriLOSEC) 20 mg delayed release capsule   No Yes   Sig: Take 1 capsule (20 mg total) by mouth daily   telmisartan (MICARDIS) 20 MG tablet   No Yes   Sig: Take 1 tablet (20 mg total) by mouth daily      Facility-Administered Medications: None       Past Medical History:   Diagnosis Date    Depression     Gout     H/O vaginal hysterectomy     resolved-4/17/2015    Skin cancer        Past Surgical History:   Procedure Laterality Date    CATARACT EXTRACTION      TOTAL ABDOMINAL HYSTERECTOMY      with removal of both ovaries    VAGINAL HYSTERECTOMY      resolved-4/17/2015       Family History   Problem Relation Age of Onset    Heart attack Mother         MI    Heart attack Father         MI    Hypertension Sister     Stomach cancer Sister     Hypertension Brother      I have reviewed and agree with the history as documented  Social History     Tobacco Use    Smoking status: Never Smoker    Smokeless tobacco: Never Used   Substance Use Topics    Alcohol use: Not Currently    Drug use: No        Review of Systems   Constitutional: Negative for chills, fatigue and fever  HENT: Negative for dental problem  Eyes: Negative for visual disturbance  Respiratory: Positive for shortness of breath and wheezing  Cardiovascular: Negative for chest pain     Gastrointestinal: Negative for abdominal pain, diarrhea and vomiting  Genitourinary: Negative for dysuria and frequency  Musculoskeletal: Negative for arthralgias  Skin: Negative for rash  Neurological: Negative for dizziness, weakness, light-headedness and headaches  Psychiatric/Behavioral: Negative for agitation, behavioral problems and confusion  All other systems reviewed and are negative  Physical Exam  Physical Exam   Constitutional: She is oriented to person, place, and time  She appears well-developed and well-nourished  HENT:   Head: Normocephalic and atraumatic  Eyes: Pupils are equal, round, and reactive to light  EOM are normal    Neck: Normal range of motion  Cardiovascular: Normal rate, regular rhythm and normal heart sounds  Pulmonary/Chest: Effort normal  She has wheezes  Abdominal: Soft  Musculoskeletal: Normal range of motion  Trace B/L LE edema  Neurological: She is alert and oriented to person, place, and time  Skin: Skin is warm and dry  Psychiatric: She has a normal mood and affect  Her behavior is normal  Thought content normal    Nursing note and vitals reviewed        Vital Signs  ED Triage Vitals [12/20/19 1656]   Temperature Pulse Respirations Blood Pressure SpO2   97 8 °F (36 6 °C) (!) 39 (!) 28 (!) 208/79 96 %      Temp Source Heart Rate Source Patient Position - Orthostatic VS BP Location FiO2 (%)   Oral Monitor Sitting Right arm --      Pain Score       9           Vitals:    12/21/19 0900 12/21/19 1000 12/21/19 1100 12/21/19 1200   BP: 131/55 144/56 133/56 134/78   Pulse: (!) 40 (!) 36 (!) 36 (!) 40   Patient Position - Orthostatic VS:             Visual Acuity  Visual Acuity      Most Recent Value   L Pupil Size (mm)  3   R Pupil Size (mm)  3   L Pupil Shape  Round   R Pupil Shape  Round          ED Medications  Medications   aspirin chewable tablet 81 mg (81 mg Oral Given 12/21/19 0906)   atorvastatin (LIPITOR) tablet 40 mg (40 mg Oral Given 12/20/19 2248)   dorzolamide-timolol (COSOPT) 22 3-6 8 MG/ML ophthalmic solution 1 drop ( Both Eyes Canceled Entry 12/21/19 1034)   doxepin (SINEquan) capsule 50 mg (50 mg Oral Given 12/20/19 2320)   gabapentin (NEURONTIN) capsule 300 mg (300 mg Oral Given 12/21/19 0906)   levETIRAcetam (KEPPRA) tablet 375 mg (375 mg Oral Given 12/21/19 0906)   pantoprazole (PROTONIX) EC tablet 40 mg (40 mg Oral Given 12/21/19 0525)   losartan (COZAAR) tablet 25 mg (25 mg Oral Given 12/21/19 0906)   chlorhexidine (PERIDEX) 0 12 % oral rinse 15 mL (15 mL Swish & Spit Given 12/21/19 0906)   heparin (porcine) subcutaneous injection 5,000 Units (5,000 Units Subcutaneous Given 12/21/19 0525)   hydrALAZINE (APRESOLINE) injection 10 mg (10 mg Intravenous Given 12/20/19 2354)   furosemide (LASIX) injection 40 mg (40 mg Intravenous Given 12/20/19 2059)       Diagnostic Studies  Results Reviewed     Procedure Component Value Units Date/Time    NT-BNP PRO [[id number]]  (Abnormal) Collected:  12/20/19 1709    Lab Status:  Final result Specimen:  Blood from Arm, Left Updated:  12/20/19 1819     NT-proBNP 7,010 pg/mL     Troponin I [[id number]]  (Normal) Collected:  12/20/19 1709    Lab Status:  Final result Specimen:  Blood from Arm, Left Updated:  12/20/19 1800     Troponin I 0 03 ng/mL     Hepatic function panel [[id number]]  (Abnormal) Collected:  12/20/19 1709    Lab Status:  Final result Specimen:  Blood from Arm, Left Updated:  12/20/19 1758     Total Bilirubin 0 81 mg/dL      Bilirubin, Direct 0 27 mg/dL      Alkaline Phosphatase 217 U/L      AST 67 U/L       U/L      Total Protein 7 6 g/dL      Albumin 4 2 g/dL     Basic metabolic panel [[id number]]  (Abnormal) Collected:  12/20/19 1709    Lab Status:  Final result Specimen:  Blood from Arm, Left Updated:  12/20/19 1758     Sodium 145 mmol/L      Potassium 4 1 mmol/L      Chloride 110 mmol/L      CO2 22 mmol/L      ANION GAP 13 mmol/L      BUN 37 mg/dL      Creatinine 1 31 mg/dL      Glucose 113 mg/dL      Calcium 10 5 mg/dL      eGFR 36 ml/min/1 73sq m     Narrative:       National Kid[employer] Disease Foundation guidelines for Chronic Kidney Disease (CKD):     Stage 1 with normal or high GFR (GFR > 90 mL/min/1 73 square meters)    Stage 2 Mild CKD (GFR = 60-89 mL/min/1 73 square meters)    Stage 3A Moderate CKD (GFR = 45-59 mL/min/1 73 square meters)    Stage 3B Moderate CKD (GFR = 30-44 mL/min/1 73 square meters)    Stage 4 Severe CKD (GFR = 15-29 mL/min/1 73 square meters)    Stage 5 End Stage CKD (GFR <15 mL/min/1 73 square meters)  Note: GFR calculation is accurate only with a steady state creatinine    CBC and differential [[id number]]  (Abnormal) Collected:  12/20/19 1709    Lab Status:  Final result Specimen:  Blood from Arm, Left Updated:  12/20/19 1721     WBC 6 70 Thousand/uL      RBC 3 69 Million/uL      Hemoglobin 10 6 g/dL      Hematocrit 34 8 %      MCV 94 fL      MCH 28 7 pg      MCHC 30 5 g/dL      RDW 15 6 %      MPV 12 8 fL      Platelets 410 Thousands/uL      nRBC 0 /100 WBCs      Neutrophils Relative 57 %      Immat GRANS % 1 %      Lymphocytes Relative 23 %      Monocytes Relative 18 %      Eosinophils Relative 1 %      Basophils Relative 0 %      Neutrophils Absolute 3 74 Thousands/µL      Immature Grans Absolute 0 05 Thousand/uL      Lymphocytes Absolute 1 57 Thousands/µL      Monocytes Absolute 1 23 Thousand/µL      Eosinophils Absolute 0 09 Thousand/µL      Basophils Absolute 0 02 Thousands/µL                  XR chest 2 views   Final Result by Jong Fu MD (12/21 8657)      Mild cardiomegaly with trace right pleural effusion              Workstation performed: [id number]                    Procedures  ECG 12 Lead Documentation Only  Date/Time: 12/21/2019 12:52 PM  Performed by: Earlene Bucio MD  Authorized by: Earlene Bucio MD     ECG reviewed by me, the ED Provider: yes    Patient location:  ED  Previous ECG:     Previous ECG:  Compared to current    Comparison ECG info:  7/19    Similarity: Changes noted  Interpretation:     Interpretation: abnormal    Rate:     ECG rate:  38    ECG rate assessment: bradycardic    Rhythm:     Rhythm: A-V block    QRS:     QRS axis:  Normal    QRS intervals:  Normal  Conduction:     Conduction: abnormal      Abnormal conduction: 2nd degree (Mobitz 2)    ST segments:     ST segments:  Normal  T waves:     T waves: normal               ED Course                               MDM  Number of Diagnoses or Management Options  Heart block: new and requires workup  Heart failure [facility]): new and requires workup  Diagnosis management comments: 1  Mobitz 2 heart block - Pt with new onset heart block, high grade  Mild symptoms include trace LE edema, SOB and some wheezing, likely heart failure 2/2 heart block  Will check troponin, metabolic panel for electrolyte abnormalities and dehydration, CBC, CXR  Will speak with cardiology to determine need for emergent temporary pacemaker vs observation         Amount and/or Complexity of Data Reviewed  Clinical lab tests: ordered and reviewed  Tests in the radiology section of CPT®: ordered and reviewed  Obtain history from someone other than the patient: yes  Review and summarize past medical records: yes  Discuss the patient with other providers: yes  Independent visualization of images, tracings, or specimens: yes    Patient Progress  Patient progress: stable        Disposition  Final diagnoses:   Heart block   Heart failure ([address] )     Time reflects when diagnosis was documented in both MDM as applicable and the Disposition within this note     Time User Action Codes Description Comment    12/20/2019  7:31 PM Den Xiao [I45 9] Heart block     12/20/2019  7:31 PM Den CHAUDHRY Add [I50 9] Heart failure [facility])       ED Disposition     ED Disposition Condition Date/Time Comment    Admit Stable Fri Dec 20, 2019  7:31 PM Case was discussed with Critical Care and the patient's admission status was agreed to be Admission Status: inpatient status to the service of Dr Diane Mahmood   Follow-up Information    None         Current Discharge Medication List      CONTINUE these medications which have NOT CHANGED    Details   aspirin 81 MG tablet Take by mouth      atorvastatin (LIPITOR) 40 mg tablet Take 1 tablet (40 mg total) by mouth every evening  Qty: 90 tablet, Refills: 1    Associated Diagnoses: Aphasia      dorzolamide-timolol (COSOPT) 22 3-6 8 MG/ML ophthalmic solution INSTILL 1 DROP IN EACH EYE EVERY DAY  Refills: 3      doxepin (SINEquan) 50 mg capsule Take 1 capsule (50 mg total) by mouth daily at bedtime  Qty: 90 capsule, Refills: 0    Comments: New dose  Associated Diagnoses: Depression, unspecified depression type      ezetimibe (ZETIA) 10 mg tablet Take 1 tablet (10 mg total) by mouth daily  Qty: 90 tablet, Refills: 1    Associated Diagnoses: Familial hypercholesterolemia; Mixed hyperlipidemia      gabapentin (NEURONTIN) 300 mg capsule Take 1 capsule (300 mg total) by mouth 3 (three) times a day And may add an extra daily p r n  Qty: 360 capsule, Refills: 3    Associated Diagnoses: Idiopathic peripheral neuropathy      levETIRAcetam (KEPPRA) 750 mg tablet Take 0 5 tablets (375 mg total) by mouth every 12 (twelve) hours  Qty: 90 tablet, Refills: 1    Associated Diagnoses: Aphasia      lidocaine (LIDODERM) 5 % Apply 1 patch topically daily Remove & Discard patch within 12 hours or as directed by MD  Qty: 30 patch, Refills: 0    Associated Diagnoses: Right shoulder pain      omeprazole (PriLOSEC) 20 mg delayed release capsule Take 1 capsule (20 mg total) by mouth daily  Qty: 90 capsule, Refills: 1    Associated Diagnoses: Gastroesophageal reflux disease without esophagitis      telmisartan (MICARDIS) 20 MG tablet Take 1 tablet (20 mg total) by mouth daily  Qty: 90 tablet, Refills: 3    Comments: Discontinuing lisinopril  Associated Diagnoses: Benign essential hypertension           No discharge procedures on file      ED Provider  Electronically Signed by           Slime Glover MD  12/21/19 5436

## 2019-12-20 NOTE — PROGRESS NOTES
Assessment/Plan:    Bradycardia/ 2nd degree Heartblock   Patient found to have heart rate in the 30s to 40s, EKG shows 2nd degree heartblock  Patient symptomatic and short of breath  Patient's family member comfortable with driving her to ER  Patient refuses wheel chair  Patient and family educated she needs ER / Cardiac evaluation for heartblock, discussed associated symptoms and concerns/ potential treatment  Please call the office if you are experiencing any worsening of symptoms or no symptom improvement  Patient verbalizes understand and agrees with treatment plan  Diagnoses and all orders for this visit:    Bradycardia  -     Transfer to other facility  -     POCT ECG    2nd degree AV block  -     Transfer to other facility                Subjective:        Patient ID: Chicho Sidhu is a 80 y o  female  Chief Complaint   Patient presents with    Cough     with wheezing, runny nose, watery eyes, fatigue; symptoms started 2 days ago  took Benadryl this AM       Patient presents to office today for evaluation of cough wheezing runny nose watery eyes and fatigue for the past 2 days  She states she woke up Wednesday morning 4:00 a m  With shortness of breath and wheezing  She does not tolerate laying down due to shortness of breath  She has tried Claritin which was helpful  The following portions of the patient's history were reviewed and updated as appropriate: allergies, current medications, past family history, past social history and problem list     Review of Systems   Constitutional: Negative for chills and fever  HENT: Positive for congestion and rhinorrhea  Negative for sinus pressure and sinus pain  Eyes: Negative for discharge  Watery eyes   Respiratory: Positive for shortness of breath and wheezing  Negative for cough  Cardiovascular: Negative for chest pain  Gastrointestinal: Negative for constipation and diarrhea     Genitourinary: Negative for difficulty urinating  Musculoskeletal: Negative for joint swelling  Skin: Negative for rash  Neurological: Negative for headaches  Hematological: Negative for adenopathy  Psychiatric/Behavioral: The patient is not nervous/anxious  Objective:  /50 (BP Location: Left arm, Patient Position: Sitting, Cuff Size: Standard)   Pulse (!) 44   Temp 97 9 °F (36 6 °C) (Temporal)   Ht 5' 2" (1 575 m)   Wt 67 1 kg (148 lb)   SpO2 97%   BMI 27 07 kg/m²      Physical Exam   Constitutional: She is oriented to person, place, and time  She appears well-developed  No distress  HENT:   Head: Normocephalic and atraumatic  Right Ear: Hearing, tympanic membrane, external ear and ear canal normal    Left Ear: Hearing, tympanic membrane, external ear and ear canal normal    Nose: Nose normal    Mouth/Throat: Uvula is midline, oropharynx is clear and moist and mucous membranes are normal    Eyes: Conjunctivae and lids are normal  Right eye exhibits no discharge  Left eye exhibits no discharge  Neck: Neck supple  Cardiovascular: Regular rhythm  No extrasystoles are present  Bradycardia present  Murmur heard  Systolic murmur is present with a grade of 3/6  Pulmonary/Chest: Effort normal and breath sounds normal  No respiratory distress  She has no wheezes  Musculoskeletal: She exhibits no deformity  Neurological: She is alert and oriented to person, place, and time  Skin: Skin is warm and dry  She is not diaphoretic  Psychiatric: She has a normal mood and affect  Her speech is normal and behavior is normal  Judgment and thought content normal  Cognition and memory are normal    Nursing note and vitals reviewed  BMI Counseling: Body mass index is 27 07 kg/m²  Follow-up plan was not completed due to patient being in urgent or emergent medical situation             Current Outpatient Medications:     Ascorbic Acid (VITAMIN C) 500 MG CAPS, Take by mouth, Disp: , Rfl:     aspirin 81 MG tablet, Take by mouth, Disp: , Rfl:     atorvastatin (LIPITOR) 40 mg tablet, Take 1 tablet (40 mg total) by mouth every evening, Disp: 90 tablet, Rfl: 1    BIOTIN PO, Take by mouth, Disp: , Rfl:     Coenzyme Q10 (COQ-10) 10 MG CAPS, Take by mouth, Disp: , Rfl:     diclofenac sodium (VOLTAREN) 1 %, Apply 2 g topically 4 (four) times a day, Disp: 100 g, Rfl: 1    dorzolamide-timolol (COSOPT) 22 3-6 8 MG/ML ophthalmic solution, INSTILL 1 DROP IN EACH EYE EVERY DAY, Disp: , Rfl: 3    doxepin (SINEquan) 50 mg capsule, Take 1 capsule (50 mg total) by mouth daily at bedtime, Disp: 90 capsule, Rfl: 0    ezetimibe (ZETIA) 10 mg tablet, Take 1 tablet (10 mg total) by mouth daily, Disp: 90 tablet, Rfl: 1    Flaxseed, Linseed, (FLAX SEED OIL PO), Take by mouth, Disp: , Rfl:     gabapentin (NEURONTIN) 300 mg capsule, Take 1 capsule (300 mg total) by mouth 3 (three) times a day And may add an extra daily p r n , Disp: 360 capsule, Rfl: 3    levETIRAcetam (KEPPRA) 750 mg tablet, Take 0 5 tablets (375 mg total) by mouth every 12 (twelve) hours, Disp: 90 tablet, Rfl: 1    lidocaine (LIDODERM) 5 %, Apply 1 patch topically daily Remove & Discard patch within 12 hours or as directed by MD, Disp: 30 patch, Rfl: 0    MAGNESIUM CITRATE PO, Take by mouth, Disp: , Rfl:     Multiple Vitamins-Minerals (PRESERVISION AREDS 2+MULTI VIT PO), Take 1 capsule by mouth 2 (two) times a day, Disp: , Rfl:     omeprazole (PriLOSEC) 20 mg delayed release capsule, Take 1 capsule (20 mg total) by mouth daily, Disp: 90 capsule, Rfl: 1    Pediatric Multiple Vit-C-FA (FLINSTONES GUMMIES OMEGA-3 DHA PO), Take 1 tablet by mouth, Disp: , Rfl:     Plant Sterols and Stanols (CHOLESTOFF PLUS) 450 MG CAPS, Take 1 capsule by mouth daily, Disp: , Rfl:     Probiotic Product (PROBIOTIC-10) CAPS, Take by mouth, Disp: , Rfl:     telmisartan (MICARDIS) 20 MG tablet, Take 1 tablet (20 mg total) by mouth daily, Disp: 90 tablet, Rfl: 3  Allergies   Allergen Reactions    Fish-Derived Products      GI upset    Allopurinol Rash     Category:  Allergy;

## 2019-12-20 NOTE — PATIENT INSTRUCTIONS
Please proceed to ER for evaluation of bradycardia (slow heart rate)  Please call the office if you are experiencing any worsening of symptoms or no symptom improvement

## 2019-12-21 PROBLEM — G40.909 SEIZURE DISORDER (HCC): Status: ACTIVE | Noted: 2019-12-21

## 2019-12-21 LAB
ALBUMIN SERPL BCP-MCNC: 3.3 G/DL (ref 3.5–5)
ALP SERPL-CCNC: 173 U/L (ref 46–116)
ALT SERPL W P-5'-P-CCNC: 115 U/L (ref 12–78)
ANION GAP SERPL CALCULATED.3IONS-SCNC: 11 MMOL/L (ref 4–13)
AST SERPL W P-5'-P-CCNC: 40 U/L (ref 5–45)
ATRIAL RATE: 77 BPM
BILIRUB SERPL-MCNC: 0.61 MG/DL (ref 0.2–1)
BUN SERPL-MCNC: 39 MG/DL (ref 5–25)
CALCIUM SERPL-MCNC: 9.9 MG/DL (ref 8.3–10.1)
CHLORIDE SERPL-SCNC: 112 MMOL/L (ref 100–108)
CO2 SERPL-SCNC: 23 MMOL/L (ref 21–32)
CREAT SERPL-MCNC: 1.3 MG/DL (ref 0.6–1.3)
GFR SERPL CREATININE-BSD FRML MDRD: 36 ML/MIN/1.73SQ M
GLUCOSE SERPL-MCNC: 95 MG/DL (ref 65–140)
MAGNESIUM SERPL-MCNC: 1.9 MG/DL (ref 1.6–2.6)
P AXIS: 57 DEGREES
PLATELET # BLD AUTO: 80 THOUSANDS/UL (ref 149–390)
PMV BLD AUTO: 13.3 FL (ref 8.9–12.7)
POTASSIUM SERPL-SCNC: 3.6 MMOL/L (ref 3.5–5.3)
PROT SERPL-MCNC: 6.1 G/DL (ref 6.4–8.2)
QRS AXIS: -34 DEGREES
QRSD INTERVAL: 132 MS
QT INTERVAL: 564 MS
QTC INTERVAL: 448 MS
SODIUM SERPL-SCNC: 146 MMOL/L (ref 136–145)
T WAVE AXIS: 44 DEGREES
T4 FREE SERPL-MCNC: 1.46 NG/DL (ref 0.76–1.46)
TSH SERPL DL<=0.05 MIU/L-ACNC: 2.75 UIU/ML (ref 0.36–3.74)
VENTRICULAR RATE: 38 BPM

## 2019-12-21 PROCEDURE — NC001 PR NO CHARGE: Performed by: NURSE PRACTITIONER

## 2019-12-21 PROCEDURE — 99223 1ST HOSP IP/OBS HIGH 75: CPT | Performed by: INTERNAL MEDICINE

## 2019-12-21 PROCEDURE — 84443 ASSAY THYROID STIM HORMONE: CPT | Performed by: INTERNAL MEDICINE

## 2019-12-21 PROCEDURE — 93005 ELECTROCARDIOGRAM TRACING: CPT

## 2019-12-21 PROCEDURE — 80053 COMPREHEN METABOLIC PANEL: CPT | Performed by: NURSE PRACTITIONER

## 2019-12-21 PROCEDURE — 93010 ELECTROCARDIOGRAM REPORT: CPT | Performed by: INTERNAL MEDICINE

## 2019-12-21 PROCEDURE — 84439 ASSAY OF FREE THYROXINE: CPT | Performed by: INTERNAL MEDICINE

## 2019-12-21 PROCEDURE — 99222 1ST HOSP IP/OBS MODERATE 55: CPT | Performed by: INTERNAL MEDICINE

## 2019-12-21 PROCEDURE — 83735 ASSAY OF MAGNESIUM: CPT | Performed by: NURSE PRACTITIONER

## 2019-12-21 RX ADMIN — PANTOPRAZOLE SODIUM 40 MG: 40 TABLET, DELAYED RELEASE ORAL at 05:25

## 2019-12-21 RX ADMIN — GABAPENTIN 300 MG: 300 CAPSULE ORAL at 09:06

## 2019-12-21 RX ADMIN — LOSARTAN POTASSIUM 25 MG: 25 TABLET, FILM COATED ORAL at 09:06

## 2019-12-21 RX ADMIN — ASPIRIN 81 MG 81 MG: 81 TABLET ORAL at 09:06

## 2019-12-21 RX ADMIN — LEVETIRACETAM 375 MG: 750 TABLET, FILM COATED ORAL at 09:06

## 2019-12-21 RX ADMIN — ATORVASTATIN CALCIUM 40 MG: 40 TABLET, FILM COATED ORAL at 17:11

## 2019-12-21 RX ADMIN — GABAPENTIN 300 MG: 300 CAPSULE ORAL at 17:11

## 2019-12-21 RX ADMIN — HEPARIN SODIUM 5000 UNITS: 5000 INJECTION INTRAVENOUS; SUBCUTANEOUS at 05:25

## 2019-12-21 RX ADMIN — DOXEPIN HYDROCHLORIDE 50 MG: 50 CAPSULE ORAL at 21:11

## 2019-12-21 RX ADMIN — LEVETIRACETAM 375 MG: 750 TABLET, FILM COATED ORAL at 21:11

## 2019-12-21 RX ADMIN — GABAPENTIN 300 MG: 300 CAPSULE ORAL at 21:11

## 2019-12-21 RX ADMIN — HEPARIN SODIUM 5000 UNITS: 5000 INJECTION INTRAVENOUS; SUBCUTANEOUS at 14:27

## 2019-12-21 RX ADMIN — CHLORHEXIDINE GLUCONATE 0.12% ORAL RINSE 15 ML: 1.2 LIQUID ORAL at 09:06

## 2019-12-21 RX ADMIN — HEPARIN SODIUM 5000 UNITS: 5000 INJECTION INTRAVENOUS; SUBCUTANEOUS at 21:11

## 2019-12-21 RX ADMIN — CHLORHEXIDINE GLUCONATE 0.12% ORAL RINSE 15 ML: 1.2 LIQUID ORAL at 21:13

## 2019-12-21 NOTE — CONSULTS
ENCOUNTER DATE: 12/21/19 9:41 AM  PATIENT NAME: Gwyn Salvador   10/4/1928    [id number]  Age: 80 y o  Sex: female  AUTHOR: Ragini Briceño MD  PRIMARYCARE PHYSICIAN: Russell Eduardo DO  INPATIENT ATTENDING PHYSICIAN: Lyn Cheney MD  *-*-*-*-*-*-*-*-*-*-*-*-*-*-*-*-*-*-*-*-*-*-*-*-*-*-*-*-*-*-*-*-*-*-*-*-*-*-*-*-*-*-*-*-*-*-*-*-*-*-*-*-*-*-  REASON Sam Lucas:  Suspected Mobitz type 2 second-degree av block    *-*-*-*-*-*-*-*-*-*-*-*-*-*-*-*-*-*-*-*-*-*-*-*-*-*-*-*-*-*-*-*-*-*-*-*-*-*-*-*-*-*-*-*-*-*-*-*-*-*-*-*-*-*-  HISTORY OF PRESENT ILLNESS:  Patient is a very pleasant 80-year-old  female with medical history significant for:  1  Benign essential hypertension  2  Stage 2 chronic kidney disease  3  Mixed dyslipidemia  4  Ambulatory dysfunction with history of falls  5  History of gout  6  Mention of pulmonary hypertension, aortic valve regurgitation, mild mitral valve regurgitation, mitral annular calcification and pulmonary hypertension (2006)  7  Osteoarthritis  8  Previously noted right bundle-branch block and 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  History is obtained from patient herself and from patient's son, Blaise Calderón, over the phone  Apparently patient workup on Thursday with substernal chest pain but the pain was resolved after a few minutes  She was having a runny nose and shortness of breath  Patient's son saw her yesterday at her home and she appeared to be fatigued and had was wheezing  He called her PCP and took her to office where she was noted to be She was noted to be bradycardic and ECG showed second-degree AV block  She was advised to go to emergency room  She elected to go to their with her son driving  She was noted to be in a 2-1 heart block with wide QRS complexes and was transferred to [facility] for further management    Since coming to [address] [facility] she has been in 2-1 av block with occurrence of atrial fibrillation with slow ventricular response transiently  Currently she is sitting in chair and denies lightheadedness or dizziness  Her reported wheezing is also resolved  She was admitted in July 2019 to [facility] with a syncopal event  She was worked up for possible seizure and was placed on Keppra  Since that time she has had no recurrence of syncope  *-*-*-*-*-*-*-*-*-*-*-*-*-*-*-*-*-*-*-*-*-*-*-*-*-*-*-*-*-*-*-*-*-*-*-*-*-*-*-*-*-*-*-*-*-*-*-*-*-*-*-*-*-*  PAST MEDICAL HISTORY:   Past Medical History:   Diagnosis Date    Depression     Gout     H/O vaginal hysterectomy     resolved-4/17/2015    Skin cancer        PAST SURGICAL HISTORY:   Past Surgical History:   Procedure Laterality Date    CATARACT EXTRACTION      TOTAL ABDOMINAL HYSTERECTOMY      with removal of both ovaries    VAGINAL HYSTERECTOMY      resolved-4/17/2015       FAMILY HISTORY:  Family History   Problem Relation Age of Onset    Heart attack Mother         MI    Heart attack Father         MI    Hypertension Sister     Stomach cancer Sister     Hypertension Brother        SOCIAL HISTORY:  [unfilled]  Social History     Tobacco Use   Smoking Status Never Smoker   Smokeless Tobacco Never Used     Social History     Substance and Sexual Activity   Alcohol Use Not Currently     Social History     Substance and Sexual Activity   Drug Use No     [id number]    *-*-*-*-*-*-*-*-*-*-*-*-*-*-*-*-*-*-*-*-*-*-*-*-*-*-*-*-*-*-*-*-*-*-*-*-*-*-*-*-*-*-*-*-*-*-*-*-*-*-*-*-*-*  ALLERGIES:  Allergies   Allergen Reactions    Fish-Derived Products      GI upset    Allopurinol Rash     Category:  Allergy;      *-*-*-*-*-*-*-*-*-*-*-*-*-*-*-*-*-*-*-*-*-*-*-*-*-*-*-*-*-*-*-*-*-*-*-*-*-*-*-*-*-*-*-*-*-*-*-*-*-*-*-*-*-*  CURRENT HOSPITAL MEDICATIONS:     Current Facility-Administered Medications:     aspirin chewable tablet 81 mg, 81 mg, Oral, Daily, Juli Naas Spatzer, CRNP, 81 mg at 12/21/19 0906    atorvastatin (LIPITOR) tablet 40 mg, 40 mg, Oral, QPM, Anabella Hidalgo Spatzer, CRNP, 40 mg at 12/20/19 2248    chlorhexidine (PERIDEX) 0 12 % oral rinse 15 mL, 15 mL, Swish & Spit, Q12H [address], Kate Clever Spatzer, CRNP, 15 mL at 12/21/19 8699    dorzolamide-timolol (COSOPT) 22 3-6 8 MG/ML ophthalmic solution 1 drop, 1 drop, Both Eyes, Daily, Kate Clever Spatzer, CRNP    doxepin (SINEquan) capsule 50 mg, 50 mg, Oral, HS, Kate Clever Spatzer, CRNP, 50 mg at 12/20/19 2320    gabapentin (NEURONTIN) capsule 300 mg, 300 mg, Oral, TID, Kate Clever Spatzer, CRNP, 300 mg at 12/21/19 7116    heparin (porcine) subcutaneous injection 5,000 Units, 5,000 Units, Subcutaneous, Q8H [address], 5,000 Units at 12/21/19 0525 **AND** [COMPLETED] Platelet count, , , Once, Kate Clever Spatzer, CRNP    hydrALAZINE (APRESOLINE) injection 10 mg, 10 mg, Intravenous, Q4H PRN, Kate Clever Spatzer, CRNP, 10 mg at 12/20/19 2354    levETIRAcetam (KEPPRA) tablet 375 mg, 375 mg, Oral, Q12H [address], Kate Clever Spatzer, CRNP, 375 mg at 12/21/19 2330    losartan (COZAAR) tablet 25 mg, 25 mg, Oral, Daily, Kate Clever Spatzer, CRNP, 25 mg at 12/21/19 0442    pantoprazole (PROTONIX) EC tablet 40 mg, 40 mg, Oral, Early Morning, Kate Clever Spatzer, CRNP, 40 mg at 12/21/19 6427    *-*-*-*-*-*-*-*-*-*-*-*-*-*-*-*-*-*-*-*-*-*-*-*-*-*-*-*-*-*-*-*-*-*-*-*-*-*-*-*-*-*-*-*-*-*-*-*-*-*-*-*-*-*  REVIEW OF SYMPTOMS:    Positive for:  Fatigue, shortness of breath, now resolved  Negative for: All remaining as reviewed below and in HPI      SYSTEM SYMPTOMS REVIEWED:  Generalweight change, fever, night sweats  Respiratoryl Wheezing, shortness of breath, cough, URI symptoms, sputum, blood  Cardiovascularchest pain, syncope, dyspnea on exertion, edema, decline in exercise tolerance, claudication   Gastrointestinalpersistent vomiting, diarrhea, abdominal distention, blood in stool   Muscular or skeletaljoint pain or swelling   Neurologicheadaches, syncope, abnormal movement  Hematologichistory of easy bruising and bleeding   Endocrinethyroid enlargement, heat or cold intolerance, polyuria   Psychiatricanxiety, depression     *-*-*-*-*-*-*-*-*-*-*-*-*-*-*-*-*-*-*-*-*-*-*-*-*-*-*-*-*-*-*-*-*-*-*-*-*-*-*-*-*-*-*-*-*-*-*-*-*-*-*-*-*-*-  VITAL SIGNS:  Vitals:    12/21/19 0700 12/21/19 0750 12/21/19 0800 12/21/19 0900   BP: 116/58 115/50 131/52 131/55   Pulse: (!) 46 (!) 32 (!) 36 (!) 40   Resp: 20 18 21 (!) 32   Temp: (!) 97 3 °F (36 3 °C)      TempSrc: Temporal      SpO2: 95% 93% 94% 96%   Weight:       Height:         Weight (last 2 days)     Date/Time   Weight    12/21/19 0539   61 1 (134 7)    12/20/19 2216   63 6 (140 21)    12/20/19 1656   66 8 (147 27)           ,   Wt Readings from Last 3 Encounters:   12/21/19 61 1 kg (134 lb 11 2 oz)   12/20/19 67 1 kg (148 lb)   10/08/19 67 kg (147 lb 12 8 oz)    , Body mass index is 24 64 kg/m²  *-*-*-*-*-*-*-*-*-*-*-*-*-*-*-*-*-*-*-*-*-*-*-*-*-*-*-*-*-*-*-*-*-*-*-*-*-*-*-*-*-*-*-*-*-*-*-*-*-*-*-*-*-*-  PHYSICAL EXAM:  General Appearance:  Elderly female, sitting in chair, in no respiratory distress   Head, Eyes, ENT:    No obvious abnormality, moist mucous mebranes  Neck:   Supple, no carotid bruit or JVD   Back:     Symmetric, no curvature  Lungs:     Respirations unlabored  Clear to auscultation bilaterally, no wheeze rhonchi or rales noted  Chest wall:    No tenderness or deformity   Heart:    Regular rate and rhythm, 2/6 harsh systolic murmur along right upper sternal border   Abdomen:     Soft, non-tender, No obvious masses, or organomegaly   Extremities:   Extremities normal, no cyanosis or edema    Skin:   Skin color, texture, turgor normal, no rashes or lesions     *-*-*-*-*-*-*-*-*-*-*-*-*-*-*-*-*-*-*-*-*-*-*-*-*-*-*-*-*-*-*-*-*-*-*-*-*-*-*-*-*-*-*-*-*-*-*-*-*-*-*-*-*-*-  LABORATORY DATA:  I have personally reviewed pertinent labs      CMP:   Results from last 7 days   Lab Units 12/21/19  0500 12/20/19  1709   SODIUM mmol/L 146* 145   POTASSIUM mmol/L 3 6 4 1   CHLORIDE mmol/L 112* 110*   CO2 mmol/L 23 22   BUN mg/dL 39* 37*   CREATININE mg/dL 1 30 1 31*   CALCIUM mg/dL 9 9 10 5*   ALK PHOS U/L 173* 217*   ALT U/L 115* 164*   AST U/L 40 67*       Cardiac Profile:   Troponin I   Date Value Ref Range Status   12/20/2019 0 03 <=0 04 ng/mL Final     Comment:       [employer] Chemistry analyzer 99% cutoff is > 0 04 ng/mL in network labs     o cTnI 99% cutoff is useful only when applied to patients in the clinical setting of myocardial ischemia   o cTnI 99% cutoff should be interpreted in the context of clinical history, ECG findings and possibly cardiac imaging to establish correct diagnosis  o cTnI 99% cutoff may be suggestive but clearly not indicative of a coronary event without the clinical setting of myocardial ischemia      07/23/2019 0 17 (H) <=0 04 ng/mL Final     Comment:       [employer] Chemistry analyzer 99% cutoff is > 0 04 ng/mL in network labs     o cTnI 99% cutoff is useful only when applied to patients in the clinical setting of myocardial ischemia   o cTnI 99% cutoff should be interpreted in the context of clinical history, ECG findings and possibly cardiac imaging to establish correct diagnosis  o cTnI 99% cutoff may be suggestive but clearly not indicative of a coronary event without the clinical setting of myocardial ischemia  Results indicate test should be repeated on new specimen collected within 4-6 hours of the original   07/23/2019 0 16 (H) <=0 04 ng/mL Final     Comment:       S[employer]ens Chemistry analyzer 99% cutoff is > 0 04 ng/mL in network labs     o cTnI 99% cutoff is useful only when applied to patients in the clinical setting of myocardial ischemia   o cTnI 99% cutoff should be interpreted in the context of clinical history, ECG findings and possibly cardiac imaging to establish correct diagnosis  o cTnI 99% cutoff may be suggestive but clearly not indicative of a coronary event without the clinical setting of myocardial ischemia      Results indicate test should be repeated on new specimen collected within 4-6 hours of the original     CK-MB   Date Value Ref Range Status   07/23/2019 2 7 0 0 - 5 0 ng/mL Final     NT-proBNP   Date Value Ref Range Status   12/20/2019 7,010 (H) <450 pg/mL Final       CBC:   Results from last 7 days   Lab Units 12/20/19  2345 12/20/19  1709   WBC Thousand/uL  --  6 70   HEMOGLOBIN g/dL  --  10 6*   HEMATOCRIT %  --  34 8   PLATELETS Thousands/uL 80* 120*       PT/INR: No results found for: PT, INR,     Magnesium:   Results from last 7 days   Lab Units 12/21/19  0500   MAGNESIUM mg/dL 1 9       Phosphorous:       Microbiology:              *-*-*-*-*-*-*-*-*-*-*-*-*-*-*-*-*-*-*-*-*-*-*-*-*-*-*-*-*-*-*-*-*-*-*-*-*-*-*-*-*-*-*-*-*-*-*-*-*-*-*-*-*-*-  RADIOLOGY RESULTS:  Xr Chest 2 Views    Result Date: 12/21/2019  Impression: Mild cardiomegaly with trace right pleural effusion  Workstation performed: [id number]       *-*-*-*-*-*-*-*-*-*-*-*-*-*-*-*-*-*-*-*-*-*-*-*-*-*-*-*-*-*-*-*-*-*-*-*-*-*-*-*-*-*-*-*-*-*-*-*-*-*-*-*-*-*-  CURRENT ECG:  ECG 1: 1705 hours yesterday: Sinus rhythm with 2nd degree A-V block with 2:1 A-V conduction  Left anterior fascicular block, Right bundle branch block, voltage lvh noted    ECG 2 :  10:30 p m  Last night:  Sinus rhythm with 2:1 HR Block, RBBB+LAFB- type 2 second-degree av block    ECG 3:  5:53 A M  This morning:  Atrial fibrillation with slow ventricular response, heart rate 55 beats per minute, right bundle-branch block with left anterior hemiblock      Results for orders placed or performed during the hospital encounter of 12/20/19   ECG 12 lead   Result Value    Ventricular Rate 38    Atrial Rate 77    CA Interval     QRSD Interval 132    QT Interval 564    QTC Interval 448    P Axis 57    QRS Axis -34    T Wave Axis 44    Narrative    Sinus rhythm with 2nd degree A-V block with 2:1 A-V conduction  Left anterior fascicular block  Right bundle branch block  voltage lvh noted  Poor anterior R wave progression is noted  Abnormal ECG  When compared with ECG of 2019 22:46,  second degree av block is now present  HR is 34 bpm less    Confirmed by Arlen Sheikh ([id number]) on 2019 7:46:40 AM       ECHOCARDIOGRAM AND OTHER CARDIOLOGY RESULTS:  Results for orders placed during the hospital encounter of 19   Echo complete with contrast if indicated    Narrative [address]  [place], [address] [facility]  [phone]    Transthoracic Echocardiogram  2D, M-mode, Doppler, and Color Doppler    Study date:  2019    Patient: Ulises Yo  MR number: [id number]  Account number: [de-identified]  : 04-Oct-1928  Age: 80 years  Gender: Female  Status: Inpatient  Location: Bedside  Height: 62 in  Weight: 154 lb  BP: 161/ 92 mmHg    Indications: Abnormal EKG    Diagnoses: R94 31 - Abnormal electrocardiogram [ECG] [EKG]    Sonographer:  FAYE Bender  Primary Physician:  DEANDRA Ann  Referring Physician:  DEANDRA Ann  Group:  [facility]  Cardiology Fellow:  Bobbi Freeman MD  Interpreting Physician:  Franky Barnes MD    SUMMARY    LEFT VENTRICLE:  Systolic function was normal  Ejection fraction was estimated to be 60 %  There were no regional wall motion abnormalities  Wall thickness was moderately increased  There was moderate concentric hypertrophy  Doppler parameters were consistent with abnormal left ventricular relaxation (grade 1 diastolic dysfunction)  MITRAL VALVE:  There was marked annular calcification  There was mild regurgitation  AORTIC VALVE:  Transaortic velocity was increased due to valvular stenosis  There was mild stenosis  There was trace regurgitation  Estimated aortic valve area (by Vmax) was 1 66 cmï¾²  TRICUSPID VALVE:  There was trace regurgitation  AORTA:  There was mild dilatation of the ascending aorta  The ascending aortic AP dimension was 39 mm      IVC, HEPATIC VEINS:  The inferior vena cava was mildly dilated  HISTORY: PRIOR HISTORY: HTN,CKD    PROCEDURE: The procedure was performed at the bedside  This was a routine study  The transthoracic approach was used  The study included complete 2D imaging, M-mode, complete spectral Doppler, and color Doppler  The heart rate was 83 bpm,  at the start of the study  Image quality was adequate  LEFT VENTRICLE: Size was normal  Systolic function was normal  Ejection fraction was estimated to be 60 %  There were no regional wall motion abnormalities  Wall thickness was moderately increased  There was moderate concentric  hypertrophy  DOPPLER: Doppler parameters were consistent with abnormal left ventricular relaxation (grade 1 diastolic dysfunction)  VENTRICULAR SEPTUM: There was sigmoid septal appearance  RIGHT VENTRICLE: The size was normal  Systolic function was normal  Wall thickness was normal     LEFT ATRIUM: Size was normal     RIGHT ATRIUM: Size was normal     MITRAL VALVE: There was marked annular calcification  Valve structure was normal  There was normal leaflet separation  DOPPLER: The transmitral velocity was within the normal range  There was no evidence for stenosis  There was mild  regurgitation  AORTIC VALVE: The valve was trileaflet  Leaflets exhibited mildly to moderately increased thickness, mild to moderate calcification, and mildly reduced cuspal separation  DOPPLER: Transaortic velocity was increased due to valvular  stenosis  There was mild stenosis  There was trace regurgitation  TRICUSPID VALVE: The valve structure was normal  There was normal leaflet separation  DOPPLER: The transtricuspid velocity was within the normal range  There was no evidence for stenosis  There was trace regurgitation  Pulmonary artery  systolic pressure was within the normal range  PULMONIC VALVE: Leaflets exhibited normal thickness, no calcification, and normal cuspal separation  DOPPLER: The transpulmonic velocity was within the normal range   There was no significant regurgitation  PERICARDIUM: There was no pericardial effusion  A pericardial fat pad was present  AORTA: The root exhibited normal size  There was mild dilatation of the ascending aorta  SYSTEMIC VEINS: IVC: The inferior vena cava was mildly dilated  Respirophasic changes were normal     MEASUREMENT TABLES    2D MEASUREMENTS  LVOT   (Reference normals)  Diam   21 mm   (--)  Aorta   (Reference normals)  AAo AP diam   39 mm   (--)    DOPPLER MEASUREMENTS  LVOT   (Reference normals)  Peak karine   113 cm/s   (--)  Mean karine   81 cm/s   (--)  VTI   21 cm   (--)  Peak gradient   5 mmHg   (--)  Mean gradient   3 mmHg   (--)  Stroke vol   72 74 ml   (--)  Stroke index   0 47 ml/mï¾²   (--)  Aortic valve   (Reference normals)  Peak karine   233 cm/s   (--)  Mean karine   170 cm/s   (--)  VTI   49 cm   (--)  Peak gradient   25 7 mmHg   (--)  Obstr index, VTI   0 43    (--)  Valve area, VTI   1 49 cmï¾²   (--)  Area index, VTI   0 87 cmï¾²/mï¾²   (--)  Obstr index, Vmax   0 48    (--)  Valve area, Vmax   1 66 cmï¾²   (--)  Area index, Vmax   0 97 cmï¾²/mï¾²   (--)  Obstr index, Vmean   0 48    (--)  Valve area, Vmean   1 66 cmï¾²   (--)  Area index, Vmean   0 97 cmï¾²/mï¾²   (--)    SYSTEM MEASUREMENT TABLES    2D  %FS: 31 5 %  Ao Diam: 2 99 cm  EDV(Teich): 43 16 ml  EF(Teich): 60 72 %  ESV(Teich): 16 95 ml  IVSd: 1 44 cm  LA Area: 18 05 cm2  LA Diam: 3 32 cm  LVEDV MOD A4C: 72 34 ml  LVEF MOD A4C: 71 64 %  LVESV MOD A4C: 20 51 ml  LVIDd: 3 27 cm  LVIDs: 2 24 cm  LVLd A4C: 7 68 cm  LVLs A4C: 5 98 cm  LVPWd: 1 68 cm  RA Area: 16 31 cm2  RVIDd: 4 18 cm  SV MOD A4C: 51 83 ml  SV(Teich): 26 21 ml    CW  AV Env  Ti: 267 59 ms  AV VTI: 42 73 cm  AV Vmax: 2 26 m/s  AV Vmean: 1 6 m/s  AV maxP 39 mmHg  AV meanP 33 mmHg  HR: 162 66 BPM  MV VTI: 31 01 cm  MV Vmax: 1 42 m/s  MV Vmean: 0 84 m/s  MV maxP 06 mmHg  MV meanPG: 3 2 mmHg  TR Vmax: 2 5 m/s  TR maxP 93 mmHg    MM  TAPSE: 1 5 cm    PW  E': 0 06 m/s  E/E': 13 14  LVOT Env  Ti: 262 98 ms  LVOT VTI: 21 34 cm  LVOT Vmax: 1 13 m/s  LVOT Vmean: 0 81 m/s  LVOT maxP 1 mmHg  LVOT meanP 85 mmHg  MV A [place]h: 1 46 m/s  MV Dec [place]: 4 08 m/s2  MV DecT: 191 81 ms  MV E [place]: 0 78 m/s  MV E/A Ratio: 0 54  MV PHT: 55 62 ms  MVA By PHT: 3 96 cm2    [employer] Accredited Echocardiography [facility]    Prepared and electronically signed by    Mireya Lebron MD  Signed 2019 16:03:33       No results found for this or any previous visit  No results found for this or any previous visit  No results found for this or any previous visit  *-*-*-*-*-*-*-*-*-*-*-*-*-*-*-*-*-*-*-*-*-*-*-*-*-*-*-*-*-*-*-*-*-*-*-*-*-*-*-*-*-*-*-*-*-*-*-*-*-*-*-*-*-*-  CARDIOLOGY ASSESSMENT & PLAN:    1  Suspected high-degree AV block with underlying right bundle-branch block and left anterior hemiblock  2  Mild aortic valve stenosis  3  Grade 1 diastolic dysfunction with initial decompensated heart failure now with improved status  4  Mild acute kidney injury superimposed over chronic kidney disease  5  Hypertension and hypertensive heart disease    Reviewed patient's ECGs from previous admissions  Patient is currently in 2:1 heart block rhythm suggestive of Mobitz type 2 av block  Current rhythm appears to be progression of her previously noted AV consuelo and infranodal conduction disease  Currently she is asymptomatic and hemodynamically stable  I verified again with the patient and her son and she did not have any syncopal event yesterday  Reviewed her case with critical care and interventional cardiology attendings  At this time as she is asymptomatic, and blood pressure is stable we will withhold from putting of temporary pacemaker       - Continued close monitoring (may be made step-down but should remain in ICU)  Please place trabscutaneous pacer pads and temporary transcutaneous pacing arrangement next to her    If she develops any symptoms during the course of the day or night or if she is noted to have higher degree AV block or afib with ventricular rhythm or pauses, then emergency temporary pacemaker will be put in      - Check TSH and FT4   - Withhold Cosopt or consider alternatives, due to concern for bradycardia due timolol component of this medication   - Continue Losartan, ASA and atorvastatin  Furosemide on PRN basis  - Permanent pacemaker on Monday  NPO aftermidnight Donnell except meds w sips of water  - limited transthoracic echocardiogram on Monday      Plan of care discussed with ICU team         *-*-*-*-*-*-*-*-*-*-*-*-*-*-*-*-*-*-*-*-*-*-*-*-*-*-*-*-*-*-*-*-*-*-*-*-*-*-*-*-*-*-*-*-*-*-*-*-*-*-*-*-*-*-  SIGNATURES:   @DRY@   Ragini Briceño MD     CC:   DO Elisa Paez C*

## 2019-12-21 NOTE — ASSESSMENT & PLAN NOTE
· Admit to inpatient status in the ICU as her condition is expected to require greater than a 2 midnight stay  · Etiology is unclear  Possibly secondary to heart failure  No other recent history of illness  Pt does not take beta blockers  · Largely non-symptomatic  NO ischemia on CXR  Blood pressure is stable  · Consult cardiology    Check echo

## 2019-12-21 NOTE — ASSESSMENT & PLAN NOTE
Wt Readings from Last 3 Encounters:   12/20/19 63 6 kg (140 lb 3 4 oz)   12/20/19 67 1 kg (148 lb)   10/08/19 67 kg (147 lb 12 8 oz)       · Echo from 7/2019 shows EF of 60% with grade 1 diastolic dysfunction  Will recheck as above  · Diuresed well in response to single dose of IV Lasix 40mg    Monitor fluid status closely and diurese prn

## 2019-12-21 NOTE — PROGRESS NOTES
Progress Note - ICU Transfer to SD/MS tele/MS   Aundrea Rosa 80 y o  female MRN: [id number]  [facility]   Unit/Bed#: ICU 12 Encounter: [id number]    Code Status: Level 1 - Full Code  Referring Physician: Maverick Crowder  Accepting Physician: Bre George  _____________________________________________________________________    Reason for ICU/SD admission:  Mobitz type 2 heart block with bradycardia    History of Present Illness:  44-year-old female with a known past medical history significant for hypertension and diastolic congestive heart failure recently has been complaining of shortness of breath  On Friday she woke up not feeling well and had chest pain which resolved on its own  Her family took her to her primary care physician who checked an EKG and found the patient to be in Mobitz type 2 heart block  She was then referred to the emergency department [address] [facility] for further evaluation treatment  Summary of clinical course: The patient was admitted to the intensive care unit where she has remained asymptomatic  She is bradycardic in the 40 beats per minute range  She was deemed medically fit for transfer to telemetry floor  However a cardiology's request she will remain in the ICU as a level 2 step-down under medicines care        Consultants:  Cardiology  _____________________________________________________________________    Diagnostic Data:  CBC:  Results from last 7 days   Lab Units 12/20/19  2345 12/20/19  1709   WBC Thousand/uL  --  6 70   HEMOGLOBIN g/dL  --  10 6*   HEMATOCRIT %  --  34 8   PLATELETS Thousands/uL 80* 120*      CMP:   Lab Results   Component Value Date    SODIUM 146 (H) 12/21/2019    K 3 6 12/21/2019     (H) 12/21/2019    CO2 23 12/21/2019    BUN 39 (H) 12/21/2019    CREATININE 1 30 12/21/2019    CALCIUM 9 9 12/21/2019    AST 40 12/21/2019     (H) 12/21/2019    ALKPHOS 173 (H) 12/21/2019    EGFR 36 12/21/2019   ,   PT/INR: No results found for: PT, INR,   Magnesium: No components found for: MAG,  Phosphorous: No results found for: PHOS    ABG: No results found for: PHART, IIX7EUV, PO2ART, CKS7INA, O3EGCQOB, BEART, SOURCE,     Microbiology:  No recent microbiology    Imaging: I have personally reviewed the pertinent imaging studies on the PACS system  Chest x-ray done 20 December 2019 demonstrates mild cardiomegaly with trace of right pleural effusion    Cardiac/EKG/telemetry/Echo:   Results from last 7 days   Lab Units 12/20/19  1709   TROPONIN I ng/mL 0 03   NT-PRO BNP pg/mL 7,010*     Bradycardia  _____________________________________________________________________    Recent or scheduled procedures:  None possible pacemaker placement    Outstanding/pending diagnostics:  None     Mobilization Plan: Mobilize    Spoke with Dr Laura Fischer  regarding transfer  Please call [phone] with any questions or concerns  Portions of the record may have been created with voice recognition software  Occasional wrong word or "sound a like" substitutions may have occurred due to the inherent limitations of voice recognition software  Read the chart carefully and recognize, using context, where substitutions have occurred      DEANDRA Brock

## 2019-12-21 NOTE — PLAN OF CARE
Problem: Potential for Falls  Goal: Patient will remain free of falls  Description  INTERVENTIONS:  - Assess patient frequently for physical needs  -  Identify cognitive and physical deficits and behaviors that affect risk of falls    -  [place] fall precautions as indicated by assessment   - Educate patient/family on patient safety including physical limitations  - Instruct patient to call for assistance with activity based on assessment  - Modify environment to reduce risk of injury  - Consider OT/PT consult to assist with strengthening/mobility  Outcome: Progressing     Problem: Prexisting or High Potential for Compromised Skin Integrity  Goal: Skin integrity is maintained or improved  Description  INTERVENTIONS:  - Identify patients at risk for skin breakdown  - Assess and monitor skin integrity  - Assess and monitor nutrition and hydration status  - Monitor labs   - Assess for incontinence   - Turn and reposition patient  - Assist with mobility/ambulation  - Relieve pressure over bony prominences  - Avoid friction and shearing  - Provide appropriate hygiene as needed including keeping skin clean and dry  - Evaluate need for skin moisturizer/barrier cream  - Collaborate with interdisciplinary team   - Patient/family teaching  - Consider wound care consult   Outcome: Progressing     Problem: PAIN - ADULT  Goal: Verbalizes/displays adequate comfort level or baseline comfort level  Description  Interventions:  - Encourage patient to monitor pain and request assistance  - Assess pain using appropriate pain scale  - Administer analgesics based on type and severity of pain and evaluate response  - Implement non-pharmacological measures as appropriate and evaluate response  - Consider cultural and social influences on pain and pain management  - Notify physician/advanced practitioner if interventions unsuccessful or patient reports new pain  Outcome: Progressing     Problem: INFECTION - ADULT  Goal: Absence or prevention of progression during hospitalization  Description  INTERVENTIONS:  - Assess and monitor for signs and symptoms of infection  - Monitor lab/diagnostic results  - Monitor all insertion sites, i e  indwelling lines, tubes, and drains  - Monitor endotracheal if appropriate and nasal secretions for changes in amount and color  - [place] appropriate cooling/warming therapies per order  - Administer medications as ordered  - Instruct and encourage patient and family to use good hand hygiene technique  - Identify and instruct in appropriate isolation precautions for identified infection/condition  Outcome: Progressing  Goal: Absence of fever/infection during neutropenic period  Description  INTERVENTIONS:  - Monitor WBC    Outcome: Progressing     Problem: SAFETY ADULT  Goal: Maintain or return to baseline ADL function  Description  INTERVENTIONS:  -  Assess patient's ability to carry out ADLs; assess patient's baseline for ADL function and identify physical deficits which impact ability to perform ADLs (bathing, care of mouth/teeth, toileting, grooming, dressing, etc )  - Assess/evaluate cause of self-care deficits   - Assess range of motion  - Assess patient's mobility; develop plan if impaired  - Assess patient's need for assistive devices and provide as appropriate  - Encourage maximum independence but intervene and supervise when necessary  - Involve family in performance of ADLs  - Assess for home care needs following discharge   - Consider OT consult to assist with ADL evaluation and planning for discharge  - Provide patient education as appropriate  Outcome: Progressing  Goal: Maintain or return mobility status to optimal level  Description  INTERVENTIONS:  - Assess patient's baseline mobility status (ambulation, transfers, stairs, etc )    - Identify cognitive and physical deficits and behaviors that affect mobility  - Identify mobility aids required to assist with transfers and/or ambulation (gait belt, sit-to-stand, lift, walker, cane, etc )  - [place] fall precautions as indicated by assessment  - Record patient progress and toleration of activity level on Mobility SBAR; progress patient to next Phase/Stage  - Instruct patient to call for assistance with activity based on assessment  - Consider rehabilitation consult to assist with strengthening/weightbearing, etc   Outcome: Progressing     Problem: DISCHARGE PLANNING  Goal: Discharge to home or other facility with appropriate resources  Description  INTERVENTIONS:  - Identify barriers to discharge w/patient and caregiver  - Arrange for needed discharge resources and transportation as appropriate  - Identify discharge learning needs (meds, wound care, etc )  - Arrange for interpretive services to assist at discharge as needed  - Refer to Case Management Department for coordinating discharge planning if the patient needs post-hospital services based on physician/advanced practitioner order or complex needs related to functional status, cognitive ability, or social support system  Outcome: Progressing     Problem: Knowledge Deficit  Goal: Patient/family/caregiver demonstrates understanding of disease process, treatment plan, medications, and discharge instructions  Description  Complete learning assessment and assess knowledge base    Interventions:  - Provide teaching at level of understanding  - Provide teaching via preferred learning methods  Outcome: Progressing

## 2019-12-21 NOTE — H&P
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  Assessment & Plan  · Admit to inpatient status in the ICU as her condition is expected to require greater than a 2 midnight stay  · Etiology is unclear  Possibly secondary to heart failure  No other recent history of illness  Pt does not take beta blockers  · Largely non-symptomatic  NO ischemia on CXR  Blood pressure is stable  · Consult cardiology  Check echo    Acute on chronic diastolic congestive heart failure [facility])  Assessment & Plan  Wt Readings from Last 3 Encounters:   12/20/19 63 6 kg (140 lb 3 4 oz)   12/20/19 67 1 kg (148 lb)   10/08/19 67 kg (147 lb 12 8 oz)       · Echo from 7/2019 shows EF of 60% with grade 1 diastolic dysfunction  Will recheck as above  · Diuresed well in response to single dose of IV Lasix 40mg  Monitor fluid status closely and diurese prn      Acute kidney injury superimposed on chronic kidney disease ([facility] [address] )  Assessment & Plan  · 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 1 1  · Creatinine slightly elevated likely secondary to hypoperfusion in the setting of bradycardia and mild fluid overload  · Trend renal indices and monitor intake and output  Essential hypertension  Assessment & Plan  · Continue home ARB  Hydralazine PRN        HPI:    Ya Pearson is a 80 y o  female with a history of htn and diastolic chf who presents with a chief complaint of shortness of breath  The patient states that she has has been experiencing some intermittent shortness of breath that has progressively worsened over the last 2 weeks, particularly noticeable with normal levels of exertion and ambulation  She denies any syncope or lightheadedness associated with this    At 0400 Friday morning the patient woke from sleep with midsternal chest pain associated with a coughing fit  Pain was non radiating and resolved with the resolution of coughing  She denies any significant sputum production  She then went to her PCP feeling that she might have bronchitis  During evaluation at the PCP she had an ECG done which showed sinus bradycardia, rate in the 30's, with a 2nd degree AV block and was subsequently brought to the ED by a family member for further evaluation  ECG in the ED again showed a 2nd degree AV block  Blood pressure was stable  CXR demonstrated mild pulmonary edema  The case was discussed between the ED doctor and cardiology who recommended admission to the ICU  Review of Systems:  Review of Systems   Constitutional: Negative for chills and fever  HENT: Negative for sinus pressure  Eyes: Negative for visual disturbance  Respiratory: Positive for shortness of breath  Negative for chest tightness  Cardiovascular: Positive for leg swelling (bilateral LE edema)  Negative for chest pain and palpitations  Gastrointestinal: Negative for abdominal pain, diarrhea, nausea and vomiting  Endocrine: Negative  Genitourinary: Negative for dysuria and flank pain  Musculoskeletal: Negative for joint swelling, myalgias and neck pain  Skin: Negative for rash and wound  Allergic/Immunologic: Negative  Neurological: Positive for weakness  Negative for seizures, syncope and light-headedness  Hematological: Negative  Psychiatric/Behavioral: Negative  Full 12 point review of systems was performed  Aside from what was mentioned in the HPI, it is otherwise negative        Historical Information   Past Medical History:   Diagnosis Date    Depression     Gout     H/O vaginal hysterectomy     resolved-4/17/2015    Skin cancer      Past Surgical History:   Procedure Laterality Date    CATARACT EXTRACTION      TOTAL ABDOMINAL HYSTERECTOMY      with removal of both ovaries    VAGINAL HYSTERECTOMY      resolved-4/17/2015     Social History   Social History     Substance and Sexual Activity   Alcohol Use Not Currently     Social History     Substance and Sexual Activity   Drug Use No     Social History     Tobacco Use   Smoking Status Never Smoker   Smokeless Tobacco Never Used       Family History:   Family History   Problem Relation Age of Onset    Heart attack Mother         MI    Heart attack Father         MI    Hypertension Sister     Stomach cancer Sister     Hypertension Brother        Medications:  Pertinent medications were reviewed    Current Facility-Administered Medications:  aspirin 81 mg Oral Daily Bonnell Rigg Spatzer, CRNP   atorvastatin 40 mg Oral QPM Bonnell Rigg Spatzer, CRNP   chlorhexidine 15 mL Swish & Spit Q12H [facility] Bonnell Rigg Spatzer, CRNP   dorzolamide-timolol 1 drop Both Eyes Daily Bonnell Rigg Spatzer, CRNP   doxepin 50 mg Oral HS Bonnell Rigg Spatzer, CRNP   gabapentin 300 mg Oral TID Bonnell Rigg Spatzer, CRNP   heparin (porcine) 5,000 Units Subcutaneous Q8H [facility] Bonnell Rigg Spatzer, CRNP   hydrALAZINE 10 mg Intravenous Q4H PRN Bonnell Rigg Spatzer, CRNP   levETIRAcetam 375 mg Oral Q12H [facility] Bonnell Rigg Spatzer, CRNP   losartan 25 mg Oral Daily Bonnell Rigg Spatzer, CRNP   pantoprazole 40 mg Oral Early Morning Bonnell Rigg Spatzer, CRNP         Allergies   Allergen Reactions    Fish-Derived Products      GI upset    Allopurinol Rash     Category: Allergy;          Vitals:   BP (!) 135/49   Pulse (!) 38   Temp 97 8 °F (36 6 °C) (Temporal)   Resp (!) 25   Wt 63 6 kg (140 lb 3 4 oz)   SpO2 96%   BMI 25 65 kg/m²   Body mass index is 25 65 kg/m²    SpO2: 96 %,   SpO2 Activity: At Rest,   O2 Device: None (Room air)      Intake/Output Summary (Last 24 hours) at 12/21/2019 0203  Last data filed at 12/21/2019 0030  Gross per 24 hour   Intake    Output 800 ml   Net -800 ml     Invasive Devices     Peripheral Intravenous Line            Peripheral IV 12/20/19 Right Antecubital less than 1 day Physical Exam:  Gen:  Awake, alert, appropriate, no acute distress  HE:  Atraumatic, normocephalic, extraocular movements intact, pupils 3 mm equal and reactive  ENT:  Oropharynx is clear without erythema or exudate  Neck/lymph:  Supple, trachea midline, no JVD, no lymphadenopathy  Chest:  Slightly diminished with some fine crackles in the bases posteriorly, fairly clear anteriorly, no wheeze  Cor:  Single S1/S2, no murmurs, rubs, gallops, bradycardic  Abd:  Soft, nontender, nondistended, bowel sounds normoactive  Ext:  2+ bilateral lower extremity edema, no clubbing or cyanosis  Neuro:  Oriented x3, cranial nerves 2-12 grossly intact, no focal deficits  Skin:  No rash, warm, dry      Diagnostic Data:  Lab: I have personally reviewed pertinent lab results  ,   CBC:  Results from last 7 days   Lab Units 12/20/19  2345 12/20/19  1709   WBC Thousand/uL  --  6 70   HEMOGLOBIN g/dL  --  10 6*   HEMATOCRIT %  --  34 8   PLATELETS Thousands/uL 80* 120*      CMP: Lab Results   Component Value Date    SODIUM 145 12/20/2019    K 4 1 12/20/2019     (H) 12/20/2019    CO2 22 12/20/2019    BUN 37 (H) 12/20/2019    CREATININE 1 31 (H) 12/20/2019    CALCIUM 10 5 (H) 12/20/2019    AST 67 (H) 12/20/2019     (H) 12/20/2019    ALKPHOS 217 (H) 12/20/2019    EGFR 36 12/20/2019   ,   PT/INR: No results found for: PT, INR,   Troponin:   Lab Results   Component Value Date    TROPONINI 0 03 12/20/2019   ,     Imaging: I have personally reviewed the pertinent imaging studies on the PACS system  CXR - Mild b/l pulmonary vascular congestion and interstitial prominence  Possible trace R pleural effusion  No focal infiltrates    EKG/telemetry/Echo:   Sinus bradycardia with 2nd degree AV block      VTE Prophylaxis: Sequential compression device (Venodyne)  and Heparin    Code Status: Level 1 - Full Code    Portions of the record may have been created with voice recognition software   Occasional wrong word or "sound a like" substitutions may have occurred due to the inherent limitations of voice recognition software  Read the chart carefully and recognize, using context, where substitutions have occurred

## 2019-12-21 NOTE — PROGRESS NOTES
Temporary pacing pads attached to patient, per verbal request from cardiologist  Patient resting OOB in chair, asymptomatic of HR of 39  No complaints at this time

## 2019-12-21 NOTE — PLAN OF CARE
Problem: Potential for Falls  Goal: Patient will remain free of falls  Description  INTERVENTIONS:  - Assess patient frequently for physical needs  -  Identify cognitive and physical deficits and behaviors that affect risk of falls    -  [place] fall precautions as indicated by assessment   - Educate patient/family on patient safety including physical limitations  - Instruct patient to call for assistance with activity based on assessment  - Modify environment to reduce risk of injury  - Consider OT/PT consult to assist with strengthening/mobility  Outcome: Progressing     Problem: Prexisting or High Potential for Compromised Skin Integrity  Goal: Skin integrity is maintained or improved  Description  INTERVENTIONS:  - Identify patients at risk for skin breakdown  - Assess and monitor skin integrity  - Assess and monitor nutrition and hydration status  - Monitor labs   - Assess for incontinence   - Turn and reposition patient  - Assist with mobility/ambulation  - Relieve pressure over bony prominences  - Avoid friction and shearing  - Provide appropriate hygiene as needed including keeping skin clean and dry  - Evaluate need for skin moisturizer/barrier cream  - Collaborate with interdisciplinary team   - Patient/family teaching  - Consider wound care consult   Outcome: Progressing     Problem: PAIN - ADULT  Goal: Verbalizes/displays adequate comfort level or baseline comfort level  Description  Interventions:  - Encourage patient to monitor pain and request assistance  - Assess pain using appropriate pain scale  - Administer analgesics based on type and severity of pain and evaluate response  - Implement non-pharmacological measures as appropriate and evaluate response  - Consider cultural and social influences on pain and pain management  - Notify physician/advanced practitioner if interventions unsuccessful or patient reports new pain  Outcome: Progressing     Problem: INFECTION - ADULT  Goal: Absence or prevention of progression during hospitalization  Description  INTERVENTIONS:  - Assess and monitor for signs and symptoms of infection  - Monitor lab/diagnostic results  - Monitor all insertion sites, i e  indwelling lines, tubes, and drains  - Monitor endotracheal if appropriate and nasal secretions for changes in amount and color  - [place] appropriate cooling/warming therapies per order  - Administer medications as ordered  - Instruct and encourage patient and family to use good hand hygiene technique  - Identify and instruct in appropriate isolation precautions for identified infection/condition  Outcome: Progressing     Problem: SAFETY ADULT  Goal: Maintain or return to baseline ADL function  Description  INTERVENTIONS:  -  Assess patient's ability to carry out ADLs; assess patient's baseline for ADL function and identify physical deficits which impact ability to perform ADLs (bathing, care of mouth/teeth, toileting, grooming, dressing, etc )  - Assess/evaluate cause of self-care deficits   - Assess range of motion  - Assess patient's mobility; develop plan if impaired  - Assess patient's need for assistive devices and provide as appropriate  - Encourage maximum independence but intervene and supervise when necessary  - Involve family in performance of ADLs  - Assess for home care needs following discharge   - Consider OT consult to assist with ADL evaluation and planning for discharge  - Provide patient education as appropriate  Outcome: Progressing  Goal: Maintain or return mobility status to optimal level  Description  INTERVENTIONS:  - Assess patient's baseline mobility status (ambulation, transfers, stairs, etc )    - Identify cognitive and physical deficits and behaviors that affect mobility  - Identify mobility aids required to assist with transfers and/or ambulation (gait belt, sit-to-stand, lift, walker, cane, etc )  - [place] fall precautions as indicated by assessment  - Record patient progress and toleration of activity level on Mobility SBAR; progress patient to next Phase/Stage  - Instruct patient to call for assistance with activity based on assessment  - Consider rehabilitation consult to assist with strengthening/weightbearing, etc   Outcome: Progressing     Problem: DISCHARGE PLANNING  Goal: Discharge to home or other facility with appropriate resources  Description  INTERVENTIONS:  - Identify barriers to discharge w/patient and caregiver  - Arrange for needed discharge resources and transportation as appropriate  - Identify discharge learning needs (meds, wound care, etc )  - Arrange for interpretive services to assist at discharge as needed  - Refer to Case Management Department for coordinating discharge planning if the patient needs post-hospital services based on physician/advanced practitioner order or complex needs related to functional status, cognitive ability, or social support system  Outcome: Progressing     Problem: Knowledge Deficit  Goal: Patient/family/caregiver demonstrates understanding of disease process, treatment plan, medications, and discharge instructions  Description  Complete learning assessment and assess knowledge base    Interventions:  - Provide teaching at level of understanding  - Provide teaching via preferred learning methods  Outcome: Progressing     Problem: CARDIOVASCULAR - ADULT  Goal: Maintains optimal cardiac output and hemodynamic stability  Description  INTERVENTIONS:  - Monitor I/O, vital signs and rhythm  - Monitor for S/S and trends of decreased cardiac output  - Administer and titrate ordered vasoactive medications to optimize hemodynamic stability  - Assess quality of pulses, skin color and temperature  - Assess for signs of decreased coronary artery perfusion  - Instruct patient to report change in severity of symptoms  Outcome: Progressing  Goal: Absence of cardiac dysrhythmias or at baseline rhythm  Description  INTERVENTIONS:  - Continuous cardiac monitoring, vital signs, obtain 12 lead EKG if ordered  - Administer antiarrhythmic and heart rate control medications as ordered  - Monitor electrolytes and administer replacement therapy as ordered  Outcome: Progressing     Problem: RESPIRATORY - ADULT  Goal: Achieves optimal ventilation and oxygenation  Description  INTERVENTIONS:  - Assess for changes in respiratory status  - Assess for changes in mentation and behavior  - Position to facilitate oxygenation and minimize respiratory effort  - Oxygen administered by appropriate delivery if ordered  - Initiate smoking cessation education as indicated  - Encourage broncho-pulmonary hygiene including cough, deep breathe, Incentive Spirometry  - Assess the need for suctioning and aspirate as needed  - Assess and instruct to report SOB or any respiratory difficulty  - Respiratory Therapy support as indicated  Outcome: Progressing     Problem: SKIN/TISSUE INTEGRITY - ADULT  Goal: Skin integrity remains intact  Description  INTERVENTIONS  - Identify patients at risk for skin breakdown  - Assess and monitor skin integrity  - Assess and monitor nutrition and hydration status  - Monitor labs (i e  albumin)  - Assess for incontinence   - Turn and reposition patient  - Assist with mobility/ambulation  - Relieve pressure over bony prominences  - Avoid friction and shearing  - Provide appropriate hygiene as needed including keeping skin clean and dry  - Evaluate need for skin moisturizer/barrier cream  - Collaborate with interdisciplinary team (i e  Nutrition, Rehabilitation, etc )   - Patient/family teaching  Outcome: Progressing

## 2019-12-21 NOTE — ASSESSMENT & PLAN NOTE
· 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 1 1  · Creatinine slightly elevated likely secondary to hypoperfusion in the setting of bradycardia and mild fluid overload  · Trend renal indices and monitor intake and output

## 2019-12-22 ENCOUNTER — APPOINTMENT (INPATIENT)
Dept: NON INVASIVE DIAGNOSTICS | Facility: HOSPITAL | Age: 84
DRG: 243 | End: 2019-12-22
Payer: MEDICARE

## 2019-12-22 LAB
ANION GAP SERPL CALCULATED.3IONS-SCNC: 9 MMOL/L (ref 4–13)
BUN SERPL-MCNC: 44 MG/DL (ref 5–25)
CALCIUM SERPL-MCNC: 10.2 MG/DL (ref 8.3–10.1)
CHLORIDE SERPL-SCNC: 111 MMOL/L (ref 100–108)
CO2 SERPL-SCNC: 25 MMOL/L (ref 21–32)
CREAT SERPL-MCNC: 1.46 MG/DL (ref 0.6–1.3)
GFR SERPL CREATININE-BSD FRML MDRD: 31 ML/MIN/1.73SQ M
GLUCOSE SERPL-MCNC: 95 MG/DL (ref 65–140)
POTASSIUM SERPL-SCNC: 3.5 MMOL/L (ref 3.5–5.3)
SODIUM SERPL-SCNC: 145 MMOL/L (ref 136–145)

## 2019-12-22 PROCEDURE — 93325 DOPPLER ECHO COLOR FLOW MAPG: CPT | Performed by: INTERNAL MEDICINE

## 2019-12-22 PROCEDURE — 93308 TTE F-UP OR LMTD: CPT

## 2019-12-22 PROCEDURE — 99232 SBSQ HOSP IP/OBS MODERATE 35: CPT | Performed by: INTERNAL MEDICINE

## 2019-12-22 PROCEDURE — 80048 BASIC METABOLIC PNL TOTAL CA: CPT | Performed by: INTERNAL MEDICINE

## 2019-12-22 PROCEDURE — 93308 TTE F-UP OR LMTD: CPT | Performed by: INTERNAL MEDICINE

## 2019-12-22 PROCEDURE — 93321 DOPPLER ECHO F-UP/LMTD STD: CPT | Performed by: INTERNAL MEDICINE

## 2019-12-22 RX ORDER — POTASSIUM CHLORIDE 20 MEQ/1
20 TABLET, EXTENDED RELEASE ORAL ONCE
Status: COMPLETED | OUTPATIENT
Start: 2019-12-22 | End: 2019-12-22

## 2019-12-22 RX ADMIN — PANTOPRAZOLE SODIUM 40 MG: 40 TABLET, DELAYED RELEASE ORAL at 05:11

## 2019-12-22 RX ADMIN — ATORVASTATIN CALCIUM 40 MG: 40 TABLET, FILM COATED ORAL at 17:11

## 2019-12-22 RX ADMIN — GABAPENTIN 300 MG: 300 CAPSULE ORAL at 17:11

## 2019-12-22 RX ADMIN — GABAPENTIN 300 MG: 300 CAPSULE ORAL at 21:12

## 2019-12-22 RX ADMIN — DOXEPIN HYDROCHLORIDE 50 MG: 50 CAPSULE ORAL at 21:13

## 2019-12-22 RX ADMIN — ASPIRIN 81 MG 81 MG: 81 TABLET ORAL at 08:00

## 2019-12-22 RX ADMIN — HEPARIN SODIUM 5000 UNITS: 5000 INJECTION INTRAVENOUS; SUBCUTANEOUS at 15:27

## 2019-12-22 RX ADMIN — LOSARTAN POTASSIUM 25 MG: 25 TABLET, FILM COATED ORAL at 08:00

## 2019-12-22 RX ADMIN — CHLORHEXIDINE GLUCONATE 0.12% ORAL RINSE 15 ML: 1.2 LIQUID ORAL at 08:00

## 2019-12-22 RX ADMIN — HEPARIN SODIUM 5000 UNITS: 5000 INJECTION INTRAVENOUS; SUBCUTANEOUS at 05:11

## 2019-12-22 RX ADMIN — GABAPENTIN 300 MG: 300 CAPSULE ORAL at 08:00

## 2019-12-22 RX ADMIN — LEVETIRACETAM 375 MG: 750 TABLET, FILM COATED ORAL at 08:00

## 2019-12-22 RX ADMIN — POTASSIUM CHLORIDE 20 MEQ: 1500 TABLET, EXTENDED RELEASE ORAL at 12:02

## 2019-12-22 RX ADMIN — CHLORHEXIDINE GLUCONATE 0.12% ORAL RINSE 15 ML: 1.2 LIQUID ORAL at 21:12

## 2019-12-22 RX ADMIN — HYDRALAZINE HYDROCHLORIDE 10 MG: 20 INJECTION INTRAMUSCULAR; INTRAVENOUS at 05:12

## 2019-12-22 RX ADMIN — LEVETIRACETAM 375 MG: 750 TABLET, FILM COATED ORAL at 21:13

## 2019-12-22 RX ADMIN — HEPARIN SODIUM 5000 UNITS: 5000 INJECTION INTRAVENOUS; SUBCUTANEOUS at 21:12

## 2019-12-22 NOTE — PLAN OF CARE
Problem: Potential for Falls  Goal: Patient will remain free of falls  Description  INTERVENTIONS:  - Assess patient frequently for physical needs  -  Identify cognitive and physical deficits and behaviors that affect risk of falls    -  [place] fall precautions as indicated by assessment   - Educate patient/family on patient safety including physical limitations  - Instruct patient to call for assistance with activity based on assessment  - Modify environment to reduce risk of injury  - Consider OT/PT consult to assist with strengthening/mobility  Outcome: Progressing     Problem: Prexisting or High Potential for Compromised Skin Integrity  Goal: Skin integrity is maintained or improved  Description  INTERVENTIONS:  - Identify patients at risk for skin breakdown  - Assess and monitor skin integrity  - Assess and monitor nutrition and hydration status  - Monitor labs   - Assess for incontinence   - Turn and reposition patient  - Assist with mobility/ambulation  - Relieve pressure over bony prominences  - Avoid friction and shearing  - Provide appropriate hygiene as needed including keeping skin clean and dry  - Evaluate need for skin moisturizer/barrier cream  - Collaborate with interdisciplinary team   - Patient/family teaching  - Consider wound care consult   Outcome: Progressing     Problem: PAIN - ADULT  Goal: Verbalizes/displays adequate comfort level or baseline comfort level  Description  Interventions:  - Encourage patient to monitor pain and request assistance  - Assess pain using appropriate pain scale  - Administer analgesics based on type and severity of pain and evaluate response  - Implement non-pharmacological measures as appropriate and evaluate response  - Consider cultural and social influences on pain and pain management  - Notify physician/advanced practitioner if interventions unsuccessful or patient reports new pain  Outcome: Progressing     Problem: INFECTION - ADULT  Goal: Absence or prevention of progression during hospitalization  Description  INTERVENTIONS:  - Assess and monitor for signs and symptoms of infection  - Monitor lab/diagnostic results  - Monitor all insertion sites, i e  indwelling lines, tubes, and drains  - Monitor endotracheal if appropriate and nasal secretions for changes in amount and color  - [place] appropriate cooling/warming therapies per order  - Administer medications as ordered  - Instruct and encourage patient and family to use good hand hygiene technique  - Identify and instruct in appropriate isolation precautions for identified infection/condition  Outcome: Progressing     Problem: SAFETY ADULT  Goal: Maintain or return to baseline ADL function  Description  INTERVENTIONS:  -  Assess patient's ability to carry out ADLs; assess patient's baseline for ADL function and identify physical deficits which impact ability to perform ADLs (bathing, care of mouth/teeth, toileting, grooming, dressing, etc )  - Assess/evaluate cause of self-care deficits   - Assess range of motion  - Assess patient's mobility; develop plan if impaired  - Assess patient's need for assistive devices and provide as appropriate  - Encourage maximum independence but intervene and supervise when necessary  - Involve family in performance of ADLs  - Assess for home care needs following discharge   - Consider OT consult to assist with ADL evaluation and planning for discharge  - Provide patient education as appropriate  Outcome: Progressing  Goal: Maintain or return mobility status to optimal level  Description  INTERVENTIONS:  - Assess patient's baseline mobility status (ambulation, transfers, stairs, etc )    - Identify cognitive and physical deficits and behaviors that affect mobility  - Identify mobility aids required to assist with transfers and/or ambulation (gait belt, sit-to-stand, lift, walker, cane, etc )  - [place] fall precautions as indicated by assessment  - Record patient progress and toleration of activity level on Mobility SBAR; progress patient to next Phase/Stage  - Instruct patient to call for assistance with activity based on assessment  - Consider rehabilitation consult to assist with strengthening/weightbearing, etc   Outcome: Progressing     Problem: DISCHARGE PLANNING  Goal: Discharge to home or other facility with appropriate resources  Description  INTERVENTIONS:  - Identify barriers to discharge w/patient and caregiver  - Arrange for needed discharge resources and transportation as appropriate  - Identify discharge learning needs (meds, wound care, etc )  - Arrange for interpretive services to assist at discharge as needed  - Refer to Case Management Department for coordinating discharge planning if the patient needs post-hospital services based on physician/advanced practitioner order or complex needs related to functional status, cognitive ability, or social support system  Outcome: Progressing     Problem: Knowledge Deficit  Goal: Patient/family/caregiver demonstrates understanding of disease process, treatment plan, medications, and discharge instructions  Description  Complete learning assessment and assess knowledge base    Interventions:  - Provide teaching at level of understanding  - Provide teaching via preferred learning methods  Outcome: Progressing     Problem: CARDIOVASCULAR - ADULT  Goal: Maintains optimal cardiac output and hemodynamic stability  Description  INTERVENTIONS:  - Monitor I/O, vital signs and rhythm  - Monitor for S/S and trends of decreased cardiac output  - Administer and titrate ordered vasoactive medications to optimize hemodynamic stability  - Assess quality of pulses, skin color and temperature  - Assess for signs of decreased coronary artery perfusion  - Instruct patient to report change in severity of symptoms  Outcome: Progressing  Goal: Absence of cardiac dysrhythmias or at baseline rhythm  Description  INTERVENTIONS:  - Continuous cardiac monitoring, vital signs, obtain 12 lead EKG if ordered  - Administer antiarrhythmic and heart rate control medications as ordered  - Monitor electrolytes and administer replacement therapy as ordered  Outcome: Progressing     Problem: RESPIRATORY - ADULT  Goal: Achieves optimal ventilation and oxygenation  Description  INTERVENTIONS:  - Assess for changes in respiratory status  - Assess for changes in mentation and behavior  - Position to facilitate oxygenation and minimize respiratory effort  - Oxygen administered by appropriate delivery if ordered  - Initiate smoking cessation education as indicated  - Encourage broncho-pulmonary hygiene including cough, deep breathe, Incentive Spirometry  - Assess the need for suctioning and aspirate as needed  - Assess and instruct to report SOB or any respiratory difficulty  - Respiratory Therapy support as indicated  Outcome: Progressing     Problem: SKIN/TISSUE INTEGRITY - ADULT  Goal: Skin integrity remains intact  Description  INTERVENTIONS  - Identify patients at risk for skin breakdown  - Assess and monitor skin integrity  - Assess and monitor nutrition and hydration status  - Monitor labs (i e  albumin)  - Assess for incontinence   - Turn and reposition patient  - Assist with mobility/ambulation  - Relieve pressure over bony prominences  - Avoid friction and shearing  - Provide appropriate hygiene as needed including keeping skin clean and dry  - Evaluate need for skin moisturizer/barrier cream  - Collaborate with interdisciplinary team (i e  Nutrition, Rehabilitation, etc )   - Patient/family teaching  Outcome: Progressing

## 2019-12-22 NOTE — PROGRESS NOTES
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  2  Grade 1 diastolic dysfunction without congestive heart failure  3  Acute kidney injury superimposed on chronic kidney disease  4  Hypertension and hypertensive heart disease    5  Hypokalemia    Though the P-P interval between the nonconducted P-wave and the following P-wave is greater than the P-P interval between conducted P-wave and the next nonconducted P-wave, the difference is very small and ilikely physiologic  The current rhythm is more likely progression of her previously noted Wenckebach AV block to now type 2 Mobitz heart block  Either way, patient has significant AV consuelo and conduction disease and a permanent pacemaker implantation is recommended  She is agreeable to undergo this procedure  I briefly discussed this procedure with her including its benefits and risks associated with the procedure  I also discussed with her son yesterday about this procedure and he is also agreeable  CARDIOLOGY PLAN:  - NPO after midnight except medications with sips of water  Avoid all AV consuelo blocking agents  - potassium 20 mEq oral x1 today  - will hold losartan due to worsening renal function  Will consider amlodipine if she is hypertensive  Continue aspirin and atorvastatin  *-*-*-*-*-*-*-*-*-*-*-*-*-*-*-*-*-*-*-*-*-*-*-*-*-*-*-*-*-*-*-*-*-*-*-*-*-*-*-*-*-*-*-*-*-*-*-*-*-*-*-*-*-*-  INTERVAL CHANGES / HISTORY OF PRESENT ILLNESS:  No acute events overnight    Patient remains in 2-1 heart block  Heart rate in mid to high 30s with occasionally in high 40s to 50  Patient has been asymptomatic with no dizziness or fatigue or shortness of breath  Has been out of bed sitting in chair  Underwent echocardiogram earlier today  REVIEW OF SYMPTOMS:    Positive for:  Denies any symptoms currently  Negative for: All remaining as reviewed below and in HPI      SYSTEM SYMPTOMS REVIEWED:  Generalweight change, fever, night sweats  Respiratoryl Wheezing, shortness of breath, cough, URI symptoms, sputum, blood  Cardiovascularchest pain, syncope, dyspnea on exertion, edema, decline in exercise tolerance, claudication   Gastrointestinalpersistent vomiting, diarrhea, abdominal distention, blood in stool   Muscular or skeletaljoint pain or swelling   Neurologicheadaches, syncope, abnormal movement  Hematologichistory of easy bruising and bleeding   Endocrinethyroid enlargement, heat or cold intolerance, polyuria   Psychiatricanxiety, depression   *-*-*-*-*-*-*-*-*-*-*-*-*-*-*-*-*-*-*-*-*-*-*-*-*-*-*-*-*-*-*-*-*-*-*-*-*-*-*-*-*-*-*-*-*-*-*-*-*-*-*-*-*-*-    VITAL SIGNS:  Vitals:    19 0700 19 0730 19 0800 19 0900   BP: 146/56  119/56 (!) 118/46   Pulse: (!) 38  (!) 44 (!) 40   Resp: (!) 23  (!) 25 (!) 24   Temp:  (!) 97 1 °F (36 2 °C)     TempSrc:  Temporal     SpO2: 97%  95% 98%   Weight:       Height:          Temp (24hrs), Av 8 °F (36 6 °C), Min:97 1 °F (36 2 °C), Max:98 4 °F (36 9 °C)  Current: Temperature: (!) 97 1 °F (36 2 °C)      Intake/Output Summary (Last 24 hours) at 2019 1049  Last data filed at 2019 0801  Gross per 24 hour   Intake 960 ml   Output 250 ml   Net 710 ml      Weight (last 2 days)     Date/Time   Weight    19 0500   63 7 (140 43)    19 0539   61 1 (134 7)    19 2216   63 6 (140 21)    19 1656   66 8 (147 27)           ,   Wt Readings from Last 3 Encounters:   19 63 7 kg (140 lb 6 9 oz)   19 67 1 kg (148 lb)   10/08/19 67 kg (147 lb 12 8 oz)    , Body mass index is 25 69 kg/m²  *-*-*-*-*-*-*-*-*-*-*-*-*-*-*-*-*-*-*-*-*-*-*-*-*-*-*-*-*-*-*-*-*-*-*-*-*-*-*-*-*-*-*-*-*-*-*-*-*-*-*-*-*-*-  PHYSICAL EXAM:  General Appearance:    Alert, cooperative, no distress, appears stated age, small built   Head, Eyes, ENT:    No obvious abnormality, moist mucous mebranes  Neck:   Supple, no carotid bruit or JVD   Back:     Symmetric, no curvature  Lungs:     Respirations unlabored  Clear to auscultation bilaterally,    Chest wall:    No tenderness or deformity   Heart:  Bradycardia, normal intensity heart sounds, systolic murmur in aortic area   Abdomen:     Soft, non-tender, No obvious masses, or organomegaly   Extremities:   Extremities normal, no cyanosis or edema    Skin:   Skin color, texture, turgor normal, no rashes or lesions   *-*-*-*-*-*-*-*-*-*-*-*-*-*-*-*-*-*-*-*-*-*-*-*-*-*-*-*-*-*-*-*-*-*-*-*-*-*-*-*-*-*-*-*-*-*-*-*-*-*-*-*-*-*-    Telemetry reviewed    Sinus rhythm with 2-1 av block with bundle-branch block  Last ECG     Results for orders placed or performed during the hospital encounter of 12/20/19   ECG 12 lead   Result Value    Ventricular Rate 38    Atrial Rate 77    IN Interval     QRSD Interval 132    QT Interval 564    QTC Interval 448    P Axis 57    QRS Axis -34    T Wave Axis 44    Narrative    Sinus rhythm with 2nd degree A-V block with 2:1 A-V conduction  Left anterior fascicular block  Right bundle branch block  voltage lvh noted  Poor anterior R wave progression is noted  Abnormal ECG  When compared with ECG of 23-JUL-2019 22:46,  second degree av block is now present  HR is 34 bpm less    Confirmed by David Gerber ([id number]) on 12/21/2019 7:46:40 AM       Medications reviewed         Current Facility-Administered Medications:     aspirin chewable tablet 81 mg, 81 mg, Oral, Daily, Ever Roll Spatzer, CRNP, 81 mg at 12/22/19 0800    atorvastatin (LIPITOR) tablet 40 mg, 40 mg, Oral, QPM, Missy Brandowater Spatzer, KRISNP, 40 mg at 12/21/19 1711    chlorhexidine (PERIDEX) 0 12 % oral rinse 15 mL, 15 mL, Swish & Spit, Q12H [facility], CRNP, 15 mL at 12/22/19 0800    dorzolamide-timolol (COSOPT) 22 3-6 8 MG/ML ophthalmic solution 1 drop, 1 drop, Both Eyes, Daily, [facility] Spatzer, CRNP    doxepin (SINEquan) capsule 50 mg, 50 mg, Oral, HS, [facility], CRNP, 50 mg at 12/21/19 2111    gabapentin (NEURONTIN) capsule 300 mg, 300 mg, Oral, TID, [facility], CRNP, 300 mg at 12/22/19 0800    heparin (porcine) subcutaneous injection 5,000 Units, 5,000 Units, Subcutaneous, Q8H [facility], 5,000 Units at 12/22/19 0511 **AND** [COMPLETED] Platelet count, , , Once, Missy Brandowater Spatzer, CRNP    hydrALAZINE (APRESOLINE) injection 10 mg, 10 mg, Intravenous, Q4H PRN, Missy Brandowater Spatzer, KRISNP, 10 mg at 12/22/19 0250    levETIRAcetam (KEPPRA) tablet 375 mg, 375 mg, Oral, Q12H [facility], CRNP, 375 mg at 12/22/19 0800    losartan (COZAAR) tablet 25 mg, 25 mg, Oral, Daily, Missy Brandowater Spatzer, CRNP, 25 mg at 12/22/19 0800    pantoprazole (PROTONIX) EC tablet 40 mg, 40 mg, Oral, Early Morning, Missy Brandowater Spatzer, CRNP, 40 mg at 12/22/19 9823    Imaging studies results reviewed    No procedure found  *-*-*-*-*-*-*-*-*-*-*-*-*-*-*-*-*-*-*-*-*-*-*-*-*-*-*-*-*-*-*-*-*-*-*-*-*-*-*-*-*-*-*-*-*-*-*-*-*-*-*-*-*-*-    LABORATORY DATA:  I have personally reviewed pertinent labs      CMP:   Results from last 7 days   Lab Units 12/22/19  0519 12/21/19  0500 12/20/19  1709   POTASSIUM mmol/L 3 5 3 6 4 1   CHLORIDE mmol/L 111* 112* 110*   CO2 mmol/L 25 23 22   BUN mg/dL 44* 39* 37*   CREATININE mg/dL 1 46* 1 30 1 31*   CALCIUM mg/dL 10 2* 9 9 10 5*   ALK PHOS U/L  --  173* 217*   ALT U/L  --  115* 164*   AST U/L  --  40 67*       Cardiac Profile:   Troponin I   Date Value Ref Range Status   12/20/2019 0 03 <=0 04 ng/mL Final     Comment:       [employer] Chemistry analyzer 99% cutoff is > 0 04 ng/mL in network labs o cTnI 99% cutoff is useful only when applied to patients in the clinical setting of myocardial ischemia   o cTnI 99% cutoff should be interpreted in the context of clinical history, ECG findings and possibly cardiac imaging to establish correct diagnosis  o cTnI 99% cutoff may be suggestive but clearly not indicative of a coronary event without the clinical setting of myocardial ischemia      07/23/2019 0 17 (H) <=0 04 ng/mL Final     Comment:       S[employer]ens Chemistry analyzer 99% cutoff is > 0 04 ng/mL in network labs     o cTnI 99% cutoff is useful only when applied to patients in the clinical setting of myocardial ischemia   o cTnI 99% cutoff should be interpreted in the context of clinical history, ECG findings and possibly cardiac imaging to establish correct diagnosis  o cTnI 99% cutoff may be suggestive but clearly not indicative of a coronary event without the clinical setting of myocardial ischemia  Results indicate test should be repeated on new specimen collected within 4-6 hours of the original   07/23/2019 0 16 (H) <=0 04 ng/mL Final     Comment:       Siemens Chemistry analyzer 99% cutoff is > 0 04 ng/mL in network labs     o cTnI 99% cutoff is useful only when applied to patients in the clinical setting of myocardial ischemia   o cTnI 99% cutoff should be interpreted in the context of clinical history, ECG findings and possibly cardiac imaging to establish correct diagnosis  o cTnI 99% cutoff may be suggestive but clearly not indicative of a coronary event without the clinical setting of myocardial ischemia      Results indicate test should be repeated on new specimen collected within 4-6 hours of the original     CK-MB   Date Value Ref Range Status   07/23/2019 2 7 0 0 - 5 0 ng/mL Final     NT-proBNP   Date Value Ref Range Status   12/20/2019 7,010 (H) <450 pg/mL Final       CBC:   Results from last 7 days   Lab Units 12/20/19  2345 12/20/19  1709   WBC Thousand/uL  --  6 70   HEMOGLOBIN g/dL  --  10 6*   HEMATOCRIT %  --  34 8   PLATELETS Thousands/uL 80* 120*       PT/INR: No results found for: PT, INR,     Magnesium:   Results from last 7 days   Lab Units 19  0500   MAGNESIUM mg/dL 1 9       Phosphorous:       Microbiology:              *-*-*-*-*-*-*-*-*-*-*-*-*-*-*-*-*-*-*-*-*-*-*-*-*-*-*-*-*-*-*-*-*-*-*-*-*-*-*-*-*-*-*-*-*-*-*-*-*-*-*-*-*-*-  ECHOCARDIOGRAM AND OTHER CARDIOLOGY RESULTS:  Results for orders placed during the hospital encounter of 19   Echo complete with contrast if indicated    Narrative [facility] [address]  [facility] [place], [place], [address] [facility]  [phone]    Transthoracic Echocardiogram  2D, M-mode, Doppler, and Color Doppler    Study date:  2019    Patient: Carlton Varela  MR number: [id number]  Account number: [de-identified]  : 04-Oct-1928  Age: 80 years  Gender: Female  Status: Inpatient  Location: Bedside  Height: 62 in  Weight: 154 lb  BP: 161/ 92 mmHg    Indications: Abnormal EKG    Diagnoses: R94 31 - Abnormal electrocardiogram [ECG] [EKG]    Sonographer:  FAYE Cox  Primary Physician:  DEANDRA Diamond  Referring Physician:  DEANDRA Diamond  Group:  [id number]  Cardiology Fellow:  Albertina Rodríguez MD  Interpreting Physician:  Christina Aldana MD    SUMMARY    LEFT VENTRICLE:  Systolic function was normal  Ejection fraction was estimated to be 60 %  There were no regional wall motion abnormalities  Wall thickness was moderately increased  There was moderate concentric hypertrophy  Doppler parameters were consistent with abnormal left ventricular relaxation (grade 1 diastolic dysfunction)  MITRAL VALVE:  There was marked annular calcification  There was mild regurgitation  AORTIC VALVE:  Transaortic velocity was increased due to valvular stenosis  There was mild stenosis  There was trace regurgitation  Estimated aortic valve area (by Vmax) was 1 66 cmï¾²      TRICUSPID VALVE:  There was trace regurgitation  AORTA:  There was mild dilatation of the ascending aorta  The ascending aortic AP dimension was 39 mm  IVC, HEPATIC VEINS:  The inferior vena cava was mildly dilated  HISTORY: PRIOR HISTORY: HTN,CKD    PROCEDURE: The procedure was performed at the bedside  This was a routine study  The transthoracic approach was used  The study included complete 2D imaging, M-mode, complete spectral Doppler, and color Doppler  The heart rate was 83 bpm,  at the start of the study  Image quality was adequate  LEFT VENTRICLE: Size was normal  Systolic function was normal  Ejection fraction was estimated to be 60 %  There were no regional wall motion abnormalities  Wall thickness was moderately increased  There was moderate concentric  hypertrophy  DOPPLER: Doppler parameters were consistent with abnormal left ventricular relaxation (grade 1 diastolic dysfunction)  VENTRICULAR SEPTUM: There was sigmoid septal appearance  RIGHT VENTRICLE: The size was normal  Systolic function was normal  Wall thickness was normal     LEFT ATRIUM: Size was normal     RIGHT ATRIUM: Size was normal     MITRAL VALVE: There was marked annular calcification  Valve structure was normal  There was normal leaflet separation  DOPPLER: The transmitral velocity was within the normal range  There was no evidence for stenosis  There was mild  regurgitation  AORTIC VALVE: The valve was trileaflet  Leaflets exhibited mildly to moderately increased thickness, mild to moderate calcification, and mildly reduced cuspal separation  DOPPLER: Transaortic velocity was increased due to valvular  stenosis  There was mild stenosis  There was trace regurgitation  TRICUSPID VALVE: The valve structure was normal  There was normal leaflet separation  DOPPLER: The transtricuspid velocity was within the normal range  There was no evidence for stenosis  There was trace regurgitation   Pulmonary artery  systolic pressure was within the normal range  PULMONIC VALVE: Leaflets exhibited normal thickness, no calcification, and normal cuspal separation  DOPPLER: The transpulmonic velocity was within the normal range  There was no significant regurgitation  PERICARDIUM: There was no pericardial effusion  A pericardial fat pad was present  AORTA: The root exhibited normal size  There was mild dilatation of the ascending aorta  SYSTEMIC VEINS: IVC: The inferior vena cava was mildly dilated  Respirophasic changes were normal     MEASUREMENT TABLES    2D MEASUREMENTS  LVOT   (Reference normals)  Diam   21 mm   (--)  Aorta   (Reference normals)  AAo AP diam   39 mm   (--)    DOPPLER MEASUREMENTS  LVOT   (Reference normals)  Peak karine   113 cm/s   (--)  Mean karine   81 cm/s   (--)  VTI   21 cm   (--)  Peak gradient   5 mmHg   (--)  Mean gradient   3 mmHg   (--)  Stroke vol   72 74 ml   (--)  Stroke index   0 47 ml/mï¾²   (--)  Aortic valve   (Reference normals)  Peak karine   233 cm/s   (--)  Mean karine   170 cm/s   (--)  VTI   49 cm   (--)  Peak gradient   25 7 mmHg   (--)  Obstr index, VTI   0 43    (--)  Valve area, VTI   1 49 cmï¾²   (--)  Area index, VTI   0 87 cmï¾²/mï¾²   (--)  Obstr index, Vmax   0 48    (--)  Valve area, Vmax   1 66 cmï¾²   (--)  Area index, Vmax   0 97 cmï¾²/mï¾²   (--)  Obstr index, Vmean   0 48    (--)  Valve area, Vmean   1 66 cmï¾²   (--)  Area index, Vmean   0 97 cmï¾²/mï¾²   (--)    SYSTEM MEASUREMENT TABLES    2D  %FS: 31 5 %  Ao Diam: 2 99 cm  EDV(Teich): 43 16 ml  EF(Teich): 60 72 %  ESV(Teich): 16 95 ml  IVSd: 1 44 cm  LA Area: 18 05 cm2  LA Diam: 3 32 cm  LVEDV MOD A4C: 72 34 ml  LVEF MOD A4C: 71 64 %  LVESV MOD A4C: 20 51 ml  LVIDd: 3 27 cm  LVIDs: 2 24 cm  LVLd A4C: 7 68 cm  LVLs A4C: 5 98 cm  LVPWd: 1 68 cm  RA Area: 16 31 cm2  RVIDd: 4 18 cm  SV MOD A4C: 51 83 ml  SV(Teich): 26 21 ml    CW  AV Env  Ti: 267 59 ms  AV VTI: 42 73 cm  AV Vmax: 2 26 m/s  AV Vmean: 1 6 m/s  AV maxP 39 mmHg  AV meanPG: 11 33 mmHg  HR: 162 66 BPM  MV VTI: 31 01 cm  MV Vmax: 1 42 m/s  MV Vmean: 0 84 m/s  MV maxP 06 mmHg  MV meanPG: 3 2 mmHg  TR Vmax: 2 5 m/s  TR maxP 93 mmHg    MM  TAPSE: 1 5 cm    PW  E': 0 06 m/s  E/E': 13 14  LVOT Env  Ti: 262 98 ms  LVOT VTI: 21 34 cm  LVOT Vmax: 1 13 m/s  LVOT Vmean: 0 81 m/s  LVOT maxP 1 mmHg  LVOT meanP 85 mmHg  MV A [place]: 1 46 m/s  MV Dec [place]: 4 08 m/s2  MV DecT: 191 81 ms  MV E [place]: 0 78 m/s  MV E/A Ratio: 0 54  MV PHT: 55 62 ms  MVA By PHT: 3 96 cm2    [employer] Accredited [facility]chocardi[facility]    Prepared and electronically signed by    Mohit Wu MD  Signed 2019 16:03:33       No results found for this or any previous visit  No results found for this or any previous visit  No results found for this or any previous visit  *-*-*-*-*-*-*-*-*-*-*-*-*-*-*-*-*-*-*-*-*-*-*-*-*-*-*-*-*-*-*-*-*-*-*-*-*-*-*-*-*-*-*-*-*-*-*-*-*-*-*-*-*-*-  ALLERGIES:  Allergies   Allergen Reactions    Fish-Derived Products      GI upset    Allopurinol Rash     Category:  Allergy;        *-*-*-*-*-*-*-*-*-*-*-*-*-*-*-*-*-*-*-*-*-*-*-*-*-*-*-*-*-*-*-*-*-*-*-*-*-*-*-*-*-*-*-*-*-*-*-*-*-*-*-*-*-*-    SIGNATURES:   [unfilled]   Payton Gibson MD

## 2019-12-22 NOTE — PROGRESS NOTES
Progress Note - Anmol Keith 80 y o  female MRN: [id number]    Unit/Bed#: ICU 12 Encounter: [id number]      Subjective: The patient feels reasonably well  She has had no problems over night  She denies chest pain, shortness of breath, palpitations, or dizziness  She has no abdominal pain, nausea, or vomiting  Physical Exam:   Temp:  [97 1 °F (36 2 °C)-98 2 °F (36 8 °C)] 97 5 °F (36 4 °C)  HR:  [32-44] 40  Resp:  [15-36] 22  BP: (108-180)/(46-89) 108/57    Gen:  Well-developed, well-nourished, in no distress  Neck:  Supple  No lymphadenopathy, goiter, or bruit  Heart:  Bradycardic  There is a grade 3/6 holosystolic murmur audible throughout the precordium  Lungs:  Clear to auscultation and percussion  No wheezing, rales, or rhonchi  Abd:  Soft with active bowel sounds  No mass, tenderness, organomegaly  Extremities:  No clubbing, cyanosis, or edema  No calf tenderness  Neuro:  Alert and oriented  No focal sign  Skin:  Warm and dry      LABS:   CMP:   Lab Results   Component Value Date    SODIUM 145 12/22/2019    K 3 5 12/22/2019     (H) 12/22/2019    CO2 25 12/22/2019    BUN 44 (H) 12/22/2019    CREATININE 1 46 (H) 12/22/2019    CALCIUM 10 2 (H) 12/22/2019    EGFR 31 12/22/2019         Assessment/Plan:  1  Second-degree heart block and paroxysmal atrial fibrillation with slow ventricular response, for permanent pacemaker tomorrow  2  Chronic kidney disease stage 3  3  Hypertension  4  History of seizure disorder  5  History of chronic diastolic congestive heart failure     The patient remained stable over night  She will remain in ICU on step-down level until her pacemaker is inserted tomorrow  The patient's creatinine is mildly elevated compared to her baseline but not to this severity to be considered acute kidney injury  We will continue to monitor this situation      VTE Pharmacologic Prophylaxis: Heparin  VTE Mechanical Prophylaxis: sequential compression device

## 2019-12-23 ENCOUNTER — APPOINTMENT (INPATIENT)
Dept: NON INVASIVE DIAGNOSTICS | Facility: HOSPITAL | Age: 84
DRG: 243 | End: 2019-12-23
Attending: INTERNAL MEDICINE
Payer: MEDICARE

## 2019-12-23 ENCOUNTER — ANESTHESIA (INPATIENT)
Dept: NON INVASIVE DIAGNOSTICS | Facility: HOSPITAL | Age: 84
DRG: 243 | End: 2019-12-23
Payer: MEDICARE

## 2019-12-23 ENCOUNTER — APPOINTMENT (INPATIENT)
Dept: RADIOLOGY | Facility: HOSPITAL | Age: 84
DRG: 243 | End: 2019-12-23
Payer: MEDICARE

## 2019-12-23 DIAGNOSIS — Z71.89 COMPLEX CARE COORDINATION: Primary | ICD-10-CM

## 2019-12-23 LAB
ATRIAL RATE: 35 BPM
ATRIAL RATE: 55 BPM
P AXIS: 62 DEGREES
PR INTERVAL: 154 MS
QRS AXIS: -41 DEGREES
QRS AXIS: -44 DEGREES
QRSD INTERVAL: 138 MS
QRSD INTERVAL: 142 MS
QT INTERVAL: 496 MS
QT INTERVAL: 704 MS
QTC INTERVAL: 475 MS
QTC INTERVAL: 538 MS
T WAVE AXIS: 57 DEGREES
T WAVE AXIS: 69 DEGREES
VENTRICULAR RATE: 35 BPM
VENTRICULAR RATE: 55 BPM

## 2019-12-23 PROCEDURE — C1785 PMKR, DUAL, RATE-RESP: HCPCS

## 2019-12-23 PROCEDURE — C1769 GUIDE WIRE: HCPCS | Performed by: INTERNAL MEDICINE

## 2019-12-23 PROCEDURE — 33208 INSRT HEART PM ATRIAL & VENT: CPT | Performed by: INTERNAL MEDICINE

## 2019-12-23 PROCEDURE — 99233 SBSQ HOSP IP/OBS HIGH 50: CPT | Performed by: INTERNAL MEDICINE

## 2019-12-23 PROCEDURE — C1898 LEAD, PMKR, OTHER THAN TRANS: HCPCS

## 2019-12-23 PROCEDURE — 0JH606Z INSERTION OF PACEMAKER, DUAL CHAMBER INTO CHEST SUBCUTANEOUS TISSUE AND FASCIA, OPEN APPROACH: ICD-10-PCS | Performed by: INTERNAL MEDICINE

## 2019-12-23 PROCEDURE — 71045 X-RAY EXAM CHEST 1 VIEW: CPT

## 2019-12-23 PROCEDURE — 02HK3JZ INSERTION OF PACEMAKER LEAD INTO RIGHT VENTRICLE, PERCUTANEOUS APPROACH: ICD-10-PCS | Performed by: INTERNAL MEDICINE

## 2019-12-23 PROCEDURE — 76937 US GUIDE VASCULAR ACCESS: CPT | Performed by: INTERNAL MEDICINE

## 2019-12-23 PROCEDURE — C1892 INTRO/SHEATH,FIXED,PEEL-AWAY: HCPCS | Performed by: INTERNAL MEDICINE

## 2019-12-23 PROCEDURE — 02H63JZ INSERTION OF PACEMAKER LEAD INTO RIGHT ATRIUM, PERCUTANEOUS APPROACH: ICD-10-PCS | Performed by: INTERNAL MEDICINE

## 2019-12-23 PROCEDURE — 93010 ELECTROCARDIOGRAM REPORT: CPT | Performed by: INTERNAL MEDICINE

## 2019-12-23 RX ORDER — LIDOCAINE HYDROCHLORIDE 10 MG/ML
INJECTION, SOLUTION EPIDURAL; INFILTRATION; INTRACAUDAL; PERINEURAL CODE/TRAUMA/SEDATION MEDICATION
Status: COMPLETED | OUTPATIENT
Start: 2019-12-23 | End: 2019-12-23

## 2019-12-23 RX ORDER — LIDOCAINE HYDROCHLORIDE 20 MG/ML
INJECTION, SOLUTION EPIDURAL; INFILTRATION; INTRACAUDAL; PERINEURAL AS NEEDED
Status: DISCONTINUED | OUTPATIENT
Start: 2019-12-23 | End: 2019-12-23 | Stop reason: SURG

## 2019-12-23 RX ORDER — SODIUM CHLORIDE 9 MG/ML
INJECTION, SOLUTION INTRAVENOUS CONTINUOUS PRN
Status: DISCONTINUED | OUTPATIENT
Start: 2019-12-23 | End: 2019-12-23 | Stop reason: SURG

## 2019-12-23 RX ORDER — OXYCODONE HYDROCHLORIDE 5 MG/1
5 TABLET ORAL EVERY 4 HOURS PRN
Status: CANCELLED | OUTPATIENT
Start: 2019-12-23

## 2019-12-23 RX ORDER — HEPARIN SODIUM 5000 [USP'U]/ML
5000 INJECTION, SOLUTION INTRAVENOUS; SUBCUTANEOUS EVERY 8 HOURS SCHEDULED
Status: CANCELLED | OUTPATIENT
Start: 2019-12-23

## 2019-12-23 RX ORDER — FENTANYL CITRATE 50 UG/ML
INJECTION, SOLUTION INTRAMUSCULAR; INTRAVENOUS AS NEEDED
Status: DISCONTINUED | OUTPATIENT
Start: 2019-12-23 | End: 2019-12-23 | Stop reason: SURG

## 2019-12-23 RX ORDER — PROPOFOL 10 MG/ML
INJECTION, EMULSION INTRAVENOUS AS NEEDED
Status: DISCONTINUED | OUTPATIENT
Start: 2019-12-23 | End: 2019-12-23 | Stop reason: SURG

## 2019-12-23 RX ORDER — ACETAMINOPHEN 325 MG/1
650 TABLET ORAL EVERY 4 HOURS PRN
Status: CANCELLED | OUTPATIENT
Start: 2019-12-23

## 2019-12-23 RX ORDER — GENTAMICIN SULFATE 40 MG/ML
INJECTION, SOLUTION INTRAMUSCULAR; INTRAVENOUS CODE/TRAUMA/SEDATION MEDICATION
Status: COMPLETED | OUTPATIENT
Start: 2019-12-23 | End: 2019-12-23

## 2019-12-23 RX ORDER — EPHEDRINE SULFATE 50 MG/ML
INJECTION INTRAVENOUS AS NEEDED
Status: DISCONTINUED | OUTPATIENT
Start: 2019-12-23 | End: 2019-12-23 | Stop reason: SURG

## 2019-12-23 RX ORDER — VANCOMYCIN HYDROCHLORIDE 1 G/200ML
15 INJECTION, SOLUTION INTRAVENOUS ONCE
Status: COMPLETED | OUTPATIENT
Start: 2019-12-23 | End: 2019-12-23

## 2019-12-23 RX ORDER — AMLODIPINE BESYLATE 5 MG/1
5 TABLET ORAL DAILY
Status: DISCONTINUED | OUTPATIENT
Start: 2019-12-23 | End: 2019-12-24

## 2019-12-23 RX ORDER — ONDANSETRON 2 MG/ML
INJECTION INTRAMUSCULAR; INTRAVENOUS AS NEEDED
Status: DISCONTINUED | OUTPATIENT
Start: 2019-12-23 | End: 2019-12-23 | Stop reason: SURG

## 2019-12-23 RX ADMIN — EPHEDRINE SULFATE 5 MG: 50 INJECTION, SOLUTION INTRAVENOUS at 14:59

## 2019-12-23 RX ADMIN — LEVETIRACETAM 375 MG: 750 TABLET, FILM COATED ORAL at 21:03

## 2019-12-23 RX ADMIN — PROPOFOL 100 MG: 10 INJECTION, EMULSION INTRAVENOUS at 14:52

## 2019-12-23 RX ADMIN — LEVETIRACETAM 375 MG: 750 TABLET, FILM COATED ORAL at 10:22

## 2019-12-23 RX ADMIN — GABAPENTIN 300 MG: 300 CAPSULE ORAL at 10:18

## 2019-12-23 RX ADMIN — SODIUM CHLORIDE: 0.9 INJECTION, SOLUTION INTRAVENOUS at 14:30

## 2019-12-23 RX ADMIN — CHLORHEXIDINE GLUCONATE 0.12% ORAL RINSE 15 ML: 1.2 LIQUID ORAL at 10:20

## 2019-12-23 RX ADMIN — CHLORHEXIDINE GLUCONATE 0.12% ORAL RINSE 15 ML: 1.2 LIQUID ORAL at 21:04

## 2019-12-23 RX ADMIN — VANCOMYCIN HYDROCHLORIDE 1000 MG: 1 INJECTION, SOLUTION INTRAVENOUS at 14:04

## 2019-12-23 RX ADMIN — LIDOCAINE HYDROCHLORIDE 80 MG: 20 INJECTION, SOLUTION EPIDURAL; INFILTRATION; INTRACAUDAL; PERINEURAL at 14:52

## 2019-12-23 RX ADMIN — DORZOLAMIDE HYDROCHLORIDE AND TIMOLOL MALEATE 1 DROP: 20; 5 SOLUTION/ DROPS OPHTHALMIC at 17:32

## 2019-12-23 RX ADMIN — HYDRALAZINE HYDROCHLORIDE 10 MG: 20 INJECTION INTRAMUSCULAR; INTRAVENOUS at 17:30

## 2019-12-23 RX ADMIN — GABAPENTIN 300 MG: 300 CAPSULE ORAL at 17:31

## 2019-12-23 RX ADMIN — GENTAMICIN SULFATE 80 MG: 40 INJECTION, SOLUTION INTRAMUSCULAR; INTRAVENOUS at 15:36

## 2019-12-23 RX ADMIN — HEPARIN SODIUM 5000 UNITS: 5000 INJECTION INTRAVENOUS; SUBCUTANEOUS at 21:03

## 2019-12-23 RX ADMIN — FENTANYL CITRATE 25 MCG: 50 INJECTION, SOLUTION INTRAMUSCULAR; INTRAVENOUS at 14:56

## 2019-12-23 RX ADMIN — ASPIRIN 81 MG 81 MG: 81 TABLET ORAL at 10:20

## 2019-12-23 RX ADMIN — GABAPENTIN 300 MG: 300 CAPSULE ORAL at 21:04

## 2019-12-23 RX ADMIN — DOXEPIN HYDROCHLORIDE 50 MG: 50 CAPSULE ORAL at 21:04

## 2019-12-23 RX ADMIN — LIDOCAINE HYDROCHLORIDE 20 ML: 10 INJECTION, SOLUTION EPIDURAL; INFILTRATION; INTRACAUDAL; PERINEURAL at 15:22

## 2019-12-23 RX ADMIN — EPHEDRINE SULFATE 5 MG: 50 INJECTION, SOLUTION INTRAVENOUS at 15:09

## 2019-12-23 RX ADMIN — AMLODIPINE BESYLATE 5 MG: 5 TABLET ORAL at 11:50

## 2019-12-23 RX ADMIN — ATORVASTATIN CALCIUM 40 MG: 40 TABLET, FILM COATED ORAL at 17:31

## 2019-12-23 RX ADMIN — PANTOPRAZOLE SODIUM 40 MG: 40 TABLET, DELAYED RELEASE ORAL at 05:26

## 2019-12-23 RX ADMIN — HEPARIN SODIUM 5000 UNITS: 5000 INJECTION INTRAVENOUS; SUBCUTANEOUS at 05:26

## 2019-12-23 RX ADMIN — EPHEDRINE SULFATE 5 MG: 50 INJECTION, SOLUTION INTRAVENOUS at 15:24

## 2019-12-23 RX ADMIN — FENTANYL CITRATE 25 MCG: 50 INJECTION, SOLUTION INTRAMUSCULAR; INTRAVENOUS at 15:05

## 2019-12-23 RX ADMIN — ONDANSETRON 4 MG: 2 INJECTION INTRAMUSCULAR; INTRAVENOUS at 16:04

## 2019-12-23 NOTE — SOCIAL WORK
Cm met with the patients son to to a general SW assessment:     The patient lives alone in a two story home  She utilizes the first floor  She is independent with adls, but her family does assist with errands  She uses a RW or a small scooter in the home  Her family drives her  Hx of VNA services in August  Hx of STR at [facility] in august  PCP is Dr Kevin Delgado  She uses the [employer] [facility] for rx needs  No mh history  No D&A history    The son is concerned re: her being home alone as she was not steady on her feet prior to admission and he is limited physically in what he is able to assist her with when she does go home  Cm requested PT and OT consults  CM following

## 2019-12-23 NOTE — ANESTHESIA PREPROCEDURE EVALUATION
Review of Systems/Medical History  Patient summary reviewed  Chart reviewed  No history of anesthetic complications     Cardiovascular  EKG reviewed, Exercise tolerance (METS): <4,  Hyperlipidemia, Hypertension , Dysrhythmias , 2nd degree block type II, CHF compensated CHF,   Comment: Normal left ventricular systolic function, EF 56%  Normal left ventricular cavity size  Mild concentric left ventricular hypertrophy  Normal left ventricular wall motion without regional wall motion abnormalities  Diastolic function not  assessed due to heart block    Mod AS,  Pulmonary  Negative pulmonary ROS        GI/Hepatic    GERD well controlled,        Negative  ROS        Endo/Other  Negative endo/other ROS      GYN       Hematology  Anemia ,     Musculoskeletal    Arthritis     Neurology  Seizures well controlled,     Psychology   Depression ,              Physical Exam    Airway    Mallampati score: II  TM Distance: >3 FB  Neck ROM: full     Dental   No notable dental hx     Cardiovascular  Rhythm: regular, Rate: abnormal, Murmur,     Pulmonary  Breath sounds clear to auscultation,     Other Findings        Anesthesia Plan  ASA Score- 3     Anesthesia Type- general with ASA Monitors  Additional Monitors:   Airway Plan:     Comment: LMA v TIVA  Plan Factors-  Patient did not smoke on day of surgery  Induction- intravenous  Postoperative Plan-     Informed Consent- Anesthetic plan and risks discussed with patient and son

## 2019-12-23 NOTE — ANESTHESIA POSTPROCEDURE EVALUATION
Post-Op Assessment Note    CV Status:  Stable  Pain Score: 1    Pain management: adequate     Mental Status:  Alert and awake   Hydration Status:  Euvolemic   PONV Controlled:  Controlled   Airway Patency:  Patent   Post Op Vitals Reviewed: Yes      Staff: Anesthesiologist           /72 (12/23/19 1633)    Temp     Pulse 78 (12/23/19 1633)   Resp 14 (12/23/19 1633)    SpO2 96 % (12/23/19 1633)

## 2019-12-23 NOTE — PROGRESS NOTES
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  2  Grade 1 diastolic dysfunction without congestive heart failure  3  Acute kidney injury superimposed on chronic kidney disease  4  Hypertension and hypertensive heart disease    5  Hypokalemia      CARDIOLOGY PLAN:  - for pacemaker implantation this afternoon  Patient is NPO  Verified willingness to undergo this procedure with the patient and her son  - continue to hold losartan  Initiating amlodipine 5 mg daily for additional blood pressure control  A  *-*-*-*-*-*-*-*-*-*-*-*-*-*-*-*-*-*-*-*-*-*-*-*-*-*-*-*-*-*-*-*-*-*-*-*-*-*-*-*-*-*-*-*-*-*-*-*-*-*-*-*-*-*-  INTERVAL CHANGES / HISTORY OF PRESENT ILLNESS:  Patient remains in 2-1 second-degree AV block  Continues to be asymptomatic  Ambulated yesterday with assistance without dizziness  Heart rate slightly improved to high 40s with ambulation but 2-1 block persisted  Blood pressure noted to be elevated this morning  REVIEW OF SYMPTOMS:    Positive for:  Denies any symptoms currently  Negative for: All remaining as reviewed below and in HPI      SYSTEM SYMPTOMS REVIEWED:  Generalweight change, fever, night sweats  Respiratoryl Wheezing, shortness of breath, cough, URI symptoms, sputum, blood  Cardiovascularchest pain, syncope, dyspnea on exertion, edema, decline in exercise tolerance, claudication   Gastrointestinalpersistent vomiting, diarrhea, abdominal distention, blood in stool   Muscular or skeletaljoint pain or swelling Neurologicheadaches, syncope, abnormal movement  Hematologichistory of easy bruising and bleeding   Endocrinethyroid enlargement, heat or cold intolerance, polyuria   Psychiatricanxiety, depression   *-*-*-*-*-*-*-*-*-*-*-*-*-*-*-*-*-*-*-*-*-*-*-*-*-*-*-*-*-*-*-*-*-*-*-*-*-*-*-*-*-*-*-*-*-*-*-*-*-*-*-*-*-*-    VITAL SIGNS:  Vitals:    19 0550 19 0600 19 0700 19 0829   BP:  (!) 180/75 133/61    Pulse:  (!) 36 (!) 34    Resp:  17 17    Temp:    98 4 °F (36 9 °C)   TempSrc:    Temporal   SpO2:  96% 95%    Weight: 64 7 kg (142 lb 10 2 oz)      Height:          Temp (24hrs), Av °F (36 7 °C), Min:97 5 °F (36 4 °C), Max:98 4 °F (36 9 °C)  Current: Temperature: 98 4 °F (36 9 °C)      Intake/Output Summary (Last 24 hours) at 2019 1037  Last data filed at 2019 0859  Gross per 24 hour   Intake 720 ml   Output 350 ml   Net 370 ml      Weight (last 2 days)     Date/Time   Weight    19 0550   64 7 (142 64)    19 0500   63 7 (140 43)    19 0539   61 1 (134 7)           ,   Wt Readings from Last 3 Encounters:   19 64 7 kg (142 lb 10 2 oz)   19 67 1 kg (148 lb)   10/08/19 67 kg (147 lb 12 8 oz)    , Body mass index is 26 09 kg/m²  *-*-*-*-*-*-*-*-*-*-*-*-*-*-*-*-*-*-*-*-*-*-*-*-*-*-*-*-*-*-*-*-*-*-*-*-*-*-*-*-*-*-*-*-*-*-*-*-*-*-*-*-*-*-  PHYSICAL EXAM:  General Appearance:     sitting comfortably in chair, in no distress appears stated age, small built   Head, Eyes, ENT:    No obvious abnormality, moist mucous mebranes  Neck:   Supple, no carotid bruit or JVD   Back:     Symmetric, no curvature  Lungs:     Respirations unlabored   Clear to auscultation bilaterally,    Chest wall:    No tenderness or deformity   Heart:  Bradycardic, regular rate and rhythm, no significant murmur appreciated   Abdomen:     Soft, non-tender, No obvious masses, or organomegaly   Extremities:   Extremities normal, no cyanosis or edema    Skin:   Skin color, texture, turgor normal, no rashes or lesions   *-*-*-*-*-*-*-*-*-*-*-*-*-*-*-*-*-*-*-*-*-*-*-*-*-*-*-*-*-*-*-*-*-*-*-*-*-*-*-*-*-*-*-*-*-*-*-*-*-*-*-*-*-*-    Telemetry reviewed    Marked bradycardia with Sinus rhythm with 2-1 av block with bundle-branch block  Occasional isolated premature ventricular contraction noted  Last ECG     Results for orders placed or performed during the hospital encounter of 12/20/19   ECG 12 lead   Result Value    Ventricular Rate 55    Atrial Rate 55    DE Interval     QRSD Interval 142    QT Interval 496    QTC Interval 475    P Axis     QRS Axis -44    T Wave Axis 69    Narrative    Atrial fibrillation with slow ventricular response with premature ventricular or aberrantly conducted complexes  Baseline artifact  Left anterior hemiblock  Right bundle branch block  Abnormal ECG  When compared with ECG of 20-DEC-2019 22:30, (unconfirmed)  Atrial fibrillation has replaced Sinus rhythm  Vent  rate has increased BY  20 BPM  QT has shortened  Confirmed by Joesph Rueda ([id number]) on 12/23/2019 9:36:11 AM       Medications reviewed         Current Facility-Administered Medications:     aspirin chewable tablet 81 mg, 81 mg, Oral, Daily, Nyra Messing Spatzer, CRNP, 81 mg at 12/23/19 1020    atorvastatin (LIPITOR) tablet 40 mg, 40 mg, Oral, QPM, Nyra Messing Spatzer, CRNP, 40 mg at 12/22/19 1711    chlorhexidine (PERIDEX) 0 12 % oral rinse 15 mL, 15 mL, Swish & Spit, Q12H [address], Nyra Messing Spatzer, CRNP, 15 mL at 12/23/19 1020    dorzolamide-timolol (COSOPT) 22 3-6 8 MG/ML ophthalmic solution 1 drop, 1 drop, Both Eyes, Daily, Nyra Messing Spatzer, CRNP    doxepin (SINEquan) capsule 50 mg, 50 mg, Oral, HS, Nyra Messing Spatzer, CRNP, 50 mg at 12/22/19 2113    gabapentin (NEURONTIN) capsule 300 mg, 300 mg, Oral, TID, Nyra Messing Spatzer, CRNP, 300 mg at 12/23/19 1018    heparin (porcine) subcutaneous injection 5,000 Units, 5,000 Units, Subcutaneous, Q8H [address], 5,000 Units at 12/23/19 0526 **AND** [COMPLETED] Platelet count, , , Once, Link DEANDRA Chong    hydrALAZINE (APRESOLINE) injection 10 mg, 10 mg, Intravenous, Q4H PRN, Morey Catchings Spatzer, CRNP, 10 mg at 12/22/19 5303    levETIRAcetam (KEPPRA) tablet 375 mg, 375 mg, Oral, Q12H CHANEL, Morey Catchings Spatzer, CRNP, 375 mg at 12/23/19 1022    pantoprazole (PROTONIX) EC tablet 40 mg, 40 mg, Oral, Early Morning, Morey Catchings Spatzer, CRNP, 40 mg at 12/23/19 3033    Imaging studies results reviewed    No procedure found  *-*-*-*-*-*-*-*-*-*-*-*-*-*-*-*-*-*-*-*-*-*-*-*-*-*-*-*-*-*-*-*-*-*-*-*-*-*-*-*-*-*-*-*-*-*-*-*-*-*-*-*-*-*-    LABORATORY DATA:  I have personally reviewed pertinent labs  CMP:   Results from last 7 days   Lab Units 12/22/19  0519 12/21/19  0500 12/20/19  1709   POTASSIUM mmol/L 3 5 3 6 4 1   CHLORIDE mmol/L 111* 112* 110*   CO2 mmol/L 25 23 22   BUN mg/dL 44* 39* 37*   CREATININE mg/dL 1 46* 1 30 1 31*   CALCIUM mg/dL 10 2* 9 9 10 5*   ALK PHOS U/L  --  173* 217*   ALT U/L  --  115* 164*   AST U/L  --  40 67*       Cardiac Profile:   Troponin I   Date Value Ref Range Status   12/20/2019 0 03 <=0 04 ng/mL Final     Comment:       Siemens Chemistry analyzer 99% cutoff is > 0 04 ng/mL in network labs     o cTnI 99% cutoff is useful only when applied to patients in the clinical setting of myocardial ischemia   o cTnI 99% cutoff should be interpreted in the context of clinical history, ECG findings and possibly cardiac imaging to establish correct diagnosis     o cTnI 99% cutoff may be suggestive but clearly not indicative of a coronary event without the clinical setting of myocardial ischemia      07/23/2019 0 17 (H) <=0 04 ng/mL Final     Comment:       Siemens Chemistry analyzer 99% cutoff is > 0 04 ng/mL in network labs     o cTnI 99% cutoff is useful only when applied to patients in the clinical setting of myocardial ischemia   o cTnI 99% cutoff should be interpreted in the context of clinical history, ECG findings and possibly cardiac imaging to establish correct diagnosis  o cTnI 99% cutoff may be suggestive but clearly not indicative of a coronary event without the clinical setting of myocardial ischemia  Results indicate test should be repeated on new specimen collected within 4-6 hours of the original   07/23/2019 0 16 (H) <=0 04 ng/mL Final     Comment:       Siemens Chemistry analyzer 99% cutoff is > 0 04 ng/mL in network labs     o cTnI 99% cutoff is useful only when applied to patients in the clinical setting of myocardial ischemia   o cTnI 99% cutoff should be interpreted in the context of clinical history, ECG findings and possibly cardiac imaging to establish correct diagnosis  o cTnI 99% cutoff may be suggestive but clearly not indicative of a coronary event without the clinical setting of myocardial ischemia      Results indicate test should be repeated on new specimen collected within 4-6 hours of the original     CK-MB   Date Value Ref Range Status   07/23/2019 2 7 0 0 - 5 0 ng/mL Final     NT-proBNP   Date Value Ref Range Status   12/20/2019 7,010 (H) <450 pg/mL Final       CBC:   Results from last 7 days   Lab Units 12/20/19  2345 12/20/19  1709   WBC Thousand/uL  --  6 70   HEMOGLOBIN g/dL  --  10 6*   HEMATOCRIT %  --  34 8   PLATELETS Thousands/uL 80* 120*       PT/INR: No results found for: PT, INR,     Magnesium:   Results from last 7 days   Lab Units 12/21/19  0500   MAGNESIUM mg/dL 1 9       Phosphorous:       Microbiology:              *-*-*-*-*-*-*-*-*-*-*-*-*-*-*-*-*-*-*-*-*-*-*-*-*-*-*-*-*-*-*-*-*-*-*-*-*-*-*-*-*-*-*-*-*-*-*-*-*-*-*-*-*-*-  ECHOCARDIOGRAM AND OTHER CARDIOLOGY RESULTS:  Results for orders placed during the hospital encounter of 07/25/19   Echo complete with contrast if indicated    Narrative [address]  [facility] [place], [address] [facility]  [phone]    Transthoracic Echocardiogram  2D, M-mode, Doppler, and Color Doppler    Study date:  29-Jul-2019    Patient: Love Serrano  MR number: [id number]  Account number: [de-identified]  : 04-Oct-1928  Age: 80 years  Gender: Female  Status: Inpatient  Location: Bedside  Height: 62 in  Weight: 154 lb  BP: 161/ 92 mmHg    Indications: Abnormal EKG    Diagnoses: R94 31 - Abnormal electrocardiogram [ECG] [EKG]    Sonographer:  FAYE England  Primary Physician:  Carol Rodriguez  Referring Physician:  DEANDRA Rodriguez  Group:  [facility]  Cardiology Fellow:  Amy Olivares MD  Interpreting Physician:  Wolfgang Garza MD    SUMMARY    LEFT VENTRICLE:  Systolic function was normal  Ejection fraction was estimated to be 60 %  There were no regional wall motion abnormalities  Wall thickness was moderately increased  There was moderate concentric hypertrophy  Doppler parameters were consistent with abnormal left ventricular relaxation (grade 1 diastolic dysfunction)  MITRAL VALVE:  There was marked annular calcification  There was mild regurgitation  AORTIC VALVE:  Transaortic velocity was increased due to valvular stenosis  There was mild stenosis  There was trace regurgitation  Estimated aortic valve area (by Vmax) was 1 66 cmï¾²  TRICUSPID VALVE:  There was trace regurgitation  AORTA:  There was mild dilatation of the ascending aorta  The ascending aortic AP dimension was 39 mm  IVC, HEPATIC VEINS:  The inferior vena cava was mildly dilated  HISTORY: PRIOR HISTORY: HTN,CKD    PROCEDURE: The procedure was performed at the bedside  This was a routine study  The transthoracic approach was used  The study included complete 2D imaging, M-mode, complete spectral Doppler, and color Doppler  The heart rate was 83 bpm,  at the start of the study  Image quality was adequate  LEFT VENTRICLE: Size was normal  Systolic function was normal  Ejection fraction was estimated to be 60 %  There were no regional wall motion abnormalities  Wall thickness was moderately increased   There was moderate concentric  hypertrophy  DOPPLER: Doppler parameters were consistent with abnormal left ventricular relaxation (grade 1 diastolic dysfunction)  VENTRICULAR SEPTUM: There was sigmoid septal appearance  RIGHT VENTRICLE: The size was normal  Systolic function was normal  Wall thickness was normal     LEFT ATRIUM: Size was normal     RIGHT ATRIUM: Size was normal     MITRAL VALVE: There was marked annular calcification  Valve structure was normal  There was normal leaflet separation  DOPPLER: The transmitral velocity was within the normal range  There was no evidence for stenosis  There was mild  regurgitation  AORTIC VALVE: The valve was trileaflet  Leaflets exhibited mildly to moderately increased thickness, mild to moderate calcification, and mildly reduced cuspal separation  DOPPLER: Transaortic velocity was increased due to valvular  stenosis  There was mild stenosis  There was trace regurgitation  TRICUSPID VALVE: The valve structure was normal  There was normal leaflet separation  DOPPLER: The transtricuspid velocity was within the normal range  There was no evidence for stenosis  There was trace regurgitation  Pulmonary artery  systolic pressure was within the normal range  PULMONIC VALVE: Leaflets exhibited normal thickness, no calcification, and normal cuspal separation  DOPPLER: The transpulmonic velocity was within the normal range  There was no significant regurgitation  PERICARDIUM: There was no pericardial effusion  A pericardial fat pad was present  AORTA: The root exhibited normal size  There was mild dilatation of the ascending aorta  SYSTEMIC VEINS: IVC: The inferior vena cava was mildly dilated   Respirophasic changes were normal     MEASUREMENT TABLES    2D MEASUREMENTS  LVOT   (Reference normals)  Diam   21 mm   (--)  Aorta   (Reference normals)  AAo AP diam   39 mm   (--)    DOPPLER MEASUREMENTS  LVOT   (Reference normals)  Peak karine   113 cm/s   (--)  Mean karine   81 cm/s (--)  VTI   21 cm   (--)  Peak gradient   5 mmHg   (--)  Mean gradient   3 mmHg   (--)  Stroke vol   72 74 ml   (--)  Stroke index   0 47 ml/mï¾²   (--)  Aortic valve   (Reference normals)  Peak minesh   233 cm/s   (--)  Mean minesh   170 cm/s   (--)  VTI   49 cm   (--)  Peak gradient   25 7 mmHg   (--)  Obstr index, VTI   0 43    (--)  Valve area, VTI   1 49 cmï¾²   (--)  Area index, VTI   0 87 cmï¾²/mï¾²   (--)  Obstr index, Vmax   0 48    (--)  Valve area, Vmax   1 66 cmï¾²   (--)  Area index, Vmax   0 97 cmï¾²/mï¾²   (--)  Obstr index, Vmean   0 48    (--)  Valve area, Vmean   1 66 cmï¾²   (--)  Area index, Vmean   0 97 cmï¾²/mï¾²   (--)    SYSTEM MEASUREMENT TABLES    2D  %FS: 31 5 %  Ao Diam: 2 99 cm  EDV(Teich): 43 16 ml  EF(Teich): 60 72 %  ESV(Teich): 16 95 ml  IVSd: 1 44 cm  LA Area: 18 05 cm2  LA Diam: 3 32 cm  LVEDV MOD A4C: 72 34 ml  LVEF MOD A4C: 71 64 %  LVESV MOD A4C: 20 51 ml  LVIDd: 3 27 cm  LVIDs: 2 24 cm  LVLd A4C: 7 68 cm  LVLs A4C: 5 98 cm  LVPWd: 1 68 cm  RA Area: 16 31 cm2  RVIDd: 4 18 cm  SV MOD A4C: 51 83 ml  SV(Teich): 26 21 ml    CW  AV Env  Ti: 267 59 ms  AV VTI: 42 73 cm  AV Vmax: 2 26 m/s  AV Vmean: 1 6 m/s  AV maxP 39 mmHg  AV meanP 33 mmHg  HR: 162 66 BPM  MV VTI: 31 01 cm  MV Vmax: 1 42 m/s  MV Vmean: 0 84 m/s  MV maxP 06 mmHg  MV meanPG: 3 2 mmHg  TR Vmax: 2 5 m/s  TR maxP 93 mmHg    MM  TAPSE: 1 5 cm    PW  E': 0 06 m/s  E/E': 13 14  LVOT Env  Ti: 262 98 ms  LVOT VTI: 21 34 cm  LVOT Vmax: 1 13 m/s  LVOT Vmean: 0 81 m/s  LVOT maxP 1 mmHg  LVOT meanP 85 mmHg  MV A Minesh: 1 46 m/s  MV Dec [place]: 4 08 m/s2  MV DecT: 191 81 ms  MV E Minesh: 0 78 m/s  MV E/A Ratio: 0 54  MV PHT: 55 62 ms  MVA By PHT: 3 96 cm2    [employer] Accredited [facility]chocardi[facility]    Prepared and electronically signed by    Erasmo De La Cruz MD  Signed 2019 16:03:33       No results found for this or any previous visit  No results found for this or any previous visit    No results found for this or any previous visit  *-*-*-*-*-*-*-*-*-*-*-*-*-*-*-*-*-*-*-*-*-*-*-*-*-*-*-*-*-*-*-*-*-*-*-*-*-*-*-*-*-*-*-*-*-*-*-*-*-*-*-*-*-*-  ALLERGIES:  Allergies   Allergen Reactions    Fish-Derived Products      GI upset    Allopurinol Rash     Category:  Allergy;        *-*-*-*-*-*-*-*-*-*-*-*-*-*-*-*-*-*-*-*-*-*-*-*-*-*-*-*-*-*-*-*-*-*-*-*-*-*-*-*-*-*-*-*-*-*-*-*-*-*-*-*-*-*-    SIGNATURES:   @PSK@   Noelle Jordan MD

## 2019-12-24 ENCOUNTER — APPOINTMENT (INPATIENT)
Dept: RADIOLOGY | Facility: HOSPITAL | Age: 84
DRG: 243 | End: 2019-12-24
Payer: MEDICARE

## 2019-12-24 VITALS
HEIGHT: 62 IN | RESPIRATION RATE: 19 BRPM | BODY MASS INDEX: 26.82 KG/M2 | WEIGHT: 145.72 LBS | SYSTOLIC BLOOD PRESSURE: 157 MMHG | DIASTOLIC BLOOD PRESSURE: 80 MMHG | TEMPERATURE: 98 F | OXYGEN SATURATION: 94 % | HEART RATE: 86 BPM

## 2019-12-24 LAB
ALBUMIN SERPL BCP-MCNC: 3.6 G/DL (ref 3.5–5)
ALP SERPL-CCNC: 188 U/L (ref 46–116)
ALT SERPL W P-5'-P-CCNC: 71 U/L (ref 12–78)
ANION GAP SERPL CALCULATED.3IONS-SCNC: 9 MMOL/L (ref 4–13)
AST SERPL W P-5'-P-CCNC: 23 U/L (ref 5–45)
BILIRUB SERPL-MCNC: 0.71 MG/DL (ref 0.2–1)
BUN SERPL-MCNC: 30 MG/DL (ref 5–25)
CALCIUM SERPL-MCNC: 10.1 MG/DL (ref 8.3–10.1)
CHLORIDE SERPL-SCNC: 111 MMOL/L (ref 100–108)
CO2 SERPL-SCNC: 25 MMOL/L (ref 21–32)
CREAT SERPL-MCNC: 1.13 MG/DL (ref 0.6–1.3)
GFR SERPL CREATININE-BSD FRML MDRD: 43 ML/MIN/1.73SQ M
GLUCOSE SERPL-MCNC: 86 MG/DL (ref 65–140)
POTASSIUM SERPL-SCNC: 4.1 MMOL/L (ref 3.5–5.3)
PROT SERPL-MCNC: 7.1 G/DL (ref 6.4–8.2)
SODIUM SERPL-SCNC: 145 MMOL/L (ref 136–145)

## 2019-12-24 PROCEDURE — 71046 X-RAY EXAM CHEST 2 VIEWS: CPT

## 2019-12-24 PROCEDURE — G8988 SELF CARE GOAL STATUS: HCPCS

## 2019-12-24 PROCEDURE — 99024 POSTOP FOLLOW-UP VISIT: CPT | Performed by: INTERNAL MEDICINE

## 2019-12-24 PROCEDURE — 80053 COMPREHEN METABOLIC PANEL: CPT | Performed by: INTERNAL MEDICINE

## 2019-12-24 PROCEDURE — G8978 MOBILITY CURRENT STATUS: HCPCS

## 2019-12-24 PROCEDURE — 97167 OT EVAL HIGH COMPLEX 60 MIN: CPT

## 2019-12-24 PROCEDURE — G8987 SELF CARE CURRENT STATUS: HCPCS

## 2019-12-24 PROCEDURE — 97163 PT EVAL HIGH COMPLEX 45 MIN: CPT

## 2019-12-24 PROCEDURE — G8979 MOBILITY GOAL STATUS: HCPCS

## 2019-12-24 PROCEDURE — 99239 HOSP IP/OBS DSCHRG MGMT >30: CPT | Performed by: INTERNAL MEDICINE

## 2019-12-24 RX ORDER — LOSARTAN POTASSIUM 50 MG/1
50 TABLET ORAL DAILY
Status: DISCONTINUED | OUTPATIENT
Start: 2019-12-25 | End: 2019-12-24 | Stop reason: HOSPADM

## 2019-12-24 RX ORDER — LOSARTAN POTASSIUM 25 MG/1
25 TABLET ORAL DAILY
Status: DISCONTINUED | OUTPATIENT
Start: 2019-12-24 | End: 2019-12-24

## 2019-12-24 RX ORDER — AMLODIPINE BESYLATE 5 MG/1
10 TABLET ORAL DAILY
Status: DISCONTINUED | OUTPATIENT
Start: 2019-12-25 | End: 2019-12-24 | Stop reason: HOSPADM

## 2019-12-24 RX ORDER — ACETAMINOPHEN 325 MG/1
650 TABLET ORAL EVERY 6 HOURS PRN
Status: DISCONTINUED | OUTPATIENT
Start: 2019-12-24 | End: 2019-12-24 | Stop reason: HOSPADM

## 2019-12-24 RX ORDER — AMLODIPINE BESYLATE 10 MG/1
10 TABLET ORAL DAILY
Refills: 0
Start: 2019-12-25 | End: 2020-01-27 | Stop reason: SDUPTHER

## 2019-12-24 RX ADMIN — HEPARIN SODIUM 5000 UNITS: 5000 INJECTION INTRAVENOUS; SUBCUTANEOUS at 16:36

## 2019-12-24 RX ADMIN — CHLORHEXIDINE GLUCONATE 0.12% ORAL RINSE 15 ML: 1.2 LIQUID ORAL at 09:02

## 2019-12-24 RX ADMIN — LEVETIRACETAM 375 MG: 750 TABLET, FILM COATED ORAL at 09:01

## 2019-12-24 RX ADMIN — GABAPENTIN 300 MG: 300 CAPSULE ORAL at 09:00

## 2019-12-24 RX ADMIN — AMLODIPINE BESYLATE 5 MG: 5 TABLET ORAL at 09:00

## 2019-12-24 RX ADMIN — LOSARTAN POTASSIUM 25 MG: 25 TABLET, FILM COATED ORAL at 10:13

## 2019-12-24 RX ADMIN — GABAPENTIN 300 MG: 300 CAPSULE ORAL at 16:36

## 2019-12-24 RX ADMIN — HEPARIN SODIUM 5000 UNITS: 5000 INJECTION INTRAVENOUS; SUBCUTANEOUS at 05:36

## 2019-12-24 RX ADMIN — PANTOPRAZOLE SODIUM 40 MG: 40 TABLET, DELAYED RELEASE ORAL at 05:36

## 2019-12-24 RX ADMIN — ACETAMINOPHEN 650 MG: 325 TABLET ORAL at 05:38

## 2019-12-24 RX ADMIN — ASPIRIN 81 MG 81 MG: 81 TABLET ORAL at 09:00

## 2019-12-24 NOTE — PHYSICAL THERAPY NOTE
PT EVALUATION    80 y o     [id number]    Shortness of breath [R06 02]  Heart block [I45 9]  Heart failure (HCC) [I50 9]    Past Medical History:   Diagnosis Date    Depression     Gout     H/O vaginal hysterectomy     resolved-4/17/2015    Skin cancer          Past Surgical History:   Procedure Laterality Date    CATARACT EXTRACTION      TOTAL ABDOMINAL HYSTERECTOMY      with removal of both ovaries    VAGINAL HYSTERECTOMY      resolved-4/17/2015 12/24/19 0820   Note Type   Note type Eval only   Pain Assessment   Pain Assessment No/denies pain   Home Living   Type of [address] [place] [address] Two level; Able to live on main level with bedroom/bathroom  (1 YOAV)   Bathroom Shower/Tub Tub/shower unit  (does not use the shower  sponge bathes)   Bathroom Toilet Standard  (BSC over toilet)   Bathroom Equipment Tub transfer bench;Commode   [address] [facility]  (electric scooter)   Additional Comments resides alone, Son visits daily  Prior Function   Level of [place] Independent with ADLs and functional mobility   Lives With Alone   Receives Help From Family   ADL Assistance Independent   IADLs Independent   Falls in the last 6 months 0   Vocational Retired   Comments Ambulates with RW at baseline in home  Obtained MAFO in September when in rhb  Does nto always wear  Restrictions/Precautions   Braces or Orthoses Sling  (LUE p pacer 12/23)   Other Precautions Telemetry; Fall Risk;Multiple lines;Visual impairment  (sling LUE p pacer 12/23)   RUE Assessment   RUE Assessment   (proximal limitations observed, see OT notes)   LUE Assessment   LUE Assessment X  (currently in sling  See OT notes )   RLE Assessment   RLE Assessment WFL  (grossly 3+/5)   LLE Assessment   LLE Assessment X  (L foot drop  0/5 DF   )   Light Touch   RLE Light Touch Grossly intact   LLE Light Touch Impaired   LLE Light Touch Comments hx of nueropathy, + foot drop     Bed Mobility   Supine to Sit 4  Minimal assistance   Additional items Assist x 1; Increased time required;Verbal cues;LE management   Additional Comments /82 RA O2 sat 93%   Transfers   Sit to Stand 3  Moderate assistance   Additional items Assist x 2; Increased time required;Verbal cues   Stand to Sit 2  Maximal assistance   Additional items Assist x 2; Increased time required;Verbal cues   Stand pivot 2  Maximal assistance   Additional items Assist x 2; Increased time required;Verbal cues   Additional Comments OOB to chair only given PLOF ambulatory with RW support  Currently maintained in sling  Maintain Sling per Dr Wheat Pod until assessed by cardiology  Ambulation to be assessed once cleared to use RW with LUE and sling d/c   /66 OOB in chair   Ambulation/Elevation   Distance 2-3 steps with transfer OOB to chair  with hand held RUE and torso support of 2nd person given LUE restriction  Balance   Static Sitting Fair +   Dynamic Sitting Fair -   Static Standing Poor   Dynamic Standing Poor -   Endurance Deficit   Endurance Deficit Yes   Endurance Deficit Description fatigue, elevated /66-nursing aware  Activity Tolerance   Activity Tolerance Patient limited by fatigue;Treatment limited secondary to medical complications (Comment)   Medical Staff Made Aware nurseCarlos  Ot-Scott   Nurse Made Aware yes  Assessment   Prognosis Good   Problem List Decreased strength;Decreased range of motion;Decreased endurance; Impaired balance;Decreased mobility; Impaired vision; Impaired sensation;Decreased skin integrity  (LUe PPM restrictions )   Assessment Pt is 81 y/o female admitted with increased SOB  Second degree heart block, acute on chronic CHF, JEN on CKD  Pt is s/p PPM on 12/23 and seen in ICU for PT consult  Sling remains in place  Up with assist orders  Prior to admission resides alone in 2 story home with first floor set up  1 YOAV  Ambulates with RW at all times  Denies falls and reports independence with ADLS   Son visits daily  Pt with hx of macular degeneration with visual impairment  Also hx of neuropathy with L foot drop  + MAFO but with limited use per her report  [facility] of rhb earlier [facility]  Currently presents with functional limiations related to visual impairments, LLE sensory impairment, L foot drop, decreased balance, joint integrity with OA, R shoulder ROm limitations, decresaed overall mobiltiy, LUE maintained in sling p PPM   Requires Sharri for bed mobiltiy  ModA of 2 for transfers sit to stand  Max A of 2 for SPT OOB to chair  Difficulty with progression of mobility without LUE use on RW at this time  Will benefit from skilled PT in order to progress and assure functional independence prior to safe d/c to home alone  Increased risk for falls given currently functional level and impairments as listed above  Barriers to Discharge Decreased caregiver support   Barriers to Discharge Comments lives alone   Goals   Patient Goals go home   STG Expiration Date 01/03/20   Short Term Goal #1 10 days: 1)  Pt will perform bed mobility with Benedicto demonstrating appropriate technique 100% of the time in order to improve function  2)  Perform all transfers with Benedicto demonstrating safe and appropriate technique 100% of the time in order to improve ability to negotiate safely in home environment  3) Assess ambulation with RW when sling d/c and revise ambulation goals  4)  Improve overall strength and balance 1/2 grade in order to optimize ability to perform functional tasks and reduce fall risk  5) Increase activity tolerance to 45 minutes in order to improve endurance to functional tasks  6)  Negotiate one step using most appropriate technique and Sharri in order to be able to negotiate safely in home environment  7) PT for ongoing patient and family/caregiver education, DME needs and d/c planning in order to promote highest level of function in least restrictive environment     PT Treatment Day 0   Plan Treatment/Interventions Functional transfer training;LE strengthening/ROM; Elevations; Therapeutic exercise; Endurance training;Patient/family training;Bed mobility; Equipment eval/education;Gait training; Compensatory technique education;Continued evaluation;Spoke to nursing;OT   PT Frequency Other (Comment)  (4-5x/wk)   Recommendation   Recommendation Short-term skilled PT   PT - OK to Discharge   (yes to rhb, NO to home alone )   Barthel Index   Feeding 5   Bathing 0   Grooming Score 0   Dressing Score 0   Bladder Score 5   Bowels Score 10   Toilet Use Score 5   Transfers (Bed/Chair) Score 5   Mobility (Level Surface) Score 0   Stairs Score 0   Barthel Index Score 30     History: co - morbidities, fall risk, use of assistive device, assist for adl's, sling LUE, multiple lines, alone  Exam: impairments in locomotion, musculoskeletal, balance,posture, joint integrity, skin integrity, cardiac, barthel 30   Clinical: unstable/unpredictable (f all risk, ICU monitoring, Ax2, decline from baseline)  Complexity:high    Carmelita Frankie, PT

## 2019-12-24 NOTE — SOCIAL WORK
Discussed PT and OT recommendations with the patient and her son - they are agreeable to STR    Choice is [facility]  CM also provided a list for back up choices  A post acute care recommendation was made by your care team for STR  Discussed Freedom of Choice with both patient and caregiver  List of facilities given to both patient and caregiver via in person  both patient and caregiver aware the list is custom filtered for them by zip code location and that [facility] post acute providers are designated

## 2019-12-24 NOTE — PROGRESS NOTES
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  2  Grade 1 diastolic dysfunction without congestive heart failure  3  Acute kidney injury superimposed on chronic kidney disease  4  Hypertension and hypertensive heart disease    5  Hypokalemia    Patient is doing well from cardiac perspective with normal functioning pacemaker device, the blood pressure is slightly elevated  CARDIOLOGY PLAN:  - She may use the walker gently on her left side with not bearing weight on it  - two-view chest x-ray is ordered reassess lead position and rule out pneumothorax  - restarting losartan 25 mg daily for blood pressure control  She was on this medicine at home also  Will continue amlodipine  Dose may be increased in the rehab of blood pressure continues to be elevated  - patient may be discharged to rehab unit/home from cardiac perspective when cleared from medicine and PT/OT cirrhosis       *-*-*-*-*-*-*-*-*-*-*-*-*-*-*-*-*-*-*-*-*-*-*-*-*-*-*-*-*-*-*-*-*-*-*-*-*-*-*-*-*-*-*-*-*-*-*-*-*-*-*-*-*-*-  INTERVAL CHANGES / HISTORY OF PRESENT ILLNESS:  Patient underwent pacemaker implantation yesterday  Tolerated the procedure well  Has done well overnight  Blood pressure is noted to be slightly elevated  REVIEW OF SYMPTOMS:    Positive for:  Denies any symptoms currently  Negative for: All remaining as reviewed below and in HPI      SYSTEM SYMPTOMS REVIEWED:  Generalweight change, fever, night sweats  Respiratoryl Wheezing, shortness of breath, cough, URI symptoms, sputum, blood  Cardiovascularchest pain, syncope, dyspnea on exertion, edema, decline in exercise tolerance, claudication   Gastrointestinalpersistent vomiting, diarrhea, abdominal distention, blood in stool   Muscular or skeletaljoint pain or swelling   Neurologicheadaches, syncope, abnormal movement  Hematologichistory of easy bruising and bleeding   Endocrinethyroid enlargement, heat or cold intolerance, polyuria   Psychiatricanxiety, depression   *-*-*-*-*-*-*-*-*-*-*-*-*-*-*-*-*-*-*-*-*-*-*-*-*-*-*-*-*-*-*-*-*-*-*-*-*-*-*-*-*-*-*-*-*-*-*-*-*-*-*-*-*-*-    VITAL SIGNS:  Vitals:    19 0700 19 0800 19 0819 19 0853   BP: 165/78 162/84  (!) 180/66   BP Location:  Right arm     Pulse: 68 72  84   Resp: 13 14  (!) 35   Temp:   97 9 °F (36 6 °C)    TempSrc:   Temporal    SpO2: 95% 94%  93%   Weight:       Height:          Temp (24hrs), Av 7 °F (36 5 °C), Min:97 °F (36 1 °C), Max:98 5 °F (36 9 °C)  Current: Temperature: 97 9 °F (36 6 °C)      Intake/Output Summary (Last 24 hours) at 2019 0908  Last data filed at 2019 0535  Gross per 24 hour   Intake 760 ml   Output 1180 ml   Net -420 ml      Weight (last 2 days)     Date/Time   Weight    19 0600   66 1 (145 72)    19 0550   64 7 (142 64)    19 0500   63 7 (140 43)           ,   Wt Readings from Last 3 Encounters:   19 66 1 kg (145 lb 11 6 oz)   19 67 1 kg (148 lb)   10/08/19 67 kg (147 lb 12 8 oz)    , Body mass index is 26 65 kg/m²  *-*-*-*-*-*-*-*-*-*-*-*-*-*-*-*-*-*-*-*-*-*-*-*-*-*-*-*-*-*-*-*-*-*-*-*-*-*-*-*-*-*-*-*-*-*-*-*-*-*-*-*-*-*-  PHYSICAL EXAM:  General Appearance:     sitting comfortably in chair, in no distress appears stated age, small built   Head, Eyes, ENT:    No obvious abnormality, moist mucous mebranes  Neck:   Supple, no carotid bruit or JVD   Back:     Symmetric, no curvature     Lungs: Respirations unlabored  Clear to auscultation bilaterally,    Chest wall:     pacemaker implant site is clean with no tenderness or hematoma over the region  Dressings are dry  Heart:    Regular rate and rhythm, no significant murmur appreciated   Abdomen:     Soft, non-tender, No obvious masses, or organomegaly   Extremities:   Extremities normal, no cyanosis or edema    Skin:   Skin color, texture, turgor normal, no rashes or lesions   *-*-*-*-*-*-*-*-*-*-*-*-*-*-*-*-*-*-*-*-*-*-*-*-*-*-*-*-*-*-*-*-*-*-*-*-*-*-*-*-*-*-*-*-*-*-*-*-*-*-*-*-*-*-    Telemetry reviewed    ATRIAL SENSED VENTRICULAR PACED RHYTHM    NORMAL DEVICE INTERROGATION THIS AM   Confirmed by device representative  Last ECG     ECG this morning shows atrial sensed ventricular paced rhythm at 79 beats per minute with appropriate capture of paced beats  Good septal capture  Results for orders placed or performed during the hospital encounter of 12/20/19   ECG 12 lead   Result Value    Ventricular Rate 55    Atrial Rate 55    UT Interval     QRSD Interval 142    QT Interval 496    QTC Interval 475    P Axis     QRS Axis -44    T Wave Axis 69    Narrative    Atrial fibrillation with slow ventricular response with premature ventricular or aberrantly conducted complexes  Baseline artifact  Left anterior hemiblock  Right bundle branch block  Abnormal ECG  When compared with ECG of 20-DEC-2019 22:30, (unconfirmed)  Atrial fibrillation has replaced Sinus rhythm  Vent  rate has increased BY  20 BPM  QT has shortened  Confirmed by Marquise Jacobsen ([id number]) on 12/23/2019 9:36:11 AM       Medications reviewed         Current Facility-Administered Medications:     acetaminophen (TYLENOL) tablet 650 mg, 650 mg, Oral, Q6H PRN, DEANDRA Gandara, 650 mg at 12/24/19 0538    amLODIPine (NORVASC) tablet 5 mg, 5 mg, Oral, Daily, Ragini Briceño MD, 5 mg at 12/24/19 0900    aspirin chewable tablet 81 mg, 81 mg, Oral, Daily, Juli Naas Spatzer, CRNP, 53 mg at 12/24/19 0900    atorvastatin (LIPITOR) tablet 40 mg, 40 mg, Oral, QPM, Griffith Blas Spatzer, CRNP, 40 mg at 12/23/19 1731    chlorhexidine (PERIDEX) 0 12 % oral rinse 15 mL, 15 mL, Swish & Spit, Q12H [address], Griffith Blas Spatzer, CRNP, 15 mL at 12/24/19 0902    dorzolamide-timolol (COSOPT) 22 3-6 8 MG/ML ophthalmic solution 1 drop, 1 drop, Both Eyes, Daily, Griffith Blas Spatzer, CRNP, 1 drop at 12/23/19 1732    doxepin (SINEquan) capsule 50 mg, 50 mg, Oral, HS, Griffith Blas Spatzer, CRNP, 50 mg at 12/23/19 2104    gabapentin (NEURONTIN) capsule 300 mg, 300 mg, Oral, TID, Griffith Blas Spatzer, CRNP, 300 mg at 12/24/19 0900    heparin (porcine) subcutaneous injection 5,000 Units, 5,000 Units, Subcutaneous, Q8H [address], 5,000 Units at 12/24/19 0536 **AND** [COMPLETED] Platelet count, , , Once, Griffith Blas Spatzer, CRNP    hydrALAZINE (APRESOLINE) injection 10 mg, 10 mg, Intravenous, Q4H PRN, Griffith Blas Spatzer, CRNP, 10 mg at 12/23/19 1730    levETIRAcetam (KEPPRA) tablet 375 mg, 375 mg, Oral, Q12H [address], Griffith Blas Spatzer, CRNP, 375 mg at 12/24/19 0901    pantoprazole (PROTONIX) EC tablet 40 mg, 40 mg, Oral, Early Morning, Griffith Blas Spatzer, CRNP, 40 mg at 12/24/19 5596    Imaging studies results reviewed    No procedure found  *-*-*-*-*-*-*-*-*-*-*-*-*-*-*-*-*-*-*-*-*-*-*-*-*-*-*-*-*-*-*-*-*-*-*-*-*-*-*-*-*-*-*-*-*-*-*-*-*-*-*-*-*-*-    LABORATORY DATA:  I have personally reviewed pertinent labs      CMP:   Results from last 7 days   Lab Units 12/24/19  0613 12/22/19  0519 12/21/19  0500 12/20/19  1709   POTASSIUM mmol/L 4 1 3 5 3 6 4 1   CHLORIDE mmol/L 111* 111* 112* 110*   CO2 mmol/L 25 25 23 22   BUN mg/dL 30* 44* 39* 37*   CREATININE mg/dL 1 13 1 46* 1 30 1 31*   CALCIUM mg/dL 10 1 10 2* 9 9 10 5*   ALK PHOS U/L 188*  --  173* 217*   ALT U/L 71  --  115* 164*   AST U/L 23  --  40 67*       Cardiac Profile:   Troponin I   Date Value Ref Range Status   12/20/2019 0 03 <=0 04 ng/mL Final     Comment:       [employer] Chemistry analyzer 99% cutoff is > 0 04 ng/mL in network labs     o cTnI 99% cutoff is useful only when applied to patients in the clinical setting of myocardial ischemia   o cTnI 99% cutoff should be interpreted in the context of clinical history, ECG findings and possibly cardiac imaging to establish correct diagnosis  o cTnI 99% cutoff may be suggestive but clearly not indicative of a coronary event without the clinical setting of myocardial ischemia      07/23/2019 0 17 (H) <=0 04 ng/mL Final     Comment:       [employer] Chemistry analyzer 99% cutoff is > 0 04 ng/mL in network labs     o cTnI 99% cutoff is useful only when applied to patients in the clinical setting of myocardial ischemia   o cTnI 99% cutoff should be interpreted in the context of clinical history, ECG findings and possibly cardiac imaging to establish correct diagnosis  o cTnI 99% cutoff may be suggestive but clearly not indicative of a coronary event without the clinical setting of myocardial ischemia  Results indicate test should be repeated on new specimen collected within 4-6 hours of the original   07/23/2019 0 16 (H) <=0 04 ng/mL Final     Comment:       [employer] Chemistry analyzer 99% cutoff is > 0 04 ng/mL in network labs     o cTnI 99% cutoff is useful only when applied to patients in the clinical setting of myocardial ischemia   o cTnI 99% cutoff should be interpreted in the context of clinical history, ECG findings and possibly cardiac imaging to establish correct diagnosis  o cTnI 99% cutoff may be suggestive but clearly not indicative of a coronary event without the clinical setting of myocardial ischemia      Results indicate test should be repeated on new specimen collected within 4-6 hours of the original     CK-MB   Date Value Ref Range Status   07/23/2019 2 7 0 0 - 5 0 ng/mL Final     NT-proBNP   Date Value Ref Range Status   12/20/2019 7,010 (H) <450 pg/mL Final       CBC:   Results from last 7 days   Lab Units 12/20/19  0561 19  1709   WBC Thousand/uL  --  6 70   HEMOGLOBIN g/dL  --  10 6*   HEMATOCRIT %  --  34 8   PLATELETS Thousands/uL 80* 120*       PT/INR: No results found for: PT, INR,     Magnesium:   Results from last 7 days   Lab Units 19  0500   MAGNESIUM mg/dL 1 9       Phosphorous:       Microbiology:              *-*-*-*-*-*-*-*-*-*-*-*-*-*-*-*-*-*-*-*-*-*-*-*-*-*-*-*-*-*-*-*-*-*-*-*-*-*-*-*-*-*-*-*-*-*-*-*-*-*-*-*-*-*-  ECHOCARDIOGRAM AND OTHER CARDIOLOGY RESULTS:  Results for orders placed during the hospital encounter of 19   Echo complete with contrast if indicated    Narrative [facility] [address]  [address]  [phone]    Transthoracic Echocardiogram  2D, M-mode, Doppler, and Color Doppler    Study date:  2019    Patient: Gosia Cortez  MR number: [id number]  Account number: [de-identified]  : 04-Oct-1928  Age: 80 years  Gender: Female  Status: Inpatient  Location: Bedside  Height: 62 in  Weight: 154 lb  BP: 161/ 92 mmHg    Indications: Abnormal EKG    Diagnoses: R94 31 - Abnormal electrocardiogram [ECG] [EKG]    Sonographer:  FAYE England  Primary Physician:  DEANDRA Rodriguez  Referring Physician:  DEANDRA Rodriguez  Group:  [id number]  Cardiology Fellow:  Amy Olivares MD  Interpreting Physician:  Wolfgang Garza MD    SUMMARY    LEFT VENTRICLE:  Systolic function was normal  Ejection fraction was estimated to be 60 %  There were no regional wall motion abnormalities  Wall thickness was moderately increased  There was moderate concentric hypertrophy  Doppler parameters were consistent with abnormal left ventricular relaxation (grade 1 diastolic dysfunction)  MITRAL VALVE:  There was marked annular calcification  There was mild regurgitation  AORTIC VALVE:  Transaortic velocity was increased due to valvular stenosis  There was mild stenosis  There was trace regurgitation    Estimated aortic valve area (by Vmax) was 1 66 cmï¾²  TRICUSPID VALVE:  There was trace regurgitation  AORTA:  There was mild dilatation of the ascending aorta  The ascending aortic AP dimension was 39 mm  IVC, HEPATIC VEINS:  The inferior vena cava was mildly dilated  HISTORY: PRIOR HISTORY: HTN,CKD    PROCEDURE: The procedure was performed at the bedside  This was a routine study  The transthoracic approach was used  The study included complete 2D imaging, M-mode, complete spectral Doppler, and color Doppler  The heart rate was 83 bpm,  at the start of the study  Image quality was adequate  LEFT VENTRICLE: Size was normal  Systolic function was normal  Ejection fraction was estimated to be 60 %  There were no regional wall motion abnormalities  Wall thickness was moderately increased  There was moderate concentric  hypertrophy  DOPPLER: Doppler parameters were consistent with abnormal left ventricular relaxation (grade 1 diastolic dysfunction)  VENTRICULAR SEPTUM: There was sigmoid septal appearance  RIGHT VENTRICLE: The size was normal  Systolic function was normal  Wall thickness was normal     LEFT ATRIUM: Size was normal     RIGHT ATRIUM: Size was normal     MITRAL VALVE: There was marked annular calcification  Valve structure was normal  There was normal leaflet separation  DOPPLER: The transmitral velocity was within the normal range  There was no evidence for stenosis  There was mild  regurgitation  AORTIC VALVE: The valve was trileaflet  Leaflets exhibited mildly to moderately increased thickness, mild to moderate calcification, and mildly reduced cuspal separation  DOPPLER: Transaortic velocity was increased due to valvular  stenosis  There was mild stenosis  There was trace regurgitation  TRICUSPID VALVE: The valve structure was normal  There was normal leaflet separation  DOPPLER: The transtricuspid velocity was within the normal range  There was no evidence for stenosis   There was trace regurgitation  Pulmonary artery  systolic pressure was within the normal range  PULMONIC VALVE: Leaflets exhibited normal thickness, no calcification, and normal cuspal separation  DOPPLER: The transpulmonic velocity was within the normal range  There was no significant regurgitation  PERICARDIUM: There was no pericardial effusion  A pericardial fat pad was present  AORTA: The root exhibited normal size  There was mild dilatation of the ascending aorta  SYSTEMIC VEINS: IVC: The inferior vena cava was mildly dilated  Respirophasic changes were normal     MEASUREMENT TABLES    2D MEASUREMENTS  LVOT   (Reference normals)  Diam   21 mm   (--)  Aorta   (Reference normals)  AAo AP diam   39 mm   (--)    DOPPLER MEASUREMENTS  LVOT   (Reference normals)  Peak karine   113 cm/s   (--)  Mean karine   81 cm/s   (--)  VTI   21 cm   (--)  Peak gradient   5 mmHg   (--)  Mean gradient   3 mmHg   (--)  Stroke vol   72 74 ml   (--)  Stroke index   0 47 ml/mï¾²   (--)  Aortic valve   (Reference normals)  Peak karine   233 cm/s   (--)  Mean karine   170 cm/s   (--)  VTI   49 cm   (--)  Peak gradient   25 7 mmHg   (--)  Obstr index, VTI   0 43    (--)  Valve area, VTI   1 49 cmï¾²   (--)  Area index, VTI   0 87 cmï¾²/mï¾²   (--)  Obstr index, Vmax   0 48    (--)  Valve area, Vmax   1 66 cmï¾²   (--)  Area index, Vmax   0 97 cmï¾²/mï¾²   (--)  Obstr index, Vmean   0 48    (--)  Valve area, Vmean   1 66 cmï¾²   (--)  Area index, Vmean   0 97 cmï¾²/mï¾²   (--)    SYSTEM MEASUREMENT TABLES    2D  %FS: 31 5 %  Ao Diam: 2 99 cm  EDV(Teich): 43 16 ml  EF(Teich): 60 72 %  ESV(Teich): 16 95 ml  IVSd: 1 44 cm  LA Area: 18 05 cm2  LA Diam: 3 32 cm  LVEDV MOD A4C: 72 34 ml  LVEF MOD A4C: 71 64 %  LVESV MOD A4C: 20 51 ml  LVIDd: 3 27 cm  LVIDs: 2 24 cm  LVLd A4C: 7 68 cm  LVLs A4C: 5 98 cm  LVPWd: 1 68 cm  RA Area: 16 31 cm2  RVIDd: 4 18 cm  SV MOD A4C: 51 83 ml  SV(Teich): 26 21 ml    CW  AV Env  Ti: 267 59 ms  AV VTI: 42 73 cm  AV Vmax: 2 26 m/s  AV Vmean: 1 6 m/s  AV maxP 39 mmHg  AV meanP 33 mmHg  HR: 162 66 BPM  MV VTI: 31 01 cm  MV Vmax: 1 42 m/s  MV Vmean: 0 84 m/s  MV maxP 06 mmHg  MV meanPG: 3 2 mmHg  TR Vmax: 2 5 m/s  TR maxP 93 mmHg    MM  TAPSE: 1 5 cm    PW  E': 0 06 m/s  E/E': 13 14  LVOT Env  Ti: 262 98 ms  LVOT VTI: 21 34 cm  LVOT Vmax: 1 13 m/s  LVOT Vmean: 0 81 m/s  LVOT maxP 1 mmHg  LVOT meanP 85 mmHg  MV A Minesh: 1 46 m/s  MV Dec Treasure: 4 08 m/s2  MV DecT: 191 81 ms  MV E Minesh: 0 78 m/s  MV E/A Ratio: 0 54  MV PHT: 55 62 ms  MVA By PHT: 3 96 cm2    [facility] Accredited Echocardiography [facility]    Prepared and electronically signed by    Erick Billingsley MD  Signed 2019 16:03:33       No results found for this or any previous visit  No results found for this or any previous visit  No results found for this or any previous visit  *-*-*-*-*-*-*-*-*-*-*-*-*-*-*-*-*-*-*-*-*-*-*-*-*-*-*-*-*-*-*-*-*-*-*-*-*-*-*-*-*-*-*-*-*-*-*-*-*-*-*-*-*-*-  ALLERGIES:  Allergies   Allergen Reactions    Fish-Derived Products      GI upset    Allopurinol Rash     Category:  Allergy;        *-*-*-*-*-*-*-*-*-*-*-*-*-*-*-*-*-*-*-*-*-*-*-*-*-*-*-*-*-*-*-*-*-*-*-*-*-*-*-*-*-*-*-*-*-*-*-*-*-*-*-*-*-*-    SIGNATURES:   @[facility]@   Noelle Jordan MD

## 2019-12-24 NOTE — PLAN OF CARE
Problem: OCCUPATIONAL THERAPY ADULT  Goal: Performs self-care activities at highest level of function for planned discharge setting  See evaluation for individualized goals  Description  Treatment Interventions: ADL retraining, Functional transfer training, UE strengthening/ROM, Endurance training, Patient/family training, Equipment evaluation/education, Compensatory technique education          See flowsheet documentation for full assessment, interventions and recommendations  Note:   Limitation: Decreased ADL status, Decreased UE ROM, Decreased UE strength, Decreased Safe judgement during ADL, Decreased endurance, Decreased high-level ADLs  Prognosis: Good  Assessment: Pt is a 81y/o female admitted to the hospital from her PCP 2* noted SOB, chest pain, bradycardia  Pt noted with 2nd degree heart block, requiring a s/p pacemaker(12/23)  Pt is currently with a L UE sling 2* pacemaker precautions  PTA pt states independence with all aspects of her ADLs, transfers, ambulation--with RW; neg falls, neg   During initial eval, pt demonstrated deficits with her functional balance, functional mobility, activity tolerance(currently fair=15-20mins), b/l UE strength, transfer safety, and ADL status  Pt would benefit from continued OT tx for the above deficits  3-5xwk/1-2wks        OT Discharge Recommendation: Short Term Rehab

## 2019-12-24 NOTE — PLAN OF CARE
Problem: Potential for Falls  Goal: Patient will remain free of falls  Description  INTERVENTIONS:  - Assess patient frequently for physical needs  -  Identify cognitive and physical deficits and behaviors that affect risk of falls    -  [place] fall precautions as indicated by assessment   - Educate patient/family on patient safety including physical limitations  - Instruct patient to call for assistance with activity based on assessment  - Modify environment to reduce risk of injury  - Consider OT/PT consult to assist with strengthening/mobility  Outcome: Progressing     Problem: Prexisting or High Potential for Compromised Skin Integrity  Goal: Skin integrity is maintained or improved  Description  INTERVENTIONS:  - Identify patients at risk for skin breakdown  - Assess and monitor skin integrity  - Assess and monitor nutrition and hydration status  - Monitor labs   - Assess for incontinence   - Turn and reposition patient  - Assist with mobility/ambulation  - Relieve pressure over bony prominences  - Avoid friction and shearing  - Provide appropriate hygiene as needed including keeping skin clean and dry  - Evaluate need for skin moisturizer/barrier cream  - Collaborate with interdisciplinary team   - Patient/family teaching  - Consider wound care consult   Outcome: Progressing     Problem: PAIN - ADULT  Goal: Verbalizes/displays adequate comfort level or baseline comfort level  Description  Interventions:  - Encourage patient to monitor pain and request assistance  - Assess pain using appropriate pain scale  - Administer analgesics based on type and severity of pain and evaluate response  - Implement non-pharmacological measures as appropriate and evaluate response  - Consider cultural and social influences on pain and pain management  - Notify physician/advanced practitioner if interventions unsuccessful or patient reports new pain  Outcome: Progressing     Problem: INFECTION - ADULT  Goal: Absence or prevention of progression during hospitalization  Description  INTERVENTIONS:  - Assess and monitor for signs and symptoms of infection  - Monitor lab/diagnostic results  - Monitor all insertion sites, i e  indwelling lines, tubes, and drains  - Monitor endotracheal if appropriate and nasal secretions for changes in amount and color  - [place] appropriate cooling/warming therapies per order  - Administer medications as ordered  - Instruct and encourage patient and family to use good hand hygiene technique  - Identify and instruct in appropriate isolation precautions for identified infection/condition  Outcome: Progressing     Problem: SAFETY ADULT  Goal: Maintain or return to baseline ADL function  Description  INTERVENTIONS:  -  Assess patient's ability to carry out ADLs; assess patient's baseline for ADL function and identify physical deficits which impact ability to perform ADLs (bathing, care of mouth/teeth, toileting, grooming, dressing, etc )  - Assess/evaluate cause of self-care deficits   - Assess range of motion  - Assess patient's mobility; develop plan if impaired  - Assess patient's need for assistive devices and provide as appropriate  - Encourage maximum independence but intervene and supervise when necessary  - Involve family in performance of ADLs  - Assess for home care needs following discharge   - Consider OT consult to assist with ADL evaluation and planning for discharge  - Provide patient education as appropriate  Outcome: Progressing  Goal: Maintain or return mobility status to optimal level  Description  INTERVENTIONS:  - Assess patient's baseline mobility status (ambulation, transfers, stairs, etc )    - Identify cognitive and physical deficits and behaviors that affect mobility  - Identify mobility aids required to assist with transfers and/or ambulation (gait belt, sit-to-stand, lift, walker, cane, etc )  - [place] fall precautions as indicated by assessment  - Record patient progress and toleration of activity level on Mobility SBAR; progress patient to next Phase/Stage  - Instruct patient to call for assistance with activity based on assessment  - Consider rehabilitation consult to assist with strengthening/weightbearing, etc   Outcome: Progressing     Problem: DISCHARGE PLANNING  Goal: Discharge to home or other facility with appropriate resources  Description  INTERVENTIONS:  - Identify barriers to discharge w/patient and caregiver  - Arrange for needed discharge resources and transportation as appropriate  - Identify discharge learning needs (meds, wound care, etc )  - Arrange for interpretive services to assist at discharge as needed  - Refer to Case Management Department for coordinating discharge planning if the patient needs post-hospital services based on physician/advanced practitioner order or complex needs related to functional status, cognitive ability, or social support system  Outcome: Progressing     Problem: Knowledge Deficit  Goal: Patient/family/caregiver demonstrates understanding of disease process, treatment plan, medications, and discharge instructions  Description  Complete learning assessment and assess knowledge base    Interventions:  - Provide teaching at level of understanding  - Provide teaching via preferred learning methods  Outcome: Progressing     Problem: CARDIOVASCULAR - ADULT  Goal: Maintains optimal cardiac output and hemodynamic stability  Description  INTERVENTIONS:  - Monitor I/O, vital signs and rhythm  - Monitor for S/S and trends of decreased cardiac output  - Administer and titrate ordered vasoactive medications to optimize hemodynamic stability  - Assess quality of pulses, skin color and temperature  - Assess for signs of decreased coronary artery perfusion  - Instruct patient to report change in severity of symptoms  Outcome: Progressing  Goal: Absence of cardiac dysrhythmias or at baseline rhythm  Description  INTERVENTIONS:  - Continuous cardiac monitoring, vital signs, obtain 12 lead EKG if ordered  - Administer antiarrhythmic and heart rate control medications as ordered  - Monitor electrolytes and administer replacement therapy as ordered  Outcome: Progressing     Problem: RESPIRATORY - ADULT  Goal: Achieves optimal ventilation and oxygenation  Description  INTERVENTIONS:  - Assess for changes in respiratory status  - Assess for changes in mentation and behavior  - Position to facilitate oxygenation and minimize respiratory effort  - Oxygen administered by appropriate delivery if ordered  - Initiate smoking cessation education as indicated  - Encourage broncho-pulmonary hygiene including cough, deep breathe, Incentive Spirometry  - Assess the need for suctioning and aspirate as needed  - Assess and instruct to report SOB or any respiratory difficulty  - Respiratory Therapy support as indicated  Outcome: Progressing     Problem: SKIN/TISSUE INTEGRITY - ADULT  Goal: Skin integrity remains intact  Description  INTERVENTIONS  - Identify patients at risk for skin breakdown  - Assess and monitor skin integrity  - Assess and monitor nutrition and hydration status  - Monitor labs (i e  albumin)  - Assess for incontinence   - Turn and reposition patient  - Assist with mobility/ambulation  - Relieve pressure over bony prominences  - Avoid friction and shearing  - Provide appropriate hygiene as needed including keeping skin clean and dry  - Evaluate need for skin moisturizer/barrier cream  - Collaborate with interdisciplinary team (i e  Nutrition, Rehabilitation, etc )   - Patient/family teaching  Outcome: Progressing

## 2019-12-24 NOTE — DISCHARGE INSTRUCTIONS
Pacemaker     -Sling x48 hours  -Do not raise arm over shoulder for next 6 weeks   -Do not lift greater than 10 lbs with arm on side of device for the next 2 weeks  -You may shower but do not take a bath or swim  Do not scrub in shower  Blot dry with towel or air dry with blow dryer   -Pacemaker dressing needs to stay on for 1 week  Thereafter you can take it off  Gently stretch at the edges of the dressing to get it off  There is an additional dressing underneath the outer layer  Please leave the inner layer on until you are seen in our device clinic (2 weeks)  You can shower with the dressing on but do not scrub with soap or water  When drying off do not use a hair drier  Gently blot dry with a towel  If the inner layer falls of by itself this is OK    -----------------------------------------------------------------------------------------------      WHAT YOU SHOULD KNOW:   A pacemaker is a small, battery-powered device that is implanted into your chest to help regulate your heart rate  AFTER YOU LEAVE:   Medicines:   · Pain medicine: You may need medicine to take away or decrease pain  ¨ Learn how to take your medicine  Ask what medicine and how much you should take  Be sure you know how, when, and how often to take it  ¨ Do not wait until the pain is severe before you take your medicine  Tell caregivers if your pain does not decrease  ¨ Pain medicine can make you dizzy or sleepy  Prevent falls by calling someone when you get out of bed or if you need help  · Antibiotics: This medicine is given to fight or prevent an infection caused by bacteria  Always take your antibiotics exactly as ordered by your healthcare provider  Do not stop taking your medicine unless directed by your healthcare provider  Never save antibiotics or take leftover antibiotics that were given to you for another illness  · Take your medicine as directed    Call your healthcare provider if you think your medicine is not helping or if you have side effects  Tell him if you are allergic to any medicine  Keep a list of the medicines, vitamins, and herbs you take  Include the amounts, and when and why you take them  Bring the list or the pill bottles to follow-up visits  Carry your medicine list with you in case of an emergency  Follow up with your cardiologist after your procedure: You may need a follow-up visit after you leave the hospital  Your cardiologist will check your wound and make sure that your pacemaker is working correctly  Follow the instructions to check your pacemaker: Your cardiologist or primary healthcare provider will check your pacemaker and the battery regularly, usually every 3 to 6 months  He may do this in his office  He may also use a computer to check your pacemaker over the telephone between visits  Pacemaker batteries usually last 5 to 8 years  The pacemaker unit will be replaced when the battery gets low  This is a simpler procedure than the original one to implant your pacemaker  Wound care:  Keep your incisions clean and dry for 7 to 10 days after your procedure  Ask your caregiver how to care for your incisions and when you can shower or bathe  Activity:   · Arm movement and lifting:  Be careful using the arm on the side of your pacemaker  Do not move your arm for the first 24 hours after your procedure  Do not  lift your arm above your shoulder or lift more than 10 pounds for 6 weeks after your procedure  This helps the leads stay in place and helps your wound heal  Ask your caregiver when you can drive after your procedure  · Sports:  Ask your caregiver when it is okay to play tennis, golf, basketball, or any sport that requires you to lift your arms  Do not play full contact sports, such as football, that could damage your pacemaker  Ask your cardiologist or primary healthcare provider how much and what kinds of physical activity are safe for you    Living with a pacemaker:   · Tell all caregivers you have a pacemaker: This includes surgeons, radiologists, and medical technicians  You may want to wear a medical alert ID bracelet or necklace that states that you have a pacemaker  · Carry your pacemaker ID card: Make sure you receive a pacemaker ID card  Carry it with you at all times  It lists important information about your pacemaker  Show it to airport security if you travel  · Avoid electrical interference:  Avoid welding equipment, MRI machines, and other equipment with large magnets or electric fields  These things could interfere with how your pacemaker works  Use your cell phone on the ear opposite from your pacemaker  Do not carry your cell phone in your shirt pocket over your chest      · Do not touch the skin around your pacemaker: This can cause damage to the lead wires or move the pacemaker unit from where it should be  Contact your cardiologist or primary healthcare provider if:   · The area around your pacemaker is painful or tender after surgery  · The skin around your stitches is red, swollen, or has drainage  This may mean that you have an infection  · You have a fever  · You have chills, a cough, and feel weak or achy  These are also signs of infection  · Your feet or ankles are swollen  Seek care immediately if:   · Your bandage becomes soaked with blood  · Your stitches open up  · You feel your heart suddenly beating very slowly or quickly  · You become too weak or dizzy to stand, or you pass out  · Your arm or leg feels warm, tender, and painful  It may look swollen and red  You have chest pain that does not go away with rest or medicine  · You feel lightheaded, short of breath, and have chest pain  You cough up blood  © 2014 [address] is for End User's use only and may not be sold, redistributed or otherwise used for commercial purposes   All illustrations and images included in CareNotes® are the copyrighted property of [employer]  or [place]  The above information is an  only  It is not intended as medical advice for individual conditions or treatments  Talk to your doctor, nurse or pharmacist before following any medical regimen to see if it is safe and effective for you

## 2019-12-24 NOTE — PROGRESS NOTES
Progress Note - Prabhu Pickering 80 y o  female MRN: [id number]    Unit/Bed#: ICU 12 Encounter: [id number]    Assessment/Plan:    Mobitz type 2    status post dual chamber pacemaker placement    JEN/CKD3    creatinine at baseline 1 13    Hypertension    slightly elevated increase ARB and Norvasc    Dyslipidemia    continue statin and Zetia for LDL control    Ambulatory dysfunction  recommending short-term rehab    History of seizure disorder  continue Neurontin and Keppra    Chronic diastolic HF   appears compensated continue Cardiology follow-up    Subjective:   Feels good, denies chest discomfort shortness of breath nausea vomiting diarrhea no fevers chills appetite stable    Objective:     Vitals: Blood pressure 158/67, pulse 72, temperature 97 9 °F (36 6 °C), temperature source Temporal, resp  rate 20, height 5' 2" (1 575 m), weight 66 1 kg (145 lb 11 6 oz), SpO2 96 %  ,Body mass index is 26 65 kg/m²        Results from last 7 days   Lab Units 12/20/19  2345 12/20/19  1709   WBC Thousand/uL  --  6 70   HEMOGLOBIN g/dL  --  10 6*   HEMATOCRIT %  --  34 8   PLATELETS Thousands/uL 80* 120*     Results from last 7 days   Lab Units 12/24/19  0613   POTASSIUM mmol/L 4 1   CHLORIDE mmol/L 111*   CO2 mmol/L 25   BUN mg/dL 30*   CREATININE mg/dL 1 13   CALCIUM mg/dL 10 1   ALK PHOS U/L 188*   ALT U/L 71   AST U/L 23       Scheduled Meds:    Current Facility-Administered Medications:  acetaminophen 650 mg Oral Q6H PRN DEANDRA Cuadra   amLODIPine 5 mg Oral Daily Ragini Briceño MD   aspirin 81 mg Oral Daily Baxter Olea Spatzer, CRNP   atorvastatin 40 mg Oral QPM Baxter Olea Spatzer, CRNP   chlorhexidine 15 mL Swish & Spit Q12H [address] Baxter Olea Spatzer, CRNP   dorzolamide-timolol 1 drop Both Eyes Daily Baxter Olea Spatzer, CRNP   doxepin 50 mg Oral HS Baxter Olea Spatzer, CRNP   gabapentin 300 mg Oral TID Baxter Olea Spatzer, CRNP   heparin (porcine) 5,000 Units Subcutaneous [facility] Baxter Olea Spatzer, CRNP   hydrALAZINE 10 mg Intravenous Q4H PRN Georgette Sabot Spatzer, CRNP   levETIRAcetam 375 mg Oral Q12H [address] Georgette Sabot Spatzer, CRNP   losartan 25 mg Oral Daily Ragini Briceño MD   pantoprazole 40 mg Oral Early Morning Georgette Sabot Spatzer, CRNP       Continuous Infusions:     Physical exam:  General appearance:  Alert no distress interaction appropriate elderly  Head/Eyes:  Nonicteric PERRL EOMI  Neck:  Supple  Lungs: CTA bilateral no wheezing rhonchi or rales  Heart: normal S1 S2 regular paced  Abdomen: Soft nontender with bowel sounds  Extremities: no edema  Skin: no rash    Invasive Devices     Peripheral Intravenous Line            Peripheral IV 12/23/19 Left Antecubital less than 1 day    Peripheral IV 12/24/19 Right Arm less than 1 day                  VTE Pharmacologic Prophylaxis:  Heparin    Counseling / Coordination of Care  Total floor / unit time spent today 30  minutes  Greater than 50% of total time was spent with the patient and / or family counseling and / or coordination of care    A description of the counseling / coordination of care:

## 2019-12-24 NOTE — SOCIAL WORK
Patient accepted at [facility] for tonight, son and patient agreeable to plan  Called [facility] - transport set up for 7:15 pm  With Phylicia via [facility]  IMM letter provided to the son       Report:   [phone]     Fax:   [phone]

## 2019-12-24 NOTE — SOCIAL WORK
No beds at the[facility]cre  [facility] and to [facility] after the discussion with the patient and son

## 2019-12-24 NOTE — OCCUPATIONAL THERAPY NOTE
OccupationalTherapy Evaluation(time=0815-0900)     Patient Name: Filipe Schwartz  NHABH'Y Date: 12/24/2019  Problem List  Principal Problem: Mobitz type 2 second degree heart block  Active Problems:    Essential hypertension    Acute kidney injury superimposed on chronic kidney disease (HCC)    Acute on chronic diastolic congestive heart failure (HCC)    Seizure disorder [facility])    Past Medical History  Past Medical History:   Diagnosis Date    Depression     Gout     H/O vaginal hysterectomy     resolved-4/17/2015    Skin cancer      Past Surgical History  Past Surgical History:   Procedure Laterality Date    CATARACT EXTRACTION      TOTAL ABDOMINAL HYSTERECTOMY      with removal of both ovaries    VAGINAL HYSTERECTOMY      resolved-4/17/2015 12/24/19 0901   Note Type   Note type Eval only   Restrictions/Precautions   Braces or Orthoses Sling  (L UE 2* pacemaker)   Other Precautions Fall Risk;Telemetry;Multiple lines;Visual impairment   Pain Assessment   Pain Assessment No/denies pain   Home Living   Type of [address] [place] [address] Two level; Able to live on main level with bedroom/bathroom  (1 jairo w/o railing)   Bathroom Shower/Tub Tub/shower unit   Bathroom Toilet Standard   Bathroom Equipment Tub transfer bench;Commode   Home Equipment Walker;Electric scooter   Prior Function   Lives With [address] [place] [address] in the last 6 months 0   Lifestyle   Autonomy PTA pt states independence with all aspects of her ADLs, transfers, ambulation--with RW; neg falls, neg    Reciprocal Relationships 2 children   Service to Others homemaker   Intrinsic Gratification needlework   Psychosocial   Psychosocial (WDL) WDL   Subjective   Subjective "I was walking the halls the other day "   ADL   Where Assessed Edge of bed   Eating Assistance 5  Supervision/Setup   Grooming Assistance 5  Supervision/Setup   C/ [address] 3 Moderate Assistance   UB Dressing Assistance 4  Minimal Assistance   LB Dressing Assistance 3  Moderate Assistance   Bed Mobility   Supine to Sit 4  Minimal assistance   Additional items Assist x 1; Increased time required;Verbal cues;LE management   Additional Comments   (bp's=178/82--EOB, nsg aware)   Transfers   Sit to Stand 3  Moderate assistance   Additional items Assist x 2; Increased time required;Verbal cues   Stand to Sit 2  Maximal assistance   Additional items Assist x 2; Increased time required;Verbal cues   Stand pivot 2  Maximal assistance   Additional items Assist x 2; Increased time required;Verbal cues   Functional Mobility   Functional Mobility 3  Moderate assistance   Additional Comments x2   Additional items Hand hold assistance   Balance   Static Sitting Fair +   Dynamic Sitting Fair -   Static Standing Poor   Dynamic Standing Poor -   Activity Tolerance   Activity Tolerance Patient limited by fatigue   Medical Staff Made Aware nsg, P T     RUE Assessment   RUE Assessment X  (poor P/AROM R shr, elbow-distal=WFLs)   RUE Strength   RUE Overall Strength   (shr=2-/5, elbow-distal=4/5;pt with hx shr problems)   LUE Assessment   LUE Assessment X  (shr=NT; elbow-distal=WFLs)   LUE Strength   LUE Overall Strength   (shr=NT, elbow-distal=4/5 )   Hand Function   Gross Motor Coordination Functional   Fine Motor Coordination Functional   Sensation   Light Touch No apparent deficits   Proprioception   Proprioception No apparent deficits   Vision-Basic Assessment   Current Vision Wears glasses all the time   Visual History Macular degeneration   Vision - Complex Assessment   Acuity   (impaired)   Perception   Inattention/Neglect Appears intact   Cognition   Overall Cognitive Status WFL   Arousal/Participation Alert   Attention Within functional limits   Orientation Level Oriented X4   Memory Within functional limits   Following Commands Follows one step commands without difficulty   Assessment   Limitation Decreased ADL status; Decreased UE ROM; Decreased UE strength;Decreased Safe judgement during ADL;Decreased endurance;Decreased high-level ADLs   Prognosis Good   Assessment Pt is a 81y/o female admitted to the hospital from her PCP 2* noted SOB, chest pain, bradycardia  Pt noted with 2nd degree heart block, requiring a s/p pacemaker(12/23)  Pt is currently with a L UE sling 2* pacemaker precautions  PTA pt states independence with all aspects of her ADLs, transfers, ambulation--with RW; neg falls, neg   During initial eval, pt demonstrated deficits with her functional balance, functional mobility, activity tolerance(currently fair=15-20mins), b/l UE strength, transfer safety, and ADL status  Pt would benefit from continued OT tx for the above deficits  3-5xwk/1-2wks  Goals   Patient Goals "to go home "   STG Time Frame   (1-7 days)   Short Term Goal #1 Pt will demonstrate improved activity tolerance to good(20-30mins) and standing tolerance to 3-5mins to assist with ADLs  Short Term Goal #2 Pt will demonstrate supervision with her bed mobility and sit-stand transfers to assist with ADLs  Short Term Goal  Pt will demonstrate proper walker/transfer safety 100% of the time  LTG Time Frame   (7-14days)   Long Term Goal #1 Pt will demonstrate g/g- balance with all functional activities  Long Term Goal #2 Pt will demonstrate mod I with their UE and LE bathing/dresssing  Long Term Goal Pt will independently demonstrate knowledge and application of proper energy conservation techniques 100% of the time  Plan   Treatment Interventions ADL retraining;Functional transfer training;UE strengthening/ROM; Endurance training;Patient/family training;Equipment evaluation/education; Compensatory technique education   Goal Expiration Date 01/07/20   OT Treatment Day 0   OT Frequency 3-5x/wk   Recommendation   OT Discharge Recommendation Short Term Rehab   Barthel Index   Feeding 5   Bathing 0   Grooming Score 0 Dressing Score 0   Bladder Score 5   Bowels Score 10   Toilet Use Score 5   Transfers (Bed/Chair) Score 5   Mobility (Level Surface) Score 0   Stairs Score 5   Barthel Index Score 35   Maira Alvarez, OT

## 2019-12-26 ENCOUNTER — TRANSITIONAL CARE MANAGEMENT (OUTPATIENT)
Dept: FAMILY MEDICINE CLINIC | Facility: CLINIC | Age: 84
End: 2019-12-26

## 2019-12-26 LAB
ATRIAL RATE: 79 BPM
P AXIS: 45 DEGREES
QRS AXIS: -67 DEGREES
QRSD INTERVAL: 154 MS
QT INTERVAL: 433 MS
QTC INTERVAL: 497 MS
T WAVE AXIS: 93 DEGREES
VENTRICULAR RATE: 79 BPM

## 2019-12-26 PROCEDURE — 93010 ELECTROCARDIOGRAM REPORT: CPT

## 2019-12-27 NOTE — PROGRESS NOTES
Pt's son states that pt is currently in rehab and will give us a call to make an appt when she is discharged from rehab

## 2019-12-30 ENCOUNTER — TELEPHONE (OUTPATIENT)
Dept: CARDIOLOGY CLINIC | Facility: CLINIC | Age: 84
End: 2019-12-30

## 2020-01-03 ENCOUNTER — TRANSITIONAL CARE MANAGEMENT (OUTPATIENT)
Dept: FAMILY MEDICINE CLINIC | Facility: CLINIC | Age: 85
End: 2020-01-03

## 2020-01-06 ENCOUNTER — PATIENT OUTREACH (OUTPATIENT)
Dept: FAMILY MEDICINE CLINIC | Facility: CLINIC | Age: 85
End: 2020-01-06

## 2020-01-06 NOTE — PROGRESS NOTES
Outpatient Nurse Care Manager initial outreach to Mada  Introduced self and services  She declined offer of my participation in her care team as support at this time  Encouraged to call or let PCP know if she changes her mind

## 2020-01-10 ENCOUNTER — OFFICE VISIT (OUTPATIENT)
Dept: FAMILY MEDICINE CLINIC | Facility: CLINIC | Age: 85
End: 2020-01-10
Payer: MEDICARE

## 2020-01-10 ENCOUNTER — IN-CLINIC DEVICE VISIT (OUTPATIENT)
Dept: CARDIOLOGY CLINIC | Facility: CLINIC | Age: 85
End: 2020-01-10

## 2020-01-10 VITALS
WEIGHT: 142 LBS | HEART RATE: 84 BPM | TEMPERATURE: 98 F | DIASTOLIC BLOOD PRESSURE: 84 MMHG | SYSTOLIC BLOOD PRESSURE: 148 MMHG | BODY MASS INDEX: 26.13 KG/M2 | OXYGEN SATURATION: 99 % | HEIGHT: 62 IN

## 2020-01-10 DIAGNOSIS — I44.1 MOBITZ TYPE 2 SECOND DEGREE HEART BLOCK: Primary | ICD-10-CM

## 2020-01-10 DIAGNOSIS — J90 PLEURAL EFFUSION: ICD-10-CM

## 2020-01-10 DIAGNOSIS — I10 ESSENTIAL HYPERTENSION: ICD-10-CM

## 2020-01-10 DIAGNOSIS — Z95.0 CARDIAC PACEMAKER: Primary | ICD-10-CM

## 2020-01-10 DIAGNOSIS — G40.909 SEIZURE DISORDER (HCC): ICD-10-CM

## 2020-01-10 PROCEDURE — 99024 POSTOP FOLLOW-UP VISIT: CPT | Performed by: INTERNAL MEDICINE

## 2020-01-10 PROCEDURE — 99496 TRANSJ CARE MGMT HIGH F2F 7D: CPT | Performed by: NURSE PRACTITIONER

## 2020-01-10 NOTE — PROGRESS NOTES
Results for orders placed or performed in visit on 01/10/20   Cardiac EP device report    Narrative    MDT DUAL PPM  DEVICE INTERROGATED IN THE [place] OFFICE  WOUND CHECK: INCISION CLEAN AND DRY WITH EDGES APPROXIMATED; WOUND CARE AND RESTRICTIONS REVIEWED WITH PATIENT  BATTERY VOLTAGE ADEQUATE (CARLIN - 11 8 YRS)  PRESENTING EGRM AS/ @ 72 PPM  AP 4%  99% (>40%/AVB - DDD 60/110)  ALL LEAD PARAMETERS WITHIN NORMAL LIMITS  ALL OTHER TESTING WITHIN NORMAL LIMITS  NO NEW SIGNIFICANT HIGH RATE EPISODES SINCE HOME REMOTE ALERT TRANSMISSION 1/7/20 PREVIOUSLY REPORTED  PT SCHEDULED WITH DR MUJICA 1/27/20 TO ESTABLISH CARDIAC F/U  NO PROGRAMMING CHANGES MADE TO DEVICE PARAMETERS  PACEMAKER FUNCTIONING APPROPRIATELY       EB

## 2020-01-10 NOTE — PROGRESS NOTES
Assessment/Plan:    Mobitz type 2 second degree heart block  Patient feeling much better since dual chamber pacemaker placement  Has been overall doing well  Has follow up with cardiology 1/27  Reviewed restrictions with patient and son  Essential hypertension  Improved with addition of amlodipine  Will continue to monitor  Patient asymptomatic  Pleural effusion  Will update chest x ray for recheck  Patient asymptomatic  Seizure disorder [facility])  Patient has had work up that has not proven any seizure activity  She never followed up with neuro since summer 2019 when this event happened  Patient is requesting to come off of Keppra  Will reach out to her PCP for input, otherwise she will need to f/u with neuro to decide if she still needs to be on this or not  Diagnoses and all orders for this visit:    Mobitz type 2 second degree heart block    Essential hypertension    Pleural effusion  -     XR chest pa & lateral; Future    Seizure disorder (HCC)          Subjective:        Patient ID: Kourtney Beauchamp is a 80 y o  female  Chief Complaint   Patient presents with    Transition of Care Management     pt was admitted to [facility] on 12/20/2019 regarding cardiac problems; D/C: 01/03/2020  pt wants to know if she should still be taking Keppra, she was prescribed this med last summer for a seizure, states she did not have a seizure  Patient presents to office today for hospital follow up  She initially was seen by myself in the office 12/20/19 with cold symptoms but was found to have bradycardia and EKG showing evidence of heart block  She was advised to go to ER  She was admitted to [facility] 12/20 and discharged 12/24  Cardiology was consulted and she had placement of dual chamber pace maker  On day of discharge she had normal functioning pacemaker  They recommended [facility] for rehab on discharge due to ambulatory dysfunction   Her blood pressure was not well controlled during her stay which resulted in the addition of amlodipine to her ARB which may require continued titration  BP on day of discharge was 166/83  X rays during hospital stay showed pleural effusions  She has follow up appointment with cardiology 1/27/20  She states she has been feeling well and she feels much better since getting pacemaker  She has a question regarding her Keppra and questioning the need to be on it  The following portions of the patient's history were reviewed and updated as appropriate: allergies, current medications, past family history, past social history and problem list     Review of Systems   Constitutional: Negative for chills and fever  Eyes: Negative for discharge  Respiratory: Negative for shortness of breath  Cardiovascular: Negative for chest pain  Gastrointestinal: Negative for constipation and diarrhea  Genitourinary: Negative for difficulty urinating  Musculoskeletal: Negative for joint swelling  Skin: Negative for rash  Neurological: Negative for headaches  Hematological: Negative for adenopathy  Psychiatric/Behavioral: The patient is not nervous/anxious  Objective:  TCM Call (since 12/10/2019)     Date and time call was made  1/3/2020 10:31 AM    Hospital care reviewed  Records reviewed    Patient was hospitialized at  [facility] - CLOSED    Date of Admission  12/20/19    Date of discharge  01/03/20    Diagnosis  Mobitz type 2 second degree heart block    Disposition  Home; Rehabilitation center    Were the patients medications reviewed and updated  Yes    Current Symptoms  None      TCM Call (since 12/10/2019)     Scheduled for follow up?   Yes    Did you obtain your prescribed medications  Yes    Do you need help managing your prescriptions or medications  No    Is transportation to your appointment needed  No    I have advised the patient to call PCP with any new or worsening symptoms  John Jain CMA    Counseling  Caregiver    Comments spoke with [facility]        /84 (BP Location: Left arm, Patient Position: Sitting, Cuff Size: Standard)   Pulse 84   Temp 98 °F (36 7 °C) (Temporal)   Ht 5' 2" (1 575 m)   Wt 64 4 kg (142 lb)   SpO2 99%   BMI 25 97 kg/m²      Physical Exam   Constitutional: She is oriented to person, place, and time  She appears well-developed  No distress  overweight   HENT:   Head: Normocephalic and atraumatic  Right Ear: External ear normal    Left Ear: External ear normal    Eyes: Conjunctivae and lids are normal  Right eye exhibits no discharge  Left eye exhibits no discharge  Neck: Normal range of motion  Neck supple  Cardiovascular: Normal rate and regular rhythm  No murmur heard  Pulmonary/Chest: Effort normal and breath sounds normal  No respiratory distress  She has no wheezes  Musculoskeletal: She exhibits no deformity  Neurological: She is alert and oriented to person, place, and time  Skin: Skin is warm and dry  She is not diaphoretic  Psychiatric: She has a normal mood and affect  Her speech is normal and behavior is normal  Judgment and thought content normal  Cognition and memory are normal    Nursing note and vitals reviewed                 Current Outpatient Medications:     amLODIPine (NORVASC) 10 mg tablet, Take 1 tablet (10 mg total) by mouth daily, Disp: , Rfl: 0    aspirin 81 MG tablet, Take by mouth, Disp: , Rfl:     atorvastatin (LIPITOR) 40 mg tablet, Take 1 tablet (40 mg total) by mouth every evening, Disp: 90 tablet, Rfl: 1    dorzolamide-timolol (COSOPT) 22 3-6 8 MG/ML ophthalmic solution, INSTILL 1 DROP IN EACH EYE EVERY DAY, Disp: , Rfl: 3    doxepin (SINEquan) 50 mg capsule, Take 1 capsule (50 mg total) by mouth daily at bedtime, Disp: 90 capsule, Rfl: 0    ezetimibe (ZETIA) 10 mg tablet, Take 1 tablet (10 mg total) by mouth daily, Disp: 90 tablet, Rfl: 1    gabapentin (NEURONTIN) 300 mg capsule, Take 1 capsule (300 mg total) by mouth 3 (three) times a day And may add an extra daily p r n , Disp: 360 capsule, Rfl: 3    lidocaine (LIDODERM) 5 %, Apply 1 patch topically daily Remove & Discard patch within 12 hours or as directed by MD, Disp: 30 patch, Rfl: 0    omeprazole (PriLOSEC) 20 mg delayed release capsule, Take 1 capsule (20 mg total) by mouth daily, Disp: 90 capsule, Rfl: 1    telmisartan (MICARDIS) 20 MG tablet, Take 1 tablet (20 mg total) by mouth daily, Disp: 90 tablet, Rfl: 3    levETIRAcetam (KEPPRA) 750 mg tablet, Take 0 5 tablets (375 mg total) by mouth every 12 (twelve) hours, Disp: 90 tablet, Rfl: 1  Allergies   Allergen Reactions    Fish-Derived Products      GI upset    Allopurinol Rash     Category:  Allergy;

## 2020-01-10 NOTE — PATIENT INSTRUCTIONS
Complete chest x ray  Continue to follow up with cardiology  Follow up with neurology in regards to being on Keppra  Please call the office if you are experiencing any worsening of symptoms or no symptom improvement  Pacemaker   WHAT YOU NEED TO KNOW:   A pacemaker is a small, battery-powered device that is implanted into your chest to help regulate your heart rate  DISCHARGE INSTRUCTIONS:   Medicines:   · Pain medicine: You may need medicine to take away or decrease pain  ¨ Learn how to take your medicine  Ask what medicine and how much you should take  Be sure you know how, when, and how often to take it  ¨ Do not wait until the pain is severe before you take your medicine  Tell caregivers if your pain does not decrease  ¨ Pain medicine can make you dizzy or sleepy  Prevent falls by calling someone when you get out of bed or if you need help  · Antibiotics: This medicine is given to fight or prevent an infection caused by bacteria  Always take your antibiotics exactly as ordered by your healthcare provider  Do not stop taking your medicine unless directed by your healthcare provider  Never save antibiotics or take leftover antibiotics that were given to you for another illness  · Take your medicine as directed  Contact your healthcare provider if you think your medicine is not helping or if you have side effects  Tell him or her if you are allergic to any medicine  Keep a list of the medicines, vitamins, and herbs you take  Include the amounts, and when and why you take them  Bring the list or the pill bottles to follow-up visits  Carry your medicine list with you in case of an emergency  Follow up with your cardiologist after your procedure: You may need a follow-up visit 7 to 10 days after you leave the hospital  Your cardiologist will check your wound and make sure that your pacemaker is working correctly  Follow the instructions to check your pacemaker:   Your cardiologist or healthcare provider will check your pacemaker and the battery regularly, usually every 3 to 6 months  He may do this in his office  He may also use a computer to check your pacemaker over the telephone between visits  Pacemaker batteries usually last 5 to 8 years  The pacemaker unit will be replaced when the battery gets low  This is a simpler procedure than the original one to implant your pacemaker  Heart rate checks:   · You may need to use a heart monitor at home after your procedure  This device may be called an event monitor, Holter monitor, or mobile telemetry  Monitoring may be done for a period of time, such as a week, or at regular intervals until your heart rhythm is regular  · You may be taught how to check your pulse on your wrist or on your neck  This allows you to monitor how your pacemaker is working  A normal heart beats 50 to 70 times a minute when you are resting  Ask what your pulse should be (your pacemaker's set rate)  Wound care:  Keep your incisions clean and dry for 7 to 10 days after your procedure  Ask your healthcare provider how to care for your incisions and when you can shower or bathe  Activity:   · Arm movement and lifting:  Be careful using the arm on the side of your pacemaker  Do not move your arm for the first 24 hours after your procedure  Do not  lift your arm above your shoulder or lift more than 10 pounds for 6 weeks after your procedure  This helps the leads stay in place and helps your wound heal  Ask your healthcare provider when you can drive after your procedure  · Sports:  Ask your healthcare provider when it is okay to play tennis, golf, basketball, or any sport that requires you to lift your arms  Do not play full contact sports, such as football, that could damage your pacemaker  Ask your cardiologist or healthcare provider how much and what kinds of physical activity are safe for you    Living with a pacemaker:   · Tell all healthcare providers you have a pacemaker: This includes surgeons, radiologists, and medical technicians  You may want to wear a medical alert ID bracelet or necklace that states that you have a pacemaker  · Carry your pacemaker ID card: Make sure you receive a pacemaker ID card  Carry it with you at all times  It lists important information about your pacemaker  Show it to airport security if you travel  · Avoid electrical interference:  Avoid welding equipment, MRI machines, and other equipment with large magnets or electric fields  These things could interfere with how your pacemaker works  Use your cell phone on the ear opposite from your pacemaker  Do not carry your cell phone in your shirt pocket over your chest      · Do not touch the skin around your pacemaker: This can cause damage to the lead wires or move the pacemaker unit from where it should be  Contact your cardiologist or healthcare provider if:   · The area around your pacemaker is painful or tender after surgery  · The skin around your stitches is red, swollen, or has drainage  This may mean that you have an infection  · You have a fever  · You have chills, a cough, and feel weak or achy  These are also signs of infection  · Your feet or ankles are swollen  Seek care immediately if:   · Your bandage becomes soaked with blood  · Your stitches open up  · You feel your heart suddenly beating very slowly or quickly  · You become too weak or dizzy to stand, or you pass out  · Your arm or leg feels warm, tender, and painful  It may look swollen and red  You have chest pain that does not go away with rest or medicine  · You feel lightheaded, short of breath, and have chest pain  You cough up blood  © 2017 [employer] Information is for End User's use only and may not be sold, redistributed or otherwise used for commercial purposes   All illustrations and images included in CareNotes® are the copyrighted property of [employer]  or [employer]  The above information is an  only  It is not intended as medical advice for individual conditions or treatments  Talk to your doctor, nurse or pharmacist before following any medical regimen to see if it is safe and effective for you

## 2020-01-11 NOTE — ASSESSMENT & PLAN NOTE
Patient feeling much better since dual chamber pacemaker placement  Has been overall doing well  Has follow up with cardiology 1/27  Reviewed restrictions with patient and son

## 2020-01-11 NOTE — ASSESSMENT & PLAN NOTE
Patient has had work up that has not proven any seizure activity  She never followed up with neuro since summer 2019 when this event happened  [facility]  Will reach out to her PCP for input, otherwise she will need to f/u with neuro to decide if she still needs to be on this or not

## 2020-01-27 ENCOUNTER — OFFICE VISIT (OUTPATIENT)
Dept: CARDIOLOGY CLINIC | Facility: CLINIC | Age: 85
End: 2020-01-27

## 2020-01-27 VITALS
HEART RATE: 98 BPM | SYSTOLIC BLOOD PRESSURE: 150 MMHG | WEIGHT: 139 LBS | DIASTOLIC BLOOD PRESSURE: 90 MMHG | OXYGEN SATURATION: 99 % | HEIGHT: 62 IN | BODY MASS INDEX: 25.58 KG/M2

## 2020-01-27 DIAGNOSIS — R26.2 AMBULATORY DYSFUNCTION: ICD-10-CM

## 2020-01-27 DIAGNOSIS — E78.2 MIXED HYPERLIPIDEMIA: ICD-10-CM

## 2020-01-27 DIAGNOSIS — Z95.0 STATUS POST PLACEMENT OF CARDIAC PACEMAKER: ICD-10-CM

## 2020-01-27 DIAGNOSIS — I10 ESSENTIAL HYPERTENSION: Primary | ICD-10-CM

## 2020-01-27 DIAGNOSIS — I27.20 MILD PULMONARY HYPERTENSION (HCC): ICD-10-CM

## 2020-01-27 PROBLEM — I44.1 MOBITZ TYPE 2 SECOND DEGREE HEART BLOCK: Status: RESOLVED | Noted: 2019-12-20 | Resolved: 2020-01-27

## 2020-01-27 PROBLEM — I35.0 NONRHEUMATIC AORTIC VALVE STENOSIS: Status: ACTIVE | Noted: 2020-01-27

## 2020-01-27 PROCEDURE — 99024 POSTOP FOLLOW-UP VISIT: CPT | Performed by: INTERNAL MEDICINE

## 2020-01-27 RX ORDER — AMLODIPINE BESYLATE 5 MG/1
10 TABLET ORAL DAILY
Start: 2020-01-27 | End: 2020-01-27 | Stop reason: SDUPTHER

## 2020-01-27 RX ORDER — AMLODIPINE BESYLATE 5 MG/1
5 TABLET ORAL DAILY
Qty: 30 TABLET | Refills: 6 | Status: SHIPPED | OUTPATIENT
Start: 2020-01-27 | End: 2020-07-20 | Stop reason: SDUPTHER

## 2020-01-27 NOTE — ASSESSMENT & PLAN NOTE
Patient is overall doing well from cardiac perspective  Though her blood pressure is slightly elevated she has had no recent complaints  Physical examination is significant for presence of his systolic murmur consistent with aortic valve stenosis  Recent device interrogation showed normal functioning dual-chamber pacemaker device  She is pacemaker dependent  Her sinus rate is slightly high  In discussion today we learnt that she has not been taking amlodipine at home though this is listed in her list of medications   -At this time I am adding amlodipine at 5 mg once daily to her medical regimen  She will continue to take Talmesartan at 20 mg once daily  She is advised that if she is taking telmisartan in the morning then she should take amlodipine in the afternoon or evening   - our goal blood pressure for her would be less than 150/85 mmHg  - she will continue to follow in the devic clinic as she has been scheduled to   - she will need continued periodic monitoring by her primary care physician  - Advised  to report any concerning symptoms such as chest pain, shortness of breath, decline in exercise tolerance or presyncope/syncope

## 2020-01-27 NOTE — PROGRESS NOTES
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, now status post permanent pacemaker implantation in December 2019  2  Essential hypertension  3  Moderate aortic valve stenosis and mild pulmonary hypertension  4  Grade 1 diastolic dysfunction without congestive heart failure   5  Iron deficiency anemia  6  Macular degeneration  7  Dementia changes  8  History of gout  9  Mixed dyslipidemia  10  Degenerative joint disease  11  Vitamin-D deficiency   13  Ambulatory dysfunction     Echocardiogram December 2019:  EF around 62%, mild concentric left ventricular hypertrophy, moderate aortic valve stenosis, no aortic valve regurgitation, mild pulmonary hypertension  Estimated RVSP/PASP is 43 mmHg, mild mitral and tricuspid valve regurgitation  CURRENT ECG:  No results found for this visit on 01/27/20  Pacemaker interrogation January 10, 2020:  JUSTIN DUAL PPM  DEVICE INTERROGATED IN THE [place] OFFICE  WOUND CHECK: INCISION CLEAN AND DRY WITH EDGES APPROXIMATED; WOUND CARE AND RESTRICTIONS REVIEWED WITH PATIENT  BATTERY VOLTAGE ADEQUATE (CARLIN - 11 8 YRS)  PRESENTING EGRM AS/ @ 72 PPM  AP 4%  99% (>40%/AVB - DDD 60/110)  ALL LEAD PARAMETERS WITHIN NORMAL LIMITS  ALL OTHER TESTING WITHIN NORMAL LIMITS  NO NEW SIGNIFICANT HIGH RATE EPISODES SINCE HOME REMOTE ALERT TRANSMISSION 1/7/20 PREVIOUSLY REPORTED  PT SCHEDULED WITH DR BRICEÑO 1/27/20 TO ESTABLISH CARDIAC F/U  NO PROGRAMMING CHANGES MADE TO DEVICE PARAMETERS   PACEMAKER FUNCTIONING APPROPRIATELY      EB    CARDIOLOGY ASSESSMENT & PLAN:  1  Essential hypertension  amLODIPine (NORVASC) 5 mg tablet    DISCONTINUED: amLODIPine (NORVASC) 5 mg tablet   2  Status post placement of cardiac pacemaker     3  Mixed hyperlipidemia     4  Ambulatory dysfunction     5  Mild pulmonary hypertension (HCC)       Status post placement of cardiac pacemaker  Patient is overall doing well from cardiac perspective  Though her blood pressure is slightly elevated she has had no recent complaints  Physical examination is significant for presence of his systolic murmur consistent with aortic valve stenosis  Recent device interrogation showed normal functioning dual-chamber pacemaker device  She is pacemaker dependent  Her sinus rate is slightly high  In discussion today we learnt that she has not been taking amlodipine at home though this is listed in her list of medications   -At this time I am adding amlodipine at 5 mg once daily to her medical regimen  She will continue to take Talmesartan at 20 mg once daily  She is advised that if she is taking telmisartan in the morning then she should take amlodipine in the afternoon or evening   - our goal blood pressure for her would be less than 150/85 mmHg  - she will continue to follow in the devic clinic as she has been scheduled to   - she will need continued periodic monitoring by her primary care physician  - Advised  to report any concerning symptoms such as chest pain, shortness of breath, decline in exercise tolerance or presyncope/syncope  INTERVAL HISTORY & HISTORY OF PRESENT ILLNESS:  Tiffanie Cervantes is here for follow-up regarding her cardiac comorbidities which include:  History of heart block status post recent pacemaker implantation, essential hypertension  Patient is here for follow-up accompanied with her daughter  She was recently hospitalized prior to Christmas due to complete heart block with minimal symptoms and recent syncopal event    She underwent a dual-chamber pacemaker implantation on 21st of December  Subsequently she was discharged to acute rehab and then to home  Today she reports that overall she has been feeling well with no recent dizziness lightheadedness or chest pains  She uses a walker for ambulation  Also denies any headaches  She has recently followed up in the device Clinic and her device is working appropriately  REVIEW OF SYMPTOMS:    Positive for:  Resolved syncope  Negative for: All remaining as reviewed below and in HPI  SYSTEM SYMPTOMS REVIEWED:  General--weight change, fever, night sweats  Respiratory--cough, wheezing, shortness of breath, sputum production  Cardiovascular--chest pain, syncope, dyspnea on exertion, edema, decline in exercise tolerance, claudication   Gastrointestinal--persistent vomiting, diarrhea, abdominal distention, blood in stool   Muscular or skeletal--joint pain or swelling   Neurologic--headaches, syncope, abnormal movement  Hematologic--history of easy bruising and bleeding   Endocrine--thyroid enlargement, heat or cold intolerance, polyuria   Psychiatric--anxiety, depression     *-*-*-*-*-*-*-*-*-*-*-*-*-*-*-*-*-*-*-*-*-*-*-*-*-*-*-*-*-*-*-*-*-*-*-*-*-*-*-*-*-*-*-*-*-*-*-*-*-*-*-*-*-*-  VITAL SIGNS:  Vitals:    01/27/20 1621   BP: 150/90   BP Location: Left arm   Patient Position: Sitting   Cuff Size: Adult   Pulse: 98   SpO2: 99%   Weight: 63 kg (139 lb)   Height: 5' 2" (1 575 m)     Weight (last 2 days)     Date/Time   Weight    01/27/20 1621   63 (139)           ,   Wt Readings from Last 3 Encounters:   01/27/20 63 kg (139 lb)   01/10/20 64 4 kg (142 lb)   12/24/19 66 1 kg (145 lb 11 6 oz)    , Body mass index is 25 42 kg/m²  *-*-*-*-*-*-*-*-*-*-*-*-*-*-*-*-*-*-*-*-*-*-*-*-*-*-*-*-*-*-*-*-*-*-*-*-*-*-*-*-*-*-*-*-*-*-*-*-*-*-*-*-*-*-  PHYSICAL EXAM:  General Appearance:    Alert, cooperative, no distress, appears stated age   Head, Eyes, ENT:    No obvious abnormality, moist mucous mebranes     Neck:   Supple, no carotid bruit or JVD   Back:     Symmetric, no curvature  Lungs:     Respirations unlabored  Clear to auscultation bilaterally,    Chest wall:    No tenderness or deformity   Heart:    Regular rate and rhythm, normal intensity heart sounds, harsh systolic murmur along upper sternal border consistent with aortic valve stenosis     Abdomen:     Soft, non-tender, No obvious masses, or organomegaly   Extremities:   Extremities normal, no cyanosis or edema    Skin:   Skin color, texture, turgor normal, no rashes or lesions     *-*-*-*-*-*-*-*-*-*-*-*-*-*-*-*-*-*-*-*-*-*-*-*-*-*-*-*-*-*-*-*-*-*-*-*-*-*-*-*-*-*-*-*-*-*-*-*-*-*-*-*-*-*-  CURRENT MEDICATION LIST:    Current Outpatient Medications:     amLODIPine (NORVASC) 5 mg tablet, Take 1 tablet (5 mg total) by mouth daily, Disp: 30 tablet, Rfl: 6    aspirin 81 MG tablet, Take by mouth, Disp: , Rfl:     atorvastatin (LIPITOR) 40 mg tablet, Take 1 tablet (40 mg total) by mouth every evening, Disp: 90 tablet, Rfl: 1    dorzolamide-timolol (COSOPT) 22 3-6 8 MG/ML ophthalmic solution, INSTILL 1 DROP IN EACH EYE EVERY DAY, Disp: , Rfl: 3    doxepin (SINEquan) 50 mg capsule, Take 1 capsule (50 mg total) by mouth daily at bedtime, Disp: 90 capsule, Rfl: 0    ezetimibe (ZETIA) 10 mg tablet, Take 1 tablet (10 mg total) by mouth daily, Disp: 90 tablet, Rfl: 1    gabapentin (NEURONTIN) 300 mg capsule, Take 1 capsule (300 mg total) by mouth 3 (three) times a day And may add an extra daily p r n , Disp: 360 capsule, Rfl: 3    levETIRAcetam (KEPPRA) 750 mg tablet, Take 0 5 tablets (375 mg total) by mouth every 12 (twelve) hours, Disp: 90 tablet, Rfl: 1    lidocaine (LIDODERM) 5 %, Apply 1 patch topically daily Remove & Discard patch within 12 hours or as directed by MD, Disp: 30 patch, Rfl: 0    omeprazole (PriLOSEC) 20 mg delayed release capsule, Take 1 capsule (20 mg total) by mouth daily, Disp: 90 capsule, Rfl: 1    telmisartan (MICARDIS) 20 MG tablet, Take 1 tablet (20 mg total) by mouth daily, Disp: 90 tablet, Rfl: 3    ALLERGIES:  Allergies   Allergen Reactions    Fish-Derived Products      GI upset    Allopurinol Rash     Category: Allergy;        *-*-*-*-*-*-*-*-*-*-*-*-*-*-*-*-*-*-*-*-*-*-*-*-*-*-*-*-*-*-*-*-*-*-*-*-*-*-*-*-*-*-*-*-*-*-*-*-*-*-*-*-*-*-  The LABORATORY DATA:  I have personally reviewed pertinent labs            Potassium   Date Value Ref Range Status   12/24/2019 4 1 3 5 - 5 3 mmol/L Final   12/22/2019 3 5 3 5 - 5 3 mmol/L Final   12/21/2019 3 6 3 5 - 5 3 mmol/L Final   10/02/2015 4 5 3 5 - 5 3 mmol/L Final     Chloride   Date Value Ref Range Status   12/24/2019 111 (H) 100 - 108 mmol/L Final   12/22/2019 111 (H) 100 - 108 mmol/L Final   12/21/2019 112 (H) 100 - 108 mmol/L Final   10/02/2015 109 (H) 100 - 108 mmol/L Final     CO2   Date Value Ref Range Status   12/24/2019 25 21 - 32 mmol/L Final   12/22/2019 25 21 - 32 mmol/L Final   12/21/2019 23 21 - 32 mmol/L Final   10/02/2015 27 21 - 32 mmol/L Final     Anion Gap   Date Value Ref Range Status   10/02/2015 7 4 - 13 mmol/L Final     BUN   Date Value Ref Range Status   12/24/2019 30 (H) 5 - 25 mg/dL Final   12/22/2019 44 (H) 5 - 25 mg/dL Final   12/21/2019 39 (H) 5 - 25 mg/dL Final   10/02/2015 24 5 - 25 mg/dL Final     Creatinine   Date Value Ref Range Status   12/24/2019 1 13 0 60 - 1 30 mg/dL Final     Comment:     Standardized to IDMS reference method   12/22/2019 1 46 (H) 0 60 - 1 30 mg/dL Final     Comment:     Standardized to IDMS reference method   12/21/2019 1 30 0 60 - 1 30 mg/dL Final     Comment:     Standardized to IDMS reference method   10/02/2015 1 29 0 60 - 1 30 mg/dL Final     Comment:     Standardized to IDMS reference method     eGFR   Date Value Ref Range Status   12/24/2019 43 ml/min/1 73sq m Final   12/22/2019 31 ml/min/1 73sq m Final   12/21/2019 36 ml/min/1 73sq m Final     Glucose   Date Value Ref Range Status   10/02/2015 90 65 - 140 mg/dL Final     Comment:     If patient is fasting, the ADA then defines impaired fasting glucose as  >100 mg/dl and diabetes as  >or equal to 126 mg/dl  Calcium   Date Value Ref Range Status   12/24/2019 10 1 8 3 - 10 1 mg/dL Final   12/22/2019 10 2 (H) 8 3 - 10 1 mg/dL Final   12/21/2019 9 9 8 3 - 10 1 mg/dL Final   10/02/2015 10 8 (H) 8 3 - 10 1 mg/dL Final     AST   Date Value Ref Range Status   12/24/2019 23 5 - 45 U/L Final     Comment:       Specimen collection should occur prior to Sulfasalazine administration due to the potential for falsely depressed results  12/21/2019 40 5 - 45 U/L Final     Comment:       Specimen collection should occur prior to Sulfasalazine administration due to the potential for falsely depressed results  12/20/2019 67 (H) 5 - 45 U/L Final     Comment:       Specimen collection should occur prior to Sulfasalazine administration due to the potential for falsely depressed results  10/02/2015 12 5 - 45 U/L Final     ALT   Date Value Ref Range Status   12/24/2019 71 12 - 78 U/L Final     Comment:       Specimen collection should occur prior to Sulfasalazine administration due to the potential for falsely depressed results  12/21/2019 115 (H) 12 - 78 U/L Final     Comment:       Specimen collection should occur prior to Sulfasalazine administration due to the potential for falsely depressed results  12/20/2019 164 (H) 12 - 78 U/L Final     Comment:       Specimen collection should occur prior to Sulfasalazine administration due to the potential for falsely depressed results      10/02/2015 20 12 - 78 U/L Final     Alkaline Phosphatase   Date Value Ref Range Status   12/24/2019 188 (H) 46 - 116 U/L Final   12/21/2019 173 (H) 46 - 116 U/L Final   12/20/2019 217 (H) 46 - 116 U/L Final   10/02/2015 77 46 - 116 U/L Final     Total Protein   Date Value Ref Range Status   10/02/2015 7 4 6 4 - 8 2 g/dL Final     Total Bilirubin   Date Value Ref Range Status   10/02/2015 0 47 0 20 - 1 00 mg/dL Final     Magnesium Date Value Ref Range Status   12/21/2019 1 9 1 6 - 2 6 mg/dL Final     WBC   Date Value Ref Range Status   12/20/2019 6 70 4 31 - 10 16 Thousand/uL Final   07/29/2019 5 19 4 31 - 10 16 Thousand/uL Final   07/28/2019 7 15 4 31 - 10 16 Thousand/uL Final     Hemoglobin   Date Value Ref Range Status   12/20/2019 10 6 (L) 11 5 - 15 4 g/dL Final   07/29/2019 9 3 (L) 11 5 - 15 4 g/dL Final   07/28/2019 8 7 (L) 11 5 - 15 4 g/dL Final     Platelets   Date Value Ref Range Status   12/20/2019 80 (L) 149 - 390 Thousands/uL Final   12/20/2019 120 (L) 149 - 390 Thousands/uL Final   07/29/2019 173 149 - 390 Thousands/uL Final     PTT   Date Value Ref Range Status   07/23/2019 30 23 - 37 seconds Final     Comment:     Therapeutic Heparin Range =  60-90 seconds     INR   Date Value Ref Range Status   07/23/2019 1 03 0 84 - 1 19 Final     CK-MB   Date Value Ref Range Status   07/23/2019 2 7 0 0 - 5 0 ng/mL Final     No results found for: TSH  Cholesterol   Date Value Ref Range Status   10/02/2015 203 mg/dL Final     Comment:     CHOLESTEROL:       Desirable        <200 mg/dl       Borderline High  200-239 mg/dl       High             >239 mg/dl  ____________________________________       HDL   Date Value Ref Range Status   10/02/2015 43 mg/dL Final     Comment:     HDL:       High       >59 mg/dl       Low        <41 mg/dl  ______________________________       HDL, Direct   Date Value Ref Range Status   07/24/2019 32 (L) 40 - 60 mg/dL Final     Comment:       HDL Cholesterol:       High    >60 mg/dL       Low     <41 mg/dL  Specimen collection should occur prior to Metamizole administration due to the potential for falsley depressed results       Triglycerides   Date Value Ref Range Status   07/24/2019 182 (H) <=150 mg/dL Final     Comment:       Triglyceride:     Normal          <150 mg/dl     Borderline High 150-199 mg/dl     High            200-499 mg/dl        Very High       >499 mg/dl    Specimen collection should occur prior to N-Acetylcysteine or Metamizole administration due to the potential for falsely depressed results  10/02/2015 237 mg/dL Final     Comment:     TRIGLYCERIDE:       Normal              <150 mg/dl       Borderline High    150-199 mg/dl       High               200-499 mg/dl       Very High          >499 mg/dl  _______________________________________        Hemoglobin A1C   Date Value Ref Range Status   2019 5 4 4 2 - 6 3 % Final     Blood Culture   Date Value Ref Range Status   2019 No Growth After 5 Days  Final   2019 No Growth After 5 Days  Final       *-*-*-*-*-*-*-*-*-*-*-*-*-*-*-*-*-*-*-*-*-*-*-*-*-*-*-*-*-*-*-*-*-*-*-*-*-*-*-*-*-*-*-*-*-*-*-*-*-*-*-*-*-*-  PREVIOUS CARDIOLOGY & RADIOLOGY RESULTS:  Results for orders placed during the hospital encounter of 19   Echo complete with contrast if indicated    Narrative [facility]  [facility] [address] [facility]  [phone]    Transthoracic Echocardiogram  2D, M-mode, Doppler, and Color Doppler    Study date:  2019    Patient: Maryann Gerard  MR number: [id number]  Account number: [de-identified]  : 04-Oct-1928  Age: 80 years  Gender: Female  Status: Inpatient  Location: Bedside  Height: 62 in  Weight: 154 lb  BP: 161/ 92 mmHg    Indications: Abnormal EKG    Diagnoses: R94 31 - Abnormal electrocardiogram [ECG] [EKG]    Sonographer:  FAYE Espinoza  Primary Physician:  Carol Stuart  Referring Physician:  DEANDRA Stuart  Group:  [facility]  Cardiology Fellow:  Grupo Denis MD  Interpreting Physician:  Alisha Devi MD    SUMMARY    LEFT VENTRICLE:  Systolic function was normal  Ejection fraction was estimated to be 60 %  There were no regional wall motion abnormalities  Wall thickness was moderately increased  There was moderate concentric hypertrophy    Doppler parameters were consistent with abnormal left ventricular relaxation (grade 1 diastolic dysfunction)  MITRAL VALVE:  There was marked annular calcification  There was mild regurgitation  AORTIC VALVE:  Transaortic velocity was increased due to valvular stenosis  There was mild stenosis  There was trace regurgitation  Estimated aortic valve area (by Vmax) was 1 66 cmï¾²  TRICUSPID VALVE:  There was trace regurgitation  AORTA:  There was mild dilatation of the ascending aorta  The ascending aortic AP dimension was 39 mm  IVC, HEPATIC VEINS:  The inferior vena cava was mildly dilated  HISTORY: PRIOR HISTORY: HTN,CKD    PROCEDURE: The procedure was performed at the bedside  This was a routine study  The transthoracic approach was used  The study included complete 2D imaging, M-mode, complete spectral Doppler, and color Doppler  The heart rate was 83 bpm,  at the start of the study  Image quality was adequate  LEFT VENTRICLE: Size was normal  Systolic function was normal  Ejection fraction was estimated to be 60 %  There were no regional wall motion abnormalities  Wall thickness was moderately increased  There was moderate concentric  hypertrophy  DOPPLER: Doppler parameters were consistent with abnormal left ventricular relaxation (grade 1 diastolic dysfunction)  VENTRICULAR SEPTUM: There was sigmoid septal appearance  RIGHT VENTRICLE: The size was normal  Systolic function was normal  Wall thickness was normal     LEFT ATRIUM: Size was normal     RIGHT ATRIUM: Size was normal     MITRAL VALVE: There was marked annular calcification  Valve structure was normal  There was normal leaflet separation  DOPPLER: The transmitral velocity was within the normal range  There was no evidence for stenosis  There was mild  regurgitation  AORTIC VALVE: The valve was trileaflet  Leaflets exhibited mildly to moderately increased thickness, mild to moderate calcification, and mildly reduced cuspal separation  DOPPLER: Transaortic velocity was increased due to valvular  stenosis  There was mild stenosis  There was trace regurgitation  TRICUSPID VALVE: The valve structure was normal  There was normal leaflet separation  DOPPLER: The transtricuspid velocity was within the normal range  There was no evidence for stenosis  There was trace regurgitation  Pulmonary artery  systolic pressure was within the normal range  PULMONIC VALVE: Leaflets exhibited normal thickness, no calcification, and normal cuspal separation  DOPPLER: The transpulmonic velocity was within the normal range  There was no significant regurgitation  PERICARDIUM: There was no pericardial effusion  A pericardial fat pad was present  AORTA: The root exhibited normal size  There was mild dilatation of the ascending aorta  SYSTEMIC VEINS: IVC: The inferior vena cava was mildly dilated   Respirophasic changes were normal     MEASUREMENT TABLES    2D MEASUREMENTS  LVOT   (Reference normals)  Diam   21 mm   (--)  Aorta   (Reference normals)  AAo AP diam   39 mm   (--)    DOPPLER MEASUREMENTS  LVOT   (Reference normals)  Peak karine   113 cm/s   (--)  Mean karine   81 cm/s   (--)  VTI   21 cm   (--)  Peak gradient   5 mmHg   (--)  Mean gradient   3 mmHg   (--)  Stroke vol   72 74 ml   (--)  Stroke index   0 47 ml/mï¾²   (--)  Aortic valve   (Reference normals)  Peak karine   233 cm/s   (--)  Mean karine   170 cm/s   (--)  VTI   49 cm   (--)  Peak gradient   25 7 mmHg   (--)  Obstr index, VTI   0 43    (--)  Valve area, VTI   1 49 cmï¾²   (--)  Area index, VTI   0 87 cmï¾²/mï¾²   (--)  Obstr index, Vmax   0 48    (--)  Valve area, Vmax   1 66 cmï¾²   (--)  Area index, Vmax   0 97 cmï¾²/mï¾²   (--)  Obstr index, Vmean   0 48    (--)  Valve area, Vmean   1 66 cmï¾²   (--)  Area index, Vmean   0 97 cmï¾²/mï¾²   (--)    SYSTEM MEASUREMENT TABLES    2D  %FS: 31 5 %  Ao Diam: 2 99 cm  EDV(Teich): 43 16 ml  EF(Teich): 60 72 %  ESV(Teich): 16 95 ml  IVSd: 1 44 cm  LA Area: 18 05 cm2  LA Diam: 3 32 cm  LVEDV MOD A4C: 72 34 ml  LVEF MOD A4C: 71 64 %  LVESV MOD A4C: 20 51 ml  LVIDd: 3 27 cm  LVIDs: 2 24 cm  LVLd A4C: 7 68 cm  LVLs A4C: 5 98 cm  LVPWd: 1 68 cm  RA Area: 16 31 cm2  RVIDd: 4 18 cm  SV MOD A4C: 51 83 ml  SV(Teich): 26 21 ml    CW  AV Env  Ti: 267 59 ms  AV VTI: 42 73 cm  AV Vmax: 2 26 m/s  AV Vmean: 1 6 m/s  AV maxP 39 mmHg  AV meanP 33 mmHg  HR: 162 66 BPM  MV VTI: 31 01 cm  MV Vmax: 1 42 m/s  MV Vmean: 0 84 m/s  MV maxP 06 mmHg  MV meanPG: 3 2 mmHg  TR Vmax: 2 5 m/s  TR maxP 93 mmHg    MM  TAPSE: 1 5 cm    PW  E': 0 06 m/s  E/E': 13 14  LVOT Env  Ti: 262 98 ms  LVOT VTI: 21 34 cm  LVOT Vmax: 1 13 m/s  LVOT Vmean: 0 81 m/s  LVOT maxP 1 mmHg  LVOT meanP 85 mmHg  MV A [place]h: 1 46 m/s  MV Dec [place]: 4 08 m/s2  MV DecT: 191 81 ms  MV E [place]h: 0 78 m/s  MV E/A Ratio: 0 54  MV PHT: 55 62 ms  MVA By PHT: 3 96 cm2    [facility] Accredited [facility]ocardi[facility]    Prepared and electronically signed by    Sherry Thompson MD  Signed 2019 16:03:33       No results found for this or any previous visit  No results found for this or any previous visit  No results found for this or any previous visit  Cardiac EP device report  MDT DUAL PPM  DEVICE INTERROGATED IN THE [facility] OFFICE  WOUND CHECK: INCISION CLEAN AND DRY WITH EDGES APPROXIMATED; WOUND CARE AND RESTRICTIONS REVIEWED WITH PATIENT  BATTERY VOLTAGE ADEQUATE (CARLIN - 11 8 YRS)  PRESENTING EGRM AS/ @ 72 PPM  AP 4%  99% (>40%/AVB - DDD 60/110)  ALL LEAD PARAMETERS WITHIN NORMAL LIMITS  ALL OTHER TESTING WITHIN NORMAL LIMITS  NO NEW SIGNIFICANT HIGH RATE EPISODES SINCE HOME REMOTE ALERT TRANSMISSION 20 PREVIOUSLY REPORTED  PT SCHEDULED WITH DR MUJICA 20 TO ESTABLISH CARDIAC F/U  NO PROGRAMMING CHANGES MADE TO DEVICE PARAMETERS  PACEMAKER FUNCTIONING APPROPRIATELY       EB        *-*-*-*-*-*-*-*-*-*-*-*-*-*-*-*-*-*-*-*-*-*-*-*-*-*-*-*-*-*-*-*-*-*-*-*-*-*-*-*-*-*-*-*-*-*-*-*-*-*-*-*-*-*-  SIGNATURES:   [unfilled]   Timoteo Ohara MD *-*-*-*-*-*-*-*-*-*-*-*-*-*-*-*-*-*-*-*-*-*-*-*-*-*-*-*-*-*-*-*-*-*-*-*-*-*-*-*-*-*-*-*-*-*-*-*-*-*-*-*-*-*-    Social History     Socioeconomic History    Marital status:       Spouse name: Not on file    Number of children: Not on file    Years of education: Not on file    Highest education level: Not on file   Occupational History    Not on file   Social Needs    Financial resource strain: Not on file    Food insecurity:     Worry: Not on file     Inability: Not on file    Transportation needs:     Medical: Not on file     Non-medical: Not on file   Tobacco Use    Smoking status: Never Smoker    Smokeless tobacco: Never Used   Substance and Sexual Activity    Alcohol use: Not Currently    Drug use: No    Sexual activity: Not on file   Lifestyle    Physical activity:     Days per week: Not on file     Minutes per session: Not on file    Stress: Not on file   Relationships    Social connections:     Talks on phone: Not on file     Gets together: Not on file     Attends Taoism service: Not on file     Active member of club or organization: Not on file     Attends meetings of clubs or organizations: Not on file     Relationship status: Not on file    Intimate partner violence:     Fear of current or ex partner: Not on file     Emotionally abused: Not on file     Physically abused: Not on file     Forced sexual activity: Not on file   Other Topics Concern    Not on file   Social History Narrative    Not on file      Family History   Problem Relation Age of Onset    Heart attack Mother         MI    Heart attack Father         MI    Hypertension Sister     Stomach cancer Sister     Hypertension Brother      Past Surgical History:   Procedure Laterality Date    CATARACT EXTRACTION      TOTAL ABDOMINAL HYSTERECTOMY      with removal of both ovaries    VAGINAL HYSTERECTOMY      resolved-4/17/2015

## 2020-01-27 NOTE — PATIENT INSTRUCTIONS
CARDIOLOGY ASSESSMENT & PLAN:  1  Essential hypertension  amLODIPine (NORVASC) 5 mg tablet    DISCONTINUED: amLODIPine (NORVASC) 5 mg tablet   2  Status post placement of cardiac pacemaker     3  Mixed hyperlipidemia     4  Ambulatory dysfunction     5  Mild pulmonary hypertension (HCC)       Status post placement of cardiac pacemaker  Patient is overall doing well from cardiac perspective  Though her blood pressure is slightly elevated she has had no recent complaints  Physical examination is significant for presence of his systolic murmur consistent with aortic valve stenosis  Recent device interrogation showed normal functioning dual-chamber pacemaker device  She is pacemaker dependent  Her sinus rate is slightly high  In discussion today we learnt that she has not been taking amlodipine at home though this is listed in her list of medications   -At this time I am adding amlodipine at 5 mg once daily to her medical regimen  She will continue to take Talmesartan at 20 mg once daily  She is advised that if she is taking telmisartan in the morning then she should take amlodipine in the afternoon or evening   - our goal blood pressure for her would be less than 150/85 mmHg  - she will continue to follow in the devic clinic as she has been scheduled to   - she will need continued periodic monitoring by her primary care physician  - Advised  to report any concerning symptoms such as chest pain, shortness of breath, decline in exercise tolerance or presyncope/syncope

## 2020-02-11 ENCOUNTER — TELEPHONE (OUTPATIENT)
Dept: FAMILY MEDICINE CLINIC | Facility: CLINIC | Age: 85
End: 2020-02-11

## 2020-02-11 NOTE — TELEPHONE ENCOUNTER
Patient is experiencing a lot of pain in her left hand  Patient said she has arthritis and is experiencing numbness and cannot close her hand  She scheduled an appointment for 2/26/2020, she originally wanted an appointment for tomorrow after 12:00  Her daughter will be in town from [place] tomorrow and she is wondering if you can squeeze her in tomorrow  Her daughter can only bring her in after 12:00

## 2020-02-12 ENCOUNTER — OFFICE VISIT (OUTPATIENT)
Dept: FAMILY MEDICINE CLINIC | Facility: CLINIC | Age: 85
End: 2020-02-12
Payer: MEDICARE

## 2020-02-12 VITALS
OXYGEN SATURATION: 99 % | HEIGHT: 62 IN | TEMPERATURE: 98 F | HEART RATE: 73 BPM | BODY MASS INDEX: 26.17 KG/M2 | WEIGHT: 142.2 LBS

## 2020-02-12 DIAGNOSIS — G56.02 CARPAL TUNNEL SYNDROME OF LEFT WRIST: Primary | ICD-10-CM

## 2020-02-12 PROCEDURE — 3080F DIAST BP >= 90 MM HG: CPT | Performed by: FAMILY MEDICINE

## 2020-02-12 PROCEDURE — 3077F SYST BP >= 140 MM HG: CPT | Performed by: FAMILY MEDICINE

## 2020-02-12 PROCEDURE — 4040F PNEUMOC VAC/ADMIN/RCVD: CPT | Performed by: FAMILY MEDICINE

## 2020-02-12 PROCEDURE — 1036F TOBACCO NON-USER: CPT | Performed by: FAMILY MEDICINE

## 2020-02-12 PROCEDURE — 1111F DSCHRG MED/CURRENT MED MERGE: CPT | Performed by: FAMILY MEDICINE

## 2020-02-12 PROCEDURE — 99214 OFFICE O/P EST MOD 30 MIN: CPT | Performed by: FAMILY MEDICINE

## 2020-02-12 PROCEDURE — 1160F RVW MEDS BY RX/DR IN RCRD: CPT | Performed by: FAMILY MEDICINE

## 2020-02-12 RX ORDER — PHENYLEPHRINE HYDROCHLORIDE 10 MG/1
TABLET, COATED ORAL DAILY
Qty: 1 EACH | Refills: 0 | Status: SHIPPED | OUTPATIENT
Start: 2020-02-12

## 2020-02-12 NOTE — PROGRESS NOTES
Assessment/Plan:     Diagnoses and all orders for this visit:    Carpal tunnel syndrome of left wrist  -     Elastic Bandages & Supports (CARPAL TUNNEL WRIST STABILIZER) MISC; by Does not apply route daily  -     diclofenac sodium (VOLTAREN) 1 %; Apply 2 g topically 4 (four) times a day  -     EMG 2 Limb Upper Extremity; Future         Due to patient's age, medications and medical conditions would not recommend oral anti-inflammatories  Advised looking for carpal tunnel wrist stabilized OTC  Can use Voltaren gel topically as directed  EMG ASAP  And follow up accordingly  Family member and patient understands  Subjective:   Chief Complaint   Patient presents with    Numbness     left hand pain and numbness since "seizure"      Patient ID: Aundrea Rosa is a 80 y o  female  Patient is a pleasant 26-year-old female who is here in the office accompanied by her daughter  She is here with ongoing left hand discomfort which now has turn to some numbness  She dates this symptom back to when she had her "seizure" was technically not really fully diagnosed as a seizure  She was put on Keppra at that time  Hand Pain    The incident occurred more than 1 week ago  The pain is present in the left hand, left wrist and left fingers (Fingers involved are the 3rd 4th and pinky)  The quality of the pain is described as aching (Tingly)  The pain radiates to the left hand  The pain is moderate  The pain has been worsening since the incident  Associated symptoms include numbness and tingling  Pertinent negatives include no chest pain or muscle weakness  EEG was negative for seizure at that time  Patient had x-ray of the right shoulder due to injury from the "fall at that time  There is no mention of any left-sided symptoms during that hospitalization  That admission was in July    Patient states that she has not been doing the [facility] for quite some time,  Even though it is on the discharge summary list from her pacemaker insertion  Patient had a readmission in December after having Mobitz type 2 second-degree heart block requiring pacemaker  Patient had syncope that but to that admission  In hindsight when she followed up with Cardiology for her pacemaker checkup Dr Praveen Yadav believes that the episode in July was likely related to the heart block  Echocardiogram was done at that time   patient's blood pressure was slightly elevated at her visit with the cardiologist and amlodipine was added  Be as that may, patient is symptoms are seemingly consistent with either carpal tunnel impingement or possibly more proximal at the elbow  Does not seem to be symptomatic in the neck or shoulder  Patient is right handed  The following portions of the patient's history were reviewed and updated as appropriate: allergies, current medications, past family history, past medical history, past social history, past surgical history and problem list       Review of Systems   Constitutional: Negative for fatigue  HENT: Negative  Eyes: Negative for visual disturbance  Respiratory: Negative  Negative for shortness of breath and stridor  Cardiovascular: Negative for chest pain  Gastrointestinal: Negative  Musculoskeletal:        As noted HPI   Neurological: Positive for tingling and numbness  Objective:      Pulse 73   Temp 98 °F (36 7 °C)   Ht 5' 2" (1 575 m)   Wt 64 5 kg (142 lb 3 2 oz)   SpO2 99%   BMI 26 01 kg/m²          Physical Exam   Constitutional: She appears well-developed and well-nourished  Pleasant 35-year-old female who looks slightly younger than her stated age within acceptable BMI  HENT:   Mouth/Throat: Oropharynx is clear and moist    Eyes: Pupils are equal, round, and reactive to light  Neck: Normal range of motion  Neck supple  No palpable tenderness including rhomboids and trapezius or shoulder     Cardiovascular: Normal rate, regular rhythm, normal heart sounds and intact distal pulses  Good capillary refill  Ulnar and radial pulses intact on left   Pulmonary/Chest: Effort normal and breath sounds normal    Abdominal: Soft  Bowel sounds are normal    Musculoskeletal: She exhibits tenderness ( tender over lateral epicondyle a positive Tinel's  )  Full range of motion of  left shoulder  Full range of motion at elbow and wrist   Neurological: She is alert  She displays normal reflexes ( grasp is excellent, no trigger finger)  She exhibits normal muscle tone  Skin: Skin is warm  Psychiatric: She has a normal mood and affect  Her behavior is normal  Thought content normal  She exhibits abnormal recent memory ( fair historian)  Vitals reviewed

## 2020-02-13 ENCOUNTER — TELEPHONE (OUTPATIENT)
Dept: CARDIOLOGY CLINIC | Facility: CLINIC | Age: 85
End: 2020-02-13

## 2020-02-13 NOTE — TELEPHONE ENCOUNTER
Pt's son, Ania Dawkins, called this office asking if this patient is OK to have dental work done, and if she needs any prep prior  Pt had a device implant in October, 2019 by Dr Carol Ann Gurrola  Can someone please ask Dr Carol Ann Gurrola,  or the PA,  to answer the son's questions? He can be reached at [phone]  Thank you

## 2020-02-25 ENCOUNTER — HOSPITAL ENCOUNTER (OUTPATIENT)
Dept: NEUROLOGY | Facility: CLINIC | Age: 85
Discharge: HOME | End: 2020-02-25
Payer: MEDICARE

## 2020-02-25 ENCOUNTER — TELEPHONE (OUTPATIENT)
Dept: FAMILY MEDICINE CLINIC | Facility: CLINIC | Age: 85
End: 2020-02-25

## 2020-02-25 DIAGNOSIS — G56.02 CARPAL TUNNEL SYNDROME OF LEFT WRIST: ICD-10-CM

## 2020-02-25 PROCEDURE — 95910 NRV CNDJ TEST 7-8 STUDIES: CPT | Performed by: PSYCHIATRY & NEUROLOGY

## 2020-02-25 PROCEDURE — 95885 MUSC TST DONE W/NERV TST LIM: CPT | Performed by: PSYCHIATRY & NEUROLOGY

## 2020-02-25 NOTE — TELEPHONE ENCOUNTER
Spoke w/ pt's son and gave message and provided [facility] names and phone numbers   Son states he will call his mom and give her the message as well

## 2020-02-25 NOTE — TELEPHONE ENCOUNTER
----- Message from Zhane Aguayo DO sent at 4/36/3415  4:28 PM EST -----  Call patient and also either daughter or son who has ever name is the family member of contact  Her carpal tunnel test/EMG was definitely positive for severe degree  I think she should consider possible surgical intervention  This would be done through orthopedic hand specialist   Would consider Drs Corinne Askew or Elias Fragoso or [facility]  Through [address]  If the patient or family has any other previous experience with another orthopedic group that would be okay also

## 2020-03-21 DIAGNOSIS — R47.01 APHASIA: ICD-10-CM

## 2020-03-21 DIAGNOSIS — F32.A DEPRESSION, UNSPECIFIED DEPRESSION TYPE: ICD-10-CM

## 2020-03-22 RX ORDER — DOXEPIN HYDROCHLORIDE 50 MG/1
CAPSULE ORAL
Qty: 90 CAPSULE | Refills: 0 | Status: SHIPPED | OUTPATIENT
Start: 2020-03-22 | End: 2020-07-06

## 2020-03-22 RX ORDER — ATORVASTATIN CALCIUM 40 MG/1
TABLET, FILM COATED ORAL
Qty: 90 TABLET | Refills: 1 | Status: SHIPPED | OUTPATIENT
Start: 2020-03-22 | End: 2020-09-21 | Stop reason: SDUPTHER

## 2020-04-08 ENCOUNTER — TELEMEDICINE (OUTPATIENT)
Dept: FAMILY MEDICINE CLINIC | Facility: CLINIC | Age: 85
End: 2020-04-08
Payer: MEDICARE

## 2020-04-08 VITALS — WEIGHT: 145 LBS | BODY MASS INDEX: 26.52 KG/M2

## 2020-04-08 DIAGNOSIS — F03.90 DEMENTIA WITHOUT BEHAVIORAL DISTURBANCE, UNSPECIFIED DEMENTIA TYPE (HCC): ICD-10-CM

## 2020-04-08 DIAGNOSIS — Z95.0 PACEMAKER: ICD-10-CM

## 2020-04-08 DIAGNOSIS — G56.02 CARPAL TUNNEL SYNDROME OF LEFT WRIST: ICD-10-CM

## 2020-04-08 DIAGNOSIS — M15.9 PRIMARY OSTEOARTHRITIS INVOLVING MULTIPLE JOINTS: ICD-10-CM

## 2020-04-08 DIAGNOSIS — I10 ESSENTIAL HYPERTENSION: Primary | ICD-10-CM

## 2020-04-08 PROCEDURE — 99213 OFFICE O/P EST LOW 20 MIN: CPT | Performed by: FAMILY MEDICINE

## 2020-04-10 ENCOUNTER — TELEPHONE (OUTPATIENT)
Dept: FAMILY MEDICINE CLINIC | Facility: CLINIC | Age: 85
End: 2020-04-10

## 2020-04-10 ENCOUNTER — REMOTE DEVICE CLINIC VISIT (OUTPATIENT)
Dept: CARDIOLOGY CLINIC | Facility: CLINIC | Age: 85
End: 2020-04-10
Payer: MEDICARE

## 2020-04-10 DIAGNOSIS — Z95.0 CARDIAC PACEMAKER IN SITU: Primary | ICD-10-CM

## 2020-04-10 PROBLEM — I50.33 ACUTE ON CHRONIC DIASTOLIC CONGESTIVE HEART FAILURE (HCC): Status: RESOLVED | Noted: 2019-12-20 | Resolved: 2020-04-10

## 2020-04-10 PROBLEM — J90 PLEURAL EFFUSION: Status: RESOLVED | Noted: 2020-01-10 | Resolved: 2020-04-10

## 2020-04-10 PROCEDURE — 93294 REM INTERROG EVL PM/LDLS PM: CPT | Performed by: INTERNAL MEDICINE

## 2020-04-10 PROCEDURE — 93296 REM INTERROG EVL PM/IDS: CPT | Performed by: INTERNAL MEDICINE

## 2020-04-23 DIAGNOSIS — E78.01 FAMILIAL HYPERCHOLESTEROLEMIA: ICD-10-CM

## 2020-04-23 DIAGNOSIS — K21.9 GASTROESOPHAGEAL REFLUX DISEASE WITHOUT ESOPHAGITIS: ICD-10-CM

## 2020-04-23 DIAGNOSIS — E78.2 MIXED HYPERLIPIDEMIA: ICD-10-CM

## 2020-04-23 RX ORDER — OMEPRAZOLE 20 MG/1
20 CAPSULE, DELAYED RELEASE ORAL DAILY
Qty: 90 CAPSULE | Refills: 1 | Status: SHIPPED | OUTPATIENT
Start: 2020-04-23 | End: 2020-11-02 | Stop reason: SDUPTHER

## 2020-04-23 RX ORDER — EZETIMIBE 10 MG/1
10 TABLET ORAL DAILY
Qty: 90 TABLET | Refills: 1 | Status: SHIPPED | OUTPATIENT
Start: 2020-04-23 | End: 2020-11-02 | Stop reason: SDUPTHER

## 2020-05-12 DIAGNOSIS — G60.9 IDIOPATHIC PERIPHERAL NEUROPATHY: ICD-10-CM

## 2020-05-12 RX ORDER — GABAPENTIN 300 MG/1
300 CAPSULE ORAL 3 TIMES DAILY
Qty: 360 CAPSULE | Refills: 3 | Status: SHIPPED | OUTPATIENT
Start: 2020-05-12 | End: 2021-08-11 | Stop reason: SDUPTHER

## 2020-05-19 DIAGNOSIS — G56.02 CARPAL TUNNEL SYNDROME OF LEFT WRIST: ICD-10-CM

## 2020-06-23 ENCOUNTER — TELEPHONE (OUTPATIENT)
Dept: FAMILY MEDICINE CLINIC | Facility: CLINIC | Age: 85
End: 2020-06-23

## 2020-07-06 DIAGNOSIS — F32.A DEPRESSION, UNSPECIFIED DEPRESSION TYPE: ICD-10-CM

## 2020-07-06 RX ORDER — DOXEPIN HYDROCHLORIDE 50 MG/1
CAPSULE ORAL
Qty: 90 CAPSULE | Refills: 0 | Status: SHIPPED | OUTPATIENT
Start: 2020-07-06 | End: 2020-10-05 | Stop reason: SDUPTHER

## 2020-07-14 ENCOUNTER — REMOTE DEVICE CLINIC VISIT (OUTPATIENT)
Dept: CARDIOLOGY CLINIC | Facility: CLINIC | Age: 85
End: 2020-07-14
Payer: MEDICARE

## 2020-07-14 DIAGNOSIS — Z95.0 CARDIAC PACEMAKER IN SITU: Primary | ICD-10-CM

## 2020-07-14 PROCEDURE — 93296 REM INTERROG EVL PM/IDS: CPT | Performed by: INTERNAL MEDICINE

## 2020-07-14 PROCEDURE — 93294 REM INTERROG EVL PM/LDLS PM: CPT | Performed by: INTERNAL MEDICINE

## 2020-07-14 NOTE — PROGRESS NOTES
Results for orders placed or performed in visit on 07/14/20   Cardiac EP device report    Narrative    MDT DUAL PPM/ ACTIVE SYSTEM IS MRI CONDITIONAL  CARELINK TRANSMISSION: BATTERY STATUS "OK"  AP 24%  99%  ALL AVAILABLE LEAD PARAMETERS WITHIN NORMAL LIMITS  NO SIGNIFICANT HIGH RATE EPISODES  NORMAL DEVICE FUNCTION   NC       Current Outpatient Medications:     amLODIPine (NORVASC) 5 mg tablet, Take 1 tablet (5 mg total) by mouth daily, Disp: 30 tablet, Rfl: 6    aspirin 81 MG tablet, Take by mouth, Disp: , Rfl:     atorvastatin (LIPITOR) 40 mg tablet, TAKE 1 TABLET BY MOUTH EVERY DAY IN THE EVENING, Disp: 90 tablet, Rfl: 1    diclofenac sodium (VOLTAREN) 1 %, Apply 2 g topically 4 (four) times a day, Disp: 100 g, Rfl: 1    dorzolamide-timolol (COSOPT) 22 3-6 8 MG/ML ophthalmic solution, INSTILL 1 DROP IN EACH EYE EVERY DAY, Disp: , Rfl: 3    doxepin (SINEquan) 50 mg capsule, TAKE 1 CAPSULE BY MOUTH AT BEDTIME, Disp: 90 capsule, Rfl: 0    Elastic Bandages & Supports (CARPAL TUNNEL WRIST STABILIZER) MISC, by Does not apply route daily, Disp: 1 each, Rfl: 0    ezetimibe (ZETIA) 10 mg tablet, Take 1 tablet (10 mg total) by mouth daily, Disp: 90 tablet, Rfl: 1    gabapentin (NEURONTIN) 300 mg capsule, Take 1 capsule (300 mg total) by mouth 3 (three) times a day And may add an extra daily p r n , Disp: 360 capsule, Rfl: 3    lidocaine (LIDODERM) 5 %, Apply 1 patch topically daily Remove & Discard patch within 12 hours or as directed by MD, Disp: 30 patch, Rfl: 0    omeprazole (PriLOSEC) 20 mg delayed release capsule, Take 1 capsule (20 mg total) by mouth daily, Disp: 90 capsule, Rfl: 1    telmisartan (MICARDIS) 20 MG tablet, Take 1 tablet (20 mg total) by mouth daily, Disp: 90 tablet, Rfl: 3

## 2020-07-20 DIAGNOSIS — I10 ESSENTIAL HYPERTENSION: ICD-10-CM

## 2020-07-21 RX ORDER — AMLODIPINE BESYLATE 5 MG/1
5 TABLET ORAL DAILY
Qty: 30 TABLET | Refills: 6 | Status: SHIPPED | OUTPATIENT
Start: 2020-07-21 | End: 2021-04-14

## 2020-08-10 ENCOUNTER — TELEPHONE (OUTPATIENT)
Dept: FAMILY MEDICINE CLINIC | Facility: CLINIC | Age: 85
End: 2020-08-10

## 2020-08-10 NOTE — TELEPHONE ENCOUNTER
The best thing obviously would be for the patient to be evaluated here to see the degree of swelling  It may simply be due to the heat  She is not normally on diuretics on a daily basis  I do see that they were prescribed in the past in the hospital   She also does take amlodipine for blood pressure and this can sometimes exacerbate some ankle and feet swelling especially when it is hot  Patient could be seen by any of the providers this week that are available, or she can trial a few days off amlodipine, keep her feet elevated as well as hydrate and see if the feet and ankles improved

## 2020-08-10 NOTE — TELEPHONE ENCOUNTER
She Berman called his mom is going to another doctor to have stiches removed from her hands for carpal tunnel this afternoon  If an appointment becomes available please call She Berman at [phone] for the next few hours  Erik pt's son consent ok called in  He is aware of the message and your recommendations that pt may see any other provider this week or trial a few days off Amlodipine  FYI: Pt is scheduled to see Gogo Sy  tomorrow 8/11/20  Pt's son was advised if anything changes to please call our office

## 2020-08-10 NOTE — TELEPHONE ENCOUNTER
Patient called and stated that for the last month, her feet and ankles have been swollen  She doesn't know if you need to see her or just call in a diuretic    She is not having any issues walking    [phone]

## 2020-08-11 ENCOUNTER — OFFICE VISIT (OUTPATIENT)
Dept: FAMILY MEDICINE CLINIC | Facility: CLINIC | Age: 85
End: 2020-08-11
Payer: MEDICARE

## 2020-08-11 VITALS
TEMPERATURE: 97.2 F | WEIGHT: 146 LBS | OXYGEN SATURATION: 99 % | DIASTOLIC BLOOD PRESSURE: 64 MMHG | HEART RATE: 65 BPM | BODY MASS INDEX: 26.87 KG/M2 | RESPIRATION RATE: 20 BRPM | SYSTOLIC BLOOD PRESSURE: 124 MMHG | HEIGHT: 62 IN

## 2020-08-11 DIAGNOSIS — I10 ESSENTIAL HYPERTENSION: Primary | ICD-10-CM

## 2020-08-11 DIAGNOSIS — E78.2 MIXED HYPERLIPIDEMIA: ICD-10-CM

## 2020-08-11 DIAGNOSIS — R60.9 PERIPHERAL EDEMA: ICD-10-CM

## 2020-08-11 PROCEDURE — 3074F SYST BP LT 130 MM HG: CPT | Performed by: FAMILY MEDICINE

## 2020-08-11 PROCEDURE — 99214 OFFICE O/P EST MOD 30 MIN: CPT | Performed by: FAMILY MEDICINE

## 2020-08-11 PROCEDURE — 3008F BODY MASS INDEX DOCD: CPT | Performed by: FAMILY MEDICINE

## 2020-08-11 PROCEDURE — 1036F TOBACCO NON-USER: CPT | Performed by: FAMILY MEDICINE

## 2020-08-11 PROCEDURE — 3078F DIAST BP <80 MM HG: CPT | Performed by: FAMILY MEDICINE

## 2020-08-11 PROCEDURE — 4040F PNEUMOC VAC/ADMIN/RCVD: CPT | Performed by: FAMILY MEDICINE

## 2020-08-11 PROCEDURE — 1160F RVW MEDS BY RX/DR IN RCRD: CPT | Performed by: FAMILY MEDICINE

## 2020-08-11 NOTE — PROGRESS NOTES
Assessment/Plan:         Diagnoses and all orders for this visit:    Essential hypertension  -     Comprehensive metabolic panel; Future  -     TSH, 3rd generation; Future    Mixed hyperlipidemia  -     Lipid Panel with Direct LDL reflex; Future    Peripheral edema-mild, like due to amlodipine    Other orders  -     Multiple Vitamins-Minerals (ICAPS AREDS 2 PO); Take 1 capsule by mouth 2 (two) times a day         patient and son advised to hold amlodipine  Advised patient to check her blood pressure daily and as long as it remains 140/80 or better that she will remain off amlodipine  She will continue all other medications  She has an appointment for October and blood work will be done prior  Patient could also benefit from low level knee-high support stocking  Subjective:   Chief Complaint   Patient presents with    Foot Swelling     Pt is here w/ c/o bilateral feet and ankle swelling       Patient ID: Matthew Sellers is a 80 y o  female  Delightful 75-year-old female accompanied today by her son with complaints of about 1 month of bilateral foot and ankle swelling  She denies increased salt intake  She does admit obviously she is fairly sedentary during COVID isolation  She does try to do some home stretching  She ambulates with walker  There has been no shortness of breath or chest pain  Patient with recent carpal tunnel surgery done on the left hand without complication  The following portions of the patient's history were reviewed and updated as appropriate: allergies, current medications, past family history, past medical history, past social history, past surgical history and problem list       Review of Systems   Constitutional: Negative for fatigue and unexpected weight change (  Patient with 4 lb weight gain since last visit in winter)  Respiratory: Negative for shortness of breath  Cardiovascular: Negative for chest pain  Leg swelling:  mild ankle and foot     Gastrointestinal: Negative  Genitourinary: Negative  Musculoskeletal: Positive for arthralgias  Psychiatric/Behavioral: Negative  Objective:      /64   Pulse 65   Temp (!) 97 2 °F (36 2 °C) (Temporal)   Resp 20   Ht 5' 2" (1 575 m)   Wt 66 2 kg (146 lb)   SpO2 99%   BMI 26 70 kg/m²       Last recorded weight 142 lb     Physical Exam  Constitutional:       Appearance: Normal appearance  She is normal weight  Cardiovascular:      Rate and Rhythm: Normal rate  Occasional extrasystoles are present  Heart sounds: Murmur present  Comments: Less than trace edema foot and ankle  Pulmonary:      Effort: Pulmonary effort is normal       Breath sounds: Normal breath sounds  Neurological:      Mental Status: She is alert  Psychiatric:         Mood and Affect: Mood normal          Behavior: Behavior normal          Thought Content:  Thought content normal          Judgment: Judgment normal

## 2020-09-21 DIAGNOSIS — R47.01 APHASIA: ICD-10-CM

## 2020-09-21 RX ORDER — ATORVASTATIN CALCIUM 40 MG/1
40 TABLET, FILM COATED ORAL EVERY EVENING
Qty: 90 TABLET | Refills: 1 | Status: SHIPPED | OUTPATIENT
Start: 2020-09-21 | End: 2020-12-28 | Stop reason: SDUPTHER

## 2020-10-05 ENCOUNTER — APPOINTMENT (OUTPATIENT)
Dept: LAB | Age: 85
End: 2020-10-05
Payer: MEDICARE

## 2020-10-05 DIAGNOSIS — E78.2 MIXED HYPERLIPIDEMIA: ICD-10-CM

## 2020-10-05 DIAGNOSIS — I10 ESSENTIAL HYPERTENSION: ICD-10-CM

## 2020-10-05 DIAGNOSIS — F32.A DEPRESSION, UNSPECIFIED DEPRESSION TYPE: ICD-10-CM

## 2020-10-05 LAB
ALBUMIN SERPL BCP-MCNC: 4.1 G/DL (ref 3.5–5)
ALP SERPL-CCNC: 100 U/L (ref 46–116)
ALT SERPL W P-5'-P-CCNC: 33 U/L (ref 12–78)
ANION GAP SERPL CALCULATED.3IONS-SCNC: 4 MMOL/L (ref 4–13)
AST SERPL W P-5'-P-CCNC: 21 U/L (ref 5–45)
BILIRUB SERPL-MCNC: 0.55 MG/DL (ref 0.2–1)
BUN SERPL-MCNC: 29 MG/DL (ref 5–25)
CALCIUM SERPL-MCNC: 10.4 MG/DL (ref 8.3–10.1)
CHLORIDE SERPL-SCNC: 113 MMOL/L (ref 100–108)
CHOLEST SERPL-MCNC: 106 MG/DL (ref 50–200)
CO2 SERPL-SCNC: 26 MMOL/L (ref 21–32)
CREAT SERPL-MCNC: 1.12 MG/DL (ref 0.6–1.3)
GFR SERPL CREATININE-BSD FRML MDRD: 43 ML/MIN/1.73SQ M
GLUCOSE P FAST SERPL-MCNC: 90 MG/DL (ref 65–99)
HDLC SERPL-MCNC: 47 MG/DL
LDLC SERPL CALC-MCNC: 40 MG/DL (ref 0–100)
POTASSIUM SERPL-SCNC: 5 MMOL/L (ref 3.5–5.3)
PROT SERPL-MCNC: 6.9 G/DL (ref 6.4–8.2)
SODIUM SERPL-SCNC: 143 MMOL/L (ref 136–145)
TRIGL SERPL-MCNC: 94 MG/DL
TSH SERPL DL<=0.05 MIU/L-ACNC: 1.99 UIU/ML (ref 0.36–3.74)

## 2020-10-05 PROCEDURE — 80061 LIPID PANEL: CPT

## 2020-10-05 PROCEDURE — 80053 COMPREHEN METABOLIC PANEL: CPT

## 2020-10-05 PROCEDURE — 84443 ASSAY THYROID STIM HORMONE: CPT

## 2020-10-05 PROCEDURE — 36415 COLL VENOUS BLD VENIPUNCTURE: CPT

## 2020-10-05 RX ORDER — DOXEPIN HYDROCHLORIDE 50 MG/1
50 CAPSULE ORAL
Qty: 90 CAPSULE | Refills: 0 | Status: SHIPPED | OUTPATIENT
Start: 2020-10-05 | End: 2021-01-05

## 2020-10-12 ENCOUNTER — OFFICE VISIT (OUTPATIENT)
Dept: FAMILY MEDICINE CLINIC | Facility: CLINIC | Age: 85
End: 2020-10-12
Payer: MEDICARE

## 2020-10-12 VITALS
HEIGHT: 62 IN | BODY MASS INDEX: 27.42 KG/M2 | DIASTOLIC BLOOD PRESSURE: 62 MMHG | HEART RATE: 80 BPM | SYSTOLIC BLOOD PRESSURE: 132 MMHG | OXYGEN SATURATION: 98 % | WEIGHT: 149 LBS | TEMPERATURE: 97.8 F | RESPIRATION RATE: 20 BRPM

## 2020-10-12 DIAGNOSIS — I10 BENIGN ESSENTIAL HYPERTENSION: ICD-10-CM

## 2020-10-12 DIAGNOSIS — Z00.00 ENCOUNTER FOR SUBSEQUENT ANNUAL WELLNESS VISIT IN MEDICARE PATIENT: Primary | ICD-10-CM

## 2020-10-12 DIAGNOSIS — Z23 ENCOUNTER FOR IMMUNIZATION: ICD-10-CM

## 2020-10-12 PROCEDURE — G0008 ADMIN INFLUENZA VIRUS VAC: HCPCS

## 2020-10-12 PROCEDURE — 90662 IIV NO PRSV INCREASED AG IM: CPT

## 2020-10-12 PROCEDURE — G0439 PPPS, SUBSEQ VISIT: HCPCS | Performed by: FAMILY MEDICINE

## 2020-10-12 RX ORDER — TELMISARTAN 20 MG/1
20 TABLET ORAL DAILY
Qty: 90 TABLET | Refills: 3 | Status: SHIPPED | OUTPATIENT
Start: 2020-10-12 | End: 2021-07-15 | Stop reason: SDUPTHER

## 2020-10-20 ENCOUNTER — IN-CLINIC DEVICE VISIT (OUTPATIENT)
Dept: CARDIOLOGY CLINIC | Facility: CLINIC | Age: 85
End: 2020-10-20
Payer: MEDICARE

## 2020-10-20 ENCOUNTER — OFFICE VISIT (OUTPATIENT)
Dept: CARDIOLOGY CLINIC | Facility: CLINIC | Age: 85
End: 2020-10-20
Payer: MEDICARE

## 2020-10-20 VITALS
BODY MASS INDEX: 27.25 KG/M2 | SYSTOLIC BLOOD PRESSURE: 146 MMHG | HEART RATE: 98 BPM | WEIGHT: 149 LBS | DIASTOLIC BLOOD PRESSURE: 68 MMHG | TEMPERATURE: 97 F

## 2020-10-20 DIAGNOSIS — I10 ESSENTIAL HYPERTENSION: Primary | ICD-10-CM

## 2020-10-20 DIAGNOSIS — Z95.0 PACEMAKER: ICD-10-CM

## 2020-10-20 DIAGNOSIS — I49.5 SICK SINUS SYNDROME (HCC): ICD-10-CM

## 2020-10-20 DIAGNOSIS — Z95.0 CARDIAC PACEMAKER: Primary | ICD-10-CM

## 2020-10-20 DIAGNOSIS — I35.0 NONRHEUMATIC AORTIC VALVE STENOSIS: ICD-10-CM

## 2020-10-20 DIAGNOSIS — I27.20 MILD PULMONARY HYPERTENSION (HCC): ICD-10-CM

## 2020-10-20 PROCEDURE — 99214 OFFICE O/P EST MOD 30 MIN: CPT | Performed by: INTERNAL MEDICINE

## 2020-10-20 PROCEDURE — 93280 PM DEVICE PROGR EVAL DUAL: CPT | Performed by: INTERNAL MEDICINE

## 2020-11-02 DIAGNOSIS — K21.9 GASTROESOPHAGEAL REFLUX DISEASE WITHOUT ESOPHAGITIS: ICD-10-CM

## 2020-11-02 DIAGNOSIS — E78.2 MIXED HYPERLIPIDEMIA: ICD-10-CM

## 2020-11-02 DIAGNOSIS — E78.01 FAMILIAL HYPERCHOLESTEROLEMIA: ICD-10-CM

## 2020-11-02 RX ORDER — OMEPRAZOLE 20 MG/1
20 CAPSULE, DELAYED RELEASE ORAL DAILY
Qty: 90 CAPSULE | Refills: 1 | Status: SHIPPED | OUTPATIENT
Start: 2020-11-02 | End: 2021-05-01 | Stop reason: SDUPTHER

## 2020-11-02 RX ORDER — EZETIMIBE 10 MG/1
10 TABLET ORAL DAILY
Qty: 90 TABLET | Refills: 1 | Status: SHIPPED | OUTPATIENT
Start: 2020-11-02 | End: 2021-04-23 | Stop reason: HOSPADM

## 2020-12-28 DIAGNOSIS — R47.01 APHASIA: ICD-10-CM

## 2020-12-28 RX ORDER — ATORVASTATIN CALCIUM 40 MG/1
40 TABLET, FILM COATED ORAL EVERY EVENING
Qty: 90 TABLET | Refills: 1 | Status: SHIPPED | OUTPATIENT
Start: 2020-12-28 | End: 2021-03-29 | Stop reason: SDUPTHER

## 2021-01-05 DIAGNOSIS — F32.A DEPRESSION, UNSPECIFIED DEPRESSION TYPE: ICD-10-CM

## 2021-01-05 RX ORDER — DOXEPIN HYDROCHLORIDE 50 MG/1
CAPSULE ORAL
Qty: 90 CAPSULE | Refills: 0 | Status: SHIPPED | OUTPATIENT
Start: 2021-01-05 | End: 2021-03-29 | Stop reason: SDUPTHER

## 2021-01-29 ENCOUNTER — VBI (OUTPATIENT)
Dept: ADMINISTRATIVE | Facility: OTHER | Age: 86
End: 2021-01-29

## 2021-02-04 ENCOUNTER — REMOTE DEVICE CLINIC VISIT (OUTPATIENT)
Dept: CARDIOLOGY CLINIC | Facility: CLINIC | Age: 86
End: 2021-02-04
Payer: MEDICARE

## 2021-02-04 DIAGNOSIS — Z95.0 CARDIAC PACEMAKER IN SITU: Primary | ICD-10-CM

## 2021-02-04 PROCEDURE — 93296 REM INTERROG EVL PM/IDS: CPT | Performed by: INTERNAL MEDICINE

## 2021-02-04 PROCEDURE — 93294 REM INTERROG EVL PM/LDLS PM: CPT | Performed by: INTERNAL MEDICINE

## 2021-02-04 NOTE — PROGRESS NOTES
Results for orders placed or performed in visit on 02/04/21   Cardiac EP device report    Narrative    MDT DUAL PPM/ ACTIVE SYSTEM IS MRI CONDITIONAL  CARELINK TRANSMISSION: BATTERY VOLTAGE ADEQUATE (11 7 YRS)  AP-28%, -99% (>40% [email or web address] OFF/AVB)  ALL AVAILABLE LEAD PARAMETERS WITHIN NORMAL LIMITS  NO SIGNIFICANT HIGH RATE EPISODES  NORMAL DEVICE FUNCTION   GV

## 2021-03-16 ENCOUNTER — TELEPHONE (OUTPATIENT)
Dept: CARDIOLOGY CLINIC | Facility: CLINIC | Age: 86
End: 2021-03-16

## 2021-03-16 NOTE — DISCHARGE SUMMARY
Discharge Summary - Medical Chicho Sidhu 80 y o  female MRN: [id number]    [address] Room / Bed: ICU 12/ICU 12 Encounter: [id number]    BRIEF OVERVIEW    Admitting Provider: Stephanie Rios MD  Discharge Provider: Juan Montenegro DO  Admission Date: 12/20/2019     Discharge Date: No discharge date for patient encounter  Primary Care Physician at Discharge: Addi Modi [phone]    Primary Discharge Diagnosis  Mobitz type 2 second-degree heart block    Other Problems Addressed  Patient Active Problem List    Diagnosis Date Noted    Seizure disorder ([facility] ) 12/21/2019    Acute on chronic diastolic congestive heart failure ([facility] ) 12/20/2019    Mobitz type 2 second degree heart block 12/20/2019    Iron deficiency anemia 10/17/2019    Ambulatory dysfunction 07/30/2019    Suspected Dementia 07/29/2019    Right shoulder pain 07/27/2019    Murmur, cardiac 07/27/2019    Fall 07/23/2019    Aphasia 07/23/2019    Elevated troponin I level 07/23/2019    Acute kidney injury superimposed on chronic kidney disease ([facility] [address] ) 07/23/2019    Depression 04/04/2018    Primary localized osteoarthritis of left knee 10/16/2017    Primary localized osteoarthritis of right knee 10/16/2017    Chronic pain of both knees 10/03/2017    Gout 07/24/2015    Mixed hyperlipidemia 03/02/2015    Essential hypertension 03/02/2015    Esophageal reflux 03/02/2015    Macular degeneration 03/02/2015    Osteoarthritis 03/02/2015    Peripheral neuropathy 03/02/2015    Vitamin D deficiency 03/02/2015       Consulting Providers   Cardiology  Therapeutic Operative Procedures Performed  Dual chamber pacemaker placement    Discharge Disposition:  Rehab    Allergies  Allergies   Allergen Reactions    Fish-Derived Products      GI upset    Allopurinol Rash     Category:  Allergy;      Diet restrictions:  Low-salt  Activity restrictions:  Per rehab  Discharge Condition:  [address]      Outpatient Follow-Up  Dr Oralia Padron in 1 week  Follow up with consulting providers  Cardiology Dr Kartik Chung in 1 week     [address] [facility]    Presenting Problem/History of Present Illness  Mobitz type 2 second degree heart block  Hospital Course    80year-old presents after syncope noted to be in second-degree heart block  Mobitz type 2 second-degree HB  patient was admitted with cardiac monitoring and seen in consultation with Cardiology  A dual chamber cardiac pacer was implanted  Cardiology evaluated patient on day of discharge dictated normal functioning pacemaker device  Ambulatory dysfunction   patient admitted with following, physical therapy recommended rehab she is discharged to [place]  Hypertension    remained elevated during admission Norvasc was added to her ARB for control  Blood pressure prior to discharge 166/83  May need further titration or dyspnea agents if elevated pressures remain  CKD 3     creatinine 1 16 date of discharge    Discharge  Statement   I spent 35 minutes discharging the patient  This time was spent on the day of discharge  I had direct contact with the patient on the day of discharge  Additional documentation is required if more than 30 minutes were spent on discharge  Discussed discharge rehab and follow up with patient and son    Discharge instructions/Information to patient and family:   See after visit summary for information provided to patient and family  S/P appendectomy    S/P knee surgery  b/l arthroscopic  S/P primary angioplasty with coronary stent  6-7 stents last one in 10/12  S/P tonsillectomy

## 2021-03-16 NOTE — TELEPHONE ENCOUNTER
Due to the current pandemic of COVID-19, patient was given the option to par take in a video/telephone call, which was accepted by the patient  Patient was informed via telephone,the following: There is a possibility of a $27 00 fee to participate in this Virtual Visit  This is depending on your insurance company if they will cover that cost       Patients preferred phone number to contact is [phone]

## 2021-03-29 DIAGNOSIS — F32.A DEPRESSION, UNSPECIFIED DEPRESSION TYPE: ICD-10-CM

## 2021-03-29 DIAGNOSIS — R47.01 APHASIA: ICD-10-CM

## 2021-03-29 RX ORDER — ATORVASTATIN CALCIUM 40 MG/1
40 TABLET, FILM COATED ORAL EVERY EVENING
Qty: 90 TABLET | Refills: 1 | Status: SHIPPED | OUTPATIENT
Start: 2021-03-29 | End: 2021-04-23 | Stop reason: SDUPTHER

## 2021-03-29 RX ORDER — DOXEPIN HYDROCHLORIDE 50 MG/1
50 CAPSULE ORAL
Qty: 90 CAPSULE | Refills: 1 | Status: SHIPPED | OUTPATIENT
Start: 2021-03-29 | End: 2021-09-24 | Stop reason: SDUPTHER

## 2021-03-29 NOTE — TELEPHONE ENCOUNTER
Pt's son Blaise Calderón called the office requesting med refills for his mom  Please send the [facility] in [facility]

## 2021-03-30 DIAGNOSIS — Z23 ENCOUNTER FOR IMMUNIZATION: ICD-10-CM

## 2021-04-14 ENCOUNTER — OFFICE VISIT (OUTPATIENT)
Dept: FAMILY MEDICINE CLINIC | Facility: CLINIC | Age: 86
End: 2021-04-14
Payer: MEDICARE

## 2021-04-14 VITALS
RESPIRATION RATE: 16 BRPM | HEIGHT: 62 IN | OXYGEN SATURATION: 97 % | SYSTOLIC BLOOD PRESSURE: 128 MMHG | HEART RATE: 71 BPM | DIASTOLIC BLOOD PRESSURE: 78 MMHG | WEIGHT: 152.4 LBS | TEMPERATURE: 96.9 F | BODY MASS INDEX: 28.05 KG/M2

## 2021-04-14 DIAGNOSIS — I10 ESSENTIAL HYPERTENSION: Primary | ICD-10-CM

## 2021-04-14 DIAGNOSIS — G60.9 IDIOPATHIC PERIPHERAL NEUROPATHY: ICD-10-CM

## 2021-04-14 DIAGNOSIS — E78.2 MIXED HYPERLIPIDEMIA: ICD-10-CM

## 2021-04-14 PROCEDURE — 99214 OFFICE O/P EST MOD 30 MIN: CPT | Performed by: FAMILY MEDICINE

## 2021-04-14 NOTE — PROGRESS NOTES
BMI Counseling: Body mass index is 27 87 kg/m²  The BMI is above normal  Nutrition recommendations include reducing portion sizes, decreasing overall calorie intake, 3-5 servings of fruits/vegetables daily, reducing fast food intake, consuming healthier snacks, decreasing soda and/or juice intake, moderation in carbohydrate intake, increasing intake of lean protein, reducing intake of saturated fat and trans fat and reducing intake of cholesterol  Exercise recommendations include exercising 3-5 times per week

## 2021-04-14 NOTE — PROGRESS NOTES
Assessment/Plan:       Diagnoses and all orders for this visit:    Essential hypertension  -     Comprehensive metabolic panel; Future    Mixed hyperlipidemia  -     Lipid Panel with Direct LDL reflex; Future    Idiopathic peripheral neuropathy  -     T3, free; Future  -     T4, free; Future  -     TSH, 3rd generation; Future    Other orders  -     Cancel: TDAP VACCINE GREATER THAN OR EQUAL TO 8YO IM  -     Cancel: DXA bone density spine hip and pelvis; Future  -     Diclofenac Sodium (VOLTAREN) 1 %; diclofenac 1 % topical gel        Patient is a full lab work will be due in October and lab slip given for same  Continue current regimen of medications  Continue efforts at healthy choices with food  Patient is limited with arthritis in the knees but encouraged her to do arm and leg chair exercises  Return in about 6 months (around 10/14/2021) for Annual physical, wellness  exam   Subjective:   Chief Complaint   Patient presents with    Follow-up      Patient ID: Devaughn Yi is a 80 y o  female  Patient is a 25-year-old female accompanied by her son who is here for routine follow-up on blood pressure and other diagnoses as noted in assessment  Patient has an upcoming cardiology visit next week  Patient is very happy with how she has been  She will be having pacemaker device checkups on a regular basis  Patient denies symptoms of CHF  Bowels and urine are within normal limits  She is limited somewhat by her knee osteoarthritis  She is still living at home independently  Her son checks on her frequently  She is interested COVID vaccine but son has been having some difficulty trying to get it scheduled  We were able to get it set up 1st appointment April 22nd  She is otherwise up-to-date on all other immunizations    The following portions of the patient's history were reviewed and updated as appropriate: allergies, current medications, past family history, past medical history, past social history, past surgical history and problem list       Review of Systems      Objective:      /78   Pulse 71   Temp (!) 96 9 °F (36 1 °C) (Temporal)   Resp 16   Ht 5' 2" (1 575 m)   Wt 69 1 kg (152 lb 6 4 oz)   SpO2 97%   BMI 27 87 kg/m²          Physical Exam  Constitutional:       General: She is not in acute distress  Appearance: Normal appearance  She is well-developed  Comments: Pleasant 57-year-old female appears younger than her stated age with slightly elevated BMI  HENT:      Head: Normocephalic  Eyes:      Conjunctiva/sclera: Conjunctivae normal       Pupils: Pupils are equal, round, and reactive to light  Neck:      Musculoskeletal: Normal range of motion and neck supple  Cardiovascular:      Rate and Rhythm: Normal rate and regular rhythm  Heart sounds: No murmur  Pulmonary:      Effort: Pulmonary effort is normal       Breath sounds: Normal breath sounds  Abdominal:      General: Bowel sounds are normal       Palpations: Abdomen is soft  There is no mass  Tenderness: There is no abdominal tenderness  Musculoskeletal: Normal range of motion  Right lower leg: No edema  Left lower leg: No edema  Skin:     General: Skin is warm and dry  Neurological:      Mental Status: She is alert and oriented to person, place, and time  Deep Tendon Reflexes: Reflexes are normal and symmetric  Comments: Patient is pretty "sharp"  Does need help with some clients issues  Does manage her own meds daily  Psychiatric:         Mood and Affect: Mood normal          Behavior: Behavior normal          Thought Content:  Thought content normal          Judgment: Judgment normal

## 2021-04-22 ENCOUNTER — IMMUNIZATIONS (OUTPATIENT)
Dept: FAMILY MEDICINE CLINIC | Facility: HOSPITAL | Age: 86
End: 2021-04-22

## 2021-04-22 DIAGNOSIS — Z23 ENCOUNTER FOR IMMUNIZATION: Primary | ICD-10-CM

## 2021-04-22 PROCEDURE — 0011A SARS-COV-2 / COVID-19 MRNA VACCINE (MODERNA) 100 MCG: CPT

## 2021-04-22 PROCEDURE — 91301 SARS-COV-2 / COVID-19 MRNA VACCINE (MODERNA) 100 MCG: CPT

## 2021-04-23 ENCOUNTER — TELEMEDICINE (OUTPATIENT)
Dept: CARDIOLOGY CLINIC | Facility: CLINIC | Age: 86
End: 2021-04-23
Payer: MEDICARE

## 2021-04-23 VITALS
SYSTOLIC BLOOD PRESSURE: 128 MMHG | WEIGHT: 152 LBS | DIASTOLIC BLOOD PRESSURE: 78 MMHG | BODY MASS INDEX: 27.97 KG/M2 | HEIGHT: 62 IN

## 2021-04-23 DIAGNOSIS — I27.20 MILD PULMONARY HYPERTENSION (HCC): ICD-10-CM

## 2021-04-23 DIAGNOSIS — R47.01 APHASIA: ICD-10-CM

## 2021-04-23 DIAGNOSIS — Z95.0 PACEMAKER: ICD-10-CM

## 2021-04-23 DIAGNOSIS — I49.5 SICK SINUS SYNDROME (HCC): ICD-10-CM

## 2021-04-23 DIAGNOSIS — R26.2 AMBULATORY DYSFUNCTION: ICD-10-CM

## 2021-04-23 DIAGNOSIS — I35.0 NONRHEUMATIC AORTIC VALVE STENOSIS: ICD-10-CM

## 2021-04-23 DIAGNOSIS — I10 ESSENTIAL HYPERTENSION: Primary | ICD-10-CM

## 2021-04-23 PROCEDURE — 99214 OFFICE O/P EST MOD 30 MIN: CPT | Performed by: INTERNAL MEDICINE

## 2021-04-23 RX ORDER — ATORVASTATIN CALCIUM 20 MG/1
20 TABLET, FILM COATED ORAL EVERY EVENING
Qty: 90 TABLET | Refills: 3 | Status: SHIPPED | OUTPATIENT
Start: 2021-04-23 | End: 2022-05-11 | Stop reason: SDUPTHER

## 2021-04-23 NOTE — ASSESSMENT & PLAN NOTE
Mrs Florence Torre  appears to be overall doing well from cardiac perspective with no recent symptoms that suggest decompensated heart failure or symptomatic aortic valve stenosis  Her blood pressure seems to be reasonably well controlled  He is on good medical regimen  Her last device interrogation showed normal functioning pacemaker device  She is pacemaker dependent  Her last blood work in October 2020 was significant for high normal potassium of 5 0 and mildly elevated calcium level  --   At this time I am advising her to continue her current medications  She will continue to be followed in the device Clinic  Given borderline abnormality of labs in 2020, a follow-up blood work is suggested and can be performed with her next visit with primary care physician      --  As she has no significant symptoms at this time there is no plan to repeat echocardiogram

## 2021-04-23 NOTE — PROGRESS NOTES
[facility]  [address] [address]  Phone#  [phone]  Fax#  [phone]  ENCOUNTER DATE: 04/23/21 12:35 PM  PATIENT NAME: Jeremiah Castillo   10/4/1928    [id number]  Age: 80 y o  Sex: female  [place] PROVIDER AND AUTHOR: Ragini Briceño MD, A  PRIMARYCARE PHYSICIAN: Erik Loomis DO  *-*-*-*-*-*-*-*-*-*-*-*-*-*-*-*-*-*-*-*-*-*-*-*-*-*-*-*-*-*-*-*-*-*-*-*-*-*-*-*-*-*-*-*-*-*-*-*-*-*-*-*-*-*  VIRTUAL VISIT- TELEPHONE ENCOUNTER  After connecting through telephone, the patient was identified by name and date of birth  she was informed that this is a telemedicine visit and that the visit is being conducted through telephone and that this is a billable service  she understands that she may discontinue the visit at anytime  My office door was closed  No one else was in the room  Patient acknowledged consent and understanding of privacy and security of the Tele-platform  VIRTUAL ENCOUNTER DISCLAIMER  Jeremiah Castillo  acknowledges that she  has consented to an online visit or consultation  she understands that the online visit is based solely on information provided by her , and that, in the absence of a face-to-face physical evaluation by the physician, the diagnosis she  receives is both limited and provisional in terms of accuracy and completeness  This is not intended to replace a full medical face-to-face evaluation by the physician  Jeremiah Castillo  understands and accepts these terms  *-*-*-*-*-*-*-*-*-*-*-*-*-*-*-*-*-*-*-*-*-*-*-*-*-*-*-*-*-*-*-*-*-*-*-*-*-*-*-*-*-*-*-*-*-*-*-*-*-*-*-*-*-*  CARDIOLOGY ASSESSMENT & PLAN:  1  Essential hypertension     2  Aphasia  atorvastatin (LIPITOR) 20 mg tablet   3  Mild pulmonary hypertension ([address] )     4  Nonrheumatic aortic valve stenosis     5  Sick sinus syndrome ([address] )     6  Dual chamber pacemaker December 2019     7   Ambulatory dysfunction       Sick sinus syndrome [facility])  Mrs Jeremiah Castillo  appears to be overall doing well from cardiac perspective with no recent symptoms that suggest decompensated heart failure or symptomatic aortic valve stenosis  Her blood pressure seems to be reasonably well controlled  He is on good medical regimen  Her last device interrogation showed normal functioning pacemaker device  She is pacemaker dependent  Her last blood work in October 2020 was significant for high normal potassium of 5 0 and mildly elevated calcium level  --   At this time I am advising her to continue her current medications  She will continue to be followed in the device Clinic  Given borderline abnormality of labs in 2020, a follow-up blood work is suggested and can be performed with her next visit with primary care physician  --  As she has no significant symptoms at this time there is no plan to repeat echocardiogram     I have spent 10 minutes with Patient and family today in which greater than 50% of this time was spent in counseling/coordination of care regarding Intructions for management, Patient and family education   IMPORTANT DIAGNOSES AND SUMMARY OF CARDIAC WORKUP:  DIAGNOSES:  1  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, now status post permanent pacemaker implantation in December 2019  2  Essential hypertension  3  Moderate aortic valve stenosis and mild pulmonary hypertension  4  Grade 1 diastolic dysfunction without congestive heart failure   5  Iron deficiency anemia  6  Macular degeneration  7  Dementia changes  8  History of gout  9  Mixed dyslipidemia  10  Degenerative joint disease  11  Vitamin-D deficiency   13  Ambulatory dysfunction     Echocardiogram December 2019:  EF around 62%, mild concentric left ventricular hypertrophy, moderate aortic valve stenosis, no aortic valve regurgitation, mild pulmonary hypertension  Estimated RVSP/PASP is 43 mmHg, mild mitral and tricuspid valve regurgitation      INTERVAL HISTORY & HISTORY OF PRESENT ILLNESS:  Love Frederick Chiara Patton is following with us for her cardiovascular medical problems including   History of heart block status post pacemaker implantation, essential hypertension, aortic valve stenosis pulmonary hypertension and comorbidities  Patient has been overall doing well with no recent subjective symptoms  She denies any recent chest pain, unusual shortness of breath, palpitations, dizziness or lightheadedness, orthopnea, PND or worsening pedal edema  She ambulates using a walker  She has not had any recent falls  She has not had any recent hospitalizations  His according to her son who is with the patient during the interaction she has been overall well  *-*-*-*-*-*-*-*-*-*-*-*-*-*-*-*-*-*-*-*-*-*-*-*-*-*-*-*-*-*-*-*-*-*-*-*-*-*-*-*-*-*-*-*-*-*-*-*-*-*-*-*-*-*  REVIEW OF SYMPTOMS:    Positive for:   Offers no significant complaints  Negative for: All remaining as reviewed below and in HPI  SYSTEM SYMPTOMS REVIEWED:  General--weight change, fever, night sweats  Respiratoryl-- Wheezing, shortness of breath, cough, URI symptoms, sputum, blood  Cardiovascular--chest pain, syncope, dyspnea on exertion, edema, decline in exercise tolerance, claudication   Gastrointestinal--persistent vomiting, diarrhea, abdominal distention, blood in stool   Muscular or skeletal--joint pain or swelling   Neurologic--headaches, syncope, abnormal movement  Hematologic--history of easy bruising and bleeding   Endocrine--thyroid enlargement, heat or cold intolerance, polyuria   Psychiatric--anxiety, depression      *-*-*-*-*-*-*-*-*-*-*-*-*-*-*-*-*-*-*-*-*-*-*-*-*-*-*-*-*-*-*-*-*-*-*-*-*-*-*-*-*-*-*-*-*-*-*-*-*-*-*-*-*-*-  VITAL SIGNS FROM PATIENT ( If available):  Vitals:    04/23/21 1043   BP: 128/78   Weight: 68 9 kg (152 lb)   Height: 5' 2" (1 575 m)       *-*-*-*-*-*-*-*-*-*-*-*-*-*-*-*-*-*-*-*-*-*-*-*-*-*-*-*-*-*-*-*-*-*-*-*-*-*-*-*-*-*-*-*-*-*-*-*-*-*-*-*-*-*-  PHYSICAL EXAM: (NOT PERFORMED AS THIS IS A VIRTUAL VISIT)      Patient reports no swelling of feet  *-*-*-*-*-*-*-*-*-*-*-*-*-*-*-*-*-*-*-*-*-*-*-*-*-*-*-*-*-*-*-*-*-*-*-*-*-*-*-*-*-*-*-*-*-*-*-*-*-*-*-*-*-*-  CURRENT MEDICATION LIST:    Current Outpatient Medications:     aspirin 81 MG tablet, Take by mouth, Disp: , Rfl:     atorvastatin (LIPITOR) 20 mg tablet, Take 1 tablet (20 mg total) by mouth every evening, Disp: 90 tablet, Rfl: 3    dorzolamide-timolol (COSOPT) 22 3-6 8 MG/ML ophthalmic solution, INSTILL 1 DROP IN EACH EYE EVERY DAY, Disp: , Rfl: 3    doxepin (SINEquan) 50 mg capsule, Take 1 capsule (50 mg total) by mouth daily at bedtime, Disp: 90 capsule, Rfl: 1    Elastic Bandages & Supports (CARPAL TUNNEL WRIST STABILIZER) MISC, by Does not apply route daily, Disp: 1 each, Rfl: 0    gabapentin (NEURONTIN) 300 mg capsule, Take 1 capsule (300 mg total) by mouth 3 (three) times a day And may add an extra daily p r n , Disp: 360 capsule, Rfl: 3    Multiple Vitamins-Minerals (ICAPS AREDS 2 PO), Take 1 capsule by mouth 2 (two) times a day, Disp: , Rfl:     omeprazole (PriLOSEC) 20 mg delayed release capsule, Take 1 capsule (20 mg total) by mouth daily, Disp: 90 capsule, Rfl: 1    telmisartan (MICARDIS) 20 MG tablet, Take 1 tablet (20 mg total) by mouth daily, Disp: 90 tablet, Rfl: 3    Diclofenac Sodium (VOLTAREN) 1 %, diclofenac 1 % topical gel, Disp: , Rfl:     ALLERGIES:  Allergies   Allergen Reactions    Fish-Derived Products - Food Allergy      GI upset    Allopurinol Rash     Category: Allergy;        *-*-*-*-*-*-*-*-*-*-*-*-*-*-*-*-*-*-*-*-*-*-*-*-*-*-*-*-*-*-*-*-*-*-*-*-*-*-*-*-*-*-*-*-*-*-*-*-*-*-*-*-*-*-  LABORATORY DATA: I have personally reviewed the available laboratory data      CMP:  Sodium   Date Value Ref Range Status   10/05/2020 143 136 - 145 mmol/L Final   12/24/2019 145 136 - 145 mmol/L Final   12/22/2019 145 136 - 145 mmol/L Final     Potassium   Date Value Ref Range Status   10/05/2020 5 0 3 5 - 5 3 mmol/L Final   12/24/2019 4 1 3 5 - 5 3 mmol/L Final   12/22/2019 3 5 3 5 - 5 3 mmol/L Final   10/02/2015 4 5 3 5 - 5 3 mmol/L Final     Chloride   Date Value Ref Range Status   10/05/2020 113 (H) 100 - 108 mmol/L Final   12/24/2019 111 (H) 100 - 108 mmol/L Final   12/22/2019 111 (H) 100 - 108 mmol/L Final   10/02/2015 109 (H) 100 - 108 mmol/L Final     CO2   Date Value Ref Range Status   10/05/2020 26 21 - 32 mmol/L Final   12/24/2019 25 21 - 32 mmol/L Final   12/22/2019 25 21 - 32 mmol/L Final   10/02/2015 27 21 - 32 mmol/L Final     BUN   Date Value Ref Range Status   10/05/2020 29 (H) 5 - 25 mg/dL Final   12/24/2019 30 (H) 5 - 25 mg/dL Final   12/22/2019 44 (H) 5 - 25 mg/dL Final   10/02/2015 24 5 - 25 mg/dL Final     Creatinine   Date Value Ref Range Status   10/05/2020 1 12 0 60 - 1 30 mg/dL Final     Comment:     Standardized to IDMS reference method   12/24/2019 1 13 0 60 - 1 30 mg/dL Final     Comment:     Standardized to IDMS reference method   12/22/2019 1 46 (H) 0 60 - 1 30 mg/dL Final     Comment:     Standardized to IDMS reference method   10/02/2015 1 29 0 60 - 1 30 mg/dL Final     Comment:     Standardized to IDMS reference method     Calcium   Date Value Ref Range Status   10/05/2020 10 4 (H) 8 3 - 10 1 mg/dL Final   12/24/2019 10 1 8 3 - 10 1 mg/dL Final   12/22/2019 10 2 (H) 8 3 - 10 1 mg/dL Final   10/02/2015 10 8 (H) 8 3 - 10 1 mg/dL Final     Total Protein   Date Value Ref Range Status   10/02/2015 7 4 6 4 - 8 2 g/dL Final     Total Bilirubin   Date Value Ref Range Status   10/02/2015 0 47 0 20 - 1 00 mg/dL Final     Alkaline Phosphatase   Date Value Ref Range Status   10/05/2020 100 46 - 116 U/L Final   12/24/2019 188 (H) 46 - 116 U/L Final   12/21/2019 173 (H) 46 - 116 U/L Final   10/02/2015 77 46 - 116 U/L Final     ALT   Date Value Ref Range Status   10/05/2020 33 12 - 78 U/L Final     Comment:     Specimen collection should occur prior to Sulfasalazine and/or Sulfapyridine administration due to the potential for falsely depressed results  12/24/2019 71 12 - 78 U/L Final     Comment:       Specimen collection should occur prior to Sulfasalazine administration due to the potential for falsely depressed results  12/21/2019 115 (H) 12 - 78 U/L Final     Comment:       Specimen collection should occur prior to Sulfasalazine administration due to the potential for falsely depressed results  10/02/2015 20 12 - 78 U/L Final     AST   Date Value Ref Range Status   10/05/2020 21 5 - 45 U/L Final     Comment:     Specimen collection should occur prior to Sulfasalazine administration due to the potential for falsely depressed results  12/24/2019 23 5 - 45 U/L Final     Comment:       Specimen collection should occur prior to Sulfasalazine administration due to the potential for falsely depressed results  12/21/2019 40 5 - 45 U/L Final     Comment:       Specimen collection should occur prior to Sulfasalazine administration due to the potential for falsely depressed results  10/02/2015 12 5 - 45 U/L Final     Glucose   Date Value Ref Range Status   10/02/2015 90 65 - 140 mg/dL Final     Comment:     If patient is fasting, the ADA then defines impaired fasting glucose as  >100 mg/dl and diabetes as  >or equal to 126 mg/dl  No results found for: PROLACTIN  Magnesium   Date Value Ref Range Status   12/21/2019 1 9 1 6 - 2 6 mg/dL Final     No results found for: PHOS Cardiac Profile:   Troponin I   Date Value Ref Range Status   12/20/2019 0 03 <=0 04 ng/mL Final     Comment:       Siemens Chemistry analyzer 99% cutoff is > 0 04 ng/mL in network labs     o cTnI 99% cutoff is useful only when applied to patients in the clinical setting of myocardial ischemia   o cTnI 99% cutoff should be interpreted in the context of clinical history, ECG findings and possibly cardiac imaging to establish correct diagnosis     o cTnI 99% cutoff may be suggestive but clearly not indicative of a coronary event without the clinical setting of myocardial ischemia      07/23/2019 0 17 (H) <=0 04 ng/mL Final     Comment:       Siemens Chemistry analyzer 99% cutoff is > 0 04 ng/mL in network labs     o cTnI 99% cutoff is useful only when applied to patients in the clinical setting of myocardial ischemia   o cTnI 99% cutoff should be interpreted in the context of clinical history, ECG findings and possibly cardiac imaging to establish correct diagnosis  o cTnI 99% cutoff may be suggestive but clearly not indicative of a coronary event without the clinical setting of myocardial ischemia  Results indicate test should be repeated on new specimen collected within 4-6 hours of the original   07/23/2019 0 16 (H) <=0 04 ng/mL Final     Comment:       Siemens Chemistry analyzer 99% cutoff is > 0 04 ng/mL in network labs     o cTnI 99% cutoff is useful only when applied to patients in the clinical setting of myocardial ischemia   o cTnI 99% cutoff should be interpreted in the context of clinical history, ECG findings and possibly cardiac imaging to establish correct diagnosis  o cTnI 99% cutoff may be suggestive but clearly not indicative of a coronary event without the clinical setting of myocardial ischemia      Results indicate test should be repeated on new specimen collected within 4-6 hours of the original     CK-MB   Date Value Ref Range Status   07/23/2019 2 7 0 0 - 5 0 ng/mL Final     NT-proBNP   Date Value Ref Range Status   12/20/2019 7,010 (H) <450 pg/mL Final     Hemoglobin A1C   Date Value Ref Range Status   07/24/2019 5 4 4 2 - 6 3 % Final     Cholesterol   Date Value Ref Range Status   10/02/2015 203 mg/dL Final     Comment:     CHOLESTEROL:       Desirable        <200 mg/dl       Borderline High  200-239 mg/dl       High             >239 mg/dl  ____________________________________       HDL   Date Value Ref Range Status   10/02/2015 43 mg/dL Final     Comment:     HDL:       High       >59 mg/dl       Low        <41 mg/dl  ______________________________       HDL, Direct   Date Value Ref Range Status   10/05/2020 47 >=40 mg/dL Final     Comment:     HDL Cholesterol:       Low     <41 mg/dL  Specimen collection should occur prior to Metamizole administration due to the potential for falsley depressed results  Triglycerides   Date Value Ref Range Status   10/05/2020 94 <=150 mg/dL Final     Comment:     Triglyceride:     Normal          <150 mg/dl     Borderline High 150-199 mg/dl     High            200-499 mg/dl        Very High       >499 mg/dl    Specimen collection should occur prior to N-Acetylcysteine or Metamizole administration due to the potential for falsely depressed results     10/02/2015 237 mg/dL Final     Comment:     TRIGLYCERIDE:       Normal              <150 mg/dl       Borderline High    150-199 mg/dl       High               200-499 mg/dl       Very High          >499 mg/dl  _______________________________________           CBC:   WBC   Date Value Ref Range Status   12/20/2019 6 70 4 31 - 10 16 Thousand/uL Final   07/29/2019 5 19 4 31 - 10 16 Thousand/uL Final   07/28/2019 7 15 4 31 - 10 16 Thousand/uL Final     Hemoglobin   Date Value Ref Range Status   12/20/2019 10 6 (L) 11 5 - 15 4 g/dL Final   07/29/2019 9 3 (L) 11 5 - 15 4 g/dL Final   07/28/2019 8 7 (L) 11 5 - 15 4 g/dL Final     Platelets   Date Value Ref Range Status   12/20/2019 80 (L) 149 - 390 Thousands/uL Final   12/20/2019 120 (L) 149 - 390 Thousands/uL Final   07/29/2019 173 149 - 390 Thousands/uL Final     PT/INR:   PTT   Date Value Ref Range Status   07/23/2019 30 23 - 37 seconds Final     Comment:     Therapeutic Heparin Range =  60-90 seconds     INR   Date Value Ref Range Status   07/23/2019 1 03 0 84 - 1 19 Final    Microbiology:          *-*-*-*-*-*-*-*-*-*-*-*-*-*-*-*-*-*-*-*-*-*-*-*-*-*-*-*-*-*-*-*-*-*-*-*-*-*-*-*-*-*-*-*-*-*-*-*-*-*-*-*-*-*-  PREVIOUS CARDIOLOGY & RADIOLOGY RESULTS:  Results for orders placed during the hospital encounter of 19   Echo complete with contrast if indicated    Narrative [address]  [facility], [address] [facility]  [phone]    Transthoracic Echocardiogram  2D, M-mode, Doppler, and Color Doppler    Study date:  2019    Patient: Kourtney Cerna  MR number: [id number]  Account number: [de-identified]  : 04-Oct-1928  Age: 80 years  Gender: Female  Status: Inpatient  Location: Bedside  Height: 62 in  Weight: 154 lb  BP: 161/ 92 mmHg    Indications: Abnormal EKG    Diagnoses: R94 31 - Abnormal electrocardiogram [ECG] [EKG]    Sonographer:  FAYE Zamora  Primary Physician:  Carol Soriano  Referring Physician:  DEANDRA Soriano  Group:  [id number]  Cardiology Fellow:  Miri Neal MD  Interpreting Physician:  Dilshad Winters MD    SUMMARY    LEFT VENTRICLE:  Systolic function was normal  Ejection fraction was estimated to be 60 %  There were no regional wall motion abnormalities  Wall thickness was moderately increased  There was moderate concentric hypertrophy  Doppler parameters were consistent with abnormal left ventricular relaxation (grade 1 diastolic dysfunction)  MITRAL VALVE:  There was marked annular calcification  There was mild regurgitation  AORTIC VALVE:  Transaortic velocity was increased due to valvular stenosis  There was mild stenosis  There was trace regurgitation  Estimated aortic valve area (by Vmax) was 1 66 cmï¾²  TRICUSPID VALVE:  There was trace regurgitation  AORTA:  There was mild dilatation of the ascending aorta  The ascending aortic AP dimension was 39 mm  IVC, HEPATIC VEINS:  The inferior vena cava was mildly dilated  HISTORY: PRIOR HISTORY: HTN,CKD    PROCEDURE: The procedure was performed at the bedside  This was a routine study  The transthoracic approach was used  The study included complete 2D imaging, M-mode, complete spectral Doppler, and color Doppler   The heart rate was 83 bpm,  at the start of the study  Image quality was adequate  LEFT VENTRICLE: Size was normal  Systolic function was normal  Ejection fraction was estimated to be 60 %  There were no regional wall motion abnormalities  Wall thickness was moderately increased  There was moderate concentric  hypertrophy  DOPPLER: Doppler parameters were consistent with abnormal left ventricular relaxation (grade 1 diastolic dysfunction)  VENTRICULAR SEPTUM: There was sigmoid septal appearance  RIGHT VENTRICLE: The size was normal  Systolic function was normal  Wall thickness was normal     LEFT ATRIUM: Size was normal     RIGHT ATRIUM: Size was normal     MITRAL VALVE: There was marked annular calcification  Valve structure was normal  There was normal leaflet separation  DOPPLER: The transmitral velocity was within the normal range  There was no evidence for stenosis  There was mild  regurgitation  AORTIC VALVE: The valve was trileaflet  Leaflets exhibited mildly to moderately increased thickness, mild to moderate calcification, and mildly reduced cuspal separation  DOPPLER: Transaortic velocity was increased due to valvular  stenosis  There was mild stenosis  There was trace regurgitation  TRICUSPID VALVE: The valve structure was normal  There was normal leaflet separation  DOPPLER: The transtricuspid velocity was within the normal range  There was no evidence for stenosis  There was trace regurgitation  Pulmonary artery  systolic pressure was within the normal range  PULMONIC VALVE: Leaflets exhibited normal thickness, no calcification, and normal cuspal separation  DOPPLER: The transpulmonic velocity was within the normal range  There was no significant regurgitation  PERICARDIUM: There was no pericardial effusion  A pericardial fat pad was present  AORTA: The root exhibited normal size  There was mild dilatation of the ascending aorta      SYSTEMIC VEINS: IVC: The inferior vena cava was mildly dilated  Respirophasic changes were normal     MEASUREMENT TABLES    2D MEASUREMENTS  LVOT   (Reference normals)  Diam   21 mm   (--)  Aorta   (Reference normals)  AAo AP diam   39 mm   (--)    DOPPLER MEASUREMENTS  LVOT   (Reference normals)  Peak minesh   113 cm/s   (--)  Mean minesh   81 cm/s   (--)  VTI   21 cm   (--)  Peak gradient   5 mmHg   (--)  Mean gradient   3 mmHg   (--)  Stroke vol   72 74 ml   (--)  Stroke index   0 47 ml/mï¾²   (--)  Aortic valve   (Reference normals)  Peak minesh   233 cm/s   (--)  Mean minesh   170 cm/s   (--)  VTI   49 cm   (--)  Peak gradient   25 7 mmHg   (--)  Obstr index, VTI   0 43    (--)  Valve area, VTI   1 49 cmï¾²   (--)  Area index, VTI   0 87 cmï¾²/mï¾²   (--)  Obstr index, Vmax   0 48    (--)  Valve area, Vmax   1 66 cmï¾²   (--)  Area index, Vmax   0 97 cmï¾²/mï¾²   (--)  Obstr index, Vmean   0 48    (--)  Valve area, Vmean   1 66 cmï¾²   (--)  Area index, Vmean   0 97 cmï¾²/mï¾²   (--)    SYSTEM MEASUREMENT TABLES    2D  %FS: 31 5 %  Ao Diam: 2 99 cm  EDV(Teich): 43 16 ml  EF(Teich): 60 72 %  ESV(Teich): 16 95 ml  IVSd: 1 44 cm  LA Area: 18 05 cm2  LA Diam: 3 32 cm  LVEDV MOD A4C: 72 34 ml  LVEF MOD A4C: 71 64 %  LVESV MOD A4C: 20 51 ml  LVIDd: 3 27 cm  LVIDs: 2 24 cm  LVLd A4C: 7 68 cm  LVLs A4C: 5 98 cm  LVPWd: 1 68 cm  RA Area: 16 31 cm2  RVIDd: 4 18 cm  SV MOD A4C: 51 83 ml  SV(Teich): 26 21 ml    CW  AV Env  Ti: 267 59 ms  AV VTI: 42 73 cm  AV Vmax: 2 26 m/s  AV Vmean: 1 6 m/s  AV maxP 39 mmHg  AV meanP 33 mmHg  HR: 162 66 BPM  MV VTI: 31 01 cm  MV Vmax: 1 42 m/s  MV Vmean: 0 84 m/s  MV maxP 06 mmHg  MV meanPG: 3 2 mmHg  TR Vmax: 2 5 m/s  TR maxP 93 mmHg    MM  TAPSE: 1 5 cm    PW  E': 0 06 m/s  E/E': 13 14  LVOT Env  Ti: 262 98 ms  LVOT VTI: 21 34 cm  LVOT Vmax: 1 13 m/s  LVOT Vmean: 0 81 m/s  LVOT maxP 1 mmHg  LVOT meanP 85 mmHg  MV A Minesh: 1 46 m/s  MV Dec Mower: 4 08 m/s2  MV DecT: 191 81 ms  MV E Minesh: 0 78 m/s  MV E/A Ratio: 0 54  MV PHT: 55 62 ms  MVA By PHT: 3 96 cm2    [facility] Accredited Echocardiography Laboratory    Prepared and electronically signed by    Leonard Vaughn MD  Signed 29-Jul-2019 16:03:33       No results found for this or any previous visit  No results found for this or any previous visit  No results found for this or any previous visit  Cardiac EP device report  JUSTIN DUAL PPM/ ACTIVE SYSTEM IS MRI CONDITIONAL  CARELINK TRANSMISSION: BATTERY VOLTAGE ADEQUATE (11 7 YRS)  AP-28%, -99% (>40% [email or web address]/AVB)  ALL AVAILABLE LEAD PARAMETERS WITHIN NORMAL LIMITS  NO SIGNIFICANT HIGH RATE EPISODES  NORMAL DEVICE FUNCTION   GV        *-*-*-*-*-*-*-*-*-*-*-*-*-*-*-*-*-*-*-*-*-*-*-*-*-*-*-*-*-*-*-*-*-*-*-*-*-*-*-*-*-*-*-*-*-*-*-*-*-*-*-*-*-*-  SIGNATURES:   @TLG@   Ragini Briceño MD     *-*-*-*-*-*-*-*-*-*-*-*-*-*-*-*-*-*-*-*-*-*-*-*-*-*-*-*-*-*-*-*-*-*-*-*-*-*-*-*-*-*-*-*-*-*-*-*-*-*-*-*-*-*-    *-*-*-*-*-*-*-*-*-*-*-*-*-*-*-*-*-*-*-*-*-*-*-*-*-*-*-*-*-*-*-*-*-*-*-*-*-*-*-*-*-*-*-*-*-*-*-*-*-*-*-*-*-*  PAST MEDICAL HISTORY:  Past Medical History:   Diagnosis Date    Depression     Gout     H/O vaginal hysterectomy     resolved-4/17/2015    Skin cancer     PAST SURGICAL HISTORY:   Past Surgical History:   Procedure Laterality Date    CATARACT EXTRACTION      TOTAL ABDOMINAL HYSTERECTOMY      with removal of both ovaries    VAGINAL HYSTERECTOMY      resolved-4/17/2015         FAMILY HISTORY:  Family History   Problem Relation Age of Onset    Heart attack Mother         MI    Heart attack Father         MI    Hypertension Sister     Stomach cancer Sister     Hypertension Brother     SOCIAL HISTORY:  Social History     Tobacco Use   Smoking Status Never Smoker   Smokeless Tobacco Never Used      Social History     Substance and Sexual Activity   Alcohol Use Not Currently     Social History     Substance and Sexual Activity   Drug Use No    [id number] *-*-*-*-*-*-*-*-*-*-*-*-*-*-*-*-*-*-*-*-*-*-*-*-*-*-*-*-*-*-*-*-*-*-*-*-*-*-*-*-*-*-*-*-*-*-*-*-*-*-*-*-*-*

## 2021-04-23 NOTE — PATIENT INSTRUCTIONS
CARDIOLOGY ASSESSMENT & PLAN:  1  Essential hypertension     2  Aphasia  atorvastatin (LIPITOR) 20 mg tablet   3  Mild pulmonary hypertension ([address] )     4  Nonrheumatic aortic valve stenosis     5  Sick sinus syndrome ([facility] [address] )     6  Dual chamber pacemaker December 2019     7  Ambulatory dysfunction       Sick sinus syndrome [facility])  Mrs Florence Torre  appears to be overall doing well from cardiac perspective with no recent symptoms that suggest decompensated heart failure or symptomatic aortic valve stenosis  Her blood pressure seems to be reasonably well controlled  He is on good medical regimen  Her last device interrogation showed normal functioning pacemaker device  She is pacemaker dependent  Her last blood work in October 2020 was significant for high normal potassium of 5 0 and mildly elevated calcium level  --   At this time I am advising her to continue her current medications  She will continue to be followed in the device Clinic  Given borderline abnormality of labs in 2020, a follow-up blood work is suggested and can be performed with her next visit with primary care physician  --  As she has no significant symptoms at this time there is no plan to repeat echocardiogram     I have spent 10 minutes with Patient and family today in which greater than 50% of this time was spent in counseling/coordination of care regarding Intructions for management, Patient and family education

## 2021-05-01 DIAGNOSIS — K21.9 GASTROESOPHAGEAL REFLUX DISEASE WITHOUT ESOPHAGITIS: ICD-10-CM

## 2021-05-03 RX ORDER — OMEPRAZOLE 20 MG/1
20 CAPSULE, DELAYED RELEASE ORAL DAILY
Qty: 90 CAPSULE | Refills: 0 | Status: SHIPPED | OUTPATIENT
Start: 2021-05-03 | End: 2021-08-06 | Stop reason: SDUPTHER

## 2021-05-06 ENCOUNTER — REMOTE DEVICE CLINIC VISIT (OUTPATIENT)
Dept: CARDIOLOGY CLINIC | Facility: CLINIC | Age: 86
End: 2021-05-06
Payer: MEDICARE

## 2021-05-06 DIAGNOSIS — Z95.0 PRESENCE OF PERMANENT CARDIAC PACEMAKER: Primary | ICD-10-CM

## 2021-05-06 PROCEDURE — 93294 REM INTERROG EVL PM/LDLS PM: CPT | Performed by: INTERNAL MEDICINE

## 2021-05-06 PROCEDURE — 93296 REM INTERROG EVL PM/IDS: CPT | Performed by: INTERNAL MEDICINE

## 2021-05-06 NOTE — PROGRESS NOTES
Results for orders placed or performed in visit on 05/06/21   Cardiac EP device report    Narrative    MDT DUAL PPM/ ACTIVE SYSTEM IS MRI CONDITIONAL  CARELINK TRANSMISSION: BATTERY VOLTAGE ADEQUATE  (11 4 YRS) AP 24%  99%  ALL AVAILABLE LEAD PARAMETERS WITHIN NORMAL LIMITS  NO SIGNIFICANT HIGH RATE EPISODES  NORMAL DEVICE FUNCTION  ---CARRERA

## 2021-05-21 ENCOUNTER — IMMUNIZATIONS (OUTPATIENT)
Dept: FAMILY MEDICINE CLINIC | Facility: HOSPITAL | Age: 86
End: 2021-05-21

## 2021-05-21 DIAGNOSIS — Z23 ENCOUNTER FOR IMMUNIZATION: Primary | ICD-10-CM

## 2021-05-21 PROCEDURE — 0012A SARS-COV-2 / COVID-19 MRNA VACCINE (MODERNA) 100 MCG: CPT

## 2021-05-21 PROCEDURE — 91301 SARS-COV-2 / COVID-19 MRNA VACCINE (MODERNA) 100 MCG: CPT

## 2021-05-28 NOTE — DISCHARGE INSTR - AVS FIRST PAGE
Medication Request  Medication name: mupirocin 2% ointment   Pharmacy Name and Location: [facility] #[id number]  Reason for request: psoriasis   When did you use medication last?:  unknown  Patient offered appointment:  patient declined  Okay to leave a detailed message: yes      Medication Request  Medication name: fluocinonide 0.05% solution   Pharmacy Name and Location: [facility] #[id number]  Reason for request: psoriasis   When did you use medication last?:  unknown  Patient offered appointment:  patient declined  Okay to leave a detailed message: yes   Please see attached instructions for your pacemaker  Our office will be contacting you with a follow up appointment in about 2 weeks      If you have any questions please do not hesitate to call our office at [phone]

## 2021-07-15 DIAGNOSIS — I10 BENIGN ESSENTIAL HYPERTENSION: ICD-10-CM

## 2021-07-15 RX ORDER — TELMISARTAN 20 MG/1
20 TABLET ORAL DAILY
Qty: 90 TABLET | Refills: 3 | Status: SHIPPED | OUTPATIENT
Start: 2021-07-15 | End: 2022-04-08 | Stop reason: SDUPTHER

## 2021-07-28 NOTE — ASSESSMENT & PLAN NOTE
Questionable differential diagnosis of possible seizure  The patient was loaded and continued with Keppra  However transferred here to [facility] for further monitoring, seizure protocol and for video EEG  Continued neurology consult  Seizure precautions  24-Dec-2020

## 2021-08-05 ENCOUNTER — REMOTE DEVICE CLINIC VISIT (OUTPATIENT)
Dept: CARDIOLOGY CLINIC | Facility: CLINIC | Age: 86
End: 2021-08-05
Payer: MEDICARE

## 2021-08-05 DIAGNOSIS — Z95.0 CARDIAC PACEMAKER IN SITU: Primary | ICD-10-CM

## 2021-08-05 PROCEDURE — 93296 REM INTERROG EVL PM/IDS: CPT | Performed by: INTERNAL MEDICINE

## 2021-08-05 PROCEDURE — 93294 REM INTERROG EVL PM/LDLS PM: CPT | Performed by: INTERNAL MEDICINE

## 2021-08-05 NOTE — PROGRESS NOTES
Results for orders placed or performed in visit on 08/05/21   Cardiac EP device report    Narrative    MDT DUAL PPM/ ACTIVE SYSTEM IS MRI CONDITIONAL  CARELINK TRANSMISSION: BATTERY VOLTAGE ADEQUATE (11 2 YRS)  AP-20%, >99% (>40% Anali@[facility] OFF/AVB)  ALL AVAILABLE LEAD PARAMETERS WITHIN NORMAL LIMITS  NO SIGNIFICANT HIGH RATE EPISODES  NORMAL DEVICE FUNCTION   GV

## 2021-08-06 DIAGNOSIS — K21.9 GASTROESOPHAGEAL REFLUX DISEASE WITHOUT ESOPHAGITIS: ICD-10-CM

## 2021-08-06 RX ORDER — OMEPRAZOLE 20 MG/1
20 CAPSULE, DELAYED RELEASE ORAL DAILY
Qty: 90 CAPSULE | Refills: 0 | Status: SHIPPED | OUTPATIENT
Start: 2021-08-06 | End: 2021-10-20 | Stop reason: SDUPTHER

## 2021-08-06 NOTE — TELEPHONE ENCOUNTER
Patient called requesting refill for Omeprazole 20 mg sent to   [facility]

## 2021-08-11 DIAGNOSIS — G60.9 IDIOPATHIC PERIPHERAL NEUROPATHY: ICD-10-CM

## 2021-08-11 RX ORDER — GABAPENTIN 300 MG/1
300 CAPSULE ORAL 3 TIMES DAILY
Qty: 360 CAPSULE | Refills: 3 | Status: SHIPPED | OUTPATIENT
Start: 2021-08-11 | End: 2021-08-11

## 2021-08-11 RX ORDER — GABAPENTIN 300 MG/1
CAPSULE ORAL
Qty: 360 CAPSULE | Refills: 3 | Status: SHIPPED | OUTPATIENT
Start: 2021-08-11 | End: 2022-02-24 | Stop reason: SDUPTHER

## 2021-08-11 NOTE — TELEPHONE ENCOUNTER
Requested medication(s) are due for refill today: No  Patient has already received a courtesy refill: No  Other reason request has been forwarded to provider: duplicate request

## 2021-09-07 NOTE — PLAN OF CARE
Problem: PHYSICAL THERAPY ADULT  Goal: Performs mobility at highest level of function for planned discharge setting  See evaluation for individualized goals  Description  Treatment/Interventions: Functional transfer training, LE strengthening/ROM, Elevations, Therapeutic exercise, Endurance training, Patient/family training, Bed mobility, Equipment eval/education, Gait training, Compensatory technique education, Continued evaluation, Spoke to nursing, OT          See flowsheet documentation for full assessment, interventions and recommendations  Note:   Prognosis: Good  Problem List: Decreased strength, Decreased range of motion, Decreased endurance, Impaired balance, Decreased mobility, Impaired vision, Impaired sensation, Decreased skin integrity(LUe PPM restrictions )  Assessment: Pt is 79 y/o female admitted with increased SOB  Second degree heart block, acute on chronic CHF, JEN on CKD  Pt is s/p PPM on 12/23 and seen in ICU for PT consult  Sling remains in place  Up with assist orders  Prior to admission resides alone in 2 story home with first floor set up  1 YOAV  Ambulates with RW at all times  Denies falls and reports independence with ADLS  Son visits daily  Pt with hx of macular degeneration with visual impairment  Also hx of neuropathy with L foot drop  + MAFO but with limited use per her report  Hx of rhb earlier this year at [facility]  Currently presents with functional limiations related to visual impairments, LLE sensory impairment, L foot drop, decreased balance, joint integrity with OA, R shoulder ROm limitations, decresaed overall mobiltiy, LUE maintained in sling p PPM   Requires Sharri for bed mobiltiy  ModA of 2 for transfers sit to stand  Max A of 2 for SPT OOB to chair  Difficulty with progression of mobility without LUE use on RW at this time  Will benefit from skilled PT in order to progress and assure functional independence prior to safe d/c to home alone   Increased risk for falls given currently functional level and impairments as listed above  Barriers to Discharge: Decreased caregiver support  Barriers to Discharge Comments: lives alone  Recommendation: Short-term skilled PT     PT - OK to Discharge: (yes to rhb, NO to home alone )    See flowsheet documentation for full assessment  70

## 2021-09-24 DIAGNOSIS — F32.A DEPRESSION, UNSPECIFIED DEPRESSION TYPE: ICD-10-CM

## 2021-09-24 RX ORDER — DOXEPIN HYDROCHLORIDE 50 MG/1
50 CAPSULE ORAL
Qty: 90 CAPSULE | Refills: 1 | Status: SHIPPED | OUTPATIENT
Start: 2021-09-24 | End: 2022-03-24 | Stop reason: SDUPTHER

## 2021-10-06 ENCOUNTER — APPOINTMENT (OUTPATIENT)
Dept: LAB | Age: 86
End: 2021-10-06
Payer: MEDICARE

## 2021-10-06 DIAGNOSIS — G60.9 IDIOPATHIC PERIPHERAL NEUROPATHY: ICD-10-CM

## 2021-10-06 DIAGNOSIS — I10 ESSENTIAL HYPERTENSION: ICD-10-CM

## 2021-10-06 DIAGNOSIS — E78.2 MIXED HYPERLIPIDEMIA: ICD-10-CM

## 2021-10-06 LAB
ALBUMIN SERPL BCP-MCNC: 3.9 G/DL (ref 3.5–5)
ALP SERPL-CCNC: 104 U/L (ref 46–116)
ALT SERPL W P-5'-P-CCNC: 21 U/L (ref 12–78)
ANION GAP SERPL CALCULATED.3IONS-SCNC: 5 MMOL/L (ref 4–13)
AST SERPL W P-5'-P-CCNC: 14 U/L (ref 5–45)
BILIRUB SERPL-MCNC: 0.61 MG/DL (ref 0.2–1)
BUN SERPL-MCNC: 25 MG/DL (ref 5–25)
CALCIUM SERPL-MCNC: 10.7 MG/DL (ref 8.3–10.1)
CHLORIDE SERPL-SCNC: 111 MMOL/L (ref 100–108)
CHOLEST SERPL-MCNC: 127 MG/DL (ref 50–200)
CO2 SERPL-SCNC: 25 MMOL/L (ref 21–32)
CREAT SERPL-MCNC: 1.12 MG/DL (ref 0.6–1.3)
GFR SERPL CREATININE-BSD FRML MDRD: 42 ML/MIN/1.73SQ M
GLUCOSE P FAST SERPL-MCNC: 90 MG/DL (ref 65–99)
HDLC SERPL-MCNC: 38 MG/DL
LDLC SERPL CALC-MCNC: 59 MG/DL (ref 0–100)
POTASSIUM SERPL-SCNC: 4.2 MMOL/L (ref 3.5–5.3)
PROT SERPL-MCNC: 7 G/DL (ref 6.4–8.2)
SODIUM SERPL-SCNC: 141 MMOL/L (ref 136–145)
T3FREE SERPL-MCNC: 2.48 PG/ML (ref 2.3–4.2)
T4 FREE SERPL-MCNC: 0.99 NG/DL (ref 0.76–1.46)
TRIGL SERPL-MCNC: 149 MG/DL
TSH SERPL DL<=0.05 MIU/L-ACNC: 2.03 UIU/ML (ref 0.36–3.74)

## 2021-10-06 PROCEDURE — 36415 COLL VENOUS BLD VENIPUNCTURE: CPT

## 2021-10-06 PROCEDURE — 80061 LIPID PANEL: CPT

## 2021-10-06 PROCEDURE — 84439 ASSAY OF FREE THYROXINE: CPT

## 2021-10-06 PROCEDURE — 84481 FREE ASSAY (FT-3): CPT

## 2021-10-06 PROCEDURE — 80053 COMPREHEN METABOLIC PANEL: CPT

## 2021-10-06 PROCEDURE — 84443 ASSAY THYROID STIM HORMONE: CPT

## 2021-10-20 ENCOUNTER — OFFICE VISIT (OUTPATIENT)
Dept: CARDIOLOGY CLINIC | Facility: CLINIC | Age: 86
End: 2021-10-20
Payer: MEDICARE

## 2021-10-20 ENCOUNTER — OFFICE VISIT (OUTPATIENT)
Dept: FAMILY MEDICINE CLINIC | Facility: CLINIC | Age: 86
End: 2021-10-20
Payer: MEDICARE

## 2021-10-20 VITALS
HEART RATE: 81 BPM | DIASTOLIC BLOOD PRESSURE: 78 MMHG | HEIGHT: 62 IN | WEIGHT: 151.8 LBS | RESPIRATION RATE: 16 BRPM | OXYGEN SATURATION: 99 % | SYSTOLIC BLOOD PRESSURE: 132 MMHG | BODY MASS INDEX: 27.94 KG/M2 | TEMPERATURE: 97.4 F

## 2021-10-20 VITALS
DIASTOLIC BLOOD PRESSURE: 80 MMHG | HEART RATE: 76 BPM | SYSTOLIC BLOOD PRESSURE: 158 MMHG | BODY MASS INDEX: 27.94 KG/M2 | OXYGEN SATURATION: 97 % | WEIGHT: 151.8 LBS | HEIGHT: 62 IN

## 2021-10-20 DIAGNOSIS — Z95.0 STATUS POST PLACEMENT OF CARDIAC PACEMAKER: ICD-10-CM

## 2021-10-20 DIAGNOSIS — Z12.31 ENCOUNTER FOR SCREENING MAMMOGRAM FOR BREAST CANCER: ICD-10-CM

## 2021-10-20 DIAGNOSIS — Z13.820 ENCOUNTER FOR OSTEOPOROSIS SCREENING IN ASYMPTOMATIC POSTMENOPAUSAL PATIENT: ICD-10-CM

## 2021-10-20 DIAGNOSIS — N18.31 STAGE 3A CHRONIC KIDNEY DISEASE (HCC): ICD-10-CM

## 2021-10-20 DIAGNOSIS — I27.20 MILD PULMONARY HYPERTENSION (HCC): ICD-10-CM

## 2021-10-20 DIAGNOSIS — I10 ESSENTIAL HYPERTENSION: Primary | ICD-10-CM

## 2021-10-20 DIAGNOSIS — I49.5 SICK SINUS SYNDROME (HCC): ICD-10-CM

## 2021-10-20 DIAGNOSIS — E78.2 MIXED HYPERLIPIDEMIA: ICD-10-CM

## 2021-10-20 DIAGNOSIS — I35.0 NONRHEUMATIC AORTIC VALVE STENOSIS: ICD-10-CM

## 2021-10-20 DIAGNOSIS — M81.0 AGE-RELATED OSTEOPOROSIS WITHOUT CURRENT PATHOLOGICAL FRACTURE: ICD-10-CM

## 2021-10-20 DIAGNOSIS — Z78.0 ENCOUNTER FOR OSTEOPOROSIS SCREENING IN ASYMPTOMATIC POSTMENOPAUSAL PATIENT: ICD-10-CM

## 2021-10-20 DIAGNOSIS — K21.9 GASTROESOPHAGEAL REFLUX DISEASE WITHOUT ESOPHAGITIS: ICD-10-CM

## 2021-10-20 PROCEDURE — 99214 OFFICE O/P EST MOD 30 MIN: CPT | Performed by: INTERNAL MEDICINE

## 2021-10-20 PROCEDURE — 93000 ELECTROCARDIOGRAM COMPLETE: CPT | Performed by: INTERNAL MEDICINE

## 2021-10-20 PROCEDURE — 99214 OFFICE O/P EST MOD 30 MIN: CPT | Performed by: FAMILY MEDICINE

## 2021-10-20 RX ORDER — AMLODIPINE BESYLATE 2.5 MG/1
2.5 TABLET ORAL DAILY
Qty: 30 TABLET | Refills: 6 | Status: SHIPPED | OUTPATIENT
Start: 2021-10-20 | End: 2022-04-08 | Stop reason: SDUPTHER

## 2021-10-20 RX ORDER — OMEPRAZOLE 20 MG/1
20 CAPSULE, DELAYED RELEASE ORAL DAILY
Qty: 90 CAPSULE | Refills: 3 | Status: SHIPPED | OUTPATIENT
Start: 2021-10-20 | End: 2022-04-08 | Stop reason: SDUPTHER

## 2021-11-05 ENCOUNTER — IN-CLINIC DEVICE VISIT (OUTPATIENT)
Dept: CARDIOLOGY CLINIC | Facility: CLINIC | Age: 86
End: 2021-11-05
Payer: MEDICARE

## 2021-11-05 DIAGNOSIS — Z95.0 PRESENCE OF CARDIAC PACEMAKER: Primary | ICD-10-CM

## 2021-11-05 PROCEDURE — 93280 PM DEVICE PROGR EVAL DUAL: CPT | Performed by: INTERNAL MEDICINE

## 2022-02-15 ENCOUNTER — REMOTE DEVICE CLINIC VISIT (OUTPATIENT)
Dept: CARDIOLOGY CLINIC | Facility: CLINIC | Age: 87
End: 2022-02-15
Payer: MEDICARE

## 2022-02-15 DIAGNOSIS — Z95.0 PRESENCE OF PERMANENT CARDIAC PACEMAKER: Primary | ICD-10-CM

## 2022-02-15 PROCEDURE — 93294 REM INTERROG EVL PM/LDLS PM: CPT | Performed by: INTERNAL MEDICINE

## 2022-02-15 PROCEDURE — 93296 REM INTERROG EVL PM/IDS: CPT | Performed by: INTERNAL MEDICINE

## 2022-02-15 NOTE — PROGRESS NOTES
MDT DUAL PPM/ ACTIVE SYSTEM IS MRI CONDITIONAL   CARELINK TRANSMISSION:  BATTERY VOLTAGE ADEQUATE (10 7 YR)   AP 24 7%  99 9% (>40%/AVB)   ALL AVAILABLE LEAD PARAMETERS WITHIN NORMAL LIMITS   NO SIGNIFICANT HIGH RATE EPISODES   NORMAL DEVICE FUNCTION   RG

## 2022-02-24 DIAGNOSIS — G60.9 IDIOPATHIC PERIPHERAL NEUROPATHY: ICD-10-CM

## 2022-02-24 RX ORDER — GABAPENTIN 300 MG/1
300 CAPSULE ORAL 3 TIMES DAILY
Qty: 360 CAPSULE | Refills: 2 | Status: SHIPPED | OUTPATIENT
Start: 2022-02-24

## 2022-03-24 DIAGNOSIS — F32.A DEPRESSION, UNSPECIFIED DEPRESSION TYPE: ICD-10-CM

## 2022-03-25 RX ORDER — DOXEPIN HYDROCHLORIDE 50 MG/1
50 CAPSULE ORAL
Qty: 90 CAPSULE | Refills: 1 | Status: SHIPPED | OUTPATIENT
Start: 2022-03-25

## 2022-04-08 DIAGNOSIS — K21.9 GASTROESOPHAGEAL REFLUX DISEASE WITHOUT ESOPHAGITIS: ICD-10-CM

## 2022-04-08 DIAGNOSIS — I10 BENIGN ESSENTIAL HYPERTENSION: ICD-10-CM

## 2022-04-08 DIAGNOSIS — I10 ESSENTIAL HYPERTENSION: ICD-10-CM

## 2022-04-12 RX ORDER — TELMISARTAN 20 MG/1
20 TABLET ORAL DAILY
Qty: 90 TABLET | Refills: 3 | Status: SHIPPED | OUTPATIENT
Start: 2022-04-12

## 2022-04-12 RX ORDER — AMLODIPINE BESYLATE 2.5 MG/1
2.5 TABLET ORAL DAILY
Qty: 90 TABLET | Refills: 1 | Status: SHIPPED | OUTPATIENT
Start: 2022-04-12

## 2022-04-12 RX ORDER — OMEPRAZOLE 20 MG/1
20 CAPSULE, DELAYED RELEASE ORAL DAILY
Qty: 90 CAPSULE | Refills: 3 | Status: SHIPPED | OUTPATIENT
Start: 2022-04-12

## 2022-04-18 ENCOUNTER — RA CDI HCC (OUTPATIENT)
Dept: OTHER | Facility: HOSPITAL | Age: 87
End: 2022-04-18

## 2022-04-18 NOTE — PROGRESS NOTES
[address]  coding opportunities       Chart reviewed, no opportunity found: CHART REVIEWED, NO OPPORTUNITY FOUND        Patients Insurance     Medicare Insurance: Medicare

## 2022-04-20 ENCOUNTER — OFFICE VISIT (OUTPATIENT)
Dept: FAMILY MEDICINE CLINIC | Facility: CLINIC | Age: 87
End: 2022-04-20
Payer: MEDICARE

## 2022-04-20 VITALS
HEART RATE: 88 BPM | SYSTOLIC BLOOD PRESSURE: 143 MMHG | WEIGHT: 153 LBS | BODY MASS INDEX: 27.98 KG/M2 | TEMPERATURE: 97.2 F | DIASTOLIC BLOOD PRESSURE: 78 MMHG | OXYGEN SATURATION: 99 %

## 2022-04-20 DIAGNOSIS — N39.41 URGE INCONTINENCE: Primary | ICD-10-CM

## 2022-04-20 DIAGNOSIS — I10 ESSENTIAL HYPERTENSION: ICD-10-CM

## 2022-04-20 DIAGNOSIS — E78.2 MIXED HYPERLIPIDEMIA: ICD-10-CM

## 2022-04-20 PROCEDURE — 99214 OFFICE O/P EST MOD 30 MIN: CPT | Performed by: FAMILY MEDICINE

## 2022-04-20 NOTE — PROGRESS NOTES
BMI Counseling: There is no height or weight on file to calculate BMI   The BMI was not able to be calculated due to patient refusing height and/or weight

## 2022-04-20 NOTE — PROGRESS NOTES
Assessment/Plan:     Diagnoses and all orders for this visit:    Urge incontinence  -     Mirabegron ER 25 MG TB24; Take 25 mg by mouth in the morning    Essential hypertension  -     Lipid Panel with Direct LDL reflex; Future  -     Comprehensive metabolic panel; Future  -     TSH, 3rd generation; Future    Mixed hyperlipidemia  -     Lipid Panel with Direct LDL reflex; Future         Trial  of Myrbetriq for urinary incontinence (based on cost and coverage ) Continue all current other medical regimen  Lab work prior to next office visit  Declines home PT  Subjective:   Chief Complaint   Patient presents with    Follow-up     6 month follow up  pt has the 3th Covid booster       Patient ID: Johnathan Morataya is a 80 y o  female  Patient is a 51-year-old female here for follow-up of hypertension  She also sees cardiology  No recent falls  She is debilitated by arthritis of the knees  She does get around at home independently  She has a scooter  She is able to get herself to the bathroom  She is able to transfer into bed  Her daughter-in-law  will brings her cook meals/ food  Patient primarily uses microwave to heat or meals  Labs were done in October 2  Patient continues to follow-up with cardiology  She The following portions of the patient's history were reviewed and updated as appropriate: allergies, current medications, past family history, past medical history, past social history, past surgical history and problem list       Review of Systems   Constitutional: Negative for fatigue  Eyes: Negative for visual disturbance  Respiratory: Negative for shortness of breath (Although patient is noted to become breathless when I was assisting her standing up to get weighed)  Cardiovascular: Negative for chest pain  Genitourinary:        Incontinence   Musculoskeletal: Positive for gait problem ( due to arthritis knees)  Psychiatric/Behavioral: Negative for sleep disturbance  Objective:    /78   Pulse 88   Temp (!) 97 2 °F (36 2 °C) (Temporal)   Wt 69 4 kg (153 lb) Comment: not done because the mobility  SpO2 99%   BMI 27 98 kg/m²          Physical Exam  Constitutional:       General: She is not in acute distress  Appearance: Normal appearance  She is well-developed  Comments: 80-year-old female who appears slightly younger than her stated age   HENT:      Head: Normocephalic  Right Ear: Tympanic membrane normal       Left Ear: Tympanic membrane normal       Ears:      Comments: Hard of hearing  Eyes:      Conjunctiva/sclera: Conjunctivae normal       Pupils: Pupils are equal, round, and reactive to light  Neck:      Vascular: No carotid bruit  Cardiovascular:      Rate and Rhythm: Normal rate and regular rhythm  Heart sounds: No murmur heard  Pulmonary:      Effort: Pulmonary effort is normal       Breath sounds: Normal breath sounds  Abdominal:      General: Bowel sounds are normal       Palpations: Abdomen is soft  There is no mass  Tenderness: There is no abdominal tenderness  Musculoskeletal:      Cervical back: Normal range of motion and neck supple  Comments: Gait is antalgic  Significantly limited    Skin:     General: Skin is warm and dry  Neurological:      Mental Status: She is alert and oriented to person, place, and time  Deep Tendon Reflexes: Reflexes are normal and symmetric  Psychiatric:         Mood and Affect: Mood normal          Behavior: Behavior normal          Thought Content: Thought content normal          Cognition and Memory: Cognition is impaired

## 2022-05-11 DIAGNOSIS — R47.01 APHASIA: ICD-10-CM

## 2022-05-11 RX ORDER — ATORVASTATIN CALCIUM 20 MG/1
20 TABLET, FILM COATED ORAL EVERY EVENING
Qty: 90 TABLET | Refills: 3 | Status: SHIPPED | OUTPATIENT
Start: 2022-05-11

## 2022-05-17 ENCOUNTER — REMOTE DEVICE CLINIC VISIT (OUTPATIENT)
Dept: CARDIOLOGY CLINIC | Facility: CLINIC | Age: 87
End: 2022-05-17
Payer: MEDICARE

## 2022-05-17 DIAGNOSIS — Z95.0 CARDIAC PACEMAKER IN SITU: Primary | ICD-10-CM

## 2022-05-17 PROCEDURE — 93294 REM INTERROG EVL PM/LDLS PM: CPT | Performed by: INTERNAL MEDICINE

## 2022-05-17 PROCEDURE — 93296 REM INTERROG EVL PM/IDS: CPT | Performed by: INTERNAL MEDICINE

## 2022-05-17 NOTE — PROGRESS NOTES
Results for orders placed or performed in visit on 05/17/22   Cardiac EP device report    Narrative    MDT DUAL PPM/ ACTIVE SYSTEM IS MRI CONDITIONAL  CARELINK TRANSMISSION: BATTERY STATUS "10 YRS " AP 26%  100%  ALL AVAILABLE LEAD PARAMETERS WITHIN NORMAL LIMITS  NO SIGNIFICANT HIGH RATE EPISODES  NORMAL DEVICE FUNCTION   NC

## 2022-07-19 ENCOUNTER — TELEMEDICINE (OUTPATIENT)
Dept: CARDIOLOGY CLINIC | Facility: CLINIC | Age: 87
End: 2022-07-19
Payer: MEDICARE

## 2022-07-19 VITALS — DIASTOLIC BLOOD PRESSURE: 79 MMHG | SYSTOLIC BLOOD PRESSURE: 137 MMHG | WEIGHT: 150 LBS | BODY MASS INDEX: 27.44 KG/M2

## 2022-07-19 DIAGNOSIS — I10 ESSENTIAL HYPERTENSION: Primary | ICD-10-CM

## 2022-07-19 DIAGNOSIS — Z95.0 STATUS POST PLACEMENT OF CARDIAC PACEMAKER: ICD-10-CM

## 2022-07-19 DIAGNOSIS — I49.5 SICK SINUS SYNDROME (HCC): ICD-10-CM

## 2022-07-19 DIAGNOSIS — I35.0 NONRHEUMATIC AORTIC VALVE STENOSIS: ICD-10-CM

## 2022-07-19 DIAGNOSIS — E78.2 MIXED HYPERLIPIDEMIA: ICD-10-CM

## 2022-07-19 DIAGNOSIS — I27.20 MILD PULMONARY HYPERTENSION (HCC): ICD-10-CM

## 2022-07-19 PROCEDURE — 99442 PR PHYS/QHP TELEPHONE EVALUATION 11-20 MIN: CPT | Performed by: INTERNAL MEDICINE

## 2022-07-19 RX ORDER — MULTIVIT WITH MINERALS/LUTEIN
500 TABLET ORAL DAILY
COMMUNITY

## 2022-07-19 NOTE — PROGRESS NOTES
[facility]  ENCOUNTER DATE: 07/19/22 11:59 AM  PATIENT NAME: Jerald Litten   10/4/1928    [id number]  Age: 80 y o  Sex: female  [address]: MD MARTINE Thomas  *-*-*-*-*-*-*-*-*-*-*-*-*-*-*-*-*-*-*-*-*-*-*-*-*-*-*-*-*-*-*-*-*-*-*-*-*-*-*-*-*-*-*-*-*-*-*-*-*-*-*-*-*-*  VIRTUAL VISIT- TELEPHONE ENCOUNTER  After connecting through telephone, the patient was identified by name and date of birth  she was informed that this is a telemedicine visit and that the visit is being conducted through telephone and that this is a billable service  she understands that she may discontinue the visit at anytime  My office door was closed  No one else was in the room  Patient acknowledged consent and understanding of privacy and security of the Tele-platform  VIRTUAL ENCOUNTER DISCLAIMER  Jerald Litten  acknowledges that she  has consented to an online visit or consultation  she understands that the online visit is based solely on information provided by her , and that, in the absence of a face-to-face physical evaluation by the physician, the diagnosis she  receives is both limited and provisional in terms of accuracy and completeness  This is not intended to replace a full medical face-to-face evaluation by the physician  Jerald Litten  understands and accepts these terms  *-*-*-*-*-*-*-*-*-*-*-*-*-*-*-*-*-*-*-*-*-*-*-*-*-*-*-*-*-*-*-*-*-*-*-*-*-*-*-*-*-*-*-*-*-*-*-*-*-*-*-*-*-*  [facility] PHYSICIAN: Sherlyn Harrell DO    CARDIOLOGY ASSESSMENT & PLAN:  1  Essential hypertension     2  Mild pulmonary hypertension ([address] )     3  Nonrheumatic aortic valve stenosis     4  Mixed hyperlipidemia     5  Sick sinus syndrome (HCC)     6  Status post placement of cardiac pacemaker       Nonrheumatic aortic valve stenosis  Ms Jerald Litten appears to be overall doing well with no recent symptoms that suggest decompensated heart failure or symptomatic AFib    Her blood pressure is reasonably well controlled  Talking to her and her son it does not suggest that she has pedal edema  She has not had recent blood work but is scheduled to get 1 before her next visit with primary care physician in October  She has normal functioning pacemaker device  She is pacemaker dependent  No recent arrhythmia was reported on last device interrogation  -- at this time I am advising her to continue her current medications  --she is advised to have routine blood work as ordered by her primary for physician prior to the next visit  -- she will continue to be followed in the device clinic as scheduled  Cardiology office appointment can be made in 8 months, earlier if necessary  -- She is advised  to report any concerning symptoms such as chest pain, shortness of breath, decline in exercise tolerance or presyncope/syncope  I have spent 15 minutes with Patient and family today in which greater than 50% of this time was spent in counseling/coordination of care regarding Intructions for management, Risk factor reductions,    IMPORTANT DIAGNOSES AND SUMMARY OF CARDIAC WORKUP:  DIAGNOSES:  1  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, now status post permanent pacemaker implantation in December 2019  2  Essential hypertension  3  Moderate aortic valve stenosis and mild pulmonary hypertension  4  Grade 1 diastolic dysfunction without congestive heart failure   5  Iron deficiency anemia  6  Macular degeneration  7  Dementia changes  8  History of gout  9  Mixed dyslipidemia  10  Degenerative joint disease  11  Vitamin-D deficiency   13  Ambulatory dysfunction     Echocardiogram December 2019:  EF around 62%, mild concentric left ventricular hypertrophy, moderate aortic valve stenosis, no aortic valve regurgitation, mild pulmonary hypertension  Estimated RVSP/PASP is 43 mmHg, mild mitral and tricuspid valve regurgitation      INTERVAL HISTORY & HISTORY OF PRESENT ILLNESS:  Charlotte Ann Radha Rubin is following with us for her cardiovascular medical problems including History of heart block status post pacemaker implantation, essential hypertension, aortic valve stenosis pulmonary hypertension and comorbidities  a virtual visit was performed at patient's request due to her mobility issues  Encounter was facilitated by patient's son Halie Otero who was with the patient at their home  She reports that she has been overall feeling well  She has had no recent chest pain or shortness of breath or palpitations or any falls  She does have significant mobility issues due to her progressing arthritis of multiple joints  She uses a walker and a scooter at home  She has had no recent orthopnea or PND or worsening pedal edema  He has had no recent hospitalizations or other significant illnesses  Last blood work is from October 2021 and indicated normal renal function and electrolyte except for elevated calcium level of 10 7  Her lipids were well controlled  Pacemaker interrogation in May 2022 shows normal functioning pacemaker device with approximately 10 years battery remaining  2 she has 26% atrial paced and 100% ventricular paced  There has been no recent arrhythmia events  *-*-*-*-*-*-*-*-*-*-*-*-*-*-*-*-*-*-*-*-*-*-*-*-*-*-*-*-*-*-*-*-*-*-*-*-*-*-*-*-*-*-*-*-*-*-*-*-*-*-*-*-*-*  REVIEW OF SYMPTOMS:    Positive for:  As noted above in HPI  Negative for: All remaining as reviewed below and in HPI   SYSTEM SYMPTOMS REVIEWED:  General--weight change, fever, night sweats  Respiratoryl-- Wheezing, shortness of breath, cough, URI symptoms, sputum, blood  Cardiovascular--chest pain, syncope, dyspnea on exertion, edema, decline in exercise tolerance, claudication   Gastrointestinal--persistent vomiting, diarrhea, abdominal distention, blood in stool   Muscular or skeletal--joint pain or swelling   Neurologic--headaches, syncope, abnormal movement  Hematologic--history of easy bruising and bleeding Endocrine--thyroid enlargement, heat or cold intolerance, polyuria   Psychiatric--anxiety, depression      *-*-*-*-*-*-*-*-*-*-*-*-*-*-*-*-*-*-*-*-*-*-*-*-*-*-*-*-*-*-*-*-*-*-*-*-*-*-*-*-*-*-*-*-*-*-*-*-*-*-*-*-*-*-  VITAL SIGNS FROM PATIENT ( If available):  Vitals:    07/19/22 1040   BP: 137/79   Weight: 68 kg (150 lb)     *-*-*-*-*-*-*-*-*-*-*-*-*-*-*-*-*-*-*-*-*-*-*-*-*-*-*-*-*-*-*-*-*-*-*-*-*-*-*-*-*-*-*-*-*-*-*-*-*-*-*-*-*-*-  PHYSICAL EXAM: (NOT PERFORMED AS THIS IS A VIRTUAL VISIT)  Patient reports no swelling of feet       *-*-*-*-*-*-*-*-*-*-*-*-*-*-*-*-*-*-*-*-*-*-*-*-*-*-*-*-*-*-*-*-*-*-*-*-*-*-*-*-*-*-*-*-*-*-*-*-*-*-*-*-*-*-  CURRENT MEDICATION LIST:    Current Outpatient Medications:     amLODIPine (NORVASC) 2 5 mg tablet, Take 1 tablet (2 5 mg total) by mouth daily, Disp: 90 tablet, Rfl: 1    ascorbic acid (VITAMIN C) 250 mg tablet, Take 500 mg by mouth daily, Disp: , Rfl:     aspirin 81 MG tablet, Take by mouth daily , Disp: , Rfl:     atorvastatin (LIPITOR) 20 mg tablet, Take 1 tablet (20 mg total) by mouth every evening, Disp: 90 tablet, Rfl: 3    Cholecalciferol (Vitamin D3) 25 MCG (1000 UT) CAPS, Take 25 each by mouth in the morning, Disp: , Rfl:     dorzolamide-timolol (COSOPT) 22 3-6 8 MG/ML ophthalmic solution, INSTILL 1 DROP IN EACH EYE EVERY DAY, Disp: , Rfl: 3    doxepin (SINEquan) 50 mg capsule, Take 1 capsule (50 mg total) by mouth daily at bedtime, Disp: 90 capsule, Rfl: 1    Elastic Bandages & Supports (CARPAL TUNNEL WRIST STABILIZER) MISC, by Does not apply route daily, Disp: 1 each, Rfl: 0    gabapentin (NEURONTIN) 300 mg capsule, Take 1 capsule (300 mg total) by mouth 3 (three) times a day, Disp: 360 capsule, Rfl: 2    Mirabegron ER 25 MG TB24, Take 25 mg by mouth in the morning, Disp: 30 tablet, Rfl: 1    Multiple Vitamins-Minerals (ICAPS AREDS 2 PO), Take 1 capsule by mouth 2 (two) times a day, Disp: , Rfl:     omeprazole (PriLOSEC) 20 mg delayed release capsule, Take 1 capsule (20 mg total) by mouth daily, Disp: 90 capsule, Rfl: 3    telmisartan (MICARDIS) 20 MG tablet, Take 1 tablet (20 mg total) by mouth daily, Disp: 90 tablet, Rfl: 3    ALLERGIES:  Allergies   Allergen Reactions    Fish-Derived Products - Food Allergy      GI upset    Allopurinol Rash     Category: Allergy;        *-*-*-*-*-*-*-*-*-*-*-*-*-*-*-*-*-*-*-*-*-*-*-*-*-*-*-*-*-*-*-*-*-*-*-*-*-*-*-*-*-*-*-*-*-*-*-*-*-*-*-*-*-*-  LABORATORY DATA: I have personally reviewed the available laboratory data      CMP:  Sodium   Date Value Ref Range Status   10/06/2021 141 136 - 145 mmol/L Final   10/05/2020 143 136 - 145 mmol/L Final   12/24/2019 145 136 - 145 mmol/L Final     Potassium   Date Value Ref Range Status   10/06/2021 4 2 3 5 - 5 3 mmol/L Final   10/05/2020 5 0 3 5 - 5 3 mmol/L Final   12/24/2019 4 1 3 5 - 5 3 mmol/L Final   10/02/2015 4 5 3 5 - 5 3 mmol/L Final     Chloride   Date Value Ref Range Status   10/06/2021 111 (H) 100 - 108 mmol/L Final   10/05/2020 113 (H) 100 - 108 mmol/L Final   12/24/2019 111 (H) 100 - 108 mmol/L Final   10/02/2015 109 (H) 100 - 108 mmol/L Final     CO2   Date Value Ref Range Status   10/06/2021 25 21 - 32 mmol/L Final   10/05/2020 26 21 - 32 mmol/L Final   12/24/2019 25 21 - 32 mmol/L Final   10/02/2015 27 21 - 32 mmol/L Final     BUN   Date Value Ref Range Status   10/06/2021 25 5 - 25 mg/dL Final   10/05/2020 29 (H) 5 - 25 mg/dL Final   12/24/2019 30 (H) 5 - 25 mg/dL Final   10/02/2015 24 5 - 25 mg/dL Final     Creatinine   Date Value Ref Range Status   10/06/2021 1 12 0 60 - 1 30 mg/dL Final     Comment:     Standardized to IDMS reference method   10/05/2020 1 12 0 60 - 1 30 mg/dL Final     Comment:     Standardized to IDMS reference method   12/24/2019 1 13 0 60 - 1 30 mg/dL Final     Comment:     Standardized to IDMS reference method   10/02/2015 1 29 0 60 - 1 30 mg/dL Final     Comment:     Standardized to IDMS reference method     Calcium   Date Value Ref Range Status   10/06/2021 10 7 (H) 8 3 - 10 1 mg/dL Final   10/05/2020 10 4 (H) 8 3 - 10 1 mg/dL Final   12/24/2019 10 1 8 3 - 10 1 mg/dL Final   10/02/2015 10 8 (H) 8 3 - 10 1 mg/dL Final     Total Protein   Date Value Ref Range Status   10/02/2015 7 4 6 4 - 8 2 g/dL Final     Total Bilirubin   Date Value Ref Range Status   10/02/2015 0 47 0 20 - 1 00 mg/dL Final     Alkaline Phosphatase   Date Value Ref Range Status   10/06/2021 104 46 - 116 U/L Final   10/05/2020 100 46 - 116 U/L Final   12/24/2019 188 (H) 46 - 116 U/L Final   10/02/2015 77 46 - 116 U/L Final     ALT   Date Value Ref Range Status   10/06/2021 21 12 - 78 U/L Final     Comment:     Specimen collection should occur prior to Sulfasalazine and/or Sulfapyridine administration due to the potential for falsely depressed results  10/05/2020 33 12 - 78 U/L Final     Comment:     Specimen collection should occur prior to Sulfasalazine and/or Sulfapyridine administration due to the potential for falsely depressed results  12/24/2019 71 12 - 78 U/L Final     Comment:       Specimen collection should occur prior to Sulfasalazine administration due to the potential for falsely depressed results  10/02/2015 20 12 - 78 U/L Final     AST   Date Value Ref Range Status   10/06/2021 14 5 - 45 U/L Final     Comment:     Specimen collection should occur prior to Sulfasalazine administration due to the potential for falsely depressed results  10/05/2020 21 5 - 45 U/L Final     Comment:     Specimen collection should occur prior to Sulfasalazine administration due to the potential for falsely depressed results  12/24/2019 23 5 - 45 U/L Final     Comment:       Specimen collection should occur prior to Sulfasalazine administration due to the potential for falsely depressed results      10/02/2015 12 5 - 45 U/L Final     Glucose   Date Value Ref Range Status   10/02/2015 90 65 - 140 mg/dL Final     Comment:     If patient is fasting, the ADA then defines impaired fasting glucose as  >100 mg/dl and diabetes as  >or equal to 126 mg/dl  No results found for: PROLACTIN  Magnesium   Date Value Ref Range Status   12/21/2019 1 9 1 6 - 2 6 mg/dL Final     No results found for: PHOS Cardiac Profile:   Troponin I   Date Value Ref Range Status   12/20/2019 0 03 <=0 04 ng/mL Final     Comment:       Siemens Chemistry analyzer 99% cutoff is > 0 04 ng/mL in network labs     o cTnI 99% cutoff is useful only when applied to patients in the clinical setting of myocardial ischemia   o cTnI 99% cutoff should be interpreted in the context of clinical history, ECG findings and possibly cardiac imaging to establish correct diagnosis  o cTnI 99% cutoff may be suggestive but clearly not indicative of a coronary event without the clinical setting of myocardial ischemia      07/23/2019 0 17 (H) <=0 04 ng/mL Final     Comment:       Siemens Chemistry analyzer 99% cutoff is > 0 04 ng/mL in network labs     o cTnI 99% cutoff is useful only when applied to patients in the clinical setting of myocardial ischemia   o cTnI 99% cutoff should be interpreted in the context of clinical history, ECG findings and possibly cardiac imaging to establish correct diagnosis  o cTnI 99% cutoff may be suggestive but clearly not indicative of a coronary event without the clinical setting of myocardial ischemia  Results indicate test should be repeated on new specimen collected within 4-6 hours of the original   07/23/2019 0 16 (H) <=0 04 ng/mL Final     Comment:       Siemens Chemistry analyzer 99% cutoff is > 0 04 ng/mL in network labs     o cTnI 99% cutoff is useful only when applied to patients in the clinical setting of myocardial ischemia   o cTnI 99% cutoff should be interpreted in the context of clinical history, ECG findings and possibly cardiac imaging to establish correct diagnosis     o cTnI 99% cutoff may be suggestive but clearly not indicative of a coronary event without the clinical setting of myocardial ischemia  Results indicate test should be repeated on new specimen collected within 4-6 hours of the original     CK-MB   Date Value Ref Range Status   07/23/2019 2 7 0 0 - 5 0 ng/mL Final     NT-proBNP   Date Value Ref Range Status   12/20/2019 7,010 (H) <450 pg/mL Final     Hemoglobin A1C   Date Value Ref Range Status   07/24/2019 5 4 4 2 - 6 3 % Final     Cholesterol   Date Value Ref Range Status   10/02/2015 203 mg/dL Final     Comment:     CHOLESTEROL:       Desirable        <200 mg/dl       Borderline High  200-239 mg/dl       High             >239 mg/dl  ____________________________________       HDL   Date Value Ref Range Status   10/02/2015 43 mg/dL Final     Comment:     HDL:       High       >59 mg/dl       Low        <41 mg/dl  ______________________________       HDL, Direct   Date Value Ref Range Status   10/06/2021 38 (L) >=40 mg/dL Final     Comment:     Specimen collection should occur prior to Metamizole administration due to the potential for falsley depressed results  Triglycerides   Date Value Ref Range Status   10/06/2021 149 <=150 mg/dL Final     Comment:     Triglyceride:     Normal          <150 mg/dl     Borderline High 150-199 mg/dl     High            200-499 mg/dl        Very High       >499 mg/dl    Specimen collection should occur prior to N-Acetylcysteine or Metamizole administration due to the potential for falsely depressed results     10/02/2015 237 mg/dL Final     Comment:     TRIGLYCERIDE:       Normal              <150 mg/dl       Borderline High    150-199 mg/dl       High               200-499 mg/dl       Very High          >499 mg/dl  _______________________________________           CBC:   WBC   Date Value Ref Range Status   12/20/2019 6 70 4 31 - 10 16 Thousand/uL Final   07/29/2019 5 19 4 31 - 10 16 Thousand/uL Final   07/28/2019 7 15 4 31 - 10 16 Thousand/uL Final     Hemoglobin   Date Value Ref Range Status   12/20/2019 10 6 (L) 11 5 - 15 4 g/dL Final 2019 9 3 (L) 11 5 - 15 4 g/dL Final   2019 8 7 (L) 11 5 - 15 4 g/dL Final     Platelets   Date Value Ref Range Status   2019 80 (L) 149 - 390 Thousands/uL Final   2019 120 (L) 149 - 390 Thousands/uL Final   2019 173 149 - 390 Thousands/uL Final     PT/INR:   PTT   Date Value Ref Range Status   2019 30 23 - 37 seconds Final     Comment:     Therapeutic Heparin Range =  60-90 seconds     INR   Date Value Ref Range Status   2019 1 03 0 84 - 1 19 Final    Microbiology:          *-*-*-*-*-*-*-*-*-*-*-*-*-*-*-*-*-*-*-*-*-*-*-*-*-*-*-*-*-*-*-*-*-*-*-*-*-*-*-*-*-*-*-*-*-*-*-*-*-*-*-*-*-*-  PREVIOUS CARDIOLOGY & RADIOLOGY RESULTS:  Results for orders placed during the hospital encounter of 19    Echo complete with contrast if indicated    Narrative  [facility] [address] [place] [address]  [phone]    Transthoracic Echocardiogram  2D, M-mode, Doppler, and Color Doppler    Study date:  2019    Patient: Kevin Green  MR number: [id number]  Account number: [de-identified]  : 04-Oct-1928  Age: 80 years  Gender: Female  Status: Inpatient  Location: Bedside  Height: 62 in  Weight: 154 lb  BP: 161/ 92 mmHg    Indications: Abnormal EKG    Diagnoses: R94 31 - Abnormal electrocardiogram [ECG] [EKG]    Sonographer:  FAYE Hope  Primary Physician:  DEANDRA Smith  Referring Physician:  DEANDRA Smith  Group:  [facility]  Cardiology Fellow:  Marcelle Lesches, MD  Interpreting Physician:  Alessandro Nails MD    SUMMARY    LEFT VENTRICLE:  Systolic function was normal  Ejection fraction was estimated to be 60 %  There were no regional wall motion abnormalities  Wall thickness was moderately increased  There was moderate concentric hypertrophy  Doppler parameters were consistent with abnormal left ventricular relaxation (grade 1 diastolic dysfunction)      MITRAL VALVE:  There was marked annular calcification  There was mild regurgitation  AORTIC VALVE:  Transaortic velocity was increased due to valvular stenosis  There was mild stenosis  There was trace regurgitation  Estimated aortic valve area (by Vmax) was 1 66 cmï¾²  TRICUSPID VALVE:  There was trace regurgitation  AORTA:  There was mild dilatation of the ascending aorta  The ascending aortic AP dimension was 39 mm  IVC, HEPATIC VEINS:  The inferior vena cava was mildly dilated  HISTORY: PRIOR HISTORY: HTN,CKD    PROCEDURE: The procedure was performed at the bedside  This was a routine study  The transthoracic approach was used  The study included complete 2D imaging, M-mode, complete spectral Doppler, and color Doppler  The heart rate was 83 bpm,  at the start of the study  Image quality was adequate  LEFT VENTRICLE: Size was normal  Systolic function was normal  Ejection fraction was estimated to be 60 %  There were no regional wall motion abnormalities  Wall thickness was moderately increased  There was moderate concentric  hypertrophy  DOPPLER: Doppler parameters were consistent with abnormal left ventricular relaxation (grade 1 diastolic dysfunction)  VENTRICULAR SEPTUM: There was sigmoid septal appearance  RIGHT VENTRICLE: The size was normal  Systolic function was normal  Wall thickness was normal     LEFT ATRIUM: Size was normal     RIGHT ATRIUM: Size was normal     MITRAL VALVE: There was marked annular calcification  Valve structure was normal  There was normal leaflet separation  DOPPLER: The transmitral velocity was within the normal range  There was no evidence for stenosis  There was mild  regurgitation  AORTIC VALVE: The valve was trileaflet  Leaflets exhibited mildly to moderately increased thickness, mild to moderate calcification, and mildly reduced cuspal separation  DOPPLER: Transaortic velocity was increased due to valvular  stenosis  There was mild stenosis   There was trace regurgitation  TRICUSPID VALVE: The valve structure was normal  There was normal leaflet separation  DOPPLER: The transtricuspid velocity was within the normal range  There was no evidence for stenosis  There was trace regurgitation  Pulmonary artery  systolic pressure was within the normal range  PULMONIC VALVE: Leaflets exhibited normal thickness, no calcification, and normal cuspal separation  DOPPLER: The transpulmonic velocity was within the normal range  There was no significant regurgitation  PERICARDIUM: There was no pericardial effusion  A pericardial fat pad was present  AORTA: The root exhibited normal size  There was mild dilatation of the ascending aorta  SYSTEMIC VEINS: IVC: The inferior vena cava was mildly dilated   Respirophasic changes were normal     MEASUREMENT TABLES    2D MEASUREMENTS  LVOT   (Reference normals)  Diam   21 mm   (--)  Aorta   (Reference normals)  AAo AP diam   39 mm   (--)    DOPPLER MEASUREMENTS  LVOT   (Reference normals)  Peak karine   113 cm/s   (--)  Mean karine   81 cm/s   (--)  VTI   21 cm   (--)  Peak gradient   5 mmHg   (--)  Mean gradient   3 mmHg   (--)  Stroke vol   72 74 ml   (--)  Stroke index   0 47 ml/mï¾²   (--)  Aortic valve   (Reference normals)  Peak karine   233 cm/s   (--)  Mean karine   170 cm/s   (--)  VTI   49 cm   (--)  Peak gradient   25 7 mmHg   (--)  Obstr index, VTI   0 43    (--)  Valve area, VTI   1 49 cmï¾²   (--)  Area index, VTI   0 87 cmï¾²/mï¾²   (--)  Obstr index, Vmax   0 48    (--)  Valve area, Vmax   1 66 cmï¾²   (--)  Area index, Vmax   0 97 cmï¾²/mï¾²   (--)  Obstr index, Vmean   0 48    (--)  Valve area, Vmean   1 66 cmï¾²   (--)  Area index, Vmean   0 97 cmï¾²/mï¾²   (--)    SYSTEM MEASUREMENT TABLES    2D  %FS: 31 5 %  Ao Diam: 2 99 cm  EDV(Teich): 43 16 ml  EF(Teich): 60 72 %  ESV(Teich): 16 95 ml  IVSd: 1 44 cm  LA Area: 18 05 cm2  LA Diam: 3 32 cm  LVEDV MOD A4C: 72 34 ml  LVEF MOD A4C: 71 64 %  LVESV MOD A4C: 20 51 ml  LVIDd: 3 27 cm  LVIDs: 2 24 cm  LVLd A4C: 7 68 cm  LVLs A4C: 5 98 cm  LVPWd: 1 68 cm  RA Area: 16 31 cm2  RVIDd: 4 18 cm  SV MOD A4C: 51 83 ml  SV(Teich): 26 21 ml    CW  AV Env  Ti: 267 59 ms  AV VTI: 42 73 cm  AV Vmax: 2 26 m/s  AV Vmean: 1 6 m/s  AV maxP 39 mmHg  AV meanP 33 mmHg  HR: 162 66 BPM  MV VTI: 31 01 cm  MV Vmax: 1 42 m/s  MV Vmean: 0 84 m/s  MV maxP 06 mmHg  MV meanPG: 3 2 mmHg  TR Vmax: 2 5 m/s  TR maxP 93 mmHg    MM  TAPSE: 1 5 cm    PW  E': 0 06 m/s  E/E': 13 14  LVOT Env  Ti: 262 98 ms  LVOT VTI: 21 34 cm  LVOT Vmax: 1 13 m/s  LVOT Vmean: 0 81 m/s  LVOT maxP 1 mmHg  LVOT meanP 85 mmHg  MV A Minesh: 1 46 m/s  MV Dec Crook: 4 08 m/s2  MV DecT: 191 81 ms  MV E Minesh: 0 78 m/s  MV E/A Ratio: 0 54  MV PHT: 55 62 ms  MVA By PHT: 3 96 cm2    [employer] Accredited Echocardiography Laboratory    Prepared and electronically signed by    Pat Fleming MD  Signed 2019 16:03:33    No results found for this or any previous visit  No results found for this or any previous visit  No results found for this or any previous visit  Cardiac EP device report  MDT DUAL PPM/ ACTIVE SYSTEM IS MRI CONDITIONAL  CARELINK TRANSMISSION: BATTERY STATUS "10 YRS " AP 26%  100%  ALL AVAILABLE LEAD PARAMETERS WITHIN NORMAL LIMITS  NO SIGNIFICANT HIGH RATE EPISODES  NORMAL DEVICE FUNCTION   NC        *-*-*-*-*-*-*-*-*-*-*-*-*-*-*-*-*-*-*-*-*-*-*-*-*-*-*-*-*-*-*-*-*-*-*-*-*-*-*-*-*-*-*-*-*-*-*-*-*-*-*-*-*-*-  SIGNATURES:   @YWH@   Ragini Briceño MD     *-*-*-*-*-*-*-*-*-*-*-*-*-*-*-*-*-*-*-*-*-*-*-*-*-*-*-*-*-*-*-*-*-*-*-*-*-*-*-*-*-*-*-*-*-*-*-*-*-*-*-*-*-*-    *-*-*-*-*-*-*-*-*-*-*-*-*-*-*-*-*-*-*-*-*-*-*-*-*-*-*-*-*-*-*-*-*-*-*-*-*-*-*-*-*-*-*-*-*-*-*-*-*-*-*-*-*-*  PAST MEDICAL HISTORY:  Past Medical History:   Diagnosis Date    Depression     Gout     H/O vaginal hysterectomy     resolved-2015    PAST SURGICAL HISTORY:   Past Surgical History:   Procedure Laterality Date    CATARACT EXTRACTION      TOTAL ABDOMINAL HYSTERECTOMY      with removal of both ovaries    VAGINAL HYSTERECTOMY      resolved-4/17/2015         FAMILY HISTORY:  Family History   Problem Relation Age of Onset    Heart attack Mother         MI    Heart attack Father         MI    Hypertension Sister     Stomach cancer Sister     Hypertension Brother     SOCIAL HISTORY:  Social History     Tobacco Use   Smoking Status Never Smoker   Smokeless Tobacco Never Used      Social History     Substance and Sexual Activity   Alcohol Use Not Currently     Social History     Substance and Sexual Activity   Drug Use No    [id number]     *-*-*-*-*-*-*-*-*-*-*-*-*-*-*-*-*-*-*-*-*-*-*-*-*-*-*-*-*-*-*-*-*-*-*-*-*-*-*-*-*-*-*-*-*-*-*-*-*-*-*-*-*-*

## 2022-07-19 NOTE — PATIENT INSTRUCTIONS
CARDIOLOGY ASSESSMENT & PLAN:  1  Essential hypertension     2  Mild pulmonary hypertension ([address] )     3  Nonrheumatic aortic valve stenosis     4  Mixed hyperlipidemia     5  Sick sinus syndrome (HCC)     6  Status post placement of cardiac pacemaker       Nonrheumatic aortic valve stenosis  Ms Sachin Mkcay appears to be overall doing well with no recent symptoms that suggest decompensated heart failure or symptomatic AFib  Her blood pressure is reasonably well controlled  Talking to her and her son it does not suggest that she has pedal edema  She has not had recent blood work but is scheduled to get 1 before her next visit with primary care physician in October  She has normal functioning pacemaker device  She is pacemaker dependent  No recent arrhythmia was reported on last device interrogation  -- at this time I am advising her to continue her current medications  --she is advised to have routine blood work as ordered by her primary for physician prior to the next visit  -- she will continue to be followed in the device clinic as scheduled  Cardiology office appointment can be made in 8 months, earlier if necessary  -- She is advised  to report any concerning symptoms such as chest pain, shortness of breath, decline in exercise tolerance or presyncope/syncope

## 2022-07-19 NOTE — ASSESSMENT & PLAN NOTE
Ms Yan Nevarez appears to be overall doing well with no recent symptoms that suggest decompensated heart failure or symptomatic AFib  Her blood pressure is reasonably well controlled  Talking to her and her son it does not suggest that she has pedal edema  She has not had recent blood work but is scheduled to get 1 before her next visit with primary care physician in October  She has normal functioning pacemaker device  She is pacemaker dependent  No recent arrhythmia was reported on last device interrogation  -- at this time I am advising her to continue her current medications  --she is advised to have routine blood work as ordered by her primary for physician prior to the next visit  -- she will continue to be followed in the device clinic as scheduled  Cardiology office appointment can be made in 8 months, earlier if necessary  -- She is advised  to report any concerning symptoms such as chest pain, shortness of breath, decline in exercise tolerance or presyncope/syncope

## 2022-08-16 ENCOUNTER — REMOTE DEVICE CLINIC VISIT (OUTPATIENT)
Dept: CARDIOLOGY CLINIC | Facility: CLINIC | Age: 87
End: 2022-08-16
Payer: MEDICARE

## 2022-08-16 DIAGNOSIS — Z95.0 CARDIAC PACEMAKER IN SITU: Primary | ICD-10-CM

## 2022-08-16 PROCEDURE — 93294 REM INTERROG EVL PM/LDLS PM: CPT | Performed by: INTERNAL MEDICINE

## 2022-08-16 PROCEDURE — 93296 REM INTERROG EVL PM/IDS: CPT | Performed by: INTERNAL MEDICINE

## 2022-08-16 NOTE — PROGRESS NOTES
Results for orders placed or performed in visit on 08/16/22   Cardiac EP device report    Narrative    MDT DUAL PPM/ ACTIVE SYSTEM IS MRI CONDITIONAL  CARELINK TRANSMISSION: BATTERY VOLTAGE ADEQUATE (10 2 YRS)  AP 33 6%  99 1% (>40%/AVB/DDD 60); ALL AVAILABLE LEAD PARAMETERS WITHIN NORMAL LIMITS  NO SIGNIFICANT HIGH RATE EPISODES  PRESENTING EGM AS/ @ 110 PPM (UTR); PACEMAKER FUNCTIONING APPROPRIATELY    ES

## 2022-09-08 DIAGNOSIS — F32.A DEPRESSION, UNSPECIFIED DEPRESSION TYPE: ICD-10-CM

## 2022-09-08 RX ORDER — DOXEPIN HYDROCHLORIDE 50 MG/1
50 CAPSULE ORAL
Qty: 90 CAPSULE | Refills: 1 | Status: SHIPPED | OUTPATIENT
Start: 2022-09-08

## 2022-09-13 DIAGNOSIS — G60.9 IDIOPATHIC PERIPHERAL NEUROPATHY: ICD-10-CM

## 2022-09-13 RX ORDER — GABAPENTIN 300 MG/1
300 CAPSULE ORAL 3 TIMES DAILY
Qty: 360 CAPSULE | Refills: 2 | Status: SHIPPED | OUTPATIENT
Start: 2022-09-13

## 2022-09-25 DIAGNOSIS — I10 ESSENTIAL HYPERTENSION: ICD-10-CM

## 2022-09-26 RX ORDER — AMLODIPINE BESYLATE 2.5 MG/1
TABLET ORAL
Qty: 90 TABLET | Refills: 1 | Status: SHIPPED | OUTPATIENT
Start: 2022-09-26

## 2022-10-05 ENCOUNTER — APPOINTMENT (EMERGENCY)
Dept: CT IMAGING | Facility: HOSPITAL | Age: 87
End: 2022-10-05
Payer: MEDICARE

## 2022-10-05 ENCOUNTER — HOSPITAL ENCOUNTER (OUTPATIENT)
Facility: HOSPITAL | Age: 87
Setting detail: OBSERVATION
Discharge: HOME | End: 2022-10-06
Attending: EMERGENCY MEDICINE | Admitting: INTERNAL MEDICINE
Payer: MEDICARE

## 2022-10-05 ENCOUNTER — APPOINTMENT (OUTPATIENT)
Dept: RADIOLOGY | Facility: HOSPITAL | Age: 87
End: 2022-10-05
Payer: MEDICARE

## 2022-10-05 DIAGNOSIS — R06.00 DYSPNEA: Primary | ICD-10-CM

## 2022-10-05 DIAGNOSIS — I49.5 SICK SINUS SYNDROME (HCC): ICD-10-CM

## 2022-10-05 DIAGNOSIS — R06.02 SHORTNESS OF BREATH: ICD-10-CM

## 2022-10-05 DIAGNOSIS — I35.0 NONRHEUMATIC AORTIC VALVE STENOSIS: ICD-10-CM

## 2022-10-05 LAB
2HR DELTA HS TROPONIN: 2 NG/L
4HR DELTA HS TROPONIN: 7 NG/L
ALBUMIN SERPL BCP-MCNC: 4.5 G/DL (ref 3.5–5)
ALP SERPL-CCNC: 99 U/L (ref 46–116)
ALT SERPL W P-5'-P-CCNC: 19 U/L (ref 12–78)
ANION GAP SERPL CALCULATED.3IONS-SCNC: 8 MMOL/L (ref 4–13)
AST SERPL W P-5'-P-CCNC: 19 U/L (ref 5–45)
ATRIAL RATE: 82 BPM
BASOPHILS # BLD AUTO: 0.01 THOUSANDS/ΜL (ref 0–0.1)
BASOPHILS NFR BLD AUTO: 0 % (ref 0–1)
BILIRUB SERPL-MCNC: 0.63 MG/DL (ref 0.2–1)
BUN SERPL-MCNC: 17 MG/DL (ref 5–25)
CALCIUM SERPL-MCNC: 11.4 MG/DL (ref 8.3–10.1)
CARDIAC TROPONIN I PNL SERPL HS: 32 NG/L
CARDIAC TROPONIN I PNL SERPL HS: 34 NG/L
CARDIAC TROPONIN I PNL SERPL HS: 39 NG/L
CHLORIDE SERPL-SCNC: 105 MMOL/L (ref 96–108)
CO2 SERPL-SCNC: 26 MMOL/L (ref 21–32)
CREAT SERPL-MCNC: 1.14 MG/DL (ref 0.6–1.3)
EOSINOPHIL # BLD AUTO: 0.04 THOUSAND/ΜL (ref 0–0.61)
EOSINOPHIL NFR BLD AUTO: 1 % (ref 0–6)
ERYTHROCYTE [DISTWIDTH] IN BLOOD BY AUTOMATED COUNT: 15.4 % (ref 11.6–15.1)
GFR SERPL CREATININE-BSD FRML MDRD: 41 ML/MIN/1.73SQ M
GLUCOSE SERPL-MCNC: 108 MG/DL (ref 65–140)
HCT VFR BLD AUTO: 36.9 % (ref 34.8–46.1)
HGB BLD-MCNC: 11.7 G/DL (ref 11.5–15.4)
IMM GRANULOCYTES # BLD AUTO: 0.02 THOUSAND/UL (ref 0–0.2)
IMM GRANULOCYTES NFR BLD AUTO: 1 % (ref 0–2)
LYMPHOCYTES # BLD AUTO: 0.9 THOUSANDS/ΜL (ref 0.6–4.47)
LYMPHOCYTES NFR BLD AUTO: 25 % (ref 14–44)
MCH RBC QN AUTO: 27.9 PG (ref 26.8–34.3)
MCHC RBC AUTO-ENTMCNC: 31.7 G/DL (ref 31.4–37.4)
MCV RBC AUTO: 88 FL (ref 82–98)
MONOCYTES # BLD AUTO: 0.38 THOUSAND/ΜL (ref 0.17–1.22)
MONOCYTES NFR BLD AUTO: 11 % (ref 4–12)
NEUTROPHILS # BLD AUTO: 2.27 THOUSANDS/ΜL (ref 1.85–7.62)
NEUTS SEG NFR BLD AUTO: 62 % (ref 43–75)
NRBC BLD AUTO-RTO: 0 /100 WBCS
NT-PROBNP SERPL-MCNC: 1947 PG/ML
P AXIS: 74 DEGREES
PLATELET # BLD AUTO: 131 THOUSANDS/UL (ref 149–390)
PMV BLD AUTO: 12.1 FL (ref 8.9–12.7)
POTASSIUM SERPL-SCNC: 3.8 MMOL/L (ref 3.5–5.3)
PR INTERVAL: 172 MS
PROT SERPL-MCNC: 8.2 G/DL (ref 6.4–8.4)
QRS AXIS: -76 DEGREES
QRSD INTERVAL: 170 MS
QT INTERVAL: 418 MS
QTC INTERVAL: 488 MS
RBC # BLD AUTO: 4.2 MILLION/UL (ref 3.81–5.12)
SODIUM SERPL-SCNC: 139 MMOL/L (ref 135–147)
T WAVE AXIS: 96 DEGREES
VENTRICULAR RATE: 82 BPM
WBC # BLD AUTO: 3.62 THOUSAND/UL (ref 4.31–10.16)

## 2022-10-05 PROCEDURE — 83880 ASSAY OF NATRIURETIC PEPTIDE: CPT | Performed by: EMERGENCY MEDICINE

## 2022-10-05 PROCEDURE — 36415 COLL VENOUS BLD VENIPUNCTURE: CPT | Performed by: EMERGENCY MEDICINE

## 2022-10-05 PROCEDURE — 85025 COMPLETE CBC W/AUTO DIFF WBC: CPT | Performed by: EMERGENCY MEDICINE

## 2022-10-05 PROCEDURE — 93010 ELECTROCARDIOGRAM REPORT: CPT | Performed by: INTERNAL MEDICINE

## 2022-10-05 PROCEDURE — 99285 EMERGENCY DEPT VISIT HI MDM: CPT

## 2022-10-05 PROCEDURE — 71275 CT ANGIOGRAPHY CHEST: CPT

## 2022-10-05 PROCEDURE — 80053 COMPREHEN METABOLIC PANEL: CPT | Performed by: EMERGENCY MEDICINE

## 2022-10-05 PROCEDURE — 99220 PR INITIAL OBSERVATION CARE/DAY 70 MINUTES: CPT | Performed by: INTERNAL MEDICINE

## 2022-10-05 PROCEDURE — 99283 EMERGENCY DEPT VISIT LOW MDM: CPT | Performed by: EMERGENCY MEDICINE

## 2022-10-05 PROCEDURE — 84484 ASSAY OF TROPONIN QUANT: CPT | Performed by: EMERGENCY MEDICINE

## 2022-10-05 PROCEDURE — 71045 X-RAY EXAM CHEST 1 VIEW: CPT

## 2022-10-05 PROCEDURE — 93005 ELECTROCARDIOGRAM TRACING: CPT

## 2022-10-05 RX ORDER — DOXEPIN HYDROCHLORIDE 50 MG/1
50 CAPSULE ORAL
Status: DISCONTINUED | OUTPATIENT
Start: 2022-10-05 | End: 2022-10-06 | Stop reason: HOSPADM

## 2022-10-05 RX ORDER — AMLODIPINE BESYLATE 2.5 MG/1
2.5 TABLET ORAL ONCE
Status: COMPLETED | OUTPATIENT
Start: 2022-10-05 | End: 2022-10-05

## 2022-10-05 RX ORDER — LOSARTAN POTASSIUM 25 MG/1
25 TABLET ORAL DAILY
Refills: 3 | Status: DISCONTINUED | OUTPATIENT
Start: 2022-10-06 | End: 2022-10-06 | Stop reason: HOSPADM

## 2022-10-05 RX ORDER — ASPIRIN 81 MG/1
81 TABLET, CHEWABLE ORAL DAILY
Status: DISCONTINUED | OUTPATIENT
Start: 2022-10-06 | End: 2022-10-06 | Stop reason: HOSPADM

## 2022-10-05 RX ORDER — AMLODIPINE BESYLATE 2.5 MG/1
2.5 TABLET ORAL DAILY
Status: DISCONTINUED | OUTPATIENT
Start: 2022-10-06 | End: 2022-10-06

## 2022-10-05 RX ORDER — ENOXAPARIN SODIUM 100 MG/ML
30 INJECTION SUBCUTANEOUS DAILY
Status: DISCONTINUED | OUTPATIENT
Start: 2022-10-06 | End: 2022-10-06 | Stop reason: HOSPADM

## 2022-10-05 RX ORDER — GABAPENTIN 300 MG/1
300 CAPSULE ORAL 2 TIMES DAILY
Status: DISCONTINUED | OUTPATIENT
Start: 2022-10-05 | End: 2022-10-06 | Stop reason: HOSPADM

## 2022-10-05 RX ORDER — DORZOLAMIDE HYDROCHLORIDE AND TIMOLOL MALEATE 20; 5 MG/ML; MG/ML
1 SOLUTION/ DROPS OPHTHALMIC EVERY EVENING
Status: DISCONTINUED | OUTPATIENT
Start: 2022-10-05 | End: 2022-10-06 | Stop reason: HOSPADM

## 2022-10-05 RX ORDER — OXYBUTYNIN CHLORIDE 5 MG/1
5 TABLET, EXTENDED RELEASE ORAL DAILY
Refills: 1 | Status: DISCONTINUED | OUTPATIENT
Start: 2022-10-06 | End: 2022-10-06 | Stop reason: HOSPADM

## 2022-10-05 RX ORDER — ATORVASTATIN CALCIUM 20 MG/1
20 TABLET, FILM COATED ORAL EVERY EVENING
Status: DISCONTINUED | OUTPATIENT
Start: 2022-10-05 | End: 2022-10-06 | Stop reason: HOSPADM

## 2022-10-05 RX ORDER — PANTOPRAZOLE SODIUM 40 MG/1
40 TABLET, DELAYED RELEASE ORAL
Refills: 3 | Status: DISCONTINUED | OUTPATIENT
Start: 2022-10-06 | End: 2022-10-06 | Stop reason: HOSPADM

## 2022-10-05 RX ADMIN — ATORVASTATIN CALCIUM 20 MG: 20 TABLET, FILM COATED ORAL at 20:35

## 2022-10-05 RX ADMIN — DORZOLAMIDE HYDROCHLORIDE AND TIMOLOL MALEATE 1 DROP: 20; 5 SOLUTION/ DROPS OPHTHALMIC at 20:38

## 2022-10-05 RX ADMIN — IOHEXOL 100 ML: 350 INJECTION, SOLUTION INTRAVENOUS at 17:18

## 2022-10-05 RX ADMIN — DOXEPIN HYDROCHLORIDE 50 MG: 50 CAPSULE ORAL at 20:36

## 2022-10-05 RX ADMIN — AMLODIPINE BESYLATE 2.5 MG: 2.5 TABLET ORAL at 18:01

## 2022-10-05 NOTE — ED NOTES
Kunal-Son of patient would like to be updated regarding plan of care; [facility], RN  10/05/22 4092

## 2022-10-05 NOTE — ED PROVIDER NOTES
Pt Name: Melodie Pearson  MRN: [id number]  [place] 10/4/1928  Age/Sex: 80 y o  female  Date of evaluation: 10/5/2022  PCP: Romayne Chars, [address] [place] [address]    Chief Complaint   Patient presents with    Shortness of Breath     Pt came in via EMS, pt reports SOB since last night, ankle swollen and L-collar bone pain  Pt denies other symptoms  HPI    Rico Espitia presents to the Emergency Department complaining of SOB  She lives at home and alone and is starting to feel that it is harder to breathe with exertion and also at rest   She did notice some lower extremity swelling BL but it is mild  No other complaints  No chest pain  No cough or fever  No sick contacts or exposures  Shortness of Breath  Severity:  Mild  Progression:  Worsening  Chronicity:  New  Relieved by:  Nothing  Worsened by:  Nothing  Ineffective treatments:  None tried  Associated symptoms: no abdominal pain, no chest pain, no cough, no ear pain, no fever, no rash, no sore throat and no vomiting    Risk factors: no recent alcohol use, no family hx of DVT, no hx of PE/DVT, no obesity, no prolonged immobilization, no recent surgery and no tobacco use          Past Medical and Surgical History    Past Medical History:   Diagnosis Date    Depression     Gout     H/O vaginal hysterectomy     resolved-4/17/2015       Past Surgical History:   Procedure Laterality Date    CATARACT EXTRACTION      TOTAL ABDOMINAL HYSTERECTOMY      with removal of both ovaries    VAGINAL HYSTERECTOMY      resolved-4/17/2015       Family History   Problem Relation Age of Onset    Heart attack Mother         MI    Heart attack Father         MI    Hypertension Sister     Stomach cancer Sister     Hypertension Brother        Social History     Tobacco Use    Smoking status: Never Smoker    Smokeless tobacco: Never Used   Vaping Use    Vaping Use: Never used   Substance Use Topics    Alcohol use: Not Currently    Drug use:  No      Allergies    Allergies   Allergen Reactions    Fish-Derived Products - Food Allergy      GI upset    Allopurinol Rash     Category: Allergy;        Home Medications    Prior to Admission medications    Medication Sig Start Date End Date Taking?  Authorizing Provider   amLODIPine (NORVASC) 2 5 mg tablet TAKE 1 TABLET BY MOUTH EVERY DAY 6/97/12   Collins Comer, DO   ascorbic acid (VITAMIN C) 250 mg tablet Take 500 mg by mouth daily    Historical Provider, MD   aspirin 81 MG tablet Take by mouth daily  3/2/15   Historical Provider, MD   atorvastatin (LIPITOR) 20 mg tablet Take 1 tablet (20 mg total) by mouth every evening 5/11/22   Alline Draft, DO   Cholecalciferol (Vitamin D3) 25 MCG (1000 UT) CAPS Take 25 each by mouth in the morning    Historical Provider, MD   dorzolamide-timolol (COSOPT) 22 3-6 8 MG/ML ophthalmic solution INSTILL 1 DROP IN [facility] EYE EVERY DAY 9/25/19   Historical Provider, MD   doxepin (SINEquan) 50 mg capsule Take 1 capsule (50 mg total) by mouth daily at bedtime 8/6/70   Collins Copper, DO   Elastic Bandages & Supports ([employer]) MISC by Does not apply route daily 1/28/75   Collins Copper, DO   gabapentin (NEURONTIN) 300 mg capsule Take 1 capsule (300 mg total) by mouth 3 (three) times a day 6/82/73   Collins Copper, DO   Mirabegron ER 25 MG TB24 Take 25 mg by mouth in the morning 3/41/27   Collins Copper, DO   Multiple Vitamins-Minerals (ICAPS AREDS 2 PO) Take 1 capsule by mouth 2 (two) times a day    Historical Provider, MD   omeprazole (PriLOSEC) 20 mg delayed release capsule Take 1 capsule (20 mg total) by mouth daily 7/21/85   Collins Copper, DO   telmisartan (MICARDIS) 20 MG tablet Take 1 tablet (20 mg total) by mouth daily 7/36/81   Collins Copper, DO   ezetimibe (ZETIA) 10 mg tablet Take 1 tablet (10 mg total) by mouth daily 11/2/20 3/41/64  [facility], DO           Review of Systems    Review of Systems   Constitutional: Negative for chills and fever  HENT: Negative for ear pain and sore throat  Eyes: Negative for pain and visual disturbance  Respiratory: Positive for shortness of breath  Negative for cough  Cardiovascular: Negative for chest pain and palpitations  Gastrointestinal: Negative for abdominal pain and vomiting  Genitourinary: Negative for dysuria and hematuria  Musculoskeletal: Negative for arthralgias and back pain  Skin: Negative for color change and rash  Neurological: Negative for seizures and syncope  All other systems reviewed and are negative  Physical Exam      ED Triage Vitals   Temperature Pulse Respirations Blood Pressure SpO2   10/05/22 1301 10/05/22 1301 10/05/22 1301 10/05/22 1301 10/05/22 1301   98 2 °F (36 8 °C) 90 18 (!) 190/101 98 %      Temp Source Heart Rate Source Patient Position - Orthostatic VS BP Location FiO2 (%)   10/05/22 1301 10/05/22 1301 10/05/22 1301 10/05/22 1301 --   Oral Monitor Lying Right arm       Pain Score       10/05/22 1657       No Pain               Physical Exam  Vitals and nursing note reviewed  Constitutional:       General: She is not in acute distress  Appearance: She is well-developed  HENT:      Head: Normocephalic and atraumatic  Eyes:      Conjunctiva/sclera: Conjunctivae normal    Cardiovascular:      Rate and Rhythm: Normal rate and regular rhythm  Heart sounds: No murmur heard  Pulmonary:      Effort: Pulmonary effort is normal  No respiratory distress  Breath sounds: Normal breath sounds  Abdominal:      Palpations: Abdomen is soft  Tenderness: There is no abdominal tenderness  Musculoskeletal:      Cervical back: Neck supple  Right lower leg: Edema present  Left lower leg: Edema present  Skin:     General: Skin is warm and dry  Neurological:      Mental Status: She is alert  Assessment and Plan    Mj Saleem is a 80 y o  female who presents with SOB   Physical examination essentially unremarkable  Differential diagnosis (not completely inclusive) includes cardiopulmonary causes of chest pain  Plan will be to perform diagnostic testing and treat symptomatically  MDM    Diagnostic Results      EKG INTERPRETATION  EKG Interpretation  Paced  EKG interpreted by me  Interpretation by Maki Bradley  EKG reviewed and interpreted independently      Labs:    Results for orders placed or performed during the hospital encounter of 10/05/22   CBC and differential   Result Value Ref Range    WBC 3 62 (L) 4 31 - 10 16 Thousand/uL    RBC 4 20 3 81 - 5 12 Million/uL    Hemoglobin 11 7 11 5 - 15 4 g/dL    Hematocrit 36 9 34 8 - 46 1 %    MCV 88 82 - 98 fL    MCH 27 9 26 8 - 34 3 pg    MCHC 31 7 31 4 - 37 4 g/dL    RDW 15 4 (H) 11 6 - 15 1 %    MPV 12 1 8 9 - 12 7 fL    Platelets 199 (L) 989 - 390 Thousands/uL    nRBC 0 /100 WBCs    Neutrophils Relative 62 43 - 75 %    Immat GRANS % 1 0 - 2 %    Lymphocytes Relative 25 14 - 44 %    Monocytes Relative 11 4 - 12 %    Eosinophils Relative 1 0 - 6 %    Basophils Relative 0 0 - 1 %    Neutrophils Absolute 2 27 1 85 - 7 62 Thousands/µL    Immature Grans Absolute 0 02 0 00 - 0 20 Thousand/uL    Lymphocytes Absolute 0 90 0 60 - 4 47 Thousands/µL    Monocytes Absolute 0 38 0 17 - 1 22 Thousand/µL    Eosinophils Absolute 0 04 0 00 - 0 61 Thousand/µL    Basophils Absolute 0 01 0 00 - 0 10 Thousands/µL   Comprehensive metabolic panel   Result Value Ref Range    Sodium 139 135 - 147 mmol/L    Potassium 3 8 3 5 - 5 3 mmol/L    Chloride 105 96 - 108 mmol/L    CO2 26 21 - 32 mmol/L    ANION GAP 8 4 - 13 mmol/L    BUN 17 5 - 25 mg/dL    Creatinine 1 14 0 60 - 1 30 mg/dL    Glucose 108 65 - 140 mg/dL    Calcium 11 4 (H) 8 3 - 10 1 mg/dL    AST 19 5 - 45 U/L    ALT 19 12 - 78 U/L    Alkaline Phosphatase 99 46 - 116 U/L    Total Protein 8 2 6 4 - 8 4 g/dL    Albumin 4 5 3 5 - 5 0 g/dL    Total Bilirubin 0 63 0 20 - 1 00 mg/dL    eGFR 41 ml/min/1 73sq m   HS Troponin 0hr (reflex protocol)   Result Value Ref Range    hs TnI 0hr 32 "Refer to ACS Flowchart"- see link ng/L   NT-BNP PRO   Result Value Ref Range    NT-proBNP 1,947 (H) <450 pg/mL   HS Troponin I 2hr   Result Value Ref Range    hs TnI 2hr 34 "Refer to ACS Flowchart"- see link ng/L    Delta 2hr hsTnI 2 <20 ng/L   HS Troponin I 4hr   Result Value Ref Range    hs TnI 4hr 39 "Refer to ACS Flowchart"- see link ng/L    Delta 4hr hsTnI 7 <20 ng/L   Basic metabolic panel   Result Value Ref Range    Sodium 140 135 - 147 mmol/L    Potassium 3 7 3 5 - 5 3 mmol/L    Chloride 106 96 - 108 mmol/L    CO2 25 21 - 32 mmol/L    ANION GAP 9 4 - 13 mmol/L    BUN 16 5 - 25 mg/dL    Creatinine 1 09 0 60 - 1 30 mg/dL    Glucose 90 65 - 140 mg/dL    Calcium 11 0 (H) 8 3 - 10 1 mg/dL    eGFR 43 ml/min/1 73sq m   CBC (With Platelets)   Result Value Ref Range    WBC 4 35 4 31 - 10 16 Thousand/uL    RBC 4 05 3 81 - 5 12 Million/uL    Hemoglobin 11 2 (L) 11 5 - 15 4 g/dL    Hematocrit 35 1 34 8 - 46 1 %    MCV 87 82 - 98 fL    MCH 27 7 26 8 - 34 3 pg    MCHC 31 9 31 4 - 37 4 g/dL    RDW 15 4 (H) 11 6 - 15 1 %    Platelets 880 (L) 000 - 390 Thousands/uL    MPV 12 6 8 9 - 12 7 fL   ECG 12 lead   Result Value Ref Range    Ventricular Rate 82 BPM    Atrial Rate 82 BPM    CA Interval 172 ms    QRSD Interval 170 ms    QT Interval 418 ms    QTC Interval 488 ms    P [place] 74 degrees    QRS Axis -76 degrees    T Wave Axis 96 degrees   ECG 12 lead   Result Value Ref Range    Ventricular Rate 86 BPM    Atrial Rate 86 BPM    CA Interval 176 ms    QRSD Interval 162 ms    QT Interval 416 ms    QTC Interval 497 ms    P Axis 87 degrees    QRS Axis -77 degrees    T Wave Axis 99 degrees   ECG 12 lead   Result Value Ref Range    Ventricular Rate 82 BPM    Atrial Rate 82 BPM    CA Interval 170 ms    QRSD Interval 166 ms    QT Interval 406 ms    QTC Interval 474 ms    P Axis 68 degrees    QRS Axis -76 degrees    T Wave Axis 96 degrees   Echo complete w/ contrast if indicated   Result Value Ref Range    A4C EF 51 %    LVOT stroke volume 53 54     LVOT stroke volume index 32 50 ml/m2    LVIDd 4 60 cm    LVIDS 3 50 cm    IVSd 1 30 cm    LVPWd 0 90 cm    FS 24 28 - 44    MV E' Tissue Velocity Septal 5 cm/s    E wave deceleration time 281 ms    MV Peak E Minesh 58 cm/s    MV Peak A Minesh 1 57 m/s    AV LVOT peak gradient 4 mmHg    LVOT peak VTI 17 05 cm    LVOT peak minesh 0 94 m/s    RVID d 3 4 cm    LA size 3 2 cm    LA length (A2C) 6 00 cm    RA 2D Volume 34 0 mL    RAA A4C 13 cm2    LVOT diameter 2 0 cm    Aortic valve peak velocity 2 2 m/s    Ao VTI 42 58 cm    AV mean gradient 11 mmHg    LVOT mn grad 2 0 mmHg    AV peak gradient 19 mmHg    AV area by cont VTI 1 3 cm2    AV area peak minesh 1 3 cm2    Aortic Valve Area by Planimetry 1 4 cm2    MV mean gradient antegrade 3 mmHg    MV peak gradient antegrade 11 mmHg    MV VTI 31 5 cm    MV stenosis pressure 1/2 time 82 ms    MV valve area p 1/2 method 2 68     TR Peak Minesh 2 9 m/s    Triscuspid Valve Regurgitation Peak Gradient 32 0 mmHg    Ao root 3 50 cm    Asc Ao 3 8 cm    Aortic valve mean velocity 15 90 m/s    Tricuspid valve peak regurgitation velocity 2 85 m/s    Left ventricular stroke volume (2D) 50 00 mL    IVS 1 3 cm    LEFT VENTRICLE SYSTOLIC VOLUME (MOD BIPLANE) 2D 50 mL    LV DIASTOLIC VOLUME (MOD BIPLANE) 2D 99 mL    Left Atrium Area-systolic Four Chamber 12 3 cm2    Left Atrium Area-systolic Apical Two Chamber 22 4 cm2    LVSV, 2D 50 mL    LVOT area 3 14 cm2    Dimensionless velociy index 0 43     AV valve area 1 26 cm2    MV valve area by continuity eq 1 70 cm2       All labs reviewed and utilized in the medical decision making process    Radiology:    CTA ED chest PE study   Final Result      No pulmonary embolism  No acute thoracic abnormality  Mildly dilated main pulmonary artery, suggestive of elevated pulmonary arterial pressures  Recommend correlation with echocardiogram       Coronary atherosclerosis  Additional chronic/incidental findings as detailed above  The study was marked in [facility] for immediate notification  Workstation performed: [id number]         XR chest 1 view portable   Final Result      1  No acute airspace disease  2   Possible small left pleural effusion                    Workstation performed: [id number]             All radiology studies independently viewed by me and interpreted by the radiologist     Procedure    Procedures      ED Course of Care and Re-Assessments      Medications   aspirin chewable tablet 81 mg (81 mg Oral Given 10/6/22 0858)   atorvastatin (LIPITOR) tablet 20 mg (20 mg Oral Given 10/5/22 2035)   doxepin (SINEquan) capsule 50 mg (50 mg Oral Given 10/5/22 2036)   gabapentin (NEURONTIN) capsule 300 mg (300 mg Oral Refused 10/6/22 0903)   oxybutynin (DITROPAN-XL) 24 hr tablet 5 mg (5 mg Oral Given 10/6/22 0858)   pantoprazole (PROTONIX) EC tablet 40 mg (40 mg Oral Given 10/6/22 0514)   losartan (COZAAR) tablet 25 mg (25 mg Oral Given 10/6/22 0858)   dorzolamide-timolol (COSOPT) 22 3-6 8 MG/ML ophthalmic solution 1 drop (1 drop Both Eyes Given 10/5/22 2038)   enoxaparin (LOVENOX) subcutaneous injection 30 mg (30 mg Subcutaneous Given 10/6/22 0858)   amLODIPine (NORVASC) tablet 5 mg (has no administration in time range)   amLODIPine (NORVASC) tablet 2 5 mg (2 5 mg Oral Given 10/5/22 1801)   iohexol (OMNIPAQUE) 350 MG/ML injection (MULTI-DOSE) 100 mL (100 mL Intravenous Given 10/5/22 1718)           FINAL IMPRESSION    Final diagnoses:   Dyspnea         DISPOSITION/PLAN      Time reflects when diagnosis was documented in both MDM as applicable and the Disposition within this note     Time User Action Codes Description Comment    10/5/2022  6:28 PM Gayatri Bad Add [R06 00] Dyspnea     10/5/2022  7:44 PM Zak Ringer Add [I49 5] Sick sinus syndrome ([address] )     10/5/2022  7:44 PM Zak Ringer Add [R06 02] Shortness of breath     10/5/2022 7:44 PM Robet Paget Add [I35 0] Nonrheumatic aortic valve stenosis       ED Disposition     ED Disposition   Admit    Condition   Stable    Date/Time   Wed Oct 5, 2022  6:28 PM    Comment   Case was discussed with NASRIN and the patient's admission status was agreed to be Admission Status: observation status to the service of Dr Maryam Olea   Follow-up Information     Follow up With Specialties Details Why Contact Info    Maggie Quiroz DO Family Medicine Follow up in 1 week(s)  [address]  [address]  [phone]              PATIENT REFERRED TO:    Maggie Quiroz DO  [address]    [address]  [phone]    Follow up in 1 week(s)        DISCHARGE MEDICATIONS:    Discharge Medication List as of 10/6/2022  4:15 PM      CONTINUE these medications which have NOT CHANGED    Details   ascorbic acid (VITAMIN C) 250 mg tablet Take 500 mg by mouth daily, Historical Med      aspirin 81 MG tablet Take by mouth daily , Starting Mon 3/2/2015, Historical Med      atorvastatin (LIPITOR) 20 mg tablet Take 1 tablet (20 mg total) by mouth every evening, Starting Wed 5/11/2022, Normal      Cholecalciferol (Vitamin D3) 25 MCG (1000 UT) CAPS Take 25 each by mouth in the morning, Historical Med      dorzolamide-timolol (COSOPT) 22 3-6 8 MG/ML ophthalmic solution INSTILL 1 DROP IN [facility] EYE EVERY DAY, Historical Med      doxepin (SINEquan) 50 mg capsule Take 1 capsule (50 mg total) by mouth daily at bedtime, Starting Thu 9/8/2022, Normal      Elastic Bandages & Supports (CARPAL TUNNEL WRIST STABILIZER) MISC by Does not apply route daily, Starting Wed 2/12/2020, Normal      gabapentin (NEURONTIN) 300 mg capsule Take 1 capsule (300 mg total) by mouth 3 (three) times a day, Starting Tue 9/13/2022, Normal      Mirabegron ER 25 MG TB24 Take 25 mg by mouth in the morning, Starting Wed 4/20/2022, Normal      omeprazole (PriLOSEC) 20 mg delayed release capsule Take 1 capsule (20 mg total) by mouth daily, Starting Tue 4/12/2022, Normal      amLODIPine (NORVASC) 2 5 mg tablet TAKE 1 TABLET BY MOUTH EVERY DAY, Normal      telmisartan (MICARDIS) 20 MG tablet Take 1 tablet (20 mg total) by mouth daily, Starting Tue 4/12/2022, Normal         STOP taking these medications       Multiple Vitamins-Minerals (ICAPS AREDS 2 PO) Comments:   Reason for Stopping:               Outpatient Discharge Orders   Discharge Diet     Activity as tolerated     Call provider for:  difficulty breathing, headache or visual disturbances            DO Estella Dial DO  10/06/22 6985

## 2022-10-05 NOTE — H&P
[facility]  H&P- Lucien Darden 10/4/1928, 80 y o  female MRN: [id number]  Unit/Bed#: E5 -01 Encounter: [id number]  Primary Care Provider: Shaye Feldman DO   Date and time admitted to hospital: 10/5/2022 11:58 AM    * Shortness of breath  Assessment & Plan  Multifactorial secondary to advanced age, valvular heart disease, pulmonary hypertension, uncontrolled essential hypertension, deconditioning  · Will order echocardiogram to determine if her heart disease has progressed  Last echocardiogram was in 2019  · Maintain on telemetry rule out abnormal heart rhythms  Last pacemaker check showed normal device function  · Consult PT OT and     · Consult cardiology    Esophageal reflux  Assessment & Plan  Switch omeprazole to Protonix while in the hospital    Essential hypertension  Assessment & Plan  · Continue Norvasc 2 5 mg daily  · Continue telmisartan or switch to inpatient formulary    Mixed hyperlipidemia  Assessment & Plan  · Continue Lipitor 20 mg daily    VTE Prophylaxis: Enoxaparin (Lovenox)     Code Status:  DNR  POLST: There is no POLST form on file for this patient (pre-hospital)    Anticipated Length of Stay:  Patient will be admitted on an Observation basis with an anticipated length of stay of  shortness of breath 2 midnights  Justification for Hospital Stay:  Shortness of breath    Total Time for Visit, including Counseling / Coordination of Care: 45 minutes  Greater than 50% of this total time spent on direct patient counseling and coordination of care  Chief Complaint:   Shortness of breath    History of Present Illness:    Lucien Darden is a 80 y o  female who presents with shortness of breath both at rest and on exertion  She said she had a poor night sleep for no reason  She was not short of breath while laying flat  This morning she just felt more tired than usual   It did not matter if she was sitting or walking    She said she did not feel this short of breath before except for when she 1st was diagnosed with an abnormal heart rhythm and the pacemaker was required  She said she was short of breath all day and only started feeling better when she came to the ER  I asked if there was any medication that made her feel better, she said the pill that they gave earlier  The only medication she received in the ER was Norvasc  She denied any cough, sputum production, fever or chills  She denied any nausea vomiting or diarrhea  She denied any chest pain or palpitation  Review of Systems:    Review of Systems   Constitutional: Positive for fatigue  HENT: Negative  Eyes: Negative  Respiratory: Positive for shortness of breath  Cardiovascular: Negative  Gastrointestinal: Negative  Genitourinary: Negative  Musculoskeletal: Negative  Skin: Negative  Neurological: Negative  Psychiatric/Behavioral: Negative  All other systems reviewed and are negative  Past Medical and Surgical History:     Past Medical History:   Diagnosis Date    Depression     Gout     H/O vaginal hysterectomy     resolved-4/17/2015       Past Surgical History:   Procedure Laterality Date    CATARACT EXTRACTION      TOTAL ABDOMINAL HYSTERECTOMY      with removal of both ovaries    VAGINAL HYSTERECTOMY      resolved-4/17/2015       Meds/Allergies:    Prior to Admission medications    Medication Sig Start Date End Date Taking?  Authorizing Provider   amLODIPine (NORVASC) 2 5 mg tablet TAKE 1 TABLET BY MOUTH EVERY DAY 9/26/22  Yes uL Seals,    ascorbic acid (VITAMIN C) 250 mg tablet Take 500 mg by mouth daily   Yes Historical Provider, MD   aspirin 81 MG tablet Take by mouth daily  3/2/15  Yes Historical Provider, MD   atorvastatin (LIPITOR) 20 mg tablet Take 1 tablet (20 mg total) by mouth every evening 5/11/22  Yes Robyn Choi,    Cholecalciferol (Vitamin D3) 25 MCG (1000 UT) CAPS Take 25 each by mouth in the morning   Yes Historical Provider, MD   dorzolamide-timolol (COSOPT) 22 3-6 8 MG/ML ophthalmic solution INSTILL 1 DROP IN [facility] EYE EVERY DAY 9/25/19  Yes Historical Provider, MD   doxepin (SINEquan) 50 mg capsule Take 1 capsule (50 mg total) by mouth daily at bedtime 9/8/22  Yes Alexandra Sale, DO   Elastic Bandages & Supports (258 Invrep) MISC by Does not apply route daily 2/12/20  Yes Alexandra Sale, DO   gabapentin (NEURONTIN) 300 mg capsule Take 1 capsule (300 mg total) by mouth 3 (three) times a day 9/13/22  Yes Alexandra Sale, DO   omeprazole (PriLOSEC) 20 mg delayed release capsule Take 1 capsule (20 mg total) by mouth daily 4/12/22  Yes Alexandra Sale, DO   telmisartan (MICARDIS) 20 MG tablet Take 1 tablet (20 mg total) by mouth daily 4/12/22  Yes Alexandra Sale, DO   Mirabegron ER 25 MG TB24 Take 25 mg by mouth in the morning 9/42/60   Alexandra Sale, DO   Multiple Vitamins-Minerals (ICAPS AREDS 2 PO) Take 1 capsule by mouth 2 (two) times a day  Patient not taking: Reported on 10/5/2022    Historical Provider, MD   ezetimibe (ZETIA) 10 mg tablet Take 1 tablet (10 mg total) by mouth daily 11/2/20 3/46/28  Alexandra Sale, DO     I have reviewed home medications with a medical source (PCP, Pharmacy, other)  Allergies: Allergies   Allergen Reactions    Fish-Derived Products - Food Allergy      GI upset    Allopurinol Rash     Category: Allergy;        Social History:     Marital Status:     Occupation:  None  Patient Pre-hospital Living Situation:  Independent  Patient Pre-hospital Diet Restrictions:  None  Substance Use History:   Social History     Substance and Sexual Activity   Alcohol Use Not Currently     Social History     Tobacco Use   Smoking Status Never Smoker   Smokeless Tobacco Never Used     Social History     Substance and Sexual Activity   Drug Use No       Family History:    Family History   Problem Relation Age of Onset    Heart attack Mother         MI    Heart attack Father         MI    Hypertension Sister     Stomach cancer Sister     Hypertension Brother        Physical Exam:     Vitals:   Blood Pressure: (!) 171/83 (10/05/22 1800)  Pulse: 90 (10/05/22 1800)  Temperature: 98 2 °F (36 8 °C) (10/05/22 1301)  Temp Source: Oral (10/05/22 1301)  Respirations: 18 (10/05/22 1800)  SpO2: 93 % (10/05/22 1800)    Physical Exam  Vitals reviewed  Constitutional:       Appearance: She is not ill-appearing or diaphoretic  HENT:      Head: Normocephalic and atraumatic  Nose: No congestion or rhinorrhea  Eyes:      General: No scleral icterus  Right eye: No discharge  Left eye: No discharge  Pupils: Pupils are equal, round, and reactive to light  Cardiovascular:      Rate and Rhythm: Normal rate and regular rhythm  Heart sounds: Murmur heard  Pulmonary:      Effort: Pulmonary effort is normal  No respiratory distress  Breath sounds: No wheezing or rales  Abdominal:      General: Abdomen is flat  There is no distension  Palpations: Abdomen is soft  Tenderness: There is no abdominal tenderness  Musculoskeletal:      Cervical back: Neck supple  Right lower leg: No edema  Left lower leg: No edema  Skin:     General: Skin is warm and dry  Coloration: Skin is not jaundiced or pale  Neurological:      Comments: Awake alert follows commands   Psychiatric:         Mood and Affect: Mood normal          Behavior: Behavior normal      Additional Data:     Lab Results: I have personally reviewed pertinent reports        Results from last 7 days   Lab Units 10/05/22  1242   WBC Thousand/uL 3 62*   HEMOGLOBIN g/dL 11 7   HEMATOCRIT % 36 9   PLATELETS Thousands/uL 131*   NEUTROS PCT % 62   LYMPHS PCT % 25   MONOS PCT % 11   EOS PCT % 1     Results from last 7 days   Lab Units 10/05/22  1242   SODIUM mmol/L 139   POTASSIUM mmol/L 3 8   CHLORIDE mmol/L 105   CO2 mmol/L 26   BUN mg/dL 17   CREATININE mg/dL 1 14   ANION GAP mmol/L 8   CALCIUM mg/dL 11 4*   ALBUMIN g/dL 4 5   TOTAL BILIRUBIN mg/dL 0 63   ALK PHOS U/L 99   ALT U/L 19   AST U/L 19   GLUCOSE RANDOM mg/dL 108                       Imaging: I have personally reviewed pertinent reports  CTA ED chest PE study   Final Result by Addie Bradley MD (10/05 1818)      No pulmonary embolism  No acute thoracic abnormality  Mildly dilated main pulmonary artery, suggestive of elevated pulmonary arterial pressures  Recommend correlation with echocardiogram       Coronary atherosclerosis  Additional chronic/incidental findings as detailed above  The study was marked in [facility] for immediate notification  Workstation performed: [id number]         XR chest 1 view portable   Final Result by Niya Sandra MD (10/05 1996)      1  No acute airspace disease  2   Possible small left pleural effusion  Workstation performed: [id number]             EKG, Pathology, and Other Studies Reviewed on Admission:   · EKG:  Sinus rhythm with sinus arrhythmia  Allscripts / Epic Records Reviewed: Yes     ** Please Note: This note has been constructed using a voice recognition system   **

## 2022-10-05 NOTE — ASSESSMENT & PLAN NOTE
Multifactorial secondary to advanced age, valvular heart disease, pulmonary hypertension, uncontrolled essential hypertension, deconditioning  · Will order echocardiogram to determine if her heart disease has progressed  Last echocardiogram was in 2019  · Maintain on telemetry rule out abnormal heart rhythms  Last pacemaker check showed normal device function    · Consult PT OT and     · Consult cardiology

## 2022-10-06 ENCOUNTER — APPOINTMENT (OUTPATIENT)
Dept: NON INVASIVE DIAGNOSTICS | Facility: HOSPITAL | Age: 87
End: 2022-10-06
Payer: MEDICARE

## 2022-10-06 VITALS
SYSTOLIC BLOOD PRESSURE: 156 MMHG | HEIGHT: 62 IN | DIASTOLIC BLOOD PRESSURE: 78 MMHG | TEMPERATURE: 98.1 F | OXYGEN SATURATION: 96 % | HEART RATE: 82 BPM | BODY MASS INDEX: 27.6 KG/M2 | WEIGHT: 150 LBS | RESPIRATION RATE: 18 BRPM

## 2022-10-06 LAB
ANION GAP SERPL CALCULATED.3IONS-SCNC: 9 MMOL/L (ref 4–13)
AORTIC ROOT: 3.5 CM
AORTIC VALVE MEAN VELOCITY: 15.9 M/S
APICAL FOUR CHAMBER EJECTION FRACTION: 51 %
ASCENDING AORTA: 3.8 CM
ATRIAL RATE: 82 BPM
ATRIAL RATE: 86 BPM
AV AREA BY CONTINUOUS VTI: 1.3 CM2
AV AREA PEAK VELOCITY: 1.3 CM2
AV LVOT MEAN GRADIENT: 2 MMHG
AV LVOT PEAK GRADIENT: 4 MMHG
AV MEAN GRADIENT: 11 MMHG
AV PEAK GRADIENT: 19 MMHG
AV VALVE AREA: 1.26 CM2
AV VELOCITY RATIO: 0.43
AVA (PLAN): 1.4 CM2
BUN SERPL-MCNC: 16 MG/DL (ref 5–25)
CALCIUM SERPL-MCNC: 11 MG/DL (ref 8.3–10.1)
CHLORIDE SERPL-SCNC: 106 MMOL/L (ref 96–108)
CO2 SERPL-SCNC: 25 MMOL/L (ref 21–32)
CREAT SERPL-MCNC: 1.09 MG/DL (ref 0.6–1.3)
DOP CALC AO PEAK VEL: 2.2 M/S
DOP CALC AO VTI: 42.58 CM
DOP CALC LVOT AREA: 3.14 CM2
DOP CALC LVOT DIAMETER: 2 CM
DOP CALC LVOT PEAK VEL VTI: 17.05 CM
DOP CALC LVOT PEAK VEL: 0.94 M/S
DOP CALC LVOT STROKE INDEX: 32.5 ML/M2
DOP CALC LVOT STROKE VOLUME: 53.54
DOP CALC MV VTI: 31.5 CM
E WAVE DECELERATION TIME: 281 MS
ERYTHROCYTE [DISTWIDTH] IN BLOOD BY AUTOMATED COUNT: 15.4 % (ref 11.6–15.1)
FRACTIONAL SHORTENING: 24 (ref 28–44)
GFR SERPL CREATININE-BSD FRML MDRD: 43 ML/MIN/1.73SQ M
GLUCOSE SERPL-MCNC: 90 MG/DL (ref 65–140)
HCT VFR BLD AUTO: 35.1 % (ref 34.8–46.1)
HGB BLD-MCNC: 11.2 G/DL (ref 11.5–15.4)
INTERVENTRICULAR SEPTUM IN DIASTOLE (PARASTERNAL SHORT AXIS VIEW): 1.3 CM
INTERVENTRICULAR SEPTUM: 1.3 CM (ref 0.6–1.1)
LAAS-AP2: 22.4 CM2
LAAS-AP4: 14.9 CM2
LEFT ATRIUM SIZE: 3.2 CM
LEFT INTERNAL DIMENSION IN SYSTOLE: 3.5 CM (ref 2.1–4)
LEFT VENTRICULAR INTERNAL DIMENSION IN DIASTOLE: 4.6 CM (ref 3.5–6)
LEFT VENTRICULAR POSTERIOR WALL IN END DIASTOLE: 0.9 CM
LEFT VENTRICULAR STROKE VOLUME: 50 ML
LVSV (TEICH): 50 ML
MCH RBC QN AUTO: 27.7 PG (ref 26.8–34.3)
MCHC RBC AUTO-ENTMCNC: 31.9 G/DL (ref 31.4–37.4)
MCV RBC AUTO: 87 FL (ref 82–98)
MV E'TISSUE VEL-SEP: 5 CM/S
MV MEAN GRADIENT: 3 MMHG
MV PEAK A VEL: 1.57 M/S
MV PEAK E VEL: 58 CM/S
MV PEAK GRADIENT: 11 MMHG
MV STENOSIS PRESSURE HALF TIME: 82 MS
MV VALVE AREA BY CONTINUITY EQUATION: 1.7 CM2
MV VALVE AREA P 1/2 METHOD: 2.68
P AXIS: 68 DEGREES
P AXIS: 87 DEGREES
PLATELET # BLD AUTO: 137 THOUSANDS/UL (ref 149–390)
PMV BLD AUTO: 12.6 FL (ref 8.9–12.7)
POTASSIUM SERPL-SCNC: 3.7 MMOL/L (ref 3.5–5.3)
PR INTERVAL: 170 MS
PR INTERVAL: 176 MS
QRS AXIS: -76 DEGREES
QRS AXIS: -77 DEGREES
QRSD INTERVAL: 162 MS
QRSD INTERVAL: 166 MS
QT INTERVAL: 406 MS
QT INTERVAL: 416 MS
QTC INTERVAL: 474 MS
QTC INTERVAL: 497 MS
RBC # BLD AUTO: 4.05 MILLION/UL (ref 3.81–5.12)
RIGHT ATRIAL 2D VOLUME: 34 ML
RIGHT ATRIUM AREA SYSTOLE A4C: 13 CM2
RIGHT VENTRICLE ID DIMENSION: 3.4 CM
SL CV LEFT ATRIUM LENGTH A2C: 6 CM
SL CV PED ECHO LEFT VENTRICLE DIASTOLIC VOLUME (MOD BIPLANE) 2D: 99 ML
SL CV PED ECHO LEFT VENTRICLE SYSTOLIC VOLUME (MOD BIPLANE) 2D: 50 ML
SODIUM SERPL-SCNC: 140 MMOL/L (ref 135–147)
T WAVE AXIS: 96 DEGREES
T WAVE AXIS: 99 DEGREES
TR MAX PG: 32 MMHG
TR PEAK VELOCITY: 2.9 M/S
TRICUSPID VALVE PEAK REGURGITATION VELOCITY: 2.85 M/S
VENTRICULAR RATE: 82 BPM
VENTRICULAR RATE: 86 BPM
WBC # BLD AUTO: 4.35 THOUSAND/UL (ref 4.31–10.16)

## 2022-10-06 PROCEDURE — 99214 OFFICE O/P EST MOD 30 MIN: CPT | Performed by: INTERNAL MEDICINE

## 2022-10-06 PROCEDURE — 93306 TTE W/DOPPLER COMPLETE: CPT

## 2022-10-06 PROCEDURE — 93306 TTE W/DOPPLER COMPLETE: CPT | Performed by: INTERNAL MEDICINE

## 2022-10-06 PROCEDURE — 80048 BASIC METABOLIC PNL TOTAL CA: CPT | Performed by: INTERNAL MEDICINE

## 2022-10-06 PROCEDURE — 85027 COMPLETE CBC AUTOMATED: CPT | Performed by: INTERNAL MEDICINE

## 2022-10-06 PROCEDURE — 99217 PR OBSERVATION CARE DISCHARGE MANAGEMENT: CPT | Performed by: INTERNAL MEDICINE

## 2022-10-06 PROCEDURE — 93010 ELECTROCARDIOGRAM REPORT: CPT | Performed by: INTERNAL MEDICINE

## 2022-10-06 PROCEDURE — 97167 OT EVAL HIGH COMPLEX 60 MIN: CPT

## 2022-10-06 RX ORDER — AMLODIPINE BESYLATE 5 MG/1
5 TABLET ORAL DAILY
Status: DISCONTINUED | OUTPATIENT
Start: 2022-10-07 | End: 2022-10-06 | Stop reason: HOSPADM

## 2022-10-06 RX ADMIN — OXYBUTYNIN CHLORIDE 5 MG: 5 TABLET, EXTENDED RELEASE ORAL at 08:58

## 2022-10-06 RX ADMIN — ENOXAPARIN SODIUM 30 MG: 30 INJECTION, SOLUTION INTRAVENOUS; SUBCUTANEOUS at 08:58

## 2022-10-06 RX ADMIN — ASPIRIN 81 MG CHEWABLE TABLET 81 MG: 81 TABLET CHEWABLE at 08:58

## 2022-10-06 RX ADMIN — PANTOPRAZOLE SODIUM 40 MG: 40 TABLET, DELAYED RELEASE ORAL at 05:14

## 2022-10-06 RX ADMIN — LOSARTAN POTASSIUM 25 MG: 25 TABLET, FILM COATED ORAL at 08:58

## 2022-10-06 RX ADMIN — AMLODIPINE BESYLATE 2.5 MG: 2.5 TABLET ORAL at 08:58

## 2022-10-06 NOTE — DISCHARGE SUMMARY
[address] [facility]  Discharge- Ya Prieto 10/4/1928, 80 y o  female MRN: [id number]  Unit/Bed#: E5 -01 Encounter: [id number]  Primary Care Provider: Chinyere Olmos DO   Date and time admitted to hospital: 10/5/2022 11:58 AM    * Shortness of breath  Assessment & Plan  Multifactorial secondary to advanced age, valvular heart disease, pulmonary hypertension, uncontrolled essential hypertension, deconditioning  · Will order echocardiogram to determine if her heart disease has progressed  Last echocardiogram was in 2019  · Maintain on telemetry rule out abnormal heart rhythms  Last pacemaker check showed normal device function  · Consult PT OT and   --recommending post acute rehab  · Consult cardiology--appreciate recommendations, monitor and initiate diuresis as needed  · Discussed with patient and son at bedside regarding monitoring overnight and going to rehab as per PT/OT recommendations; patient and son very clearly declined  Patient alert oriented x3, able answer all examiner questions appropriately, states she prefers to go home and attributes the shortness of breath to anxiety  Reports that she feels much better and insists on going home tonight  94% SpO2 on room air; echo pending, but patient examines euvolemic  Grade 3 murmur on exam; evaluated by Cardiology who reports likely moderate murmur similar to previous  The patient says she will follow-up in the outpatient setting  Son at bedside agrees, states he will transport patient home and monitor  Patient encouraged to return if she should experience shortness of breath in the future, both patient and son voiced understanding      Esophageal reflux  Assessment & Plan  Switch omeprazole to Protonix while in the hospital    Essential hypertension  Assessment & Plan  · Received Norvasc 2 5 mg daily; blood pressure still above goal, increase to Norvasc 5 mg daily, monitor  · Continue losartan 25 mg    Mixed hyperlipidemia  Assessment & Plan  · Continue Lipitor 20 mg daily      Discharging Physician / Practitioner: Savage Jimenez  PCP: Nellie Ward,   Admission Date:   Admission Orders (From admission, onward)     Ordered        10/05/22 1828  Place in Observation  Once                      Discharge Date: 10/06/22    Medical Problems             Resolved Problems  Date Reviewed: 10/6/2022   None                 Consultations During Hospital Stay:  · Cardiology    Procedures Performed:   · CTA chest  · Chest x-ray    Significant Findings / Test Results:   · Shortness of breath  · Hypertensive emergency    Incidental Findings:   ·      Test Results Pending at Discharge (will require follow up): · None     Outpatient Tests Requested:  · CBC/CMP    Complications:  None    Reason for Admission:  Shortness of breath    Hospital Course: As per HPI:    "Mj Saleem is a 80 y o  female who presents with shortness of breath both at rest and on exertion  She said she had a poor night sleep for no reason  She was not short of breath while laying flat  This morning she just felt more tired than usual   It did not matter if she was sitting or walking  She said she did not feel this short of breath before except for when she 1st was diagnosed with an abnormal heart rhythm and the pacemaker was required  She said she was short of breath all day and only started feeling better when she came to the ER  I asked if there was any medication that made her feel better, she said the pill that they gave earlier  The only medication she received in the ER was Norvasc  She denied any cough, sputum production, fever or chills  She denied any nausea vomiting or diarrhea  She denied any chest pain or palpitation      Condition at Discharge: stable     Discharge Day Visit / Exam:     Subjective:  Patient and son at bedside declining post acute care rehab; insisted on returning home    States that shortness of breath has resolved and she feels much better  Likely related to hypertensive emergency and patient endorses anxiety on day of admission  Son states he will monitor patient at home and will transported back to her house  He agrees with plan and declines rehab for his mother as well  Vitals: Blood Pressure: 156/78 (10/06/22 1516)  Pulse: 82 (10/06/22 1516)  Temperature: 98 1 °F (36 7 °C) (10/06/22 1516)  Temp Source: Oral (10/06/22 1516)  Respirations: 18 (10/06/22 0731)  Height: 5' 2" (157 5 cm) (10/06/22 1455)  Weight - Scale: 68 kg (150 lb) (10/06/22 1455)  SpO2: 96 % (10/06/22 1516)  Exam:   Physical Exam  Vitals and nursing note reviewed  Constitutional:       General: She is not in acute distress  Appearance: She is well-developed  HENT:      Head: Normocephalic and atraumatic  Eyes:      Conjunctiva/sclera: Conjunctivae normal    Cardiovascular:      Rate and Rhythm: Normal rate and regular rhythm  Heart sounds: Murmur heard  Pulmonary:      Effort: Pulmonary effort is normal  No respiratory distress  Breath sounds: Normal breath sounds  Abdominal:      Palpations: Abdomen is soft  Tenderness: There is no abdominal tenderness  Musculoskeletal:      Cervical back: Neck supple  Skin:     General: Skin is warm and dry  Neurological:      Mental Status: She is alert and oriented to person, place, and time  Psychiatric:         Behavior: Behavior normal          Discussion with Family: Discussed treatment plan with family and patient who agree with current plan; encouraged to ask questions and participate  Discharge instructions/Information to patient and family:   See after visit summary for information provided to patient and family  Provisions for Follow-Up Care:  See after visit summary for information related to follow-up care and any pertinent home health orders        Disposition:     Home    For Discharges to [facility]:   · Not Applicable to this Patient - Not Applicable to this Patient    Planned Readmission:  None     Discharge Statement:  I spent 45 minutes discharging the patient  This time was spent on the day of discharge  I had direct contact with the patient on the day of discharge  Greater than 50% of the total time was spent examining patient, answering all patient questions, arranging and discussing plan of care with patient as well as directly providing post-discharge instructions  Additional time then spent on discharge activities  Discharge Medications:  See after visit summary for reconciled discharge medications provided to patient and family        ** Please Note: This note has been constructed using a voice recognition system **

## 2022-10-06 NOTE — PLAN OF CARE
Problem: Potential for Falls  Goal: Patient will remain free of falls  Description: INTERVENTIONS:  - Educate patient/family on patient safety including physical limitations  - Instruct patient to call for assistance with activity   - Consult OT/PT to assist with strengthening/mobility   - Keep Call bell within reach  - Keep bed low and locked with side rails adjusted as appropriate  - Keep care items and personal belongings within reach  - Initiate and maintain comfort rounds  - Make Fall Risk Sign visible to staff  - Offer Toileting every  Hours, in advance of need  - Initiate/Maintain alarm  - Obtain necessary fall risk management equipment:   - Apply yellow socks and bracelet for high fall risk patients  - Consider moving patient to room near nurses station  Outcome: Progressing     Problem: MOBILITY - ADULT  Goal: Maintain or return to baseline ADL function  Description: INTERVENTIONS:  -  Assess patient's ability to carry out ADLs; assess patient's baseline for ADL function and identify physical deficits which impact ability to perform ADLs (bathing, care of mouth/teeth, toileting, grooming, dressing, etc )  - Assess/evaluate cause of self-care deficits   - Assess range of motion  - Assess patient's mobility; develop plan if impaired  - Assess patient's need for assistive devices and provide as appropriate  - Encourage maximum independence but intervene and supervise when necessary  - Involve family in performance of ADLs  - Assess for home care needs following discharge   - Consider OT consult to assist with ADL evaluation and planning for discharge  - Provide patient education as appropriate  Outcome: Progressing  Goal: Maintains/Returns to pre admission functional level  Description: INTERVENTIONS:  - Perform BMAT or MOVE assessment daily    - Set and communicate daily mobility goal to care team and patient/family/caregiver     - Collaborate with rehabilitation services on mobility goals if consulted  - Perform Range of Motion times a day  - Reposition patient every  hours    - Dangle patient  times a day  - Stand patient  times a day  - Ambulate patient times a day  - Out of bed to chair  times a day   - Out of bed for meals  times a day  - Out of bed for toileting  - Record patient progress and toleration of activity level   Outcome: Progressing     Problem: Prexisting or High Potential for Compromised Skin Integrity  Goal: Skin integrity is maintained or improved  Description: INTERVENTIONS:  - Identify patients at risk for skin breakdown  - Assess and monitor skin integrity  - Assess and monitor nutrition and hydration status  - Monitor labs   - Assess for incontinence   - Turn and reposition patient  - Assist with mobility/ambulation  - Relieve pressure over bony prominences  - Avoid friction and shearing  - Provide appropriate hygiene as needed including keeping skin clean and dry  - Evaluate need for skin moisturizer/barrier cream  - Collaborate with interdisciplinary team   - Patient/family teaching  - Consider wound care consult   Outcome: Progressing     Problem: CARDIOVASCULAR - ADULT  Goal: Maintains optimal cardiac output and hemodynamic stability  Description: INTERVENTIONS:  - Monitor I/O, vital signs and rhythm  - Monitor for S/S and trends of decreased cardiac output  - Administer and titrate ordered vasoactive medications to optimize hemodynamic stability  - Assess quality of pulses, skin color and temperature  - Assess for signs of decreased coronary artery perfusion  - Instruct patient to report change in severity of symptoms  Outcome: Progressing  Goal: Absence of cardiac dysrhythmias or at baseline rhythm  Description: INTERVENTIONS:  - Continuous cardiac monitoring, vital signs, obtain 12 lead EKG if ordered  - Administer antiarrhythmic and heart rate control medications as ordered  - Monitor electrolytes and administer replacement therapy as ordered  Outcome: Progressing     Problem: SKIN/TISSUE INTEGRITY - ADULT  Goal: Skin Integrity remains intact(Skin Breakdown Prevention)  Description: Assess:  -Perform Erlin assessment every   -Clean and moisturize skin every -Inspect skin when repositioning, toileting, and assisting with ADLS  -Assess under medical devices such as  every   -Assess extremities for adequate circulation and sensation     Bed Management:  -Have minimal linens on bed & keep smooth, unwrinkled  -Change linens as needed when moist or perspiring  -Avoid sitting or lying in one position for more than  hours while in bed  -Keep HOB at degrees     Toileting:  -Offer bedside commode  -Assess for incontinence every   -Use incontinent care products after each incontinent episode such as     Activity:  -Mobilize patient  times a day  -Encourage activity and walks on unit  -Encourage or provide ROM exercises   -Turn and reposition patient every  Hours  -Use appropriate equipment to lift or move patient in bed  -Instruct/ Assist with weight shifting every  when out of bed in chair  -Consider limitation of chair time  hour intervals    Skin Care:  -Avoid use of baby powder, tape, friction and shearing, hot water or constrictive clothing  -Relieve pressure over bony prominences using   -Do not massage red bony areas    Next Steps:  -Teach patient strategies to minimize risks such as    -Consider consults to  interdisciplinary teams such as   Outcome: Progressing     Problem: MUSCULOSKELETAL - ADULT  Goal: Maintain or return mobility to safest level of function  Description: INTERVENTIONS:  - Assess patient's ability to carry out ADLs; assess patient's baseline for ADL function and identify physical deficits which impact ability to perform ADLs (bathing, care of mouth/teeth, toileting, grooming, dressing, etc )  - Assess/evaluate cause of self-care deficits   - Assess range of motion  - Assess patient's mobility  - Assess patient's need for assistive devices and provide as appropriate  - Encourage maximum independence but intervene and supervise when necessary  - Involve family in performance of ADLs  - Assess for home care needs following discharge   - Consider OT consult to assist with ADL evaluation and planning for discharge  - Provide patient education as appropriate  Outcome: Progressing  Goal: Maintain proper alignment of affected body part  Description: INTERVENTIONS:  - Support, maintain and protect limb and body alignment  - Provide patient/ family with appropriate education  Outcome: Progressing

## 2022-10-06 NOTE — ASSESSMENT & PLAN NOTE
· Received Norvasc 2 5 mg daily; blood pressure still above goal, increase to Norvasc 5 mg daily, monitor  · Continue losartan 25 mg

## 2022-10-06 NOTE — PLAN OF CARE
Problem: Potential for Falls  Goal: Patient will remain free of falls  Description: INTERVENTIONS:  - Educate patient/family on patient safety including physical limitations  - Instruct patient to call for assistance with activity   - Consult OT/PT to assist with strengthening/mobility   - Keep Call bell within reach  - Keep bed low and locked with side rails adjusted as appropriate  - Keep care items and personal belongings within reach  - Initiate and maintain comfort rounds  - Make Fall Risk Sign visible to staff  - Initiate/Maintain bed alarm  - Apply yellow socks and bracelet for high fall risk patients  - Consider moving patient to room near nurses station  Outcome: Adequate for Discharge     Problem: MOBILITY - ADULT  Goal: Maintain or return to baseline ADL function  Description: INTERVENTIONS:  -  Assess patient's ability to carry out ADLs; assess patient's baseline for ADL function and identify physical deficits which impact ability to perform ADLs (bathing, care of mouth/teeth, toileting, grooming, dressing, etc )  - Assess/evaluate cause of self-care deficits   - Assess range of motion  - Assess patient's mobility; develop plan if impaired  - Assess patient's need for assistive devices and provide as appropriate  - Encourage maximum independence but intervene and supervise when necessary  - Involve family in performance of ADLs  - Assess for home care needs following discharge   - Consider OT consult to assist with ADL evaluation and planning for discharge  - Provide patient education as appropriate  Outcome: Adequate for Discharge  Goal: Maintains/Returns to pre admission functional level  Description: INTERVENTIONS:  - Perform BMAT or MOVE assessment daily    - Set and communicate daily mobility goal to care team and patient/family/caregiver     - Collaborate with rehabilitation services on mobility goals if consulted  - Out of bed to chair 3 times a day   - Out of bed for meals 3 times a day  - Out of bed for toileting  - Record patient progress and toleration of activity level   Outcome: Adequate for Discharge     Problem: Prexisting or High Potential for Compromised Skin Integrity  Goal: Skin integrity is maintained or improved  Description: INTERVENTIONS:  - Identify patients at risk for skin breakdown  - Assess and monitor skin integrity  - Assess and monitor nutrition and hydration status  - Monitor labs   - Assess for incontinence   - Turn and reposition patient  - Assist with mobility/ambulation  - Relieve pressure over bony prominences  - Avoid friction and shearing  - Provide appropriate hygiene as needed including keeping skin clean and dry  - Evaluate need for skin moisturizer/barrier cream  - Collaborate with interdisciplinary team   - Patient/family teaching  - Consider wound care consult   Outcome: Adequate for Discharge     Problem: CARDIOVASCULAR - ADULT  Goal: Maintains optimal cardiac output and hemodynamic stability  Description: INTERVENTIONS:  - Monitor I/O, vital signs and rhythm  - Monitor for S/S and trends of decreased cardiac output  - Administer and titrate ordered vasoactive medications to optimize hemodynamic stability  - Assess quality of pulses, skin color and temperature  - Assess for signs of decreased coronary artery perfusion  - Instruct patient to report change in severity of symptoms  Outcome: Adequate for Discharge  Goal: Absence of cardiac dysrhythmias or at baseline rhythm  Description: INTERVENTIONS:  - Continuous cardiac monitoring, vital signs, obtain 12 lead EKG if ordered  - Administer antiarrhythmic and heart rate control medications as ordered  - Monitor electrolytes and administer replacement therapy as ordered  Outcome: Adequate for Discharge     Problem: SKIN/TISSUE INTEGRITY - ADULT  Goal: Skin Integrity remains intact(Skin Breakdown Prevention)  Description: Assess:  -Perform Erlin assessment every shift  -Clean and moisturize skin every day  -Inspect skin when repositioning, toileting, and assisting with ADLS  -Assess extremities for adequate circulation and sensation     Bed Management:  -Have minimal linens on bed & keep smooth, unwrinkled  -Change linens as needed when moist or perspiring  -Avoid sitting or lying in one position for more than 2 hours while in bed    Toileting:  -Offer bedside commode    Activity:  -Encourage activity and walks on unit  -Encourage or provide ROM exercises   -Use appropriate equipment to lift or move patient in bed    Skin Care:  -Avoid use of baby powder, tape, friction and shearing, hot water or constrictive clothing  -Do not massage red bony areas  Outcome: Adequate for Discharge     Problem: MUSCULOSKELETAL - ADULT  Goal: Maintain or return mobility to safest level of function  Description: INTERVENTIONS:  - Assess patient's ability to carry out ADLs; assess patient's baseline for ADL function and identify physical deficits which impact ability to perform ADLs (bathing, care of mouth/teeth, toileting, grooming, dressing, etc )  - Assess/evaluate cause of self-care deficits   - Assess range of motion  - Assess patient's mobility  - Assess patient's need for assistive devices and provide as appropriate  - Encourage maximum independence but intervene and supervise when necessary  - Involve family in performance of ADLs  - Assess for home care needs following discharge   - Consider OT consult to assist with ADL evaluation and planning for discharge  - Provide patient education as appropriate  Outcome: Adequate for Discharge  Goal: Maintain proper alignment of affected body part  Description: INTERVENTIONS:  - Support, maintain and protect limb and body alignment  - Provide patient/ family with appropriate education  Outcome: Adequate for Discharge     Problem: OCCUPATIONAL THERAPY ADULT  Goal: Performs self-care activities at highest level of function for planned discharge setting    See evaluation for individualized goals  Description: Treatment Interventions: ADL retraining, UE strengthening/ROM, Functional transfer training, Endurance training, Patient/family training, Equipment evaluation/education, Compensatory technique education, Continued evaluation, Energy conservation, Activityengagement          See flowsheet documentation for full assessment, interventions and recommendations     Outcome: Adequate for Discharge

## 2022-10-06 NOTE — OCCUPATIONAL THERAPY NOTE
Occupational Therapy Evaluation     Patient Name: Yas Varela  [id number] Date: 10/6/2022  Problem List  Principal Problem:    Shortness of breath  Active Problems:    Mixed hyperlipidemia    Essential hypertension    Esophageal reflux    Past Medical History  Past Medical History:   Diagnosis Date    Depression     Gout     H/O vaginal hysterectomy     resolved-4/17/2015     Past Surgical History  Past Surgical History:   Procedure Laterality Date    CATARACT EXTRACTION      TOTAL ABDOMINAL HYSTERECTOMY      with removal of both ovaries    VAGINAL HYSTERECTOMY      resolved-4/17/2015           10/06/22 0941   OT Last Visit   OT Visit Date 10/06/22   Note Type   Note type Evaluation   Restrictions/Precautions   Weight Bearing Precautions Per Order No   Other Precautions Chair Alarm; Bed Alarm; Fall Risk;Pain;Hard of hearing;Telemetry   Pain Assessment   Pain Assessment Tool 0-10   Pain Score 10 - Worst Possible Pain  (reports 0/10 at rest; 10/10 w/ activity)   Pain Location/Orientation Orientation: Bilateral;Location: Knee   Patient's Stated Pain Goal No pain   Hospital Pain Intervention(s) Repositioned; Ambulation/increased activity; Emotional support; Rest   Multiple Pain Sites No   Home Living   Type of Home House   Home Layout Two level;Performs ADLs on one level; Able to live on main level with bedroom/bathroom  (1 YOAV garage/basement area and stair glide to main living area)   Bathroom Shower/Tub   (reports sponge bathes at baseline)   [place] [address];Tub transfer [address] Walker;Electric scooter   Additional Comments Pt lives alone in a two level house with 1 YOAV garage/basement area and stair glide to main living area  Pt reports son visits "nearly every day" and dtr visits every other week  Prior Function   Level of [place] Independent with ADLs and functional mobility; Needs assistance with IADLs   Lives With Alone Receives Help From Family  (Son visits "nearly every day" and dtr visits every other week)   ADL Assistance Independent  (reports increased difficulty recently)   IADLs Needs assistance   Falls in the last 6 months 0   Vocational Retired   Comments At baseline, pt was I w/ ADLs and Mod I for functional transfers/mobility w/ use of RW vs electric scooter  Pt reports primarily using electric scooter both inside/outside, however does use RW for short distances in home  Son/dtr assist w/ IADLs  (-)   Denies falls PTA  Lifestyle   Autonomy At baseline, pt was I w/ ADLs and Mod I for functional transfers/mobility w/ use of RW vs electric scooter  Pt reports primarily using electric scooter both inside/outside, however does use RW for short distances in home  Son/dtr assist w/ IADLs  (-)   Denies falls PTA  Reciprocal Relationships Son, dtr   Service to Others Retired   Psychosocial   Psychosocial ([address]) WDL   ADL   Where Assessed Chair   Eating Assistance 7  Independent   Grooming Assistance 5  [address] [facility] 5  Supervision/Setup   LB [address] 3  Moderate Assistance   [address] [facility] 5  Supervision/Setup    [facility] 3  Moderate [address]  3  Moderate Assistance   Functional Assistance 4  Minimal Assistance   Bed Mobility   Supine to Sit 5  Supervision   Additional items HOB elevated; Bedrails; Increased time required   Sit to Supine 4  Minimal assistance   Additional items Assist x 1; Increased time required;Verbal cues;LE management   Additional Comments Pt lying supine with bed alarm activated at end of session  Call bell and phone within reach  All needs met and pt reports no further questions for OT at this time  Transfers   Sit to Stand 3  Moderate assistance   Additional items Assist x 1; Increased time required;Verbal cues   Stand to Sit 4  Minimal assistance   Additional items Assist x 1; Increased time required;Verbal cues   Toilet transfer 3  Moderate assistance   Additional items Assist x 1; Increased time required;Verbal cues;Standard toilet  (grab bar use on pt's L)   Additional Comments Cues for safe technique and hand placement  Increased assist required to initiate forward weight shift from surface for sit>stand   Functional Mobility   Functional Mobility 4  Minimal assistance   Additional Comments Assist x1 for balance/steadying w/ use of RW  Cues for hand placement on RW w/ decreased carryover   Additional items Rolling walker   Balance   Static Sitting Fair   Dynamic Sitting Fair -   Static Standing Fair -   Dynamic Standing Poor +   Ambulatory Poor +   Activity Tolerance   Activity Tolerance Patient limited by fatigue   Medical Staff Made Aware NICOLÁS Hernandez   Nurse Made Aware yes   RUE Assessment   RUE Assessment WFL  (pain w/ ROM 2* arthritis)   RUE Strength   R Shoulder Flexion 3+/5   R Shoulder Extension 3+/5   R Elbow Flexion 4-/5   R Elbow Extension 4-/5   LUE Assessment   LUE Assessment WFL   LUE Strength   LUE Overall Strength Within Functional Limits - able to perform ADL tasks with strength  (4-/5 throughout)   Hand Function   Gross Motor Coordination Functional   Fine Motor Coordination Functional   Sensation   Light Touch Partial deficits in the RLE;Partial deficits in the LLE   Proprioception   Proprioception No apparent deficits   Vision-Basic Assessment   Current Vision Wears glasses all the time   Vision - Complex Assessment   Ocular Range of Motion [facility]   Acuity Able to read clock/calendar on wall without difficulty; Able to read employee name badge without difficulty   Perception   Inattention/Neglect Appears intact   Cognition   Overall Cognitive Status Impaired   Arousal/Participation Alert; Cooperative   Attention Attends with cues to redirect   Orientation Level Oriented X4   Memory Decreased recall of precautions   Following Commands Follows one step commands with increased time or repetition   Comments [place]  Pleasant and cooperative  Limited insight into deficits  Inconsistent report of social information- pt initially reports no local support at all, later reports son visits daily   Assessment   Limitation Decreased ADL status; Decreased UE strength;Decreased Safe judgement during ADL;Decreased cognition;Decreased endurance;Decreased sensation;Decreased self-care trans;Decreased high-level ADLs   Prognosis Good   Assessment Pt is a 80 y o  female seen for OT evaluation s/p adm to [address] on 10/5/2022 w/ Shortness of breath, both at rest and on exertion  Comorbidities affecting pts functional performance include a significant PMH of Depression, Gout  Pt with active OT orders  Pt lives alone in a two level house with 1 YOAV garage/basement area and stair glide to main living area  Pt reports son visits "nearly every day" and dtr visits every other week  At baseline, pt was I w/ ADLs and Mod I for functional transfers/mobility w/ use of RW vs electric scooter  Pt reports primarily using electric scooter both inside/outside, however does use RW for short distances in home  Son/dtr assist w/ IADLs  (-)   Denies falls PTA  Upon evaluation, pt currently requires Supervision for UB ADLs, Mod A for LB ADLs, Mod A for toileting, Supervision for bed mobility, Mod-Min A for transfers, and Min A for functional mobility 2* the following deficits impacting occupational performance: decreased strength, decreased balance, decreased tolerance, impaired sensation, impaired problem solving, decreased safety awareness and increased pain  These impairments, as well at pts fall risk, steps to enter environment, limited home support, difficulty performing ADLS and limited insight into deficits limit pts ability to safely engage in all baseline areas of occupation  Based on the aforementioned OT evaluation, functional performance deficits, and assessments, pt has been identified as a High complexity evaluation   Pt to continue to benefit from continued acute OT services during hospital stay to address defined deficits and to maximize level of functional independence in the following Occupational Performance areas: grooming, bathing/shower, toilet hygiene, dressing, medication management, health maintenance, functional mobility, community mobility, clothing management and social participation  From OT standpoint, recommend STR upon D/C  OT will continue to follow pt 3-5x/wk to address the following goals to  w/in 10-14 days:   Goals   Patient Goals To go home   LTG Time Frame 10-14   Long Term Goal Please refer to LTGs listed below   Plan   Treatment Interventions ADL retraining;UE strengthening/ROM; Functional transfer training; Endurance training;Patient/family training;Equipment evaluation/education; Compensatory technique education;Continued evaluation; Energy conservation; Activityengagement   Goal Expiration Date 10/20/22   OT Treatment Day 0   OT Frequency 3-5x/wk   Recommendation   OT Discharge Recommendation Post acute rehabilitation services   Additional Comments  The patient's raw score on the AM-PAC Daily Activity inpatient short form is 16, standardized score is 35 96, less than 39 4  Patients at this level are likely to benefit from discharge to post-acute rehabilitation services  Please refer to the recommendation of the Occupational Therapist for safe discharge planning     AM-PAC Daily Activity Inpatient   Lower Body Dressing 2   Bathing 2   Toileting 2   Upper Body Dressing 3   Grooming 3   Eating 4   Daily Activity Raw Score 16   Daily Activity Standardized Score (Calc for Raw Score >=11) 35 96   AM-[facility] Applied Cognition Inpatient   Following a Speech/Presentation 3   Understanding Ordinary Conversation 3   Taking Medications 3   Remembering Where Things Are Placed or Put Away 3   Remembering List of 4-5 Errands 2   Taking Care of Complicated Tasks 2   Applied Cognition Raw Score 16   Applied Cognition Standardized Score 35 03       GOALS    Pt will improve activity tolerance to G for min 30 min txment sessions for increase engagement in functional tasks    Pt will complete bed mobility at a Mod I level w/ G balance/safety demonstrated to decrease caregiver assistance required     Pt will complete UB dressing/self care w/ mod I using adaptive device and DME as needed     Pt will complete LB dressing/self care w/ mod I using adaptive device and DME as needed    Pt will complete toileting w/ mod I w/ G hygiene/thoroughness using DME as needed    Pt will improve functional transfers to Mod I on/off all surfaces using DME as needed w/ G balance/safety     Pt will improve functional mobility during ADL/IADL/leisure tasks to Mod I using DME as needed w/ G balance/safety     Pt will be attentive 100% of the time during ongoing cognitive assessment w/ G participation to assist w/ safe d/c planning/recommendations    Pt will demonstrate G carryover of pt/caregiver education and training as appropriate w/o cues w/ good tolerance to increase safety during functional tasks    Pt will demonstrate 100% carryover of energy conservation techniques t/o functional I/ADL/leisure tasks w/o cues s/p skilled education to increase endurance during functional tasks    Pt will increase BUE strength by 1MM grade via AROM/AAROM exercises to increase independence in ADLs and transfers    Pt will verbalize 3 potential fall hazards and identify appropriate compensatory techniques to decrease fall risk in home environment     Pt will increase standing tolerance to 8-10 mins with Fair+ dynamic standing balance to increase safety during participation in ADLs       Anika Cortes, OTR/L

## 2022-10-06 NOTE — CONSULTS
Consult - Cardiology   Devaughn Yi 80 y o  female MRN: [id number]  Unit/Bed#: E5 -01 Encounter: [id number]        Reason For Consult:  Short of breath               Assessment:  Dyspnea (chief complaint, reason for consultation)  Moderate Aortic stenosis    -echo 12/2019:  Velocity 3 2 m/sec, gradient 20 mmHg, IGGY 1 5 centimeters squared  Mild pulmonary hypertension   -PASP 43 mmHg per echo 12/2019  Mobitz 2 & 3' heart block, indwelling pacemaker with dependency    -Medtronic dual-chamber MRI conditional, implant 12/23/2019   -Interrogation 8/16/2022: Battery adequate (10 2 years) AP 33 6%   99 1%  Lead parameters WNL  No high rate episodes  Hypertension   -O/p Rx:  Norvasc 2 5 mg daily, telmisartan 20 mg daily  Dyslipidemia   -Lipitor 20 mg    Ambulatory dysfunction  GERD  Iron deficiency anemia    Discussion / Plan:  #  patient presented reporting several hours of dyspnea without other complaint, and as discussed in the HPI  Uncertain etiology with mostly unremarkable data set with the exception of signs of accelerated hypertension and abnormal NT proBNP on arrival   Some element of mild diastolic CHF seems plausible though she does not seem to have radiographic evidence of pulmonary vascular congestion or other exam findings of pulmonary edema/volume overload at the time of my visit  Symptoms have resolved seemingly independent of any particular therapy  · Echocardiogram pending with current exam suggesting not more than moderate valvular heart disease  · Continue antihypertensives as taken prior to admission with recommendation for continued surveillance for possible titration need  · Will defer initiation of diuretic with advisement for continued surveillance for signs and symptoms of volume overload and daily standing weights - including post hospital             History Of Present Illness:  Devaughn Yi is very pleasant 80year-old patient of Jorge Barkley cardiologist Dr Odalys Marcos  Medical problems are highlighted by those things enumerated in the assessment above  Ms Brennen Light continues to live a rather independent lifestyle in her private residence with frequent visits from her children  She is personally capable of activities of daily living though because of what sounds like significant arthritis she has ambulatory dysfunction for which she, in limited fashion, walks about her residence with a walker but more frequently uses a motorized scooter  Tuesday of this week, 10/4/2022, was the patient's 89th birthday  After an enjoyable and otherwise unremarkable day she went to bed feeling at her baseline  She states that evening she slept poorly and had trouble achieving general comfort with some element of mild dyspnea  Is not clear that she had any obvious orthopnea or PND  She specifically denied any chest discomfort, tachycardia or other ectopy, diaphoresis, nausea, lightheadedness abdominal bloating, lower extremity edema, cough, cold-type symptoms, fevers or chills  The next morning (yesterday) the patient's son came to visit her  She told him of her feelings of vague dyspnea for which she was brought to the emergency department  Patient presented to the hospital with normal room air saturation with abnormal blood pressure 190-101  Over 5 hours that followed her blood pressure remained elevated with systolic pressures in the 170s-180s  Chest x-ray was unremarkable as was CTA with report indicating no pulmonary embolism, focal consolidation, ground-glass opacity, or suspicious nodule  NT proBNP is abnormal 1947 with no prior value for comparison  High sensitivity troponin levels were normal x3  Hemoglobin is near normal at 11 2 the patient tells me at some point during her time in the emergency department she had resolution of symptoms though the only medicine she was given there was Norvasc    She has since been asymptomatic in tells me that at the time my visit she is back to her baseline  Past Medical History:        Past Medical History:   Diagnosis Date    Depression     Gout     H/O vaginal hysterectomy     resolved-4/17/2015      Past Surgical History:   Procedure Laterality Date    CATARACT EXTRACTION      TOTAL ABDOMINAL HYSTERECTOMY      with removal of both ovaries    VAGINAL HYSTERECTOMY      resolved-4/17/2015        Allergy:        Allergies   Allergen Reactions    Fish-Derived Products - Food Allergy      GI upset    Allopurinol Rash     Category: Allergy; Medications:       Prior to Admission medications    Medication Sig Start Date End Date Taking?  Authorizing Provider   ascorbic acid (VITAMIN C) 250 mg tablet Take 500 mg by mouth daily   Yes Historical Provider, MD   aspirin 81 MG tablet Take by mouth daily  3/2/15  Yes Historical Provider, MD   atorvastatin (LIPITOR) 20 mg tablet Take 1 tablet (20 mg total) by mouth every evening 5/11/22  Yes Mackenzie Fraga,    Cholecalciferol (Vitamin D3) 25 MCG (1000 UT) CAPS Take 25 each by mouth in the morning   Yes Historical Provider, MD   dorzolamide-timolol (COSOPT) 22 3-6 8 MG/ML ophthalmic solution INSTILL 1 DROP IN [facility] EYE EVERY DAY 9/25/19  Yes Historical Provider, MD   doxepin (SINEquan) 50 mg capsule Take 1 capsule (50 mg total) by mouth daily at bedtime 9/8/22  Yes Brad Huston,    Elastic Bandages & Supports (CARPAL TUNNEL WRIST STABILIZER) MISC by Does not apply route daily 2/12/20  Yes Brad Huston DO   gabapentin (NEURONTIN) 300 mg capsule Take 1 capsule (300 mg total) by mouth 3 (three) times a day 9/13/22  Yes Brad Huston DO   Mirabegron ER 25 MG TB24 Take 25 mg by mouth in the morning 4/20/22  Yes Brad Huston DO   omeprazole (PriLOSEC) 20 mg delayed release capsule Take 1 capsule (20 mg total) by mouth daily 4/12/22  Yes Ni Fisher,    amLODIPine (NORVASC) 2 5 mg tablet TAKE 1 TABLET BY MOUTH EVERY DAY 8/90/50   Brad Huston DO   Multiple Vitamins-Minerals (ICAPS AREDS 2 PO) Take 1 capsule by mouth 2 (two) times a day  Patient not taking: Reported on 10/5/2022    Historical Provider, MD   telmisartan (MICARDIS) 20 MG tablet Take 1 tablet (20 mg total) by mouth daily 0/16/00   Maggie Quiroz DO   ezetimibe (ZETIA) 10 mg tablet Take 1 tablet (10 mg total) by mouth daily 11/2/20 9/24/46  Maggie Quiroz DO       Family History:     Family History   Problem Relation Age of Onset    Heart attack Mother         MI    Heart attack Father         MI    Hypertension Sister     Stomach cancer Sister     Hypertension Brother         Social History:       Social History     Socioeconomic History    Marital status:      Spouse name: None    Number of children: None    Years of education: None    Highest education level: None   Occupational History    None   Tobacco Use    Smoking status: Never Smoker    Smokeless tobacco: Never Used   Vaping Use    Vaping Use: Never used   Substance and Sexual Activity    Alcohol use: Not Currently    Drug use: No    Sexual activity: None   Other Topics Concern    None   Social History Narrative    None     Social Determinants of Health     Financial Resource Strain: Not on file   Food Insecurity: Not on file   Transportation Needs: Not on file   Physical Activity: Not on file   Stress: Not on file   Social Connections: Not on file   Intimate Partner Violence: Not on file   Housing Stability: Not on file       ROS:  Symptoms per HPI  no change in appetite, oral intake, bowel habits  No sign of blood loss  The remainder of the review of systems is negative    Exam:  General:  Very pleasant, alert normally conversant, comfortable appearing woman who appears less than her chronologic age  Head: Normocephalic, atraumatic  Eyes:  EOMI  Pupils - equal, round, reactive to accomodation  No icterus  Normal Conjunctiva     Oropharynx: Moist without lesion  Neck:  No gross bruit, JVD, thyromegaly, or lymphadenopathy  Heart:  Regular with controlled rate  ARIELLE at base with diminished but preserved S2  Systolic murmur at mid to low left sternal border    Lungs:  Clear without rales/rhonchi/wheeze  Abdomen:  Soft and nontender with normal bowel sounds  No organomegaly or mass  Lower Limbs:  No edema  Pulses: RLE - DP:  1-2+                 LLE - DP:  1-2+  Musculoskeletal: Independent movement of limbs observed, Formal ROM and strength eval not performed  Neurologic:    Oriented to: person, place, situation  Cranial Nerves: grossly intact - vision, smell, taste, and hearing were not tested  Motor function: grossly normal, symmetric   Sensation: Was not tested    Vitals:    10/05/22 1800 10/05/22 1953 10/05/22 2232 10/06/22 0731   BP: (!) 171/83 160/100 153/81 146/89   BP Location: Right arm   Left arm   Pulse: 90 90 69 82   Resp: 18 18 18 18   Temp:  98 °F (36 7 °C) 97 8 °F (36 6 °C) 97 8 °F (36 6 °C)   TempSrc:    Oral   SpO2: 93% 98% 91% 94%           DATA:      -----------    ECG:                    -----------------------------------------------------------------------------------------------------------------------------------------------  Telemetry:   Atrial sensing and ventricular paced rhythm with overall heart rate 70-80s           -----------------------------------------------------------------------------------------------------------------------------------------------  Echocardiogram  12/22/2019, [facility]  SUMMARY     SUMMARY:  Patient in 2-1 av block     LEFT VENTRICLE:  Normal left ventricular systolic function, EF 58%  Normal left ventricular cavity size  Mild concentric left ventricular hypertrophy  Normal left ventricular wall motion without regional wall motion abnormalities   Diastolic function not  assessed due to heart block      LEFT ATRIUM:  Mild left atrial enlargement      RIGHT ATRIUM:  Not assessed      MITRAL VALVE:  There was mild regurgitation      AORTIC VALVE:  The aortic valve is tricuspid  The leaflets are calcified with reduced cusp excursion  The aortic valve velocity is 3 2 m/sec  The aortic valve mean gradient is 20 mmHg  The aortic valve area calculates to be 1 5 cm2  The findings are  consistent with moderate aortic stenosis  There is no aortic regurgitation      TRICUSPID VALVE:  There was mild regurgitation      PULMONARY ARTERIES:  Mild pulmonary hypertension  Pulmonary artery systolic pressure is estimated at 43 mmHg     -----------------------------------------------------------------------------------------------------------------------------------------------  Weights: Wt Readings from Last 20 Encounters:   07/19/22 68 kg (150 lb)   04/20/22 69 4 kg (153 lb)   10/20/21 68 9 kg (151 lb 12 8 oz)   10/20/21 68 9 kg (151 lb 12 8 oz)   04/23/21 68 9 kg (152 lb)   04/14/21 69 1 kg (152 lb 6 4 oz)   10/20/20 67 6 kg (149 lb)   10/12/20 67 6 kg (149 lb)   08/11/20 66 2 kg (146 lb)   04/08/20 65 8 kg (145 lb)   02/12/20 64 5 kg (142 lb 3 2 oz)   01/27/20 63 kg (139 lb)   01/10/20 64 4 kg (142 lb)   12/24/19 66 1 kg (145 lb 11 6 oz)   12/20/19 67 1 kg (148 lb)   10/08/19 67 kg (147 lb 12 8 oz)   08/23/19 65 4 kg (144 lb 3 2 oz)   07/29/19 58 5 kg (129 lb)   07/23/19 70 1 kg (154 lb 8 7 oz)   04/04/19 74 8 kg (165 lb)   , There is no height or weight on file to calculate BMI           Lab Studies:               Results from last 7 days   Lab Units 10/06/22  0457 10/05/22  1242   WBC Thousand/uL 4 35 3 62*   HEMOGLOBIN g/dL 11 2* 11 7   HEMATOCRIT % 35 1 36 9   PLATELETS Thousands/uL 137* 131*   ,   Results from last 7 days   Lab Units 10/06/22  0457 10/05/22  1242   POTASSIUM mmol/L 3 7 3 8   CHLORIDE mmol/L 106 105   CO2 mmol/L 25 26   BUN mg/dL 16 17   CREATININE mg/dL 1 09 1 14   CALCIUM mg/dL 11 0* 11 4*   ALK PHOS U/L  --  99   ALT U/L  --  19   AST U/L  --  19

## 2022-10-06 NOTE — PLAN OF CARE
Problem: Potential for Falls  Goal: Patient will remain free of falls  Description: INTERVENTIONS:  - Educate patient/family on patient safety including physical limitations  - Instruct patient to call for assistance with activity   - Consult OT/PT to assist with strengthening/mobility   - Keep Call bell within reach  - Keep bed low and locked with side rails adjusted as appropriate  - Keep care items and personal belongings within reach  - Initiate and maintain comfort rounds  - Make Fall Risk Sign visible to staff  - Initiate/Maintain bed alarm  - Apply yellow socks and bracelet for high fall risk patients  - Consider moving patient to room near nurses station  Outcome: Progressing     Problem: MOBILITY - ADULT  Goal: Maintain or return to baseline ADL function  Description: INTERVENTIONS:  -  Assess patient's ability to carry out ADLs; assess patient's baseline for ADL function and identify physical deficits which impact ability to perform ADLs (bathing, care of mouth/teeth, toileting, grooming, dressing, etc )  - Assess/evaluate cause of self-care deficits   - Assess range of motion  - Assess patient's mobility; develop plan if impaired  - Assess patient's need for assistive devices and provide as appropriate  - Encourage maximum independence but intervene and supervise when necessary  - Involve family in performance of ADLs  - Assess for home care needs following discharge   - Consider OT consult to assist with ADL evaluation and planning for discharge  - Provide patient education as appropriate  Outcome: Progressing  Goal: Maintains/Returns to pre admission functional level  Description: INTERVENTIONS:  - Perform BMAT or MOVE assessment daily    - Set and communicate daily mobility goal to care team and patient/family/caregiver     - Collaborate with rehabilitation services on mobility goals if consulted  - Out of bed to chair 3 times a day   - Out of bed for meals 3 times a day  - Out of bed for toileting  - Record patient progress and toleration of activity level   Outcome: Progressing     Problem: Prexisting or High Potential for Compromised Skin Integrity  Goal: Skin integrity is maintained or improved  Description: INTERVENTIONS:  - Identify patients at risk for skin breakdown  - Assess and monitor skin integrity  - Assess and monitor nutrition and hydration status  - Monitor labs   - Assess for incontinence   - Turn and reposition patient  - Assist with mobility/ambulation  - Relieve pressure over bony prominences  - Avoid friction and shearing  - Provide appropriate hygiene as needed including keeping skin clean and dry  - Evaluate need for skin moisturizer/barrier cream  - Collaborate with interdisciplinary team   - Patient/family teaching  - Consider wound care consult   Outcome: Progressing     Problem: CARDIOVASCULAR - ADULT  Goal: Maintains optimal cardiac output and hemodynamic stability  Description: INTERVENTIONS:  - Monitor I/O, vital signs and rhythm  - Monitor for S/S and trends of decreased cardiac output  - Administer and titrate ordered vasoactive medications to optimize hemodynamic stability  - Assess quality of pulses, skin color and temperature  - Assess for signs of decreased coronary artery perfusion  - Instruct patient to report change in severity of symptoms  Outcome: Progressing  Goal: Absence of cardiac dysrhythmias or at baseline rhythm  Description: INTERVENTIONS:  - Continuous cardiac monitoring, vital signs, obtain 12 lead EKG if ordered  - Administer antiarrhythmic and heart rate control medications as ordered  - Monitor electrolytes and administer replacement therapy as ordered  Outcome: Progressing     Problem: SKIN/TISSUE INTEGRITY - ADULT  Goal: Skin Integrity remains intact(Skin Breakdown Prevention)  Description: Assess:  -Perform Erlin assessment every shift  -Clean and moisturize skin every day  -Inspect skin when repositioning, toileting, and assisting with ADLS  -Assess extremities for adequate circulation and sensation     Bed Management:  -Have minimal linens on bed & keep smooth, unwrinkled  -Change linens as needed when moist or perspiring  -Avoid sitting or lying in one position for more than 2 hours while in bed    Toileting:  -Offer bedside commode    Activity:  -Encourage activity and walks on unit  -Encourage or provide ROM exercises   -Use appropriate equipment to lift or move patient in bed    Skin Care:  -Avoid use of baby powder, tape, friction and shearing, hot water or constrictive clothing  -Do not massage red bony areas  Outcome: Progressing     Problem: MUSCULOSKELETAL - ADULT  Goal: Maintain or return mobility to safest level of function  Description: INTERVENTIONS:  - Assess patient's ability to carry out ADLs; assess patient's baseline for ADL function and identify physical deficits which impact ability to perform ADLs (bathing, care of mouth/teeth, toileting, grooming, dressing, etc )  - Assess/evaluate cause of self-care deficits   - Assess range of motion  - Assess patient's mobility  - Assess patient's need for assistive devices and provide as appropriate  - Encourage maximum independence but intervene and supervise when necessary  - Involve family in performance of ADLs  - Assess for home care needs following discharge   - Consider OT consult to assist with ADL evaluation and planning for discharge  - Provide patient education as appropriate  Outcome: Progressing  Goal: Maintain proper alignment of affected body part  Description: INTERVENTIONS:  - Support, maintain and protect limb and body alignment  - Provide patient/ family with appropriate education  Outcome: Progressing

## 2022-10-06 NOTE — PLAN OF CARE
Problem: OCCUPATIONAL THERAPY ADULT  Goal: Performs self-care activities at highest level of function for planned discharge setting  See evaluation for individualized goals  Description: Treatment Interventions: ADL retraining, UE strengthening/ROM, Functional transfer training, Endurance training, Patient/family training, Equipment evaluation/education, Compensatory technique education, Continued evaluation, Energy conservation, Activityengagement          See flowsheet documentation for full assessment, interventions and recommendations  Note: Limitation: Decreased ADL status, Decreased UE strength, Decreased Safe judgement during ADL, Decreased cognition, Decreased endurance, Decreased sensation, Decreased self-care trans, Decreased high-level ADLs  Prognosis: Good  Assessment: Pt is a 80 y o  female seen for OT evaluation s/p adm to [facility]  on 10/5/2022 w/ Shortness of breath, both at rest and on exertion  Comorbidities affecting pts functional performance include a significant PMH of Depression, Gout  Pt with active OT orders  Pt lives alone in a two level house with 1 YOAV garage/basement area and stair glide to main living area  Pt reports son visits "nearly every day" and dtr visits every other week  At baseline, pt was I w/ ADLs and Mod I for functional transfers/mobility w/ use of RW vs electric scooter  Pt reports primarily using electric scooter both inside/outside, however does use RW for short distances in home  Son/dtr assist w/ IADLs  (-)   Denies falls PTA  Upon evaluation, pt currently requires Supervision for UB ADLs, Mod A for LB ADLs, Mod A for toileting, Supervision for bed mobility, Mod-Min A for transfers, and Min A for functional mobility 2* the following deficits impacting occupational performance: decreased strength, decreased balance, decreased tolerance, impaired sensation, impaired problem solving, decreased safety awareness and increased pain   These impairments, as well at pts fall risk, steps to enter environment, limited home support, difficulty performing ADLS and limited insight into deficits limit pts ability to safely engage in all baseline areas of occupation  Based on the aforementioned OT evaluation, functional performance deficits, and assessments, pt has been identified as a High complexity evaluation  Pt to continue to benefit from continued acute OT services during hospital stay to address defined deficits and to maximize level of functional independence in the following Occupational Performance areas: grooming, bathing/shower, toilet hygiene, dressing, medication management, health maintenance, functional mobility, community mobility, clothing management and social participation  From OT standpoint, recommend STR upon D/C   OT will continue to follow pt 3-5x/wk to address the following goals to  w/in 10-14 days:     OT Discharge Recommendation: Post acute rehabilitation services

## 2022-10-06 NOTE — ASSESSMENT & PLAN NOTE
Multifactorial secondary to advanced age, valvular heart disease, pulmonary hypertension, uncontrolled essential hypertension, deconditioning  · Will order echocardiogram to determine if her heart disease has progressed  Last echocardiogram was in 2019  · Maintain on telemetry rule out abnormal heart rhythms  Last pacemaker check showed normal device function  · Consult PT OT and   --recommending post acute rehab  · Consult cardiology--appreciate recommendations, monitor and initiate diuresis as needed  · Discussed with patient and son at bedside regarding monitoring overnight and going to rehab as per PT/OT recommendations; patient and son very clearly declined  Patient alert oriented x3, able answer all examiner questions appropriately, states she prefers to go home and attributes the shortness of breath to anxiety  Reports that she feels much better and insists on going home tonight  94% SpO2 on room air; echo pending, but patient examines euvolemic  Grade 3 murmur on exam; evaluated by Cardiology who reports likely moderate murmur similar to previous  The patient says she will follow-up in the outpatient setting  Son at bedside agrees, states he will transport patient home and monitor  Patient encouraged to return if she should experience shortness of breath in the future, both patient and son voiced understanding

## 2022-10-17 ENCOUNTER — APPOINTMENT (OUTPATIENT)
Dept: LAB | Age: 87
End: 2022-10-17
Payer: MEDICARE

## 2022-10-17 DIAGNOSIS — I10 ESSENTIAL HYPERTENSION: ICD-10-CM

## 2022-10-17 DIAGNOSIS — E78.2 MIXED HYPERLIPIDEMIA: ICD-10-CM

## 2022-10-17 DIAGNOSIS — E83.52 HYPERCALCEMIA: ICD-10-CM

## 2022-10-17 LAB
ALBUMIN SERPL BCP-MCNC: 3.9 G/DL (ref 3.5–5)
ALP SERPL-CCNC: 81 U/L (ref 46–116)
ALT SERPL W P-5'-P-CCNC: 19 U/L (ref 12–78)
ANION GAP SERPL CALCULATED.3IONS-SCNC: 7 MMOL/L (ref 4–13)
AST SERPL W P-5'-P-CCNC: 13 U/L (ref 5–45)
BILIRUB SERPL-MCNC: 0.51 MG/DL (ref 0.2–1)
BUN SERPL-MCNC: 31 MG/DL (ref 5–25)
CALCIUM SERPL-MCNC: 10.7 MG/DL (ref 8.3–10.1)
CHLORIDE SERPL-SCNC: 115 MMOL/L (ref 96–108)
CHOLEST SERPL-MCNC: 129 MG/DL
CO2 SERPL-SCNC: 21 MMOL/L (ref 21–32)
CREAT SERPL-MCNC: 1.27 MG/DL (ref 0.6–1.3)
GFR SERPL CREATININE-BSD FRML MDRD: 36 ML/MIN/1.73SQ M
GLUCOSE P FAST SERPL-MCNC: 102 MG/DL (ref 65–99)
HDLC SERPL-MCNC: 35 MG/DL
LDLC SERPL CALC-MCNC: 66 MG/DL (ref 0–100)
POTASSIUM SERPL-SCNC: 4.2 MMOL/L (ref 3.5–5.3)
PROT SERPL-MCNC: 7 G/DL (ref 6.4–8.4)
SODIUM SERPL-SCNC: 143 MMOL/L (ref 135–147)
TRIGL SERPL-MCNC: 139 MG/DL
TSH SERPL DL<=0.05 MIU/L-ACNC: 2.66 UIU/ML (ref 0.45–4.5)

## 2022-10-17 PROCEDURE — 80053 COMPREHEN METABOLIC PANEL: CPT

## 2022-10-17 PROCEDURE — 80061 LIPID PANEL: CPT

## 2022-10-17 PROCEDURE — 83970 ASSAY OF PARATHORMONE: CPT

## 2022-10-17 PROCEDURE — 36415 COLL VENOUS BLD VENIPUNCTURE: CPT

## 2022-10-17 PROCEDURE — 84443 ASSAY THYROID STIM HORMONE: CPT

## 2022-10-18 DIAGNOSIS — E83.52 HYPERCALCEMIA: Primary | ICD-10-CM

## 2022-10-18 LAB — PTH-INTACT SERPL-MCNC: 78.2 PG/ML (ref 18.4–80.1)

## 2022-10-25 ENCOUNTER — OFFICE VISIT (OUTPATIENT)
Dept: FAMILY MEDICINE CLINIC | Facility: CLINIC | Age: 87
End: 2022-10-25

## 2022-10-25 VITALS
OXYGEN SATURATION: 94 % | WEIGHT: 148 LBS | SYSTOLIC BLOOD PRESSURE: 142 MMHG | DIASTOLIC BLOOD PRESSURE: 82 MMHG | TEMPERATURE: 96.5 F | BODY MASS INDEX: 27.07 KG/M2 | HEART RATE: 68 BPM

## 2022-10-25 DIAGNOSIS — R06.02 SHORTNESS OF BREATH: ICD-10-CM

## 2022-10-25 DIAGNOSIS — Z00.00 ENCOUNTER FOR ANNUAL WELLNESS EXAM IN MEDICARE PATIENT: Primary | ICD-10-CM

## 2022-10-25 DIAGNOSIS — Z23 ENCOUNTER FOR IMMUNIZATION: ICD-10-CM

## 2022-10-25 DIAGNOSIS — I35.0 MODERATE AORTIC STENOSIS: ICD-10-CM

## 2022-10-25 DIAGNOSIS — I10 ESSENTIAL HYPERTENSION: ICD-10-CM

## 2022-10-25 NOTE — PROGRESS NOTES
Assessment and Plan:   Amada Sanchez was seen today for medicare wellness visit  Diagnoses and all orders for this visit:    Encounter for annual wellness exam in Medicare patient    Essential hypertension    Moderate aortic stenosis  Comments:  Echo October 2022    Shortness of breath-improved/multifactorial    Encounter for immunization  -     influenza vaccine, high-dose, PF 0 7 mL (FLUZONE HIGH-DOSE)    Has upcoming cardiology visit November 8th  The current medical regimen is effective;  continue present plan and medications  Problem List Items Addressed This Visit        Cardiovascular and Mediastinum    Essential hypertension    Nonrheumatic aortic valve stenosis       Other    Shortness of breath      Other Visit Diagnoses     Encounter for annual wellness exam in Medicare patient    -  Primary    Encounter for immunization        Relevant Orders    influenza vaccine, high-dose, PF 0 7 mL (FLUZONE HIGH-DOSE) (Completed)        BMI Counseling: Body mass index is 27 07 kg/m²  The BMI is above normal  Nutrition recommendations include encouraging healthy choices of fruits and vegetables  Patient referred to PCP  Rationale for BMI follow-up plan is due to patient being overweight or obese  BMI Counseling: Body mass index is 27 07 kg/m²  Follow-up plan was not completed due to elderly patient (72 years old) where weight reduction/weight gain would complicate underlying health condition such as: illness or physical disability  Preventive health issues were discussed with patient, and age appropriate screening tests were ordered as noted in patient's After Visit Summary  Personalized health advice and appropriate referrals for health education or preventive services given if needed, as noted in patient's After Visit Summary       History of Present Illness:     Patient presents for a Medicare Wellness Visit  Chief Complaint   Patient presents with   • Medicare Wellness Visit     77-year-old female here for wellness but recently also in the hospital   Today she is not accompanied by any family member  patient also had “routine “ labs done October 17th and they are reviewed  Component      Latest Ref Rng & Units 10/17/2022   Sodium      135 - 147 mmol/L 143   Potassium      3 5 - 5 3 mmol/L 4 2   Chloride      96 - 108 mmol/L 115 (H)   CO2      21 - 32 mmol/L 21   Anion Gap      4 - 13 mmol/L 7   BUN      5 - 25 mg/dL 31 (H)   Creatinine      0 60 - 1 30 mg/dL 1 27   GLUCOSE FASTING      65 - 99 mg/dL 102 (H)   Calcium      8 3 - 10 1 mg/dL 10 7 (H)   AST      5 - 45 U/L 13   ALT      12 - 78 U/L 19   Alkaline Phosphatase      46 - 116 U/L 81   Total Protein      6 4 - 8 4 g/dL 7 0   Albumin      3 5 - 5 0 g/dL 3 9   TOTAL BILIRUBIN      0 20 - 1 00 mg/dL 0 51   eGFR      ml/min/1 73sq m 36   Cholesterol      See Comment mg/dL 129   Triglycerides      See Comment mg/dL 139   HDL      >=50 mg/dL 35 (L)   LDL Calculated      0 - 100 mg/dL 66   TSH 3RD GENERATON      0 450 - 4 500 uIU/mL 2 660   PARATHYROID HORMONE      18 4 - 80 1 pg/mL 78 2     Was also just admitted to [facility] October 5 to October 6 for symptoms of shortness of breath  Assessment and plan notes multifactorial secondary to advanced age, valvular heart disease, pulmonary hypertension and uncontrolled essential hypertension along with deconditioning  Assessment & Plan  Multifactorial secondary to advanced age, valvular heart disease, pulmonary hypertension, uncontrolled essential hypertension, deconditioning  · Will order echocardiogram to determine if her heart disease has progressed  Last echocardiogram was in 2019  · Maintain on telemetry rule out abnormal heart rhythms  Last pacemaker check showed normal device function    · Consult PT OT and   --recommending post acute rehab  · Consult cardiology--appreciate recommendations, monitor and initiate diuresis as needed  · Discussed with patient and son at bedside regarding monitoring overnight and going to rehab as per PT/OT recommendations; patient and son very clearly declined  Patient alert oriented x3, able answer all examiner questions appropriately, states she prefers to go home and attributes the shortness of breath to anxiety  Reports that she feels much better and insists on going home tonight  94% SpO2 on room air; echo pending, but patient examines euvolemic  Grade 3 murmur on exam; evaluated by Cardiology who reports likely moderate murmur similar to previous  The patient says she will follow-up in the outpatient setting  Son at bedside agrees, states he will transport patient home and monitor  Patient encouraged to return if she should experience shortness of breath in the future, both patient and son voiced understanding      IMPRESSION: CTA - CHEST WITH IV CONTRAST - PULMONARY ANGIOGRAM 10/5/2022     No pulmonary embolism  No acute thoracic abnormality  Mildly dilated main pulmonary artery, suggestive of elevated pulmonary arterial pressures  Recommend correlation with echocardiogram      Coronary atherosclerosis      Interpretation Summary:  Echocardiogram       Technically difficult but adequate transthoracic echocardiogram study      1  Mild concentric left ventricular hypertrophy, normal left ventricular systolic function, grade 1 diastolic dysfunction  Ejection fraction is estimated as around 55%  2  Normal right ventricular size and systolic function  3  Mild left atrial cavity enlargement  4  Aortic valve sclerosis with focal calcification  Suspected moderate aortic valve stenosis  Peak transaortic velocity is 2 3 m/sec, mean gradient is 14 mmHg, calculated aortic valve area is 1 1-1 3 cm2  No significant aortic valve regurgitation noted  Doppler velocity index is 0 38  5  Mitral annular calcification and mitral valve leaflet thickening with focal calcification  Mild mitral valve regurgitation  6  Mild tricuspid valve regurgitation  7  No obvious pulmonary hypertension but estimated RVSP/PASP is close to upper limit of normal   8  No pericardial effusion      Compared to report of previous echocardiogram from December 22, 2019 there is some progression of aortic valve stenosis  Mitral annular calcification is new  Patient Care Team:  Julian Blanton DO as PCP - Oliverio Hill DO     Review of Systems:   Recent labs  Component      Latest Ref Rng & Units 10/17/2022   Sodium      135 - 147 mmol/L 143   Potassium      3 5 - 5 3 mmol/L 4 2   Chloride      96 - 108 mmol/L 115 (H)   CO2      21 - 32 mmol/L 21   Anion Gap      4 - 13 mmol/L 7   BUN      5 - 25 mg/dL 31 (H)   Creatinine      0 60 - 1 30 mg/dL 1 27   GLUCOSE FASTING      65 - 99 mg/dL 102 (H)   Calcium      8 3 - 10 1 mg/dL 10 7 (H)   AST      5 - 45 U/L 13   ALT      12 - 78 U/L 19   Alkaline Phosphatase      46 - 116 U/L 81   Total Protein      6 4 - 8 4 g/dL 7 0   Albumin      3 5 - 5 0 g/dL 3 9   TOTAL BILIRUBIN      0 20 - 1 00 mg/dL 0 51   eGFR      ml/min/1 73sq m 36   Cholesterol      See Comment mg/dL 129   Triglycerides      See Comment mg/dL 139   HDL      >=50 mg/dL 35 (L)   LDL Calculated      0 - 100 mg/dL 66   TSH 3RD GENERATON      0 450 - 4 500 uIU/mL 2 660   PARATHYROID HORMONE      18 4 - 80 1 pg/mL 78 2     Review of Systems   Constitutional: Fatigue:  fair  Eyes: Visual disturbance:  up-to-date  Gastrointestinal: Positive for constipation (does stool softener and citracel)  Musculoskeletal: Positive for arthralgias (Knees are the same  Do significantly limit her)  Psychiatric/Behavioral: Sleep disturbance: Currently no issues          Problem List:     Patient Active Problem List   Diagnosis   • Mixed hyperlipidemia   • Essential hypertension   • Esophageal reflux   • Macular degeneration   • Osteoarthritis   • Peripheral neuropathy   • Primary localized osteoarthritis of left knee   • Primary localized osteoarthritis of right knee • Vitamin D deficiency   • Depression   • Right shoulder pain   • Murmur, cardiac   • Suspected Dementia   • Ambulatory dysfunction   • Iron deficiency anemia   • Seizure disorder (HCC)   • Nonrheumatic aortic valve stenosis   • Mild pulmonary hypertension (HCC)   • Carpal tunnel syndrome of left wrist   • Status post placement of cardiac pacemaker   • Sick sinus syndrome (HCC)   • Stage 3a chronic kidney disease (HCC)   • Shortness of breath      Past Medical and Surgical History:     Past Medical History:   Diagnosis Date   • Depression    • Gout    • H/O vaginal hysterectomy     resolved-4/17/2015     Past Surgical History:   Procedure Laterality Date   • CATARACT EXTRACTION     • TOTAL ABDOMINAL HYSTERECTOMY      with removal of both ovaries   • VAGINAL HYSTERECTOMY      resolved-4/17/2015      Family History:     Family History   Problem Relation Age of Onset   • Heart attack Mother         MI   • Heart attack Father         MI   • Hypertension Sister    • Stomach cancer Sister    • Hypertension Brother       Social History:     Social History     Socioeconomic History   • Marital status:      Spouse name: None   • Number of children: None   • Years of education: None   • Highest education level: None   Occupational History   • None   Tobacco Use   • Smoking status: Never Smoker   • Smokeless tobacco: Never Used   Vaping Use   • Vaping Use: Never used   Substance and Sexual Activity   • Alcohol use: Not Currently   • Drug use: No   • Sexual activity: None   Other Topics Concern   • None   Social History Narrative   • None     Social Determinants of Health     Financial Resource Strain: Low Risk    • Difficulty of Paying Living Expenses: Not hard at all   Food Insecurity: Not on file   Transportation Needs: No Transportation Needs   • Lack of Transportation (Medical): No   • Lack of Transportation (Non-Medical):  No   Physical Activity: Not on file   Stress: Not on file   Social Connections: Not on file Intimate Partner Violence: Not on file   Housing Stability: Not on file      Medications and Allergies:     Current Outpatient Medications   Medication Sig Dispense Refill   • amLODIPine (NORVASC) 2 5 mg tablet TAKE 1 TABLET BY MOUTH EVERY DAY 90 tablet 1   • ascorbic acid (VITAMIN C) 250 mg tablet Take 500 mg by mouth daily     • aspirin 81 MG tablet Take by mouth daily      • atorvastatin (LIPITOR) 20 mg tablet Take 1 tablet (20 mg total) by mouth every evening 90 tablet 3   • dorzolamide-timolol (COSOPT) 22 3-6 8 MG/ML ophthalmic solution INSTILL 1 DROP IN EACH EYE EVERY DAY  3   • doxepin (SINEquan) 50 mg capsule Take 1 capsule (50 mg total) by mouth daily at bedtime 90 capsule 1   • Elastic Bandages & Supports (CARPAL TUNNEL WRIST STABILIZER) MISC by Does not apply route daily 1 each 0   • gabapentin (NEURONTIN) 300 mg capsule Take 1 capsule (300 mg total) by mouth 3 (three) times a day 360 capsule 2   • omeprazole (PriLOSEC) 20 mg delayed release capsule Take 1 capsule (20 mg total) by mouth daily 90 capsule 3   • telmisartan (MICARDIS) 20 MG tablet Take 1 tablet (20 mg total) by mouth daily 90 tablet 3   • Cholecalciferol (Vitamin D3) 25 MCG (1000 UT) CAPS Take 25 each by mouth in the morning       No current facility-administered medications for this visit  Allergies   Allergen Reactions   • Fish-Derived Products - Food Allergy      GI upset   • Allopurinol Rash     Category:  Allergy;       Immunizations:     Immunization History   Administered Date(s) Administered   • COVID-19 MODERNA VACC 0 5 ML IM 04/22/2021, 05/21/2021, 12/01/2021   • Influenza Split High Dose Preservative Free IM 10/20/2014, 10/02/2015, 09/28/2016, 10/03/2017   • Influenza, high dose seasonal 0 7 mL 10/04/2018, 10/08/2019, 10/12/2020, 10/13/2021, 10/25/2022   • Pneumococcal Conjugate 13-Valent 10/04/2018   • Pneumococcal Polysaccharide PPV23 04/20/2005, 04/20/2015   • Tuberculin Skin Test 12/26/2019, 01/03/2020   • Zoster 03/25/2008, 04/20/2011      Health Maintenance:         Topic Date Due   • DXA SCAN  11/04/2030 (Originally 10/5/2019)   • Breast Cancer Screening: Mammogram  11/16/2033 (Originally 10/4/1968)         Topic Date Due   • COVID-19 Vaccine (4 - Booster for Moderna series) 04/01/2022      Medicare Screening Tests and Risk Assessments:         Health Risk Assessment:   Patient rates overall health as fair  Patient feels that their physical health rating is same  Patient is satisfied with their life  Eyesight was rated as slightly worse  Hearing was rated as slightly worse  Patient feels that their emotional and mental health rating is same  Patients states they are sometimes angry  Patient states they are never, rarely unusually tired/fatigued  Pain experienced in the last 7 days has been a lot  Patient's pain rating has been 9/10  Patient states that she has experienced no weight loss or gain in last 6 months  Fall Risk Screening: In the past year, patient has experienced: no history of falling in past year      Urinary Incontinence Screening:   Patient has leaked urine accidently in the last six months  Home Safety:  Patient does not have trouble with stairs inside or outside of their home  Patient has working smoke alarms and has no working carbon monoxide detector  Home safety hazards include: none  Nutrition:   Current diet is Regular  Medications:   Patient is currently taking over-the-counter supplements  OTC medications include: see medication list  Patient is not able to manage medications       Activities of Daily Living (ADLs)/Instrumental Activities of Daily Living (IADLs):   Walk and transfer into and out of bed and chair?: Yes  Dress and groom yourself?: Yes    Bathe or shower yourself?: Yes    Do your laundry/housekeeping?: No  Manage your money, pay your bills and track your expenses?: No  Make your own meals?: Yes    Do your own shopping?: No    Previous Hospitalizations:   Any hospitalizations or ED visits within the last 12 months?: Yes    How many hospitalizations have you had in the last year?: 1-2    Advance Care Planning:   Living will: Yes    Durable POA for healthcare: Yes    Advanced directive: Yes      PREVENTIVE SCREENINGS      Cardiovascular Screening:    General: Screening Not Indicated and History Lipid Disorder      Diabetes Screening:     General: Screening Current      Colorectal Cancer Screening:     General: Screening Not Indicated      Cervical Cancer Screening:    General: Screening Not Indicated      Osteoporosis Screening:    General: Screening Current      Lung Cancer Screening:     General: Screening Not Indicated    Screening, Brief Intervention, and Referral to Treatment (SBIRT)    Screening  Typical number of drinks in a day: 0  Typical number of drinks in a week: 0  Interpretation: Low risk drinking behavior  Single Item Drug Screening:  How often have you used an illegal drug (including marijuana) or a prescription medication for non-medical reasons in the past year? never    Single Item Drug Screen Score: 0  Interpretation: Negative screen for possible drug use disorder         Physical Exam:     /82   Pulse 68   Temp (!) 96 5 °F (35 8 °C) (Temporal)   Wt 67 1 kg (148 lb)   SpO2 94%   BMI 27 07 kg/m²     Physical Exam  HENT:      Right Ear: Tympanic membrane normal       Left Ear: Tympanic membrane normal       Ears:      Comments: Hard of hearing  Cardiovascular:      Rate and Rhythm: Normal rate and regular rhythm  Pulses: Normal pulses  Heart sounds: Murmur (Of aortic stenosis) heard  Pulmonary:      Effort: Pulmonary effort is normal       Breath sounds: Normal breath sounds  Neurological:      Mental Status: She is alert and oriented to person, place, and time  Psychiatric:         Mood and Affect: Mood normal          Cognition and Memory: Impaired cognition:  mild, fairly good historian of recent events hospitalization  Yris Lopes, DO

## 2022-10-25 NOTE — PATIENT INSTRUCTIONS
Medicare Preventive Visit Patient Instructions  Thank you for completing your Welcome to Medicare Visit or Medicare Annual Wellness Visit today  Your next wellness visit will be due in one year (10/26/2023)  The screening/preventive services that you may require over the next 5-10 years are detailed below  Some tests may not apply to you based off risk factors and/or age  Screening tests ordered at today's visit but not completed yet may show as past due  Also, please note that scanned in results may not display below  Preventive Screenings:  Service Recommendations Previous Testing/Comments   Colorectal Cancer Screening  * Colonoscopy    * Fecal Occult Blood Test (FOBT)/Fecal Immunochemical Test (FIT)  * Fecal DNA/Cologuard Test  * Flexible Sigmoidoscopy Age: 39-70 years old   Colonoscopy: every 10 years (may be performed more frequently if at higher risk)  OR  FOBT/FIT: every 1 year  OR  Cologuard: every 3 years  OR  Sigmoidoscopy: every 5 years  Screening may be recommended earlier than age 39 if at higher risk for colorectal cancer  Also, an individualized decision between you and your healthcare provider will decide whether screening between the ages of 74-80 would be appropriate  Colonoscopy: Not on file  FOBT/FIT: Not on file  Cologuard: Not on file  Sigmoidoscopy: Not on file    Screening Not Indicated     Breast Cancer Screening Age: 36 years old  Frequency: every 1-2 years  Not required if history of left and right mastectomy Mammogram: Not on file        Cervical Cancer Screening Between the ages of 21-29, pap smear recommended once every 3 years  Between the ages of 33-67, can perform pap smear with HPV co-testing every 5 years     Recommendations may differ for women with a history of total hysterectomy, cervical cancer, or abnormal pap smears in past  Pap Smear: Not on file    Screening Not Indicated   Hepatitis C Screening Once for adults born between 1945 and 1965  More frequently in patients at high risk for Hepatitis C Hep C Antibody: Not on file        Diabetes Screening 1-2 times per year if you're at risk for diabetes or have pre-diabetes Fasting glucose: 102 mg/dL (10/17/2022)  A1C: 5 4 % (7/24/2019)  Screening Current   Cholesterol Screening Once every 5 years if you don't have a lipid disorder  May order more often based on risk factors  Lipid panel: 10/17/2022    Screening Not Indicated  History Lipid Disorder     Other Preventive Screenings Covered by Medicare:  1  Abdominal Aortic Aneurysm (AAA) Screening: covered once if your at risk  You're considered to be at risk if you have a family history of AAA  2  Lung Cancer Screening: covers low dose CT scan once per year if you meet all of the following conditions: (1) Age 50-69; (2) No signs or symptoms of lung cancer; (3) Current smoker or have quit smoking within the last 15 years; (4) You have a tobacco smoking history of at least 20 pack years (packs per day multiplied by number of years you smoked); (5) You get a written order from a healthcare provider  3  Glaucoma Screening: covered annually if you're considered high risk: (1) You have diabetes OR (2) Family history of glaucoma OR (3)  aged 48 and older OR (3)  American aged 72 and older  3  Osteoporosis Screening: covered every 2 years if you meet one of the following conditions: (1) You're estrogen deficient and at risk for osteoporosis based off medical history and other findings; (2) Have a vertebral abnormality; (3) On glucocorticoid therapy for more than 3 months; (4) Have primary hyperparathyroidism; (5) On osteoporosis medications and need to assess response to drug therapy  · Last bone density test (DXA Scan): 10/05/2017  5  HIV Screening: covered annually if you're between the age of 12-76  Also covered annually if you are younger than 13 and older than 72 with risk factors for HIV infection   For pregnant patients, it is covered up to 3 times per pregnancy  Immunizations:  Immunization Recommendations   Influenza Vaccine Annual influenza vaccination during flu season is recommended for all persons aged >= 6 months who do not have contraindications   Pneumococcal Vaccine   * Pneumococcal conjugate vaccine = PCV13 (Prevnar 13), PCV15 (Vaxneuvance), PCV20 (Prevnar 20)  * Pneumococcal polysaccharide vaccine = PPSV23 (Pneumovax) Adults 25-60 years old: 1-3 doses may be recommended based on certain risk factors  Adults 72 years old: 1-2 doses may be recommended based off what pneumonia vaccine you previously received   Hepatitis B Vaccine 3 dose series if at intermediate or high risk (ex: diabetes, end stage renal disease, liver disease)   Tetanus (Td) Vaccine - COST NOT COVERED BY MEDICARE PART B Following completion of primary series, a booster dose should be given every 10 years to maintain immunity against tetanus  Td may also be given as tetanus wound prophylaxis  Tdap Vaccine - COST NOT COVERED BY MEDICARE PART B Recommended at least once for all adults  For pregnant patients, recommended with each pregnancy  Shingles Vaccine (Shingrix) - COST NOT COVERED BY MEDICARE PART B  2 shot series recommended in those aged 48 and above     Health Maintenance Due:      Topic Date Due   • DXA SCAN  11/04/2030 (Originally 10/5/2019)   • Breast Cancer Screening: Mammogram  11/16/2033 (Originally 10/4/1968)     Immunizations Due:      Topic Date Due   • COVID-19 Vaccine (4 - Booster for Moderna series) 04/01/2022   • Influenza Vaccine (1) 09/01/2022     Advance Directives   What are advance directives? Advance directives are legal documents that state your wishes and plans for medical care  These plans are made ahead of time in case you lose your ability to make decisions for yourself  Advance directives can apply to any medical decision, such as the treatments you want, and if you want to donate organs  What are the types of advance directives?   There are many types of advance directives, and each state has rules about how to use them  You may choose a combination of any of the following:  · Living will: This is a written record of the treatment you want  You can also choose which treatments you do not want, which to limit, and which to stop at a certain time  This includes surgery, medicine, IV fluid, and tube feedings  · Durable power of  for healthcare [facility]): This is a written record that states who you want to make healthcare choices for you when you are unable to make them for yourself  This person, called a proxy, is usually a family member or a friend  You may choose more than 1 proxy  · Do not resuscitate (DNR) order:  A DNR order is used in case your heart stops beating or you stop breathing  It is a request not to have certain forms of treatment, such as CPR  A DNR order may be included in other types of advance directives  · Medical directive: This covers the care that you want if you are in a coma, near death, or unable to make decisions for yourself  You can list the treatments you want for each condition  Treatment may include pain medicine, surgery, blood transfusions, dialysis, IV or tube feedings, and a ventilator (breathing machine)  · Values history: This document has questions about your views, beliefs, and how you feel and think about life  This information can help others choose the care that you would choose  Why are advance directives important? An advance directive helps you control your care  Although spoken wishes may be used, it is better to have your wishes written down  Spoken wishes can be misunderstood, or not followed  Treatments may be given even if you do not want them  An advance directive may make it easier for your family to make difficult choices about your care  Urinary Incontinence   Urinary incontinence (UI)  is when you lose control of your bladder   UI develops because your bladder cannot store or empty urine properly  The 3 most common types of UI are stress incontinence, urge incontinence, or both  Medicines:   · May be given to help strengthen your bladder control  Report any side effects of medication to your healthcare provider  Do pelvic muscle exercises often:  Your pelvic muscles help you stop urinating  Squeeze these muscles tight for 5 seconds, then relax for 5 seconds  Gradually work up to squeezing for 10 seconds  Do 3 sets of 15 repetitions a day, or as directed  This will help strengthen your pelvic muscles and improve bladder control  Train your bladder:  Go to the bathroom at set times, such as every 2 hours, even if you do not feel the urge to go  You can also try to hold your urine when you feel the urge to go  For example, hold your urine for 5 minutes when you feel the urge to go  As that becomes easier, hold your urine for 10 minutes  Self-care:   · Keep a UI record  Write down how often you leak urine and how much you leak  Make a note of what you were doing when you leaked urine  · Drink liquids as directed  You may need to limit the amount of liquid you drink to help control your urine leakage  Do not drink any liquid right before you go to bed  Limit or do not have drinks that contain caffeine or alcohol  · Prevent constipation  Eat a variety of high-fiber foods  Good examples are high-fiber cereals, beans, vegetables, and whole-grain breads  Walking is the best way to trigger your intestines to have a bowel movement  · Exercise regularly and maintain a healthy weight  Weight loss and exercise will decrease pressure on your bladder and help you control your leakage  · Use a catheter as directed  to help empty your bladder  A catheter is a tiny, plastic tube that is put into your bladder to drain your urine  · Go to behavior therapy as directed  Behavior therapy may be used to help you learn to control your urge to urinate      Weight Management   Why it is important to manage your weight:  Being overweight increases your risk of health conditions such as heart disease, high blood pressure, type 2 diabetes, and certain types of cancer  It can also increase your risk for osteoarthritis, sleep apnea, and other respiratory problems  Aim for a slow, steady weight loss  Even a small amount of weight loss can lower your risk of health problems  How to lose weight safely:  A safe and healthy way to lose weight is to eat fewer calories and get regular exercise  You can lose up about 1 pound a week by decreasing the number of calories you eat by 500 calories each day  Healthy meal plan for weight management:  A healthy meal plan includes a variety of foods, contains fewer calories, and helps you stay healthy  A healthy meal plan includes the following:  · Eat whole-grain foods more often  A healthy meal plan should contain fiber  Fiber is the part of grains, fruits, and vegetables that is not broken down by your body  Whole-grain foods are healthy and provide extra fiber in your diet  Some examples of whole-grain foods are whole-wheat breads and pastas, oatmeal, brown rice, and bulgur  · Eat a variety of vegetables every day  Include dark, leafy greens such as spinach, kale, gaetano greens, and mustard greens  Eat yellow and orange vegetables such as carrots, sweet potatoes, and winter squash  · Eat a variety of fruits every day  Choose fresh or canned fruit (canned in its own juice or light syrup) instead of juice  Fruit juice has very little or no fiber  · Eat low-fat dairy foods  Drink fat-free (skim) milk or 1% milk  Eat fat-free yogurt and low-fat cottage cheese  Try low-fat cheeses such as mozzarella and other reduced-fat cheeses  · Choose meat and other protein foods that are low in fat  Choose beans or other legumes such as split peas or lentils  Choose fish, skinless poultry (chicken or turkey), or lean cuts of red meat (beef or pork)   Before you cook meat or poultry, cut off any visible fat  · Use less fat and oil  Try baking foods instead of frying them  Add less fat, such as margarine, sour cream, regular salad dressing and mayonnaise to foods  Eat fewer high-fat foods  Some examples of high-fat foods include french fries, doughnuts, ice cream, and cakes  · Eat fewer sweets  Limit foods and drinks that are high in sugar  This includes candy, cookies, regular soda, and sweetened drinks  Exercise:  Exercise at least 30 minutes per day on most days of the week  Some examples of exercise include walking, biking, dancing, and swimming  You can also fit in more physical activity by taking the stairs instead of the elevator or parking farther away from stores  Ask your healthcare provider about the best exercise plan for you  © Copyright [employer] 2018 Information is for End User's use only and may not be sold, redistributed or otherwise used for commercial purposes   All illustrations and images included in CareNotes® are the copyrighted property of [employer]  or [address] [place] [address]

## 2022-11-08 ENCOUNTER — OFFICE VISIT (OUTPATIENT)
Dept: CARDIOLOGY CLINIC | Facility: CLINIC | Age: 87
End: 2022-11-08

## 2022-11-08 VITALS
SYSTOLIC BLOOD PRESSURE: 164 MMHG | OXYGEN SATURATION: 100 % | HEIGHT: 62 IN | HEART RATE: 64 BPM | DIASTOLIC BLOOD PRESSURE: 78 MMHG | BODY MASS INDEX: 27.07 KG/M2

## 2022-11-08 DIAGNOSIS — I10 ESSENTIAL HYPERTENSION: ICD-10-CM

## 2022-11-08 DIAGNOSIS — N18.31 STAGE 3A CHRONIC KIDNEY DISEASE (HCC): ICD-10-CM

## 2022-11-08 DIAGNOSIS — I35.0 NONRHEUMATIC AORTIC VALVE STENOSIS: ICD-10-CM

## 2022-11-08 DIAGNOSIS — I49.5 SICK SINUS SYNDROME (HCC): Primary | ICD-10-CM

## 2022-11-08 DIAGNOSIS — I27.20 MILD PULMONARY HYPERTENSION (HCC): ICD-10-CM

## 2022-11-08 DIAGNOSIS — Z95.0 STATUS POST PLACEMENT OF CARDIAC PACEMAKER: ICD-10-CM

## 2022-11-08 RX ORDER — FUROSEMIDE 20 MG/1
20 TABLET ORAL AS NEEDED
Qty: 30 TABLET | Refills: 0 | Status: SHIPPED | OUTPATIENT
Start: 2022-11-08

## 2022-11-08 RX ORDER — AMLODIPINE BESYLATE 2.5 MG/1
2.5 TABLET ORAL 2 TIMES DAILY
Qty: 180 TABLET | Refills: 3 | Status: SHIPPED | OUTPATIENT
Start: 2022-11-08

## 2022-11-08 NOTE — PROGRESS NOTES
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, now status post permanent pacemaker implantation in December 2019  2  Essential hypertension  3  Moderate aortic valve stenosis and mild pulmonary hypertension  4  Grade 1 diastolic dysfunction without congestive heart failure   5  Iron deficiency anemia  6  Macular degeneration  7  Dementia changes  8  History of gout  9  Mixed dyslipidemia  10  Degenerative joint disease  11  Vitamin-D deficiency   13  Ambulatory dysfunction     ECHOCARDIOGRAM October 6, 2022:  1  Mild concentric left ventricular hypertrophy, normal left ventricular systolic function, grade 1 diastolic dysfunction  Ejection fraction is estimated as around 55%  2  Normal right ventricular size and systolic function  3  Mild left atrial cavity enlargement  4  Aortic valve sclerosis with focal calcification  Suspected moderate aortic valve stenosis  Peak transaortic velocity is 2 3 m/sec, mean gradient is 14 mmHg, calculated aortic valve area is 1 1-1 3 cm2  No significant aortic valve regurgitation noted  Doppler velocity index is 0 38  5  Mitral annular calcification and mitral valve leaflet thickening with focal calcification  Mild mitral valve regurgitation  6  Mild tricuspid valve regurgitation  7  No obvious pulmonary hypertension but estimated RVSP/PASP is close to upper limit of normal   8  No pericardial effusion      Echocardiogram December 2019: EF around 62%, mild concentric left ventricular hypertrophy, moderate aortic valve stenosis, no aortic valve regurgitation, mild pulmonary hypertension  Estimated RVSP/PASP is 43 mmHg, mild mitral and tricuspid valve regurgitation  CURRENT ECG   No results found for this visit on 11/08/22  Pacemaker interrogation August 2022:  1  Mild concentric left ventricular hypertrophy, normal left ventricular systolic function, grade 1 diastolic dysfunction  Ejection fraction is estimated as around 55%  2  Normal right ventricular size and systolic function  3  Mild left atrial cavity enlargement  4  Aortic valve sclerosis with focal calcification  Suspected moderate aortic valve stenosis  Peak transaortic velocity is 2 3 m/sec, mean gradient is 14 mmHg, calculated aortic valve area is 1 1-1 3 cm2  No significant aortic valve regurgitation noted  Doppler velocity index is 0 38  5  Mitral annular calcification and mitral valve leaflet thickening with focal calcification  Mild mitral valve regurgitation  6  Mild tricuspid valve regurgitation  7  No obvious pulmonary hypertension but estimated RVSP/PASP is close to upper limit of normal   8  No pericardial effusion  CARDIOLOGY ASSESSMENT & PLAN     1  Sick sinus syndrome ([address] )     2  Nonrheumatic aortic valve stenosis  furosemide (LASIX) 20 mg tablet   3  Mild pulmonary hypertension (HCC)  furosemide (LASIX) 20 mg tablet   4  Essential hypertension  amLODIPine (NORVASC) 2 5 mg tablet   5  Status post placement of cardiac pacemaker     6  Stage 3a chronic kidney disease ([address] )       Nonrheumatic aortic valve stenosis  Ms Anmol Keith appears to be overall stable from cardiac perspective  Though she recently had fatigue and shortness of breath symptoms seem to be better now  On examination there is no significant pulmonary vascular congestion or peripheral edema  She does have modest murmur of aortic valve stenosis  Recent echocardiographic findings are reviewed      -- at this time since her blood pressure was noted to be elevated I am changing the frequency of amlodipine to 2 5 mg twice daily  We are continuing all other medications at the current doses  -- I am advising that if she develops increasing lower extremity swelling or has any shortness of breath disabling her from lying down in bed then we should add diuretic to her  I will prescribe her furosemide 20 mg at every other day he does but to be taken only if she develops these symptoms in the future  -- regarding her ambulatory dysfunction and degenerative joint disease  She may benefit from home physical therapy  I am advising her to discuss this with her primary physician  -- she will continue to be followed in the device clinic as scheduled  Cardiology office appointment will be made for 6 months  -- Dietary and medical compliance are reinforced  -- She is advised  to report any concerning symptoms such as chest pain, shortness of breath, decline in exercise tolerance or presyncope/syncope  INTERVAL HISTORY & HISTORY OF PRESENT ILLNESS     Karlene Bautista is here for follow-up regarding her cardiac comorbidities which include:  Hypertension, heart block with history of pacemaker implantation, aortic valve stenosis, pulmonary hypertension and other comorbidities  She is here accompanied for visit accompanied with her son  She was recently admitted at [facility] overnight after presenting with fatigue and shortness of breath  Symptoms worsen nonspecific  She had echocardiogram during the hospitalization which was overall similar to previous echocardiogram   She was discharged the following day  Now she reports that she has been overall all right except that her ambulatory dysfunction is getting worse due to her degenerative joint disease and arthritis in knees and feet  Denies chest pain or unusual shortness of breath  Denies orthopnea or PND  Denies any palpitations  Has had no recurrence of falls  Has had no recent presyncope/syncope      Functional capacity status:  Poor, limited due to agent degenerative joint disease   (Excellent- >10 METs; Good: (7-10 METs); Moderate (4-7 METs); Poor (<= 4 METs)    Any chronic stressors:  None   (feeling of poor health, financial problems, and social isolation etc)  Tobacco or alcohol dependence:  She does not smoke and she does not drink  Current cardiac medications:  Atorvastatin 20 mg daily, telmisartan 20 mg daily, amlodipine 2 5 mg daily, aspirin 81 mg daily  Blood work from October 1, 2022 shows sodium 143, potassium 4 2, chloride 115, bicarb 21, BUN 31, creatinine 1 27, calcium 10 7, LFTs were normal, lipids were well controlled and normal   Hemoglobin was 11 2, hematocrit 35 5, TSH was 2 66  NT proBNP level during hospitalization was 1947  REVIEW OF SYSTEMS   Positive for:  As noted above in HPI  Negative for: All remaining as reviewed below and in HPI  SYSTEM SYMPTOMS REVIEWED:  General--weight change, fever, night sweats  Respiratory--cough, wheezing, shortness of breath, sputum production  Cardiovascular--chest pain, syncope, dyspnea on exertion, edema, decline in exercise tolerance, claudication   Gastrointestinal--persistent vomiting, diarrhea, abdominal distention, blood in stool   Muscular or skeletal--joint pain or swelling   Neurologic--headaches, syncope, abnormal movement  Hematologic--history of easy bruising and bleeding   Endocrine--thyroid enlargement, heat or cold intolerance, polyuria   Psychiatric--anxiety, depression     *-*-*-*-*-*-*-*-*-*-*-*-*-*-*-*-*-*-*-*-*-*-*-*-*-*-*-*-*-*-*-*-*-*-*-*-*-*-*-*-*-*-*-*-*-*-*-*-*-*-*-*-*-*-  VITAL SIGNS     CURRENT VITAL SIGNS:   Vitals:    11/08/22 1256   BP: 164/78   Pulse: 64   SpO2: 100%   Height: 5' 2" (1 575 m)       BMI: Body mass index is 27 07 kg/m²      WEIGHTS:   Wt Readings from Last 25 Encounters:   10/25/22 67 1 kg (148 lb)   10/06/22 68 kg (150 lb)   07/19/22 68 kg (150 lb)   04/20/22 69 4 kg (153 lb)   10/20/21 68 9 kg (151 lb 12 8 oz)   10/20/21 68 9 kg (151 lb 12 8 oz)   04/23/21 68 9 kg (152 lb)   04/14/21 69 1 kg (152 lb 6 4 oz)   10/20/20 67 6 kg (149 lb)   10/12/20 67 6 kg (149 lb)   08/11/20 66 2 kg (146 lb)   04/08/20 65 8 kg (145 lb)   02/12/20 64 5 kg (142 lb 3 2 oz)   01/27/20 63 kg (139 lb)   01/10/20 64 4 kg (142 lb)   12/24/19 66 1 kg (145 lb 11 6 oz)   12/20/19 67 1 kg (148 lb)   10/08/19 67 kg (147 lb 12 8 oz)   08/23/19 65 4 kg (144 lb 3 2 oz)   07/29/19 58 5 kg (129 lb)   07/23/19 70 1 kg (154 lb 8 7 oz)   04/04/19 74 8 kg (165 lb)   11/01/18 70 8 kg (156 lb)   10/25/18 70 8 kg (156 lb)   10/18/18 70 8 kg (156 lb)        *-*-*-*-*-*-*-*-*-*-*-*-*-*-*-*-*-*-*-*-*-*-*-*-*-*-*-*-*-*-*-*-*-*-*-*-*-*-*-*-*-*-*-*-*-*-*-*-*-*-*-*-*-*-  PHYSICAL EXAM     General Appearance:    Alert, cooperative, no distress, appears stated age   Head, Eyes, ENT:    No obvious abnormality, moist mucous mebranes  Neck:   Supple, no carotid bruit or JVD   Back:     Symmetric, no curvature  Lungs:     Respirations unlabored  Clear to auscultation bilaterally,    Chest wall:    No tenderness or deformity   Heart:    Regular rate and rhythm, S1 and S2 normal, no murmur, rub  or gallop  Abdomen:     Soft, non-tender, No obvious masses, or organomegaly   Extremities:   Extremities warm, no cyanosis or edema    Skin:   No venostatic changes in lower extremities  Normal skin color, texture, and turgor   No rashes or lesions     *-*-*-*-*-*-*-*-*-*-*-*-*-*-*-*-*-*-*-*-*-*-*-*-*-*-*-*-*-*-*-*-*-*-*-*-*-*-*-*-*-*-*-*-*-*-*-*-*-*-*-*-*-*-  CURRENT MEDICATIONS LIST     Current Outpatient Medications:   •  amLODIPine (NORVASC) 2 5 mg tablet, Take 1 tablet (2 5 mg total) by mouth 2 (two) times a day, Disp: 180 tablet, Rfl: 3  •  ascorbic acid (VITAMIN C) 250 mg tablet, Take 500 mg by mouth daily, Disp: , Rfl:   •  aspirin 81 MG tablet, Take by mouth daily , Disp: , Rfl:   •  atorvastatin (LIPITOR) 20 mg tablet, Take 1 tablet (20 mg total) by mouth every evening, Disp: 90 tablet, Rfl: 3  •  Cholecalciferol (Vitamin D3) 25 MCG (1000 UT) CAPS, Take 25 each by mouth in the morning, Disp: , Rfl:   •  dorzolamide-timolol (COSOPT) 22 3-6 8 MG/ML ophthalmic solution, INSTILL 1 DROP IN EACH EYE EVERY DAY, Disp: , Rfl: 3  •  doxepin (SINEquan) 50 mg capsule, Take 1 capsule (50 mg total) by mouth daily at bedtime, Disp: 90 capsule, Rfl: 1  •  Elastic Bandages & Supports (CARPAL TUNNEL WRIST STABILIZER) MISC, by Does not apply route daily, Disp: 1 each, Rfl: 0  •  furosemide (LASIX) 20 mg tablet, Take 1 tablet (20 mg total) by mouth if needed (Lower extremity edema) May take 3 times weekly as needed for lower extremity swelling , Disp: 30 tablet, Rfl: 0  •  gabapentin (NEURONTIN) 300 mg capsule, Take 1 capsule (300 mg total) by mouth 3 (three) times a day, Disp: 360 capsule, Rfl: 2  •  omeprazole (PriLOSEC) 20 mg delayed release capsule, Take 1 capsule (20 mg total) by mouth daily, Disp: 90 capsule, Rfl: 3  •  telmisartan (MICARDIS) 20 MG tablet, Take 1 tablet (20 mg total) by mouth daily, Disp: 90 tablet, Rfl: 3       ALLERGIES     Allergies   Allergen Reactions   • Fish-Derived Products - Food Allergy      GI upset   • Allopurinol Rash     Category:  Allergy;        *-*-*-*-*-*-*-*-*-*-*-*-*-*-*-*-*-*-*-*-*-*-*-*-*-*-*-*-*-*-*-*-*-*-*-*-*-*-*-*-*-*-*-*-*-*-*-*-*-*-*-*-*-*-  LABORATORY DATA     Sodium   Date Value Ref Range Status   10/02/2015 143 136 - 145 mmol/L Final     Sodium   Date Value Ref Range Status   10/02/2015 143 136 - 145 mmol/L Final     Potassium   Date Value Ref Range Status   10/17/2022 4 2 3 5 - 5 3 mmol/L Final   10/06/2022 3 7 3 5 - 5 3 mmol/L Final   10/05/2022 3 8 3 5 - 5 3 mmol/L Final   10/02/2015 4 5 3 5 - 5 3 mmol/L Final     Chloride   Date Value Ref Range Status   10/17/2022 115 (H) 96 - 108 mmol/L Final   10/06/2022 106 96 - 108 mmol/L Final   10/05/2022 105 96 - 108 mmol/L Final   10/02/2015 109 (H) 100 - 108 mmol/L Final     CO2   Date Value Ref Range Status   10/17/2022 21 21 - 32 mmol/L Final   10/06/2022 25 21 - 32 mmol/L Final   10/05/2022 26 21 - 32 mmol/L Final   10/02/2015 27 21 - 32 mmol/L Final     Anion Gap   Date Value Ref Range Status   10/02/2015 7 4 - 13 mmol/L Final     BUN   Date Value Ref Range Status   10/17/2022 31 (H) 5 - 25 mg/dL Final   10/06/2022 16 5 - 25 mg/dL Final   10/05/2022 17 5 - 25 mg/dL Final   10/02/2015 24 5 - 25 mg/dL Final     Creatinine   Date Value Ref Range Status   10/17/2022 1 27 0 60 - 1 30 mg/dL Final     Comment:     Standardized to IDMS reference method   10/06/2022 1 09 0 60 - 1 30 mg/dL Final     Comment:     Standardized to IDMS reference method   10/05/2022 1 14 0 60 - 1 30 mg/dL Final     Comment:     Standardized to IDMS reference method   10/02/2015 1 29 0 60 - 1 30 mg/dL Final     Comment:     Standardized to IDMS reference method     eGFR   Date Value Ref Range Status   10/17/2022 36 ml/min/1 73sq m Final   10/06/2022 43 ml/min/1 73sq m Final   10/05/2022 41 ml/min/1 73sq m Final     Glucose   Date Value Ref Range Status   10/02/2015 90 65 - 140 mg/dL Final     Comment:     If patient is fasting, the ADA then defines impaired fasting glucose as  >100 mg/dl and diabetes as  >or equal to 126 mg/dl  Calcium   Date Value Ref Range Status   10/17/2022 10 7 (H) 8 3 - 10 1 mg/dL Final   10/06/2022 11 0 (H) 8 3 - 10 1 mg/dL Final   10/05/2022 11 4 (H) 8 3 - 10 1 mg/dL Final   10/02/2015 10 8 (H) 8 3 - 10 1 mg/dL Final     AST   Date Value Ref Range Status   10/17/2022 13 5 - 45 U/L Final     Comment:     Specimen collection should occur prior to Sulfasalazine administration due to the potential for falsely depressed results  10/05/2022 19 5 - 45 U/L Final     Comment:     Specimen collection should occur prior to Sulfasalazine administration due to the potential for falsely depressed results      10/06/2021 14 5 - 45 U/L Final     Comment:     Specimen collection should occur prior to Sulfasalazine administration due to the potential for falsely depressed results  10/02/2015 12 5 - 45 U/L Final     ALT   Date Value Ref Range Status   10/17/2022 19 12 - 78 U/L Final     Comment:     Specimen collection should occur prior to Sulfasalazine and/or Sulfapyridine administration due to the potential for falsely depressed results  10/05/2022 19 12 - 78 U/L Final     Comment:     Specimen collection should occur prior to Sulfasalazine administration due to the potential for falsely depressed results  10/06/2021 21 12 - 78 U/L Final     Comment:     Specimen collection should occur prior to Sulfasalazine and/or Sulfapyridine administration due to the potential for falsely depressed results      10/02/2015 20 12 - 78 U/L Final     Alkaline Phosphatase   Date Value Ref Range Status   10/17/2022 81 46 - 116 U/L Final   10/05/2022 99 46 - 116 U/L Final   10/06/2021 104 46 - 116 U/L Final   10/02/2015 77 46 - 116 U/L Final     Total Protein   Date Value Ref Range Status   10/02/2015 7 4 6 4 - 8 2 g/dL Final     Total Bilirubin   Date Value Ref Range Status   10/02/2015 0 47 0 20 - 1 00 mg/dL Final     Magnesium   Date Value Ref Range Status   12/21/2019 1 9 1 6 - 2 6 mg/dL Final     WBC   Date Value Ref Range Status   10/06/2022 4 35 4 31 - 10 16 Thousand/uL Final   10/05/2022 3 62 (L) 4 31 - 10 16 Thousand/uL Final   12/20/2019 6 70 4 31 - 10 16 Thousand/uL Final     Hemoglobin   Date Value Ref Range Status   10/06/2022 11 2 (L) 11 5 - 15 4 g/dL Final   10/05/2022 11 7 11 5 - 15 4 g/dL Final   12/20/2019 10 6 (L) 11 5 - 15 4 g/dL Final     Platelets   Date Value Ref Range Status   10/06/2022 137 (L) 149 - 390 Thousands/uL Final   10/05/2022 131 (L) 149 - 390 Thousands/uL Final   12/20/2019 80 (L) 149 - 390 Thousands/uL Final     PTT   Date Value Ref Range Status   07/23/2019 30 23 - 37 seconds Final     Comment:     Therapeutic Heparin Range =  60-90 seconds     INR   Date Value Ref Range Status   07/23/2019 1 03 0 84 - 1 19 Final     CK-MB   Date Value Ref Range Status   2019 2 7 0 0 - 5 0 ng/mL Final     No results found for: TSH  Cholesterol   Date Value Ref Range Status   10/02/2015 203 mg/dL Final     Comment:     CHOLESTEROL:       Desirable        <200 mg/dl       Borderline High  200-239 mg/dl       High             >239 mg/dl  ____________________________________        Hemoglobin A1C   Date Value Ref Range Status   2019 5 4 4 2 - 6 3 % Final     Blood Culture   Date Value Ref Range Status   2019 No Growth After 5 Days  Final   2019 No Growth After 5 Days  Final       *-*-*-*-*-*-*-*-*-*-*-*-*-*-*-*-*-*-*-*-*-*-*-*-*-*-*-*-*-*-*-*-*-*-*-*-*-*-*-*-*-*-*-*-*-*-*-*-*-*-*-*-*-*-  PREVIOUS CARDIOLOGY & RADIOLOGY TEST RESULTS   I have personally reviewed pertinent results of cardiovascular tests noted below  Results for orders placed during the hospital encounter of 19    Echo complete with contrast if indicated    Narrative  [facility]  [facility], [address] [facility]  [phone]    Transthoracic Echocardiogram  2D, M-mode, Doppler, and Color Doppler    Study date:  2019    Patient: Fabiola Moreno  MR number: [id number]  Account number: [de-identified]  : 04-Oct-1928  Age: 80 years  Gender: Female  Status: Inpatient  Location: Bedside  Height: 62 in  Weight: 154 lb  BP: 161/ 92 mmHg    Indications: Abnormal EKG    Diagnoses: R94 31 - Abnormal electrocardiogram [ECG] [EKG]    Sonographer:  FAYE Guthrie  Primary Physician:  Carol Barrera  Referring Physician:  DEANDRA Barrera  [facility]  Cardiology Fellow:  Michael Matthews MD  Interpreting Physician:  Mohit Wu MD    SUMMARY    LEFT VENTRICLE:  Systolic function was normal  Ejection fraction was estimated to be 60 %  There were no regional wall motion abnormalities  Wall thickness was moderately increased    There was moderate concentric hypertrophy  Doppler parameters were consistent with abnormal left ventricular relaxation (grade 1 diastolic dysfunction)  MITRAL VALVE:  There was marked annular calcification  There was mild regurgitation  AORTIC VALVE:  Transaortic velocity was increased due to valvular stenosis  There was mild stenosis  There was trace regurgitation  Estimated aortic valve area (by Vmax) was 1 66 cmï¾²  TRICUSPID VALVE:  There was trace regurgitation  AORTA:  There was mild dilatation of the ascending aorta  The ascending aortic AP dimension was 39 mm  IVC, HEPATIC VEINS:  The inferior vena cava was mildly dilated  HISTORY: PRIOR HISTORY: HTN,CKD    PROCEDURE: The procedure was performed at the bedside  This was a routine study  The transthoracic approach was used  The study included complete 2D imaging, M-mode, complete spectral Doppler, and color Doppler  The heart rate was 83 bpm,  at the start of the study  Image quality was adequate  LEFT VENTRICLE: Size was normal  Systolic function was normal  Ejection fraction was estimated to be 60 %  There were no regional wall motion abnormalities  Wall thickness was moderately increased  There was moderate concentric  hypertrophy  DOPPLER: Doppler parameters were consistent with abnormal left ventricular relaxation (grade 1 diastolic dysfunction)  VENTRICULAR SEPTUM: There was sigmoid septal appearance  RIGHT VENTRICLE: The size was normal  Systolic function was normal  Wall thickness was normal     LEFT ATRIUM: Size was normal     RIGHT ATRIUM: Size was normal     MITRAL VALVE: There was marked annular calcification  Valve structure was normal  There was normal leaflet separation  DOPPLER: The transmitral velocity was within the normal range  There was no evidence for stenosis  There was mild  regurgitation  AORTIC VALVE: The valve was trileaflet   Leaflets exhibited mildly to moderately increased thickness, mild to moderate calcification, and mildly reduced cuspal separation  DOPPLER: Transaortic velocity was increased due to valvular  stenosis  There was mild stenosis  There was trace regurgitation  TRICUSPID VALVE: The valve structure was normal  There was normal leaflet separation  DOPPLER: The transtricuspid velocity was within the normal range  There was no evidence for stenosis  There was trace regurgitation  Pulmonary artery  systolic pressure was within the normal range  PULMONIC VALVE: Leaflets exhibited normal thickness, no calcification, and normal cuspal separation  DOPPLER: The transpulmonic velocity was within the normal range  There was no significant regurgitation  PERICARDIUM: There was no pericardial effusion  A pericardial fat pad was present  AORTA: The root exhibited normal size  There was mild dilatation of the ascending aorta  SYSTEMIC VEINS: IVC: The inferior vena cava was mildly dilated   Respirophasic changes were normal     MEASUREMENT TABLES    2D MEASUREMENTS  LVOT   (Reference normals)  Diam   21 mm   (--)  Aorta   (Reference normals)  AAo AP diam   39 mm   (--)    DOPPLER MEASUREMENTS  LVOT   (Reference normals)  Peak karine   113 cm/s   (--)  Mean karine   81 cm/s   (--)  VTI   21 cm   (--)  Peak gradient   5 mmHg   (--)  Mean gradient   3 mmHg   (--)  Stroke vol   72 74 ml   (--)  Stroke index   0 47 ml/mï¾²   (--)  Aortic valve   (Reference normals)  Peak karine   233 cm/s   (--)  Mean karine   170 cm/s   (--)  VTI   49 cm   (--)  Peak gradient   25 7 mmHg   (--)  Obstr index, VTI   0 43    (--)  Valve area, VTI   1 49 cmï¾²   (--)  Area index, VTI   0 87 cmï¾²/mï¾²   (--)  Obstr index, Vmax   0 48    (--)  Valve area, Vmax   1 66 cmï¾²   (--)  Area index, Vmax   0 97 cmï¾²/mï¾²   (--)  Obstr index, Vmean   0 48    (--)  Valve area, Vmean   1 66 cmï¾²   (--)  Area index, Vmean   0 97 cmï¾²/mï¾²   (--)    SYSTEM MEASUREMENT TABLES    2D  %FS: 31 5 %  Ao Diam: 2 99 cm  EDV(Teich): 43 16 ml  EF(Teich): 60 72 %  ESV(Teich): 16 95 ml  IVSd: 1 44 cm  LA Area: 18 05 cm2  LA Diam: 3 32 cm  LVEDV MOD A4C: 72 34 ml  LVEF MOD A4C: 71 64 %  LVESV MOD A4C: 20 51 ml  LVIDd: 3 27 cm  LVIDs: 2 24 cm  LVLd A4C: 7 68 cm  LVLs A4C: 5 98 cm  LVPWd: 1 68 cm  RA Area: 16 31 cm2  RVIDd: 4 18 cm  SV MOD A4C: 51 83 ml  SV(Teich): 26 21 ml    CW  AV Env  Ti: 267 59 ms  AV VTI: 42 73 cm  AV Vmax: 2 26 m/s  AV Vmean: 1 6 m/s  AV maxP 39 mmHg  AV meanP 33 mmHg  HR: 162 66 BPM  MV VTI: 31 01 cm  MV Vmax: 1 42 m/s  MV Vmean: 0 84 m/s  MV maxP 06 mmHg  MV meanPG: 3 2 mmHg  TR Vmax: 2 5 m/s  TR maxP 93 mmHg    MM  TAPSE: 1 5 cm    PW  E': 0 06 m/s  E/E': 13 14  LVOT Env  Ti: 262 98 ms  LVOT VTI: 21 34 cm  LVOT Vmax: 1 13 m/s  LVOT Vmean: 0 81 m/s  LVOT maxP 1 mmHg  LVOT meanP 85 mmHg  MV A [place]h: 1 46 m/s  MV Dec [place]: 4 08 m/s2  MV DecT: 191 81 ms  MV E [place]h: 0 78 m/s  MV E/A Ratio: 0 54  MV PHT: 55 62 ms  MVA By PHT: 3 96 cm2    [facility] Accredited Echocardiography Laboratory    Prepared and electronically signed by    Kianna Fishman MD  Signed 2019 16:03:33    No results found for this or any previous visit  No results found for this or any previous visit  No results found for this or any previous visit  Echo complete w/ contrast if indicated  Technically difficult but adequate transthoracic echocardiogram study  1  Mild concentric left ventricular hypertrophy, normal left ventricular   systolic function, grade 1 diastolic dysfunction  Ejection fraction is   estimated as around 55%  2  Normal right ventricular size and systolic function  3  Mild left atrial cavity enlargement  4  Aortic valve sclerosis with focal calcification  Suspected moderate   aortic valve stenosis  Peak transaortic velocity is 2 3 m/sec, mean   gradient is 14 mmHg, calculated aortic valve area is 1 1-1 3 cm2  No   significant aortic valve regurgitation noted  Doppler velocity index is   0 38  5  Mitral annular calcification and mitral valve leaflet thickening with   focal calcification  Mild mitral valve regurgitation  6  Mild tricuspid valve regurgitation  7  No obvious pulmonary hypertension but estimated RVSP/PASP is close to   upper limit of normal   8  No pericardial effusion  Compared to report of previous echocardiogram from December 22, 2019 there   is some progression of aortic valve stenosis  Mitral annular   calcification is new  *-*-*-*-*-*-*-*-*-*-*-*-*-*-*-*-*-*-*-*-*-*-*-*-*-*-*-*-*-*-*-*-*-*-*-*-*-*-*-*-*-*-*-*-*-*-*-*-*-*-*-*-*-*-  SIGNATURES:   [unfilled]   Luis Sousa MD; MARTINE    *-*-*-*-*-*-*-*-*-*-*-*-*-*-*-*-*-*-*-*-*-*-*-*-*-*-*-*-*-*-*-*-*-*-*-*-*-*-*-*-*-*-*-*-*-*-*-*-*-*-*-*-*-*-  PAST MEDICAL HISTORY:  Past Medical History:   Diagnosis Date   • Depression    • Gout    • H/O vaginal hysterectomy     resolved-4/17/2015    PAST SURGICAL HISTORY:  Past Surgical History:   Procedure Laterality Date   • CATARACT EXTRACTION     • TOTAL ABDOMINAL HYSTERECTOMY      with removal of both ovaries   • VAGINAL HYSTERECTOMY      resolved-4/17/2015         FAMILY HISTORY:  Family History   Problem Relation Age of Onset   • Heart attack Mother         MI   • Heart attack Father         MI   • Hypertension Sister    • Stomach cancer Sister    • Hypertension Brother     SOCIAL HISTORY:  Social History     Tobacco Use   Smoking Status Never Smoker   Smokeless Tobacco Never Used      Social History     Substance and Sexual Activity   Alcohol Use Not Currently     Social History     Substance and Sexual Activity   Drug Use No    [id number]     *-*-*-*-*-*-*-*-*-*-*-*-*-*-*-*-*-*-*-*-*-*-*-*-*-*-*-*-*-*-*-*-*-*-*-*-*-*-*-*-*-*-*-*-*-*-*-*-*-*-*-*-*-*  ALLERGIES:  Allergies   Allergen Reactions   • Fish-Derived Products - Food Allergy      GI upset   • Allopurinol Rash     Category:  Allergy;     CURRENT SCHEDULED MEDICATIONS:    Current Outpatient Medications:   •  amLODIPine (NORVASC) 2 5 mg tablet, Take 1 tablet (2 5 mg total) by mouth 2 (two) times a day, Disp: 180 tablet, Rfl: 3  •  ascorbic acid (VITAMIN C) 250 mg tablet, Take 500 mg by mouth daily, Disp: , Rfl:   •  aspirin 81 MG tablet, Take by mouth daily , Disp: , Rfl:   •  atorvastatin (LIPITOR) 20 mg tablet, Take 1 tablet (20 mg total) by mouth every evening, Disp: 90 tablet, Rfl: 3  •  Cholecalciferol (Vitamin D3) 25 MCG (1000 UT) CAPS, Take 25 each by mouth in the morning, Disp: , Rfl:   •  dorzolamide-timolol (COSOPT) 22 3-6 8 MG/ML ophthalmic solution, INSTILL 1 DROP IN EACH EYE EVERY DAY, Disp: , Rfl: 3  •  doxepin (SINEquan) 50 mg capsule, Take 1 capsule (50 mg total) by mouth daily at bedtime, Disp: 90 capsule, Rfl: 1  •  Elastic Bandages & Supports (CARPAL TUNNEL WRIST STABILIZER) MISC, by Does not apply route daily, Disp: 1 each, Rfl: 0  •  furosemide (LASIX) 20 mg tablet, Take 1 tablet (20 mg total) by mouth if needed (Lower extremity edema) May take 3 times weekly as needed for lower extremity swelling , Disp: 30 tablet, Rfl: 0  •  gabapentin (NEURONTIN) 300 mg capsule, Take 1 capsule (300 mg total) by mouth 3 (three) times a day, Disp: 360 capsule, Rfl: 2  •  omeprazole (PriLOSEC) 20 mg delayed release capsule, Take 1 capsule (20 mg total) by mouth daily, Disp: 90 capsule, Rfl: 3  •  telmisartan (MICARDIS) 20 MG tablet, Take 1 tablet (20 mg total) by mouth daily, Disp: 90 tablet, Rfl: 3     *-*-*-*-*-*-*-*-*-*-*-*-*-*-*-*-*-*-*-*-*-*-*-*-*-*-*-*-*-*-*-*-*-*-*-*-*-*-*-*-*-*-*-*-*-*-*-*-*-*-*-*-*-*

## 2022-11-08 NOTE — ASSESSMENT & PLAN NOTE
Ms Gillian Simmonds appears to be overall stable from cardiac perspective  Though she recently had fatigue and shortness of breath symptoms seem to be better now  On examination there is no significant pulmonary vascular congestion or peripheral edema  She does have modest murmur of aortic valve stenosis  Recent echocardiographic findings are reviewed  -- at this time since her blood pressure was noted to be elevated I am changing the frequency of amlodipine to 2 5 mg twice daily  We are continuing all other medications at the current doses  -- I am advising that if she develops increasing lower extremity swelling or has any shortness of breath disabling her from lying down in bed then we should add diuretic to her  I will prescribe her furosemide 20 mg at every other day he does but to be taken only if she develops these symptoms in the future  -- regarding her ambulatory dysfunction and degenerative joint disease  She may benefit from home physical therapy  I am advising her to discuss this with her primary physician  -- she will continue to be followed in the device clinic as scheduled  Cardiology office appointment will be made for 6 months  -- Dietary and medical compliance are reinforced  -- She is advised  to report any concerning symptoms such as chest pain, shortness of breath, decline in exercise tolerance or presyncope/syncope

## 2022-11-08 NOTE — PATIENT INSTRUCTIONS
CARDIOLOGY ASSESSMENT & PLAN     1  Sick sinus syndrome ([facility] [address] )     2  Nonrheumatic aortic valve stenosis  furosemide (LASIX) 20 mg tablet   3  Mild pulmonary hypertension (HCC)  furosemide (LASIX) 20 mg tablet   4  Essential hypertension  amLODIPine (NORVASC) 2 5 mg tablet   5  Status post placement of cardiac pacemaker     6  Stage 3a chronic kidney disease ([facility] [address] )       Nonrheumatic aortic valve stenosis  Ms Stanford Boogie appears to be overall stable from cardiac perspective  Though she recently had fatigue and shortness of breath symptoms seem to be better now  On examination there is no significant pulmonary vascular congestion or peripheral edema  She does have modest murmur of aortic valve stenosis  Recent echocardiographic findings are reviewed  -- at this time since her blood pressure was noted to be elevated I am changing the frequency of amlodipine to 2 5 mg twice daily  We are continuing all other medications at the current doses  -- I am advising that if she develops increasing lower extremity swelling or has any shortness of breath disabling her from lying down in bed then we should add diuretic to her  I will prescribe her furosemide 20 mg at every other day he does but to be taken only if she develops these symptoms in the future  -- regarding her ambulatory dysfunction and degenerative joint disease  She may benefit from home physical therapy  I am advising her to discuss this with her primary physician  -- she will continue to be followed in the device clinic as scheduled  Cardiology office appointment will be made for 6 months  -- Dietary and medical compliance are reinforced  -- She is advised  to report any concerning symptoms such as chest pain, shortness of breath, decline in exercise tolerance or presyncope/syncope

## 2022-11-18 ENCOUNTER — REMOTE DEVICE CLINIC VISIT (OUTPATIENT)
Dept: CARDIOLOGY CLINIC | Facility: CLINIC | Age: 87
End: 2022-11-18

## 2022-11-18 DIAGNOSIS — Z95.0 CARDIAC PACEMAKER IN SITU: Primary | ICD-10-CM

## 2022-11-21 NOTE — PROGRESS NOTES
Results for orders placed or performed in visit on 11/18/22   Cardiac EP device report    Narrative    MDT DUAL PPM/ ACTIVE SYSTEM IS MRI CONDITIONAL  CARELINK TRANSMISSION: BATTERY VOLTAGE ADEQUATE (9 9 YRS)  AP-33%, -99% (>40% Yomaira@weeSPIN OFF/AVB)  ALL AVAILABLE LEAD PARAMETERS WITHIN NORMAL LIMITS  NO SIGNIFICANT HIGH RATE EPISODES  NORMAL DEVICE FUNCTION   GV

## 2022-12-27 ENCOUNTER — IN-CLINIC DEVICE VISIT (OUTPATIENT)
Dept: CARDIOLOGY CLINIC | Facility: CLINIC | Age: 87
End: 2022-12-27

## 2022-12-27 DIAGNOSIS — Z95.0 PRESENCE OF CARDIAC PACEMAKER: Primary | ICD-10-CM

## 2022-12-27 NOTE — PROGRESS NOTES
Results for orders placed or performed in visit on 12/27/22   Cardiac EP device report    Narrative    MDT Shelley Castelan INTERROGATED IN THE [place] OFFICE  BATTERY VOLTAGE ADEQUATE (9 8 YRS)  AP: 30 2%  : 99 5% (>40%~MVP-OFF~CHB)  ALL AVAILABLE LEAD PARAMETERS WITHIN NORMAL LIMITS  NO SIGNIFICANT HIGH RATE EPISODES  NO PROGRAMMING CHANGES MADE TO DEVICE PARAMETERS  PACEMAKER FUNCTIONING APPROPRIATELY    [address] [place] [address]

## 2022-12-28 NOTE — TELEPHONE ENCOUNTER
OPEN up Friday morning  Start me at 9:30 am  Can put her in as FIRST patient 
Patient needs to make a TCM appt  She will only see you  Where can you squeeze her in?     Oneyda Crawford [phone]
Please put patient in schedule
scheduled
28-Dec-2022 17:23

## 2023-02-20 DIAGNOSIS — F32.A DEPRESSION, UNSPECIFIED DEPRESSION TYPE: ICD-10-CM

## 2023-02-20 RX ORDER — DOXEPIN HYDROCHLORIDE 50 MG/1
CAPSULE ORAL
Qty: 90 CAPSULE | Refills: 1 | Status: SHIPPED | OUTPATIENT
Start: 2023-02-20

## 2023-03-20 DIAGNOSIS — K21.9 GASTROESOPHAGEAL REFLUX DISEASE WITHOUT ESOPHAGITIS: ICD-10-CM

## 2023-03-20 RX ORDER — OMEPRAZOLE 20 MG/1
20 CAPSULE, DELAYED RELEASE ORAL DAILY
Qty: 90 CAPSULE | Refills: 3 | Status: SHIPPED | OUTPATIENT
Start: 2023-03-20

## 2023-03-23 DIAGNOSIS — I10 BENIGN ESSENTIAL HYPERTENSION: ICD-10-CM

## 2023-03-24 RX ORDER — TELMISARTAN 20 MG/1
20 TABLET ORAL DAILY
Qty: 90 TABLET | Refills: 3 | Status: SHIPPED | OUTPATIENT
Start: 2023-03-24

## 2023-03-28 ENCOUNTER — REMOTE DEVICE CLINIC VISIT (OUTPATIENT)
Dept: CARDIOLOGY CLINIC | Facility: CLINIC | Age: 88
End: 2023-03-28

## 2023-03-28 DIAGNOSIS — Z95.0 PRESENCE OF CARDIAC PACEMAKER: Primary | ICD-10-CM

## 2023-03-28 NOTE — PROGRESS NOTES
MDT DUAL PPM/ ACTIVE SYSTEM IS MRI CONDITIONAL   CARELINK TRANSMISSION: BATTERY VOLTAGE ADEQUATE (9 5 YRS)  AP 32 8%  99 9% (>40%/AVB/DDD 60); ALL AVAILABLE LEAD PARAMETERS WITHIN NORMAL LIMITS  NO SIGNIFICANT HIGH RATE EPISODES   PACEMAKER FUNCTIONING APPROPRIATELY   ES

## 2023-05-10 ENCOUNTER — OFFICE VISIT (OUTPATIENT)
Dept: FAMILY MEDICINE CLINIC | Facility: CLINIC | Age: 88
End: 2023-05-10

## 2023-05-10 VITALS
DIASTOLIC BLOOD PRESSURE: 72 MMHG | WEIGHT: 143.5 LBS | BODY MASS INDEX: 26.25 KG/M2 | SYSTOLIC BLOOD PRESSURE: 142 MMHG | OXYGEN SATURATION: 98 % | RESPIRATION RATE: 15 BRPM | HEART RATE: 56 BPM | TEMPERATURE: 96.3 F

## 2023-05-10 DIAGNOSIS — N18.31 STAGE 3A CHRONIC KIDNEY DISEASE (HCC): ICD-10-CM

## 2023-05-10 DIAGNOSIS — I10 ESSENTIAL HYPERTENSION: Primary | ICD-10-CM

## 2023-05-10 DIAGNOSIS — Z95.0 STATUS POST PLACEMENT OF CARDIAC PACEMAKER: ICD-10-CM

## 2023-05-10 DIAGNOSIS — I49.5 SICK SINUS SYNDROME (HCC): ICD-10-CM

## 2023-05-10 DIAGNOSIS — E78.2 MIXED HYPERLIPIDEMIA: ICD-10-CM

## 2023-05-10 NOTE — PROGRESS NOTES
Name: Adalid Randall      : 10/4/1928      MRN: [id number]  Encounter Provider: Reyna Barrera DO  Encounter Date: 5/10/2023   Encounter department: [facility] PRIMARY CARE    Assessment & Plan     1  Essential hypertension  -     Comprehensive metabolic panel; Future; Expected date: 10/02/2023  -     TSH, 3rd generation; Future; Expected date: 10/02/2023    2  Mixed hyperlipidemia  Comments:  Discontinued atorvastatin due to dry mouth taking OTC CholestOff  Orders:  -     Lipid Panel with Direct LDL reflex; Future; Expected date: 10/02/2023  -     Comprehensive metabolic panel; Future; Expected date: 10/02/2023  -     TSH, 3rd generation; Future; Expected date: 10/02/2023    3  Stage 3a chronic kidney disease (HCC)  -     Comprehensive metabolic panel; Future; Expected date: 10/02/2023    4  Sick sinus syndrome ([facility] [address] )  Comments: Follows with cardiology status post pacemaker    5  Status post placement of cardiac pacemaker    Continue with cardiology follow-up next month recheck here  Lab work prior to office visit  BMI Counseling: Body mass index is 26 25 kg/m²  Follow-up plan was not completed due to elderly patient (72 years old) where weight reduction/weight gain would complicate underlying health condition such as: illness or physical disability  Subjective     Pleasant 79-year-old female here with son for 6-month checkup  She is doing fairly well  Remains independent  Son and daughter-in-law keep a close eye on her  No recent falls  Patient is relatively sedentary due to knee DJD and decrease ambulation due to pain  EP device check with PPM up-to-date  Chief Complaint   Patient presents with   • Follow-up     6 mo check     Stopped atorvastatin--- dry mouth, back to CHOLESTOFF  Review of Systems   HENT:        Dry mouth secondary to atorvastatin   Gastrointestinal: Negative for constipation and diarrhea  Genitourinary: Negative for dysuria          No incontinence Musculoskeletal: Positive for arthralgias (knees)  Psychiatric/Behavioral: Negative for sleep disturbance (Uses doxepin)  Memory Short term fair       Past Medical History:   Diagnosis Date   • Depression    • Gout    • H/O vaginal hysterectomy     resolved-4/17/2015     Past Surgical History:   Procedure Laterality Date   • CATARACT EXTRACTION     • TOTAL ABDOMINAL HYSTERECTOMY      with removal of both ovaries   • VAGINAL HYSTERECTOMY      resolved-4/17/2015     Family History   Problem Relation Age of Onset   • Heart attack Mother         MI   • Heart attack Father         MI   • Hypertension Sister    • Stomach cancer Sister    • Hypertension Brother      Social History     Socioeconomic History   • Marital status:      Spouse name: None   • Number of children: None   • Years of education: None   • Highest education level: None   Occupational History   • None   Tobacco Use   • Smoking status: Never   • Smokeless tobacco: Never   Vaping Use   • Vaping Use: Never used   Substance and Sexual Activity   • Alcohol use: Not Currently   • Drug use: No   • Sexual activity: None   Other Topics Concern   • None   Social History Narrative   • None     Social Determinants of Health     Financial Resource Strain: Low Risk    • Difficulty of Paying Living Expenses: Not hard at all   Food Insecurity: Not on file   Transportation Needs: No Transportation Needs   • Lack of Transportation (Medical): No   • Lack of Transportation (Non-Medical):  No   Physical Activity: Not on file   Stress: Not on file   Social Connections: Not on file   Intimate Partner Violence: Not on file   Housing Stability: Not on file     Current Outpatient Medications on File Prior to Visit   Medication Sig   • amLODIPine (NORVASC) 2 5 mg tablet Take 1 tablet (2 5 mg total) by mouth 2 (two) times a day   • ascorbic acid (VITAMIN C) 250 mg tablet Take 500 mg by mouth daily   • aspirin 81 MG tablet Take by mouth daily    • Cholecalciferol (Vitamin D3) 25 MCG (1000 UT) CAPS Take 25 each by mouth in the morning   • dorzolamide-timolol (COSOPT) 22 3-6 8 MG/ML ophthalmic solution INSTILL 1 DROP IN EACH EYE EVERY DAY   • doxepin (SINEquan) 50 mg capsule TAKE 1 CAPSULE BY MOUTH EVERY DAY AT BEDTIME   • Elastic Bandages & Supports (CARPAL TUNNEL WRIST STABILIZER) MISC by Does not apply route daily   • gabapentin (NEURONTIN) 300 mg capsule Take 1 capsule (300 mg total) by mouth 3 (three) times a day   • omeprazole (PriLOSEC) 20 mg delayed release capsule Take 1 capsule (20 mg total) by mouth daily   • telmisartan (MICARDIS) 20 MG tablet Take 1 tablet (20 mg total) by mouth daily   • furosemide (LASIX) 20 mg tablet Take 1 tablet (20 mg total) by mouth if needed (Lower extremity edema) May take 3 times weekly as needed for lower extremity swelling  (Patient not taking: Reported on 5/10/2023)   • [DISCONTINUED] ezetimibe (ZETIA) 10 mg tablet Take 1 tablet (10 mg total) by mouth daily     Allergies   Allergen Reactions   • Fish-Derived Products - Food Allergy      GI upset   • Allopurinol Rash     Category: Allergy;      Immunization History   Administered Date(s) Administered   • COVID-19 MODERNA VACC 0 5 ML IM 04/22/2021, 05/21/2021, 12/01/2021   • Influenza Split High Dose Preservative Free IM 10/20/2014, 10/02/2015, 09/28/2016, 10/03/2017   • Influenza, high dose seasonal 0 7 mL 10/04/2018, 10/08/2019, 10/12/2020, 10/13/2021, 10/25/2022   • Pneumococcal Conjugate 13-Valent 10/04/2018   • Pneumococcal Polysaccharide PPV23 04/20/2005, 04/20/2015   • Tuberculin Skin Test 12/26/2019, 01/03/2020   • Zoster 03/25/2008, 04/20/2011       Objective     /72   Pulse 56   Temp (!) 96 3 °F (35 7 °C) (Temporal)   Resp 15   Wt 65 1 kg (143 lb 8 oz)   SpO2 98%   BMI 26 25 kg/m²     Physical Exam  Vitals reviewed  Constitutional:       General: She is not in acute distress  Appearance: She is well-developed        Comments: 54-year-old female who appears slightly younger than her stated age examined in wheelchair   HENT:      Head: Normocephalic  Mouth/Throat:      Mouth: Mucous membranes are moist    Eyes:      Conjunctiva/sclera: Conjunctivae normal       Pupils: Pupils are equal, round, and reactive to light  Cardiovascular:      Rate and Rhythm: Normal rate and regular rhythm  Heart sounds: Murmur ( soft systolic) heard  Pulmonary:      Effort: Pulmonary effort is normal       Breath sounds: Normal breath sounds  Abdominal:      General: Bowel sounds are normal       Palpations: Abdomen is soft  There is no mass  Tenderness: There is no abdominal tenderness  Musculoskeletal:      Right lower leg: Tenderness present  No edema  Left lower leg: Tenderness present  No edema  Skin:     Findings: No rash  Neurological:      Mental Status: She is alert and oriented to person, place, and time  Deep Tendon Reflexes: Reflexes are normal and symmetric  Psychiatric:         Behavior: Behavior normal          Thought Content: Thought content normal          Cognition and Memory: Cognition is impaired (Short-term but otherwise excellent historian)         Aissatou Gonzalez,

## 2023-05-29 DIAGNOSIS — F32.A DEPRESSION, UNSPECIFIED DEPRESSION TYPE: ICD-10-CM

## 2023-05-30 RX ORDER — DOXEPIN HYDROCHLORIDE 50 MG/1
CAPSULE ORAL
Qty: 90 CAPSULE | Refills: 1 | Status: SHIPPED | OUTPATIENT
Start: 2023-05-30

## 2023-06-02 ENCOUNTER — OFFICE VISIT (OUTPATIENT)
Dept: CARDIOLOGY CLINIC | Facility: CLINIC | Age: 88
End: 2023-06-02

## 2023-06-02 VITALS
HEART RATE: 71 BPM | SYSTOLIC BLOOD PRESSURE: 143 MMHG | BODY MASS INDEX: 26.25 KG/M2 | HEIGHT: 62 IN | OXYGEN SATURATION: 96 % | DIASTOLIC BLOOD PRESSURE: 73 MMHG

## 2023-06-02 DIAGNOSIS — I27.20 MILD PULMONARY HYPERTENSION (HCC): ICD-10-CM

## 2023-06-02 DIAGNOSIS — I10 ESSENTIAL HYPERTENSION: ICD-10-CM

## 2023-06-02 DIAGNOSIS — I49.5 SICK SINUS SYNDROME (HCC): ICD-10-CM

## 2023-06-02 DIAGNOSIS — I35.0 NONRHEUMATIC AORTIC VALVE STENOSIS: Primary | ICD-10-CM

## 2023-06-02 DIAGNOSIS — N18.31 STAGE 3A CHRONIC KIDNEY DISEASE (HCC): ICD-10-CM

## 2023-06-02 NOTE — PATIENT INSTRUCTIONS
CARDIOLOGY ASSESSMENT & PLAN     1  Nonrheumatic aortic valve stenosis        2  Mild pulmonary hypertension ([facility] [address] )        3  Sick sinus syndrome (HCC)        4  Stage 3a chronic kidney disease ([facility] [address] )        5  Essential hypertension          Nonrheumatic aortic valve stenosis  Ms Misbah Boswell is overall doing well from cardiac perspective with no recent symptoms that suggest decompensated heart failure or valvular heart disease or angina  Blood pressure is well controlled  On examination she has modest aortic valve stenosis murmur  There is some peripheral edema which is more of appears to be dependent edema  She is on good medical regimen  Her renal function has been stable  She has normal functioning pacemaker device  -- At this time I am advising her to continue current medications  -- I am advising her to take furosemide if her swelling becomes worse she can take 8 every other day for a few days before going back to as needed  -- She will continue to be followed in the device clinic as scheduled  -- We will plan for a cardiology office visit in 8 months time  Earlier if necessary  -- Dietary and medical compliance are reinforced  She is advised to avoid added salt in diet  -- She is advised  to report any concerning symptoms such as chest pain, shortness of breath, decline in exercise tolerance or presyncope/syncope

## 2023-06-02 NOTE — ASSESSMENT & PLAN NOTE
Ms Tj Coleman is overall doing well from cardiac perspective with no recent symptoms that suggest decompensated heart failure or valvular heart disease or angina  Blood pressure is well controlled  On examination she has modest aortic valve stenosis murmur  There is some peripheral edema which is more of appears to be dependent edema  She is on good medical regimen  Her renal function has been stable  She has normal functioning pacemaker device  -- At this time I am advising her to continue current medications  -- I am advising her to take furosemide if her swelling becomes worse she can take 8 every other day for a few days before going back to as needed  -- She will continue to be followed in the device clinic as scheduled  -- We will plan for a cardiology office visit in 8 months time  Earlier if necessary  -- Dietary and medical compliance are reinforced  She is advised to avoid added salt in diet  -- She is advised  to report any concerning symptoms such as chest pain, shortness of breath, decline in exercise tolerance or presyncope/syncope

## 2023-06-02 NOTE — PROGRESS NOTES
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, now status post permanent pacemaker implantation in December 2019  2  Essential hypertension  3  Moderate aortic valve stenosis and mild pulmonary hypertension  4  Grade 1 diastolic dysfunction without congestive heart failure   5  Iron deficiency anemia  6  Macular degeneration  7  Dementia changes  8  History of gout  9  Mixed dyslipidemia  10  Degenerative joint disease  11  Vitamin-D deficiency   13  Ambulatory dysfunction     ECHOCARDIOGRAM October 6, 2022:  1  Mild concentric left ventricular hypertrophy, normal left ventricular systolic function, grade 1 diastolic dysfunction  Ejection fraction is estimated as around 55%  2  Normal right ventricular size and systolic function  3  Mild left atrial cavity enlargement  4  Aortic valve sclerosis with focal calcification  Suspected moderate aortic valve stenosis  Peak transaortic velocity is 2 3 m/sec, mean gradient is 14 mmHg, calculated aortic valve area is 1 1-1 3 cm2  No significant aortic valve regurgitation noted  Doppler velocity index is 0 38  5  Mitral annular calcification and mitral valve leaflet thickening with focal calcification  Mild mitral valve regurgitation  6  Mild tricuspid valve regurgitation  7  No obvious pulmonary hypertension but estimated RVSP/PASP is close to upper limit of normal   8  No pericardial effusion      Echocardiogram December 2019: EF around 62%, mild concentric left ventricular hypertrophy, moderate aortic valve stenosis, no aortic valve regurgitation, mild pulmonary hypertension  Estimated RVSP/PASP is 43 mmHg, mild mitral and tricuspid valve regurgitation  CURRENT ECG   No results found for this visit on 06/02/23  Pacemaker interrogation March 2023:  MDT DUAL PPM/ ACTIVE SYSTEM IS MRI CONDITIONAL   CARELINK TRANSMISSION: BATTERY VOLTAGE ADEQUATE (9 5 YRS)  AP 32 8%  99 9% (>40%/AVB/DDD 60); ALL AVAILABLE LEAD PARAMETERS WITHIN NORMAL LIMITS  NO SIGNIFICANT HIGH RATE EPISODES  PACEMAKER FUNCTIONING APPROPRIATELY   ES        CARDIOLOGY ASSESSMENT & PLAN     1  Nonrheumatic aortic valve stenosis        2  Mild pulmonary hypertension ([address] )        3  Sick sinus syndrome (HCC)        4  Stage 3a chronic kidney disease ([address] )        5  Essential hypertension          Nonrheumatic aortic valve stenosis  Ms Asha Chin is overall doing well from cardiac perspective with no recent symptoms that suggest decompensated heart failure or valvular heart disease or angina  Blood pressure is well controlled  On examination she has modest aortic valve stenosis murmur  There is some peripheral edema which is more of appears to be dependent edema  She is on good medical regimen  Her renal function has been stable  She has normal functioning pacemaker device  -- At this time I am advising her to continue current medications  -- I am advising her to take furosemide if her swelling becomes worse she can take 8 every other day for a few days before going back to as needed  -- She will continue to be followed in the device clinic as scheduled  -- We will plan for a cardiology office visit in 8 months time  Earlier if necessary  -- Dietary and medical compliance are reinforced  She is advised to avoid added salt in diet  -- She is advised  to report any concerning symptoms such as chest pain, shortness of breath, decline in exercise tolerance or presyncope/syncope       INTERVAL HISTORY & HISTORY OF PRESENT ILLNESS     Angela Azevedo Ilia Prescott is here for follow-up regarding her cardiac comorbidities which include:  Hypertension, heart block with history of pacemaker implantation, aortic valve stenosis, pulmonary hypertension and other comorbidities  She is here for follow-up visit accompanied with her son who is also her primary caregiver  She was last seen by me in November 2022  Today she mentions that she has been overall feeling well with no recent significant symptoms  She occasionally feels lightheaded and dizzy but symptom is transient  She has had no recent falls  She gets around the house on her mobility scooter but she is able to transfer herself  She denies any typical anginal-like chest pain or orthopnea or PND or worsening pedal edema  She has had no recent falls  Functional capacity status:  Poor, limited due to agent degenerative joint disease   (Excellent- >10 METs; Good: (7-10 METs); Moderate (4-7 METs); Poor (<= 4 METs)    Any chronic stressors:  None   (feeling of poor health, financial problems, and social isolation etc)  Tobacco or alcohol dependence:  She does not smoke and she does not drink  Current cardiac medications:  Atorvastatin 20 mg daily, telmisartan 20 mg daily, amlodipine 2 5 mg daily, aspirin 81 mg daily  Blood work from October 1, 2022 shows sodium 143, potassium 4 2, chloride 115, bicarb 21, BUN 31, creatinine 1 27, calcium 10 7, LFTs were normal, lipids were well controlled and normal   Hemoglobin was 11 2, hematocrit 35 5, TSH was 2 66  NT proBNP level during hospitalization was 1947  REVIEW OF SYSTEMS   Positive for:  As noted above in HPI  Negative for: All remaining as reviewed below and in HPI      SYSTEM SYMPTOMS REVIEWED:  General--weight change, fever, night sweats  Respiratory--cough, wheezing, shortness of breath, sputum production  Cardiovascular--chest pain, syncope, dyspnea on exertion, edema, decline in exercise tolerance, claudication   Gastrointestinal--persistent "vomiting, diarrhea, abdominal distention, blood in stool   Muscular or skeletal--joint pain or swelling   Neurologic--headaches, syncope, abnormal movement  Hematologic--history of easy bruising and bleeding   Endocrine--thyroid enlargement, heat or cold intolerance, polyuria   Psychiatric--anxiety, depression     *-*-*-*-*-*-*-*-*-*-*-*-*-*-*-*-*-*-*-*-*-*-*-*-*-*-*-*-*-*-*-*-*-*-*-*-*-*-*-*-*-*-*-*-*-*-*-*-*-*-*-*-*-*-  VITAL SIGNS     CURRENT VITAL SIGNS:   Vitals:    06/02/23 1145   BP: 143/73   Pulse: 71   SpO2: 96%   Height: 5' 2\" (1 575 m)       BMI: Body mass index is 26 25 kg/m²  WEIGHTS:   Wt Readings from Last 25 Encounters:   05/10/23 65 1 kg (143 lb 8 oz)   10/25/22 67 1 kg (148 lb)   10/06/22 68 kg (150 lb)   07/19/22 68 kg (150 lb)   04/20/22 69 4 kg (153 lb)   10/20/21 68 9 kg (151 lb 12 8 oz)   10/20/21 68 9 kg (151 lb 12 8 oz)   04/23/21 68 9 kg (152 lb)   04/14/21 69 1 kg (152 lb 6 4 oz)   10/20/20 67 6 kg (149 lb)   10/12/20 67 6 kg (149 lb)   08/11/20 66 2 kg (146 lb)   04/08/20 65 8 kg (145 lb)   02/12/20 64 5 kg (142 lb 3 2 oz)   01/27/20 63 kg (139 lb)   01/10/20 64 4 kg (142 lb)   12/24/19 66 1 kg (145 lb 11 6 oz)   12/20/19 67 1 kg (148 lb)   10/08/19 67 kg (147 lb 12 8 oz)   08/23/19 65 4 kg (144 lb 3 2 oz)   07/29/19 58 5 kg (129 lb)   07/23/19 70 1 kg (154 lb 8 7 oz)   04/04/19 74 8 kg (165 lb)   11/01/18 70 8 kg (156 lb)   10/25/18 70 8 kg (156 lb)        *-*-*-*-*-*-*-*-*-*-*-*-*-*-*-*-*-*-*-*-*-*-*-*-*-*-*-*-*-*-*-*-*-*-*-*-*-*-*-*-*-*-*-*-*-*-*-*-*-*-*-*-*-*-  PHYSICAL EXAM     General Appearance:    Alert, cooperative, no distress, appears stated age   Head, Eyes, ENT:    No obvious abnormality, moist mucous mebranes  Neck:   Supple, no carotid bruit or JVD   Back:     Symmetric, no curvature  Lungs:     Respirations unlabored   Clear to auscultation bilaterally,    Chest wall:    No tenderness or deformity   Heart:    Regular rate and rhythm, S1 and S2 normal, grade 2/6 " systolic murmur consistent with aortic valve stenosis rub  or gallop  Abdomen:     Soft, non-tender, No obvious masses, or organomegaly   Extremities:   Extremities warm, grade 1 lower extremity edema  Skin:   No venostatic changes in lower extremities  Normal skin color, texture, and turgor  No rashes or lesions     *-*-*-*-*-*-*-*-*-*-*-*-*-*-*-*-*-*-*-*-*-*-*-*-*-*-*-*-*-*-*-*-*-*-*-*-*-*-*-*-*-*-*-*-*-*-*-*-*-*-*-*-*-*-  CURRENT MEDICATIONS LIST     Current Outpatient Medications:   •  amLODIPine (NORVASC) 2 5 mg tablet, Take 1 tablet (2 5 mg total) by mouth 2 (two) times a day, Disp: 180 tablet, Rfl: 3  •  ascorbic acid (VITAMIN C) 250 mg tablet, Take 500 mg by mouth daily, Disp: , Rfl:   •  aspirin 81 MG tablet, Take by mouth daily , Disp: , Rfl:   •  Cholecalciferol (Vitamin D3) 25 MCG (1000 UT) CAPS, Take 25 each by mouth in the morning, Disp: , Rfl:   •  dorzolamide-timolol (COSOPT) 22 3-6 8 MG/ML ophthalmic solution, INSTILL 1 DROP IN EACH EYE EVERY DAY, Disp: , Rfl: 3  •  doxepin (SINEquan) 50 mg capsule, TAKE 1 CAPSULE BY MOUTH EVERY DAY AT BEDTIME, Disp: 90 capsule, Rfl: 1  •  Elastic Bandages & Supports (CARPAL TUNNEL WRIST STABILIZER) MISC, by Does not apply route daily, Disp: 1 each, Rfl: 0  •  furosemide (LASIX) 20 mg tablet, Take 1 tablet (20 mg total) by mouth if needed (Lower extremity edema) May take 3 times weekly as needed for lower extremity swelling , Disp: 30 tablet, Rfl: 0  •  gabapentin (NEURONTIN) 300 mg capsule, Take 1 capsule (300 mg total) by mouth 3 (three) times a day, Disp: 360 capsule, Rfl: 2  •  omeprazole (PriLOSEC) 20 mg delayed release capsule, Take 1 capsule (20 mg total) by mouth daily, Disp: 90 capsule, Rfl: 3  •  telmisartan (MICARDIS) 20 MG tablet, Take 1 tablet (20 mg total) by mouth daily, Disp: 90 tablet, Rfl: 3       ALLERGIES     Allergies   Allergen Reactions   • Fish-Derived Products - Food Allergy      GI upset   • Allopurinol Rash     Category:  Allergy; *-*-*-*-*-*-*-*-*-*-*-*-*-*-*-*-*-*-*-*-*-*-*-*-*-*-*-*-*-*-*-*-*-*-*-*-*-*-*-*-*-*-*-*-*-*-*-*-*-*-*-*-*-*-  LABORATORY DATA     Sodium   Date Value Ref Range Status   10/02/2015 143 136 - 145 mmol/L Final     Alkaline Phosphatase   Date Value Ref Range Status   10/17/2022 81 46 - 116 U/L Final   10/05/2022 99 46 - 116 U/L Final   10/06/2021 104 46 - 116 U/L Final   10/02/2015 77 46 - 116 U/L Final     ALT   Date Value Ref Range Status   10/17/2022 19 12 - 78 U/L Final     Comment:     Specimen collection should occur prior to Sulfasalazine and/or Sulfapyridine administration due to the potential for falsely depressed results  10/05/2022 19 12 - 78 U/L Final     Comment:     Specimen collection should occur prior to Sulfasalazine administration due to the potential for falsely depressed results  10/06/2021 21 12 - 78 U/L Final     Comment:     Specimen collection should occur prior to Sulfasalazine and/or Sulfapyridine administration due to the potential for falsely depressed results  10/02/2015 20 12 - 78 U/L Final     Anion Gap   Date Value Ref Range Status   10/02/2015 7 4 - 13 mmol/L Final     AST   Date Value Ref Range Status   10/17/2022 13 5 - 45 U/L Final     Comment:     Specimen collection should occur prior to Sulfasalazine administration due to the potential for falsely depressed results  10/05/2022 19 5 - 45 U/L Final     Comment:     Specimen collection should occur prior to Sulfasalazine administration due to the potential for falsely depressed results  10/06/2021 14 5 - 45 U/L Final     Comment:     Specimen collection should occur prior to Sulfasalazine administration due to the potential for falsely depressed results      10/02/2015 12 5 - 45 U/L Final     Total Bilirubin   Date Value Ref Range Status   10/02/2015 0 47 0 20 - 1 00 mg/dL Final     BUN   Date Value Ref Range Status   10/17/2022 31 (H) 5 - 25 mg/dL Final   10/06/2022 16 5 - 25 mg/dL Final   10/05/2022 17 5 - 25 mg/dL Final   10/02/2015 24 5 - 25 mg/dL Final     Calcium   Date Value Ref Range Status   10/17/2022 10 7 (H) 8 3 - 10 1 mg/dL Final   10/06/2022 11 0 (H) 8 3 - 10 1 mg/dL Final   10/05/2022 11 4 (H) 8 3 - 10 1 mg/dL Final   10/02/2015 10 8 (H) 8 3 - 10 1 mg/dL Final     Chloride   Date Value Ref Range Status   10/17/2022 115 (H) 96 - 108 mmol/L Final   10/06/2022 106 96 - 108 mmol/L Final   10/05/2022 105 96 - 108 mmol/L Final   10/02/2015 109 (H) 100 - 108 mmol/L Final     CO2   Date Value Ref Range Status   10/17/2022 21 21 - 32 mmol/L Final   10/06/2022 25 21 - 32 mmol/L Final   10/05/2022 26 21 - 32 mmol/L Final   10/02/2015 27 21 - 32 mmol/L Final     Creatinine   Date Value Ref Range Status   10/17/2022 1 27 0 60 - 1 30 mg/dL Final     Comment:     Standardized to IDMS reference method   10/06/2022 1 09 0 60 - 1 30 mg/dL Final     Comment:     Standardized to IDMS reference method   10/05/2022 1 14 0 60 - 1 30 mg/dL Final     Comment:     Standardized to IDMS reference method   10/02/2015 1 29 0 60 - 1 30 mg/dL Final     Comment:     Standardized to IDMS reference method     eGFR   Date Value Ref Range Status   10/17/2022 36 ml/min/1 73sq m Final   10/06/2022 43 ml/min/1 73sq m Final   10/05/2022 41 ml/min/1 73sq m Final     Glucose   Date Value Ref Range Status   10/02/2015 90 65 - 140 mg/dL Final     Comment:     If patient is fasting, the ADA then defines impaired fasting glucose as  >100 mg/dl and diabetes as  >or equal to 126 mg/dl         Potassium   Date Value Ref Range Status   10/17/2022 4 2 3 5 - 5 3 mmol/L Final   10/06/2022 3 7 3 5 - 5 3 mmol/L Final   10/05/2022 3 8 3 5 - 5 3 mmol/L Final   10/02/2015 4 5 3 5 - 5 3 mmol/L Final     Magnesium   Date Value Ref Range Status   12/21/2019 1 9 1 6 - 2 6 mg/dL Final     Sodium   Date Value Ref Range Status   10/02/2015 143 136 - 145 mmol/L Final     Total Protein   Date Value Ref Range Status   10/02/2015 7 4 6 4 - 8 2 g/dL Final     Hemoglobin   Date "Value Ref Range Status   10/06/2022 11 2 (L) 11 5 - 15 4 g/dL Final   10/05/2022 11 7 11 5 - 15 4 g/dL Final   12/20/2019 10 6 (L) 11 5 - 15 4 g/dL Final     Platelets   Date Value Ref Range Status   10/06/2022 137 (L) 149 - 390 Thousands/uL Final   10/05/2022 131 (L) 149 - 390 Thousands/uL Final   12/20/2019 80 (L) 149 - 390 Thousands/uL Final     WBC   Date Value Ref Range Status   10/06/2022 4 35 4 31 - 10 16 Thousand/uL Final   10/05/2022 3 62 (L) 4 31 - 10 16 Thousand/uL Final   12/20/2019 6 70 4 31 - 10 16 Thousand/uL Final     INR   Date Value Ref Range Status   07/23/2019 1 03 0 84 - 1 19 Final     PTT   Date Value Ref Range Status   07/23/2019 30 23 - 37 seconds Final     Comment:     Therapeutic Heparin Range =  60-90 seconds     CK-MB   Date Value Ref Range Status   07/23/2019 2 7 0 0 - 5 0 ng/mL Final     No results found for: \"TSH\"  Cholesterol   Date Value Ref Range Status   10/02/2015 203 mg/dL Final     Comment:     CHOLESTEROL:       Desirable        <200 mg/dl       Borderline High  200-239 mg/dl       High             >239 mg/dl  ____________________________________        Hemoglobin A1C   Date Value Ref Range Status   07/24/2019 5 4 4 2 - 6 3 % Final     Blood Culture   Date Value Ref Range Status   08/01/2019 No Growth After 5 Days  Final   07/27/2019 No Growth After 5 Days  Final       *-*-*-*-*-*-*-*-*-*-*-*-*-*-*-*-*-*-*-*-*-*-*-*-*-*-*-*-*-*-*-*-*-*-*-*-*-*-*-*-*-*-*-*-*-*-*-*-*-*-*-*-*-*-  PREVIOUS CARDIOLOGY & RADIOLOGY TEST RESULTS   I have personally reviewed pertinent results of cardiovascular tests noted below      Results for orders placed during the hospital encounter of 07/25/19    Echo complete with contrast if indicated    Narrative  [address]  [facility], [address] [facility]  [phone]    Transthoracic Echocardiogram  2D, M-mode, Doppler, and Color Doppler    Study date:  29-Jul-2019    Patient: Jacoby Oneil  MR number: " [id number]  Account number: [de-identified]  : 04-Oct-1928  Age: 80 years  Gender: Female  Status: Inpatient  Location: Bedside  Height: 62 in  Weight: 154 lb  BP: 161/ 92 mmHg    Indications: Abnormal EKG    Diagnoses: R94 31 - Abnormal electrocardiogram [ECG] [EKG]    Sonographer:  FAYE Chowdary  Primary Physician:  Sonia Epps, [address]  Referring Physician:  DEANDRA Dover  Group:  [facility]  Cardiology Fellow:  Malu Marrero MD  Interpreting Physician:  Tariq Cowan MD    SUMMARY    LEFT VENTRICLE:  Systolic function was normal  Ejection fraction was estimated to be 60 %  There were no regional wall motion abnormalities  Wall thickness was moderately increased  There was moderate concentric hypertrophy  Doppler parameters were consistent with abnormal left ventricular relaxation (grade 1 diastolic dysfunction)  MITRAL VALVE:  There was marked annular calcification  There was mild regurgitation  AORTIC VALVE:  Transaortic velocity was increased due to valvular stenosis  There was mild stenosis  There was trace regurgitation  Estimated aortic valve area (by Vmax) was 1 66 cmï¾²  TRICUSPID VALVE:  There was trace regurgitation  AORTA:  There was mild dilatation of the ascending aorta  The ascending aortic AP dimension was 39 mm  IVC, HEPATIC VEINS:  The inferior vena cava was mildly dilated  HISTORY: PRIOR HISTORY: HTN,CKD    PROCEDURE: The procedure was performed at the bedside  This was a routine study  The transthoracic approach was used  The study included complete 2D imaging, M-mode, complete spectral Doppler, and color Doppler  The heart rate was 83 bpm,  at the start of the study  Image quality was adequate  LEFT VENTRICLE: Size was normal  Systolic function was normal  Ejection fraction was estimated to be 60 %  There were no regional wall motion abnormalities  Wall thickness was moderately increased   There was moderate concentric  hypertrophy  DOPPLER: Doppler parameters were consistent with abnormal left ventricular relaxation (grade 1 diastolic dysfunction)  VENTRICULAR SEPTUM: There was sigmoid septal appearance  RIGHT VENTRICLE: The size was normal  Systolic function was normal  Wall thickness was normal     LEFT ATRIUM: Size was normal     RIGHT ATRIUM: Size was normal     MITRAL VALVE: There was marked annular calcification  Valve structure was normal  There was normal leaflet separation  DOPPLER: The transmitral velocity was within the normal range  There was no evidence for stenosis  There was mild  regurgitation  AORTIC VALVE: The valve was trileaflet  Leaflets exhibited mildly to moderately increased thickness, mild to moderate calcification, and mildly reduced cuspal separation  DOPPLER: Transaortic velocity was increased due to valvular  stenosis  There was mild stenosis  There was trace regurgitation  TRICUSPID VALVE: The valve structure was normal  There was normal leaflet separation  DOPPLER: The transtricuspid velocity was within the normal range  There was no evidence for stenosis  There was trace regurgitation  Pulmonary artery  systolic pressure was within the normal range  PULMONIC VALVE: Leaflets exhibited normal thickness, no calcification, and normal cuspal separation  DOPPLER: The transpulmonic velocity was within the normal range  There was no significant regurgitation  PERICARDIUM: There was no pericardial effusion  A pericardial fat pad was present  AORTA: The root exhibited normal size  There was mild dilatation of the ascending aorta  SYSTEMIC VEINS: IVC: The inferior vena cava was mildly dilated   Respirophasic changes were normal     MEASUREMENT TABLES    2D MEASUREMENTS  LVOT   (Reference normals)  Diam   21 mm   (--)  Aorta   (Reference normals)  AAo AP diam   39 mm   (--)    DOPPLER MEASUREMENTS  LVOT   (Reference normals)  Peak karine   113 cm/s   (--)  Mean karine   81 cm/s (--)  VTI   21 cm   (--)  Peak gradient   5 mmHg   (--)  Mean gradient   3 mmHg   (--)  Stroke vol   72 74 ml   (--)  Stroke index   0 47 ml/mï¾²   (--)  Aortic valve   (Reference normals)  Peak minesh   233 cm/s   (--)  Mean minesh   170 cm/s   (--)  VTI   49 cm   (--)  Peak gradient   25 7 mmHg   (--)  Obstr index, VTI   0 43    (--)  Valve area, VTI   1 49 cmï¾²   (--)  Area index, VTI   0 87 cmï¾²/mï¾²   (--)  Obstr index, Vmax   0 48    (--)  Valve area, Vmax   1 66 cmï¾²   (--)  Area index, Vmax   0 97 cmï¾²/mï¾²   (--)  Obstr index, Vmean   0 48    (--)  Valve area, Vmean   1 66 cmï¾²   (--)  Area index, Vmean   0 97 cmï¾²/mï¾²   (--)    SYSTEM MEASUREMENT TABLES    2D  %FS: 31 5 %  Ao Diam: 2 99 cm  EDV(Teich): 43 16 ml  EF(Teich): 60 72 %  ESV(Teich): 16 95 ml  IVSd: 1 44 cm  LA Area: 18 05 cm2  LA Diam: 3 32 cm  LVEDV MOD A4C: 72 34 ml  LVEF MOD A4C: 71 64 %  LVESV MOD A4C: 20 51 ml  LVIDd: 3 27 cm  LVIDs: 2 24 cm  LVLd A4C: 7 68 cm  LVLs A4C: 5 98 cm  LVPWd: 1 68 cm  RA Area: 16 31 cm2  RVIDd: 4 18 cm  SV MOD A4C: 51 83 ml  SV(Teich): 26 21 ml    CW  AV Env  Ti: 267 59 ms  AV VTI: 42 73 cm  AV Vmax: 2 26 m/s  AV Vmean: 1 6 m/s  AV maxP 39 mmHg  AV meanP 33 mmHg  HR: 162 66 BPM  MV VTI: 31 01 cm  MV Vmax: 1 42 m/s  MV Vmean: 0 84 m/s  MV maxP 06 mmHg  MV meanPG: 3 2 mmHg  TR Vmax: 2 5 m/s  TR maxP 93 mmHg    MM  TAPSE: 1 5 cm    PW  E': 0 06 m/s  E/E': 13 14  LVOT Env  Ti: 262 98 ms  LVOT VTI: 21 34 cm  LVOT Vmax: 1 13 m/s  LVOT Vmean: 0 81 m/s  LVOT maxP 1 mmHg  LVOT meanP 85 mmHg  MV A Minesh: 1 46 m/s  MV Dec Mellette: 4 08 m/s2  MV DecT: 191 81 ms  MV E Minesh: 0 78 m/s  MV E/A Ratio: 0 54  MV PHT: 55 62 ms  MVA By PHT: 3 96 cm2    [facility] Accredited Echocardiography Laboratory    Prepared and electronically signed by    Mary Carmen Coronado MD  Signed 2019 16:03:33    No results found for this or any previous visit      No results found for this or any previous visit  No results found for this or any previous visit  Cardiac EP device report  MDT DUAL PPM/ ACTIVE SYSTEM IS MRI CONDITIONAL  CARELINK TRANSMISSION: BATTERY VOLTAGE ADEQUATE (9 5 YRS)  AP 32 8%  99 9% (>40%/AVB/DDD 60); ALL AVAILABLE LEAD PARAMETERS WITHIN NORMAL LIMITS  NO SIGNIFICANT HIGH RATE EPISODES  PACEMAKER FUNCTIONING APPROPRIATELY  ES        *-*-*-*-*-*-*-*-*-*-*-*-*-*-*-*-*-*-*-*-*-*-*-*-*-*-*-*-*-*-*-*-*-*-*-*-*-*-*-*-*-*-*-*-*-*-*-*-*-*-*-*-*-*-  SIGNATURES:   @ZJR@   Ragini Briceño MD; SVENA    *-*-*-*-*-*-*-*-*-*-*-*-*-*-*-*-*-*-*-*-*-*-*-*-*-*-*-*-*-*-*-*-*-*-*-*-*-*-*-*-*-*-*-*-*-*-*-*-*-*-*-*-*-*-  PAST MEDICAL HISTORY:  Past Medical History:   Diagnosis Date   • Depression    • Gout    • H/O vaginal hysterectomy     resolved-4/17/2015    PAST SURGICAL HISTORY:  Past Surgical History:   Procedure Laterality Date   • CATARACT EXTRACTION     • TOTAL ABDOMINAL HYSTERECTOMY      with removal of both ovaries   • VAGINAL HYSTERECTOMY      resolved-4/17/2015         FAMILY HISTORY:  Family History   Problem Relation Age of Onset   • Heart attack Mother         MI   • Heart attack Father         MI   • Hypertension Sister    • Stomach cancer Sister    • Hypertension Brother     SOCIAL HISTORY:  Social History     Tobacco Use   Smoking Status Never   Smokeless Tobacco Never      Social History     Substance and Sexual Activity   Alcohol Use Not Currently     Social History     Substance and Sexual Activity   Drug Use No    [id number]     *-*-*-*-*-*-*-*-*-*-*-*-*-*-*-*-*-*-*-*-*-*-*-*-*-*-*-*-*-*-*-*-*-*-*-*-*-*-*-*-*-*-*-*-*-*-*-*-*-*-*-*-*-*  ALLERGIES:  Allergies   Allergen Reactions   • Fish-Derived Products - Food Allergy      GI upset   • Allopurinol Rash     Category:  Allergy;     CURRENT SCHEDULED MEDICATIONS:    Current Outpatient Medications:   •  amLODIPine (NORVASC) 2 5 mg tablet, Take 1 tablet (2 5 mg total) by mouth 2 (two) times a day, Disp: 180 tablet, Rfl: 3  •  ascorbic acid (VITAMIN C) 250 mg tablet, Take 500 mg by mouth daily, Disp: , Rfl:   •  aspirin 81 MG tablet, Take by mouth daily , Disp: , Rfl:   •  Cholecalciferol (Vitamin D3) 25 MCG (1000 UT) CAPS, Take 25 each by mouth in the morning, Disp: , Rfl:   •  dorzolamide-timolol (COSOPT) 22 3-6 8 MG/ML ophthalmic solution, INSTILL 1 DROP IN EACH EYE EVERY DAY, Disp: , Rfl: 3  •  doxepin (SINEquan) 50 mg capsule, TAKE 1 CAPSULE BY MOUTH EVERY DAY AT BEDTIME, Disp: 90 capsule, Rfl: 1  •  Elastic Bandages & Supports (CARPAL TUNNEL WRIST STABILIZER) MISC, by Does not apply route daily, Disp: 1 each, Rfl: 0  •  furosemide (LASIX) 20 mg tablet, Take 1 tablet (20 mg total) by mouth if needed (Lower extremity edema) May take 3 times weekly as needed for lower extremity swelling , Disp: 30 tablet, Rfl: 0  •  gabapentin (NEURONTIN) 300 mg capsule, Take 1 capsule (300 mg total) by mouth 3 (three) times a day, Disp: 360 capsule, Rfl: 2  •  omeprazole (PriLOSEC) 20 mg delayed release capsule, Take 1 capsule (20 mg total) by mouth daily, Disp: 90 capsule, Rfl: 3  •  telmisartan (MICARDIS) 20 MG tablet, Take 1 tablet (20 mg total) by mouth daily, Disp: 90 tablet, Rfl: 3     *-*-*-*-*-*-*-*-*-*-*-*-*-*-*-*-*-*-*-*-*-*-*-*-*-*-*-*-*-*-*-*-*-*-*-*-*-*-*-*-*-*-*-*-*-*-*-*-*-*-*-*-*-*

## 2023-06-27 ENCOUNTER — TELEPHONE (OUTPATIENT)
Dept: FAMILY MEDICINE CLINIC | Facility: CLINIC | Age: 88
End: 2023-06-27

## 2023-06-27 ENCOUNTER — REMOTE DEVICE CLINIC VISIT (OUTPATIENT)
Dept: CARDIOLOGY CLINIC | Facility: CLINIC | Age: 88
End: 2023-06-27
Payer: MEDICARE

## 2023-06-27 DIAGNOSIS — Z95.0 PRESENCE OF PERMANENT CARDIAC PACEMAKER: Primary | ICD-10-CM

## 2023-06-27 PROCEDURE — 93296 REM INTERROG EVL PM/IDS: CPT | Performed by: INTERNAL MEDICINE

## 2023-06-27 PROCEDURE — 93294 REM INTERROG EVL PM/LDLS PM: CPT | Performed by: INTERNAL MEDICINE

## 2023-06-28 ENCOUNTER — APPOINTMENT (OUTPATIENT)
Dept: LAB | Facility: CLINIC | Age: 88
End: 2023-06-28
Payer: MEDICARE

## 2023-06-28 DIAGNOSIS — R39.9 UTI SYMPTOMS: Primary | ICD-10-CM

## 2023-06-28 DIAGNOSIS — R39.9 UTI SYMPTOMS: ICD-10-CM

## 2023-06-28 PROCEDURE — 87086 URINE CULTURE/COLONY COUNT: CPT

## 2023-06-28 PROCEDURE — 87077 CULTURE AEROBIC IDENTIFY: CPT

## 2023-06-28 PROCEDURE — 87186 SC STD MICRODIL/AGAR DIL: CPT

## 2023-06-28 RX ORDER — PHENAZOPYRIDINE HYDROCHLORIDE 200 MG/1
200 TABLET, FILM COATED ORAL
Qty: 20 TABLET | Refills: 0 | Status: SHIPPED | OUTPATIENT
Start: 2023-06-28

## 2023-06-29 NOTE — PROGRESS NOTES
Results for orders placed or performed in visit on 06/27/23   Cardiac EP device report    Narrative    MDT DUAL PPM/ ACTIVE SYSTEM IS MRI CONDITIONAL  CARELINK TRANSMISSION: BATTERY VOLTAGE ADEQUATE (9 3 YRS)  AP 44 2%  99 9% (>40%/AVB) ALL AVAILABLE LEAD PARAMETERS WITHIN NORMAL LIMITS  NO SIGNIFICANT HIGH RATE EPISODES  NORMAL DEVICE FUNCTION   AM/CARRERA

## 2023-06-30 ENCOUNTER — TELEPHONE (OUTPATIENT)
Dept: FAMILY MEDICINE CLINIC | Facility: CLINIC | Age: 88
End: 2023-06-30

## 2023-06-30 DIAGNOSIS — N39.0 E. COLI UTI: Primary | ICD-10-CM

## 2023-06-30 DIAGNOSIS — B96.20 E. COLI UTI: Primary | ICD-10-CM

## 2023-06-30 LAB
BACTERIA UR CULT: ABNORMAL
BACTERIA UR CULT: ABNORMAL

## 2023-06-30 RX ORDER — NITROFURANTOIN 25; 75 MG/1; MG/1
100 CAPSULE ORAL 2 TIMES DAILY
Qty: 14 CAPSULE | Refills: 0 | Status: SHIPPED | OUTPATIENT
Start: 2023-06-30 | End: 2023-07-07

## 2023-06-30 NOTE — TELEPHONE ENCOUNTER
Patient's son Wylie Galeazzi is calling for the test results of patient for her urine  They are waiting for medication to be called into her pharmacy

## 2023-07-10 ENCOUNTER — TELEPHONE (OUTPATIENT)
Dept: FAMILY MEDICINE CLINIC | Facility: CLINIC | Age: 88
End: 2023-07-10

## 2023-07-10 DIAGNOSIS — N39.0 E. COLI UTI: ICD-10-CM

## 2023-07-10 DIAGNOSIS — B96.20 E. COLI UTI: ICD-10-CM

## 2023-07-10 DIAGNOSIS — R39.9 UTI SYMPTOMS: Primary | ICD-10-CM

## 2023-07-24 DIAGNOSIS — R39.9 UTI SYMPTOMS: Primary | ICD-10-CM

## 2023-07-24 RX ORDER — NITROFURANTOIN 25; 75 MG/1; MG/1
100 CAPSULE ORAL 2 TIMES DAILY
Qty: 14 CAPSULE | Refills: 0 | Status: SHIPPED | OUTPATIENT
Start: 2023-07-24 | End: 2023-07-31

## 2023-07-31 ENCOUNTER — CLINICAL SUPPORT (OUTPATIENT)
Dept: FAMILY MEDICINE CLINIC | Facility: CLINIC | Age: 88
End: 2023-07-31
Payer: MEDICARE

## 2023-07-31 DIAGNOSIS — B96.20 E. COLI UTI: ICD-10-CM

## 2023-07-31 DIAGNOSIS — R39.9 UTI SYMPTOMS: Primary | ICD-10-CM

## 2023-07-31 DIAGNOSIS — N39.0 E. COLI UTI: ICD-10-CM

## 2023-07-31 LAB
SL AMB  POCT GLUCOSE, UA: ABNORMAL
SL AMB LEUKOCYTE ESTERASE,UA: ABNORMAL
SL AMB POCT BILIRUBIN,UA: ABNORMAL
SL AMB POCT BLOOD,UA: ABNORMAL
SL AMB POCT CLARITY,UA: ABNORMAL
SL AMB POCT COLOR,UA: ABNORMAL
SL AMB POCT KETONES,UA: ABNORMAL
SL AMB POCT NITRITE,UA: ABNORMAL
SL AMB POCT PH,UA: 5
SL AMB POCT SPECIFIC GRAVITY,UA: 1.01
SL AMB POCT URINE PROTEIN: ABNORMAL
SL AMB POCT UROBILINOGEN: 0.2

## 2023-07-31 PROCEDURE — 87086 URINE CULTURE/COLONY COUNT: CPT | Performed by: FAMILY MEDICINE

## 2023-07-31 PROCEDURE — 81003 URINALYSIS AUTO W/O SCOPE: CPT

## 2023-08-01 LAB — BACTERIA UR CULT: NORMAL

## 2023-08-09 ENCOUNTER — CLINICAL SUPPORT (OUTPATIENT)
Dept: FAMILY MEDICINE CLINIC | Facility: CLINIC | Age: 88
End: 2023-08-09
Payer: MEDICARE

## 2023-08-09 DIAGNOSIS — R39.9 UTI SYMPTOMS: Primary | ICD-10-CM

## 2023-08-09 LAB
SL AMB  POCT GLUCOSE, UA: NORMAL
SL AMB LEUKOCYTE ESTERASE,UA: NORMAL
SL AMB POCT BILIRUBIN,UA: NORMAL
SL AMB POCT BLOOD,UA: NORMAL
SL AMB POCT CLARITY,UA: CLEAR
SL AMB POCT COLOR,UA: YELLOW
SL AMB POCT KETONES,UA: NORMAL
SL AMB POCT NITRITE,UA: NORMAL
SL AMB POCT PH,UA: 5
SL AMB POCT SPECIFIC GRAVITY,UA: 1.02
SL AMB POCT URINE PROTEIN: 0.15
SL AMB POCT UROBILINOGEN: 3.5

## 2023-08-09 PROCEDURE — 81002 URINALYSIS NONAUTO W/O SCOPE: CPT

## 2023-08-09 PROCEDURE — 87086 URINE CULTURE/COLONY COUNT: CPT | Performed by: FAMILY MEDICINE

## 2023-08-10 LAB — BACTERIA UR CULT: NORMAL

## 2023-08-11 ENCOUNTER — TELEPHONE (OUTPATIENT)
Dept: LAB | Facility: HOSPITAL | Age: 88
End: 2023-08-11

## 2023-08-14 DIAGNOSIS — G60.9 IDIOPATHIC PERIPHERAL NEUROPATHY: ICD-10-CM

## 2023-08-14 DIAGNOSIS — F32.A DEPRESSION, UNSPECIFIED DEPRESSION TYPE: ICD-10-CM

## 2023-08-15 DIAGNOSIS — K21.9 GASTROESOPHAGEAL REFLUX DISEASE WITHOUT ESOPHAGITIS: ICD-10-CM

## 2023-08-15 DIAGNOSIS — I10 ESSENTIAL HYPERTENSION: ICD-10-CM

## 2023-08-15 RX ORDER — OMEPRAZOLE 20 MG/1
20 CAPSULE, DELAYED RELEASE ORAL DAILY
Qty: 90 CAPSULE | Refills: 3 | Status: SHIPPED | OUTPATIENT
Start: 2023-08-15

## 2023-08-15 RX ORDER — GABAPENTIN 300 MG/1
300 CAPSULE ORAL 3 TIMES DAILY
Qty: 360 CAPSULE | Refills: 0 | Status: SHIPPED | OUTPATIENT
Start: 2023-08-15

## 2023-08-15 RX ORDER — AMLODIPINE BESYLATE 2.5 MG/1
2.5 TABLET ORAL 2 TIMES DAILY
Qty: 180 TABLET | Refills: 3 | Status: SHIPPED | OUTPATIENT
Start: 2023-08-15

## 2023-08-15 RX ORDER — DOXEPIN HYDROCHLORIDE 50 MG/1
50 CAPSULE ORAL
Qty: 90 CAPSULE | Refills: 0 | Status: SHIPPED | OUTPATIENT
Start: 2023-08-15

## 2023-08-30 ENCOUNTER — APPOINTMENT (OUTPATIENT)
Dept: RADIOLOGY | Facility: MEDICAL CENTER | Age: 88
End: 2023-08-30
Payer: MEDICARE

## 2023-08-30 ENCOUNTER — OFFICE VISIT (OUTPATIENT)
Dept: FAMILY MEDICINE CLINIC | Facility: CLINIC | Age: 88
End: 2023-08-30
Payer: MEDICARE

## 2023-08-30 VITALS
TEMPERATURE: 97.7 F | HEART RATE: 73 BPM | HEIGHT: 62 IN | OXYGEN SATURATION: 97 % | SYSTOLIC BLOOD PRESSURE: 128 MMHG | DIASTOLIC BLOOD PRESSURE: 72 MMHG | BODY MASS INDEX: 26.25 KG/M2

## 2023-08-30 DIAGNOSIS — M15.9 PRIMARY OSTEOARTHRITIS INVOLVING MULTIPLE JOINTS: ICD-10-CM

## 2023-08-30 DIAGNOSIS — M79.644 PAIN OF RIGHT THUMB: ICD-10-CM

## 2023-08-30 DIAGNOSIS — K21.9 GASTROESOPHAGEAL REFLUX DISEASE WITHOUT ESOPHAGITIS: ICD-10-CM

## 2023-08-30 DIAGNOSIS — M54.2 NECK PAIN: ICD-10-CM

## 2023-08-30 DIAGNOSIS — N18.31 STAGE 3A CHRONIC KIDNEY DISEASE (HCC): ICD-10-CM

## 2023-08-30 DIAGNOSIS — I10 ESSENTIAL HYPERTENSION: Primary | ICD-10-CM

## 2023-08-30 DIAGNOSIS — G60.9 IDIOPATHIC PERIPHERAL NEUROPATHY: ICD-10-CM

## 2023-08-30 PROBLEM — D69.6 PLATELETS DECREASED (HCC): Status: ACTIVE | Noted: 2023-08-30

## 2023-08-30 PROCEDURE — 73130 X-RAY EXAM OF HAND: CPT

## 2023-08-30 PROCEDURE — 99214 OFFICE O/P EST MOD 30 MIN: CPT | Performed by: FAMILY MEDICINE

## 2023-08-30 NOTE — PROGRESS NOTES
Chief Complaint   Patient presents with   • Thumb Pain     Right x  2 wks. Name: Shelly Gill      : 10/4/1928      MRN: [id number]  Encounter Provider: Cedric Zhao MD  Encounter Date: 2023   Encounter department: [facility] PRIMARY [facility]ARE    Assessment & Plan     We will recommend Voltaren gel and Tylenol. She does have a history of CKD and we should avoid systemic NSAIDs. We will also check an x-ray of the right hand. She may continue her gabapentin for peripheral neuropathy. Continue Prilosec for GERD she is doing well with this. Blood pressure stable 128/72, continue BP regimen. Neck is most likely strain due to sleeping, continue range of motion exercises. RTC as needed    1. Essential hypertension    2. Gastroesophageal reflux disease without esophagitis    3. Idiopathic peripheral neuropathy    4. Pain of right thumb  -     XR hand 3+ vw right; Future; Expected date: 2023  -     Diclofenac Sodium (VOLTAREN) 1 %; Apply 2 g topically 4 (four) times a day    5. Primary osteoarthritis involving multiple joints    6. Neck pain    7. Stage 3a chronic kidney disease ([address])           Subjective      She presents today to discuss a 2-week history of right thumb pain. She has a long history of arthritis throughout the body. No inciting event. States that the pain occurs mostly when waking up in the morning. Also reporting some neck pain she states she may have slept on it wrong. Review of Systems   Constitutional: Negative for activity change, appetite change, chills, fatigue and fever. HENT: Negative for congestion, rhinorrhea, sneezing and sore throat. Eyes: Negative for pain, discharge, redness and itching. Respiratory: Negative for cough, chest tightness, shortness of breath and wheezing. Cardiovascular: Negative for chest pain and palpitations.    Gastrointestinal: Negative for abdominal distention, abdominal pain, constipation, diarrhea, nausea and vomiting. Musculoskeletal: Positive for arthralgias, myalgias and neck pain. Negative for back pain and joint swelling. Skin: Negative for rash. Neurological: Negative for dizziness, weakness, numbness and headaches. Hematological: Negative for adenopathy. Psychiatric/Behavioral: Negative for dysphoric mood. All other systems reviewed and are negative. Current Outpatient Medications on File Prior to Visit   Medication Sig   • amLODIPine (NORVASC) 2.5 mg tablet Take 1 tablet (2.5 mg total) by mouth 2 (two) times a day   • ascorbic acid (VITAMIN C) 250 mg tablet Take 500 mg by mouth daily   • aspirin 81 MG tablet Take by mouth daily    • Cholecalciferol (Vitamin D3) 25 MCG (1000 UT) CAPS Take 25 each by mouth in the morning   • dorzolamide-timolol (COSOPT) 22.3-6.8 MG/ML ophthalmic solution INSTILL 1 DROP IN EACH EYE EVERY DAY   • doxepin (SINEquan) 50 mg capsule Take 1 capsule (50 mg total) by mouth daily at bedtime   • Elastic Bandages & Supports (CARPAL TUNNEL WRIST STABILIZER) MISC by Does not apply route daily   • furosemide (LASIX) 20 mg tablet Take 1 tablet (20 mg total) by mouth if needed (Lower extremity edema) May take 3 times weekly as needed for lower extremity swelling. • gabapentin (NEURONTIN) 300 mg capsule Take 1 capsule (300 mg total) by mouth 3 (three) times a day   • omeprazole (PriLOSEC) 20 mg delayed release capsule Take 1 capsule (20 mg total) by mouth daily   • phenazopyridine (PYRIDIUM) 200 mg tablet Take 1 tablet (200 mg total) by mouth 3 (three) times a day with meals   • telmisartan (MICARDIS) 20 MG tablet Take 1 tablet (20 mg total) by mouth daily   • [DISCONTINUED] ezetimibe (ZETIA) 10 mg tablet Take 1 tablet (10 mg total) by mouth daily       Objective     /72   Pulse 73   Temp 97.7 °F (36.5 °C)   Ht 5' 2" (1.575 m)   SpO2 97%   BMI 26.25 kg/m²     Physical Exam  Vitals reviewed. Constitutional:       General: She is not in acute distress.      Appearance: Normal appearance. She is well-developed. She is not toxic-appearing or diaphoretic. HENT:      Head: Normocephalic and atraumatic. Right Ear: External ear normal.      Left Ear: External ear normal.   Eyes:      General: No scleral icterus. Right eye: No discharge. Left eye: No discharge. Conjunctiva/sclera: Conjunctivae normal.   Cardiovascular:      Rate and Rhythm: Normal rate and regular rhythm. Pulses: Normal pulses. Heart sounds: Murmur heard. Pulmonary:      Effort: Pulmonary effort is normal. No respiratory distress. Breath sounds: Normal breath sounds. No wheezing. Abdominal:      General: Abdomen is flat. There is no distension. Palpations: Abdomen is soft. There is no mass. Tenderness: There is no abdominal tenderness. Hernia: No hernia is present. Musculoskeletal:         General: Deformity (Right thumb) present. No swelling or tenderness. Normal range of motion. Cervical back: Normal range of motion. Skin:     General: Skin is warm and dry. Capillary Refill: Capillary refill takes less than 2 seconds. Coloration: Skin is not pale. Findings: No erythema. Neurological:      General: No focal deficit present. Mental Status: She is alert.    Psychiatric:         Mood and Affect: Mood normal.         Behavior: Behavior normal.       Mae Worley MD

## 2023-09-11 ENCOUNTER — TELEPHONE (OUTPATIENT)
Age: 88
End: 2023-09-11

## 2023-09-11 DIAGNOSIS — M79.641 RIGHT HAND PAIN: Primary | ICD-10-CM

## 2023-09-11 NOTE — TELEPHONE ENCOUNTER
Caller: Daughter    Doctor: Hand Specialist    Reason for call: Questioned avail appts for right hand pain.  Reviewed schedules for [place] offices    Call back#: [phone]

## 2023-09-26 ENCOUNTER — REMOTE DEVICE CLINIC VISIT (OUTPATIENT)
Dept: CARDIOLOGY CLINIC | Facility: CLINIC | Age: 88
End: 2023-09-26
Payer: MEDICARE

## 2023-09-26 DIAGNOSIS — Z95.0 PRESENCE OF PERMANENT CARDIAC PACEMAKER: Primary | ICD-10-CM

## 2023-09-26 PROCEDURE — 93296 REM INTERROG EVL PM/IDS: CPT | Performed by: INTERNAL MEDICINE

## 2023-09-26 PROCEDURE — 93294 REM INTERROG EVL PM/LDLS PM: CPT | Performed by: INTERNAL MEDICINE

## 2023-09-26 NOTE — PROGRESS NOTES
MDT DUAL PPM/ ACTIVE SYSTEM IS MRI CONDITIONAL   CARELINK TRANSMISSION:  BATTERY VOLTAGE ADEQUATE (9.0 YR.).  AP 17.0%  99.8% (>40%/AVB/DDD 60 PPM, 150-180 MS).  ALL LEAD PARAMETERS WITHIN NORMAL LIMITS.  NO SIGNIFICANT HIGH RATE EPISODES.  NORMAL DEVICE FUNCTION.  RG

## 2023-09-27 ENCOUNTER — APPOINTMENT (OUTPATIENT)
Dept: LAB | Facility: HOSPITAL | Age: 88
End: 2023-09-27
Payer: MEDICARE

## 2023-09-27 DIAGNOSIS — E78.2 MIXED HYPERLIPIDEMIA: ICD-10-CM

## 2023-09-27 DIAGNOSIS — N18.31 STAGE 3A CHRONIC KIDNEY DISEASE (HCC): ICD-10-CM

## 2023-09-27 DIAGNOSIS — I10 ESSENTIAL HYPERTENSION: ICD-10-CM

## 2023-09-27 LAB
ALBUMIN SERPL BCP-MCNC: 4.2 G/DL (ref 3.5–5)
ALP SERPL-CCNC: 81 U/L (ref 34–104)
ALT SERPL W P-5'-P-CCNC: 11 U/L (ref 7–52)
ANION GAP SERPL CALCULATED.3IONS-SCNC: 11 MMOL/L
AST SERPL W P-5'-P-CCNC: 14 U/L (ref 13–39)
BILIRUB SERPL-MCNC: 0.45 MG/DL (ref 0.2–1)
BUN SERPL-MCNC: 39 MG/DL (ref 5–25)
CALCIUM SERPL-MCNC: 10.6 MG/DL (ref 8.4–10.2)
CHLORIDE SERPL-SCNC: 110 MMOL/L (ref 96–108)
CHOLEST SERPL-MCNC: 177 MG/DL
CO2 SERPL-SCNC: 22 MMOL/L (ref 21–32)
CREAT SERPL-MCNC: 1.14 MG/DL (ref 0.6–1.3)
GFR SERPL CREATININE-BSD FRML MDRD: 41 ML/MIN/1.73SQ M
GLUCOSE SERPL-MCNC: 68 MG/DL (ref 65–140)
HDLC SERPL-MCNC: 31 MG/DL
LDLC SERPL CALC-MCNC: 105 MG/DL (ref 0–100)
POTASSIUM SERPL-SCNC: 4.5 MMOL/L (ref 3.5–5.3)
PROT SERPL-MCNC: 6.9 G/DL (ref 6.4–8.4)
SODIUM SERPL-SCNC: 143 MMOL/L (ref 135–147)
TRIGL SERPL-MCNC: 204 MG/DL
TSH SERPL DL<=0.05 MIU/L-ACNC: 2.02 UIU/ML (ref 0.45–4.5)

## 2023-09-27 PROCEDURE — 36415 COLL VENOUS BLD VENIPUNCTURE: CPT

## 2023-09-27 PROCEDURE — 84443 ASSAY THYROID STIM HORMONE: CPT

## 2023-09-27 PROCEDURE — 80053 COMPREHEN METABOLIC PANEL: CPT

## 2023-09-27 PROCEDURE — 80061 LIPID PANEL: CPT

## 2023-10-05 ENCOUNTER — OFFICE VISIT (OUTPATIENT)
Dept: OBGYN CLINIC | Facility: CLINIC | Age: 88
End: 2023-10-05
Payer: MEDICARE

## 2023-10-05 VITALS
BODY MASS INDEX: 26.31 KG/M2 | WEIGHT: 143 LBS | HEIGHT: 62 IN | SYSTOLIC BLOOD PRESSURE: 137 MMHG | DIASTOLIC BLOOD PRESSURE: 68 MMHG

## 2023-10-05 DIAGNOSIS — M19.041 LOCALIZED OSTEOARTHRITIS OF HAND, RIGHT: Primary | ICD-10-CM

## 2023-10-05 DIAGNOSIS — M79.641 PAIN OF RIGHT HAND: ICD-10-CM

## 2023-10-05 PROCEDURE — 99203 OFFICE O/P NEW LOW 30 MIN: CPT | Performed by: SURGERY

## 2023-10-05 NOTE — PROGRESS NOTES
ORTHOPAEDIC HAND, WRIST, AND ELBOW OFFICE  VISIT       ASSESSMENT/PLAN:      80 y o female who presents with Right hand Osteoarthritis    · Physical exam performed. X-rays reviewed. She has multiple sites of severe osteoarthritis in her Right hand   · Discussed use of compression gloves at night to help with swelling and stiffness of both hands. · We also discussed cock splint use at night Right side for neurological symptoms of Right hand  · Discussed Ultrasound of Right wrist if needed in future for diagnostic imaging to R/O Carpal Tunnel. Pt states that she would not want surgery though. The patient verbalized understanding of exam findings and treatment plan. We engaged in the shared decision-making process and treatment options were discussed at length with the patient. Surgical and conservative management discussed today along with risks and benefits. Diagnoses and all orders for this visit:    Localized osteoarthritis of hand, right    Pain of right hand  -     Durable Medical Equipment        Follow Up:  Return if symptoms worsen or fail to improve.      ____________________________________________________________________________________________________________________________________________      CHIEF COMPLAINT:  Chief Complaint   Patient presents with   • Right Hand - Pain   • Left Hand - Pain       SUBJECTIVE:  Gisselle Olivares is a 80y.o. year old  female who presents for evaluation of Right hand pain     Pt states that she has been having increasing sharp pains in her Right hand and in her digits since August. She has had known osteoarthritic pains prior but this pain is increased. She also has occasionally numbness and tingling into her hand. She reports she is having swelling in the AM and trouble using her Right hand to open jars, bottles ect.   No treatments other than heat and arthritis ointment which have not helped  She reports past episode of  gout in Right thumb DIP joint which has cause a deformity      I have personally reviewed all the relevant PMH, PSH, SH, FH, Medications and allergies      PAST MEDICAL HISTORY:  Past Medical History:   Diagnosis Date   • Depression    • Gout    • H/O vaginal hysterectomy     resolved-4/17/2015       PAST SURGICAL HISTORY:  Past Surgical History:   Procedure Laterality Date   • CATARACT EXTRACTION     • TOTAL ABDOMINAL HYSTERECTOMY      with removal of both ovaries   • VAGINAL HYSTERECTOMY      resolved-4/17/2015       FAMILY HISTORY:  Family History   Problem Relation Age of Onset   • Heart attack Mother         MI   • Heart attack Father         MI   • Hypertension Sister    • Stomach cancer Sister    • Hypertension Brother        SOCIAL HISTORY:  Social History     Tobacco Use   • Smoking status: Never   • Smokeless tobacco: Never   Vaping Use   • Vaping Use: Never used   Substance Use Topics   • Alcohol use: Not Currently   • Drug use: No       MEDICATIONS:    Current Outpatient Medications:   •  amLODIPine (NORVASC) 2.5 mg tablet, Take 1 tablet (2.5 mg total) by mouth 2 (two) times a day, Disp: 180 tablet, Rfl: 3  •  ascorbic acid (VITAMIN C) 250 mg tablet, Take 500 mg by mouth daily, Disp: , Rfl:   •  aspirin 81 MG tablet, Take by mouth daily , Disp: , Rfl:   •  Cholecalciferol (Vitamin D3) 25 MCG (1000 UT) CAPS, Take 25 each by mouth in the morning, Disp: , Rfl:   •  Diclofenac Sodium (VOLTAREN) 1 %, Apply 2 g topically 4 (four) times a day, Disp: 50 g, Rfl: 0  •  dorzolamide-timolol (COSOPT) 22.3-6.8 MG/ML ophthalmic solution, INSTILL 1 DROP IN EACH EYE EVERY DAY, Disp: , Rfl: 3  •  doxepin (SINEquan) 50 mg capsule, Take 1 capsule (50 mg total) by mouth daily at bedtime, Disp: 90 capsule, Rfl: 0  •  Elastic Bandages & Supports (CARPAL TUNNEL WRIST STABILIZER) MISC, by Does not apply route daily, Disp: 1 each, Rfl: 0  •  furosemide (LASIX) 20 mg tablet, Take 1 tablet (20 mg total) by mouth if needed (Lower extremity edema) May take 3 times weekly as needed for lower extremity swelling., Disp: 30 tablet, Rfl: 0  •  gabapentin (NEURONTIN) 300 mg capsule, Take 1 capsule (300 mg total) by mouth 3 (three) times a day, Disp: 360 capsule, Rfl: 0  •  omeprazole (PriLOSEC) 20 mg delayed release capsule, Take 1 capsule (20 mg total) by mouth daily, Disp: 90 capsule, Rfl: 3  •  phenazopyridine (PYRIDIUM) 200 mg tablet, Take 1 tablet (200 mg total) by mouth 3 (three) times a day with meals, Disp: 20 tablet, Rfl: 0  •  telmisartan (MICARDIS) 20 MG tablet, Take 1 tablet (20 mg total) by mouth daily, Disp: 90 tablet, Rfl: 3    ALLERGIES:  Allergies   Allergen Reactions   • Fish-Derived Products - Food Allergy      GI upset   • Allopurinol Rash     Category: Allergy;            REVIEW OF SYSTEMS:  Review of Systems   Constitutional: Negative for chills and fever. HENT: Negative for ear pain and sore throat. Eyes: Negative for pain and visual disturbance. Respiratory: Negative for cough and shortness of breath. Cardiovascular: Negative for chest pain and palpitations. Gastrointestinal: Negative for abdominal pain and vomiting. Genitourinary: Negative for dysuria and hematuria. Musculoskeletal: Positive for arthralgias. Negative for back pain. Skin: Negative for color change and rash. Neurological: Negative for seizures and syncope. All other systems reviewed and are negative.       VITALS:  Vitals:    10/05/23 1327   BP: 137/68       LABS:  HgA1c:   Lab Results   Component Value Date    HGBA1C 5.4 07/24/2019     BMP:   Lab Results   Component Value Date    GLUCOSE 90 10/02/2015    CALCIUM 10.6 (H) 09/27/2023     10/02/2015    K 4.5 09/27/2023    CO2 22 09/27/2023     (H) 09/27/2023    BUN 39 (H) 09/27/2023    CREATININE 1.14 09/27/2023       _____________________________________________________  PHYSICAL EXAMINATION:  General: well developed and well nourished, alert, oriented times 3 and appears comfortable  Psychiatric: Normal  HEENT: Normocephalic, Atraumatic Trachea Midline, No torticollis  Pulmonary: No audible wheezing or respiratory distress   Abdomen/GI: Non tender, non distended   Cardiovascular: No pitting edema, 2+ radial pulse   Skin: No masses, erythema, lacerations, fluctation, ulcerations  Neurovascular: Sensation Intact to the Median, Ulnar, Radial Nerve, Motor Intact to the Median, Ulnar, Radial Nerve and Pulses Intact  Musculoskeletal: Normal, except as noted in detailed exam and in HPI.       MUSCULOSKELETAL EXAMINATION:  SILT  Short composite fist 2nd to stiffness and pain  Mild tenderness at multiple trigger fingers of Right hand  ___________________________________________________  STUDIES REVIEWED:  I have personally reviewed AP lateral and oblique radiographs of Rigth hand   which demonstrate   Severe Osteoarthritis           PROCEDURES PERFORMED:  Procedures  No Procedures performed today    _____________________________________________________      Lito Sin    I,:  Janes Saeed am acting as a scribe while in the presence of the attending physician.:       I,:  Charisma Sherwood MD personally performed the services described in this documentation    as scribed in my presence.:

## 2023-10-16 NOTE — TELEPHONE ENCOUNTER
[facility] Care Plan    POC reviewed with Kobi Pittman and family at 0300. Pt verbalized understanding. Questions and concerns addressed. No acute events overnight. Pt progressing toward goals. Will continue to monitor. See below and flowsheets for full assessment and VS info.     -PRN Hydralazine given x1  -PRN tylenol given x1  -PRN zofran given x1  -Cardene titrated to keep SBP <140      Is this a stroke patient? yes- Stroke booklet reviewed with family, risk factors identified for patient and stroke booklet remains at bedside for ongoing education.     Neuro:  Malick Coma Scale  Best Eye Response: 3-->(E3) to speech  Best Motor Response: 6-->(M6) obeys commands  Best Verbal Response: 4-->(V4) confused  Malick Coma Scale Score: 13  Assessment Qualifiers: patient not sedated/intubated, no eye obstruction present  Pupil PERRLA: yes     24hr Temp:  [97.9 °F (36.6 °C)-98.5 °F (36.9 °C)]     CV:   Rhythm: normal sinus rhythm  BP goals:   SBP < 140  MAP > 65    Resp:      Vent Mode: A/C  Set Rate: 12 BPM  Oxygen Concentration (%): 40  Vt Set: 450 mL  PEEP/CPAP: 5 cmH20    Plan: N/A    GI/:     Diet/Nutrition Received: regular  Last Bowel Movement: 10/13/23  Voiding Characteristics: due to void    Intake/Output Summary (Last 24 hours) at 10/16/2023 0601  Last data filed at 10/16/2023 0501  Gross per 24 hour   Intake 726.27 ml   Output 150 ml   Net 576.27 ml     Unmeasured Output  Stool Occurrence: 0    Labs/Accuchecks:  Recent Labs   Lab 10/16/23  0039   WBC 13.44*   RBC 4.00*   HGB 12.9*   HCT 38.0*         Recent Labs   Lab 10/16/23  0039      K 3.7   CO2 20*      BUN 18   CREATININE 0.8   ALKPHOS 73   ALT 13   AST 24   BILITOT 1.0      Recent Labs   Lab 10/14/23  0404   INR 1.0   APTT 22.8      Recent Labs   Lab 10/13/23  2210   CPK 92   TROPONINI <0.012       Electrolytes: N/A - electrolytes WDL  Accuchecks: Q6H    Gtts:   niCARdipine 5 mg/hr (10/16/23 0301)       LDA/Wounds:  Lines/Drains/Airways   Please advise and authorize, thanks      Peripheral Intravenous Line  Duration                  Peripheral IV - Single Lumen 10/13/23 2210 20 G Left Antecubital 2 days         Peripheral IV - Single Lumen 10/13/23 2258 18 G Anterior;Distal;Right Upper Arm 2 days                  Wounds: No  Wound care consulted: No

## 2023-10-17 ENCOUNTER — OFFICE VISIT (OUTPATIENT)
Dept: FAMILY MEDICINE CLINIC | Facility: CLINIC | Age: 88
End: 2023-10-17
Payer: MEDICARE

## 2023-10-17 VITALS
TEMPERATURE: 97.3 F | OXYGEN SATURATION: 97 % | HEART RATE: 68 BPM | DIASTOLIC BLOOD PRESSURE: 100 MMHG | SYSTOLIC BLOOD PRESSURE: 162 MMHG

## 2023-10-17 DIAGNOSIS — R05.1 ACUTE COUGH: ICD-10-CM

## 2023-10-17 DIAGNOSIS — J06.9 ACUTE URI: Primary | ICD-10-CM

## 2023-10-17 PROCEDURE — 99214 OFFICE O/P EST MOD 30 MIN: CPT | Performed by: NURSE PRACTITIONER

## 2023-10-17 RX ORDER — BENZONATATE 100 MG/1
100 CAPSULE ORAL 3 TIMES DAILY PRN
Qty: 20 CAPSULE | Refills: 0 | Status: SHIPPED | OUTPATIENT
Start: 2023-10-17 | End: 2023-10-20

## 2023-10-17 RX ORDER — DOXYCYCLINE 100 MG/1
100 TABLET ORAL 2 TIMES DAILY
Qty: 14 TABLET | Refills: 0 | Status: SHIPPED | OUTPATIENT
Start: 2023-10-17 | End: 2023-10-20

## 2023-10-17 NOTE — PATIENT INSTRUCTIONS
Start antibiotic, this is doxycycline 100 mg twice daily for 7 days. Take antibiotic with food. If you take any vitamin supplements, try to separate dose by 4-6 hours as this may impair absorption of the antibiotic. Start cough medication, this is the Tessalon perles. One pill every 8 hours as needed for cough. Stay well hydrated. Can use over the counter mucinex as well. Please call the office if you are experiencing any worsening of symptoms or no symptom improvement.

## 2023-10-20 ENCOUNTER — APPOINTMENT (EMERGENCY)
Dept: RADIOLOGY | Facility: HOSPITAL | Age: 88
DRG: 152 | End: 2023-10-20
Payer: MEDICARE

## 2023-10-20 ENCOUNTER — APPOINTMENT (EMERGENCY)
Dept: CT IMAGING | Facility: HOSPITAL | Age: 88
DRG: 152 | End: 2023-10-20
Payer: MEDICARE

## 2023-10-20 ENCOUNTER — HOSPITAL ENCOUNTER (INPATIENT)
Facility: HOSPITAL | Age: 88
LOS: 1 days | Discharge: HOME HEALTH | DRG: 152 | End: 2023-10-21
Attending: EMERGENCY MEDICINE | Admitting: INTERNAL MEDICINE
Payer: MEDICARE

## 2023-10-20 DIAGNOSIS — R41.82 CHANGE IN MENTAL STATUS: Primary | ICD-10-CM

## 2023-10-20 DIAGNOSIS — J06.9 ACUTE URI: ICD-10-CM

## 2023-10-20 PROBLEM — A86 ACUTE ENCEPHALITIS: Status: ACTIVE | Noted: 2023-10-20

## 2023-10-20 PROBLEM — G93.41 ACUTE METABOLIC ENCEPHALOPATHY: Status: ACTIVE | Noted: 2023-10-20

## 2023-10-20 LAB
2HR DELTA HS TROPONIN: -1 NG/L
ALBUMIN SERPL BCP-MCNC: 4.5 G/DL (ref 3.5–5)
ALP SERPL-CCNC: 84 U/L (ref 34–104)
ALT SERPL W P-5'-P-CCNC: 15 U/L (ref 7–52)
ANION GAP SERPL CALCULATED.3IONS-SCNC: 10 MMOL/L
ANISOCYTOSIS BLD QL SMEAR: PRESENT
APTT PPP: 28 SECONDS (ref 23–37)
AST SERPL W P-5'-P-CCNC: 23 U/L (ref 13–39)
ATRIAL RATE: 84 BPM
BACTERIA UR QL AUTO: ABNORMAL /HPF
BASOPHILS # BLD MANUAL: 0 THOUSAND/UL (ref 0–0.1)
BASOPHILS NFR MAR MANUAL: 0 % (ref 0–1)
BILIRUB SERPL-MCNC: 0.59 MG/DL (ref 0.2–1)
BILIRUB UR QL STRIP: NEGATIVE
BUN SERPL-MCNC: 23 MG/DL (ref 5–25)
CALCIUM SERPL-MCNC: 11.6 MG/DL (ref 8.4–10.2)
CARDIAC TROPONIN I PNL SERPL HS: 32 NG/L
CARDIAC TROPONIN I PNL SERPL HS: 33 NG/L
CHLORIDE SERPL-SCNC: 104 MMOL/L (ref 96–108)
CLARITY UR: CLEAR
CO2 SERPL-SCNC: 23 MMOL/L (ref 21–32)
COLOR UR: ABNORMAL
CREAT SERPL-MCNC: 1.11 MG/DL (ref 0.6–1.3)
EOSINOPHIL # BLD MANUAL: 0.06 THOUSAND/UL (ref 0–0.4)
EOSINOPHIL NFR BLD MANUAL: 1 % (ref 0–6)
ERYTHROCYTE [DISTWIDTH] IN BLOOD BY AUTOMATED COUNT: 15.9 % (ref 11.6–15.1)
FLUAV RNA RESP QL NAA+PROBE: NEGATIVE
FLUBV RNA RESP QL NAA+PROBE: NEGATIVE
GFR SERPL CREATININE-BSD FRML MDRD: 42 ML/MIN/1.73SQ M
GLUCOSE SERPL-MCNC: 111 MG/DL (ref 65–140)
GLUCOSE UR STRIP-MCNC: NEGATIVE MG/DL
HCT VFR BLD AUTO: 36.4 % (ref 34.8–46.1)
HGB BLD-MCNC: 11.5 G/DL (ref 11.5–15.4)
HGB UR QL STRIP.AUTO: ABNORMAL
HYALINE CASTS #/AREA URNS LPF: ABNORMAL /LPF
INR PPP: 1.1 (ref 0.84–1.19)
KETONES UR STRIP-MCNC: NEGATIVE MG/DL
LACTATE SERPL-SCNC: 1.8 MMOL/L (ref 0.5–2)
LEUKOCYTE ESTERASE UR QL STRIP: NEGATIVE
LG PLATELETS BLD QL SMEAR: PRESENT
LYMPHOCYTES # BLD AUTO: 0.95 THOUSAND/UL (ref 0.6–4.47)
LYMPHOCYTES # BLD AUTO: 15 % (ref 14–44)
MCH RBC QN AUTO: 26.9 PG (ref 26.8–34.3)
MCHC RBC AUTO-ENTMCNC: 31.6 G/DL (ref 31.4–37.4)
MCV RBC AUTO: 85 FL (ref 82–98)
METAMYELOCYTES NFR BLD MANUAL: 1 % (ref 0–1)
MONOCYTES # BLD AUTO: 0.63 THOUSAND/UL (ref 0–1.22)
MONOCYTES NFR BLD: 10 % (ref 4–12)
MUCOUS THREADS UR QL AUTO: ABNORMAL
NEUTROPHILS # BLD MANUAL: 4.63 THOUSAND/UL (ref 1.85–7.62)
NEUTS BAND NFR BLD MANUAL: 5 % (ref 0–8)
NEUTS SEG NFR BLD AUTO: 68 % (ref 43–75)
NITRITE UR QL STRIP: NEGATIVE
NON-SQ EPI CELLS URNS QL MICRO: ABNORMAL /HPF
OVALOCYTES BLD QL SMEAR: PRESENT
P AXIS: 83 DEGREES
PH UR STRIP.AUTO: 5.5 [PH]
PLATELET # BLD AUTO: 250 THOUSANDS/UL (ref 149–390)
PLATELET BLD QL SMEAR: ADEQUATE
PMV BLD AUTO: 12.6 FL (ref 8.9–12.7)
POTASSIUM SERPL-SCNC: 4 MMOL/L (ref 3.5–5.3)
PR INTERVAL: 170 MS
PROCALCITONIN SERPL-MCNC: 0.06 NG/ML
PROT SERPL-MCNC: 7.8 G/DL (ref 6.4–8.4)
PROT UR STRIP-MCNC: ABNORMAL MG/DL
PROTHROMBIN TIME: 14.2 SECONDS (ref 11.6–14.5)
QRS AXIS: -77 DEGREES
QRSD INTERVAL: 164 MS
QT INTERVAL: 432 MS
QTC INTERVAL: 510 MS
RBC # BLD AUTO: 4.28 MILLION/UL (ref 3.81–5.12)
RBC #/AREA URNS AUTO: ABNORMAL /HPF
RBC MORPH BLD: PRESENT
RSV RNA RESP QL NAA+PROBE: NEGATIVE
SARS-COV-2 RNA RESP QL NAA+PROBE: NEGATIVE
SODIUM SERPL-SCNC: 137 MMOL/L (ref 135–147)
SP GR UR STRIP.AUTO: 1.01 (ref 1–1.03)
T WAVE AXIS: 97 DEGREES
UROBILINOGEN UR STRIP-ACNC: <2 MG/DL
VENTRICULAR RATE: 84 BPM
WBC # BLD AUTO: 6.34 THOUSAND/UL (ref 4.31–10.16)
WBC #/AREA URNS AUTO: ABNORMAL /HPF

## 2023-10-20 PROCEDURE — 87040 BLOOD CULTURE FOR BACTERIA: CPT

## 2023-10-20 PROCEDURE — 99285 EMERGENCY DEPT VISIT HI MDM: CPT | Performed by: EMERGENCY MEDICINE

## 2023-10-20 PROCEDURE — 85007 BL SMEAR W/DIFF WBC COUNT: CPT

## 2023-10-20 PROCEDURE — 99223 1ST HOSP IP/OBS HIGH 75: CPT | Performed by: INTERNAL MEDICINE

## 2023-10-20 PROCEDURE — 85610 PROTHROMBIN TIME: CPT

## 2023-10-20 PROCEDURE — 84484 ASSAY OF TROPONIN QUANT: CPT

## 2023-10-20 PROCEDURE — 85730 THROMBOPLASTIN TIME PARTIAL: CPT

## 2023-10-20 PROCEDURE — 81001 URINALYSIS AUTO W/SCOPE: CPT

## 2023-10-20 PROCEDURE — 93005 ELECTROCARDIOGRAM TRACING: CPT

## 2023-10-20 PROCEDURE — 83605 ASSAY OF LACTIC ACID: CPT

## 2023-10-20 PROCEDURE — 85027 COMPLETE CBC AUTOMATED: CPT

## 2023-10-20 PROCEDURE — 36415 COLL VENOUS BLD VENIPUNCTURE: CPT

## 2023-10-20 PROCEDURE — 71046 X-RAY EXAM CHEST 2 VIEWS: CPT

## 2023-10-20 PROCEDURE — 99285 EMERGENCY DEPT VISIT HI MDM: CPT

## 2023-10-20 PROCEDURE — 70450 CT HEAD/BRAIN W/O DYE: CPT

## 2023-10-20 PROCEDURE — 84145 PROCALCITONIN (PCT): CPT

## 2023-10-20 PROCEDURE — 93010 ELECTROCARDIOGRAM REPORT: CPT

## 2023-10-20 PROCEDURE — 0241U HB NFCT DS VIR RESP RNA 4 TRGT: CPT

## 2023-10-20 PROCEDURE — 80053 COMPREHEN METABOLIC PANEL: CPT

## 2023-10-20 PROCEDURE — G1004 CDSM NDSC: HCPCS

## 2023-10-20 RX ORDER — DORZOLAMIDE HYDROCHLORIDE AND TIMOLOL MALEATE 20; 5 MG/ML; MG/ML
1 SOLUTION/ DROPS OPHTHALMIC DAILY
Status: DISCONTINUED | OUTPATIENT
Start: 2023-10-20 | End: 2023-10-21 | Stop reason: HOSPADM

## 2023-10-20 RX ORDER — ONDANSETRON 2 MG/ML
4 INJECTION INTRAMUSCULAR; INTRAVENOUS EVERY 6 HOURS PRN
Status: DISCONTINUED | OUTPATIENT
Start: 2023-10-20 | End: 2023-10-21 | Stop reason: HOSPADM

## 2023-10-20 RX ORDER — MELATONIN
1000 DAILY
Status: DISCONTINUED | OUTPATIENT
Start: 2023-10-21 | End: 2023-10-21 | Stop reason: HOSPADM

## 2023-10-20 RX ORDER — PANTOPRAZOLE SODIUM 40 MG/1
40 TABLET, DELAYED RELEASE ORAL
Status: DISCONTINUED | OUTPATIENT
Start: 2023-10-21 | End: 2023-10-21 | Stop reason: HOSPADM

## 2023-10-20 RX ORDER — CALCIUM CARBONATE 500 MG/1
1000 TABLET, CHEWABLE ORAL 3 TIMES DAILY PRN
Status: DISCONTINUED | OUTPATIENT
Start: 2023-10-20 | End: 2023-10-21 | Stop reason: HOSPADM

## 2023-10-20 RX ORDER — GABAPENTIN 100 MG/1
200 CAPSULE ORAL 3 TIMES DAILY
Status: DISCONTINUED | OUTPATIENT
Start: 2023-10-20 | End: 2023-10-21 | Stop reason: HOSPADM

## 2023-10-20 RX ORDER — GUAIFENESIN/DEXTROMETHORPHAN 100-10MG/5
10 SYRUP ORAL EVERY 4 HOURS PRN
Status: DISCONTINUED | OUTPATIENT
Start: 2023-10-20 | End: 2023-10-21 | Stop reason: HOSPADM

## 2023-10-20 RX ORDER — ASCORBIC ACID 500 MG
500 TABLET ORAL DAILY
Status: DISCONTINUED | OUTPATIENT
Start: 2023-10-21 | End: 2023-10-21 | Stop reason: HOSPADM

## 2023-10-20 RX ORDER — LOSARTAN POTASSIUM 25 MG/1
25 TABLET ORAL DAILY
Status: DISCONTINUED | OUTPATIENT
Start: 2023-10-21 | End: 2023-10-21 | Stop reason: HOSPADM

## 2023-10-20 RX ORDER — ASPIRIN 81 MG/1
81 TABLET, CHEWABLE ORAL DAILY
Status: DISCONTINUED | OUTPATIENT
Start: 2023-10-21 | End: 2023-10-21 | Stop reason: HOSPADM

## 2023-10-20 RX ORDER — AZITHROMYCIN 250 MG/1
500 TABLET, FILM COATED ORAL EVERY 24 HOURS
Status: DISCONTINUED | OUTPATIENT
Start: 2023-10-20 | End: 2023-10-21 | Stop reason: HOSPADM

## 2023-10-20 RX ORDER — SODIUM CHLORIDE 9 MG/ML
50 INJECTION, SOLUTION INTRAVENOUS CONTINUOUS
Status: DISPENSED | OUTPATIENT
Start: 2023-10-20 | End: 2023-10-21

## 2023-10-20 RX ORDER — AMLODIPINE BESYLATE 2.5 MG/1
2.5 TABLET ORAL 2 TIMES DAILY
Status: DISCONTINUED | OUTPATIENT
Start: 2023-10-20 | End: 2023-10-21 | Stop reason: HOSPADM

## 2023-10-20 RX ORDER — POLYETHYLENE GLYCOL 3350 17 G/17G
17 POWDER, FOR SOLUTION ORAL DAILY PRN
Status: DISCONTINUED | OUTPATIENT
Start: 2023-10-20 | End: 2023-10-21 | Stop reason: HOSPADM

## 2023-10-20 RX ORDER — HEPARIN SODIUM 5000 [USP'U]/ML
5000 INJECTION, SOLUTION INTRAVENOUS; SUBCUTANEOUS EVERY 8 HOURS SCHEDULED
Status: DISCONTINUED | OUTPATIENT
Start: 2023-10-20 | End: 2023-10-21 | Stop reason: HOSPADM

## 2023-10-20 RX ORDER — ACETAMINOPHEN 325 MG/1
650 TABLET ORAL EVERY 8 HOURS SCHEDULED
Status: DISCONTINUED | OUTPATIENT
Start: 2023-10-20 | End: 2023-10-21 | Stop reason: HOSPADM

## 2023-10-20 RX ORDER — BENZONATATE 100 MG/1
100 CAPSULE ORAL 3 TIMES DAILY PRN
Status: DISCONTINUED | OUTPATIENT
Start: 2023-10-20 | End: 2023-10-21 | Stop reason: HOSPADM

## 2023-10-20 RX ORDER — DOXEPIN HYDROCHLORIDE 50 MG/1
50 CAPSULE ORAL
Status: DISCONTINUED | OUTPATIENT
Start: 2023-10-20 | End: 2023-10-21 | Stop reason: HOSPADM

## 2023-10-20 RX ORDER — GUAIFENESIN 600 MG/1
600 TABLET, EXTENDED RELEASE ORAL EVERY 12 HOURS SCHEDULED
Status: DISCONTINUED | OUTPATIENT
Start: 2023-10-20 | End: 2023-10-21 | Stop reason: HOSPADM

## 2023-10-20 RX ADMIN — AMLODIPINE BESYLATE 2.5 MG: 2.5 TABLET ORAL at 21:16

## 2023-10-20 RX ADMIN — ACETAMINOPHEN 325MG 650 MG: 325 TABLET ORAL at 17:42

## 2023-10-20 RX ADMIN — GUAIFENESIN AND DEXTROMETHORPHAN 10 ML: 100; 10 SYRUP ORAL at 22:50

## 2023-10-20 RX ADMIN — HEPARIN SODIUM 5000 UNITS: 5000 INJECTION INTRAVENOUS; SUBCUTANEOUS at 17:42

## 2023-10-20 RX ADMIN — SODIUM CHLORIDE 50 ML/HR: 0.9 INJECTION, SOLUTION INTRAVENOUS at 17:42

## 2023-10-20 RX ADMIN — AZITHROMYCIN DIHYDRATE 500 MG: 250 TABLET ORAL at 17:41

## 2023-10-20 RX ADMIN — GABAPENTIN 200 MG: 100 CAPSULE ORAL at 21:17

## 2023-10-20 RX ADMIN — DOXEPIN HYDROCHLORIDE 50 MG: 50 CAPSULE ORAL at 21:17

## 2023-10-20 NOTE — ASSESSMENT & PLAN NOTE
BP slightly elevated at time of presentation  Continue Norvasc and losartan per hospital formulary for telmisartan  Hold Lasix given poor oral intake

## 2023-10-20 NOTE — ASSESSMENT & PLAN NOTE
Patient notes one week of congestion, cough and generalized malaise   Seen by PCP on Tuesday and started on doxycycline and Tessalon Perles, patient notes no improvement  We will switch to Zithromax and guaifenesin  Supportive measures while inpatient

## 2023-10-20 NOTE — ED ATTENDING ATTESTATION
10/20/2023  I, Lexy Nunez MD, saw and evaluated the patient. I have discussed the patient with the resident/non-physician practitioner and agree with the resident's/non-physician practitioner's findings, Plan of Care, and MDM as documented in the resident's/non-physician practitioner's note, except where noted. All available labs and Radiology studies were reviewed. I was present for key portions of any procedure(s) performed by the resident/non-physician practitioner and I was immediately available to provide assistance. At this point I agree with the current assessment done in the Emergency Department. I have conducted an independent evaluation of this patient a history and physical is as follows:  Patient is a 79 yo female, with URI symptoms for past 2 weeks, taking Doxy by PCP, no relief, more confused recently, EMS noted tachypnea, c/o cough, no chest pain, abd or back pain, no fever. On exam, conscious, alert, confused, does not know month, Lungs CTA, CVS RRR, Abd soft, NTND, Neuro no focal neuro deficits, no slurred speech, no facial droop. D/D: Recent URI, Pneumonia, ACS, CHF, Confusion, Change mental status, r/o ICH/SDH, we will check labs, CXR, CT head, EKG. ED Course     Labs, imaging results reviewed.  We will admit for changed mental status    Critical Care Time  Procedures

## 2023-10-20 NOTE — ASSESSMENT & PLAN NOTE
Patient brought into the hospital by son for disorientation and confusion this morning as well as cough and URI symptoms  Patient lives alone and was reportedly disoriented when son walked in this morning  Recently started on doxycycline and Tessalon Perles for URI-we will hold further doses, question medication reaction  Does appear to have productive cough, start Mucinex and placed on 3-day course of Zithromax  Gentle IV fluid hydration  Consider gerontology follow up inpatient vs outpatient   PT/OT consults

## 2023-10-20 NOTE — Clinical Note
Case was discussed with NASRIN and the patient's admission status was agreed to be Admission Status: inpatient status to the service of Dr. Dubose

## 2023-10-20 NOTE — H&P
[facility]  H&P  Name: Sammie blair female I MRN: [id number]  Unit/Bed#: ED-05 I Date of Admission: 10/20/2023   Date of Service: 10/20/2023 I Hospital Day: 0      Assessment/Plan   * Acute metabolic encephalopathy  Assessment & Plan  Patient brought into the hospital by son for disorientation and confusion this morning as well as cough and URI symptoms  Patient lives alone and was reportedly disoriented when son walked in this morning  Recently started on doxycycline and Tessalon Perles for URI-we will hold further doses, question medication reaction  Does appear to have productive cough, start Mucinex and placed on 3-day course of Zithromax  Gentle IV fluid hydration  Consider gerontology follow up inpatient vs outpatient   PT/OT consults    Acute URI  Assessment & Plan  Patient notes one week of congestion, cough and generalized malaise   Seen by PCP on Tuesday and started on doxycycline and Tessalon Perles, patient notes no improvement  We will switch to Zithromax and guaifenesin  Supportive measures while inpatient    Stage 3a chronic kidney disease [facility])  Assessment & Plan  Lab Results   Component Value Date    EGFR 42 10/20/2023    EGFR 41 09/27/2023    EGFR 36 10/17/2022    CREATININE 1.11 10/20/2023    CREATININE 1.14 09/27/2023    CREATININE 1.27 10/17/2022     Creatinine stable at baseline at 1.11  Hold Lasix given poor oral intake  Monitor while inpatient    Sick sinus syndrome ([address])  Assessment & Plan  Pacemaker in place    Esophageal reflux  Assessment & Plan  Continue PPI    Essential hypertension  Assessment & Plan  BP slightly elevated at time of presentation  Continue Norvasc and losartan per hospital formulary for telmisartan  Hold Lasix given poor oral intake    Mixed hyperlipidemia  Assessment & Plan  Not maintained on maintenance medications due to age and side effects         VTE Pharmacologic Prophylaxis: VTE Score: 5 High Risk (Score >/= 5) - Pharmacological DVT Prophylaxis Ordered: heparin. Sequential Compression Devices Ordered. Code Status: Prior DNR/DNI  Discussion with family: Updated  (son) at bedside. Anticipated Length of Stay: Patient will be admitted on an inpatient basis with an anticipated length of stay of greater than 2 midnights secondary to acute metabolic encephalopathy. Total Time Spent on Date of Encounter in care of patient: 60 mins. This time was spent on one or more of the following: performing physical exam; counseling and coordination of care; obtaining or reviewing history; documenting in the medical record; reviewing/ordering tests, medications or procedures; communicating with other healthcare professionals and discussing with patient's family/caregivers. Chief Complaint: Confusion and cough    History of Present Illness:  Cesar Mcintyre is a 80 y.o. female with a PMH of hypertension, GERD, CKD who presents with confusion and cough. Patient was brought in to the hospital after son found patient to be confused this morning. He notes she lives alone and he visits daily, reports patient was confused as to where she was this morning and what she was doing. He notes she was dressed abnormally. Does report she is more awake and alert at time of admission. Patient also reports she has had upper respiratory infection with cough and congestion over the past week and a half. Was seen by PCP on Tuesday and started on doxycycline and Tessalon Perles. She notes minimal improvement in her symptoms with these medications. She does report poor appetite and poor oral intake. Notes she feels generally unwell with myalgias. Does complain of chronic knee pain. Denies any known fevers or chills at home. Review of Systems:  Review of Systems   Constitutional:  Positive for appetite change. Negative for chills and fever. HENT:  Positive for congestion. Negative for trouble swallowing.     Eyes:  Negative for visual disturbance. Respiratory:  Positive for cough and shortness of breath. Cardiovascular:  Negative for chest pain and leg swelling. Gastrointestinal:  Negative for abdominal pain and vomiting. Genitourinary:  Negative for difficulty urinating and dysuria. Musculoskeletal:  Positive for myalgias. Negative for gait problem. Skin:  Negative for rash. Allergic/Immunologic: Negative for immunocompromised state. Neurological:  Negative for dizziness. Psychiatric/Behavioral:  Positive for confusion. Past Medical and Surgical History:   Past Medical History:   Diagnosis Date    Depression     Gout     H/O vaginal hysterectomy     resolved-4/17/2015       Past Surgical History:   Procedure Laterality Date    CATARACT EXTRACTION      TOTAL ABDOMINAL HYSTERECTOMY      with removal of both ovaries    VAGINAL HYSTERECTOMY      resolved-4/17/2015       Meds/Allergies:  Prior to Admission medications    Medication Sig Start Date End Date Taking?  Authorizing Provider   amLODIPine (NORVASC) 2.5 mg tablet Take 1 tablet (2.5 mg total) by mouth 2 (two) times a day 8/15/23   Edwin Jacobsen DO   ascorbic acid (VITAMIN C) 250 mg tablet Take 500 mg by mouth daily    Historical Provider, MD   aspirin 81 MG tablet Take by mouth daily  3/2/15   Historical Provider, MD   benzonatate (TESSALON PERLES) 100 mg capsule Take 1 capsule (100 mg total) by mouth 3 (three) times a day as needed for cough 10/17/23   DEANDRA Aldrich   Cholecalciferol (Vitamin D3) 25 MCG (1000 UT) CAPS Take 25 each by mouth in the morning    Historical Provider, MD   Diclofenac Sodium (VOLTAREN) 1 % Apply 2 g topically 4 (four) times a day 8/30/23   Candice Correa MD   dorzolamide-timolol (COSOPT) 22.3-6.8 MG/ML ophthalmic solution INSTILL 1 DROP IN [facility] E[facility] EVERY DAY 9/25/19   Historical Provider, MD   doxepin (SINEquan) 50 mg capsule Take 1 capsule (50 mg total) by mouth daily at bedtime 9/75/71   Jo Beyer DO doxycycline (ADOXA) 100 MG tablet Take 1 tablet (100 mg total) by mouth 2 (two) times a day for 7 days 10/17/23 10/24/23  DEANDRA Tipton   Elastic Bandages & Supports (CARPAL TUNNEL WRIST STABILIZER) MISC by Does not apply route daily 8/62/41   Domo Marie DO   furosemide (LASIX) 20 mg tablet Take 1 tablet (20 mg total) by mouth if needed (Lower extremity edema) May take 3 times weekly as needed for lower extremity swelling. 11/8/22   Ragini Briceño MD   gabapentin (NEURONTIN) 300 mg capsule Take 1 capsule (300 mg total) by mouth 3 (three) times a day 3/88/23   Domo Marie DO   omeprazole (PriLOSEC) 20 mg delayed release capsule Take 1 capsule (20 mg total) by mouth daily 8/15/23   Vinicius Schafer DO   phenazopyridine (PYRIDIUM) 200 mg tablet Take 1 tablet (200 mg total) by mouth 3 (three) times a day with meals 9/33/62   Domo Marie DO   telmisartan (MICARDIS) 20 MG tablet Take 1 tablet (20 mg total) by mouth daily 1/05/47   Domo Marie DO   ezetimibe (ZETIA) 10 mg tablet Take 1 tablet (10 mg total) by mouth daily 11/2/20 5/51/49  Domo Marie DO     I have reviewed home medications with patient personally. Allergies: Allergies   Allergen Reactions    Fish-Derived Products - Food Allergy      GI upset    Allopurinol Rash     Category: Allergy;        Social History:  Marital Status:     Occupation: Unknown  Patient Pre-hospital Living Situation: Home  Patient Pre-hospital Level of Mobility: walks  Patient Pre-hospital Diet Restrictions: None  Substance Use History:   Social History     Substance and Sexual Activity   Alcohol Use Not Currently     Social History     Tobacco Use   Smoking Status Never   Smokeless Tobacco Never     Social History     Substance and Sexual Activity   Drug Use No       Family History:  Family History   Problem Relation Age of Onset    Heart attack Mother         MI    Heart attack Father         MI    Hypertension Sister     Stomach cancer Sister     Hypertension Brother        Physical Exam:     Vitals:   Blood Pressure: 160/71 (10/20/23 1630)  Pulse: 97 (10/20/23 1630)  Temperature: 97.8 °F (36.6 °C) (10/20/23 1232)  Temp Source: Oral (10/20/23 1232)  Respirations: 20 (10/20/23 1556)  SpO2: 97 % (10/20/23 1630)    Physical Exam  Vitals reviewed. Constitutional:       General: She is not in acute distress. HENT:      Head: Normocephalic and atraumatic. Eyes:      General: No scleral icterus. Conjunctiva/sclera: Conjunctivae normal.   Cardiovascular:      Rate and Rhythm: Normal rate and regular rhythm. Heart sounds: No murmur heard. Pulmonary:      Effort: Pulmonary effort is normal. No respiratory distress. Breath sounds: Normal breath sounds. Abdominal:      General: Bowel sounds are normal. There is no distension. Palpations: Abdomen is soft. Tenderness: There is no abdominal tenderness. Musculoskeletal:      Cervical back: Neck supple. Right lower leg: No edema. Left lower leg: No edema. Skin:     General: Skin is warm and dry. Neurological:      Mental Status: She is alert and oriented to person, place, and time. Cranial Nerves: No cranial nerve deficit. Motor: No weakness.    Psychiatric:         Mood and Affect: Mood normal.         Behavior: Behavior normal.          Additional Data:     Lab Results:  Results from last 7 days   Lab Units 10/20/23  1316   WBC Thousand/uL 6.34   HEMOGLOBIN g/dL 11.5   HEMATOCRIT % 36.4   PLATELETS Thousands/uL 250   BANDS PCT % 5   LYMPHO PCT % 15   MONO PCT % 10   EOS PCT % 1     Results from last 7 days   Lab Units 10/20/23  1316   SODIUM mmol/L 137   POTASSIUM mmol/L 4.0   CHLORIDE mmol/L 104   CO2 mmol/L 23   BUN mg/dL 23   CREATININE mg/dL 1.11   ANION GAP mmol/L 10   CALCIUM mg/dL 11.6*   ALBUMIN g/dL 4.5   TOTAL BILIRUBIN mg/dL 0.59   ALK PHOS U/L 84   ALT U/L 15   AST U/L 23   GLUCOSE RANDOM mg/dL 111     Results from last 7 days   Lab Units 10/20/23  1316   INR  1.10             Results from last 7 days   Lab Units 10/20/23  1316   LACTIC ACID mmol/L 1.8   PROCALCITONIN ng/ml 0.06       Lines/Drains:  Invasive Devices       Peripheral Intravenous Line  Duration             Peripheral IV 10/20/23 Left Antecubital <1 day                        Imaging: Reviewed radiology reports from this admission including: chest xray and CT head  XR chest 2 views   Final Result by Fuad Sotomayor MD (10/20 1405)      No acute cardiopulmonary disease. Workstation performed: [id number]         CT head without contrast   Final Result by Rick Reed MD (10/20 1401)      No acute intracranial abnormality. Mild microangiopathy. Workstation performed: [id number]             EKG and Other Studies Reviewed on Admission:   EKG: Paced rhythm. HR 84.    ** Please Note: This note has been constructed using a voice recognition system.  **

## 2023-10-20 NOTE — ASSESSMENT & PLAN NOTE
Lab Results   Component Value Date    EGFR 42 10/20/2023    EGFR 41 09/27/2023    EGFR 36 10/17/2022    CREATININE 1.11 10/20/2023    CREATININE 1.14 09/27/2023    CREATININE 1.27 10/17/2022     Creatinine stable at baseline at 1.11  Hold Lasix given poor oral intake  Monitor while inpatient

## 2023-10-20 NOTE — PLAN OF CARE
Problem: MOBILITY - ADULT  Goal: Maintain or return to baseline ADL function  Description: INTERVENTIONS:  -  Assess patient's ability to carry out ADLs; assess patient's baseline for ADL function and identify physical deficits which impact ability to perform ADLs (bathing, care of mouth/teeth, toileting, grooming, dressing, etc.)  - Assess/evaluate cause of self-care deficits   - Assess range of motion  - Assess patient's mobility; develop plan if impaired  - Assess patient's need for assistive devices and provide as appropriate  - Encourage maximum independence but intervene and supervise when necessary  - Involve family in performance of ADLs  - Assess for home care needs following discharge   - Consider OT consult to assist with ADL evaluation and planning for discharge  - Provide patient education as appropriate  Outcome: Progressing     Problem: PAIN - ADULT  Goal: Verbalizes/displays adequate comfort level or baseline comfort level  Description: Interventions:  - Encourage patient to monitor pain and request assistance  - Assess pain using appropriate pain scale  - Administer analgesics based on type and severity of pain and evaluate response  - Implement non-pharmacological measures as appropriate and evaluate response  - Consider cultural and social influences on pain and pain management  - Notify physician/advanced practitioner if interventions unsuccessful or patient reports new pain  Outcome: Progressing     Problem: INFECTION - ADULT  Goal: Absence of fever/infection during neutropenic period  Description: INTERVENTIONS:  - Monitor WBC    Outcome: Progressing     Problem: SAFETY ADULT  Goal: Maintain or return to baseline ADL function  Description: INTERVENTIONS:  -  Assess patient's ability to carry out ADLs; assess patient's baseline for ADL function and identify physical deficits which impact ability to perform ADLs (bathing, care of mouth/teeth, toileting, grooming, dressing, etc.)  - Assess/evaluate cause of self-care deficits   - Assess range of motion  - Assess patient's mobility; develop plan if impaired  - Assess patient's need for assistive devices and provide as appropriate  - Encourage maximum independence but intervene and supervise when necessary  - Involve family in performance of ADLs  - Assess for home care needs following discharge   - Consider OT consult to assist with ADL evaluation and planning for discharge  - Provide patient education as appropriate  Outcome: Progressing     Problem: DISCHARGE PLANNING  Goal: Discharge to home or other facility with appropriate resources  Description: INTERVENTIONS:  - Identify barriers to discharge w/patient and caregiver  - Arrange for needed discharge resources and transportation as appropriate  - Identify discharge learning needs (meds, wound care, etc.)  - Arrange for interpretive services to assist at discharge as needed  - Refer to Case Management Department for coordinating discharge planning if the patient needs post-hospital services based on physician/advanced practitioner order or complex needs related to functional status, cognitive ability, or social support system  Outcome: Progressing     Problem: Knowledge Deficit  Goal: Patient/family/caregiver demonstrates understanding of disease process, treatment plan, medications, and discharge instructions  Description: Complete learning assessment and assess knowledge base.   Interventions:  - Provide teaching at level of understanding  - Provide teaching via preferred learning methods  Outcome: Progressing     Problem: NEUROSENSORY - ADULT  Goal: Achieves stable or improved neurological status  Description: INTERVENTIONS  - Monitor and report changes in neurological status  - Monitor vital signs such as temperature, blood pressure, glucose, and any other labs ordered   - Initiate measures to prevent increased intracranial pressure  - Monitor for seizure activity and implement precautions if appropriate      Outcome: Progressing     Problem: RESPIRATORY - ADULT  Goal: Achieves optimal ventilation and oxygenation  Description: INTERVENTIONS:  - Assess for changes in respiratory status  - Assess for changes in mentation and behavior  - Position to facilitate oxygenation and minimize respiratory effort  - Oxygen administered by appropriate delivery if ordered  - Initiate smoking cessation education as indicated  - Encourage broncho-pulmonary hygiene including cough, deep breathe, Incentive Spirometry  - Assess the need for suctioning and aspirate as needed  - Assess and instruct to report SOB or any respiratory difficulty  - Respiratory Therapy support as indicated  Outcome: Progressing

## 2023-10-21 ENCOUNTER — HOME HEALTH ADMISSION (OUTPATIENT)
Dept: HOME HEALTH SERVICES | Facility: HOME HEALTHCARE | Age: 88
End: 2023-10-21

## 2023-10-21 VITALS
TEMPERATURE: 98.5 F | OXYGEN SATURATION: 95 % | HEIGHT: 62 IN | BODY MASS INDEX: 26.16 KG/M2 | SYSTOLIC BLOOD PRESSURE: 160 MMHG | HEART RATE: 95 BPM | DIASTOLIC BLOOD PRESSURE: 89 MMHG | RESPIRATION RATE: 16 BRPM

## 2023-10-21 PROCEDURE — NC001 PR NO CHARGE: Performed by: PHYSICIAN ASSISTANT

## 2023-10-21 PROCEDURE — 97163 PT EVAL HIGH COMPLEX 45 MIN: CPT

## 2023-10-21 PROCEDURE — 99239 HOSP IP/OBS DSCHRG MGMT >30: CPT | Performed by: PHYSICIAN ASSISTANT

## 2023-10-21 RX ORDER — AZITHROMYCIN 500 MG/1
500 TABLET, FILM COATED ORAL EVERY 24 HOURS
Qty: 4 TABLET | Refills: 0 | Status: SHIPPED | OUTPATIENT
Start: 2023-10-21 | End: 2023-10-25

## 2023-10-21 RX ORDER — ACETAMINOPHEN 325 MG/1
650 TABLET ORAL EVERY 6 HOURS PRN
Refills: 0
Start: 2023-10-21

## 2023-10-21 RX ADMIN — GABAPENTIN 200 MG: 100 CAPSULE ORAL at 08:48

## 2023-10-21 RX ADMIN — LOSARTAN POTASSIUM 25 MG: 25 TABLET, FILM COATED ORAL at 08:45

## 2023-10-21 RX ADMIN — ACETAMINOPHEN 325MG 650 MG: 325 TABLET ORAL at 06:29

## 2023-10-21 RX ADMIN — DORZOLAMIDE HYDROCHLORIDE AND TIMOLOL MALEATE 1 DROP: 22.3; 6.8 SOLUTION/ DROPS OPHTHALMIC at 08:45

## 2023-10-21 RX ADMIN — ASPIRIN 81 MG CHEWABLE TABLET 81 MG: 81 TABLET CHEWABLE at 08:45

## 2023-10-21 RX ADMIN — AMLODIPINE BESYLATE 2.5 MG: 2.5 TABLET ORAL at 08:45

## 2023-10-21 RX ADMIN — Medication 1000 UNITS: at 08:45

## 2023-10-21 RX ADMIN — HEPARIN SODIUM 5000 UNITS: 5000 INJECTION INTRAVENOUS; SUBCUTANEOUS at 06:29

## 2023-10-21 RX ADMIN — BENZONATATE 100 MG: 100 CAPSULE ORAL at 06:29

## 2023-10-21 RX ADMIN — OXYCODONE HYDROCHLORIDE AND ACETAMINOPHEN 500 MG: 500 TABLET ORAL at 08:45

## 2023-10-21 RX ADMIN — PANTOPRAZOLE SODIUM 40 MG: 40 TABLET, DELAYED RELEASE ORAL at 06:29

## 2023-10-21 NOTE — ASSESSMENT & PLAN NOTE
Patient notes one week of congestion, cough and generalized malaise   Seen by PCP on Tuesday and started on doxycycline and Tessalon Perles, patient notes no improvement  Continue Zithromax and guaifenesin  Supportive measures while inpatient  Blood cultures pending

## 2023-10-21 NOTE — PHYSICAL THERAPY NOTE
PT EVALUATION 09:55-10:24    89 y.o.    [id number]    SOB (shortness of breath) [R06.02]  Change in mental status [R41.82]    Past Medical History:   Diagnosis Date    Depression     Gout     H/O vaginal hysterectomy     resolved-4/17/2015         Past Surgical History:   Procedure Laterality Date    CATARACT EXTRACTION      TOTAL ABDOMINAL HYSTERECTOMY      with removal of both ovaries    VAGINAL HYSTERECTOMY      resolved-4/17/2015        10/21/23 0955   PT Last Visit   PT Visit Date 10/21/23   Note Type   Note type Evaluation   Pain Assessment   Pain Score No Pain   Restrictions/Precautions   Weight Bearing Precautions Per Order No   Other Precautions Bed Alarm; Restraints; Fall Risk;Multiple lines   Home Living   Type of [address] [place] [address]  One level  (0 YOAV, uses stair glide to basement exit.)   Bathroom Shower/Tub Tub/shower unit  (sponge bathes)   Bathroom Toilet Standard   Bathroom Equipment   (none)   Bathroom Accessibility Accessible; Other (Comment)  (not accessible for electric scooter, uses cane to navigate in bathroom.)   [address]; Wheelchair-manual;Electric scooter;Stair glide   Additional Comments resides alone in one story home. Stair glide to exit home thru basement. Prior Function   Level of [place] Independent with ADLs; Independent with functional mobility; Needs assistance with IADLS   Lives With Alone   Receives Help From Family  (daughter and son assist with housework, laundry, transportation, setting up meds in pill box. Daughter comes down from [facility] every Wednesday to do her grocery shopping.)   IADLs Family/Friend/Other provides transportation; Family/Friend/Other provides medication management; Independent with meal prep   Falls in the last 6 months 0   Comments I PTA for transfers and distances of 10 ft with cane only to access bathroom. Uses electric scooter for locomotion in home. Outside of home, family pushes in [facility]. Son visits daily for several hours.    General Additional Pertinent History Pt is 81 y/o female admitted with URI symptoms and encephalopathy. PT consulted. Family/Caregiver Present No   Cognition   Overall Cognitive Status WFL   Arousal/Participation Alert   Orientation Level Oriented to person;Oriented to place;Oriented to time   Following Commands Follows one step commands without difficulty   Comments Pleasant. Subjective   Subjective Initially reluctant to trial OOB. Notes does not walk, uses electric scooter. RUE Assessment   RUE Assessment WFL  (as observed with functional reach and grasp.)   LUE Assessment   LUE Assessment WFL  (as observed with functional reach and grasp.)   RLE Assessment   RLE Assessment X  (limited knee flexion, + arthritic changes, pain)   Strength RLE   RLE Overall Strength 3-/5   LLE Assessment   LLE Assessment X  (+ L foot drop-no use of brace per pt report.)   Strength LLE   LLE Overall Strength 3-/5   L Ankle Dorsiflexion 0/5   L Ankle Plantar Flexion 2/5   Coordination   Movements are Fluid and Coordinated 0   Coordination and Movement Description LE weakness, L foot drop   Light Touch   RLE Light Touch Grossly intact   LLE Light Touch Impaired   LLE Light Touch Comments distally   Bed Mobility   Supine to Sit 3  Moderate assistance   Additional items Assist x 1; Increased time required;Verbal cues;LE management; Bedrails;HOB elevated   Additional Comments Increased time to reach EOB. Transfers   Sit to Stand 3  Moderate assistance   Additional items Assist x 1; Increased time required;Verbal cues   Stand to Sit 3  Moderate assistance   Additional items Assist x 1; Increased time required;Verbal cues   Stand pivot 2  Maximal assistance   Additional items Assist x 1; Increased time required;Verbal cues   Additional Comments Performed SOB OOB to chair only.    Ambulation/Elevation   Gait pattern Not appropriate   Balance   Static Sitting Fair +   Dynamic Sitting Fair -   Static Standing Poor   Dynamic Standing Poor - Ambulatory Zero   Endurance Deficit   Endurance Deficit Yes   Endurance Deficit Description fatigue, weakness, knee pain   Activity Tolerance   Activity Tolerance Patient limited by fatigue;Patient limited by pain   Medical Staff Made Aware NurseMarilu   Nurse Made Aware yes   Assessment   Prognosis Fair   Problem List Decreased strength;Decreased range of motion;Decreased endurance; Impaired balance;Decreased mobility; Impaired sensation;Pain  (joint integrity/arthritis)   Assessment Bartholome Snellen is a 80 y.o. female with a PMH of hypertension, GERD, CKD who presents with confusion and cough. Admitted with acute metabolic encephalopathy, acute URI. PT consulted. Up and OOB as tolerated orders. Prior to admission resides alone in one story home. Stair glide to basement exit. Uses electric scooter in home. Family pushes in [facility] when exiting home. Notes scooter does not fit into bathroom, therefore uses cane for distances of approximately 10 ft. Sponge bathes. All transfers and ADLS independently per her report. Son visits daily for 2-3 hours at a time. Denies falls. Currently presents with functional limitations related to chronic L foot drop, decreased sensation, B/L knee arthritis with pain, knee ROM limitations, LE strength. Impaired balance, functional mobility, activity tolerance and ability to perform locomotion. Requires modA for bed mobility and transfers sit to stand. Max A for SPT OOB to chair. Given impairments and functional transfers below baseline requiring assistance will benefit from skilled PT in order to optimize functional outcomes and facilitate return to PLOF. The patient's AM-PAC Basic Mobility Inpatient Short Form Raw Score is 10. A Raw score of less than or equal to 16 suggests the patient may benefit from discharge to post-acute rehabilitation services. Please also refer to the recommendation of the Physical Therapist for safe discharge planning.  May benefit from STR in order to address impairments and assist with return to baseline transfer ability. PT will follow 3-5 times per week to progress with POC. Barriers to Discharge Decreased caregiver support   Barriers to Discharge Comments lives alone, independent with transfers and ADLS PTA-decline noted upon asssessment. Goals   Patient Goals go home as soon as possible. STG Expiration Date 10/31/23   Short Term Goal #1 10 days: 1). Pt will perform bed mobility with Benedicto demonstrating appropriate technique 100% of the time in order to improve function. 2)  Perform all transfers with Benedicto demonstrating safe and appropriate technique 100% of the time in order to improve ability to negotiate safely in home environment. 3) Amb with least restrictive AD > 10 ft x1 with mod I in order to demonstrate ability to negotiate in bathroom at home. 4)  Improve overall strength and balance 1/2 grade in order to optimize ability to perform functional tasks and reduce fall risk. 5) Increase activity tolerance to 30 minutes in order to improve endurance to functional tasks. 6) PT for ongoing patient and family/caregiver education, DME needs and d/c planning in order to promote highest level of function in least restrictive environment. Plan   Treatment/Interventions Functional transfer training;LE strengthening/ROM; Therapeutic exercise; Endurance training;Patient/family training;Equipment eval/education; Bed mobility;Gait training; Compensatory technique education;Continued evaluation;Spoke to nursing   PT Frequency 3-5x/wk   Discharge Recommendation   PT Discharge Recommendation Post acute rehabilitation services  (vs 24* assistance given need for assist for all mobility at this time which is decline from PLOF. Lives alone. )   AM-PAC Basic Mobility Inpatient   Turning in Flat Bed Without Bedrails 2   Lying on Back to Sitting on Edge of Flat Bed Without Bedrails 2   Moving Bed to Chair 2   Standing Up From Chair Using Arms 2   Walk in Room 1   Climb 3-5 Stairs With Railing 1   Basic Mobility Inpatient Raw Score 10   Turning Head Towards Sound 4   Follow Simple Instructions 3   Low Function Basic Mobility Raw Score  17   Low Function Basic Mobility Standardized Score  27.46   Highest Level Of Mobility   [facility] Goal 4: Move to chair/commode   End of Consult   Patient Position at End of Consult Bedside chair;Bed/Chair alarm activated; All needs within reach   End of Consult Comments soft care cushion and alarm in place. Hx/personal factors: limited home support, difficulty performing IADLs, fall risk , functional decline , and advanced age, comorbidities    Examination:decreased strength , decreased LE ROM, joint integrity, decreased balance, decreased activity tolerance, impaired sensation, increased pain, and impaired posture. Clinical: unpredictable Ongoing medical status, Trending/abnormal lab values, Risk for falls, bed/chair alarm, Continuous monitoring, Decreased activity tolerance compared to baseline, and Decline from PLOF. Samanta Inch      Complexity: high           Joesph Lei, PT

## 2023-10-21 NOTE — PROGRESS NOTES
[facility]  Progress Note  Name: Cherrie Argueta  MRN: [id number]  Unit/Bed#: [facility] -01 I Date of Admission: 10/20/2023   Date of Service: 10/21/2023 I Hospital Day: 1    Assessment/Plan   * Acute metabolic encephalopathy  Assessment & Plan  Patient brought into the hospital by son for disorientation and confusion this morning as well as cough and URI symptoms  Patient lives alone and was reportedly disoriented when son walked on morning of admission  Recently started on doxycycline for URI, discontinued   Continues to complain of productive cough, started on Zithromax yesterday, continue to complete 5-day course  Continue as needed antitussives  Received IV fluid hydration, tolerating oral diet will discontinue  Consider gerontology follow up inpatient vs outpatient   PT/OT while inpatient  Appears alert, awake and oriented this morning    Acute URI  Assessment & Plan  Patient notes one week of congestion, cough and generalized malaise   Seen by PCP on Tuesday and started on doxycycline and Tessalon Perles, patient notes no improvement  Continue Zithromax and guaifenesin  Supportive measures while inpatient  Blood cultures pending    Stage 3a chronic kidney disease [facility])  Assessment & Plan  Lab Results   Component Value Date    EGFR 42 10/20/2023    EGFR 41 09/27/2023    EGFR 36 10/17/2022    CREATININE 1.11 10/20/2023    CREATININE 1.14 09/27/2023    CREATININE 1.27 10/17/2022     Creatinine stable at baseline at 1.11  Hold Lasix given poor oral intake  Monitor while inpatient    Sick sinus syndrome [facility])  Assessment & Plan  Pacemaker in place    Esophageal reflux  Assessment & Plan  Continue PPI    Essential hypertension  Assessment & Plan  BP slightly elevated at time of presentation  Continue Norvasc and losartan per hospital formulary for telmisartan  Hold Lasix given poor oral intake    Mixed hyperlipidemia  Assessment & Plan  Not maintained on maintenance medications due to age and side effects             VTE Pharmacologic Prophylaxis: VTE Score: 5 High Risk (Score >/= 5) - Pharmacological DVT Prophylaxis Ordered: heparin. Sequential Compression Devices Ordered. Patient Centered Rounds: I performed bedside rounds with nursing staff today. Discussions with Specialists or Other Care Team Provider: none    Education and Discussions with Family / Patient: Will update son. Total Time Spent on Date of Encounter in care of patient: This time was spent on one or more of the following: performing physical exam; counseling and coordination of care; obtaining or reviewing history; documenting in the medical record; reviewing/ordering tests, medications or procedures; communicating with other healthcare professionals and discussing with patient's family/caregivers. Current Length of Stay: 1 day(s)  Current Patient Status: Inpatient   Certification Statement: The patient will continue to require additional inpatient hospital stay due to acute encephalopathy pending blood cultures   Discharge Plan: Anticipate discharge tomorrow to discharge location to be determined pending rehab evaluations. Code Status: Level 3 - DNAR and DNI    Subjective:   Patient seen and examined at bedside. Notes she feels well today. Still with intermittent cough but notes improvement since admission. Eating drinking appropriately. Objective:     Vitals:   Temp (24hrs), Av.1 °F (36.7 °C), Min:97.8 °F (36.6 °C), Max:98.5 °F (36.9 °C)    Temp:  [97.8 °F (36.6 °C)-98.5 °F (36.9 °C)] 98.5 °F (36.9 °C)  HR:  [] 95  Resp:  [16-21] 16  BP: (146-175)/(67-93) 160/89  SpO2:  [92 %-97 %] 94 %  Body mass index is 26.16 kg/m². Input and Output Summary (last 24 hours): Intake/Output Summary (Last 24 hours) at 10/21/2023 1100  Last data filed at 10/21/2023 0758  Gross per 24 hour   Intake --   Output 459 ml   Net -459 ml       Physical Exam:   Physical Exam  Vitals reviewed.    Constitutional: General: She is not in acute distress. HENT:      Head: Normocephalic and atraumatic. Eyes:      General: No scleral icterus. Conjunctiva/sclera: Conjunctivae normal.   Cardiovascular:      Rate and Rhythm: Normal rate and regular rhythm. Heart sounds: No murmur heard. Pulmonary:      Effort: Pulmonary effort is normal. No respiratory distress. Breath sounds: Normal breath sounds. Abdominal:      General: Bowel sounds are normal. There is no distension. Palpations: Abdomen is soft. Tenderness: There is no abdominal tenderness. Musculoskeletal:      Cervical back: Neck supple. Right lower leg: No edema. Left lower leg: No edema. Skin:     General: Skin is warm and dry. Neurological:      Mental Status: She is alert and oriented to person, place, and time. Psychiatric:         Mood and Affect: Mood normal.         Behavior: Behavior normal.          Additional Data:     Labs:  Results from last 7 days   Lab Units 10/20/23  1316   WBC Thousand/uL 6.34   HEMOGLOBIN g/dL 11.5   HEMATOCRIT % 36.4   PLATELETS Thousands/uL 250   BANDS PCT % 5   LYMPHO PCT % 15   MONO PCT % 10   EOS PCT % 1     Results from last 7 days   Lab Units 10/20/23  1316   SODIUM mmol/L 137   POTASSIUM mmol/L 4.0   CHLORIDE mmol/L 104   CO2 mmol/L 23   BUN mg/dL 23   CREATININE mg/dL 1.11   ANION GAP mmol/L 10   CALCIUM mg/dL 11.6*   ALBUMIN g/dL 4.5   TOTAL BILIRUBIN mg/dL 0.59   ALK PHOS U/L 84   ALT U/L 15   AST U/L 23   GLUCOSE RANDOM mg/dL 111     Results from last 7 days   Lab Units 10/20/23  1316   INR  1.10             Results from last 7 days   Lab Units 10/20/23  1316   LACTIC ACID mmol/L 1.8   PROCALCITONIN ng/ml 0.06       Lines/Drains:  Invasive Devices       Peripheral Intravenous Line  Duration             Peripheral IV 10/20/23 Left Antecubital <1 day                          Imaging: No pertinent imaging reviewed.     Recent Cultures (last 7 days):   Results from last 7 days   Lab Units 10/20/23  1330 10/20/23  1316   BLOOD CULTURE  Received in Microbiology Lab. Culture in Progress. Received in Microbiology Lab. Culture in Progress. Last 24 Hours Medication List:   Current Facility-Administered Medications   Medication Dose Route Frequency Provider Last Rate    acetaminophen  650 mg Oral [facility] Sheridan Palomino PA-C      amLODIPine  2.5 mg Oral BID Sheridan Palomino PA-C      ascorbic acid  500 mg Oral Daily [facility] MARIELA Palomino      aspirin  81 mg Oral Daily Sheridan Palomino PA-C      azithromycin  500 mg Oral Q24H Sheridan Palomino PA-C      benzonatate  100 mg Oral TID PRN Dorie Larson PA-C      calcium carbonate  1,000 mg Oral TID PRN Sheridan Palomino PA-C      cholecalciferol  1,000 Units Oral Daily Sheridan Palomino PA-C      dextromethorphan-guaiFENesin  10 mL Oral Q4H PRN Dorie Larson PA-C      dorzolamide-timolol  1 drop Both Eyes Daily Sheridan Palomino PA-C      doxepin  50 mg Oral HS Sheridan Palomino PA-C      gabapentin  200 mg Oral TID Sheridan Palomino PA-C      guaiFENesin  600 mg Oral Q12H [address] Sheridan Palomino PA-C      heparin (porcine)  5,000 Units Subcutaneous Q8H [address] Sheridan Palomino PA-C      losartan  25 mg Oral Daily Sheridan Palomino PA-C      ondansetron  4 mg Intravenous Q6H PRN Sheridan Palomino PA-C      pantoprazole  40 mg Oral Early Morning Sheridan Palomino PA-C      polyethylene glycol  17 g Oral Daily PRN Sheridan Palomino PA-C          Today, Patient Was Seen By: Kamran Sanders PA-C    **Please Note: This note may have been constructed using a voice recognition system. **

## 2023-10-21 NOTE — PLAN OF CARE
Problem: MOBILITY - ADULT  Goal: Maintain or return to baseline ADL function  Description: INTERVENTIONS:  -  Assess patient's ability to carry out ADLs; assess patient's baseline for ADL function and identify physical deficits which impact ability to perform ADLs (bathing, care of mouth/teeth, toileting, grooming, dressing, etc.)  - Assess/evaluate cause of self-care deficits   - Assess range of motion  - Assess patient's mobility; develop plan if impaired  - Assess patient's need for assistive devices and provide as appropriate  - Encourage maximum independence but intervene and supervise when necessary  - Involve family in performance of ADLs  - Assess for home care needs following discharge   - Consider OT consult to assist with ADL evaluation and planning for discharge  - Provide patient education as appropriate  Outcome: Progressing  Goal: Maintains/Returns to pre admission functional level  Description: INTERVENTIONS:  - Perform BMAT or MOVE assessment daily.   - Set and communicate daily mobility goal to care team and patient/family/caregiver. - Collaborate with rehabilitation services on mobility goals if consulted  - Perform Range of Motion 3 times a day. - Reposition patient every 2 hours.   - Dangle patient 3 times a day  - Stand patient 3 times a day  - Ambulate patient 3 times a day  - Out of bed to chair 3 times a day   - Out of bed for meals 3 times a day  - Out of bed for toileting  - Record patient progress and toleration of activity level   Outcome: Progressing     Problem: PAIN - ADULT  Goal: Verbalizes/displays adequate comfort level or baseline comfort level  Description: Interventions:  - Encourage patient to monitor pain and request assistance  - Assess pain using appropriate pain scale  - Administer analgesics based on type and severity of pain and evaluate response  - Implement non-pharmacological measures as appropriate and evaluate response  - Consider cultural and social influences on pain and pain management  - Notify physician/advanced practitioner if interventions unsuccessful or patient reports new pain  Outcome: Progressing     Problem: INFECTION - ADULT  Goal: Absence of fever/infection during neutropenic period  Description: INTERVENTIONS:  - Monitor WBC    Outcome: Progressing     Problem: SAFETY ADULT  Goal: Maintain or return to baseline ADL function  Description: INTERVENTIONS:  -  Assess patient's ability to carry out ADLs; assess patient's baseline for ADL function and identify physical deficits which impact ability to perform ADLs (bathing, care of mouth/teeth, toileting, grooming, dressing, etc.)  - Assess/evaluate cause of self-care deficits   - Assess range of motion  - Assess patient's mobility; develop plan if impaired  - Assess patient's need for assistive devices and provide as appropriate  - Encourage maximum independence but intervene and supervise when necessary  - Involve family in performance of ADLs  - Assess for home care needs following discharge   - Consider OT consult to assist with ADL evaluation and planning for discharge  - Provide patient education as appropriate  Outcome: Progressing  Goal: Maintains/Returns to pre admission functional level  Description: INTERVENTIONS:  - Perform BMAT or MOVE assessment daily.   - Set and communicate daily mobility goal to care team and patient/family/caregiver. - Collaborate with rehabilitation services on mobility goals if consulted  - Perform Range of Motion 3 times a day. - Reposition patient every 2 hours.   - Dangle patient 3 times a day  - Stand patient 3 times a day  - Ambulate patient 3 times a day  - Out of bed to chair 3 times a day   - Out of bed for meals 3 times a day  - Out of bed for toileting  - Record patient progress and toleration of activity level   Outcome: Progressing  Goal: Patient will remain free of falls  Description: INTERVENTIONS:  - Educate patient/family on patient safety including physical limitations  - Instruct patient to call for assistance with activity   - Consult OT/PT to assist with strengthening/mobility   - Keep Call bell within reach  - Keep bed low and locked with side rails adjusted as appropriate  - Keep care items and personal belongings within reach  - Initiate and maintain comfort rounds  - Make Fall Risk Sign visible to staff  - Offer Toileting every 2 Hours, in advance of need  - Initiate/Maintain alarm  - Obtain necessary fall risk management equipment  - Apply yellow socks and bracelet for high fall risk patients  - Consider moving patient to room near nurses station  Outcome: Progressing     Problem: DISCHARGE PLANNING  Goal: Discharge to home or other facility with appropriate resources  Description: INTERVENTIONS:  - Identify barriers to discharge w/patient and caregiver  - Arrange for needed discharge resources and transportation as appropriate  - Identify discharge learning needs (meds, wound care, etc.)  - Arrange for interpretive services to assist at discharge as needed  - Refer to Case Management Department for coordinating discharge planning if the patient needs post-hospital services based on physician/advanced practitioner order or complex needs related to functional status, cognitive ability, or social support system  Outcome: Progressing     Problem: Knowledge Deficit  Goal: Patient/family/caregiver demonstrates understanding of disease process, treatment plan, medications, and discharge instructions  Description: Complete learning assessment and assess knowledge base.   Interventions:  - Provide teaching at level of understanding  - Provide teaching via preferred learning methods  Outcome: Progressing     Problem: NEUROSENSORY - ADULT  Goal: Achieves stable or improved neurological status  Description: INTERVENTIONS  - Monitor and report changes in neurological status  - Monitor vital signs such as temperature, blood pressure, glucose, and any other labs ordered   - Initiate measures to prevent increased intracranial pressure  - Monitor for seizure activity and implement precautions if appropriate      Outcome: Progressing     Problem: RESPIRATORY - ADULT  Goal: Achieves optimal ventilation and oxygenation  Description: INTERVENTIONS:  - Assess for changes in respiratory status  - Assess for changes in mentation and behavior  - Position to facilitate oxygenation and minimize respiratory effort  - Oxygen administered by appropriate delivery if ordered  - Initiate smoking cessation education as indicated  - Encourage broncho-pulmonary hygiene including cough, deep breathe, Incentive Spirometry  - Assess the need for suctioning and aspirate as needed  - Assess and instruct to report SOB or any respiratory difficulty  - Respiratory Therapy support as indicated  Outcome: Progressing     Problem: Prexisting or High Potential for Compromised Skin Integrity  Goal: Skin integrity is maintained or improved  Description: INTERVENTIONS:  - Identify patients at risk for skin breakdown  - Assess and monitor skin integrity  - Assess and monitor nutrition and hydration status  - Monitor labs   - Assess for incontinence   - Turn and reposition patient  - Assist with mobility/ambulation  - Relieve pressure over bony prominences  - Avoid friction and shearing  - Provide appropriate hygiene as needed including keeping skin clean and dry  - Evaluate need for skin moisturizer/barrier cream  - Collaborate with interdisciplinary team   - Patient/family teaching  - Consider wound care consult   Outcome: Progressing

## 2023-10-21 NOTE — NURSING NOTE
Discharge instructions reviewed with pt and son at bedside. Pt and son verbalized understanding of all discharge instructions. Discharge instructions given to son at time of discharge. IV removed, Quinn disconnected. All questions addressed, no further questions or concerns at this time. Pt discharged to home with son, all belongings sent with pt.

## 2023-10-21 NOTE — CASE MANAGEMENT
Case Management Assessment & Discharge Planning Note    Patient name Uma Shultz  Location [facility] 2 /South 2 Denice August* MRN [id number]  : 10/4/1928 Date 10/21/2023       Current Admission Date: 10/20/2023  Current Admission Diagnosis:Acute metabolic encephalopathy   Patient Active Problem List    Diagnosis Date Noted    Acute metabolic encephalopathy     Acute URI 10/20/2023    Pain of right hand 10/05/2023    Platelets decreased ([address]) 2023    Shortness of breath 10/05/2022    Stage 3a chronic kidney disease ([address]) 10/20/2021    Sick sinus syndrome ([address]) 10/20/2020    Status post placement of cardiac pacemaker 04/10/2020    Carpal tunnel syndrome of left wrist     Nonrheumatic aortic valve stenosis 2020    Mild pulmonary hypertension ([address]) 2020    Seizure disorder ([address]) 2019    Iron deficiency anemia 10/17/2019    Ambulatory dysfunction 2019    Suspected Dementia 2019    Right shoulder pain 2019    Murmur, cardiac 2019    Depression 2018    Primary localized osteoarthritis of left knee 10/16/2017    Primary localized osteoarthritis of right knee 10/16/2017    Mixed hyperlipidemia 2015    Essential hypertension 2015    Esophageal reflux 2015    Macular degeneration 2015    Osteoarthritis 2015    Peripheral neuropathy 2015    Vitamin D deficiency 2015      LOS (days): 1  Geometric Mean LOS (GMLOS) (days): 3.20  Days to GMLOS:2.3     OBJECTIVE:    Risk of Unplanned Readmission Score: 11.58         Current admission status: Inpatient       Preferred Pharmacy:   [facility], [address] [place] [address]  Phone: [phone] Fax: [phone]    Primary Care Provider: Anand Vieyra DO    Primary Insurance: MEDICARE  Secondary Insurance: BLUE CROSS    ASSESSMENT:  Active Health Care Proxies       Lesta Closs Health Care Agent - Son   Primary Phone: [phone] [facility]  Home Phone: [phone]                 Readmission Root Cause  30 Day Readmission: No    Patient Information  Admitted from[de-identified] Home  Mental Status: Alert  During Assessment patient was accompanied by: Son  Assessment information provided by[de-identified] Son  Primary Caregiver: Self  Support Systems: Sam  [place] of Residence: [address] [place] [address] do you live in?: [address] entry access options.  Select all that apply.: No steps to enter home  Type of Current Residence: 2 story home  Upon entering residence, is there a bedroom on the main floor (no further steps)?: Yes  Upon entering residence, is there a bathroom on the main floor (no further steps)?: Yes  In the last 12 months, was there a time when you were not able to pay the mortgage or rent on time?: No  In the last 12 months, how many places have you lived?: 1  In the last 12 months, was there a time when you did not have a steady place to sleep or slept in a shelter (including now)?: No  Homeless/housing insecurity resource given?: N/A  Living Arrangements: Lives Alone (Family visits every day)    Activities of Daily Living Prior to Admission  Functional Status: Independent  Completes ADLs independently?: Yes  Ambulates independently?: Yes  Does patient use assisted devices?: Yes  Assisted Devices (DME) used: Danita Keen, Wheelchair, Other (Comment) (Scooter)  Does patient currently own DME?: Yes  What DME does the patient currently own?: Danita Keen, Wheelchair, Other (Comment) (Scooter)  Does patient have a history of Outpatient Therapy (PT/OT)?: No  Does the patient have a history of Short-Term Rehab?: Yes ([facility])  Does patient have a history of HHC?: Yes  Does patient currently have [facility]?: No    Patient Information Continued  Income Source: SSI/SSD  Does patient have prescription coverage?: Yes  Within the past 12 months, you worried that your food would run out before you got the money to buy more.: Never true  Within the past 12 months, the food you bought just didn't last and you didn't have money to get more.: Never true  Food insecurity resource given?: N/A  Does patient receive dialysis treatments?: No  Does patient have a history of substance abuse?: No  Does patient have a history of Mental Health Diagnosis?: No    Means of Transportation  Means of Transport to Appts[de-identified] Family transport  In the past 12 months, has lack of transportation kept you from medical appointments or from getting medications?: No  In the past 12 months, has lack of transportation kept you from meetings, work, or from getting things needed for daily living?: No  Was application for public transport provided?: N/A    DISCHARGE DETAILS:    Discharge planning discussed with[de-identified] Pt's son  Freedom of Choice: Yes  Comments - Freedom of Choice: Pt not in agreement with rehab recommendation, son in agreement with pt returning home with VNA services.   CM contacted family/caregiver?: Yes  Were Treatment Team discharge recommendations reviewed with patient/caregiver?: Yes  Did patient/caregiver verbalize understanding of patient care needs?: Yes  Were patient/caregiver advised of the risks associated with not following Treatment Team discharge recommendations?: Yes    Contacts  Patient Contacts: Albino Patel/Son  Relationship to Patient[de-identified] Family  Contact Method: Phone  Phone Number: [phone]  Reason/Outcome: Continuity of Care, Emergency Contact, Referral, Discharge [facility]         Is the patient interested in [facility] at discharge?: Yes  [facility] requested[de-identified] Medical Social Work, Occupational Therapy, Physical [place] Agency Name[de-identified] Other (Awaiting accepting agency)  [facility] Provider[de-identified] PCP  Home Health Services Needed[de-identified] Evaluate Functional Status and Safety, Strengthening/Theraputic Exercises to Improve Function, Gait/ADL Training  Homebound Criteria Met[de-identified] Uses an Assist Device (i.e. cane, walker, etc)  Supporting Clincal Findings[de-identified] Limited Endurance, Fatigues Easliy in [employer]    DME Referral Provided  Referral made for DME?: No    Other Referral/Resources/Interventions Provided:  Interventions: [facility]  Referral Comments: [address] referrals sent via Aidin. Treatment Team Recommendation: Short Term Rehab  Discharge Destination Plan[de-identified] Home with [address]     Additional Comments: Pt's son reports that he is at pt's home daily with assistance from pt's daughter and cousin as well. Pt is able to ambulate in her home with a scooter, has a wheelchair for out of the home, and also has a rolling walker. Pt's son in agreement with pt that home with [address] is a safe discharge plan.

## 2023-10-21 NOTE — DISCHARGE SUMMARY
[facility]  Discharge- Kailee Cain 10/4/1928, 80 y.o. female MRN: [id number]  Unit/Bed#: [address] Encounter: [id number]  Primary Care Provider: Tammy Cote DO   Date and time admitted to hospital: 10/20/2023 12:24 PM    * Acute metabolic encephalopathy  Assessment & Plan  Patient brought into the hospital by son for disorientation and confusion this morning as well as cough and URI symptoms  Patient lives alone and was reportedly disoriented when son walked on morning of admission  Recently started on doxycycline for URI, discontinued   Continues to complain of productive cough, started on Zithromax yesterday, continue to complete 5-day course  Continue as needed antitussives  Received IV fluid hydration, tolerating oral diet will discontinue  Consider gerontology follow up inpatient vs outpatient   PT/OT while inpatient-recommending rehab, patient declines, would like to return home  Appears alert, awake and oriented this morning    Acute URI  Assessment & Plan  Patient notes one week of congestion, cough and generalized malaise   Seen by PCP on Tuesday and started on doxycycline and Tessalon Perles, patient notes no improvement  Continue Zithromax and guaifenesin  Supportive measures while inpatient    Stage 3a chronic kidney disease [facility])  Assessment & Plan  Lab Results   Component Value Date    EGFR 42 10/20/2023    EGFR 41 09/27/2023    EGFR 36 10/17/2022    CREATININE 1.11 10/20/2023    CREATININE 1.14 09/27/2023    CREATININE 1.27 10/17/2022     Creatinine stable at baseline at 1.11  Hold Lasix given poor oral intake  Monitor while inpatient    Sick sinus syndrome ([address])  Assessment & Plan  Pacemaker in place    Esophageal reflux  Assessment & Plan  Continue PPI    Essential hypertension  Assessment & Plan  BP slightly elevated at time of presentation  Continue Norvasc and losartan per hospital formulary for telmisartan  Hold Lasix given poor oral intake    Mixed hyperlipidemia  Assessment & Plan  Not maintained on maintenance medications due to age and side effects      Medical Problems       Resolved Problems  Date Reviewed: 10/21/2023   None       Discharging Physician / Practitioner: Kye Mendoza PA-C  PCP: Jeremy Gutierrez DO  Admission Date:   Admission Orders (From admission, onward)       Ordered        10/20/23 [address]  Once                          Discharge Date: 10/21/23    Consultations During Hospital Stay:  None    Procedures Performed:   CT head without contrast    Result Date: 10/20/2023  Impression: No acute intracranial abnormality. Mild microangiopathy. Workstation performed: [id number]     XR chest 2 views    Result Date: 10/20/2023  Impression: No acute cardiopulmonary disease. Workstation performed: [id number]        Significant Findings / Test Results:   See above    Incidental Findings:   none       Test Results Pending at Discharge (will require follow up):   none     Outpatient Tests Requested:  PCP follow up     Complications:  none    Reason for Admission: acute encephalopathy     Hospital Course:   Milena Farmer is a 80 y.o. female patient who originally presented to the hospital on 10/20/2023 due to confusion. Patient was brought in by son for concern of confusion at home. Also noting she had been sick with a cough over the past week. Was seen by PCP and prescribed doxycycline on Tuesday, reported minimal relief in her symptoms. She was switched to Zithromax at time of admission. On day of discharge, she was tolerating oral diet, noted improvement in her cough. Alert and oriented. Worked with physical therapy recommended discharge to rehab but patient refused requesting to return home at time of discharge. The patient, initially admitted to the hospital as inpatient, was discharged earlier than expected given the following: Rapid clinical improvement.     Please see above list of diagnoses and related plan for additional information. Condition at Discharge: stable    Discharge Day Visit / Exam:   * Please refer to separate progress note for these details *    Discussion with Family: Updated  (son) via phone. Discharge instructions/Information to patient and family:   See after visit summary for information provided to patient and family. Provisions for Follow-Up Care:  See after visit summary for information related to follow-up care and any pertinent home health orders. Disposition:   Home    Planned Readmission: None     Discharge Statement:  I spent 35 minutes discharging the patient. This time was spent on the day of discharge. I had direct contact with the patient on the day of discharge. Greater than 50% of the total time was spent examining patient, answering all patient questions, arranging and discussing plan of care with patient as well as directly providing post-discharge instructions. Additional time then spent on discharge activities. Discharge Medications:  See after visit summary for reconciled discharge medications provided to patient and/or family.       **Please Note: This note may have been constructed using a voice recognition system**

## 2023-10-21 NOTE — PLAN OF CARE
Problem: MOBILITY - ADULT  Goal: Maintain or return to baseline ADL function  Description: INTERVENTIONS:  -  Assess patient's ability to carry out ADLs; assess patient's baseline for ADL function and identify physical deficits which impact ability to perform ADLs (bathing, care of mouth/teeth, toileting, grooming, dressing, etc.)  - Assess/evaluate cause of self-care deficits   - Assess range of motion  - Assess patient's mobility; develop plan if impaired  - Assess patient's need for assistive devices and provide as appropriate  - Encourage maximum independence but intervene and supervise when necessary  - Involve family in performance of ADLs  - Assess for home care needs following discharge   - Consider OT consult to assist with ADL evaluation and planning for discharge  - Provide patient education as appropriate  Outcome: Progressing  Goal: Maintains/Returns to pre admission functional level  Description: INTERVENTIONS:  - Perform BMAT or MOVE assessment daily.   - Set and communicate daily mobility goal to care team and patient/family/caregiver. - Collaborate with rehabilitation services on mobility goals if consulted  - Perform Range of Motion 2 times a day.   - Dangle patient 3 times a day  - Stand patient 3 times a day  - Ambulate patient 2 times a day  - Out of bed to chair 2 times a day   - Out of bed for meals 2 times a day  - Record patient progress and toleration of activity level   Outcome: Progressing     Problem: PAIN - ADULT  Goal: Verbalizes/displays adequate comfort level or baseline comfort level  Description: Interventions:  - Encourage patient to monitor pain and request assistance  - Assess pain using appropriate pain scale  - Administer analgesics based on type and severity of pain and evaluate response  - Implement non-pharmacological measures as appropriate and evaluate response  - Consider cultural and social influences on pain and pain management  - Notify physician/advanced practitioner if interventions unsuccessful or patient reports new pain  Outcome: Progressing     Problem: INFECTION - ADULT  Goal: Absence of fever/infection during neutropenic period  Description: INTERVENTIONS:  - Monitor WBC    Outcome: Progressing     Problem: SAFETY ADULT  Goal: Maintain or return to baseline ADL function  Description: INTERVENTIONS:  -  Assess patient's ability to carry out ADLs; assess patient's baseline for ADL function and identify physical deficits which impact ability to perform ADLs (bathing, care of mouth/teeth, toileting, grooming, dressing, etc.)  - Assess/evaluate cause of self-care deficits   - Assess range of motion  - Assess patient's mobility; develop plan if impaired  - Assess patient's need for assistive devices and provide as appropriate  - Encourage maximum independence but intervene and supervise when necessary  - Involve family in performance of ADLs  - Assess for home care needs following discharge   - Consider OT consult to assist with ADL evaluation and planning for discharge  - Provide patient education as appropriate  Outcome: Progressing  Goal: Maintains/Returns to pre admission functional level  Description: INTERVENTIONS:  - Perform BMAT or MOVE assessment daily.   - Set and communicate daily mobility goal to care team and patient/family/caregiver. - Collaborate with rehabilitation services on mobility goals if consulted  - Record patient progress and toleration of activity level   - Perform Range of Motion 2 times a day.   - Dangle patient 3 times a day  - Stand patient 3 times a day  - Ambulate patient 2 times a day  - Out of bed to chair 2 times a day   - Out of bed for meals 2 times a day    Outcome: Progressing  Goal: Patient will remain free of falls  Description: INTERVENTIONS:  - Educate patient/family on patient safety including physical limitations  - Instruct patient to call for assistance with activity   - Consult OT/PT to assist with strengthening/mobility   - Keep Call bell within reach  - Keep bed low and locked with side rails adjusted as appropriate  - Keep care items and personal belongings within reach  - Initiate and maintain comfort rounds  - Make Fall Risk Sign visible to staff  - Offer Toileting every 2 Hours, in advance of need  - Initiate/Maintain bed alarm  - Obtain necessary fall risk management equipment: bed alarm, bed rails  - Apply yellow socks and bracelet for high fall risk patients  - Consider moving patient to room near nurses station  Outcome: Progressing     Problem: DISCHARGE PLANNING  Goal: Discharge to home or other facility with appropriate resources  Description: INTERVENTIONS:  - Identify barriers to discharge w/patient and caregiver  - Arrange for needed discharge resources and transportation as appropriate  - Identify discharge learning needs (meds, wound care, etc.)  - Arrange for interpretive services to assist at discharge as needed  - Refer to Case Management Department for coordinating discharge planning if the patient needs post-hospital services based on physician/advanced practitioner order or complex needs related to functional status, cognitive ability, or social support system  Outcome: Progressing     Problem: Knowledge Deficit  Goal: Patient/family/caregiver demonstrates understanding of disease process, treatment plan, medications, and discharge instructions  Description: Complete learning assessment and assess knowledge base.   Interventions:  - Provide teaching at level of understanding  - Provide teaching via preferred learning methods  Outcome: Progressing     Problem: NEUROSENSORY - ADULT  Goal: Achieves stable or improved neurological status  Description: INTERVENTIONS  - Monitor and report changes in neurological status  - Monitor vital signs such as temperature, blood pressure, glucose, and any other labs ordered   - Initiate measures to prevent increased intracranial pressure  - Monitor for seizure activity and implement precautions if appropriate      Outcome: Progressing     Problem: RESPIRATORY - ADULT  Goal: Achieves optimal ventilation and oxygenation  Description: INTERVENTIONS:  - Assess for changes in respiratory status  - Assess for changes in mentation and behavior  - Position to facilitate oxygenation and minimize respiratory effort  - Oxygen administered by appropriate delivery if ordered  - Initiate smoking cessation education as indicated  - Encourage broncho-pulmonary hygiene including cough, deep breathe, Incentive Spirometry  - Assess the need for suctioning and aspirate as needed  - Assess and instruct to report SOB or any respiratory difficulty  - Respiratory Therapy support as indicated  Outcome: Progressing

## 2023-10-21 NOTE — PLAN OF CARE
Problem: PHYSICAL THERAPY ADULT  Goal: Performs mobility at highest level of function for planned discharge setting. See evaluation for individualized goals. Description: Treatment/Interventions: Functional transfer training, LE strengthening/ROM, Therapeutic exercise, Endurance training, Patient/family training, Equipment eval/education, Bed mobility, Gait training, Compensatory technique education, Continued evaluation, Spoke to nursing          See flowsheet documentation for full assessment, interventions and recommendations. Note: Prognosis: Fair  Problem List: Decreased strength, Decreased range of motion, Decreased endurance, Impaired balance, Decreased mobility, Impaired sensation, Pain (joint integrity/arthritis)  Assessment: Meir Noel is a 80 y.o. female with a PMH of hypertension, GERD, CKD who presents with confusion and cough. Admitted with acute metabolic encephalopathy, acute URI. PT consulted. Up and OOB as tolerated orders. Prior to admission resides alone in one story home. Stair glide to basement exit. Uses electric scooter in home. Family pushes in [facility] when exiting home. Notes scooter does not fit into bathroom, therefore uses cane for distances of approximately 10 ft. Sponge bathes. All transfers and ADLS independently per her report. Son visits daily for 2-3 hours at a time. Denies falls. Currently presents with functional limitations related to chronic L foot drop, decreased sensation, B/L knee arthritis with pain, knee ROM limitations, LE strength. Impaired balance, functional mobility, activity tolerance and ability to perform locomotion. Requires modA for bed mobility and transfers sit to stand. Max A for SPT OOB to chair. Given impairments and functional transfers below baseline requiring assistance will benefit from skilled PT in order to optimize functional outcomes and facilitate return to PLOF.   The patient's AM-PAC Basic Mobility Inpatient Short Form Raw Score is 10. A Raw score of less than or equal to 16 suggests the patient may benefit from discharge to post-acute rehabilitation services. Please also refer to the recommendation of the Physical Therapist for safe discharge planning. May benefit from STR in order to address impairments and assist with return to baseline transfer ability. PT will follow 3-5 times per week to progress with POC. Barriers to Discharge: Decreased caregiver support  Barriers to Discharge Comments: lives alone, independent with transfers and ADLS PTA-decline noted upon asssessment. PT Discharge Recommendation: Post acute rehabilitation services (vs 24* assistance given need for assist for all mobility at this time which is decline from PLOF. Lives alone.)    See flowsheet documentation for full assessment.

## 2023-10-21 NOTE — PLAN OF CARE
Problem: MOBILITY - ADULT  Goal: Maintain or return to baseline ADL function  Description: INTERVENTIONS:  -  Assess patient's ability to carry out ADLs; assess patient's baseline for ADL function and identify physical deficits which impact ability to perform ADLs (bathing, care of mouth/teeth, toileting, grooming, dressing, etc.)  - Assess/evaluate cause of self-care deficits   - Assess range of motion  - Assess patient's mobility; develop plan if impaired  - Assess patient's need for assistive devices and provide as appropriate  - Encourage maximum independence but intervene and supervise when necessary  - Involve family in performance of ADLs  - Assess for home care needs following discharge   - Consider OT consult to assist with ADL evaluation and planning for discharge  - Provide patient education as appropriate  10/21/2023 1357 by Tanner Delgado RN  Outcome: Adequate for Discharge  10/21/2023 1204 by Tanner Delgado RN  Outcome: Progressing  Goal: Maintains/Returns to pre admission functional level  Description: INTERVENTIONS:  - Perform BMAT or MOVE assessment daily.   - Set and communicate daily mobility goal to care team and patient/family/caregiver. - Collaborate with rehabilitation services on mobility goals if consulted  - Perform Range of Motion 3 times a day. - Reposition patient every 2 hours.   - Dangle patient 3 times a day  - Stand patient 3 times a day  - Ambulate patient 3 times a day  - Out of bed to chair 3 times a day   - Out of bed for meals 3 times a day  - Out of bed for toileting  - Record patient progress and toleration of activity level   10/21/2023 1357 by Tanner Delgado RN  Outcome: Adequate for Discharge  10/21/2023 1204 by Tanner Delgado RN  Outcome: Progressing     Problem: PAIN - ADULT  Goal: Verbalizes/displays adequate comfort level or baseline comfort level  Description: Interventions:  - Encourage patient to monitor pain and request assistance  - Assess pain using appropriate pain scale  - Administer analgesics based on type and severity of pain and evaluate response  - Implement non-pharmacological measures as appropriate and evaluate response  - Consider cultural and social influences on pain and pain management  - Notify physician/advanced practitioner if interventions unsuccessful or patient reports new pain  10/21/2023 1357 by Aurelia Isaac RN  Outcome: Adequate for Discharge  10/21/2023 1204 by Aurelia Isaac RN  Outcome: Progressing     Problem: INFECTION - ADULT  Goal: Absence of fever/infection during neutropenic period  Description: INTERVENTIONS:  - Monitor WBC    10/21/2023 1357 by Aurelia Isaac RN  Outcome: Adequate for Discharge  10/21/2023 1204 by Aurelia Isaac RN  Outcome: Progressing     Problem: SAFETY ADULT  Goal: Maintain or return to baseline ADL function  Description: INTERVENTIONS:  -  Assess patient's ability to carry out ADLs; assess patient's baseline for ADL function and identify physical deficits which impact ability to perform ADLs (bathing, care of mouth/teeth, toileting, grooming, dressing, etc.)  - Assess/evaluate cause of self-care deficits   - Assess range of motion  - Assess patient's mobility; develop plan if impaired  - Assess patient's need for assistive devices and provide as appropriate  - Encourage maximum independence but intervene and supervise when necessary  - Involve family in performance of ADLs  - Assess for home care needs following discharge   - Consider OT consult to assist with ADL evaluation and planning for discharge  - Provide patient education as appropriate  10/21/2023 1357 by Aurelia Isaac RN  Outcome: Adequate for Discharge  10/21/2023 1204 by Aurelia Isaac RN  Outcome: Progressing  Goal: Maintains/Returns to pre admission functional level  Description: INTERVENTIONS:  - Perform BMAT or MOVE assessment daily.   - Set and communicate daily mobility goal to care team and patient/family/caregiver.    - Collaborate with rehabilitation services on mobility goals if consulted  - Perform Range of Motion 3 times a day. - Reposition patient every 2 hours.   - Dangle patient 3 times a day  - Stand patient 3 times a day  - Ambulate patient 3 times a day  - Out of bed to chair 3 times a day   - Out of bed for meals 3 times a day  - Out of bed for toileting  - Record patient progress and toleration of activity level   10/21/2023 1357 by Allie Prado RN  Outcome: Adequate for Discharge  10/21/2023 1204 by Allie Prado RN  Outcome: Progressing  Goal: Patient will remain free of falls  Description: INTERVENTIONS:  - Educate patient/family on patient safety including physical limitations  - Instruct patient to call for assistance with activity   - Consult OT/PT to assist with strengthening/mobility   - Keep Call bell within reach  - Keep bed low and locked with side rails adjusted as appropriate  - Keep care items and personal belongings within reach  - Initiate and maintain comfort rounds  - Make Fall Risk Sign visible to staff  - Offer Toileting every 2 Hours, in advance of need  - Initiate/Maintain alarm  - Obtain necessary fall risk management equipment  - Apply yellow socks and bracelet for high fall risk patients  - Consider moving patient to room near nurses station  10/21/2023 1357 by Allie Prado RN  Outcome: Adequate for Discharge  10/21/2023 1204 by Allie Prado RN  Outcome: Progressing     Problem: DISCHARGE PLANNING  Goal: Discharge to home or other facility with appropriate resources  Description: INTERVENTIONS:  - Identify barriers to discharge w/patient and caregiver  - Arrange for needed discharge resources and transportation as appropriate  - Identify discharge learning needs (meds, wound care, etc.)  - Arrange for interpretive services to assist at discharge as needed  - Refer to Case Management Department for coordinating discharge planning if the patient needs post-hospital services based on physician/advanced practitioner order or complex needs related to functional status, cognitive ability, or social support system  10/21/2023 1357 by Allie Prado RN  Outcome: Adequate for Discharge  10/21/2023 1204 by Allie Prado RN  Outcome: Progressing     Problem: Knowledge Deficit  Goal: Patient/family/caregiver demonstrates understanding of disease process, treatment plan, medications, and discharge instructions  Description: Complete learning assessment and assess knowledge base.   Interventions:  - Provide teaching at level of understanding  - Provide teaching via preferred learning methods  10/21/2023 1357 by Allie Prado RN  Outcome: Adequate for Discharge  10/21/2023 1204 by Allie Prado RN  Outcome: Progressing     Problem: NEUROSENSORY - ADULT  Goal: Achieves stable or improved neurological status  Description: INTERVENTIONS  - Monitor and report changes in neurological status  - Monitor vital signs such as temperature, blood pressure, glucose, and any other labs ordered   - Initiate measures to prevent increased intracranial pressure  - Monitor for seizure activity and implement precautions if appropriate      10/21/2023 1357 by Allie Prado RN  Outcome: Adequate for Discharge  10/21/2023 1204 by Allie Prado RN  Outcome: Progressing     Problem: RESPIRATORY - ADULT  Goal: Achieves optimal ventilation and oxygenation  Description: INTERVENTIONS:  - Assess for changes in respiratory status  - Assess for changes in mentation and behavior  - Position to facilitate oxygenation and minimize respiratory effort  - Oxygen administered by appropriate delivery if ordered  - Initiate smoking cessation education as indicated  - Encourage broncho-pulmonary hygiene including cough, deep breathe, Incentive Spirometry  - Assess the need for suctioning and aspirate as needed  - Assess and instruct to report SOB or any respiratory difficulty  - Respiratory Therapy support as indicated  10/21/2023 1357 by Allie Prado RN  Outcome: Adequate for Discharge  10/21/2023 1204 by Demetrius Werner RN  Outcome: Progressing     Problem: Prexisting or High Potential for Compromised Skin Integrity  Goal: Skin integrity is maintained or improved  Description: INTERVENTIONS:  - Identify patients at risk for skin breakdown  - Assess and monitor skin integrity  - Assess and monitor nutrition and hydration status  - Monitor labs   - Assess for incontinence   - Turn and reposition patient  - Assist with mobility/ambulation  - Relieve pressure over bony prominences  - Avoid friction and shearing  - Provide appropriate hygiene as needed including keeping skin clean and dry  - Evaluate need for skin moisturizer/barrier cream  - Collaborate with interdisciplinary team   - Patient/family teaching  - Consider wound care consult   10/21/2023 1353 by Demetrius Werner RN  Outcome: Adequate for Discharge  10/21/2023 1204 by Demetrius Werner RN  Outcome: Progressing

## 2023-10-21 NOTE — ASSESSMENT & PLAN NOTE
Patient brought into the hospital by son for disorientation and confusion this morning as well as cough and URI symptoms  Patient lives alone and was reportedly disoriented when son walked on morning of admission  Recently started on doxycycline for URI, discontinued   Continues to complain of productive cough, started on Zithromax yesterday, continue to complete 5-day course  Continue as needed antitussives  Received IV fluid hydration, tolerating oral diet will discontinue  Consider gerontology follow up inpatient vs outpatient   PT/OT while inpatient-recommending rehab, patient declines, would like to return home  Appears alert, awake and oriented this morning

## 2023-10-21 NOTE — ASSESSMENT & PLAN NOTE
Patient brought into the hospital by son for disorientation and confusion this morning as well as cough and URI symptoms  Patient lives alone and was reportedly disoriented when son walked on morning of admission  Recently started on doxycycline for URI, discontinued   Continues to complain of productive cough, started on Zithromax yesterday, continue to complete 5-day course  Continue as needed antitussives  Received IV fluid hydration, tolerating oral diet will discontinue  Consider gerontology follow up inpatient vs outpatient   PT/OT while inpatient  Appears alert, awake and oriented this morning

## 2023-10-21 NOTE — ASSESSMENT & PLAN NOTE
Patient notes one week of congestion, cough and generalized malaise   Seen by PCP on Tuesday and started on doxycycline and Tessalon Perles, patient notes no improvement  Continue Zithromax and guaifenesin  Supportive measures while inpatient

## 2023-10-21 NOTE — CASE MANAGEMENT
Case Management Discharge Planning Note    Patient name Kailee Cain  Location [address] [address]/[facility] 2 Spencer Tapia* MRN [id number]  : 10/4/1928 Date 10/21/2023       Current Admission Date: 10/20/2023  Current Admission Diagnosis:Acute metabolic encephalopathy   Patient Active Problem List    Diagnosis Date Noted    Acute metabolic encephalopathy     Acute URI 10/20/2023    Pain of right hand 10/05/2023    Platelets decreased ([address]) 2023    Shortness of breath 10/05/2022    Stage 3a chronic kidney disease ([address]) 10/20/2021    Sick sinus syndrome ([address]) 10/20/2020    Status post placement of cardiac pacemaker 04/10/2020    Carpal tunnel syndrome of left wrist     Nonrheumatic aortic valve stenosis 2020    Mild pulmonary hypertension ([address]) 2020    Seizure disorder ([address]) 2019    Iron deficiency anemia 10/17/2019    Ambulatory dysfunction 2019    Suspected Dementia 2019    Right shoulder pain 2019    Murmur, cardiac 2019    Depression 2018    Primary localized osteoarthritis of left knee 10/16/2017    Primary localized osteoarthritis of right knee 10/16/2017    Mixed hyperlipidemia 2015    Essential hypertension 2015    Esophageal reflux 2015    Macular degeneration 2015    Osteoarthritis 2015    Peripheral neuropathy 2015    Vitamin D deficiency 2015      LOS (days): 1  Geometric Mean LOS (GMLOS) (days): 3.20  Days to GMLOS:2.3     OBJECTIVE:  Risk of Unplanned Readmission Score: 11.58         Current admission status: Inpatient   Preferred Pharmacy:   [facility], [address] [place] [address]  Phone: [phone] Fax: [phone]    Primary Care Provider: Tammy Cote DO    Primary Insurance: MEDICARE  Secondary Insurance: BLUE CROSS    DISCHARGE DETAILS:    Requested [address]         Is the patient interested in [address] at discharge?: Yes  [address] requested[de-identified] Medical Social Work, Occupational Therapy, Physical [address] [facility] Name[de-identified] [place] Provider[de-identified] PCP  Home Health Services Needed[de-identified] Evaluate Functional Status and Safety, Strengthening/Theraputic Exercises to Improve Function, Gait/ADL Training  Supporting Clincal Findings[de-identified] Fatigues Easliy in [employer], Limited Endurance    Additional Comments: [facility] choice list reviewed with pt's son who wishes to move forward with [facility].  [facility] reserved and added to AVS.

## 2023-10-22 ENCOUNTER — HOME CARE VISIT (OUTPATIENT)
Dept: HOME HEALTH SERVICES | Facility: HOME HEALTHCARE | Age: 88
End: 2023-10-22

## 2023-10-22 NOTE — CASE COMMUNICATION
Change in [facility] date for [facility] to 10/24/23. Schedulers: please have therapist call pt directly at [phone] to determine if home phys ther still needed as son was not sure when phys ther spoke with him.   Salima Munoz Nearing PT

## 2023-10-23 ENCOUNTER — HOME CARE VISIT (OUTPATIENT)
Dept: HOME HEALTH SERVICES | Facility: HOME HEALTHCARE | Age: 88
End: 2023-10-23

## 2023-10-24 ENCOUNTER — TRANSITIONAL CARE MANAGEMENT (OUTPATIENT)
Dept: FAMILY MEDICINE CLINIC | Facility: CLINIC | Age: 88
End: 2023-10-24

## 2023-10-24 ENCOUNTER — HOME CARE VISIT (OUTPATIENT)
Dept: HOME HEALTH SERVICES | Facility: HOME HEALTHCARE | Age: 88
End: 2023-10-24

## 2023-10-24 NOTE — CASE COMMUNICATION
I received a call back from the patient's son. He reports that the patient is not in need of home physical and occupational therapy services at this time. I explained that if the patient needs our services in the future that she will need a new referral from her PCP. He verbalized an understanding. Patient not admitted at this time.  Thank you

## 2023-10-25 LAB
BACTERIA BLD CULT: NORMAL
BACTERIA BLD CULT: NORMAL

## 2023-10-27 ENCOUNTER — OFFICE VISIT (OUTPATIENT)
Dept: FAMILY MEDICINE CLINIC | Facility: CLINIC | Age: 88
End: 2023-10-27
Payer: MEDICARE

## 2023-10-27 VITALS
OXYGEN SATURATION: 97 % | TEMPERATURE: 97 F | HEART RATE: 70 BPM | DIASTOLIC BLOOD PRESSURE: 72 MMHG | RESPIRATION RATE: 16 BRPM | WEIGHT: 138.4 LBS | BODY MASS INDEX: 25.47 KG/M2 | SYSTOLIC BLOOD PRESSURE: 142 MMHG | HEIGHT: 62 IN

## 2023-10-27 DIAGNOSIS — F32.A DEPRESSION, UNSPECIFIED DEPRESSION TYPE: ICD-10-CM

## 2023-10-27 DIAGNOSIS — Z95.0 STATUS POST PLACEMENT OF CARDIAC PACEMAKER: ICD-10-CM

## 2023-10-27 DIAGNOSIS — I10 ESSENTIAL HYPERTENSION: ICD-10-CM

## 2023-10-27 DIAGNOSIS — R26.2 AMBULATORY DYSFUNCTION: ICD-10-CM

## 2023-10-27 DIAGNOSIS — Z23 ENCOUNTER FOR IMMUNIZATION: ICD-10-CM

## 2023-10-27 DIAGNOSIS — Z63.8 CAREGIVER ROLE STRAIN: ICD-10-CM

## 2023-10-27 DIAGNOSIS — M17.11 PRIMARY LOCALIZED OSTEOARTHRITIS OF RIGHT KNEE: ICD-10-CM

## 2023-10-27 DIAGNOSIS — F03.A0 MILD DEMENTIA WITHOUT BEHAVIORAL DISTURBANCE, PSYCHOTIC DISTURBANCE, MOOD DISTURBANCE, OR ANXIETY, UNSPECIFIED DEMENTIA TYPE (HCC): ICD-10-CM

## 2023-10-27 DIAGNOSIS — G60.9 IDIOPATHIC PERIPHERAL NEUROPATHY: ICD-10-CM

## 2023-10-27 DIAGNOSIS — G93.41 ACUTE METABOLIC ENCEPHALOPATHY: Primary | Chronic | ICD-10-CM

## 2023-10-27 PROCEDURE — 99496 TRANSJ CARE MGMT HIGH F2F 7D: CPT | Performed by: FAMILY MEDICINE

## 2023-10-27 PROCEDURE — G0008 ADMIN INFLUENZA VIRUS VAC: HCPCS

## 2023-10-27 PROCEDURE — 90662 IIV NO PRSV INCREASED AG IM: CPT

## 2023-10-27 PROCEDURE — 90677 PCV20 VACCINE IM: CPT

## 2023-10-27 PROCEDURE — G0009 ADMIN PNEUMOCOCCAL VACCINE: HCPCS

## 2023-10-27 RX ORDER — GABAPENTIN 300 MG/1
300 CAPSULE ORAL 3 TIMES DAILY
Qty: 270 CAPSULE | Refills: 2 | Status: SHIPPED | OUTPATIENT
Start: 2023-10-27

## 2023-10-27 RX ORDER — DOXEPIN HYDROCHLORIDE 50 MG/1
50 CAPSULE ORAL
Qty: 90 CAPSULE | Refills: 0 | Status: SHIPPED | OUTPATIENT
Start: 2023-10-27

## 2023-10-27 SDOH — SOCIAL STABILITY - SOCIAL INSECURITY: OTHER SPECIFIED PROBLEMS RELATED TO PRIMARY SUPPORT GROUP: Z63.8

## 2023-10-27 NOTE — PROGRESS NOTES
Assessment/Plan:       Diagnoses and all orders for this visit:    Acute metabolic encephalopathy  -     Ambulatory Referral to Social Work Care Management Program; Future    Encounter for immunization  -     influenza vaccine, high-dose, PF 0.7 mL (FLUZONE HIGH-DOSE)  -     Pneumococcal Conjugate Vaccine 20-valent (Pcv20)    Idiopathic peripheral neuropathy  -     gabapentin (NEURONTIN) 300 mg capsule; Take 1 capsule (300 mg total) by mouth 3 (three) times a day    Depression, unspecified depression type  -     doxepin (SINEquan) 50 mg capsule; Take 1 capsule (50 mg total) by mouth daily at bedtime    Essential hypertension  -     Ambulatory Referral to Social Work Care Management Program; Future    Caregiver role strain  -     Ambulatory Referral to Social Work Care Management Program; Future    Status post placement of cardiac pacemaker    Ambulatory dysfunction  -     Ambulatory Referral to Social Work Care Management Program; Future    Primary localized osteoarthritis of right knee    Mild dementia without behavioral disturbance, psychotic disturbance, mood disturbance, or anxiety, unspecified dementia type ([address])  -     Ambulatory Referral to Social Work Care Management Program; Future        The current medical regimen is effective;  continue present plan and medications. Subjective:        Patient ID: Harlan Parisi is a 80 y.o. female. Chief Complaint   Patient presents with    Transition of Care Management     Change in mental status        Patient is a 45-year-old accompanied by her son today status post admission to [facility] here for TCM. Patient was admitted to [facility]. [place] October 20 with diagnosis of acute metabolic encephalopathy. This was partly initiated by acute upper respiratory diagnosis. She has been on outpatient doxycycline. Then this was switched to Zithromax. She received IV fluid hydration.   PT OT while inpatient recommending rehab however patient declined and wanted to return home. Patient's son Bee Gonzalez is her primary caregiver. She does live alone but he checks on her quite frequently. There is also other siblings a sister who does come from out of town and is helpful also. Her son Dora Nesbitt tells me that she is resistant for help in the home with nursing PT or OT. He recently had undergone open heart surgery and recovery so in his absence due to his recovery other family members were helping out but patient made it somewhat difficult and was uncooperative at times. Unfortunately Dora Nesbitt is expecting to have an abdominal aneurysm surgery in the near future and will be incapacitated and will rely on family members to help out again. Patient essentially was refusing and son is looking for other options. With everyone in the room together I discussed this with Norma Tomlin and she said "I will let other family members help me ". I extended to Norma Tomlin son Dora Nesbitt that I would do a referral into our  to see if he may find any additional resources for help with the situation if the patient becomes uncooperative. The following portions of the patient's history were reviewed and updated as appropriate: allergies, current medications, past family history, past social history and problem list.    Review of Systems   Constitutional:  Positive for fatigue. HENT:          Patient denies any residual URI symptoms. Respiratory:  Negative for cough. Musculoskeletal:  Positive for arthralgias (Knees are shot, patient is limited walking only to bathroom etc.). Psychiatric/Behavioral:          Patient is calm and cooperative today.        She was aware of her medication and which ones so you are is in need of.  Objective:  TCM Call       Date and time call was made  10/24/2023 10:32 AM    Hospital care reviewed  Records reviewed    Patient was hospitialized at  [facility] [place] [facility]    Date of Admission  10/20/23    Date of discharge  10/21/23 Diagnosis  Acute Metabolic Encephalopthy    Disposition  Home    Were the patients medications reviewed and updated  No    Current Symptoms  None          TCM Call       Post hospital issues  None    Should patient be enrolled in anticoag monitoring? No    Scheduled for follow up? Yes    Did you obtain your prescribed medications  Yes    Do you need help managing your prescriptions or medications  No    Is transportation to your appointment needed  No    I have advised the patient to call PCP with any new or worsening symptoms  Masood Joshua MA    Living Arrangements  Alone    Counseling  Patient    Comments  spoke with [facility]          /72   Pulse 70   Temp (!) 97 °F (36.1 °C) (Temporal)   Resp 16   Ht 5' 2" (1.575 m)   Wt 62.8 kg (138 lb 6.4 oz)   SpO2 97%   BMI 25.31 kg/m²      Physical Exam  Constitutional:       Appearance: Normal appearance. HENT:      Ears:      Comments: Slightly hard of hearing     Mouth/Throat:      Mouth: Mucous membranes are moist.   Cardiovascular:      Rate and Rhythm: Normal rate. Heart sounds: Murmur heard. Comments: Has pacemaker  Pulmonary:      Effort: Pulmonary effort is normal.      Breath sounds: Normal breath sounds. Abdominal:      General: Bowel sounds are normal.      Palpations: Abdomen is soft. Neurological:      Mental Status: She is alert and oriented to person, place, and time. Psychiatric:         Mood and Affect: Mood normal.         Behavior: Behavior normal. Behavior is cooperative. Thought Content: Thought content normal.         Judgment: Judgment normal.             BMI Counseling: Body mass index is 25.31 kg/m². Follow-up plan was not completed due to elderly patient (72 years old) where weight reduction/weight gain would complicate underlying health condition such as: illness or physical disability.          Current Outpatient Medications:     acetaminophen (TYLENOL) 325 mg tablet, Take 2 tablets (650 mg total) by mouth every 6 (six) hours as needed for mild pain, headaches or fever, Disp: , Rfl: 0    amLODIPine (NORVASC) 2.5 mg tablet, Take 1 tablet (2.5 mg total) by mouth 2 (two) times a day, Disp: 180 tablet, Rfl: 3    ascorbic acid (VITAMIN C) 250 mg tablet, Take 500 mg by mouth daily, Disp: , Rfl:     aspirin 81 MG tablet, Take by mouth daily , Disp: , Rfl:     Cholecalciferol (Vitamin D3) 25 MCG (1000 UT) CAPS, Take 25 each by mouth in the morning, Disp: , Rfl:     dorzolamide-timolol (COSOPT) 22.3-6.8 MG/ML ophthalmic solution, INSTILL 1 DROP IN EACH EYE EVERY DAY, Disp: , Rfl: 3    doxepin (SINEquan) 50 mg capsule, Take 1 capsule (50 mg total) by mouth daily at bedtime, Disp: 90 capsule, Rfl: 0    Elastic Bandages & Supports (CARPAL TUNNEL WRIST STABILIZER) MISC, by Does not apply route daily, Disp: 1 each, Rfl: 0    furosemide (LASIX) 20 mg tablet, Take 1 tablet (20 mg total) by mouth if needed (Lower extremity edema) May take 3 times weekly as needed for lower extremity swelling., Disp: 30 tablet, Rfl: 0    gabapentin (NEURONTIN) 300 mg capsule, Take 1 capsule (300 mg total) by mouth 3 (three) times a day, Disp: 270 capsule, Rfl: 2    omeprazole (PriLOSEC) 20 mg delayed release capsule, Take 1 capsule (20 mg total) by mouth daily, Disp: 90 capsule, Rfl: 3    telmisartan (MICARDIS) 20 MG tablet, Take 1 tablet (20 mg total) by mouth daily, Disp: 90 tablet, Rfl: 3  Allergies   Allergen Reactions    Fish-Derived Products - Food Allergy      GI upset    Allopurinol Rash     Category:  Allergy;

## 2023-11-02 ENCOUNTER — PATIENT OUTREACH (OUTPATIENT)
Dept: FAMILY MEDICINE CLINIC | Facility: CLINIC | Age: 88
End: 2023-11-02

## 2023-11-02 NOTE — PROGRESS NOTES
OP CM rcvd consult for caregiver strain. Pt resides alone in 2 story home with first floor set up. Pt resides alone but family assists daily. Son will be having surgery again and is concerned because pt usually refuses care. Per notes other family members have been helping and pt stated she is agreeable to this. Called to pts son in regards to this matter and left message.

## 2023-11-03 ENCOUNTER — PATIENT OUTREACH (OUTPATIENT)
Dept: FAMILY MEDICINE CLINIC | Facility: CLINIC | Age: 88
End: 2023-11-03

## 2023-11-03 NOTE — PROGRESS NOTES
OP CM called to pts son again in regards to assistance needed for pt at home. [employer] message sent as well. Dear Mr Timothy Thomson:    I am the  that covers [facility] [employer]. I received a consult that you are interested in services at home for your mother. There is a home health waiver program through the [facility] for home health aides. Your mother would need to make less than [phone] a month in income and have less than 8000 in assets such as savings, checking, bonds, etc.  If she does meet those guidelines we can apply for the program.  They will require 5 years of banking info and it can take one to two months for the process. Otherwise there are private pay agencies such as [employer] [phone]. These do average about $30+ an hour. If neither are an issue can you rely on any family, friends, or neighbors. Let me know your thoughts.   I can be reached at [phone] Monday through Friday from 8am to 430pm.      Sincerely,      KEV Nichols

## 2023-11-03 NOTE — LETTER
November 3, 2023     Erika Coyne    Patient: Christophe Tejeda   YOB: 1928   Date of Visit: 11/3/2023       Dear Mr Carmen Montes De Oca:    I am the  that covers [facility] [employer]. I received a consult that you are interested in services at home for your mother. There is a home health waiver program through the [facility] for home health aides. Your mother would need to make less than [phone] a month in income and have less than 8000 in assets such as savings, checking, bonds, etc.  If she does meet those guidelines we can apply for the program.  They will require 5 years of banking info and it can take one to two months for the process. Otherwise there are private pay agencies such as [employer] [phone]. These do average about $30+ an hour. If neither are an issue can you rely on any family, friends, or neighbors. Let me know your thoughts.   I can be reached at [phone] Monday through Friday from 8am to 430pm.           Sincerely,        Kathrin Dakins, MSW        CC: No Recipients

## 2023-11-06 NOTE — TELEPHONE ENCOUNTER
"     Office Note      Date: 2023  Patient Name: Herbert Harley  MRN: [id number]  : 1955    Chief Complaint   Patient presents with    Diabetes     Type 2 diabetes mellitus with hypoglycemia without coma, with long-term current use of insulin           History of Present Illness:   Herbert Harley is a 67 y.o. male who presents for follow-up for type 2 diabetes diagnosed in .  He is accompanied by his wife today.  He reports he is up to 40 units of Lantus daily and has been on this dose for the last 3 to 4 weeks.  His fasting blood sugars are typically between 95 and 160 mg/dL.  He remains on Farxiga 10 mg daily.  He denies any hypoglycemia.  His lowest blood glucose has been 95 mg/dL.  He reports he saw nephrology last week and they are monitoring his weight they reduced his Bumex to 1/2 tablet daily.  Since last appointment his primary care physician added Zoloft this dose was recently increased.  He reports he has not been able to get his syringes from [facility] and did not take his insulin this morning.      Subjective     Review of Systems:   Review of Systems   Constitutional: Negative.    Cardiovascular: Negative.    Gastrointestinal: Negative.    Endocrine: Negative.    Neurological: Negative.        The following portions of the patient's history were reviewed and updated as appropriate: allergies, current medications, past family history, past medical history, past social history, past surgical history, and problem list.    Objective     Vitals:    23 1540   BP: 118/52   BP Location: Left arm   Patient Position: Sitting   Cuff Size: Adult   Pulse: 62   SpO2: 96%   Weight: 74.8 kg (165 lb)   Height: 165.1 cm (65\")     Body mass index is 27.46 kg/m².    Physical Exam  Vitals reviewed.   Constitutional:       General: He is not in acute distress.     Appearance: Normal appearance.   Neurological:      Mental Status: He is alert.         HEMOGLOBIN A1C  Lab Results   Component " ----- Message from Richard Hercules DO sent at 69/42/3266  8:37 AM EDT -----  Regarding: Supplement  Based on the patient's last blood work will need to replace with some additional iron supplement    I recommend daily Flintstones chewable WITH IRON Value Date    HGBA1C 8.0 (A) 11/06/2023       GLUCOSE  Glucose   Date Value Ref Range Status   11/06/2023 170 (A) 70 - 130 mg/dL Final       CMP  Lab Results   Component Value Date    GLUCOSE 286 (H) 10/04/2023    BUN 34 (H) 10/04/2023    CREATININE 1.62 (H) 10/04/2023    EGFRIFNONA 66 12/14/2021    BCR 21.0 10/04/2023    K 4.5 10/04/2023    CO2 21.0 (L) 10/04/2023    CALCIUM 9.3 10/04/2023    AST 35 05/18/2023    ALT 62 (H) 05/18/2023       LIPID PANEL  Lab Results   Component Value Date    CHOL 80 02/17/2023    CHLPL 174 03/28/2014    TRIG 60 02/17/2023    HDL 37 (L) 02/17/2023    LDL 29 02/17/2023       URINE MICROALBUMIN/CREATININE RATIO  Microalbumin/Creatinine Ratio   Date Value Ref Range Status   12/22/2022 55.9 mg/g Final       TSH  Lab Results   Component Value Date    TSH 2.330 12/22/2022       Assessment / Plan      Assessment & Plan:  1. Type 2 diabetes mellitus with hyperglycemia, with long-term current use of insulin  His hemoglobin A1c has improved significantly as compared to July at 8.0%.  This is still above goal but much improved.  We will try increasing his Lantus to 42 units daily and he will continue to monitor fasting blood glucose readings and adjust accordingly.  He will continue Farxiga 10 mg daily (cardiologist has him on this)  His weight is up 16 pounds since his appointment in July.  They are monitoring his weight for fluid retention for nephrology.  His blood pressure is okay today.  I gave him a sample pack of syringes to use until he is able to get his syringes from [facility].  He will resume his Lantus.    - POC Glycosylated Hemoglobin (Hb A1C)  - POC Glucose, Blood      Return in about 4 months (around 3/6/2024) for Recheck.     This note was dictated using Dragon voice recognition.    Jessie Bello PA-C  11/06/2023

## 2023-11-10 ENCOUNTER — PATIENT OUTREACH (OUTPATIENT)
Dept: FAMILY MEDICINE CLINIC | Facility: CLINIC | Age: 88
End: 2023-11-10

## 2023-11-10 NOTE — PROGRESS NOTES
OP CM rcvd VM from pts son Bita Hire that he is not in need of social work services at this time but will keep my number for any future concerns.

## 2023-12-19 PROBLEM — J06.9 ACUTE URI: Status: RESOLVED | Noted: 2023-10-20 | Resolved: 2023-12-19

## 2023-12-27 ENCOUNTER — REMOTE DEVICE CLINIC VISIT (OUTPATIENT)
Dept: CARDIOLOGY CLINIC | Facility: CLINIC | Age: 88
End: 2023-12-27
Payer: MEDICARE

## 2023-12-27 DIAGNOSIS — Z95.0 CARDIAC PACEMAKER IN SITU: Primary | ICD-10-CM

## 2023-12-27 PROCEDURE — 93296 REM INTERROG EVL PM/IDS: CPT | Performed by: INTERNAL MEDICINE

## 2023-12-27 PROCEDURE — 93294 REM INTERROG EVL PM/LDLS PM: CPT | Performed by: INTERNAL MEDICINE

## 2023-12-27 NOTE — PROGRESS NOTES
Results for orders placed or performed in visit on 12/27/23   Cardiac EP device report    Narrative    MDT DUAL PPM/ ACTIVE SYSTEM IS MRI CONDITIONAL  CARELINK TRANSMISSION: BATTERY VOLTAGE ADEQUATE (8.7 YRS). AP-31%, >99% (DEPENDENT/AVB). ALL AVAILABLE LEAD PARAMETERS WITHIN NORMAL LIMITS. 1 NSVT EPISODE FOR 5 BEATS, AVG CL~300MS. EF-55% (ECHO 10/6/22). PT IS NOT ON ANY BETA BLOCKER. NORMAL DEVICE FUNCTION. GV

## 2024-01-22 DIAGNOSIS — F32.A DEPRESSION, UNSPECIFIED DEPRESSION TYPE: ICD-10-CM

## 2024-01-23 RX ORDER — DOXEPIN HYDROCHLORIDE 50 MG/1
50 CAPSULE ORAL
Qty: 90 CAPSULE | Refills: 0 | Status: SHIPPED | OUTPATIENT
Start: 2024-01-23

## 2024-01-26 DIAGNOSIS — H35.30 MACULAR DEGENERATION, UNSPECIFIED LATERALITY, UNSPECIFIED TYPE: Primary | ICD-10-CM

## 2024-01-26 DIAGNOSIS — H40.10X4 OPEN-ANGLE GLAUCOMA OF BOTH EYES, INDETERMINATE STAGE, UNSPECIFIED OPEN-ANGLE GLAUCOMA TYPE: ICD-10-CM

## 2024-01-26 RX ORDER — DORZOLAMIDE HYDROCHLORIDE AND TIMOLOL MALEATE 20; 5 MG/ML; MG/ML
1 SOLUTION/ DROPS OPHTHALMIC EVERY 12 HOURS
Qty: 10 ML | Refills: 0 | Status: SHIPPED | OUTPATIENT
Start: 2024-01-26

## 2024-02-08 ENCOUNTER — RA CDI HCC (OUTPATIENT)
Dept: OTHER | Facility: HOSPITAL | Age: 89
End: 2024-02-08

## 2024-03-02 DIAGNOSIS — H35.30 MACULAR DEGENERATION, UNSPECIFIED LATERALITY, UNSPECIFIED TYPE: ICD-10-CM

## 2024-03-02 DIAGNOSIS — H40.10X4 OPEN-ANGLE GLAUCOMA OF BOTH EYES, INDETERMINATE STAGE, UNSPECIFIED OPEN-ANGLE GLAUCOMA TYPE: ICD-10-CM

## 2024-03-04 RX ORDER — DORZOLAMIDE HYDROCHLORIDE AND TIMOLOL MALEATE 20; 5 MG/ML; MG/ML
SOLUTION/ DROPS OPHTHALMIC
Qty: 10 ML | Refills: 0 | Status: SHIPPED | OUTPATIENT
Start: 2024-03-04

## 2024-03-14 DIAGNOSIS — I10 BENIGN ESSENTIAL HYPERTENSION: ICD-10-CM

## 2024-03-14 RX ORDER — TELMISARTAN 20 MG/1
20 TABLET ORAL DAILY
Qty: 90 TABLET | Refills: 1 | Status: SHIPPED | OUTPATIENT
Start: 2024-03-14

## 2024-03-14 NOTE — ASSESSMENT & PLAN NOTE
[facility]  DIABETES MANAGEMENT CONSULT    Re-consulted by Provider for advanced nursing evaluation and care for inpatient blood glucose management.    Evaluation and Action Plan   This 69 year old AA female was admitted 2/21/24 with nausea & vomiting. Patient has CKD and is on PD at home; this has continued in-house. Usual home insulin dosing consists of 10 units of total insulin per day. Patient was receiving TF for a few days, along with corrective insulin, with Bgs rising into the 200-300s. TF was stopped 3/4/24 in order to address pulmonary edema. PD useful in pulling extra fluid, but Bps were lowish. Given one-time dose of steroid & a pint of PRBCs. TF had been in place before providing just at night. Now, Tfs have been completely stopped as patient is eating >50% of meals.     Management Rationale Action Plan   Medication   Basal & nutritional needs Based on  []        Blood glucose pattern  []        Weight & BMI  []        Kidney dysfunction  [x]        Home diabetes regimen  Tube feedings     Continue NPH insulin 7 units twice daily     Corrective insulin Based on sensitivity  [x]        Low   []        Medium  []        High      Additional orders        Diabetes Discharge Plan   Medication  NPH insulin 7 units twice daily     Referral  []        Outpatient diabetes education   Additional orders            Initial Presentation   Galilea Aguilar is a 69 y.o. female admitted 2/21/24 from ED after experiencing vomiting and left side/flank pain - ESRD-PD dependant. Recent admission 1/25-2/5 for abd pain/ sepsis secondary to peritonitis and was d/c'd with abx. Since discharged persistent N/V.   CT: Large right retroperitoneal mass though has been present on several CT scans since 2021      HX:  Past Medical History:   Diagnosis Date    Asthma     CAD (coronary artery disease)     COPD (chronic obstructive pulmonary disease) (HCC)     PCP manages    CVA (cerebral vascular accident)  Related temperature spike on 07/27  No fever overnight  Chest x-ray and UA negative for infection this admission  Procalcitonin borderline elevated  Follow echocardiogram  Follow-up blood cultures  Incentive spirometry  Trend temperature curve  Trend procalcitonin in a m  [place] Short course of azithromycin p o  As mentioned

## 2024-03-27 ENCOUNTER — REMOTE DEVICE CLINIC VISIT (OUTPATIENT)
Dept: CARDIOLOGY CLINIC | Facility: CLINIC | Age: 89
End: 2024-03-27
Payer: MEDICARE

## 2024-03-27 DIAGNOSIS — Z95.0 CARDIAC PACEMAKER IN SITU: Primary | ICD-10-CM

## 2024-03-27 PROCEDURE — 93296 REM INTERROG EVL PM/IDS: CPT | Performed by: INTERNAL MEDICINE

## 2024-03-27 PROCEDURE — 93294 REM INTERROG EVL PM/LDLS PM: CPT | Performed by: INTERNAL MEDICINE

## 2024-03-27 NOTE — PROGRESS NOTES
"Results for orders placed or performed in visit on 03/27/24   Cardiac EP device report    Narrative    MDT DUAL PPM/ ACTIVE SYSTEM IS MRI CONDITIONAL  CARELINK TRANSMISSION: BATTERY STATUS \"8.5 YRS.\" AP 40%  100%. ALL AVAILABLE LEAD PARAMETERS WITHIN NORMAL LIMITS. NO SIGNIFICANT HIGH RATE EPISODES. NORMAL DEVICE FUNCTION. NC         "

## 2024-04-19 PROBLEM — I12.9 HYPERTENSIVE KIDNEY DISEASE WITH CKD (CHRONIC KIDNEY DISEASE): Status: ACTIVE | Noted: 2024-04-19

## 2024-04-22 DIAGNOSIS — H40.10X4 OPEN-ANGLE GLAUCOMA OF BOTH EYES, INDETERMINATE STAGE, UNSPECIFIED OPEN-ANGLE GLAUCOMA TYPE: ICD-10-CM

## 2024-04-22 DIAGNOSIS — F32.A DEPRESSION, UNSPECIFIED DEPRESSION TYPE: ICD-10-CM

## 2024-04-22 DIAGNOSIS — H35.30 MACULAR DEGENERATION, UNSPECIFIED LATERALITY, UNSPECIFIED TYPE: ICD-10-CM

## 2024-04-23 RX ORDER — DOXEPIN HYDROCHLORIDE 50 MG/1
50 CAPSULE ORAL
Qty: 90 CAPSULE | Refills: 0 | Status: SHIPPED | OUTPATIENT
Start: 2024-04-23

## 2024-04-23 RX ORDER — DORZOLAMIDE HYDROCHLORIDE AND TIMOLOL MALEATE 20; 5 MG/ML; MG/ML
SOLUTION/ DROPS OPHTHALMIC
Qty: 10 ML | Refills: 0 | Status: SHIPPED | OUTPATIENT
Start: 2024-04-23

## 2024-06-05 ENCOUNTER — TELEPHONE (OUTPATIENT)
Age: 89
End: 2024-06-05

## 2024-06-05 DIAGNOSIS — R39.9 UTI SYMPTOMS: Primary | ICD-10-CM

## 2024-06-05 RX ORDER — SULFAMETHOXAZOLE AND TRIMETHOPRIM 800; 160 MG/1; MG/1
1 TABLET ORAL 2 TIMES DAILY
Qty: 6 TABLET | Refills: 0 | Status: SHIPPED | OUTPATIENT
Start: 2024-06-05 | End: 2024-06-08

## 2024-06-05 NOTE — TELEPHONE ENCOUNTER
Pharmacy calling on behalf of patient. Wanted to make PCP aware of drug interaction between Bactrim and Telmisartan. States Bactrim causes increased Potassium levels and effects renal function. Please advise, Pharmacy would like to know if PCP wants to continue with medication.

## 2024-06-05 NOTE — TELEPHONE ENCOUNTER
Patient has been with burning urination on and off for the past week  Requesting urine test to be ordered.    Declined office visit.    Pharmacy [facility] [place]    Please review and advice  Thank you

## 2024-06-12 DIAGNOSIS — H40.10X4 OPEN-ANGLE GLAUCOMA OF BOTH EYES, INDETERMINATE STAGE, UNSPECIFIED OPEN-ANGLE GLAUCOMA TYPE: ICD-10-CM

## 2024-06-12 DIAGNOSIS — H35.30 MACULAR DEGENERATION, UNSPECIFIED LATERALITY, UNSPECIFIED TYPE: ICD-10-CM

## 2024-06-12 RX ORDER — DORZOLAMIDE HYDROCHLORIDE AND TIMOLOL MALEATE 20; 5 MG/ML; MG/ML
SOLUTION/ DROPS OPHTHALMIC
Qty: 10 ML | Refills: 0 | Status: SHIPPED | OUTPATIENT
Start: 2024-06-12

## 2024-06-26 ENCOUNTER — REMOTE DEVICE CLINIC VISIT (OUTPATIENT)
Dept: CARDIOLOGY CLINIC | Facility: CLINIC | Age: 89
End: 2024-06-26
Payer: MEDICARE

## 2024-06-26 DIAGNOSIS — Z95.0 CARDIAC PACEMAKER IN SITU: Primary | ICD-10-CM

## 2024-06-26 PROCEDURE — 93294 REM INTERROG EVL PM/LDLS PM: CPT | Performed by: INTERNAL MEDICINE

## 2024-06-26 PROCEDURE — 93296 REM INTERROG EVL PM/IDS: CPT | Performed by: INTERNAL MEDICINE

## 2024-06-26 NOTE — PROGRESS NOTES
Results for orders placed or performed in visit on 06/26/24   Cardiac EP device report    Narrative    MDT DUAL PPM/ ACTIVE SYSTEM IS MRI CONDITIONAL  CARELINK TRANSMISSION: BATTERY VOLTAGE ADEQUATE (8.3 YRS). AP 44.2%  99.9% (>40%/AVB/DDD 60PPM); ALL AVAILABLE LEAD PARAMETERS WITHIN NORMAL LIMITS. NO SIGNIFICANT HIGH RATE EPISODES. NORMAL DEVICE FUNCTION.  ES

## 2024-07-01 DIAGNOSIS — R39.9 UTI SYMPTOMS: Primary | ICD-10-CM

## 2024-07-01 RX ORDER — SULFAMETHOXAZOLE AND TRIMETHOPRIM 800; 160 MG/1; MG/1
1 TABLET ORAL 2 TIMES DAILY
Qty: 14 TABLET | Refills: 0 | Status: ON HOLD | OUTPATIENT
Start: 2024-07-01 | End: 2024-07-08

## 2024-07-02 ENCOUNTER — LAB (OUTPATIENT)
Dept: LAB | Facility: CLINIC | Age: 89
End: 2024-07-02
Payer: MEDICARE

## 2024-07-02 DIAGNOSIS — R39.9 UTI SYMPTOMS: ICD-10-CM

## 2024-07-02 LAB
BACTERIA UR QL AUTO: ABNORMAL /HPF
BILIRUB UR QL STRIP: NEGATIVE
CLARITY UR: ABNORMAL
COLOR UR: YELLOW
GLUCOSE UR STRIP-MCNC: NEGATIVE MG/DL
HGB UR QL STRIP.AUTO: NEGATIVE
KETONES UR STRIP-MCNC: NEGATIVE MG/DL
LEUKOCYTE ESTERASE UR QL STRIP: ABNORMAL
MUCOUS THREADS UR QL AUTO: ABNORMAL
NITRITE UR QL STRIP: NEGATIVE
NON-SQ EPI CELLS URNS QL MICRO: ABNORMAL /HPF
PH UR STRIP.AUTO: 6 [PH]
PROT UR STRIP-MCNC: ABNORMAL MG/DL
RBC #/AREA URNS AUTO: ABNORMAL /HPF
SP GR UR STRIP.AUTO: 1.01 (ref 1–1.03)
UROBILINOGEN UR STRIP-ACNC: <2 MG/DL
WBC #/AREA URNS AUTO: ABNORMAL /HPF
WBC CLUMPS # UR AUTO: PRESENT /UL

## 2024-07-02 PROCEDURE — 87077 CULTURE AEROBIC IDENTIFY: CPT

## 2024-07-02 PROCEDURE — 81001 URINALYSIS AUTO W/SCOPE: CPT

## 2024-07-02 PROCEDURE — 87186 SC STD MICRODIL/AGAR DIL: CPT

## 2024-07-02 PROCEDURE — 87086 URINE CULTURE/COLONY COUNT: CPT

## 2024-07-04 LAB — BACTERIA UR CULT: ABNORMAL

## 2024-07-07 ENCOUNTER — APPOINTMENT (INPATIENT)
Dept: CT IMAGING | Facility: HOSPITAL | Age: 89
DRG: 315 | End: 2024-07-07
Payer: MEDICARE

## 2024-07-07 ENCOUNTER — HOSPITAL ENCOUNTER (INPATIENT)
Facility: HOSPITAL | Age: 89
LOS: 3 days | Discharge: NURSING, REHAB OR LONG-TERM CARE | DRG: 315 | End: 2024-07-10
Admitting: INTERNAL MEDICINE
Payer: MEDICARE

## 2024-07-07 ENCOUNTER — APPOINTMENT (EMERGENCY)
Dept: RADIOLOGY | Facility: HOSPITAL | Age: 89
DRG: 315 | End: 2024-07-07
Payer: MEDICARE

## 2024-07-07 DIAGNOSIS — D64.9 ANEMIA: ICD-10-CM

## 2024-07-07 DIAGNOSIS — R53.1 GENERALIZED WEAKNESS: Primary | ICD-10-CM

## 2024-07-07 DIAGNOSIS — N17.9 AKI (ACUTE KIDNEY INJURY) (HCC): ICD-10-CM

## 2024-07-07 PROBLEM — M25.511 RIGHT SHOULDER PAIN: Status: RESOLVED | Noted: 2019-07-27 | Resolved: 2024-07-07

## 2024-07-07 PROBLEM — M79.641 PAIN OF RIGHT HAND: Status: RESOLVED | Noted: 2023-10-05 | Resolved: 2024-07-07

## 2024-07-07 PROBLEM — M17.11 PRIMARY LOCALIZED OSTEOARTHRITIS OF RIGHT KNEE: Status: RESOLVED | Noted: 2017-10-16 | Resolved: 2024-07-07

## 2024-07-07 PROBLEM — I12.9 HYPERTENSIVE KIDNEY DISEASE WITH CKD (CHRONIC KIDNEY DISEASE): Status: RESOLVED | Noted: 2024-04-19 | Resolved: 2024-07-07

## 2024-07-07 PROBLEM — G93.41 ACUTE METABOLIC ENCEPHALOPATHY: Status: RESOLVED | Noted: 2023-10-20 | Resolved: 2024-07-07

## 2024-07-07 PROBLEM — R06.02 SHORTNESS OF BREATH: Status: RESOLVED | Noted: 2022-10-05 | Resolved: 2024-07-07

## 2024-07-07 PROBLEM — E86.1 HYPOTENSION DUE TO HYPOVOLEMIA: Status: ACTIVE | Noted: 2024-07-07

## 2024-07-07 PROBLEM — R26.2 AMBULATORY DYSFUNCTION: Status: RESOLVED | Noted: 2019-07-30 | Resolved: 2024-07-07

## 2024-07-07 PROBLEM — M17.12 PRIMARY LOCALIZED OSTEOARTHRITIS OF LEFT KNEE: Status: RESOLVED | Noted: 2017-10-16 | Resolved: 2024-07-07

## 2024-07-07 PROBLEM — N18.9 ACUTE KIDNEY INJURY SUPERIMPOSED ON CHRONIC KIDNEY DISEASE  (HCC): Status: ACTIVE | Noted: 2021-10-20

## 2024-07-07 PROBLEM — F03.A0 MILD DEMENTIA WITHOUT BEHAVIORAL DISTURBANCE, PSYCHOTIC DISTURBANCE, MOOD DISTURBANCE, OR ANXIETY (HCC): Status: ACTIVE | Noted: 2019-07-29

## 2024-07-07 PROBLEM — I95.9 HYPOTENSION: Status: ACTIVE | Noted: 2024-07-07

## 2024-07-07 LAB
2HR DELTA HS TROPONIN: -2 NG/L
ABO GROUP BLD: NORMAL
ALBUMIN SERPL BCG-MCNC: 4 G/DL (ref 3.5–5)
ALP SERPL-CCNC: 70 U/L (ref 34–104)
ALT SERPL W P-5'-P-CCNC: 9 U/L (ref 7–52)
ANION GAP SERPL CALCULATED.3IONS-SCNC: 7 MMOL/L (ref 4–13)
ANION GAP SERPL CALCULATED.3IONS-SCNC: 9 MMOL/L (ref 4–13)
ANISOCYTOSIS BLD QL SMEAR: PRESENT
ANISOCYTOSIS BLD QL SMEAR: PRESENT
AST SERPL W P-5'-P-CCNC: 13 U/L (ref 13–39)
ATRIAL RATE: 60 BPM
ATRIAL RATE: 61 BPM
BACTERIA UR QL AUTO: ABNORMAL /HPF
BASOPHILS # BLD MANUAL: 0 THOUSAND/UL (ref 0–0.1)
BASOPHILS # BLD MANUAL: 0.25 THOUSAND/UL (ref 0–0.1)
BASOPHILS NFR MAR MANUAL: 0 % (ref 0–1)
BASOPHILS NFR MAR MANUAL: 2 % (ref 0–1)
BILIRUB SERPL-MCNC: 0.3 MG/DL (ref 0.2–1)
BILIRUB UR QL STRIP: NEGATIVE
BLD GP AB SCN SERPL QL: NEGATIVE
BUN SERPL-MCNC: 41 MG/DL (ref 5–25)
BUN SERPL-MCNC: 41 MG/DL (ref 5–25)
BURR CELLS BLD QL SMEAR: PRESENT
BURR CELLS BLD QL SMEAR: PRESENT
CALCIUM SERPL-MCNC: 10.4 MG/DL (ref 8.4–10.2)
CALCIUM SERPL-MCNC: 9.5 MG/DL (ref 8.4–10.2)
CARDIAC TROPONIN I PNL SERPL HS: 15 NG/L
CARDIAC TROPONIN I PNL SERPL HS: 17 NG/L
CHLORIDE SERPL-SCNC: 106 MMOL/L (ref 96–108)
CHLORIDE SERPL-SCNC: 109 MMOL/L (ref 96–108)
CLARITY UR: CLEAR
CO2 SERPL-SCNC: 19 MMOL/L (ref 21–32)
CO2 SERPL-SCNC: 20 MMOL/L (ref 21–32)
COLOR UR: ABNORMAL
CREAT SERPL-MCNC: 2.88 MG/DL (ref 0.6–1.3)
CREAT SERPL-MCNC: 3.09 MG/DL (ref 0.6–1.3)
EOSINOPHIL # BLD MANUAL: 0 THOUSAND/UL (ref 0–0.4)
EOSINOPHIL # BLD MANUAL: 0 THOUSAND/UL (ref 0–0.4)
EOSINOPHIL NFR BLD MANUAL: 0 % (ref 0–6)
EOSINOPHIL NFR BLD MANUAL: 0 % (ref 0–6)
ERYTHROCYTE [DISTWIDTH] IN BLOOD BY AUTOMATED COUNT: 18.2 % (ref 11.6–15.1)
ERYTHROCYTE [DISTWIDTH] IN BLOOD BY AUTOMATED COUNT: 18.3 % (ref 11.6–15.1)
FERRITIN SERPL-MCNC: 37 NG/ML (ref 11–307)
GFR SERPL CREATININE-BSD FRML MDRD: 12 ML/MIN/1.73SQ M
GFR SERPL CREATININE-BSD FRML MDRD: 13 ML/MIN/1.73SQ M
GLUCOSE SERPL-MCNC: 109 MG/DL (ref 65–140)
GLUCOSE SERPL-MCNC: 82 MG/DL (ref 65–140)
GLUCOSE UR STRIP-MCNC: NEGATIVE MG/DL
HCT VFR BLD AUTO: 27.4 % (ref 34.8–46.1)
HCT VFR BLD AUTO: 28 % (ref 34.8–46.1)
HGB BLD-MCNC: 8.4 G/DL (ref 11.5–15.4)
HGB BLD-MCNC: 8.7 G/DL (ref 11.5–15.4)
HGB UR QL STRIP.AUTO: NEGATIVE
IRON SATN MFR SERPL: 5 % (ref 15–50)
IRON SERPL-MCNC: 16 UG/DL (ref 50–212)
KETONES UR STRIP-MCNC: NEGATIVE MG/DL
LEUKOCYTE ESTERASE UR QL STRIP: ABNORMAL
LG PLATELETS BLD QL SMEAR: PRESENT
LYMPHOCYTES # BLD AUTO: 0.75 THOUSAND/UL (ref 0.6–4.47)
LYMPHOCYTES # BLD AUTO: 0.94 THOUSAND/UL (ref 0.6–4.47)
LYMPHOCYTES # BLD AUTO: 10 % (ref 14–44)
LYMPHOCYTES # BLD AUTO: 6 % (ref 14–44)
MAGNESIUM SERPL-MCNC: 2.2 MG/DL (ref 1.9–2.7)
MCH RBC QN AUTO: 26 PG (ref 26.8–34.3)
MCH RBC QN AUTO: 26.8 PG (ref 26.8–34.3)
MCHC RBC AUTO-ENTMCNC: 30.7 G/DL (ref 31.4–37.4)
MCHC RBC AUTO-ENTMCNC: 31.1 G/DL (ref 31.4–37.4)
MCV RBC AUTO: 85 FL (ref 82–98)
MCV RBC AUTO: 86 FL (ref 82–98)
METAMYELOCYTE ABSOLUTE CT: 0.09 THOUSAND/UL (ref 0–0.1)
METAMYELOCYTES NFR BLD MANUAL: 1 % (ref 0–1)
MONOCYTES # BLD AUTO: 1.13 THOUSAND/UL (ref 0–1.22)
MONOCYTES # BLD AUTO: 1.49 THOUSAND/UL (ref 0–1.22)
MONOCYTES NFR BLD: 12 % (ref 4–12)
MONOCYTES NFR BLD: 12 % (ref 4–12)
MUCOUS THREADS UR QL AUTO: ABNORMAL
NEUTROPHILS # BLD MANUAL: 7.25 THOUSAND/UL (ref 1.85–7.62)
NEUTROPHILS # BLD MANUAL: 9.94 THOUSAND/UL (ref 1.85–7.62)
NEUTS BAND NFR BLD MANUAL: 1 % (ref 0–8)
NEUTS BAND NFR BLD MANUAL: 2 % (ref 0–8)
NEUTS SEG NFR BLD AUTO: 76 % (ref 43–75)
NEUTS SEG NFR BLD AUTO: 78 % (ref 43–75)
NITRITE UR QL STRIP: NEGATIVE
NON-SQ EPI CELLS URNS QL MICRO: ABNORMAL /HPF
OVALOCYTES BLD QL SMEAR: PRESENT
OVALOCYTES BLD QL SMEAR: PRESENT
P AXIS: 62 DEGREES
P AXIS: 79 DEGREES
PH UR STRIP.AUTO: 5.5 [PH]
PLATELET # BLD AUTO: 102 THOUSANDS/UL (ref 149–390)
PLATELET # BLD AUTO: 94 THOUSANDS/UL (ref 149–390)
PLATELET BLD QL SMEAR: ABNORMAL
PLATELET BLD QL SMEAR: ABNORMAL
POIKILOCYTOSIS BLD QL SMEAR: PRESENT
POTASSIUM SERPL-SCNC: 4.3 MMOL/L (ref 3.5–5.3)
POTASSIUM SERPL-SCNC: 4.6 MMOL/L (ref 3.5–5.3)
PR INTERVAL: 174 MS
PR INTERVAL: 218 MS
PROT SERPL-MCNC: 6.2 G/DL (ref 6.4–8.4)
PROT UR STRIP-MCNC: ABNORMAL MG/DL
QRS AXIS: -65 DEGREES
QRS AXIS: -70 DEGREES
QRSD INTERVAL: 104 MS
QRSD INTERVAL: 150 MS
QT INTERVAL: 414 MS
QT INTERVAL: 476 MS
QTC INTERVAL: 416 MS
QTC INTERVAL: 476 MS
RBC # BLD AUTO: 3.23 MILLION/UL (ref 3.81–5.12)
RBC # BLD AUTO: 3.25 MILLION/UL (ref 3.81–5.12)
RBC #/AREA URNS AUTO: ABNORMAL /HPF
RBC MORPH BLD: PRESENT
RBC MORPH BLD: PRESENT
RENAL EPI CELLS #/AREA URNS HPF: PRESENT /[HPF]
RH BLD: POSITIVE
SODIUM SERPL-SCNC: 135 MMOL/L (ref 135–147)
SODIUM SERPL-SCNC: 135 MMOL/L (ref 135–147)
SP GR UR STRIP.AUTO: 1.01 (ref 1–1.03)
SPECIMEN EXPIRATION DATE: NORMAL
T WAVE AXIS: 106 DEGREES
T WAVE AXIS: 148 DEGREES
TIBC SERPL-MCNC: 298 UG/DL (ref 250–450)
UIBC SERPL-MCNC: 282 UG/DL (ref 155–355)
UROBILINOGEN UR STRIP-ACNC: <2 MG/DL
VENTRICULAR RATE: 60 BPM
VENTRICULAR RATE: 61 BPM
WBC # BLD AUTO: 12.43 THOUSAND/UL (ref 4.31–10.16)
WBC # BLD AUTO: 9.42 THOUSAND/UL (ref 4.31–10.16)
WBC #/AREA URNS AUTO: ABNORMAL /HPF

## 2024-07-07 PROCEDURE — 83550 IRON BINDING TEST: CPT

## 2024-07-07 PROCEDURE — 80053 COMPREHEN METABOLIC PANEL: CPT

## 2024-07-07 PROCEDURE — 74176 CT ABD & PELVIS W/O CONTRAST: CPT

## 2024-07-07 PROCEDURE — 85007 BL SMEAR W/DIFF WBC COUNT: CPT

## 2024-07-07 PROCEDURE — 84484 ASSAY OF TROPONIN QUANT: CPT

## 2024-07-07 PROCEDURE — 82728 ASSAY OF FERRITIN: CPT

## 2024-07-07 PROCEDURE — 99223 1ST HOSP IP/OBS HIGH 75: CPT | Performed by: INTERNAL MEDICINE

## 2024-07-07 PROCEDURE — 71045 X-RAY EXAM CHEST 1 VIEW: CPT

## 2024-07-07 PROCEDURE — 83540 ASSAY OF IRON: CPT

## 2024-07-07 PROCEDURE — 80048 BASIC METABOLIC PNL TOTAL CA: CPT

## 2024-07-07 PROCEDURE — 81001 URINALYSIS AUTO W/SCOPE: CPT

## 2024-07-07 PROCEDURE — 85027 COMPLETE CBC AUTOMATED: CPT

## 2024-07-07 PROCEDURE — 99285 EMERGENCY DEPT VISIT HI MDM: CPT

## 2024-07-07 PROCEDURE — 93010 ELECTROCARDIOGRAM REPORT: CPT | Performed by: INTERNAL MEDICINE

## 2024-07-07 PROCEDURE — 36415 COLL VENOUS BLD VENIPUNCTURE: CPT

## 2024-07-07 PROCEDURE — 93005 ELECTROCARDIOGRAM TRACING: CPT

## 2024-07-07 PROCEDURE — 83735 ASSAY OF MAGNESIUM: CPT

## 2024-07-07 PROCEDURE — 96360 HYDRATION IV INFUSION INIT: CPT

## 2024-07-07 PROCEDURE — 86900 BLOOD TYPING SEROLOGIC ABO: CPT

## 2024-07-07 PROCEDURE — 86901 BLOOD TYPING SEROLOGIC RH(D): CPT

## 2024-07-07 PROCEDURE — 86850 RBC ANTIBODY SCREEN: CPT

## 2024-07-07 PROCEDURE — 96361 HYDRATE IV INFUSION ADD-ON: CPT

## 2024-07-07 RX ORDER — SODIUM CHLORIDE, SODIUM GLUCONATE, SODIUM ACETATE, POTASSIUM CHLORIDE, MAGNESIUM CHLORIDE, SODIUM PHOSPHATE, DIBASIC, AND POTASSIUM PHOSPHATE .53; .5; .37; .037; .03; .012; .00082 G/100ML; G/100ML; G/100ML; G/100ML; G/100ML; G/100ML; G/100ML
1000 INJECTION, SOLUTION INTRAVENOUS ONCE
Status: COMPLETED | OUTPATIENT
Start: 2024-07-07 | End: 2024-07-07

## 2024-07-07 RX ORDER — POLYETHYLENE GLYCOL 3350 17 G/17G
17 POWDER, FOR SOLUTION ORAL DAILY
Status: DISCONTINUED | OUTPATIENT
Start: 2024-07-07 | End: 2024-07-10 | Stop reason: HOSPADM

## 2024-07-07 RX ORDER — GABAPENTIN 100 MG/1
100 CAPSULE ORAL 3 TIMES DAILY
Status: DISCONTINUED | OUTPATIENT
Start: 2024-07-07 | End: 2024-07-10 | Stop reason: HOSPADM

## 2024-07-07 RX ORDER — ENOXAPARIN SODIUM 100 MG/ML
40 INJECTION SUBCUTANEOUS DAILY
Status: DISCONTINUED | OUTPATIENT
Start: 2024-07-07 | End: 2024-07-07

## 2024-07-07 RX ORDER — ACETAMINOPHEN 325 MG/1
650 TABLET ORAL EVERY 6 HOURS PRN
Status: DISCONTINUED | OUTPATIENT
Start: 2024-07-07 | End: 2024-07-10 | Stop reason: HOSPADM

## 2024-07-07 RX ORDER — ENOXAPARIN SODIUM 100 MG/ML
30 INJECTION SUBCUTANEOUS DAILY
Status: DISCONTINUED | OUTPATIENT
Start: 2024-07-07 | End: 2024-07-07

## 2024-07-07 RX ORDER — HEPARIN SODIUM 5000 [USP'U]/ML
5000 INJECTION, SOLUTION INTRAVENOUS; SUBCUTANEOUS EVERY 12 HOURS SCHEDULED
Status: DISCONTINUED | OUTPATIENT
Start: 2024-07-08 | End: 2024-07-10 | Stop reason: HOSPADM

## 2024-07-07 RX ORDER — SODIUM CHLORIDE 9 MG/ML
3 INJECTION INTRAVENOUS
Status: DISCONTINUED | OUTPATIENT
Start: 2024-07-07 | End: 2024-07-10 | Stop reason: HOSPADM

## 2024-07-07 RX ORDER — SODIUM CHLORIDE, SODIUM GLUCONATE, SODIUM ACETATE, POTASSIUM CHLORIDE, MAGNESIUM CHLORIDE, SODIUM PHOSPHATE, DIBASIC, AND POTASSIUM PHOSPHATE .53; .5; .37; .037; .03; .012; .00082 G/100ML; G/100ML; G/100ML; G/100ML; G/100ML; G/100ML; G/100ML
83 INJECTION, SOLUTION INTRAVENOUS CONTINUOUS
Status: DISPENSED | OUTPATIENT
Start: 2024-07-07 | End: 2024-07-07

## 2024-07-07 RX ORDER — MAGNESIUM HYDROXIDE/ALUMINUM HYDROXICE/SIMETHICONE 120; 1200; 1200 MG/30ML; MG/30ML; MG/30ML
30 SUSPENSION ORAL EVERY 6 HOURS PRN
Status: DISCONTINUED | OUTPATIENT
Start: 2024-07-07 | End: 2024-07-10 | Stop reason: HOSPADM

## 2024-07-07 RX ORDER — MIDODRINE HYDROCHLORIDE 5 MG/1
5 TABLET ORAL
Status: DISCONTINUED | OUTPATIENT
Start: 2024-07-07 | End: 2024-07-09

## 2024-07-07 RX ORDER — ONDANSETRON 2 MG/ML
4 INJECTION INTRAMUSCULAR; INTRAVENOUS EVERY 6 HOURS PRN
Status: DISCONTINUED | OUTPATIENT
Start: 2024-07-07 | End: 2024-07-10 | Stop reason: HOSPADM

## 2024-07-07 RX ADMIN — SODIUM CHLORIDE, SODIUM GLUCONATE, SODIUM ACETATE, POTASSIUM CHLORIDE, MAGNESIUM CHLORIDE, SODIUM PHOSPHATE, DIBASIC, AND POTASSIUM PHOSPHATE 83 ML/HR: .53; .5; .37; .037; .03; .012; .00082 INJECTION, SOLUTION INTRAVENOUS at 09:11

## 2024-07-07 RX ADMIN — ENOXAPARIN SODIUM 30 MG: 30 INJECTION SUBCUTANEOUS at 08:58

## 2024-07-07 RX ADMIN — IRON SUCROSE 200 MG: 20 INJECTION, SOLUTION INTRAVENOUS at 20:26

## 2024-07-07 RX ADMIN — MIDODRINE HYDROCHLORIDE 5 MG: 5 TABLET ORAL at 16:33

## 2024-07-07 RX ADMIN — SODIUM CHLORIDE 500 ML: 0.9 INJECTION, SOLUTION INTRAVENOUS at 02:07

## 2024-07-07 RX ADMIN — SODIUM CHLORIDE 500 ML: 0.9 INJECTION, SOLUTION INTRAVENOUS at 01:18

## 2024-07-07 RX ADMIN — GABAPENTIN 100 MG: 100 CAPSULE ORAL at 20:26

## 2024-07-07 RX ADMIN — MIDODRINE HYDROCHLORIDE 5 MG: 5 TABLET ORAL at 11:38

## 2024-07-07 RX ADMIN — GABAPENTIN 100 MG: 100 CAPSULE ORAL at 16:33

## 2024-07-07 RX ADMIN — SODIUM CHLORIDE, SODIUM GLUCONATE, SODIUM ACETATE, POTASSIUM CHLORIDE, MAGNESIUM CHLORIDE, SODIUM PHOSPHATE, DIBASIC, AND POTASSIUM PHOSPHATE 1000 ML: .53; .5; .37; .037; .03; .012; .00082 INJECTION, SOLUTION INTRAVENOUS at 03:52

## 2024-07-07 RX ADMIN — POLYETHYLENE GLYCOL 3350 17 G: 17 POWDER, FOR SOLUTION ORAL at 08:58

## 2024-07-07 RX ADMIN — ASPIRIN 81 MG: 81 TABLET, COATED ORAL at 08:58

## 2024-07-07 RX ADMIN — GABAPENTIN 100 MG: 100 CAPSULE ORAL at 09:10

## 2024-07-07 NOTE — ASSESSMENT & PLAN NOTE
No overt bleeding noted  Hgb 11> 8.7    Plan:  Check hemeoccult, iron panel  Trend CBC  Maintain active T&S, consent obtained from son

## 2024-07-07 NOTE — PLAN OF CARE
Problem: Potential for Falls  Goal: Patient will remain free of falls  Description: INTERVENTIONS:  - Educate patient/family on patient safety including physical limitations  - Instruct patient to call for assistance with activity   - Consult OT/PT to assist with strengthening/mobility   - Keep Call bell within reach  - Keep bed low and locked with side rails adjusted as appropriate  - Keep care items and personal belongings within reach  - Initiate and maintain comfort rounds  - Make Fall Risk Sign visible to staff  - Offer Toileting every 2 Hours, in advance of need  - Initiate/Maintain bed, chair alarm  - Obtain necessary fall risk management equipment: bed rails, bed alarms  - Apply yellow socks and bracelet for high fall risk patients  - Consider moving patient to room near nurses station  Outcome: Progressing     Problem: PAIN - ADULT  Goal: Verbalizes/displays adequate comfort level or baseline comfort level  Description: Interventions:  - Encourage patient to monitor pain and request assistance  - Assess pain using appropriate pain scale  - Administer analgesics based on type and severity of pain and evaluate response  - Implement non-pharmacological measures as appropriate and evaluate response  - Consider cultural and social influences on pain and pain management  - Notify physician/advanced practitioner if interventions unsuccessful or patient reports new pain  Outcome: Progressing     Problem: SAFETY ADULT  Goal: Maintain or return to baseline ADL function  Description: INTERVENTIONS:  -  Assess patient's ability to carry out ADLs; assess patient's baseline for ADL function and identify physical deficits which impact ability to perform ADLs (bathing, care of mouth/teeth, toileting, grooming, dressing, etc.)  - Assess/evaluate cause of self-care deficits   - Assess range of motion  - Assess patient's mobility; develop plan if impaired  - Assess patient's need for assistive devices and provide as  appropriate  - Encourage maximum independence but intervene and supervise when necessary  - Involve family in performance of ADLs  - Assess for home care needs following discharge   - Consider OT consult to assist with ADL evaluation and planning for discharge  - Provide patient education as appropriate  Outcome: Progressing     Problem: DISCHARGE PLANNING  Goal: Discharge to home or other facility with appropriate resources  Description: INTERVENTIONS:  - Identify barriers to discharge w/patient and caregiver  - Arrange for needed discharge resources and transportation as appropriate  - Identify discharge learning needs (meds, wound care, etc.)  - Arrange for interpretive services to assist at discharge as needed  - Refer to Case Management Department for coordinating discharge planning if the patient needs post-hospital services based on physician/advanced practitioner order or complex needs related to functional status, cognitive ability, or social support system  Outcome: Progressing     Problem: Knowledge Deficit  Goal: Patient/family/caregiver demonstrates understanding of disease process, treatment plan, medications, and discharge instructions  Description: Complete learning assessment and assess knowledge base.  Interventions:  - Provide teaching at level of understanding  - Provide teaching via preferred learning methods  Outcome: Progressing     Problem: CARDIOVASCULAR - ADULT  Goal: Maintains optimal cardiac output and hemodynamic stability  Description: INTERVENTIONS:  - Monitor I/O, vital signs and rhythm  - Monitor for S/S and trends of decreased cardiac output  - Administer and titrate ordered vasoactive medications to optimize hemodynamic stability  - Assess quality of pulses, skin color and temperature  - Assess for signs of decreased coronary artery perfusion  - Instruct patient to report change in severity of symptoms  Outcome: Progressing  Goal: Absence of cardiac dysrhythmias or at baseline  rhythm  Description: INTERVENTIONS:  - Continuous cardiac monitoring, vital signs, obtain 12 lead EKG if ordered  - Administer antiarrhythmic and heart rate control medications as ordered  - Monitor electrolytes and administer replacement therapy as ordered  Outcome: Progressing     Problem: METABOLIC, FLUID AND ELECTROLYTES - ADULT  Goal: Electrolytes maintained within normal limits  Description: INTERVENTIONS:  - Monitor labs and assess patient for signs and symptoms of electrolyte imbalances  - Administer electrolyte replacement as ordered  - Monitor response to electrolyte replacements, including repeat lab results as appropriate  - Instruct patient on fluid and nutrition as appropriate  Outcome: Progressing  Goal: Fluid balance maintained  Description: INTERVENTIONS:  - Monitor labs   - Monitor I/O and WT  - Instruct patient on fluid and nutrition as appropriate  - Assess for signs & symptoms of volume excess or deficit  Outcome: Progressing     Problem: SKIN/TISSUE INTEGRITY - ADULT  Goal: Skin Integrity remains intact(Skin Breakdown Prevention)  Description: Assess:  -Perform Erlin assessment every shift  -Clean and moisturize skin as needed  -Inspect skin when repositioning, toileting, and assisting with ADLS  -Assess under medical devices such as masimo every 2H  -Assess extremities for adequate circulation and sensation     Bed Management:  -Have minimal linens on bed & keep smooth, unwrinkled  -Change linens as needed when moist or perspiring  -Avoid sitting or lying in one position for more than 2 hours while in bed     Toileting:  -Offer bedside commode  -Assess for incontinence every 2H  -Use incontinent care products after each incontinent episode such as foam cleanser    Activity:  -Mobilize patient 2 times a day  -Encourage activity and walks on unit  -Encourage or provide ROM exercises   -Use appropriate equipment to lift or move patient in bed  -Instruct/ Assist with weight shifting every 15 min  when out of bed in chair  -Consider limitation of chair time to 2 hour intervals    Skin Care:  -Avoid use of baby powder, tape, friction and shearing, hot water or constrictive clothing  -Relieve pressure over bony prominences using mepilex foams  -Do not massage red bony areas    Next Steps:  -Teach patient strategies to minimize risks such as call, don't fall.   -Consider consults to  interdisciplinary teams such as PT/OT.  Outcome: Progressing     Problem: HEMATOLOGIC - ADULT  Goal: Maintains hematologic stability  Description: INTERVENTIONS  - Assess for signs and symptoms of bleeding or hemorrhage  - Monitor labs  - Administer supportive blood products/factors as ordered and appropriate  Outcome: Progressing     Problem: MUSCULOSKELETAL - ADULT  Goal: Maintain or return mobility to safest level of function  Description: INTERVENTIONS:  - Assess patient's ability to carry out ADLs; assess patient's baseline for ADL function and identify physical deficits which impact ability to perform ADLs (bathing, care of mouth/teeth, toileting, grooming, dressing, etc.)  - Assess/evaluate cause of self-care deficits   - Assess range of motion  - Assess patient's mobility  - Assess patient's need for assistive devices and provide as appropriate  - Encourage maximum independence but intervene and supervise when necessary  - Involve family in performance of ADLs  - Assess for home care needs following discharge   - Consider OT consult to assist with ADL evaluation and planning for discharge  - Provide patient education as appropriate  Outcome: Progressing

## 2024-07-07 NOTE — ASSESSMENT & PLAN NOTE
Presents with dizziness, weakness. No known fevers, no overt bleeding described. Reports intermittent diarrhea. Completed outpatient ABX for UTI last week.   BUN 41, Cr 3. Hgb 11.5>8.7  Multifactorial: hypovolemia 2/2 poor PO intake/diarrhea. Occult bleeding. Occult infection    Plan:  Check CT abd/pelvis for any further etiology  Fluid responsive after 1L NS, continue fluid resuscitation   Trend CBC, BMP

## 2024-07-07 NOTE — H&P
[facility]  H&P  Name: Tala Coyne 89 y.o. female I MRN: [id number]  Unit/Bed#: [place] [address] -01 I Date of Admission: 7/7/2024   Date of Service: 7/7/2024 I Hospital Day: 0      Assessment & Plan   * Hypotension due to hypovolemia  Assessment & Plan  Presents with dizziness, weakness. No known fevers, no overt bleeding described. Reports intermittent diarrhea. Completed outpatient ABX for UTI last week.   BUN 41, Cr 3. Hgb 11.5>8.7  Multifactorial: hypovolemia 2/2 poor PO intake/diarrhea. Occult bleeding. Occult infection    Plan:  Check CT abd/pelvis for any further etiology  Fluid responsive after 1L NS, continue fluid resuscitation   Trend CBC, BMP    Acute kidney injury superimposed on chronic kidney disease  (HCC)  Assessment & Plan  Lab Results   Component Value Date    EGFR 12 07/07/2024    EGFR 42 10/20/2023    EGFR 41 09/27/2023    CREATININE 3.09 (H) 07/07/2024    CREATININE 1.11 10/20/2023    CREATININE 1.14 09/27/2023     CKD in setting of age, hypertensive nephropathy. JEN likely prerenal azotemia   Baseline Cr: 1  Admit Cr: 3.09     Plan:  S/p 1L NS, continue volume resuscitation  Monitor renal function, renal dose medications, maintain normotension/euvolemia, avoid nephrotoxic agents, I&Os    Sick sinus syndrome s/p PPM  Assessment & Plan  Noted   EKG reviewed: atrial paced    Iron deficiency anemia  Assessment & Plan  No overt bleeding noted  Hgb 11> 8.7    Plan:  Check hemeoccult, iron panel  Trend CBC  Maintain active T&S, consent obtained from son    Mild dementia without behavioral disturbance, psychotic disturbance, mood disturbance, or anxiety ([facility])  Assessment & Plan  Pleasant, somewhat confused but redirectable    Plan:  High risk for hospital acquired delirium: ear plugs/eye shades at night/TV out, avoid unnecessary sleep interruption, use of glasses/hearing aids during day, frequent reorientation, aggressive physical therapy/mobilization, avoid opioids,  benzodiazepines, antihistamines. If agitated risk to self/others use antipsychotic early evening/safety sitter.    Essential hypertension  Assessment & Plan  Hypotensive   Home regimen: amlodipine 2.5mg daily, furosemide 20mg daily, telmisartan 20mg daily     Plan:  Hold PTA antihypertensives in setting of hypotension    VTE Pharmacologic Prophylaxis: VTE Score: 4 Moderate Risk (Score 3-4) - Pharmacological DVT Prophylaxis Ordered: enoxaparin (Lovenox).  Code Status: Level 3 - DNAR and DNI   Discussion with family: Updated  (son) via phone.    Anticipated Length of Stay: Patient will be admitted on an inpatient basis with an anticipated length of stay of greater than 2 midnights secondary to hypotension.    Total Time Spent on Date of Encounter in care of patient:  mins. This time was spent on one or more of the following: performing physical exam; counseling and coordination of care; obtaining or reviewing history; documenting in the medical record; reviewing/ordering tests, medications or procedures; communicating with other healthcare professionals and discussing with patient's family/caregivers.    Chief Complaint: weakness    History of Present Illness:  Tala Coyne is a 89 y.o. female with a PMH of dementia, HTN, CKD who presents with weakness.   History obtained from patient, family, chart review and discussion with ED attending. She denies any complaints, just felt weak earlier prompting ED visit. Somewhat poor historian. Denies pain anywhere, no nausea/vomiting. Does have intermittent diarrhea. Has been eating/drinking ok. No black/bright red stools. No urinary pain/frequency. Son called to obtain more information states over this week she has had increasing weakness/dizziness. She typically is fairly independent on her electric scooter but this week she has not been. He denies known sick contacts. Recently completed course of antibiotics for a UTI last week. No other changes in  medications noted.        Review of Systems:  Review of Systems   Constitutional:  Positive for fatigue. Negative for chills and fever.   Respiratory:  Negative for shortness of breath.    Cardiovascular:  Negative for chest pain and palpitations.   Gastrointestinal:  Negative for abdominal pain, blood in stool, diarrhea, nausea and vomiting.   Genitourinary:  Negative for dysuria and frequency.   Neurological:  Positive for dizziness and weakness. Negative for syncope.   All other systems reviewed and are negative.      Past Medical and Surgical History:   Past Medical History:   Diagnosis Date    Depression     Gout     H/O vaginal hysterectomy     resolved-4/17/2015    Hypertensive kidney disease with CKD (chronic kidney disease) 04/19/2024    Macular degeneration 03/02/2015    Osteoarthritis 03/02/2015       Past Surgical History:   Procedure Laterality Date    CATARACT EXTRACTION      TOTAL ABDOMINAL HYSTERECTOMY      with removal of both ovaries    VAGINAL HYSTERECTOMY      resolved-4/17/2015       Meds/Allergies:  Prior to Admission medications    Medication Sig Start Date End Date Taking? Authorizing Provider   acetaminophen (TYLENOL) 325 mg tablet Take 2 tablets (650 mg total) by mouth every 6 (six) hours as needed for mild pain, headaches or fever 10/21/23  Yes Christina Middleton PA-C   amLODIPine (NORVASC) 2.5 mg tablet Take 1 tablet (2.5 mg total) by mouth 2 (two) times a day 8/15/23  Yes Felipe Doyle DO   ascorbic acid (VITAMIN C) 250 mg tablet Take 500 mg by mouth daily   Yes Historical Provider, MD   aspirin 81 MG tablet Take by mouth daily  3/2/15  Yes Historical Provider, MD   Cholecalciferol (Vitamin D3) 25 MCG (1000 UT) CAPS Take 25 each by mouth in the morning   Yes Historical Provider, MD   doxepin (SINEquan) 50 mg capsule TAKE 1 CAPSULE BY MOUTH EVERY DAY AT BEDTIME 4/23/24  Yes Ni Fisher DO   Elastic Bandages & Supports (CARPAL TUNNEL WRIST STABILIZER) MISC by Does not apply route  daily 2/12/20  Yes Ni Fisher DO   furosemide (LASIX) 20 mg tablet Take 1 tablet (20 mg total) by mouth if needed (Lower extremity edema) May take 3 times weekly as needed for lower extremity swelling. 11/8/22  Yes Ragini Briceño MD   gabapentin (NEURONTIN) 300 mg capsule Take 1 capsule (300 mg total) by mouth 3 (three) times a day 10/27/23  Yes Ni Fisher DO   omeprazole (PriLOSEC) 20 mg delayed release capsule Take 1 capsule (20 mg total) by mouth daily 8/15/23  Yes Felipe Doyle DO   sulfamethoxazole-trimethoprim (BACTRIM DS) 800-160 mg per tablet Take 1 tablet by mouth 2 (two) times a day for 7 days 7/1/24 7/8/24 Yes Ni Fisher DO   telmisartan (MICARDIS) 20 MG tablet TAKE 1 TABLET BY MOUTH EVERY DAY 3/14/24  Yes Ni Fisher DO   dorzolamide-timolol (COSOPT) 2-0.5 % ophthalmic solution INSTILL 1 DROP IN EACH EYE EVERY 12 HOURS  Patient not taking: Reported on 7/7/2024 6/12/24   Ni Fisher DO   ezetimibe (ZETIA) 10 mg tablet Take 1 tablet (10 mg total) by mouth daily 11/2/20 4/23/21  Ni Fisher DO     I have reviewed home medications using recent Epic encounter.    Allergies:   Allergies   Allergen Reactions    Fish-Derived Products - Food Allergy      GI upset    Allopurinol Rash     Category: Allergy;        Social History:  Marital Status:    Occupation:    Patient Pre-hospital Living Situation: Home  Patient Pre-hospital Level of Mobility: walks  Patient Pre-hospital Diet Restrictions:   Substance Use History:   Social History     Substance and Sexual Activity   Alcohol Use Not Currently     Social History     Tobacco Use   Smoking Status Never   Smokeless Tobacco Never     Social History     Substance and Sexual Activity   Drug Use No       Family History:  Family History   Problem Relation Age of Onset    Heart attack Mother         MI    Heart attack Father         MI    Hypertension Sister     Stomach cancer Sister     Hypertension Brother        Physical  "Exam:     Vitals:   Blood Pressure: 94/70 (07/07/24 0431)  Pulse: 61 (07/07/24 0431)  Temperature: 97.5 °F (36.4 °C) (07/07/24 0431)  Temp Source: Oral (07/07/24 0431)  Respirations: 20 (07/07/24 0431)  Height: 5' 2\" (157.5 cm) (07/07/24 0300)  Weight - Scale: 64.1 kg (141 lb 5 oz) (07/07/24 0431)  SpO2: 91 % (07/07/24 0500)    Physical Exam  Vitals and nursing note reviewed.   Constitutional:       General: She is not in acute distress.     Appearance: She is well-developed.   HENT:      Head: Normocephalic and atraumatic.   Eyes:      Conjunctiva/sclera: Conjunctivae normal.   Cardiovascular:      Rate and Rhythm: Normal rate and regular rhythm.      Heart sounds: No murmur heard.  Pulmonary:      Effort: Pulmonary effort is normal. No respiratory distress.      Breath sounds: Normal breath sounds.   Abdominal:      Palpations: Abdomen is soft.      Tenderness: There is no abdominal tenderness.   Musculoskeletal:         General: No swelling.      Cervical back: Neck supple.   Skin:     General: Skin is warm and dry.      Capillary Refill: Capillary refill takes 2 to 3 seconds.   Neurological:      General: No focal deficit present.      Mental Status: She is alert.      Comments: Oriented to self, place   Psychiatric:         Mood and Affect: Mood normal.          Additional Data:     Lab Results:  Results from last 7 days   Lab Units 07/07/24  0111   WBC Thousand/uL 12.43*   HEMOGLOBIN g/dL 8.7*   HEMATOCRIT % 28.0*   PLATELETS Thousands/uL 102*   BANDS PCT % 2   LYMPHO PCT % 6*   MONO PCT % 12   EOS PCT % 0     Results from last 7 days   Lab Units 07/07/24  0111   SODIUM mmol/L 135   POTASSIUM mmol/L 4.6   CHLORIDE mmol/L 106   CO2 mmol/L 20*   BUN mg/dL 41*   CREATININE mg/dL 3.09*   ANION GAP mmol/L 9   CALCIUM mg/dL 10.4*   ALBUMIN g/dL 4.0   TOTAL BILIRUBIN mg/dL 0.30   ALK PHOS U/L 70   ALT U/L 9   AST U/L 13   GLUCOSE RANDOM mg/dL 109             Lab Results   Component Value Date    HGBA1C 5.4 07/24/2019 "           Lines/Drains:  Invasive Devices       Peripheral Intravenous Line  Duration             Peripheral IV 07/07/24 Left Antecubital <1 day                        Imaging: Reviewed radiology reports from this admission including: chest xray and abdominal/pelvic CT and Personally reviewed the following imaging: chest xray and abdominal/pelvic CT  X-ray chest 1 view portable   ED Interpretation by Abhishek Moore DO (07/07 0208)   No acute cardiopulmonary disease      CT abdomen pelvis wo contrast    (Results Pending)       EKG and Other Studies Reviewed on Admission:   EKG: Personally Reviewed. Paced rhythm. HR 60.    ** Please Note: This note has been constructed using a voice recognition system. **

## 2024-07-07 NOTE — PLAN OF CARE
Problem: Potential for Falls  Goal: Patient will remain free of falls  Description: INTERVENTIONS:  - Educate patient/family on patient safety including physical limitations  - Instruct patient to call for assistance with activity   - Consult OT/PT to assist with strengthening/mobility   - Keep Call bell within reach  - Keep bed low and locked with side rails adjusted as appropriate  - Keep care items and personal belongings within reach  - Initiate and maintain comfort rounds  - Make Fall Risk Sign visible to staff  - Offer Toileting every 2 Hours, in advance of need  - Initiate/Maintain bed, chair alarm  - Obtain necessary fall risk management equipment: bed rails, bed alarms  - Apply yellow socks and bracelet for high fall risk patients  - Consider moving patient to room near nurses station  Outcome: Progressing     Problem: PAIN - ADULT  Goal: Verbalizes/displays adequate comfort level or baseline comfort level  Description: Interventions:  - Encourage patient to monitor pain and request assistance  - Assess pain using appropriate pain scale  - Administer analgesics based on type and severity of pain and evaluate response  - Implement non-pharmacological measures as appropriate and evaluate response  - Consider cultural and social influences on pain and pain management  - Notify physician/advanced practitioner if interventions unsuccessful or patient reports new pain  Outcome: Progressing     Problem: SAFETY ADULT  Goal: Maintain or return to baseline ADL function  Description: INTERVENTIONS:  -  Assess patient's ability to carry out ADLs; assess patient's baseline for ADL function and identify physical deficits which impact ability to perform ADLs (bathing, care of mouth/teeth, toileting, grooming, dressing, etc.)  - Assess/evaluate cause of self-care deficits   - Assess range of motion  - Assess patient's mobility; develop plan if impaired  - Assess patient's need for assistive devices and provide as  appropriate  - Encourage maximum independence but intervene and supervise when necessary  - Involve family in performance of ADLs  - Assess for home care needs following discharge   - Consider OT consult to assist with ADL evaluation and planning for discharge  - Provide patient education as appropriate  Outcome: Progressing     Problem: CARDIOVASCULAR - ADULT  Goal: Maintains optimal cardiac output and hemodynamic stability  Description: INTERVENTIONS:  - Monitor I/O, vital signs and rhythm  - Monitor for S/S and trends of decreased cardiac output  - Administer and titrate ordered vasoactive medications to optimize hemodynamic stability  - Assess quality of pulses, skin color and temperature  - Assess for signs of decreased coronary artery perfusion  - Instruct patient to report change in severity of symptoms  Outcome: Progressing     Problem: MUSCULOSKELETAL - ADULT  Goal: Maintain or return mobility to safest level of function  Description: INTERVENTIONS:  - Assess patient's ability to carry out ADLs; assess patient's baseline for ADL function and identify physical deficits which impact ability to perform ADLs (bathing, care of mouth/teeth, toileting, grooming, dressing, etc.)  - Assess/evaluate cause of self-care deficits   - Assess range of motion  - Assess patient's mobility  - Assess patient's need for assistive devices and provide as appropriate  - Encourage maximum independence but intervene and supervise when necessary  - Involve family in performance of ADLs  - Assess for home care needs following discharge   - Consider OT consult to assist with ADL evaluation and planning for discharge  - Provide patient education as appropriate  Outcome: Progressing

## 2024-07-07 NOTE — ASSESSMENT & PLAN NOTE
Lab Results   Component Value Date    EGFR 12 07/07/2024    EGFR 42 10/20/2023    EGFR 41 09/27/2023    CREATININE 3.09 (H) 07/07/2024    CREATININE 1.11 10/20/2023    CREATININE 1.14 09/27/2023     CKD in setting of age, hypertensive nephropathy. JEN likely prerenal azotemia   Baseline Cr: 1  Admit Cr: 3.09     Plan:  S/p 1L NS, continue volume resuscitation  Monitor renal function, renal dose medications, maintain normotension/euvolemia, avoid nephrotoxic agents, I&Os

## 2024-07-07 NOTE — ASSESSMENT & PLAN NOTE
Pleasant, somewhat confused but redirectable    Plan:  High risk for hospital acquired delirium: ear plugs/eye shades at night/TV out, avoid unnecessary sleep interruption, use of glasses/hearing aids during day, frequent reorientation, aggressive physical therapy/mobilization, avoid opioids, benzodiazepines, antihistamines. If agitated risk to self/others use antipsychotic early evening/safety sitter.

## 2024-07-07 NOTE — ED NOTES
RN gave report to NICOLÁS Romero at this time.     Chela Griffin RN  07/07/24 0405       Chela Griffin RN  07/07/24 0405

## 2024-07-07 NOTE — ED NOTES
Pt's son, Kunal, contacted at request of pt by this RN and given an update on pt's status.      Chela Griffin RN  07/07/24 0219

## 2024-07-07 NOTE — ASSESSMENT & PLAN NOTE
Hypotensive   Home regimen: amlodipine 2.5mg daily, furosemide 20mg daily, telmisartan 20mg daily     Plan:  Hold PTA antihypertensives in setting of hypotension

## 2024-07-07 NOTE — RESULT ENCOUNTER NOTE
Urine culture obtained is positive and patient had been started on empiric antibiotic so symptoms should be improved.  Please call patient to confirm

## 2024-07-08 LAB
ANION GAP SERPL CALCULATED.3IONS-SCNC: 8 MMOL/L (ref 4–13)
ATRIAL RATE: 60 BPM
ATRIAL RATE: 61 BPM
BUN SERPL-MCNC: 39 MG/DL (ref 5–25)
CALCIUM SERPL-MCNC: 9.3 MG/DL (ref 8.4–10.2)
CHLORIDE SERPL-SCNC: 111 MMOL/L (ref 96–108)
CO2 SERPL-SCNC: 18 MMOL/L (ref 21–32)
CREAT SERPL-MCNC: 2.3 MG/DL (ref 0.6–1.3)
ERYTHROCYTE [DISTWIDTH] IN BLOOD BY AUTOMATED COUNT: 18.5 % (ref 11.6–15.1)
GFR SERPL CREATININE-BSD FRML MDRD: 17 ML/MIN/1.73SQ M
GLUCOSE SERPL-MCNC: 84 MG/DL (ref 65–140)
HCT VFR BLD AUTO: 25.3 % (ref 34.8–46.1)
HGB BLD-MCNC: 7.9 G/DL (ref 11.5–15.4)
MCH RBC QN AUTO: 26.8 PG (ref 26.8–34.3)
MCHC RBC AUTO-ENTMCNC: 31.2 G/DL (ref 31.4–37.4)
MCV RBC AUTO: 86 FL (ref 82–98)
P AXIS: 69 DEGREES
P AXIS: 78 DEGREES
PLATELET # BLD AUTO: 92 THOUSANDS/UL (ref 149–390)
POTASSIUM SERPL-SCNC: 4.5 MMOL/L (ref 3.5–5.3)
PR INTERVAL: 174 MS
PR INTERVAL: 178 MS
QRS AXIS: -61 DEGREES
QRS AXIS: -67 DEGREES
QRSD INTERVAL: 150 MS
QRSD INTERVAL: 154 MS
QT INTERVAL: 478 MS
QT INTERVAL: 496 MS
QTC INTERVAL: 481 MS
QTC INTERVAL: 496 MS
RBC # BLD AUTO: 2.95 MILLION/UL (ref 3.81–5.12)
SODIUM SERPL-SCNC: 137 MMOL/L (ref 135–147)
T WAVE AXIS: 114 DEGREES
T WAVE AXIS: 140 DEGREES
VENTRICULAR RATE: 60 BPM
VENTRICULAR RATE: 61 BPM
WBC # BLD AUTO: 5.7 THOUSAND/UL (ref 4.31–10.16)

## 2024-07-08 PROCEDURE — 97167 OT EVAL HIGH COMPLEX 60 MIN: CPT

## 2024-07-08 PROCEDURE — 85027 COMPLETE CBC AUTOMATED: CPT | Performed by: INTERNAL MEDICINE

## 2024-07-08 PROCEDURE — 97530 THERAPEUTIC ACTIVITIES: CPT

## 2024-07-08 PROCEDURE — 97163 PT EVAL HIGH COMPLEX 45 MIN: CPT

## 2024-07-08 PROCEDURE — 80048 BASIC METABOLIC PNL TOTAL CA: CPT | Performed by: INTERNAL MEDICINE

## 2024-07-08 PROCEDURE — 99232 SBSQ HOSP IP/OBS MODERATE 35: CPT | Performed by: STUDENT IN AN ORGANIZED HEALTH CARE EDUCATION/TRAINING PROGRAM

## 2024-07-08 PROCEDURE — 93010 ELECTROCARDIOGRAM REPORT: CPT | Performed by: INTERNAL MEDICINE

## 2024-07-08 RX ORDER — SODIUM CHLORIDE, SODIUM GLUCONATE, SODIUM ACETATE, POTASSIUM CHLORIDE, MAGNESIUM CHLORIDE, SODIUM PHOSPHATE, DIBASIC, AND POTASSIUM PHOSPHATE .53; .5; .37; .037; .03; .012; .00082 G/100ML; G/100ML; G/100ML; G/100ML; G/100ML; G/100ML; G/100ML
75 INJECTION, SOLUTION INTRAVENOUS CONTINUOUS
Status: DISPENSED | OUTPATIENT
Start: 2024-07-08 | End: 2024-07-09

## 2024-07-08 RX ORDER — PANTOPRAZOLE SODIUM 40 MG/1
40 TABLET, DELAYED RELEASE ORAL
Status: DISCONTINUED | OUTPATIENT
Start: 2024-07-09 | End: 2024-07-10 | Stop reason: HOSPADM

## 2024-07-08 RX ADMIN — GABAPENTIN 100 MG: 100 CAPSULE ORAL at 16:39

## 2024-07-08 RX ADMIN — POLYETHYLENE GLYCOL 3350 17 G: 17 POWDER, FOR SOLUTION ORAL at 09:29

## 2024-07-08 RX ADMIN — IRON SUCROSE 200 MG: 20 INJECTION, SOLUTION INTRAVENOUS at 20:40

## 2024-07-08 RX ADMIN — GABAPENTIN 100 MG: 100 CAPSULE ORAL at 09:28

## 2024-07-08 RX ADMIN — HEPARIN SODIUM 5000 UNITS: 5000 INJECTION INTRAVENOUS; SUBCUTANEOUS at 20:40

## 2024-07-08 RX ADMIN — ASPIRIN 81 MG: 81 TABLET, COATED ORAL at 09:28

## 2024-07-08 RX ADMIN — HEPARIN SODIUM 5000 UNITS: 5000 INJECTION INTRAVENOUS; SUBCUTANEOUS at 09:28

## 2024-07-08 RX ADMIN — SODIUM CHLORIDE, SODIUM GLUCONATE, SODIUM ACETATE, POTASSIUM CHLORIDE, MAGNESIUM CHLORIDE, SODIUM PHOSPHATE, DIBASIC, AND POTASSIUM PHOSPHATE 75 ML/HR: .53; .5; .37; .037; .03; .012; .00082 INJECTION, SOLUTION INTRAVENOUS at 11:26

## 2024-07-08 RX ADMIN — GABAPENTIN 100 MG: 100 CAPSULE ORAL at 20:40

## 2024-07-08 NOTE — ASSESSMENT & PLAN NOTE
Hypotensive   Home regimen: amlodipine 2.5mg daily, furosemide 20mg daily, telmisartan 20mg daily   Hold blood pressure medications given hypotension  Continue to monitor at this time

## 2024-07-08 NOTE — ASSESSMENT & PLAN NOTE
Presents with dizziness, weakness. No known fevers, no overt bleeding described. Reports intermittent diarrhea. Completed outpatient ABX for UTI last week.   BUN 41, Cr 3. Hgb 11.5>8.7  Multifactorial: hypovolemia 2/2 poor PO intake/diarrhea. Occult bleeding. Occult infection  CT abdomen pelvis any acute pathology  Improved with IV fluids and holding blood pressure medication  Continue to monitor

## 2024-07-08 NOTE — ASSESSMENT & PLAN NOTE
No overt bleeding noted  Hgb 11> 8.7  Hemoglobin trended down to 7.9, suspect likely dilutional given IV fluids and JEN  FOBT ordered, has not had a bowel movement since admission  Iron panel consistent with iron deficiency, B12, folate and TSH ordered  Give IV iron transfusion for 3 days  Continue to monitor H&H and transfuse as needed.

## 2024-07-08 NOTE — PROGRESS NOTES
[facility]  Progress Note  Name: Tala Coyne I  MRN: [id number]  Unit/Bed#: [place] [address] -01 I Date of Admission: 7/7/2024   Date of Service: 7/8/2024 I Hospital Day: 1    Assessment & Plan   Hypotension due to hypovolemia  Assessment & Plan  Presents with dizziness, weakness. No known fevers, no overt bleeding described. Reports intermittent diarrhea. Completed outpatient ABX for UTI last week.   BUN 41, Cr 3. Hgb 11.5>8.7  Multifactorial: hypovolemia 2/2 poor PO intake/diarrhea. Occult bleeding. Occult infection  CT abdomen pelvis any acute pathology  Improved with IV fluids and holding blood pressure medication  Continue to monitor    Sick sinus syndrome s/p PPM  Assessment & Plan  Noted   EKG reviewed: atrial paced    Iron deficiency anemia  Assessment & Plan  No overt bleeding noted  Hgb 11> 8.7  Hemoglobin trended down to 7.9, suspect likely dilutional given IV fluids and JEN  FOBT ordered, has not had a bowel movement since admission  Iron panel consistent with iron deficiency, B12, folate and TSH ordered  Give IV iron transfusion for 3 days  Continue to monitor H&H and transfuse as needed.    Mild dementia without behavioral disturbance, psychotic disturbance, mood disturbance, or anxiety (HCC)  Assessment & Plan  Pleasant, somewhat confused but redirectable  High risk for hospital acquired delirium: ear plugs/eye shades at night/TV out, avoid unnecessary sleep interruption, use of glasses/hearing aids during day, frequent reorientation, aggressive physical therapy/mobilization, avoid opioids, benzodiazepines, antihistamines. If agitated risk to self/others use antipsychotic early evening/safety sitter.    Essential hypertension  Assessment & Plan  Hypotensive   Home regimen: amlodipine 2.5mg daily, furosemide 20mg daily, telmisartan 20mg daily   Hold blood pressure medications given hypotension  Continue to monitor at this time    * Acute kidney injury superimposed on  chronic kidney disease  (HCC)  Assessment & Plan  Lab Results   Component Value Date    EGFR 17 2024    EGFR 13 2024    EGFR 12 2024    CREATININE 2.30 (H) 2024    CREATININE 2.88 (H) 2024    CREATININE 3.09 (H) 2024     CKD in setting of age, hypertensive nephropathy. JEN likely prerenal azotemia   Suspect JEN to be prerenal with reports of diarrhea, possible gastroenteritis  Baseline Cr: 1, Admit Cr: 3.09   Creatinine improving with IV fluids, holding ACE/ARB, will continue fluids for an additional 24 hours  Repeat BMP in a.m.             VTE Pharmacologic Prophylaxis:   Pharmacologic: Heparin  Mechanical VTE Prophylaxis in Place: Yes    Current Length of Stay: 1 day(s)    Current Patient Status: Inpatient   Certification Statement: The patient will continue to require additional inpatient hospital stay due to monitor H/H, IV iron    Discharge Plan: pending    Code Status: Level 3 - DNAR and DNI      Subjective:   No events overnight.  Denies any reports of bleeding, has not had any bowel movement since admission.  Feeling better overall.  Has been able to tolerate some of her breakfast this morning.    Objective:     Vitals:   Temp (24hrs), Av.2 °F (36.8 °C), Min:98.1 °F (36.7 °C), Max:98.3 °F (36.8 °C)    Temp:  [98.1 °F (36.7 °C)-98.3 °F (36.8 °C)] 98.1 °F (36.7 °C)  HR:  [63-97] 63  Resp:  [18-20] 18  BP: ()/(50-72) 140/72  SpO2:  [94 %-98 %] 98 %  Body mass index is 26.01 kg/m².     Input and Output Summary (last 24 hours):       Intake/Output Summary (Last 24 hours) at 2024 1636  Last data filed at 2024 0845  Gross per 24 hour   Intake 660 ml   Output 1175 ml   Net -515 ml       Physical Exam:     Physical Exam  Vitals and nursing note reviewed.   Constitutional:       General: She is not in acute distress.     Appearance: She is well-developed.   HENT:      Head: Normocephalic.   Cardiovascular:      Rate and Rhythm: Normal rate.   Pulmonary:       Effort: Pulmonary effort is normal. No respiratory distress.   Abdominal:      Palpations: Abdomen is soft.      Tenderness: There is no abdominal tenderness.   Musculoskeletal:         General: No swelling.   Skin:     General: Skin is warm and dry.   Neurological:      General: No focal deficit present.      Mental Status: She is alert.      Comments: Oriented to self, place   Psychiatric:         Mood and Affect: Mood normal.       Additional Data:     Labs:    Results from last 7 days   Lab Units 07/08/24 0446 07/07/24  0542   WBC Thousand/uL 5.70 9.42   HEMOGLOBIN g/dL 7.9* 8.4*   HEMATOCRIT % 25.3* 27.4*   PLATELETS Thousands/uL 92* 94*   BANDS PCT %  --  1   LYMPHO PCT %  --  10*   MONO PCT %  --  12   EOS PCT %  --  0     Results from last 7 days   Lab Units 07/08/24  0446 07/07/24  0542 07/07/24  0111   SODIUM mmol/L 137   < > 135   POTASSIUM mmol/L 4.5   < > 4.6   CHLORIDE mmol/L 111*   < > 106   CO2 mmol/L 18*   < > 20*   BUN mg/dL 39*   < > 41*   CREATININE mg/dL 2.30*   < > 3.09*   ANION GAP mmol/L 8   < > 9   CALCIUM mg/dL 9.3   < > 10.4*   ALBUMIN g/dL  --   --  4.0   TOTAL BILIRUBIN mg/dL  --   --  0.30   ALK PHOS U/L  --   --  70   ALT U/L  --   --  9   AST U/L  --   --  13   GLUCOSE RANDOM mg/dL 84   < > 109    < > = values in this interval not displayed.                           * I Have Reviewed All Lab Data Listed Above.  * Additional Pertinent Lab Tests Reviewed: All Labs For Current Hospital Admission Reviewed    Mobility:  Basic Mobility Inpatient Raw Score: 13  -[facility] Goal: 4: Move to chair/commode  -[facility] Achieved: 5: Stand (1 or more minutes) (standing tolerance 2-3 minutes)    Lines:     Invasive Devices       Peripheral Intravenous Line  Duration             Peripheral IV 07/07/24 Distal;Right;Ventral (anterior) Forearm 1 day    Peripheral IV 07/07/24 Left Antecubital 1 day                       Imaging:    Imaging Reports Reviewed Today Include:     X-ray chest 1 view  portable    Result Date: 7/7/2024  Impression: Mild pulmonary venous congestion. Pulmonary artery enlargement compatible with history of pulmonary hypertension. Workstation performed: [id number]     CT abdomen pelvis wo contrast    Result Date: 7/7/2024  Impression: 1.  Left parapelvic cysts. No hydronephrosis. 2.  Cholelithiasis without evidence of cholecystitis. 3.  Diverticulosis without evidence of diverticulitis. Workstation performed: [id number]        Recent Cultures (last 7 days):     Results from last 7 days   Lab Units 07/02/24  1220   URINE CULTURE  >100,000 cfu/ml Escherichia coli*       Last 24 Hours Medication List:   Current Facility-Administered Medications   Medication Dose Route Frequency Provider Last Rate    acetaminophen  650 mg Oral Q6H PRN Jose J Soler PA-C      aluminum-magnesium hydroxide-simethicone  30 mL Oral Q6H PRN Jose J Soler PA-C      aspirin  81 mg Oral Daily Jose J Soler PA-C      gabapentin  100 mg Oral TID Kem Salazar DO      heparin (porcine)  5,000 Units Subcutaneous Q12H CHANEL Kem Salazar DO      iron sucrose  200 mg Intravenous Q24H Kem Salazar  mg (07/07/24 2026)    midodrine  5 mg Oral TID AC Kem Salazar DO      multi-electrolyte  75 mL/hr Intravenous Continuous Denzel Perez MD 75 mL/hr (07/08/24 1126)    ondansetron  4 mg Intravenous Q6H PRN Jose J Soler PA-C      [START ON 7/9/2024] pantoprazole  40 mg Oral Early Morning Denzel Perez MD      polyethylene glycol  17 g Oral Daily Jose J Soler PA-C      sodium chloride (PF)  3 mL Intravenous Q1H PRN Abhishek Moore DO          Today, Patient Was Seen By: Denzel Perez MD    ** Please Note: Dictation voice to text software may have been used in the creation of this document. **

## 2024-07-08 NOTE — PLAN OF CARE
Problem: Potential for Falls  Goal: Patient will remain free of falls  Description: INTERVENTIONS:  - Educate patient/family on patient safety including physical limitations  - Instruct patient to call for assistance with activity   - Consult OT/PT to assist with strengthening/mobility   - Keep Call bell within reach  - Keep bed low and locked with side rails adjusted as appropriate  - Keep care items and personal belongings within reach  - Initiate and maintain comfort rounds  - Make Fall Risk Sign visible to staff  - Offer Toileting every 2 Hours, in advance of need  - Initiate/Maintain bed, chair alarm  - Obtain necessary fall risk management equipment: bed rails, bed alarms  - Apply yellow socks and bracelet for high fall risk patients  - Consider moving patient to room near nurses station  Outcome: Progressing     Problem: PAIN - ADULT  Goal: Verbalizes/displays adequate comfort level or baseline comfort level  Description: Interventions:  - Encourage patient to monitor pain and request assistance  - Assess pain using appropriate pain scale  - Administer analgesics based on type and severity of pain and evaluate response  - Implement non-pharmacological measures as appropriate and evaluate response  - Consider cultural and social influences on pain and pain management  - Notify physician/advanced practitioner if interventions unsuccessful or patient reports new pain  Outcome: Progressing     Problem: SAFETY ADULT  Goal: Maintain or return to baseline ADL function  Description: INTERVENTIONS:  -  Assess patient's ability to carry out ADLs; assess patient's baseline for ADL function and identify physical deficits which impact ability to perform ADLs (bathing, care of mouth/teeth, toileting, grooming, dressing, etc.)  - Assess/evaluate cause of self-care deficits   - Assess range of motion  - Assess patient's mobility; develop plan if impaired  - Assess patient's need for assistive devices and provide as  appropriate  - Encourage maximum independence but intervene and supervise when necessary  - Involve family in performance of ADLs  - Assess for home care needs following discharge   - Consider OT consult to assist with ADL evaluation and planning for discharge  - Provide patient education as appropriate  Outcome: Progressing     Problem: DISCHARGE PLANNING  Goal: Discharge to home or other facility with appropriate resources  Description: INTERVENTIONS:  - Identify barriers to discharge w/patient and caregiver  - Arrange for needed discharge resources and transportation as appropriate  - Identify discharge learning needs (meds, wound care, etc.)  - Arrange for interpretive services to assist at discharge as needed  - Refer to Case Management Department for coordinating discharge planning if the patient needs post-hospital services based on physician/advanced practitioner order or complex needs related to functional status, cognitive ability, or social support system  Outcome: Progressing     Problem: Knowledge Deficit  Goal: Patient/family/caregiver demonstrates understanding of disease process, treatment plan, medications, and discharge instructions  Description: Complete learning assessment and assess knowledge base.  Interventions:  - Provide teaching at level of understanding  - Provide teaching via preferred learning methods  Outcome: Progressing     Problem: CARDIOVASCULAR - ADULT  Goal: Maintains optimal cardiac output and hemodynamic stability  Description: INTERVENTIONS:  - Monitor I/O, vital signs and rhythm  - Monitor for S/S and trends of decreased cardiac output  - Administer and titrate ordered vasoactive medications to optimize hemodynamic stability  - Assess quality of pulses, skin color and temperature  - Assess for signs of decreased coronary artery perfusion  - Instruct patient to report change in severity of symptoms  Outcome: Progressing  Goal: Absence of cardiac dysrhythmias or at baseline  rhythm  Description: INTERVENTIONS:  - Continuous cardiac monitoring, vital signs, obtain 12 lead EKG if ordered  - Administer antiarrhythmic and heart rate control medications as ordered  - Monitor electrolytes and administer replacement therapy as ordered  Outcome: Progressing     Problem: METABOLIC, FLUID AND ELECTROLYTES - ADULT  Goal: Electrolytes maintained within normal limits  Description: INTERVENTIONS:  - Monitor labs and assess patient for signs and symptoms of electrolyte imbalances  - Administer electrolyte replacement as ordered  - Monitor response to electrolyte replacements, including repeat lab results as appropriate  - Instruct patient on fluid and nutrition as appropriate  Outcome: Progressing  Goal: Fluid balance maintained  Description: INTERVENTIONS:  - Monitor labs   - Monitor I/O and WT  - Instruct patient on fluid and nutrition as appropriate  - Assess for signs & symptoms of volume excess or deficit  Outcome: Progressing     Problem: SKIN/TISSUE INTEGRITY - ADULT  Goal: Skin Integrity remains intact(Skin Breakdown Prevention)  Description: Assess:  -Perform Erlin assessment every shift  -Clean and moisturize skin as needed  -Inspect skin when repositioning, toileting, and assisting with ADLS  -Assess under medical devices such as masimo every 2H  -Assess extremities for adequate circulation and sensation     Bed Management:  -Have minimal linens on bed & keep smooth, unwrinkled  -Change linens as needed when moist or perspiring  -Avoid sitting or lying in one position for more than 2 hours while in bed     Toileting:  -Offer bedside commode  -Assess for incontinence every 2H  -Use incontinent care products after each incontinent episode such as foam cleanser    Activity:  -Mobilize patient 2 times a day  -Encourage activity and walks on unit  -Encourage or provide ROM exercises   -Use appropriate equipment to lift or move patient in bed  -Instruct/ Assist with weight shifting every 15 min  when out of bed in chair  -Consider limitation of chair time to 2 hour intervals    Skin Care:  -Avoid use of baby powder, tape, friction and shearing, hot water or constrictive clothing  -Relieve pressure over bony prominences using mepilex foams  -Do not massage red bony areas    Next Steps:  -Teach patient strategies to minimize risks such as call, don't fall.   -Consider consults to  interdisciplinary teams such as PT/OT.  Outcome: Progressing     Problem: HEMATOLOGIC - ADULT  Goal: Maintains hematologic stability  Description: INTERVENTIONS  - Assess for signs and symptoms of bleeding or hemorrhage  - Monitor labs  - Administer supportive blood products/factors as ordered and appropriate  Outcome: Progressing     Problem: MUSCULOSKELETAL - ADULT  Goal: Maintain or return mobility to safest level of function  Description: INTERVENTIONS:  - Assess patient's ability to carry out ADLs; assess patient's baseline for ADL function and identify physical deficits which impact ability to perform ADLs (bathing, care of mouth/teeth, toileting, grooming, dressing, etc.)  - Assess/evaluate cause of self-care deficits   - Assess range of motion  - Assess patient's mobility  - Assess patient's need for assistive devices and provide as appropriate  - Encourage maximum independence but intervene and supervise when necessary  - Involve family in performance of ADLs  - Assess for home care needs following discharge   - Consider OT consult to assist with ADL evaluation and planning for discharge  - Provide patient education as appropriate  Outcome: Progressing     Problem: Prexisting or High Potential for Compromised Skin Integrity  Goal: Skin integrity is maintained or improved  Description: INTERVENTIONS:  - Identify patients at risk for skin breakdown  - Assess and monitor skin integrity  - Assess and monitor nutrition and hydration status  - Monitor labs   - Assess for incontinence   - Turn and reposition patient  - Assist with  mobility/ambulation  - Relieve pressure over bony prominences  - Avoid friction and shearing  - Provide appropriate hygiene as needed including keeping skin clean and dry  - Evaluate need for skin moisturizer/barrier cream  - Collaborate with interdisciplinary team   - Patient/family teaching  - Consider wound care consult   Outcome: Progressing

## 2024-07-08 NOTE — OCCUPATIONAL THERAPY NOTE
Occupational Therapy Evaluation and Treatment     Patient Name: Tala Coyne  Today's Date: 7/8/2024  Problem List  Principal Problem:    Acute kidney injury superimposed on chronic kidney disease  (HCC)  Active Problems:    Essential hypertension    Mild dementia without behavioral disturbance, psychotic disturbance, mood disturbance, or anxiety (HCC)    Iron deficiency anemia    Sick sinus syndrome s/p PPM    Hypotension due to hypovolemia    Past Medical History  Past Medical History:   Diagnosis Date    Arthritis     Depression     Gout     H/O vaginal hysterectomy     resolved-4/17/2015    Hypertensive kidney disease with CKD (chronic kidney disease) 04/19/2024    Macular degeneration 03/02/2015    Osteoarthritis 03/02/2015     Past Surgical History  Past Surgical History:   Procedure Laterality Date    CATARACT EXTRACTION      TOTAL ABDOMINAL HYSTERECTOMY      with removal of both ovaries    VAGINAL HYSTERECTOMY      resolved-4/17/2015 07/08/24 1004   OT Last Visit   OT Visit Date 07/08/24   Note Type   Note type Evaluation  (and tx)   Pain Assessment   Pain Assessment Tool 0-10   Pain Score No Pain   Restrictions/Precautions   Other Precautions Chair Alarm;Fall Risk;Hard of hearing;Visual impairment;Cognitive   Home Living   Type of Home House   Home Layout Two level;Stairs to enter without rails  (1 YOAV from garage/basement uses stair glide to get to main level)   Bathroom Shower/Tub Tub/shower unit   Bathroom Toilet Standard   Bathroom Equipment Tub transfer bench;Grab bars in shower;Commode   Home Equipment Stair glide;Walker;Cane;Electric scooter;Wheelchair-manual  (mainly uses electric scooter for functional mobility in house w/ occassional use of SPC for BR use, w/c in community w/ assistance to guide it)   Prior Function   Level of [place] Independent with ADLs;Independent with functional mobility;Needs assistance with IADLS   Lives With (S)  Alone   Receives Help From  Family  (daughter, reports son w/ decreased ability to provide assistance due to multiple surgical procedure, however will transport to [facility].)   IADLs Independent with medication management;Family/Friend/Other provides transportation;Family/Friend/Other provides meals;Family/Friend/Other provides medication management;Independent with meal prep  (pt reports cooking light meals daughter goes grocery shopping, pt reports taking pills on her own, daughter fills weekly pillbox.)   Falls in the last 6 months 0   Vocational Retired   Comments (-) falls (-) (+) home alone   Lifestyle   Autonomy PTA pt was living in a 2 SH w/ 1 YOAV- stair glide from basement/garage to main level w/ b&b w/ standard toilet w/ BSC, tub/shower unit w. Tub/transfer bench. PLOF was (I) with ADLs and was (I) with IADLs.   Reciprocal Relationships daughter, son   Service to Others mother   General   Additional Pertinent History Coexisting medical conditions affecting pt's functional performance at time of assessment include: SSS, mild dementia murmur cardiac, depression, pacemaker seizure disorder.  Patient with active OT orders and activity orders for Up and OOB as tolerated  to assess ADLs/IADLs/functional mobility and assist w/ D/C planning.   Family/Caregiver Present No   Subjective   Subjective pt agreeable to OT/PT co eval.   ADL   Where Assessed Edge of bed   Eating Assistance 5  Supervision/Setup   Grooming Assistance 5  Supervision/Setup   UB Bathing Assistance 5  Supervision/Setup   LB Bathing Assistance 3  Moderate Assistance   UB Dressing Assistance 4  Minimal Assistance   LB Dressing Assistance 3  Moderate Assistance   Toileting Assistance  3  Moderate Assistance   Transfers   Sit to Stand 3  Moderate assistance   Additional items Assist x 2;Armrests;Increased time required;Verbal cues;Other  (RW)   Stand to Sit 3  Moderate assistance   Additional items Assist x 1;Armrests;Increased time required;Verbal cues;Other  (RW)    Additional Comments pt required VC for safety   Functional Mobility   Functional Mobility 3  Moderate assistance   Additional Comments ModAx1-3-5 steps   Additional items Rolling walker   Balance   Static Sitting Fair +   Dynamic Sitting Fair   Static Standing Fair -   Dynamic Standing Poor   Ambulatory Poor   Activity Tolerance   Activity Tolerance (S)  Patient tolerated treatment well;Treatment limited secondary to medical complications (Comment);Patient limited by fatigue  (BP t/o session: sitting in chair: 124/55, standing at chair: 79/60, sitting after stand: 105/52)   Medical Staff Made Aware PT NICOLÁS Bridges Mao MD   Nurse Made Aware Yes   RUE Assessment   RUE Assessment X  (MMT: AROM ~85*, elbow: 4/5)   LUE Assessment   LUE Assessment WFL  (MMT: shoulder:4-/5, elbow: 4/5)   Hand Function   Gross Motor Coordination Functional   Fine Motor Coordination Functional   Sensation   Light Touch Partial deficits in the LLE;Partial deficits in the RLE   Proprioception   Proprioception No apparent deficits   Vision-Basic Assessment   Current Vision Wears glasses all the time   Visual History Macular degeneration   Patient Visual Report Blurring of print when reading   Vision - Complex Assessment   Ocular Range of Motion Intact   Acuity   (unable to read board, unable to read name tag)   Perception   Inattention/Neglect Appears intact   Cognition   Overall Cognitive Status Impaired   Arousal/Participation Alert;Responsive;Cooperative   Attention Attends with cues to redirect   Orientation Level Oriented to person;Oriented to place;Disoriented to time  (able to report month-not month or year)   Memory Decreased recall of precautions   Following Commands Follows one step commands without difficulty   Assessment   Limitation Decreased ADL status;Decreased UE ROM;Decreased UE strength;Decreased Safe judgement during ADL;Decreased cognition;Decreased endurance;Decreased sensation;Decreased self-care trans;Decreased  high-level ADLs  (dec balance, coordination, functional reach)   Prognosis Good   Assessment Patient is a 89 y.o. year old female seen for OT eval s/p admit to [facility] on 2024 with JEN superimposed on CKD, Hypotension.   CLOF include: eating/grooming: supervision , UB ADLs: Sharri, LB ADLs: modA, toileting: modA, bed mobility: DNT, functional transfers: modA and modAx2, functional mobility: modA, standing tolerance: 3 minutes. Personal factors affecting pt at IE include: YOAV home environment, limited home support, difficulty performing ADLs, difficulty performing IADLs, difficulty performing transfers/mobility, limited insight into deficits, fall risk , functional decline , increased reliance on DME , access to transportation , and advanced age. Pt presents to OT below baseline due to the following occupational performance deficits decreased UE ROM, decreased strength , decreased balance, decreased activity tolerance, limited functional reach, impaired memory, decreased safety awareness, hypotension, SOB, impaired coordination, decreased postural control, and decreased cardiovascular endurance. Pt would benefit from continued Skilled OT tx while in [facility] to address deficits as defined above and maximize level of functional independence w/ ADLs/IADLs and functional mobility. The following occupational performance areas to address include: eating, grooming, bathing/shower, toilet hygiene, dressing, medication management, health maintenance, functional mobility, community mobility, clothing management, cleaning, meal prep, household maintenance, and social participation. Based on OT evaluation, assessment(s), performance deficits listed, and current level of function, pt is identified as high complexity evaluation. From an OT standpoint, at this time discharge recommendation is level Level II (moderate resource intensity). OT will continue to follow up w/ pt 3-5x/wk to address the goals listed below to  w/in 10-  days.   Goals   Patient Goals to feel better   LTG Time Frame 10-14   Plan   Treatment Interventions ADL retraining;Functional transfer training;Visual perceptual retraining;UE strengthening/ROM;Endurance training;Cognitive reorientation;Patient/family training;Equipment evaluation/education;Neuromuscular reeducation;Compensatory technique education;Fine motor coordination activities;Continued evaluation;Energy conservation;Activityengagement   Goal Expiration Date 07/22/24   OT Treatment Day 1   OT Frequency 3-5x/wk   Discharge Recommendation   Rehab Resource Intensity Level, OT II (Moderate Resource Intensity)   Additional Comments  Pt benefited from co [place] w/ PT due to secondary complex medical condition of pt, possible A of 2 required to achieve and maintain transitional movements, requiring the need of skilled therapeutic intervention of 2 therapists to achieve delivery of services.   AM-PAC Daily Activity Inpatient   Lower Body Dressing 2   Bathing 2   Toileting 2   Upper Body Dressing 3   Grooming 3   Eating 3   Daily Activity Raw Score 15   Daily Activity Standardized Score (Calc for Raw Score >=11) 34.69   AM-PAC Applied Cognition Inpatient   Following a Speech/Presentation 3   Understanding Ordinary Conversation 3   Taking Medications 3   Remembering Where Things Are Placed or Put Away 2   Remembering List of 4-5 Errands 2   Taking Care of Complicated Tasks 2   Applied Cognition Raw Score 15   Applied Cognition Standardized Score 33.54   Additional Treatment Session   Start Time 1015   End Time 1029   Treatment Assessment Pt seen for skilled OT f/u tx session focusing on ADL retraining, activity tolerance/ endurance, balance, functional transfers and mobility, to increase ability to engage in ADL/functional tasks. Pt was found in chair, agreeable to skilled OT TX. Pt progressing fairly towards OT goals.  Pt participated in ADL tasks at chair requiring S grooming, Jeovany UB dressing, S UB Bathing, ModA LB  dressing, ModA LB bathing, ModA toileting. Pt engaged in STS w/ ModAx2 from chair w/ RW. Pt required ModAx1 for functional mobility with RW for 3-5 steps. Pt demonstrated orthostatic BP w/o S&S t/o session- more information on flowchart. Pt continues to demonstrate deficits affecting occupational performance such as: endurance, strength, functional reach, coordination, safety, and balance. Pt continues to perform below baseline with functional mobility and ADLs. Pt will continue to benefit from lvl 2 resource intensity. Will continue to see pt per POC, pt left in chair w/ chair alarmed, call bell in reach, and all needs met.     ADL Goals  Pt will complete grooming tasks with mod (I) assistance w/ DME PRN  Pt will complete UB tasks with mod (I) assistance w/ DME PRN  Pt will complete LB tasks with S assistance w/ DME PRN  Pt will demonstrate S assistance w/ toileting w/ DME PRN    IADL Goals  Pt will demonstrate mod (I) with medication management.  Pt will demonstrate IADL performance simulation w/ mod (I) to show carryover once discharged.     Mobility Goals  Pt will demonstrate good bed mobility w/ Mod (I) to participate in frequent repositioning to shift weight improving skin integrity.  Pt will increase activity tolerance to 10 minutes to increase endurance in ADLs.   Pt will increase functional mobility with mod (I) for a unlimited household distance in order to increase endurance to complete ADLs/IADLs.   Pt will increase UE strength by 1 mmt grade to be able to complete ADLs and transfers.  Pt will complete functional transfers at mod (I) level w/ DME PRN with good safety awareness.   Pt will demonstrate carryover of AD safety during activities to facilitate independence w/ functional mobility for activity/community participation.   Pt will demonstrate compensatory techniques for energy conservation to help with endurance during completion of ADLs.   Pt will demonstrate Improved balance by 1 grade to reduce  risk for falls while completing ADLs.     Cognition goals  Pt will follow 1 step commands without redirection 100% of the time.  Pt will participate in a formal cognitive assessment w/ minimal cuing and redirection 100% of the time.      Leisure Goals   Pt will identify 2 activities in hospital/home/community setting which will promote physical/ emotional or cognitive wellbeing    Sabrina Orozco

## 2024-07-08 NOTE — CASE MANAGEMENT
Case Management Assessment & Discharge Planning Note    Patient name Tala Coyne  Location [facility] 2 M* MRN [id number]  : 10/4/1928 Date 2024       Current Admission Date: 2024  Current Admission Diagnosis:Acute kidney injury superimposed on chronic kidney disease  (HCC)   Patient Active Problem List    Diagnosis Date Noted Date Diagnosed    Hypotension due to hypovolemia 2024     Platelets decreased ([facility]) 2023     Acute kidney injury superimposed on chronic kidney disease  ([facility]) 10/20/2021     Sick sinus syndrome s/p PPM 10/20/2020     Status post placement of cardiac pacemaker 04/10/2020     Nonrheumatic aortic valve stenosis 2020     Mild pulmonary hypertension ([facility]) 2020     Seizure disorder ([facility]) 2019     Iron deficiency anemia 10/17/2019     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 Fax: [phone]    Primary Care Provider: Ni Fisher DO    Primary Insurance: MEDICARE  Secondary Insurance: BLUE CROSS    ASSESSMENT:  Active Health Care Proxies       DorethaKunal Health Care Agent - Son   Primary Phone: [phone] (Mobile)  Home Phone: [phone]                           Readmission Root Cause  30 Day Readmission: No    Patient Information  Admitted from::  Home  Mental Status: Alert  During Assessment patient was accompanied by: Son  Assessment information provided by:: Patient, Son  Primary Caregiver: Self  Support Systems: Self, Son, Family members  County of Residence: [place]  What city do you live in?: [place]  Home entry access options. Select all that apply.: No steps to enter home  Type of Current Residence: 2 story home  Upon entering residence, is there a bedroom on the main floor (no further steps)?: Yes  Upon entering residence, is there a bathroom on the main floor (no further steps)?: Yes  Living Arrangements: Lives Alone  Is patient a ?: No    Activities of Daily Living Prior to Admission  Functional Status: Independent  Completes ADLs independently?: Yes  Ambulates independently?: Yes  Does patient use assisted devices?: Yes  Assisted Devices (DME) used: Walker, Wheelchair, Other (Comment) (electric scooter)  Does patient currently own DME?: Yes  What DME does the patient currently own?: Walker, Wheelchair, Other (Comment) (electric scooter)  Does patient have a history of Outpatient Therapy (PT/OT)?: No  Does the patient have a history of Short-Term Rehab?: Yes ([facility])  Does patient have a history of HHC?: Yes ([facility])  Does patient currently have HHC?: No         Patient Information Continued  Income Source: Pension/FDC  Does patient have prescription coverage?: Yes  Does patient receive dialysis treatments?: No  Does patient have a history of substance abuse?: No  Does patient have a history of Mental Health Diagnosis?: No         Means of Transportation  Means of Transport to Appts:: Family transport      Social Determinants of Health (SDOH)      Flowsheet Row Most Recent Value   Housing Stability    In the last 12 months, was there a time when you were not able to pay the mortgage or rent on time? N   At any time in the past 12 months, were you homeless or living in a shelter (including now)? N   Transportation Needs    In the past  12 months, has lack of transportation kept you from medical appointments or from getting medications? no   In the past 12 months, has lack of transportation kept you from meetings, work, or from getting things needed for daily living? No   Food Insecurity    Within the past 12 months, you worried that your food would run out before you got the money to buy more. Never true   Within the past 12 months, the food you bought just didn't last and you didn't have money to get more. Never true   Utilities    In the past 12 months has the electric, gas, oil, or water company threatened to shut off services in your home? No            DISCHARGE DETAILS:    Discharge planning discussed with:: patient, son at bedside  Freedom of Choice: Yes  Comments - Freedom of Choice: open to blanket referral, [facility] first choice, would NOT want to go to [facility]  CM contacted family/caregiver?: Yes  Were Treatment Team discharge recommendations reviewed with patient/caregiver?: Yes  Did patient/caregiver verbalize understanding of patient care needs?: Yes  Were patient/caregiver advised of the risks associated with not following Treatment Team discharge recommendations?: Yes    Contacts  Patient Contacts: Kunal Coyne, son  Relationship to Patient:: Family  Contact Method: Phone  Phone Number: [phone]  Reason/Outcome: Continuity of Care, Emergency Contact, Discharge Planning                        Treatment Team Recommendation: Short Term Rehab  Discharge Destination Plan:: Short Term Rehab  Transport at Discharge : Other (Comment) (TBD)                  Additional Comments: Introduced self and role to patient and son Kunal at bedside.  Patient lives alone, is mostly independent with ADLs, family does check on family daily and completes household chores such as laundry, cleaning, food preparation.  Daughter assists with organizing medications.  Discussed recommendation for STR, patient's first choice would be [facility], is somewhat  resistant to the idea of rehab but open to referrals/discussion.  Referral placed in [facility] to follow.

## 2024-07-08 NOTE — PLAN OF CARE
Problem: Potential for Falls  Goal: Patient will remain free of falls  Description: INTERVENTIONS:  - Educate patient/family on patient safety including physical limitations  - Instruct patient to call for assistance with activity   - Consult OT/PT to assist with strengthening/mobility   - Keep Call bell within reach  - Keep bed low and locked with side rails adjusted as appropriate  - Keep care items and personal belongings within reach  - Initiate and maintain comfort rounds  - Make Fall Risk Sign visible to staff  - Offer Toileting every 2 Hours, in advance of need  - Initiate/Maintain bed, chair alarm  - Obtain necessary fall risk management equipment: bed rails, bed alarms  - Apply yellow socks and bracelet for high fall risk patients  - Consider moving patient to room near nurses station  Outcome: Progressing     Problem: PAIN - ADULT  Goal: Verbalizes/displays adequate comfort level or baseline comfort level  Description: Interventions:  - Encourage patient to monitor pain and request assistance  - Assess pain using appropriate pain scale  - Administer analgesics based on type and severity of pain and evaluate response  - Implement non-pharmacological measures as appropriate and evaluate response  - Consider cultural and social influences on pain and pain management  - Notify physician/advanced practitioner if interventions unsuccessful or patient reports new pain  Outcome: Progressing     Problem: Knowledge Deficit  Goal: Patient/family/caregiver demonstrates understanding of disease process, treatment plan, medications, and discharge instructions  Description: Complete learning assessment and assess knowledge base.  Interventions:  - Provide teaching at level of understanding  - Provide teaching via preferred learning methods  Outcome: Progressing     Problem: CARDIOVASCULAR - ADULT  Goal: Maintains optimal cardiac output and hemodynamic stability  Description: INTERVENTIONS:  - Monitor I/O, vital signs  and rhythm  - Monitor for S/S and trends of decreased cardiac output  - Administer and titrate ordered vasoactive medications to optimize hemodynamic stability  - Assess quality of pulses, skin color and temperature  - Assess for signs of decreased coronary artery perfusion  - Instruct patient to report change in severity of symptoms  Outcome: Progressing

## 2024-07-08 NOTE — ASSESSMENT & PLAN NOTE
Pleasant, somewhat confused but redirectable  High risk for hospital acquired delirium: ear plugs/eye shades at night/TV out, avoid unnecessary sleep interruption, use of glasses/hearing aids during day, frequent reorientation, aggressive physical therapy/mobilization, avoid opioids, benzodiazepines, antihistamines. If agitated risk to self/others use antipsychotic early evening/safety sitter.

## 2024-07-08 NOTE — ASSESSMENT & PLAN NOTE
Lab Results   Component Value Date    EGFR 17 07/08/2024    EGFR 13 07/07/2024    EGFR 12 07/07/2024    CREATININE 2.30 (H) 07/08/2024    CREATININE 2.88 (H) 07/07/2024    CREATININE 3.09 (H) 07/07/2024     CKD in setting of age, hypertensive nephropathy. JEN likely prerenal azotemia   Suspect JEN to be prerenal with reports of diarrhea, possible gastroenteritis  Baseline Cr: 1, Admit Cr: 3.09   Creatinine improving with IV fluids, holding ACE/ARB, will continue fluids for an additional 24 hours  Repeat BMP in a.m.

## 2024-07-08 NOTE — PLAN OF CARE
Problem: OCCUPATIONAL THERAPY ADULT  Goal: Performs self-care activities at highest level of function for planned discharge setting.  See evaluation for individualized goals.  Description: Treatment Interventions: ADL retraining, Functional transfer training, Visual perceptual retraining, UE strengthening/ROM, Endurance training, Cognitive reorientation, Patient/family training, Equipment evaluation/education, Neuromuscular reeducation, Compensatory technique education, Fine motor coordination activities, Continued evaluation, Energy conservation, Activityengagement          See flowsheet documentation for full assessment, interventions and recommendations.   Outcome: Progressing  Note: Limitation: Decreased ADL status, Decreased UE ROM, Decreased UE strength, Decreased Safe judgement during ADL, Decreased cognition, Decreased endurance, Decreased sensation, Decreased self-care trans, Decreased high-level ADLs (dec balance, coordination, functional reach)  Prognosis: Good  Assessment: Patient is a 89 y.o. year old female seen for OT eval s/p admit to [facility] on 7/7/2024 with JEN superimposed on CKD, Hypotension.   CLOF include: eating/grooming: supervision , UB ADLs: Sharri, LB ADLs: modA, toileting: modA, bed mobility: DNT, functional transfers: modA and modAx2, functional mobility: modA, standing tolerance: 3 minutes. Personal factors affecting pt at IE include: YOAV home environment, limited home support, difficulty performing ADLs, difficulty performing IADLs, difficulty performing transfers/mobility, limited insight into deficits, fall risk , functional decline , increased reliance on DME , access to transportation , and advanced age. Pt presents to OT below baseline due to the following occupational performance deficits decreased UE ROM, decreased strength , decreased balance, decreased activity tolerance, limited functional reach, impaired memory, decreased safety awareness, hypotension, SOB, impaired  coordination, decreased postural control, and decreased cardiovascular endurance. Pt would benefit from continued Skilled OT tx while in [facility] to address deficits as defined above and maximize level of functional independence w/ ADLs/IADLs and functional mobility. The following occupational performance areas to address include: eating, grooming, bathing/shower, toilet hygiene, dressing, medication management, health maintenance, functional mobility, community mobility, clothing management, cleaning, meal prep, household maintenance, and social participation. Based on OT evaluation, assessment(s), performance deficits listed, and current level of function, pt is identified as high complexity evaluation. From an OT standpoint, at this time discharge recommendation is level Level II (moderate resource intensity). OT will continue to follow up w/ pt 3-5x/wk to address the goals listed below to  w/in 10-14 days.     Rehab Resource Intensity Level, OT: II (Moderate Resource Intensity)

## 2024-07-08 NOTE — OCCUPATIONAL THERAPY NOTE
Patient is a 89 y.o. year old female seen for OT eval s/p admit to [facility] on 7/7/2024 with JEN superimposed on CKD, Hypotension. Coexisting medical conditions affecting pt's functional performance at time of assessment include: SSS, mild dementia murmur cardiac, depression, pacemaker seizure disorder.  Patient with active OT orders and activity orders for Up and OOB as tolerated  to assess ADLs/IADLs/functional mobility and assist w/ D/C planning. PTA pt was living in a 2 SH w/ 1 YOAV- stair glide from basement/garage to main level w/ b&b w/ standard toilet w/ BSC, tub/shower unit w. Tub/transfer bench. PLOF was (I) with ADLs and was (I) with IADLs. CLOF include: eating/grooming: supervision , UB ADLs: Sharri, LB ADLs: modA, toileting: modA, bed mobility: DNT, functional transfers: modA and modAx2, functional mobility: modA, standing tolerance: 3 minutes. Personal factors affecting pt at  include: YOAV home environment, limited home support, difficulty performing ADLs, difficulty performing IADLs, difficulty performing transfers/mobility, limited insight into deficits, fall risk , functional decline , increased reliance on DME , access to transportation , and advanced age. Pt presents to OT below baseline due to the following occupational performance deficits decreased UE ROM, decreased strength , decreased balance, decreased activity tolerance, limited functional reach, decreased safety awareness, hypotension, SOB, impaired coordination, decreased postural control, and decreased cardiovascular endurance. Pt would benefit from continued Skilled OT tx while in [facility] to address deficits as defined above and maximize level of functional independence w/ ADLs/IADLs and functional mobility. The following occupational performance areas to address include: eating, grooming, bathing/shower, toilet hygiene, dressing, medication management, health maintenance, functional mobility, community mobility, clothing management, cleaning,  meal prep, household maintenance, and social participation. Based on OT evaluation, assessment(s), performance deficits listed, and current level of function, pt is identified as high complexity evaluation. From an OT standpoint, at this time discharge recommendation is level Level II (moderate resource intensity). OT will continue to follow up w/ pt 3-5x/wk to address the goals listed below to  w/in 10-14 days.      Pt benefited from co eval w/ PT due to secondary complex medical condition of pt, possible A of 2 required to achieve and maintain transitional movements, requiring the need of skilled therapeutic intervention of 2 therapists to achieve delivery of services.       ADL Goals  Pt will complete grooming tasks with mod (I) assistance w/ DME PRN  Pt will complete UB tasks with mod (I) assistance w/ DME PRN  Pt will complete LB tasks with S assistance w/ DME PRN  Pt will demonstrate S assistance w/ toileting w/ DME PRN    IADL Goals  Pt will demonstrate mod (I) with medication management.  Pt will demonstrate IADL performance simulation w/ mod (I) to show carryover once discharged.     Mobility Goals  Pt will demonstrate good bed mobility w/ Mod (I) to participate in frequent repositioning to shift weight improving skin integrity.  Pt will increase activity tolerance to 10 minutes to increase endurance in ADLs.   Pt will increase functional mobility with mod (I) for a unlimited household distance in order to increase endurance to complete ADLs/IADLs.   Pt will increase UE strength by 1 mmt grade to be able to complete ADLs and transfers.  Pt will complete functional transfers at mod (I) level w/ DME PRN with good safety awareness.   Pt will demonstrate carryover of AD safety during activities to facilitate independence w/ functional mobility for activity/community participation.   Pt will demonstrate compensatory techniques for energy conservation to help with endurance during completion of ADLs.   Pt  will demonstrate Improved balance by 1 grade to reduce risk for falls while completing ADLs.     Cognition goals  Pt will follow 1 step commands without redirection 100% of the time.  Pt will participate in a formal cognitive assessment w/ minimal cuing and redirection 100% of the time.      Leisure Goals   Pt will identify 2 activities in hospital/home/community setting which will promote physical/ emotional or cognitive wellbeing        Tx:    Pt seen for skilled OT f/u tx session focusing on ADL retraining, activity tolerance/ endurance, balance, functional transfers and mobility, to increase ability to engage in ADL/functional tasks. Pt was found in chair, agreeable to skilled OT TX. Pt progressing fairly towards OT goals.  Pt participated in ADL tasks at chair requiring S grooming, Jeovany UB dressing, S UB Bathing, ModA LB dressing, ModA LB bathing, ModA toileting. Pt engaged in STS w/ ModAx2 from chair w/ RW. Pt required ModAx1 for functional mobility with RW for 3-5 steps. Pt demonstrated orthostatic BP w/o S&S t/o session- more information on flowchart. Pt continues to demonstrate deficits affecting occupational performance such as: endurance, strength, functional reach, coordination, safety, and balance. Pt continues to perform below baseline with functional mobility and ADLs. Pt will continue to benefit from lvl 2 resource intensity. Will continue to see pt per POC, pt left in chair w/ chair alarmed, call bell in reach, and all needs met.

## 2024-07-08 NOTE — PHYSICAL THERAPY NOTE
PHYSICAL THERAPY EVALUATION and tx     NAME:  Tala Coyne  DATE: 07/08/24    AGE:   89 y.o.  Mrn:   [id number]  ADMIT DX:  Weakness [R53.1]  Anemia [D64.9]  JEN (acute kidney injury) (HCC) [N17.9]  Generalized weakness [R53.1]    Patient Active Problem List   Diagnosis    Mixed hyperlipidemia    Essential hypertension    Esophageal reflux    Peripheral neuropathy    Vitamin D deficiency    Depression    Murmur, cardiac    Mild dementia without behavioral disturbance, psychotic disturbance, mood disturbance, or anxiety (HCC)    Iron deficiency anemia    Seizure disorder (HCC)    Nonrheumatic aortic valve stenosis    Mild pulmonary hypertension (HCC)    Status post placement of cardiac pacemaker    Sick sinus syndrome s/p PPM    Acute kidney injury superimposed on chronic kidney disease  (HCC)    Platelets decreased (HCC)    Hypotension due to hypovolemia       Past Medical History:   Diagnosis Date    Arthritis     Depression     Gout     H/O vaginal hysterectomy     resolved-4/17/2015    Hypertensive kidney disease with CKD (chronic kidney disease) 04/19/2024    Macular degeneration 03/02/2015    Osteoarthritis 03/02/2015       Past Surgical History:   Procedure Laterality Date    CATARACT EXTRACTION      TOTAL ABDOMINAL HYSTERECTOMY      with removal of both ovaries    VAGINAL HYSTERECTOMY      resolved-4/17/2015       Imaging Studies:  CT abdomen pelvis wo contrast   Final Result by Les Sorensen MD (07/07 5223)      1.  Left parapelvic cysts. No hydronephrosis.   2.  Cholelithiasis without evidence of cholecystitis.   3.  Diverticulosis without evidence of diverticulitis.      Workstation performed: [id number]         X-ray chest 1 view portable   ED Interpretation by Abhishek Moore DO (07/07 0208)   No acute cardiopulmonary disease      Final Result by Lizbeth Oliveira MD (07/07 0628)      Mild pulmonary venous congestion.      Pulmonary artery enlargement compatible with history of pulmonary  hypertension.            Workstation performed: [id number]             Past Medical History:   Diagnosis Date    Arthritis     Depression     Gout     H/O vaginal hysterectomy     resolved-4/17/2015    Hypertensive kidney disease with CKD (chronic kidney disease) 04/19/2024    Macular degeneration 03/02/2015    Osteoarthritis 03/02/2015     Length Of Stay: 1  Performed at least 2 patient identifiers during session: Name and Birthday, ID bracelet  PHYSICAL THERAPY EVALUATION :        07/08/24 1029   PT Last Visit   PT Visit Date 07/08/24   Note Type   Note type Evaluation  (and treatment)   Pain Assessment   Pain Assessment Tool 0-10   Pain Score No Pain   Restrictions/Precautions   Other Precautions Chair Alarm;Fall Risk;Hard of hearing;Cognitive  (BP monitoring,)   Home Living   Type of Home House   Home Layout Two level;Stairs to enter without rails  (1 YOAV from garage/basement uses stair glide to get to main level)   Bathroom Shower/Tub Tub/shower unit   Bathroom Toilet Standard   Bathroom Equipment Tub transfer bench;Grab bars in shower;Commode   Home Equipment Stair glide;Walker;Cane;Electric scooter;Wheelchair-manual  (mainly uses e-scooter for functional mobility in house w/ occassional use of SPC and furniture for bathroom use, w/c in community w/ assitance for propulsion)   Additional Comments Lives alone. Local son that occasionally provides transportation and local dtr that visits every other week and assists w/ grocery shopping   Prior Function   Level of [place] Independent with ADLs;Independent with functional mobility;Needs assistance with IADLS   Lives With (S)  Alone   Receives Help From Family  (dtr, son w/ recent sgx and unable to help)   IADLs Independent with medication management;Family/Friend/Other provides transportation;Family/Friend/Other provides meals;Family/Friend/Other provides medication management;Independent with meal prep  (refer to OT)   Falls in the last 6 months 0  "  Vocational Retired   Comments PTA, pt lives alone and reports functioning at mod I w/ functional mobility primarily w/ e-scooter in home w/ stair glide vs SPC w/ furniture to access bathroom vs W/c w/ assistance w/in community, indep w/ ADLs, and A w/ IADLs, (-) falls, (-) drive.   General   Family/Caregiver Present No   Cognition   Overall Cognitive Status Impaired   Arousal/Participation Alert   Attention Attends with cues to redirect   Orientation Level Oriented to person;Oriented to place;Disoriented to time  (month (-) date/year (\"2025\")   Memory Decreased recall of precautions   Following Commands Follows one step commands without difficulty   Comments Dec safety awareness during session requiring frequent cues.   Subjective   Subjective (S)  Reports occasional \"dizzy spells\" at home, L ankle pain that inc w/ palpation anterior joint line w/ unknown onset \"Please don't touch my ankle.\" Audible wheezing w/ SpO2 WFL and denying SOB, RN and MD aware.   RLE Assessment   RLE Assessment WFL  (hip flx 3+/5, knee ext 4-/5, ankle 4/5 (+) neuropathy feet)   LLE Assessment   LLE Assessment X  (hip flx 3+/5, knee ext 4-/5, ankle 1/5 w/ AROM 5-10 PF, unable to DF to neutral. PROM WFL w/o pain. (+) neuropathy feet)   Vision-Basic Assessment   Current Vision Wears glasses all the time   Visual History Glaucoma   Patient Visual Report Blurring of print when reading   Coordination   Sensation X  (h/o neuropathy)   Transfers   Sit to Stand 3  Moderate assistance   Additional items Assist x 2;Armrests;Increased time required;Verbal cues;Other   Stand to Sit 3  Moderate assistance   Additional items Assist x 1;Armrests;Increased time required;Verbal cues;Other   Additional Comments Greeted in bedside chair. Cues for safe technique/hand placement and eccentric control. RW for stability. (+) asx dec in BP upon standing. Refer to Activity tolerance; pt returned to seated. Additional tx session following IE for progression of gait " transfers; Assessment below .  (L knee hyperextension and valgus in stance)   Ambulation/Elevation   Gait pattern Poor UE support;Improper Weight shift;Antalgic;Narrow HÉCTOR;Decreased foot clearance;Decreased L stance;Inconsistent kaye;Short stride;Excessively slow;Step through pattern  (Retropulsive)   Gait Assistance 3  Moderate assist   Additional items Assist x 1;Verbal cues;Tactile cues   Assistive Device Rolling walker   Distance 3'x1   Ambulation/Elevation Additional Comments unsteady, dec tolerance 2/2 fatigue and weakness. No gross LOB. L knee hyperxtension and valgus noted.   Balance   Static Sitting Fair +   Dynamic Sitting Fair   Static Standing Poor +   Dynamic Standing Poor   Ambulatory Poor   Endurance Deficit   Endurance Deficit Yes   Activity Tolerance   Activity Tolerance (S)  Patient tolerated treatment well;Patient limited by fatigue;Other (Comment);Treatment limited secondary to medical complications (Comment)  (Asx t/o session: sitting in chair: 124/55, standing at chair: 79/60, sitting after stand w/ LE/UE exercise 105/52, post additional tx session 121/59. RN and MD aware; audible wheezing SpO2 WFL on RA.)   Medical Staff Made Aware OT, MD, RN   Nurse Made Aware RN cleared/updated   Assessment   Prognosis Good   Problem List Decreased strength;Decreased range of motion;Decreased endurance;Impaired balance;Decreased mobility;Decreased cognition;Decreased safety awareness;Pain;Obesity;Impaired sensation;Impaired vision   Assessment Pt is a 89 y.o. female y/o presenting to [facility] on 7[facility]7/2024 with weakness. Primary dx: Acute kidney injury superimposed on chronic kidney disease  (HCC).Significant pmhx per chart: dementia, HTN, CKD, recent UTI, HTN, SSS s/p pm (10/2020), hld, peripheral neuropathy, sz d/o (7/2019). PT consulted to assess strength/functional mobility, activity tolerance and d/c needs. Active PT orders and activity orders for Up and OOB as tolerated . PTA, pt lives  "alone and reports functioning at mod I w/ functional mobility primarily w/ e-scooter in home w/ stair glide vs SPC w/ furniture to access bathroom vs W/c w/ assistance w/in community, indep w/ ADLs, and A w/ IADLs, (-) falls, (-) drive. During PT IE, pt presenting with above (see flowsheet) outlined functional impairments including dec strength, AROM L ankle, balance, gait, and deficits that limit functional mobility and activity tolerance relative to baseline. Pt currently requires mod Ax1-2 for transfers. Mobility assessment limited 2/2 asymptomatic dec in BP from 124/55 to 79/68, prompting pt to be returned to seated w/ BP stabilized. MD and RN aware of BP, audible wheezing w/ SpO2 WFL, and pt c/o L ankle pain w/ unknown onset. Pt currently demonstrating inc fall risk. Fall risk education provided with verbal understanding. Denied additional sxs throughout session. Recommend continued mobilization of pt with nsg staff as tolerated to prevent further decline in function. Pt will benefit from continued PT services to progress mobility independence necessary for return to PLOF. Based on pt presentation and impairments, pt would most appropriately benefit from level II (mod) rehab intensity resources . Pt seen for additional tx session following IE. Refer to assessment below.   Barriers to Discharge Inaccessible home environment;Decreased caregiver support;Other (Comment)  (Lives alone, 1STE, limited ambulatory distances requiring inc assistance)   Goals   Patient Goals \"Get better\"   STG Expiration Date 07/22/24   Short Term Goal #1 Updated following additional tx session 1).  Pt will perform bed mobility with Benedicto demonstrating appropriate technique 100% of the time in order to improve function. 2)  Perform all transfers with Benedicto demonstrating safe and appropriate technique 100% of the time in order to improve ability to negotiate safely in home environment. 3) Amb with least restrictive AD > 20'x1 with mod I in " order to demonstrate ability to negotiate in home environment. 4)  Improve overall strength and balance 1/2 grade in order to optimize ability to perform functional tasks and reduce fall risk. 5) Increase activity tolerance to 45 minutes in order to improve endurance to functional tasks. 6)  Negotiate >/= 1 stairs using most appropriate technique and mod I in order to be able to negotiate safely in home environment. 7) PT for ongoing patient and family/caregiver education, DME needs and d/c planning in order to promote highest level of function in least restrictive environment.   PT Treatment Day 0   Plan   Treatment/Interventions Functional transfer training;LE strengthening/ROM;Elevations;Therapeutic exercise;Endurance training;Equipment eval/education;Patient/family training;Cognitive reorientation;Bed mobility;Gait training;Compensatory technique education;Spoke to nursing;Spoke to MD;OT   PT Frequency 4-6x/wk   Discharge Recommendation   Rehab Resource Intensity Level, PT II (Moderate Resource Intensity)   Additional Comments The patient's AM-PAC Basic Mobility Inpatient Short Form Raw Score is 13. A Raw score of less than or equal to 16 suggests the patient may benefit from discharge to post-acute rehabilitation services. Please also refer to the recommendation of the Physical Therapist for safe discharge planning.   AM-PAC Basic Mobility Inpatient   Turning in Flat Bed Without Bedrails 3   Lying on Back to Sitting on Edge of Flat Bed Without Bedrails 3   Moving Bed to Chair 2   Standing Up From Chair Using Arms 2   Walk in Room 2   Climb 3-5 Stairs With Railing 1   Basic Mobility Inpatient Raw Score 13   Basic Mobility Standardized Score 33.99   [facility] Highest Level Of Mobility   -[facility] Goal 4: Move to chair/commode   -HLM Achieved 5: Stand (1 or more minutes)  (standing tolerance 2-3 minutes)   Additional Treatment Session   Start Time 1021   End Time 1029   Treatment Assessment Additional tx session  following IE for progression of gait, edu on BP management, and further functional assessment w/ goal update. Pt received seated in chair w/ dec BP. Instructed on Therapeutic exercises w/ BP stabilization. Requiring mod Ax1-2 to complete transfers and limited distance amb w/ RW. Refer to flowsheet for additional objective data. Requires skilled cues for technique and safety throughout w/ limited carryover. Pt stable at end of session. Will continue to benefit from skilled PT per POC. Recommend level II mod rehab intensity resources   Exercises   Knee AROM Long Arc Quad 20 reps;AROM   Ankle Pumps 20 reps;Sitting   UE Exercise Sitting  (hand squeezes/bicep curls for BP management)   End of Consult   Patient Position at End of Consult Bedside chair;Bed/Chair alarm activated;All needs within reach;Other (comment)  (Pt stable)       Pt requires PT/OT co-eval for pt's best interest due to medical complexity, safety concerns, fall risk, dec activity tolerance which is decline from PLOF, significant assistance with mobility and/or cognitive-behavioral impairments.    (Please find full objective findings from PT assessment regarding body systems outlined above).     Hx/personal factors: inaccessible home environment, step(s) to enter environment, limited home support, and use of more restrictive AD, co-morbidities, dec caregiver support, use of AD, pain, fall risk, and obesity, coping styles, social background, past experience, behavior pattern, post op precautions  Examination: assessed/impairments of systems including multiple body structures involved; dec mobility, dec balance, dec endurance, dec amb, risk for falls, pain, impairements in locomotion, musculoskeletal, balance, endurance, posture, coordination, assessed cognition, AM-PAC score suggesting inc assistance/supervision vs baseline, impaired judgment/safety awareness/impulsivity, gait deviations ,   Clinical: unpredictable (ongoing medical status, risk for falls,  need for input for mobility technique/safety, and bed/chair alarm, )  Complexity: high         Cyrus Tafoya, PT,DPT   07/08/24

## 2024-07-08 NOTE — PLAN OF CARE
Problem: PHYSICAL THERAPY ADULT  Goal: Performs mobility at highest level of function for planned discharge setting.  See evaluation for individualized goals.  Description: Treatment/Interventions: Functional transfer training, LE strengthening/ROM, Elevations, Therapeutic exercise, Endurance training, Equipment eval/education, Patient/family training, Cognitive reorientation, Bed mobility, Gait training, Compensatory technique education, Spoke to nursing, Spoke to MD, OT          See flowsheet documentation for full assessment, interventions and recommendations.  Outcome: Progressing  Note: Prognosis: Good  Problem List: Decreased strength, Decreased range of motion, Decreased endurance, Impaired balance, Decreased mobility, Decreased cognition, Decreased safety awareness, Pain, Obesity, Impaired sensation, Impaired vision  Assessment: Pt is a 89 y.o. female y/o presenting to [facility] on 7/7/2024 with weakness. Primary dx: Acute kidney injury superimposed on chronic kidney disease  (HCC).Significant pmhx per chart: dementia, HTN, CKD, recent UTI, HTN, SSS s/p pm (10/2020), hld, peripheral neuropathy, sz d/o (7/2019). PT consulted to assess strength/functional mobility, activity tolerance and d/c needs. Active PT orders and activity orders for Up and OOB as tolerated . PTA, pt lives alone and reports functioning at mod I w/ functional mobility primarily w/ e-scooter in home w/ stair glide vs SPC w/ furniture to access bathroom vs W/c w/ assistance w/in community, indep w/ ADLs, and A w/ IADLs, (-) falls, (-) drive. During PT IE, pt presenting with above (see flowsheet) outlined functional impairments including dec strength, AROM L ankle, balance, gait, and deficits that limit functional mobility and activity tolerance relative to baseline. Pt currently requires mod Ax1-2 for transfers. Mobility assessment limited 2/2 asymptomatic dec in BP from 124/55 to 79/68, prompting pt to be returned to seated w/  BP stabilized. MD and RN aware of BP, audible wheezing w/ SpO2 WFL, and pt c/o L ankle pain w/ unknown onset. Pt currently demonstrating inc fall risk. Fall risk education provided with verbal understanding. Denied additional sxs throughout session. Recommend continued mobilization of pt with nsg staff as tolerated to prevent further decline in function. Pt will benefit from continued PT services to progress mobility independence necessary for return to PLOF. Based on pt presentation and impairments, pt would most appropriately benefit from level II (mod) rehab intensity resources . Pt seen for additional tx session following IE. Refer to assessment below.  Barriers to Discharge: Inaccessible home environment, Decreased caregiver support, Other (Comment) (Lives alone, 1STE, limited ambulatory distances requiring inc assistance)     Rehab Resource Intensity Level, PT: II (Moderate Resource Intensity)    See flowsheet documentation for full assessment.

## 2024-07-09 LAB
ABO GROUP BLD: NORMAL
ANION GAP SERPL CALCULATED.3IONS-SCNC: 6 MMOL/L (ref 4–13)
BUN SERPL-MCNC: 27 MG/DL (ref 5–25)
CALCIUM SERPL-MCNC: 10.2 MG/DL (ref 8.4–10.2)
CHLORIDE SERPL-SCNC: 111 MMOL/L (ref 96–108)
CO2 SERPL-SCNC: 22 MMOL/L (ref 21–32)
CREAT SERPL-MCNC: 1.57 MG/DL (ref 0.6–1.3)
ERYTHROCYTE [DISTWIDTH] IN BLOOD BY AUTOMATED COUNT: 18.6 % (ref 11.6–15.1)
FOLATE SERPL-MCNC: 4.1 NG/ML
GFR SERPL CREATININE-BSD FRML MDRD: 27 ML/MIN/1.73SQ M
GLUCOSE SERPL-MCNC: 82 MG/DL (ref 65–140)
HCT VFR BLD AUTO: 28.6 % (ref 34.8–46.1)
HGB BLD-MCNC: 8.8 G/DL (ref 11.5–15.4)
MCH RBC QN AUTO: 26.5 PG (ref 26.8–34.3)
MCHC RBC AUTO-ENTMCNC: 30.8 G/DL (ref 31.4–37.4)
MCV RBC AUTO: 86 FL (ref 82–98)
PLATELET # BLD AUTO: 121 THOUSANDS/UL (ref 149–390)
POTASSIUM SERPL-SCNC: 4.8 MMOL/L (ref 3.5–5.3)
RBC # BLD AUTO: 3.32 MILLION/UL (ref 3.81–5.12)
RH BLD: POSITIVE
SODIUM SERPL-SCNC: 139 MMOL/L (ref 135–147)
TSH SERPL DL<=0.05 MIU/L-ACNC: 2.41 UIU/ML (ref 0.45–4.5)
VIT B12 SERPL-MCNC: 460 PG/ML (ref 180–914)
WBC # BLD AUTO: 4.3 THOUSAND/UL (ref 4.31–10.16)

## 2024-07-09 PROCEDURE — 84443 ASSAY THYROID STIM HORMONE: CPT | Performed by: STUDENT IN AN ORGANIZED HEALTH CARE EDUCATION/TRAINING PROGRAM

## 2024-07-09 PROCEDURE — 82746 ASSAY OF FOLIC ACID SERUM: CPT | Performed by: STUDENT IN AN ORGANIZED HEALTH CARE EDUCATION/TRAINING PROGRAM

## 2024-07-09 PROCEDURE — 99232 SBSQ HOSP IP/OBS MODERATE 35: CPT | Performed by: STUDENT IN AN ORGANIZED HEALTH CARE EDUCATION/TRAINING PROGRAM

## 2024-07-09 PROCEDURE — 85027 COMPLETE CBC AUTOMATED: CPT | Performed by: INTERNAL MEDICINE

## 2024-07-09 PROCEDURE — 80048 BASIC METABOLIC PNL TOTAL CA: CPT | Performed by: INTERNAL MEDICINE

## 2024-07-09 PROCEDURE — 82607 VITAMIN B-12: CPT | Performed by: STUDENT IN AN ORGANIZED HEALTH CARE EDUCATION/TRAINING PROGRAM

## 2024-07-09 RX ORDER — FOLIC ACID 1 MG/1
1 TABLET ORAL DAILY
Status: DISCONTINUED | OUTPATIENT
Start: 2024-07-10 | End: 2024-07-10 | Stop reason: HOSPADM

## 2024-07-09 RX ADMIN — IRON SUCROSE 200 MG: 20 INJECTION, SOLUTION INTRAVENOUS at 20:46

## 2024-07-09 RX ADMIN — SODIUM CHLORIDE, SODIUM GLUCONATE, SODIUM ACETATE, POTASSIUM CHLORIDE, MAGNESIUM CHLORIDE, SODIUM PHOSPHATE, DIBASIC, AND POTASSIUM PHOSPHATE 75 ML/HR: .53; .5; .37; .037; .03; .012; .00082 INJECTION, SOLUTION INTRAVENOUS at 01:27

## 2024-07-09 RX ADMIN — GABAPENTIN 100 MG: 100 CAPSULE ORAL at 16:01

## 2024-07-09 RX ADMIN — PANTOPRAZOLE SODIUM 40 MG: 40 TABLET, DELAYED RELEASE ORAL at 05:16

## 2024-07-09 RX ADMIN — HEPARIN SODIUM 5000 UNITS: 5000 INJECTION INTRAVENOUS; SUBCUTANEOUS at 20:46

## 2024-07-09 RX ADMIN — GABAPENTIN 100 MG: 100 CAPSULE ORAL at 08:13

## 2024-07-09 RX ADMIN — GABAPENTIN 100 MG: 100 CAPSULE ORAL at 20:46

## 2024-07-09 RX ADMIN — HEPARIN SODIUM 5000 UNITS: 5000 INJECTION INTRAVENOUS; SUBCUTANEOUS at 08:14

## 2024-07-09 RX ADMIN — ASPIRIN 81 MG: 81 TABLET, COATED ORAL at 08:13

## 2024-07-09 RX ADMIN — SODIUM CHLORIDE, SODIUM GLUCONATE, SODIUM ACETATE, POTASSIUM CHLORIDE, MAGNESIUM CHLORIDE, SODIUM PHOSPHATE, DIBASIC, AND POTASSIUM PHOSPHATE 75 ML/HR: .53; .5; .37; .037; .03; .012; .00082 INJECTION, SOLUTION INTRAVENOUS at 16:02

## 2024-07-09 NOTE — CASE MANAGEMENT
Case Management Discharge Planning Note    Patient name Tala Coyne  Location [place] [address] /[address] 2 M* MRN [id number]  : 10/4/1928 Date 2024       Current Admission Date: 2024  Current Admission Diagnosis:Acute kidney injury superimposed on chronic kidney disease  (HCC)   Patient Active Problem List    Diagnosis Date Noted Date Diagnosed    Hypotension due to hypovolemia 2024     Platelets decreased ([facility]) 2023     Acute kidney injury superimposed on chronic kidney disease  (HCC) 10/20/2021     Sick sinus syndrome s/p PPM 10/20/2020     Status post placement of cardiac pacemaker 04/10/2020     Nonrheumatic aortic valve stenosis 2020     Mild pulmonary hypertension ([facility]) 2020     Seizure disorder ([facility]) 2019     Iron deficiency anemia 10/17/2019     Mild dementia without behavioral disturbance, psychotic disturbance, mood disturbance, or anxiety ([facility]) 2019     Murmur, cardiac 2019     Depression 2018     Mixed hyperlipidemia 2015     Essential hypertension 2015     Esophageal reflux 2015     Peripheral neuropathy 2015     Vitamin D deficiency 2015       LOS (days): 2  Geometric Mean LOS (GMLOS) (days): 2.7  Days to GMLOS:0.1     OBJECTIVE:  Risk of Unplanned Readmission Score: 18.39         Current admission status: Inpatient   Preferred Pharmacy:   [id number] - [place], [address] [address]  Phone: [phone] Fax: [phone]    Primary Care Provider: Ni Fisher DO    Primary Insurance: MEDICARE  Secondary Insurance: BLUE CROSS    DISCHARGE DETAILS:    Discharge planning discussed with:: pt and pt's son Kunal  Freedom of Choice: Yes  Comments - Freedom of Choice: Discussed STR with pt being agreeable to same- discussed available facilities with her choosing [facility]- reserved in [place] with anticipated d/c Wednesday  CM  contacted family/caregiver?: Yes  Were Treatment Team discharge recommendations reviewed with patient/caregiver?: Yes  Did patient/caregiver verbalize understanding of patient care needs?: Yes       Contacts  Patient Contacts: Kunal Coyne, son  Relationship to Patient:: Family  Contact Method: In Person  Reason/Outcome: Discharge Planning, Referral    Requested Home Health Care         Is the patient interested in HHC at discharge?: No    DME Referral Provided  Referral made for DME?: No    Other Referral/Resources/Interventions Provided:  Interventions: Other (Specify) (Discussion had w/ son re: possible aftercare from [facility] if needed.  Provided [facility] contact information to further asst with potential dc needs from [facility])    Would you like to participate in our [facility] service program?  : No - Declined    Treatment Team Recommendation: Short Term Rehab  Discharge Destination Plan:: Short Term Rehab, SNF ([facility])  Transport at Discharge : Wheelchair van (Discussed OOP costs for [facility] with son being agreeable to same)                             IMM Given (Date):: 07/09/24  IMM Given to:: Family (son)

## 2024-07-09 NOTE — ASSESSMENT & PLAN NOTE
Lab Results   Component Value Date    EGFR 27 07/09/2024    EGFR 17 07/08/2024    EGFR 13 07/07/2024    CREATININE 1.57 (H) 07/09/2024    CREATININE 2.30 (H) 07/08/2024    CREATININE 2.88 (H) 07/07/2024     CKD in setting of age, hypertensive nephropathy. JEN likely prerenal azotemia   Suspect JEN to be prerenal with reports of diarrhea, possible gastroenteritis  Baseline Cr: 1, Admit Cr: 3.09   Hold ACE/ARBs  Renal functions continue to improve, has been tolerating diet.  Will continue IV fluids till later this evening  Repeat BMP in a.m.

## 2024-07-09 NOTE — PROGRESS NOTES
Patient:  SURYA BRADY    MRN:  [id number]    [place] Request ID:  [id number]    Level of care reserved:  Skilled Nursing Facility    Partner Reserved:  [facility], [place] [id number] [phone]    Clinical needs requested:    Geography searched:  10 miles around [id number]    Start of Service:    Request sent:  3:18pm EDT on 7/8/2024 by Monie Shell    Partner reserved:  2:40pm EDT on 7/9/2024 by Liliana Ford    Choice list shared:  2:40pm EDT on 7/9/2024 by Liliana Ford

## 2024-07-09 NOTE — PROGRESS NOTES
[facility]  Progress Note  Name: Tala Coyne I  MRN: [id number]  Unit/Bed#: [place] [address] -01 I Date of Admission: 7/7/2024   Date of Service: 7/9/2024 I Hospital Day: 2    Assessment & Plan   Hypotension due to hypovolemia  Assessment & Plan  Presents with dizziness, weakness. No known fevers, no overt bleeding described. Reports intermittent diarrhea. Completed outpatient ABX for UTI last week.   BUN 41, Cr 3. Hgb 11.5>8.7  Multifactorial: hypovolemia 2/2 poor PO intake/diarrhea. Occult bleeding. Occult infection  CT abdomen pelvis any acute pathology  Improved with IV fluids and holding blood pressure medication  Continue to monitor    Sick sinus syndrome s/p PPM  Assessment & Plan  Noted   EKG reviewed: atrial paced    Iron deficiency anemia  Assessment & Plan  Repeat hemoglobin had improved to 8.8 today, no signs or symptoms of bleeding  Iron panel consistent with iron deficiency, B12 at 460, folate at 4.1 and TSH WNL  Treated with IV iron transfusion for 3 days  Continue to monitor H&H, had not required blood transfusions    Mild dementia without behavioral disturbance, psychotic disturbance, mood disturbance, or anxiety (HCC)  Assessment & Plan  Pleasant, somewhat confused but redirectable  High risk for hospital acquired delirium, delirium precautions  PT and OT recommending rehab though patient is declining at this time preferring to go home    Essential hypertension  Assessment & Plan  Hypotensive   Home regimen: amlodipine 2.5mg daily, furosemide 20mg daily, telmisartan 20mg daily   Hold blood pressure medications given hypotension  Continue to monitor at this time    * Acute kidney injury superimposed on chronic kidney disease  (HCC)  Assessment & Plan  Lab Results   Component Value Date    EGFR 27 07/09/2024    EGFR 17 07/08/2024    EGFR 13 07/07/2024    CREATININE 1.57 (H) 07/09/2024    CREATININE 2.30 (H) 07/08/2024    CREATININE 2.88 (H) 07/07/2024     CKD in setting  of age, hypertensive nephropathy. JEN likely prerenal azotemia   Suspect JEN to be prerenal with reports of diarrhea, possible gastroenteritis  Baseline Cr: 1, Admit Cr: 3.09   Hold ACE/ARBs  Renal functions continue to improve, has been tolerating diet.  Will continue IV fluids till later this evening  Repeat BMP in a.m.             VTE Pharmacologic Prophylaxis:   Pharmacologic: Heparin  Mechanical VTE Prophylaxis in Place: Yes    Current Length of Stay: 2 day(s)    Current Patient Status: Inpatient   Certification Statement: The patient will continue to require additional inpatient hospital stay due to IV fluids, monitor renal functions    Discharge Plan: pending    Code Status: Level 3 - DNAR and DNI      Subjective:   No events overnight.  She reports she has been tolerating diet and been able to drink more frequently.  Denies any complaints of chest pain, shortness of breath, nausea or vomiting or diarrhea.  Called and updated son on phone.    Objective:     Vitals:   Temp (24hrs), Av.9 °F (36.6 °C), Min:97.4 °F (36.3 °C), Max:98.2 °F (36.8 °C)    Temp:  [97.4 °F (36.3 °C)-98.2 °F (36.8 °C)] 98.2 °F (36.8 °C)  HR:  [63-80] 80  Resp:  [18-20] 20  BP: (135-140)/(64-72) 135/67  SpO2:  [97 %-100 %] 100 %  Body mass index is 25.81 kg/m².     Input and Output Summary (last 24 hours):       Intake/Output Summary (Last 24 hours) at 2024 1241  Last data filed at 2024 1159  Gross per 24 hour   Intake 660 ml   Output 1725 ml   Net -1065 ml       Physical Exam:     Physical Exam  Vitals and nursing note reviewed.   HENT:      Head: Normocephalic.   Eyes:      Conjunctiva/sclera: Conjunctivae normal.   Cardiovascular:      Rate and Rhythm: Normal rate.   Pulmonary:      Effort: Pulmonary effort is normal.      Breath sounds: No wheezing.   Abdominal:      General: Bowel sounds are normal. There is no distension.      Palpations: Abdomen is soft.   Musculoskeletal:         General: No swelling.      Right lower  leg: No edema.      Left lower leg: No edema.   Skin:     General: Skin is warm.   Neurological:      General: No focal deficit present.      Mental Status: She is alert. Mental status is at baseline.         Additional Data:     Labs:    Results from last 7 days   Lab Units 07/09/24 0442 07/08/24 0446 07/07/24  0542   WBC Thousand/uL 4.30*   < > 9.42   HEMOGLOBIN g/dL 8.8*   < > 8.4*   HEMATOCRIT % 28.6*   < > 27.4*   PLATELETS Thousands/uL 121*   < > 94*   BANDS PCT %  --   --  1   LYMPHO PCT %  --   --  10*   MONO PCT %  --   --  12   EOS PCT %  --   --  0    < > = values in this interval not displayed.     Results from last 7 days   Lab Units 07/09/24 0442 07/07/24  0542 07/07/24  0111   SODIUM mmol/L 139   < > 135   POTASSIUM mmol/L 4.8   < > 4.6   CHLORIDE mmol/L 111*   < > 106   CO2 mmol/L 22   < > 20*   BUN mg/dL 27*   < > 41*   CREATININE mg/dL 1.57*   < > 3.09*   ANION GAP mmol/L 6   < > 9   CALCIUM mg/dL 10.2   < > 10.4*   ALBUMIN g/dL  --   --  4.0   TOTAL BILIRUBIN mg/dL  --   --  0.30   ALK PHOS U/L  --   --  70   ALT U/L  --   --  9   AST U/L  --   --  13   GLUCOSE RANDOM mg/dL 82   < > 109    < > = values in this interval not displayed.                           * I Have Reviewed All Lab Data Listed Above.  * Additional Pertinent Lab Tests Reviewed: All Labs For Current Hospital Admission Reviewed    Mobility:  Basic Mobility Inpatient Raw Score: 13  -[facility] Goal: 4: Move to chair/commode  -[facility] Achieved: 6: Walk 10 steps or more    Lines:     Invasive Devices       Peripheral Intravenous Line  Duration             Peripheral IV 07/07/24 Distal;Right;Ventral (anterior) Forearm 2 days    Peripheral IV 07/07/24 Left Antecubital 2 days                       Imaging:    Imaging Reports Reviewed Today Include:     X-ray chest 1 view portable    Result Date: 7/7/2024  Impression: Mild pulmonary venous congestion. Pulmonary artery enlargement compatible with history of pulmonary hypertension. Workstation  performed: [id number]     CT abdomen pelvis wo contrast    Result Date: 7/7/2024  Impression: 1.  Left parapelvic cysts. No hydronephrosis. 2.  Cholelithiasis without evidence of cholecystitis. 3.  Diverticulosis without evidence of diverticulitis. Workstation performed: [id number]        Recent Cultures (last 7 days):           Last 24 Hours Medication List:   Current Facility-Administered Medications   Medication Dose Route Frequency Provider Last Rate    acetaminophen  650 mg Oral Q6H PRN Jose J Soler PA-C      aluminum-magnesium hydroxide-simethicone  30 mL Oral Q6H PRN Jose J Soler PA-C      aspirin  81 mg Oral Daily Jose J Soler PA-C      [START ON 7/10/2024] cyanocobalamin  1,000 mcg Oral Daily Denzel Perez MD      [START ON 7/10/2024] folic acid  1 mg Oral Daily Denzel Perez MD      gabapentin  100 mg Oral TID Kem Salazar DO      heparin (porcine)  5,000 Units Subcutaneous Q12H [facility] Kem Salazar DO      iron sucrose  200 mg Intravenous Q24H Kem Salazar  mg (07/08/24 2040)    multi-electrolyte  75 mL/hr Intravenous Continuous Denzel Perez MD 75 mL/hr (07/09/24 0127)    ondansetron  4 mg Intravenous Q6H PRN Jose J Soler PA-C      pantoprazole  40 mg Oral Early Morning Denzel Perez MD      polyethylene glycol  17 g Oral Daily Jose J Soler PA-C      sodium chloride (PF)  3 mL Intravenous Q1H PRN Abhishek Moore DO          Today, Patient Was Seen By: Denzel Perez MD    ** Please Note: Dictation voice to text software may have been used in the creation of this document. **

## 2024-07-09 NOTE — ASSESSMENT & PLAN NOTE
Repeat hemoglobin had improved to 8.8 today, no signs or symptoms of bleeding  Iron panel consistent with iron deficiency, B12 at 460, folate at 4.1 and TSH WNL  Treated with IV iron transfusion for 3 days  Continue to monitor H&H, had not required blood transfusions

## 2024-07-09 NOTE — ASSESSMENT & PLAN NOTE
Pleasant, somewhat confused but redirectable  High risk for hospital acquired delirium, delirium precautions  PT and OT recommending rehab though patient is declining at this time preferring to go home

## 2024-07-09 NOTE — PLAN OF CARE
Problem: Potential for Falls  Goal: Patient will remain free of falls  Description: INTERVENTIONS:  - Educate patient/family on patient safety including physical limitations  - Instruct patient to call for assistance with activity   - Consult OT/PT to assist with strengthening/mobility   - Keep Call bell within reach  - Keep bed low and locked with side rails adjusted as appropriate  - Keep care items and personal belongings within reach  - Initiate and maintain comfort rounds  - Make Fall Risk Sign visible to staff  - Offer Toileting every 2 Hours, in advance of need  - Initiate/Maintain bed, chair alarm  - Obtain necessary fall risk management equipment: bed rails, bed alarms  - Apply yellow socks and bracelet for high fall risk patients  - Consider moving patient to room near nurses station  Outcome: Progressing     Problem: PAIN - ADULT  Goal: Verbalizes/displays adequate comfort level or baseline comfort level  Description: Interventions:  - Encourage patient to monitor pain and request assistance  - Assess pain using appropriate pain scale  - Administer analgesics based on type and severity of pain and evaluate response  - Implement non-pharmacological measures as appropriate and evaluate response  - Consider cultural and social influences on pain and pain management  - Notify physician/advanced practitioner if interventions unsuccessful or patient reports new pain  Outcome: Progressing     Problem: SAFETY ADULT  Goal: Maintain or return to baseline ADL function  Description: INTERVENTIONS:  -  Assess patient's ability to carry out ADLs; assess patient's baseline for ADL function and identify physical deficits which impact ability to perform ADLs (bathing, care of mouth/teeth, toileting, grooming, dressing, etc.)  - Assess/evaluate cause of self-care deficits   - Assess range of motion  - Assess patient's mobility; develop plan if impaired  - Assess patient's need for assistive devices and provide as  appropriate  - Encourage maximum independence but intervene and supervise when necessary  - Involve family in performance of ADLs  - Assess for home care needs following discharge   - Consider OT consult to assist with ADL evaluation and planning for discharge  - Provide patient education as appropriate  Outcome: Progressing     Problem: DISCHARGE PLANNING  Goal: Discharge to home or other facility with appropriate resources  Description: INTERVENTIONS:  - Identify barriers to discharge w/patient and caregiver  - Arrange for needed discharge resources and transportation as appropriate  - Identify discharge learning needs (meds, wound care, etc.)  - Arrange for interpretive services to assist at discharge as needed  - Refer to Case Management Department for coordinating discharge planning if the patient needs post-hospital services based on physician/advanced practitioner order or complex needs related to functional status, cognitive ability, or social support system  Outcome: Progressing     Problem: Knowledge Deficit  Goal: Patient/family/caregiver demonstrates understanding of disease process, treatment plan, medications, and discharge instructions  Description: Complete learning assessment and assess knowledge base.  Interventions:  - Provide teaching at level of understanding  - Provide teaching via preferred learning methods  Outcome: Progressing     Problem: CARDIOVASCULAR - ADULT  Goal: Maintains optimal cardiac output and hemodynamic stability  Description: INTERVENTIONS:  - Monitor I/O, vital signs and rhythm  - Monitor for S/S and trends of decreased cardiac output  - Administer and titrate ordered vasoactive medications to optimize hemodynamic stability  - Assess quality of pulses, skin color and temperature  - Assess for signs of decreased coronary artery perfusion  - Instruct patient to report change in severity of symptoms  Outcome: Progressing  Goal: Absence of cardiac dysrhythmias or at baseline  rhythm  Description: INTERVENTIONS:  - Continuous cardiac monitoring, vital signs, obtain 12 lead EKG if ordered  - Administer antiarrhythmic and heart rate control medications as ordered  - Monitor electrolytes and administer replacement therapy as ordered  Outcome: Progressing     Problem: METABOLIC, FLUID AND ELECTROLYTES - ADULT  Goal: Electrolytes maintained within normal limits  Description: INTERVENTIONS:  - Monitor labs and assess patient for signs and symptoms of electrolyte imbalances  - Administer electrolyte replacement as ordered  - Monitor response to electrolyte replacements, including repeat lab results as appropriate  - Instruct patient on fluid and nutrition as appropriate  Outcome: Progressing  Goal: Fluid balance maintained  Description: INTERVENTIONS:  - Monitor labs   - Monitor I/O and WT  - Instruct patient on fluid and nutrition as appropriate  - Assess for signs & symptoms of volume excess or deficit  Outcome: Progressing     Problem: SKIN/TISSUE INTEGRITY - ADULT  Goal: Skin Integrity remains intact(Skin Breakdown Prevention)  Description: Assess:  -Perform Erlin assessment every shift  -Clean and moisturize skin as needed  -Inspect skin when repositioning, toileting, and assisting with ADLS  -Assess under medical devices such as masimo every 2H  -Assess extremities for adequate circulation and sensation     Bed Management:  -Have minimal linens on bed & keep smooth, unwrinkled  -Change linens as needed when moist or perspiring  -Avoid sitting or lying in one position for more than 2 hours while in bed     Toileting:  -Offer bedside commode  -Assess for incontinence every 2H  -Use incontinent care products after each incontinent episode such as foam cleanser    Activity:  -Mobilize patient 2 times a day  -Encourage activity and walks on unit  -Encourage or provide ROM exercises   -Use appropriate equipment to lift or move patient in bed  -Instruct/ Assist with weight shifting every 15 min  when out of bed in chair  -Consider limitation of chair time to 2 hour intervals    Skin Care:  -Avoid use of baby powder, tape, friction and shearing, hot water or constrictive clothing  -Relieve pressure over bony prominences using mepilex foams  -Do not massage red bony areas    Next Steps:  -Teach patient strategies to minimize risks such as call, don't fall.   -Consider consults to  interdisciplinary teams such as PT/OT.  Outcome: Progressing     Problem: HEMATOLOGIC - ADULT  Goal: Maintains hematologic stability  Description: INTERVENTIONS  - Assess for signs and symptoms of bleeding or hemorrhage  - Monitor labs  - Administer supportive blood products/factors as ordered and appropriate  Outcome: Progressing     Problem: MUSCULOSKELETAL - ADULT  Goal: Maintain or return mobility to safest level of function  Description: INTERVENTIONS:  - Assess patient's ability to carry out ADLs; assess patient's baseline for ADL function and identify physical deficits which impact ability to perform ADLs (bathing, care of mouth/teeth, toileting, grooming, dressing, etc.)  - Assess/evaluate cause of self-care deficits   - Assess range of motion  - Assess patient's mobility  - Assess patient's need for assistive devices and provide as appropriate  - Encourage maximum independence but intervene and supervise when necessary  - Involve family in performance of ADLs  - Assess for home care needs following discharge   - Consider OT consult to assist with ADL evaluation and planning for discharge  - Provide patient education as appropriate  Outcome: Progre     Problem: Prexisting or High Potential for Compromised Skin Integrity  Goal: Skin integrity is maintained or improved  Description: INTERVENTIONS:  - Identify patients at risk for skin breakdown  - Assess and monitor skin integrity  - Assess and monitor nutrition and hydration status  - Monitor labs   - Assess for incontinence   - Turn and reposition patient  - Assist with  mobility/ambulation  - Relieve pressure over bony prominences  - Avoid friction and shearing  - Provide appropriate hygiene as needed including keeping skin clean and dry  - Evaluate need for skin moisturizer/barrier cream  - Collaborate with interdisciplinary team   - Patient/family teaching  - Consider wound care consult   Outcome: Progressing

## 2024-07-10 VITALS
HEIGHT: 62 IN | RESPIRATION RATE: 20 BRPM | HEART RATE: 87 BPM | WEIGHT: 141.54 LBS | TEMPERATURE: 98 F | DIASTOLIC BLOOD PRESSURE: 79 MMHG | BODY MASS INDEX: 26.05 KG/M2 | OXYGEN SATURATION: 95 % | SYSTOLIC BLOOD PRESSURE: 154 MMHG

## 2024-07-10 LAB
ANION GAP SERPL CALCULATED.3IONS-SCNC: 6 MMOL/L (ref 4–13)
BUN SERPL-MCNC: 21 MG/DL (ref 5–25)
CALCIUM SERPL-MCNC: 10.2 MG/DL (ref 8.4–10.2)
CHLORIDE SERPL-SCNC: 112 MMOL/L (ref 96–108)
CO2 SERPL-SCNC: 22 MMOL/L (ref 21–32)
CREAT SERPL-MCNC: 1.26 MG/DL (ref 0.6–1.3)
ERYTHROCYTE [DISTWIDTH] IN BLOOD BY AUTOMATED COUNT: 18.6 % (ref 11.6–15.1)
GFR SERPL CREATININE-BSD FRML MDRD: 36 ML/MIN/1.73SQ M
GLUCOSE SERPL-MCNC: 79 MG/DL (ref 65–140)
HCT VFR BLD AUTO: 27 % (ref 34.8–46.1)
HGB BLD-MCNC: 8.3 G/DL (ref 11.5–15.4)
MCH RBC QN AUTO: 26 PG (ref 26.8–34.3)
MCHC RBC AUTO-ENTMCNC: 30.7 G/DL (ref 31.4–37.4)
MCV RBC AUTO: 85 FL (ref 82–98)
PLATELET # BLD AUTO: 139 THOUSANDS/UL (ref 149–390)
PMV BLD AUTO: 13 FL (ref 8.9–12.7)
POTASSIUM SERPL-SCNC: 4.5 MMOL/L (ref 3.5–5.3)
RBC # BLD AUTO: 3.19 MILLION/UL (ref 3.81–5.12)
SODIUM SERPL-SCNC: 140 MMOL/L (ref 135–147)
WBC # BLD AUTO: 3.59 THOUSAND/UL (ref 4.31–10.16)

## 2024-07-10 PROCEDURE — 85027 COMPLETE CBC AUTOMATED: CPT | Performed by: INTERNAL MEDICINE

## 2024-07-10 PROCEDURE — 99239 HOSP IP/OBS DSCHRG MGMT >30: CPT | Performed by: STUDENT IN AN ORGANIZED HEALTH CARE EDUCATION/TRAINING PROGRAM

## 2024-07-10 PROCEDURE — 80048 BASIC METABOLIC PNL TOTAL CA: CPT | Performed by: INTERNAL MEDICINE

## 2024-07-10 RX ORDER — FOLIC ACID 1 MG/1
1 TABLET ORAL DAILY
Start: 2024-07-11

## 2024-07-10 RX ADMIN — ASPIRIN 81 MG: 81 TABLET, COATED ORAL at 09:11

## 2024-07-10 RX ADMIN — PANTOPRAZOLE SODIUM 40 MG: 40 TABLET, DELAYED RELEASE ORAL at 06:06

## 2024-07-10 RX ADMIN — CYANOCOBALAMIN TAB 500 MCG 1000 MCG: 500 TAB at 09:11

## 2024-07-10 RX ADMIN — HEPARIN SODIUM 5000 UNITS: 5000 INJECTION INTRAVENOUS; SUBCUTANEOUS at 09:11

## 2024-07-10 RX ADMIN — GABAPENTIN 100 MG: 100 CAPSULE ORAL at 09:11

## 2024-07-10 RX ADMIN — FOLIC ACID 1 MG: 1 TABLET ORAL at 09:11

## 2024-07-10 NOTE — ASSESSMENT & PLAN NOTE
Presents with dizziness, weakness. No known fevers, no overt bleeding described. Reports intermittent diarrhea. Completed outpatient ABX for UTI last week.   BUN 41, Cr 3. Hgb 11.5>8.7  Multifactorial: hypovolemia 2/2 poor PO intake/diarrhea. Occult bleeding. Occult infection  CT abdomen pelvis any acute pathology  Resolved with IV fluids  Continue home blood pressure medications on discharge

## 2024-07-10 NOTE — CASE MANAGEMENT
Case Management Discharge Planning Note    Patient name Tala Coyne  Location [facility] 2 /South 2 M* MRN [id number]  : 10/4/1928 Date 7/10/2024       Current Admission Date: 2024  Current Admission Diagnosis:Acute kidney injury superimposed on chronic kidney disease  (HCC)   Patient Active Problem List    Diagnosis Date Noted Date Diagnosed    Hypotension due to hypovolemia 2024     Platelets decreased ([facility]) 2023     Acute kidney injury superimposed on chronic kidney disease  ([facility]ly [facility]) 10/20/2021     Sick sinus syndrome s/p PPM 10/20/2020     Status post placement of cardiac pacemaker 04/10/2020     Nonrheumatic aortic valve stenosis 2020     Mild pulmonary hypertension ([facility]) 2020     Seizure disorder ([facility]) 2019     Iron deficiency anemia 10/17/2019     Mild dementia without behavioral disturbance, psychotic disturbance, mood disturbance, or anxiety ([facility]) 2019     Murmur, cardiac 2019     Depression 2018     Mixed hyperlipidemia 2015     Essential hypertension 2015     Esophageal reflux 2015     Peripheral neuropathy 2015     Vitamin D deficiency 2015       LOS (days): 3  Geometric Mean LOS (GMLOS) (days): 2.7  Days to GMLOS:-0.5     OBJECTIVE:  Risk of Unplanned Readmission Score: 18.52         Current admission status: Inpatient   Preferred Pharmacy:   [id number] - [address] [address]  Phone: [phone] Fax: [phone]    Primary Care Provider: Ni Fisher DO    Primary Insurance: MEDICARE  Secondary Insurance: BLUE CROSS    DISCHARGE DETAILS:                           Contacts  Patient Contacts: Kunal Coyne, son  Relationship to Patient:: Family  Contact Method: Phone  Phone Number: [phone]  Reason/Outcome: Discharge Planning (informed of  time)                              Transport at  Discharge : Wheelchair van  Dispatcher Contacted: Yes  Number/Name of Dispatcher: Roundtrip  Transported by (Company and Unit #): [employer]  ETA of Transport (Date): 07/10/24  ETA of Transport (Time): 1100                            Accepting Facility Name, City & State : [facility]  Receiving Facility/Agency Phone Number: [phone]  Facility/Agency Fax Number: [phone]

## 2024-07-10 NOTE — DISCHARGE SUMMARY
[facility]  Discharge- Tala Coyne 10/4/1928, 89 y.o. female MRN: [id number]  Unit/Bed#: [place] [address] -01 Encounter: [id number]  Primary Care Provider: Ni Fisher DO   Date and time admitted to hospital: 7/7/2024  1:04 AM    Hypotension due to hypovolemia  Assessment & Plan  Presents with dizziness, weakness. No known fevers, no overt bleeding described. Reports intermittent diarrhea. Completed outpatient ABX for UTI last week.   BUN 41, Cr 3. Hgb 11.5>8.7  Multifactorial: hypovolemia 2/2 poor PO intake/diarrhea. Occult bleeding. Occult infection  CT abdomen pelvis any acute pathology  Resolved with IV fluids  Continue home blood pressure medications on discharge    Sick sinus syndrome s/p PPM  Assessment & Plan  Noted   EKG reviewed: atrial paced    Iron deficiency anemia  Assessment & Plan  Repeat hemoglobin had improved to 8.8 today, no signs or symptoms of bleeding  Iron panel consistent with iron deficiency, B12 at 460, folate at 4.1 and TSH WNL  Treated with IV iron transfusion for 3 days, recommend iron p.o. once daily  No evidence of bleeding, H&H has been stable    Mild dementia without behavioral disturbance, psychotic disturbance, mood disturbance, or anxiety ([facility])  Assessment & Plan  Pleasant, somewhat confused but redirectable  High risk for hospital acquired delirium, delirium precautions  Discharge to short-term rehab at [facility]    Essential hypertension  Assessment & Plan  Hypotensive on admission, held with blood pressure improved  Continue home medications on discharge  Home regimen: amlodipine 2.5mg daily, furosemide 20mg daily, telmisartan 20mg daily     * Acute kidney injury superimposed on chronic kidney disease  (HCC)  Assessment & Plan  Lab Results   Component Value Date    EGFR 36 07/10/2024    EGFR 27 07/09/2024    EGFR 17 07/08/2024    CREATININE 1.26 07/10/2024    CREATININE 1.57 (H) 07/09/2024    CREATININE 2.30 (H) 07/08/2024     CKD in setting of  age, hypertensive nephropathy. JEN likely prerenal azotemia   Suspect JEN to be prerenal with reports of diarrhea, possible gastroenteritis  Baseline Cr: 1, Admit Cr: 3.09   Hold ACE/ARBs  Resolved, renal functions improved back to baseline        Discharging Physician / Practitioner: Denzel Perez MD  PCP: Ni Fisher DO  Admission Date:   Admission Orders (From admission, onward)       Ordered        07/07/24 0253  INPATIENT ADMISSION  Once                          Discharge Date: 07/10/24    Medical Problems       Resolved Problems  Date Reviewed: 7/10/2024   None         Consultations During Hospital Stay:  PT/OT    Procedures Performed:   none    Significant Findings / Test Results:   X-ray chest 1 view portable    Result Date: 7/7/2024  Impression: Mild pulmonary venous congestion. Pulmonary artery enlargement compatible with history of pulmonary hypertension. Workstation performed: [id number]     CT abdomen pelvis wo contrast    Result Date: 7/7/2024  Impression: 1.  Left parapelvic cysts. No hydronephrosis. 2.  Cholelithiasis without evidence of cholecystitis. 3.  Diverticulosis without evidence of diverticulitis. Workstation performed: [id number]        Incidental Findings:   none     Test Results Pending at Discharge (will require follow up):   none     Outpatient Tests Requested:  Recommend CBC and BMP in 1 to 2 weeks outpatient    Complications:  none    Reason for Admission: Diarrhea, JEN    Hospital Course:     Tala Coyne is a 89 y.o. female patient who originally presented to the hospital on 7/7/2024 due to JEN.  Recently had a gastroenteritis and urinary tract infection.  She completed antibiotics with Bactrim.  Develop worsening abdominal pain and frequent diarrhea.  Diarrhea have resolved on admission, but she did have significant JEN treated with IV fluids suspected due to GI losses.  Her home ACE/ARB's were held, her renal functions did improve.  She was also hypotensive on admission  "and likely multifactorial with significant anemia and GI losses.  Her blood pressure improved and home medications resumed on discharge.  She also had significant anemia with hemoglobin as low as 7.9 with no evidence of bleeding.  Her hemoglobin on discharge was noted to be 8.3.  Iron panel suggestive of iron deficiency anemia and she was treated with IV iron transfusion for 3 days.  Given additional B12 and folate supplementation.  Baseline hemoglobin had in the past noted to be around 12.     Please see above list of diagnoses and related plan for additional information.     Condition at Discharge: fair     Discharge Day Visit / Exam:     Subjective:    No events overnight. Denies any complaints.  Discussed discharge and ready for rehab later today.  Vitals: Blood Pressure: 154/79 (07/10/24 0734)  Pulse: 87 (07/10/24 0734)  Temperature: 98 °F (36.7 °C) (07/10/24 0734)  Temp Source: Oral (07/10/24 0734)  Respirations: 20 (07/10/24 0734)  Height: 5' 2\" (157.5 cm) (07/07/24 0300)  Weight - Scale: 64.2 kg (141 lb 8.6 oz) (07/10/24 0556)  SpO2: 95 % (07/10/24 0734)  Exam:   Physical Exam  Vitals and nursing note reviewed.   HENT:      Head: Normocephalic.   Eyes:      Conjunctiva/sclera: Conjunctivae normal.   Cardiovascular:      Rate and Rhythm: Normal rate.   Pulmonary:      Effort: Pulmonary effort is normal.      Breath sounds: No wheezing.   Abdominal:      General: Bowel sounds are normal. There is no distension.      Palpations: Abdomen is soft.   Musculoskeletal:         General: No swelling.      Right lower leg: No edema.      Left lower leg: No edema.   Skin:     General: Skin is warm.   Neurological:      General: No focal deficit present.      Mental Status: She is alert. Mental status is at baseline.       Discharge instructions/Information to patient and family:   See after visit summary for information provided to patient and family.      Provisions for Follow-Up Care:  See after visit summary for " information related to follow-up care and any pertinent home health orders.      Disposition:     Acute Rehab at [facility]     Discharge Statement:  I spent 40 minutes discharging the patient. This time was spent on the day of discharge. I had direct contact with the patient on the day of discharge. Greater than 50% of the total time was spent examining patient, answering all patient questions, arranging and discussing plan of care with patient as well as directly providing post-discharge instructions.  Additional time then spent on discharge activities.    Discharge Medications:  See after visit summary for reconciled discharge medications provided to patient and family.      ** Please Note: This note has been constructed using a voice recognition system **

## 2024-07-10 NOTE — ASSESSMENT & PLAN NOTE
Hypotensive on admission, held with blood pressure improved  Continue home medications on discharge  Home regimen: amlodipine 2.5mg daily, furosemide 20mg daily, telmisartan 20mg daily

## 2024-07-10 NOTE — ASSESSMENT & PLAN NOTE
Repeat hemoglobin had improved to 8.8 today, no signs or symptoms of bleeding  Iron panel consistent with iron deficiency, B12 at 460, folate at 4.1 and TSH WNL  Treated with IV iron transfusion for 3 days, recommend iron p.o. once daily  No evidence of bleeding, H&H has been stable

## 2024-07-10 NOTE — RESTORATIVE TECHNICIAN NOTE
Restorative Technician Note      Patient Name: Tala Coyne     Restorative Tech Visit Date: 07/10/24  Note Type: Mobility  Patient Position Upon Consult: Supine  Activity Performed: Range of motion; Stood; Transferred  Assistive Device: Roller walker  Patient Position at End of Consult: All needs within reach; Bedside chair

## 2024-07-10 NOTE — ASSESSMENT & PLAN NOTE
Lab Results   Component Value Date    EGFR 36 07/10/2024    EGFR 27 07/09/2024    EGFR 17 07/08/2024    CREATININE 1.26 07/10/2024    CREATININE 1.57 (H) 07/09/2024    CREATININE 2.30 (H) 07/08/2024     CKD in setting of age, hypertensive nephropathy. JEN likely prerenal azotemia   Suspect JEN to be prerenal with reports of diarrhea, possible gastroenteritis  Baseline Cr: 1, Admit Cr: 3.09   Hold ACE/ARBs  Resolved, renal functions improved back to baseline

## 2024-07-10 NOTE — ASSESSMENT & PLAN NOTE
Pleasant, somewhat confused but redirectable  High risk for hospital acquired delirium, delirium precautions  Discharge to short-term rehab at [facility]

## 2024-07-11 ENCOUNTER — PATIENT OUTREACH (OUTPATIENT)
Dept: CASE MANAGEMENT | Facility: OTHER | Age: 89
End: 2024-07-11

## 2024-07-11 NOTE — PROGRESS NOTES
Outpatient Care Management JEN/SNF Pathway. Discharged 7/10/24 to [facility]. Email sent to facility to inform them the patient is on the JEN Pathway and I will be following them during their skilled stay.  This Admin Coordinator will continue to monitor via chart review.

## 2024-07-18 ENCOUNTER — PATIENT OUTREACH (OUTPATIENT)
Dept: CASE MANAGEMENT | Facility: OTHER | Age: 89
End: 2024-07-18

## 2024-07-19 ENCOUNTER — PATIENT OUTREACH (OUTPATIENT)
Dept: CASE MANAGEMENT | Facility: OTHER | Age: 89
End: 2024-07-19

## 2024-07-19 ENCOUNTER — TELEPHONE (OUTPATIENT)
Dept: FAMILY MEDICINE CLINIC | Facility: CLINIC | Age: 89
End: 2024-07-19

## 2024-07-19 ENCOUNTER — PATIENT OUTREACH (OUTPATIENT)
Dept: FAMILY MEDICINE CLINIC | Facility: CLINIC | Age: 89
End: 2024-07-19

## 2024-07-19 DIAGNOSIS — Z71.89 COMPLEX CARE COORDINATION: Primary | ICD-10-CM

## 2024-07-19 NOTE — TELEPHONE ENCOUNTER
----- Message from Nicki MANCILLA sent at 7/19/2024  1:03 PM EDT -----  Regarding: TCM visit  Patient needing TCM follow-up:      Patient discharged from [facility] to [facility] on 7/10/24 with primary diagnosis of Acute kidney injury superimposed on chronic kidney disease.    Please discharged from facility on 7/18/24 to home.    Please call patient to set up a TCM visit if you have not already done so.    Thank you.

## 2024-07-19 NOTE — PROGRESS NOTES
PCC email alert received the patient left AMA 7/18/24 from [facility].I have removed myself off of the care team and sent an inbasket to the appropriate care  to notifying them of the SNF discharge and JEN/SNF Pathway. .  Ambulatory referral placed for complex care management.     .

## 2024-07-19 NOTE — PROGRESS NOTES
[facility] facility referral received on pt admitted to [facility] 7/7-7/10 for generalized weakness,JEN, UTI. Pt discharged to [facility] for STR but signed out AMA on 7/18.

## 2024-07-22 ENCOUNTER — PATIENT OUTREACH (OUTPATIENT)
Dept: CASE MANAGEMENT | Facility: OTHER | Age: 89
End: 2024-07-22

## 2024-07-22 DIAGNOSIS — G60.9 IDIOPATHIC PERIPHERAL NEUROPATHY: ICD-10-CM

## 2024-07-22 DIAGNOSIS — F32.A DEPRESSION, UNSPECIFIED DEPRESSION TYPE: ICD-10-CM

## 2024-07-22 RX ORDER — DOXEPIN HYDROCHLORIDE 50 MG/1
50 CAPSULE ORAL
Qty: 90 CAPSULE | Refills: 0 | Status: SHIPPED | OUTPATIENT
Start: 2024-07-22

## 2024-07-22 RX ORDER — GABAPENTIN 300 MG/1
300 CAPSULE ORAL 3 TIMES DAILY
Qty: 300 CAPSULE | Refills: 1 | Status: SHIPPED | OUTPATIENT
Start: 2024-07-22

## 2024-07-22 NOTE — PROGRESS NOTES
Called preferred number listed and it is pt's daughter, who is not listed as a contact. She suggested that call be placed to pt at the number of [phone]. Called number and son Kunal picked up call. He was visiting with pt. He states that the pt and family all decided to have pt leave [facility] due to poor care and bad tasting meals. Kunal reports that things have not been easy as pt also had an incident when hospitalized and he since had reported it to the supervisor but has not yet heard back with a response.  Explained CM's role and reason for calls. Pt has family that stops in every day, alternating and also brings food to pt.They also assist with cleaning, groceries and dr appts. Pt has an electric scooter that she gets around with. Discussed with pts son that pt should avoid NSAIDS and use tylenol for any pain or discomfort.He states that she seems to be doing better than before she was admitted to the hospital.  Will follow up with pt next week to assess how she is doing. If pt does not answer, son states that he can be called.

## 2024-07-30 ENCOUNTER — PATIENT OUTREACH (OUTPATIENT)
Dept: CASE MANAGEMENT | Facility: OTHER | Age: 89
End: 2024-07-30

## 2024-07-30 NOTE — PROGRESS NOTES
Inbasket reminder for follow up call to discuss if there are any new developments regarding pt. Called pt's number and left message.   Then called son, Kunal and also left message on his voicemail requesting return call back. Will make another attempt to contact this week.

## 2024-08-01 ENCOUNTER — PATIENT OUTREACH (OUTPATIENT)
Dept: CASE MANAGEMENT | Facility: OTHER | Age: 89
End: 2024-08-01

## 2024-08-01 NOTE — PROGRESS NOTES
"Called and spoke with pt who sounds a little congested, but states that she feels good. \"I think I have a cold\". Pt reports that she has an occasional cough, a lot of nose blowing.Her mucous is clear.She denies shortness of breath. Pt states that she 'actually did a load of laundry this morning' . Patient also reports that her son has Covid and did have a concern about pt being positive- she has been tested 3x by her son, both wearing masks in each others presence. All tests have been negative.  Pt reports a good appetite. Her daughter, who lives in [place] visits every 2 weeks and organizes pill boxes, grocery shops and meal preps for her.   Encouraged pt to drink fluids, which she states that has been. Will follow up next week.Pt has TCM appt on 8/7.   "

## 2024-08-05 ENCOUNTER — PATIENT OUTREACH (OUTPATIENT)
Dept: CASE MANAGEMENT | Facility: OTHER | Age: 89
End: 2024-08-05

## 2024-08-05 NOTE — PROGRESS NOTES
Inbasket reminder to follow up with pt regarding cough and nasal congestion from last week. Called and left message requesting return call back.

## 2024-08-07 ENCOUNTER — OFFICE VISIT (OUTPATIENT)
Dept: FAMILY MEDICINE CLINIC | Facility: CLINIC | Age: 89
End: 2024-08-07
Payer: MEDICARE

## 2024-08-07 DIAGNOSIS — F03.A0 MILD DEMENTIA WITHOUT BEHAVIORAL DISTURBANCE, PSYCHOTIC DISTURBANCE, MOOD DISTURBANCE, OR ANXIETY, UNSPECIFIED DEMENTIA TYPE (HCC): ICD-10-CM

## 2024-08-07 DIAGNOSIS — N17.9 AKI (ACUTE KIDNEY INJURY) (HCC): ICD-10-CM

## 2024-08-07 DIAGNOSIS — I49.5 SICK SINUS SYNDROME (HCC): ICD-10-CM

## 2024-08-07 DIAGNOSIS — I35.0 NONRHEUMATIC AORTIC VALVE STENOSIS: ICD-10-CM

## 2024-08-07 DIAGNOSIS — E86.1 HYPOTENSION DUE TO HYPOVOLEMIA: Primary | ICD-10-CM

## 2024-08-07 DIAGNOSIS — D50.9 IRON DEFICIENCY ANEMIA, UNSPECIFIED IRON DEFICIENCY ANEMIA TYPE: ICD-10-CM

## 2024-08-07 DIAGNOSIS — I10 ESSENTIAL HYPERTENSION: ICD-10-CM

## 2024-08-07 PROCEDURE — 99496 TRANSJ CARE MGMT HIGH F2F 7D: CPT | Performed by: FAMILY MEDICINE

## 2024-08-09 ENCOUNTER — PATIENT OUTREACH (OUTPATIENT)
Dept: CASE MANAGEMENT | Facility: OTHER | Age: 89
End: 2024-08-09

## 2024-08-09 NOTE — LETTER
Date: 08/09/24    Dear Tala Coyne,   My name is Reyna; I am a registered nurse care manager working with [facility]  I have not been able to reach you by phone and would like to discuss any concerns you have about your health conditions.  My work is to help patients that have complex medical conditions understand and better manage their health.  This includes patients who may have been in the hospital or emergency room.    My contact information is enclosed below.  Please call me if with any questions you may have or if you believe this would be something helpful to you.  I look forward to speaking with you.    Sincerely,  Reyna Wooten RN  [phone]  Outpatient Care Manager

## 2024-08-09 NOTE — PROGRESS NOTES
Second call attempt  with message left in contacting pt for care management. Pt did have PCP f/u visit on 8/7 with Dr Fisher. Will send unable to reach letter to pt's My Chart and close in 1 week if no response.   Also sending inbasket to provider to inquire about if BMP needed repeat.

## 2024-08-14 ENCOUNTER — PATIENT OUTREACH (OUTPATIENT)
Dept: CASE MANAGEMENT | Facility: OTHER | Age: 89
End: 2024-08-14

## 2024-08-14 VITALS
DIASTOLIC BLOOD PRESSURE: 78 MMHG | HEIGHT: 62 IN | SYSTOLIC BLOOD PRESSURE: 128 MMHG | TEMPERATURE: 97.2 F | RESPIRATION RATE: 16 BRPM | OXYGEN SATURATION: 98 % | BODY MASS INDEX: 24.71 KG/M2 | HEART RATE: 79 BPM | WEIGHT: 134.3 LBS

## 2024-08-14 NOTE — ASSESSMENT & PLAN NOTE
Aortic valve sclerosis with focal calcification. Suspected moderate aortic valve stenosis. Peak transaortic velocity is 2.3 m/sec, mean gradient is 14 mmHg, calculated aortic valve area is 1.1-1.3 cm2. No significant aortic valve regurgitation noted. Doppler velocity index is 0.38

## 2024-08-14 NOTE — PROGRESS NOTES
Inelyssasket message from Dr Fisher that pt would benefit from lab draw if in home. Did respond regarding [facility] mobile labs. Called and left message on pts phone to provide contact number as well as informed that letter of unable to reach was  sent to her My Chart. JEN pathway until 8/18.If no response will close out then.

## 2024-08-15 ENCOUNTER — PATIENT OUTREACH (OUTPATIENT)
Dept: CASE MANAGEMENT | Facility: OTHER | Age: 89
End: 2024-08-15

## 2024-08-15 DIAGNOSIS — I10 ESSENTIAL HYPERTENSION: ICD-10-CM

## 2024-08-15 RX ORDER — AMLODIPINE BESYLATE 2.5 MG/1
2.5 TABLET ORAL 2 TIMES DAILY
Qty: 180 TABLET | Refills: 3 | Status: SHIPPED | OUTPATIENT
Start: 2024-08-15

## 2024-08-15 NOTE — PROGRESS NOTES
Pt returned call and stated that she will have her daughter call for a mobile lab draw since there are labs ordered. Provided the number [phone] option 2 for labs.

## 2024-08-15 NOTE — PROGRESS NOTES
Inbasket reminder to call pt regarding mobile labs requesting return call back to discuss if pt has planned lab draw. No response from messages left.Will check back on Monday and close if not heard back.

## 2024-08-16 ENCOUNTER — TELEPHONE (OUTPATIENT)
Dept: LAB | Facility: HOSPITAL | Age: 89
End: 2024-08-16

## 2024-08-20 DIAGNOSIS — K21.9 GASTROESOPHAGEAL REFLUX DISEASE WITHOUT ESOPHAGITIS: ICD-10-CM

## 2024-08-23 ENCOUNTER — APPOINTMENT (OUTPATIENT)
Dept: LAB | Facility: HOSPITAL | Age: 89
End: 2024-08-23
Payer: MEDICARE

## 2024-08-23 DIAGNOSIS — E86.1 HYPOTENSION DUE TO HYPOVOLEMIA: ICD-10-CM

## 2024-08-23 DIAGNOSIS — N17.9 AKI (ACUTE KIDNEY INJURY) (HCC): ICD-10-CM

## 2024-08-23 DIAGNOSIS — D50.9 IRON DEFICIENCY ANEMIA, UNSPECIFIED IRON DEFICIENCY ANEMIA TYPE: ICD-10-CM

## 2024-08-23 DIAGNOSIS — I10 ESSENTIAL HYPERTENSION: ICD-10-CM

## 2024-08-23 LAB
ALBUMIN SERPL BCG-MCNC: 4.6 G/DL (ref 3.5–5)
ALP SERPL-CCNC: 75 U/L (ref 34–104)
ALT SERPL W P-5'-P-CCNC: 12 U/L (ref 7–52)
ANION GAP SERPL CALCULATED.3IONS-SCNC: 9 MMOL/L (ref 4–13)
AST SERPL W P-5'-P-CCNC: 19 U/L (ref 13–39)
BILIRUB SERPL-MCNC: 0.42 MG/DL (ref 0.2–1)
BUN SERPL-MCNC: 22 MG/DL (ref 5–25)
CALCIUM SERPL-MCNC: 11 MG/DL (ref 8.4–10.2)
CHLORIDE SERPL-SCNC: 105 MMOL/L (ref 96–108)
CO2 SERPL-SCNC: 26 MMOL/L (ref 21–32)
CREAT SERPL-MCNC: 1.12 MG/DL (ref 0.6–1.3)
ERYTHROCYTE [DISTWIDTH] IN BLOOD BY AUTOMATED COUNT: 17.9 % (ref 11.6–15.1)
GFR SERPL CREATININE-BSD FRML MDRD: 41 ML/MIN/1.73SQ M
GLUCOSE SERPL-MCNC: 76 MG/DL (ref 65–140)
HCT VFR BLD AUTO: 36.7 % (ref 34.8–46.1)
HGB BLD-MCNC: 11 G/DL (ref 11.5–15.4)
MCH RBC QN AUTO: 27 PG (ref 26.8–34.3)
MCHC RBC AUTO-ENTMCNC: 30 G/DL (ref 31.4–37.4)
MCV RBC AUTO: 90 FL (ref 82–98)
PLATELET # BLD AUTO: 165 THOUSANDS/UL (ref 149–390)
PMV BLD AUTO: 12.9 FL (ref 8.9–12.7)
POTASSIUM SERPL-SCNC: 4.1 MMOL/L (ref 3.5–5.3)
PROT SERPL-MCNC: 7.2 G/DL (ref 6.4–8.4)
RBC # BLD AUTO: 4.07 MILLION/UL (ref 3.81–5.12)
SODIUM SERPL-SCNC: 140 MMOL/L (ref 135–147)
WBC # BLD AUTO: 3.33 THOUSAND/UL (ref 4.31–10.16)

## 2024-08-23 PROCEDURE — 36415 COLL VENOUS BLD VENIPUNCTURE: CPT

## 2024-08-23 PROCEDURE — 85027 COMPLETE CBC AUTOMATED: CPT

## 2024-08-23 PROCEDURE — 80053 COMPREHEN METABOLIC PANEL: CPT

## 2024-08-26 ENCOUNTER — PATIENT OUTREACH (OUTPATIENT)
Dept: CASE MANAGEMENT | Facility: OTHER | Age: 89
End: 2024-08-26

## 2024-08-26 NOTE — PROGRESS NOTES
Incoming inbasket message o check on lab results and close out of JEN pathway if normal. Pts renal studies were WNL and pathway ended.Will remove self from care team.

## 2024-09-15 DIAGNOSIS — I10 BENIGN ESSENTIAL HYPERTENSION: ICD-10-CM

## 2024-09-17 RX ORDER — TELMISARTAN 20 MG/1
20 TABLET ORAL DAILY
Qty: 90 TABLET | Refills: 1 | Status: SHIPPED | OUTPATIENT
Start: 2024-09-17

## 2024-09-26 ENCOUNTER — REMOTE DEVICE CLINIC VISIT (OUTPATIENT)
Dept: CARDIOLOGY CLINIC | Facility: CLINIC | Age: 89
End: 2024-09-26
Payer: MEDICARE

## 2024-09-26 DIAGNOSIS — Z95.0 CARDIAC PACEMAKER IN SITU: Primary | ICD-10-CM

## 2024-09-26 PROCEDURE — 93296 REM INTERROG EVL PM/IDS: CPT | Performed by: INTERNAL MEDICINE

## 2024-09-26 PROCEDURE — 93294 REM INTERROG EVL PM/LDLS PM: CPT | Performed by: INTERNAL MEDICINE

## 2024-09-26 NOTE — PROGRESS NOTES
Results for orders placed or performed in visit on 09/26/24   Cardiac EP device report    Narrative    MDT DUAL PPM/ ACTIVE SYSTEM IS MRI CONDITIONAL  CARELINK TRANSMISSION: BATTERY VOLTAGE ADEQUATE (8.- YRS). AP-41%, >99% (DEPENDENT/AVB). ALL AVAILABLE LEAD PARAMETERS WITHIN NORMAL LIMITS. NO SIGNIFICANT HIGH RATE EPISODES. NORMAL DEVICE FUNCTION. GV

## 2024-10-21 DIAGNOSIS — F32.A DEPRESSION, UNSPECIFIED DEPRESSION TYPE: ICD-10-CM

## 2024-10-22 RX ORDER — DOXEPIN HYDROCHLORIDE 50 MG/1
50 CAPSULE ORAL
Qty: 90 CAPSULE | Refills: 0 | Status: SHIPPED | OUTPATIENT
Start: 2024-10-22

## 2024-11-12 ENCOUNTER — TELEPHONE (OUTPATIENT)
Age: 89
End: 2024-11-12

## 2024-11-12 NOTE — TELEPHONE ENCOUNTER
Patients daughter cancelled AWV for 11/13/24 due to lack of transportation via MyChart. Patient rescheduled for May, 2025.

## 2024-11-28 ENCOUNTER — RESULTS FOLLOW-UP (OUTPATIENT)
Dept: CARDIOLOGY CLINIC | Facility: CLINIC | Age: 89
End: 2024-11-28

## 2024-12-27 ENCOUNTER — REMOTE DEVICE CLINIC VISIT (OUTPATIENT)
Dept: CARDIOLOGY CLINIC | Facility: CLINIC | Age: 89
End: 2024-12-27
Payer: MEDICARE

## 2024-12-27 DIAGNOSIS — Z95.0 PRESENCE OF CARDIAC PACEMAKER: ICD-10-CM

## 2024-12-27 DIAGNOSIS — I49.5 SICK SINUS SYNDROME (HCC): Primary | ICD-10-CM

## 2024-12-27 PROCEDURE — 93296 REM INTERROG EVL PM/IDS: CPT | Performed by: INTERNAL MEDICINE

## 2024-12-27 PROCEDURE — 93294 REM INTERROG EVL PM/LDLS PM: CPT | Performed by: INTERNAL MEDICINE

## 2024-12-27 NOTE — PROGRESS NOTES
MDT DUAL PPM/ ACTIVE SYSTEM IS MRI CONDITIONAL   CARELINK TRANSMISSION:  BATTERY VOLTAGE ADEQUATE (7.8 YRS).    AP 47.4%   99.7%.  (>40% 2ND* AVB/DDD 60 BPM).   ALL AVAILABLE LEAD PARAMETERS WITHIN NORMAL LIMITS.  NO SIGNIFICANT HIGH RATE EPISODES.  PT TAKES ASA, NO BBLOCKERS.  EF 55% (ECHO 10/6/2022.  TIME IN AT/AF <0.1%.   NORMAL DEVICE FUNCTION.  PAS

## 2025-02-10 ENCOUNTER — RESULTS FOLLOW-UP (OUTPATIENT)
Dept: CARDIOLOGY CLINIC | Facility: CLINIC | Age: OVER 89
End: 2025-02-10

## 2025-02-13 DIAGNOSIS — K21.9 GASTROESOPHAGEAL REFLUX DISEASE WITHOUT ESOPHAGITIS: ICD-10-CM

## 2025-02-13 DIAGNOSIS — G60.9 IDIOPATHIC PERIPHERAL NEUROPATHY: ICD-10-CM

## 2025-02-13 DIAGNOSIS — F32.A DEPRESSION, UNSPECIFIED DEPRESSION TYPE: ICD-10-CM

## 2025-02-13 RX ORDER — GABAPENTIN 300 MG/1
300 CAPSULE ORAL 3 TIMES DAILY
Qty: 300 CAPSULE | Refills: 1 | Status: SHIPPED | OUTPATIENT
Start: 2025-02-13

## 2025-02-13 RX ORDER — DOXEPIN HYDROCHLORIDE 50 MG/1
50 CAPSULE ORAL
Qty: 90 CAPSULE | Refills: 0 | Status: SHIPPED | OUTPATIENT
Start: 2025-02-13

## 2025-02-13 NOTE — TELEPHONE ENCOUNTER
Requested medication(s) are due for refill today: Yes  Patient has already received a courtesy refill: No  Other reason request has been forwarded to provider: Dr Ybarra is off until Monday

## 2025-03-11 DIAGNOSIS — I10 BENIGN ESSENTIAL HYPERTENSION: ICD-10-CM

## 2025-03-11 RX ORDER — TELMISARTAN 20 MG/1
20 TABLET ORAL DAILY
Qty: 90 TABLET | Refills: 1 | Status: SHIPPED | OUTPATIENT
Start: 2025-03-11

## 2025-03-11 NOTE — TELEPHONE ENCOUNTER
Requested medication(s) are due for refill today: Yes  **If antibiotic or given during sick visit, contact patient to discuss current symptoms.     LOV:  08/07/24  **If longer then 1 year, contact patient to schedule annual PRIOR to refilling. Once scheduled, adjust refill for 30 days, no refills.    NOV:  05/14/25    Is patient due for annual visit: Yes, describe: Next visit  **If future appointment, adjust to annual/follow up.  ** No appointment call to schedule annual/follow up.    Route to PCP, unless PCP no longer here, then physician they are seeing next.

## 2025-03-14 ENCOUNTER — TELEPHONE (OUTPATIENT)
Age: OVER 89
End: 2025-03-14

## 2025-03-14 DIAGNOSIS — R39.9 UTI SYMPTOMS: Primary | ICD-10-CM

## 2025-03-14 NOTE — TELEPHONE ENCOUNTER
Patient states she has a bladder infection burning with urination. Couple of weeks, comes and goes, worse recently. No way of getting into the office, has no ride. Asking if pcp would be willing to prescribe something for her, sent to [facility] pharmacy. Please contact patient back with an update, thank you.

## 2025-03-14 NOTE — TELEPHONE ENCOUNTER
Patient's daughter call back. They will be taking a sample to the closest lab. Can you please place order

## 2025-03-14 NOTE — TELEPHONE ENCOUNTER
Patient's daughter Anai, calling back regarding status of request. Warm transfer to [facility]. Anai advised it's okay to place urine in sterilized pill bottle and drop off at lab; an order will be put in. Anai will have brother drop it off.

## 2025-03-14 NOTE — TELEPHONE ENCOUNTER
I called and spoke with the patient's daughter and made her aware we do need to have the patient's urine tested, she is in [place] and will not be back with the patient until Wednesday. She will call and see if her brother can get a urine specimen to the lab and call us back so we can place appropriate orders.

## 2025-03-14 NOTE — TELEPHONE ENCOUNTER
Patient daughter, Anai, calling back to ask if patient, Tala, can place urine sample in a sterilized pill bottle since she cannot come to the office? Unable to warm transfer to office clinical. Daughter states she needs patient's urine sample to get to the lab by 3:00 PM today 3/14/25. Please reach out to daughter at [phone].

## 2025-03-17 NOTE — TELEPHONE ENCOUNTER
Dghtr called and stated that the urine sample will be dropped off today.  She is hopeful the results will be back on Wednesday, as that is when she is in from [place] to pick the script up for the patient.

## 2025-03-18 ENCOUNTER — APPOINTMENT (OUTPATIENT)
Dept: LAB | Facility: CLINIC | Age: OVER 89
End: 2025-03-18
Payer: MEDICARE

## 2025-03-18 DIAGNOSIS — R39.9 UTI SYMPTOMS: Primary | ICD-10-CM

## 2025-03-18 LAB
BACTERIA UR QL AUTO: ABNORMAL /HPF
BILIRUB UR QL STRIP: NEGATIVE
CLARITY UR: ABNORMAL
COLOR UR: YELLOW
GLUCOSE UR STRIP-MCNC: NEGATIVE MG/DL
HGB UR QL STRIP.AUTO: ABNORMAL
KETONES UR STRIP-MCNC: NEGATIVE MG/DL
LEUKOCYTE ESTERASE UR QL STRIP: ABNORMAL
MUCOUS THREADS UR QL AUTO: ABNORMAL
NITRITE UR QL STRIP: NEGATIVE
NON-SQ EPI CELLS URNS QL MICRO: ABNORMAL /HPF
PH UR STRIP.AUTO: 6 [PH]
PROT UR STRIP-MCNC: ABNORMAL MG/DL
RBC #/AREA URNS AUTO: ABNORMAL /HPF
SP GR UR STRIP.AUTO: 1.01 (ref 1–1.03)
UROBILINOGEN UR STRIP-ACNC: <2 MG/DL
WBC #/AREA URNS AUTO: ABNORMAL /HPF

## 2025-03-18 PROCEDURE — 87186 SC STD MICRODIL/AGAR DIL: CPT

## 2025-03-18 PROCEDURE — 87086 URINE CULTURE/COLONY COUNT: CPT

## 2025-03-18 PROCEDURE — 87077 CULTURE AEROBIC IDENTIFY: CPT

## 2025-03-18 PROCEDURE — 81001 URINALYSIS AUTO W/SCOPE: CPT

## 2025-03-18 RX ORDER — NITROFURANTOIN 25; 75 MG/1; MG/1
100 CAPSULE ORAL 2 TIMES DAILY
Qty: 14 CAPSULE | Refills: 0 | Status: SHIPPED | OUTPATIENT
Start: 2025-03-18 | End: 2025-03-25

## 2025-03-18 NOTE — TELEPHONE ENCOUNTER
Patient's daughter, Anai, called back to confirm that her brother dropped off the patient's urine sample was dropped off.  Advised that the urinalysis and culture are showing as in process.  She will be back in the area tomorrow for the day and will call to follow up.

## 2025-03-20 LAB
BACTERIA UR CULT: ABNORMAL
BACTERIA UR CULT: ABNORMAL

## 2025-03-31 ENCOUNTER — REMOTE DEVICE CLINIC VISIT (OUTPATIENT)
Dept: CARDIOLOGY CLINIC | Facility: CLINIC | Age: OVER 89
End: 2025-03-31

## 2025-03-31 DIAGNOSIS — Z95.0 CARDIAC PACEMAKER IN SITU: Primary | ICD-10-CM

## 2025-03-31 NOTE — PROGRESS NOTES
Results for orders placed or performed in visit on 03/31/25   Cardiac EP device report    Narrative    MDT DUAL PPM/ ACTIVE SYSTEM IS MRI CONDITIONAL  CARELINK TRANSMISSION: BATTERY VOLTAGE ADEQUATE (7.6 YRS). AP-37%, >99% (DEPENDENT/AVB). ALL AVAILABLE LEAD PARAMETERS WITHIN NORMAL LIMITS. NO SIGNIFICANT HIGH RATE EPISODES. NORMAL DEVICE FUNCTION. GV

## 2025-04-23 ENCOUNTER — RESULTS FOLLOW-UP (OUTPATIENT)
Dept: CARDIOLOGY CLINIC | Facility: CLINIC | Age: OVER 89
End: 2025-04-23

## 2025-05-13 NOTE — ASSESSMENT & PLAN NOTE
Baseline creatinine is 1 0  Monitor w/gentle ivf hydration Unable to reach Patient  after two attempts.  Messages left on voicemail to call back, advised to call 911 if experiencing a life threatening emergency.   Reason for Disposition  • Message left on unidentified voice mail. Phone number verified.    Protocols used: No Contact or Duplicate Contact Call-A-OH

## 2025-05-14 ENCOUNTER — OFFICE VISIT (OUTPATIENT)
Dept: FAMILY MEDICINE CLINIC | Facility: CLINIC | Age: OVER 89
End: 2025-05-14
Payer: MEDICARE

## 2025-05-14 VITALS
OXYGEN SATURATION: 96 % | BODY MASS INDEX: 23.19 KG/M2 | HEART RATE: 88 BPM | SYSTOLIC BLOOD PRESSURE: 144 MMHG | TEMPERATURE: 97 F | DIASTOLIC BLOOD PRESSURE: 70 MMHG | WEIGHT: 126.8 LBS

## 2025-05-14 DIAGNOSIS — I35.0 NONRHEUMATIC AORTIC VALVE STENOSIS: ICD-10-CM

## 2025-05-14 DIAGNOSIS — I10 ESSENTIAL HYPERTENSION: ICD-10-CM

## 2025-05-14 DIAGNOSIS — F03.A0 MILD DEMENTIA WITHOUT BEHAVIORAL DISTURBANCE, PSYCHOTIC DISTURBANCE, MOOD DISTURBANCE, OR ANXIETY, UNSPECIFIED DEMENTIA TYPE (HCC): ICD-10-CM

## 2025-05-14 DIAGNOSIS — Z00.00 MEDICARE ANNUAL WELLNESS VISIT, SUBSEQUENT: Primary | ICD-10-CM

## 2025-05-14 DIAGNOSIS — Z95.0 STATUS POST PLACEMENT OF CARDIAC PACEMAKER: ICD-10-CM

## 2025-05-14 PROCEDURE — 99213 OFFICE O/P EST LOW 20 MIN: CPT | Performed by: FAMILY MEDICINE

## 2025-05-14 PROCEDURE — G0439 PPPS, SUBSEQ VISIT: HCPCS | Performed by: FAMILY MEDICINE

## 2025-05-14 PROCEDURE — G2211 COMPLEX E/M VISIT ADD ON: HCPCS | Performed by: FAMILY MEDICINE

## 2025-05-14 NOTE — PROGRESS NOTES
Name: Tala Coyne      : 10/4/1928      MRN: [id number]  Encounter Provider: Ni Fisher DO  Encounter Date: 2025   Encounter department: [facility] PRIMARY CARE  :  Assessment & Plan  Medicare annual wellness visit, subsequent  Return in about 6 months (around 2025) for Recheck with dr Conway.        Essential hypertension  at goal continue current meds.       Mild dementia without behavioral disturbance, psychotic disturbance, mood disturbance, or anxiety, unspecified dementia type (HCC)  Advised that consideration to minimizing medications that would be can attributing to cognitive function her doxepin that she has been taking for years at bedtime.  (Although she is adamant about staying on it)..  The risk and benefits of the gabapentin for her peripheral neuropathy should continue to be discussed.       Nonrheumatic moderate aortic valve stenosis  Patient does need to follow-up with cardiologist as last echocardiogram was        Status post placement of cardiac pacemaker  Patient does need updated office visit with cardiology for this diagnosis as well.  Will make sure to call to schedule same.         Urinary Incontinence Plan of Care: counseling topics discussed: practice Kegel (pelvic floor strengthening) exercises and use restroom every 2 hours. Patient usually gets to the bathroom on time..       Preventive health issues were discussed with patient, and age appropriate screening tests were ordered as noted in patient's After Visit Summary. Personalized health advice and appropriate referrals for health education or preventive services given if needed, as noted in patient's After Visit Summary.    History of Present Illness     Patient here for Medicare wellness.  Accompanied by her daughter who lives in [place].  Does come to visit with her regularly.  Patient's son is also in the area.  She says she is managing in her home independently       Patient Care  Team:  Ni Fisher DO as PCP - General  DO Alisson Bruner as Outpatient Care Manager (Care Coordination)    Review of Systems   HENT:          Hard of hearing   Respiratory:  Negative for shortness of breath (Patient generally fairly sedentary).    Cardiovascular:         No recent in person visits with cardiology other than device check    Gastrointestinal:         States she is continent of bowel   Genitourinary:  Negative for dysuria (No current symptoms of dysuria).        Rare episodes of leakage   Musculoskeletal:  Positive for gait problem (End-stage arthritic knees).   Psychiatric/Behavioral:          Sleeps well (remains on doxepin)   All other systems reviewed and are negative.    Medical History Reviewed by provider this encounter:  Tobacco  Allergies  Meds  Problems  Med Hx  Surg Hx  Fam Hx       Annual Wellness Visit Questionnaire       Health Risk Assessment:   Patient rates overall health as fair. Patient feels that their physical health rating is same. Patient is satisfied with their life. Eyesight was rated as slightly worse. Hearing was rated as slightly worse. Patient feels that their emotional and mental health rating is same. Patients states they are sometimes angry. Patient states they are sometimes unusually tired/fatigued. Pain experienced in the last 7 days has been some. Patient's pain rating has been 8/10.     Fall Risk Screening:   In the past year, patient has experienced: no history of falling in past year      Urinary Incontinence Screening:   Patient has leaked urine accidently in the last six months.     Home Safety:  Patient does not have trouble with stairs inside or outside of their home. Patient has working smoke alarms and has no working carbon monoxide detector. Home safety hazards include: none.     Nutrition:   Current diet is Regular.     Medications:   Patient is currently taking over-the-counter supplements. OTC medications include:  see medication list. Patient is not able to manage medications.     Activities of Daily Living (ADLs)/Instrumental Activities of Daily Living (IADLs):   Walk and transfer into and out of bed and chair?: Yes  Dress and groom yourself?: Yes    Bathe or shower yourself?: Yes    Feed yourself? Yes  Do your laundry/housekeeping?: No  Manage your money, pay your bills and track your expenses?: No  Make your own meals?: Yes    Do your own shopping?: No    Previous Hospitalizations:   Any hospitalizations or ED visits within the last 12 months?: Yes    How many hospitalizations have you had in the last year?: 1-2    Advance Care Planning:   Living will: Yes    Durable POA for healthcare: Yes    Advanced directive: Yes    Advanced directive counseling given: Yes    End of Life Decisions reviewed with patient: Yes    Provider agrees with end of life decisions: Yes      Cognitive Screening:   Provider or family/friend/caregiver concerned regarding cognition?: Yes    Cognition Comments: Comment from hospitalization 2019  Suspected Dementia  Assessment & Plan  Likely early stages of dementia in setting of aphasia evidenced by mental status fluctuations and confusion  Evaluated by Neurology.  Outpatient follow-up with Neurology Clinic.  Patient did not follow through with outpatient visit.  Has had no formal evaluations.  Mild dementia is diagnoses listed on her chart since 2019.      Preventive Screenings      Cardiovascular Screening:    General: Screening Not Indicated and History Lipid Disorder      Diabetes Screening:     General: Screening Current      Colorectal Cancer Screening:     General: Screening Not Indicated      Cervical Cancer Screening:    General: Screening Not Indicated      Osteoporosis Screening:    General: Screening Current      Lung Cancer Screening:     General: Screening Not Indicated    Immunizations:  - Immunizations due: Zoster (Shingrix)    Screening, Brief Intervention, and Referral to Treatment  (SBIRT)     Screening  Typical number of drinks in a day: 0  Typical number of drinks in a week: 0  Interpretation: Low risk drinking behavior.    Social Drivers of Health     Financial Resource Strain: Low Risk  (2/11/2024)    Overall Financial Resource Strain (CARDIA)    • Difficulty of Paying Living Expenses: Not hard at all   Food Insecurity: No Food Insecurity (6/3/2025)    Nursing - Inadequate Food Risk Classification    • Worried About Running Out of Food in the Last Year: Never true    • Ran Out of Food in the Last Year: Never true    • Ran Out of Food in the Last Year: Never true   Transportation Needs: No Transportation Needs (6/3/2025)    Nursing - Transportation Risk Classification    • Lack of Transportation: No   Housing Stability: Unknown (6/3/2025)    Nursing: Inadequate Housing Risk Classification    • Unable to Pay for Housing in the Last Year: No    • Has Housing: No   Utilities: Not At Risk (6/3/2025)    Nursing - Utilities Risk Classification    • Threatened with loss of utilities: No     No results found.    Objective   /70   Pulse 88   Temp (!) 97 °F (36.1 °C) (Temporal)   Wt 57.5 kg (126 lb 12.8 oz)   SpO2 96%   BMI 23.19 kg/m²     Physical Exam  Vitals reviewed.   Constitutional:       Appearance: Normal appearance.      Comments: Pleasant interactive 89-year-old female who appears stated age examined in wheelchair   HENT:      Right Ear: Ear canal normal.      Left Ear: Ear canal normal.      Ears:      Comments: Hard of hearing     Nose: Nose normal.     Eyes:      Pupils: Pupils are equal, round, and reactive to light.      Comments: Wearing glasses     Cardiovascular:      Rate and Rhythm: Normal rate and regular rhythm.      Heart sounds: Murmur (Systolic) heard.      Comments: No peripheral ischemic changes noted  Pulmonary:      Effort: Pulmonary effort is normal.      Breath sounds: Normal breath sounds.   Abdominal:      Palpations: Abdomen is soft. There is no mass.      Musculoskeletal:      Comments: Significant valgus knees     Skin:     Findings: No rash.     Neurological:      Mental Status: She is alert and oriented to person, place, and time.     Psychiatric:         Mood and Affect: Mood normal.         Behavior: Behavior normal.         Thought Content: Thought content normal.         Cognition and Memory: Cognition is impaired (Short-term issues).         Judgment: Judgment normal.

## 2025-05-14 NOTE — PATIENT INSTRUCTIONS
Medicare Preventive Visit Patient Instructions  Thank you for completing your Welcome to Medicare Visit or Medicare Annual Wellness Visit today. Your next wellness visit will be due in one year (5/15/2026).  The screening/preventive services that you may require over the next 5-10 years are detailed below. Some tests may not apply to you based off risk factors and/or age. Screening tests ordered at today's visit but not completed yet may show as past due. Also, please note that scanned in results may not display below.  Preventive Screenings:  Service Recommendations Previous Testing/Comments   Colorectal Cancer Screening  * Colonoscopy    * Fecal Occult Blood Test (FOBT)/Fecal Immunochemical Test (FIT)  * Fecal DNA/Cologuard Test  * Flexible Sigmoidoscopy Age: 45-75 years old   Colonoscopy: every 10 years (may be performed more frequently if at higher risk)  OR  FOBT/FIT: every 1 year  OR  Cologuard: every 3 years  OR  Sigmoidoscopy: every 5 years  Screening may be recommended earlier than age 45 if at higher risk for colorectal cancer. Also, an individualized decision between you and your healthcare provider will decide whether screening between the ages of 76-85 would be appropriate. Colonoscopy: Not on file  FOBT/FIT: Not on file  Cologuard: Not on file  Sigmoidoscopy: Not on file    Screening Not Indicated     Breast Cancer Screening Age: 40+ years old  Frequency: every 1-2 years  Not required if history of left and right mastectomy Mammogram: Not on file        Cervical Cancer Screening Between the ages of 21-29, pap smear recommended once every 3 years.   Between the ages of 30-65, can perform pap smear with HPV co-testing every 5 years.   Recommendations may differ for women with a history of total hysterectomy, cervical cancer, or abnormal pap smears in past. Pap Smear: Not on file    Screening Not Indicated   Hepatitis C Screening Once for adults born between 1945 and 1965  More frequently in patients at  high risk for Hepatitis C Hep C Antibody: Not on file        Diabetes Screening 1-2 times per year if you're at risk for diabetes or have pre-diabetes Fasting glucose: 102 mg/dL (10/17/2022)  A1C: No results in last 5 years (No results in last 5 years)  Screening Current   Cholesterol Screening Once every 5 years if you don't have a lipid disorder. May order more often based on risk factors. Lipid panel: 09/27/2023    Screening Not Indicated  History Lipid Disorder     Other Preventive Screenings Covered by Medicare:  Abdominal Aortic Aneurysm (AAA) Screening: covered once if your at risk. You're considered to be at risk if you have a family history of AAA.  Lung Cancer Screening: covers low dose CT scan once per year if you meet all of the following conditions: (1) Age 55-77; (2) No signs or symptoms of lung cancer; (3) Current smoker or have quit smoking within the last 15 years; (4) You have a tobacco smoking history of at least 20 pack years (packs per day multiplied by number of years you smoked); (5) You get a written order from a healthcare provider.  Glaucoma Screening: covered annually if you're considered high risk: (1) You have diabetes OR (2) Family history of glaucoma OR (3)  aged 50 and older OR (4)  American aged 65 and older  Osteoporosis Screening: covered every 2 years if you meet one of the following conditions: (1) You're estrogen deficient and at risk for osteoporosis based off medical history and other findings; (2) Have a vertebral abnormality; (3) On glucocorticoid therapy for more than 3 months; (4) Have primary hyperparathyroidism; (5) On osteoporosis medications and need to assess response to drug therapy.   Last bone density test (DXA Scan): 10/05/2017.  HIV Screening: covered annually if you're between the age of 15-65. Also covered annually if you are younger than 15 and older than 65 with risk factors for HIV infection. For pregnant patients, it is covered up to  3 times per pregnancy.    Immunizations:  Immunization Recommendations   Influenza Vaccine Annual influenza vaccination during flu season is recommended for all persons aged >= 6 months who do not have contraindications   Pneumococcal Vaccine   * Pneumococcal conjugate vaccine = PCV13 (Prevnar 13), PCV15 (Vaxneuvance), PCV20 (Prevnar 20)  * Pneumococcal polysaccharide vaccine = PPSV23 (Pneumovax) Adults 19-63 yo with certain risk factors or if 65+ yo  If never received any pneumonia vaccine: recommend Prevnar 20 (PCV20)  Give PCV20 if previously received 1 dose of PCV13 or PPSV23   Hepatitis B Vaccine 3 dose series if at intermediate or high risk (ex: diabetes, end stage renal disease, liver disease)   Respiratory syncytial virus (RSV) Vaccine - COVERED BY MEDICARE PART D  * RSVPreF3 (Arexvy) CDC recommends that adults 60 years of age and older may receive a single dose of RSV vaccine using shared clinical decision-making (SCDM)   Tetanus (Td) Vaccine - COST NOT COVERED BY MEDICARE PART B Following completion of primary series, a booster dose should be given every 10 years to maintain immunity against tetanus. Td may also be given as tetanus wound prophylaxis.   Tdap Vaccine - COST NOT COVERED BY MEDICARE PART B Recommended at least once for all adults. For pregnant patients, recommended with each pregnancy.   Shingles Vaccine (Shingrix) - COST NOT COVERED BY MEDICARE PART B  2 shot series recommended in those 19 years and older who have or will have weakened immune systems or those 50 years and older     Health Maintenance Due:      Topic Date Due   • DXA SCAN  11/04/2030 (Originally 10/5/2019)   • Breast Cancer Screening: Mammogram  11/16/2033 (Originally 10/4/1968)     Immunizations Due:      Topic Date Due   • COVID-19 Vaccine (4 - 2024-25 season) 09/01/2024     Advance Directives   What are advance directives?  Advance directives are legal documents that state your wishes and plans for medical care. These plans  are made ahead of time in case you lose your ability to make decisions for yourself. Advance directives can apply to any medical decision, such as the treatments you want, and if you want to donate organs.   What are the types of advance directives?  There are many types of advance directives, and each state has rules about how to use them. You may choose a combination of any of the following:  Living will:  This is a written record of the treatment you want. You can also choose which treatments you do not want, which to limit, and which to stop at a certain time. This includes surgery, medicine, IV fluid, and tube feedings.   Durable power of  for healthcare (DPAHC):  This is a written record that states who you want to make healthcare choices for you when you are unable to make them for yourself. This person, called a proxy, is usually a family member or a friend. You may choose more than 1 proxy.  Do not resuscitate (DNR) order:  A DNR order is used in case your heart stops beating or you stop breathing. It is a request not to have certain forms of treatment, such as CPR. A DNR order may be included in other types of advance directives.  Medical directive:  This covers the care that you want if you are in a coma, near death, or unable to make decisions for yourself. You can list the treatments you want for each condition. Treatment may include pain medicine, surgery, blood transfusions, dialysis, IV or tube feedings, and a ventilator (breathing machine).  Values history:  This document has questions about your views, beliefs, and how you feel and think about life. This information can help others choose the care that you would choose.  Why are advance directives important?  An advance directive helps you control your care. Although spoken wishes may be used, it is better to have your wishes written down. Spoken wishes can be misunderstood, or not followed. Treatments may be given even if you do not want  them. An advance directive may make it easier for your family to make difficult choices about your care.   Urinary Incontinence   Urinary incontinence (UI)  is when you lose control of your bladder. UI develops because your bladder cannot store or empty urine properly. The 3 most common types of UI are stress incontinence, urge incontinence, or both.  Medicines:   May be given to help strengthen your bladder control. Report any side effects of medication to your healthcare provider.  Do pelvic muscle exercises often:  Your pelvic muscles help you stop urinating. Squeeze these muscles tight for 5 seconds, then relax for 5 seconds. Gradually work up to squeezing for 10 seconds. Do 3 sets of 15 repetitions a day, or as directed. This will help strengthen your pelvic muscles and improve bladder control.  Train your bladder:  Go to the bathroom at set times, such as every 2 hours, even if you do not feel the urge to go. You can also try to hold your urine when you feel the urge to go. For example, hold your urine for 5 minutes when you feel the urge to go. As that becomes easier, hold your urine for 10 minutes.   Self-care:   Keep a UI record.  Write down how often you leak urine and how much you leak. Make a note of what you were doing when you leaked urine.  Drink liquids as directed. You may need to limit the amount of liquid you drink to help control your urine leakage. Do not drink any liquid right before you go to bed. Limit or do not have drinks that contain caffeine or alcohol.   Prevent constipation.  Eat a variety of high-fiber foods. Good examples are high-fiber cereals, beans, vegetables, and whole-grain breads. Walking is the best way to trigger your intestines to have a bowel movement.  Exercise regularly and maintain a healthy weight.  Weight loss and exercise will decrease pressure on your bladder and help you control your leakage.   Use a catheter as directed  to help empty your bladder. A catheter is a  tiny, plastic tube that is put into your bladder to drain your urine.   Go to behavior therapy as directed.  Behavior therapy may be used to help you learn to control your urge to urinate.     © Copyright [employer] 2018 Information is for End User's use only and may not be sold, redistributed or otherwise used for commercial purposes. All illustrations and images included in CareNotes® are the copyrighted property of [employer] or [employer]

## 2025-05-16 ENCOUNTER — TELEPHONE (OUTPATIENT)
Age: OVER 89
End: 2025-05-16

## 2025-05-16 NOTE — TELEPHONE ENCOUNTER
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 Offered all [place] locations, Patient's Daughter rejected as too far out. [facility] was rejected as it is too far for Patient to travel.     Patient's Daughter opted not to schedule with us.

## 2025-05-17 DIAGNOSIS — F32.A DEPRESSION, UNSPECIFIED DEPRESSION TYPE: ICD-10-CM

## 2025-05-19 ENCOUNTER — TELEPHONE (OUTPATIENT)
Age: OVER 89
End: 2025-05-19

## 2025-05-19 NOTE — TELEPHONE ENCOUNTER
Patient's son is trying to access her [employer] and was asked to provide an access code found on patients visit summary. I wasn't able to find it for patient so I provided him the [employer] help line information.

## 2025-05-20 RX ORDER — DOXEPIN HYDROCHLORIDE 50 MG/1
50 CAPSULE ORAL
Qty: 90 CAPSULE | Refills: 0 | Status: SHIPPED | OUTPATIENT
Start: 2025-05-20

## 2025-05-29 NOTE — CONSULTS
Duplicate order  Please see original consult completed Jim Sawant at [facility] on 07/24/2019  Neurology to follow  There are no Wet Read(s) to document.

## 2025-05-30 ENCOUNTER — TELEPHONE (OUTPATIENT)
Dept: FAMILY MEDICINE CLINIC | Facility: CLINIC | Age: OVER 89
End: 2025-05-30

## 2025-05-30 DIAGNOSIS — H40.1130 PRIMARY OPEN ANGLE GLAUCOMA OF BOTH EYES, UNSPECIFIED GLAUCOMA STAGE: Primary | ICD-10-CM

## 2025-05-30 RX ORDER — DORZOLAMIDE HYDROCHLORIDE AND TIMOLOL MALEATE 20; 5 MG/ML; MG/ML
1 SOLUTION/ DROPS OPHTHALMIC 2 TIMES DAILY
Qty: 10 ML | Refills: 0 | Status: SHIPPED | OUTPATIENT
Start: 2025-05-30

## 2025-05-30 NOTE — TELEPHONE ENCOUNTER
Patient's daughter calling regarding Cosopt eye drops. Reports patient has been on these for quite some time but had a supply built up in her refrigerator and now she is running low. Estimates to have about a week left. Noted-patient has only been using eye drops once a day, not twice a day. Requesting script be sent into [employer] on file. If there are any questions or concerns can call patient or daughter.

## 2025-06-02 ENCOUNTER — APPOINTMENT (EMERGENCY)
Dept: CT IMAGING | Facility: HOSPITAL | Age: OVER 89
DRG: 065 | End: 2025-06-02
Payer: MEDICARE

## 2025-06-02 ENCOUNTER — APPOINTMENT (EMERGENCY)
Dept: RADIOLOGY | Facility: HOSPITAL | Age: OVER 89
DRG: 065 | End: 2025-06-02
Payer: MEDICARE

## 2025-06-02 ENCOUNTER — HOSPITAL ENCOUNTER (INPATIENT)
Facility: HOSPITAL | Age: OVER 89
LOS: 3 days | Discharge: NURSING, REHAB OR LONG-TERM CARE | DRG: 065 | End: 2025-06-06
Attending: EMERGENCY MEDICINE | Admitting: STUDENT IN AN ORGANIZED HEALTH CARE EDUCATION/TRAINING PROGRAM
Payer: MEDICARE

## 2025-06-02 DIAGNOSIS — R29.810 FACIAL DROOP: Primary | ICD-10-CM

## 2025-06-02 DIAGNOSIS — D50.9 IRON DEFICIENCY ANEMIA, UNSPECIFIED IRON DEFICIENCY ANEMIA TYPE: ICD-10-CM

## 2025-06-02 DIAGNOSIS — I63.9 STROKE (CEREBRUM) (HCC): ICD-10-CM

## 2025-06-02 PROBLEM — R29.90 STROKE-LIKE SYMPTOMS: Status: ACTIVE | Noted: 2025-06-02

## 2025-06-02 LAB
2HR DELTA HS TROPONIN: 1 NG/L
ANION GAP SERPL CALCULATED.3IONS-SCNC: 6 MMOL/L (ref 4–13)
APTT PPP: 29 SECONDS (ref 23–34)
BUN SERPL-MCNC: 31 MG/DL (ref 5–25)
CALCIUM SERPL-MCNC: 10.9 MG/DL (ref 8.4–10.2)
CARDIAC TROPONIN I PNL SERPL HS: 11 NG/L (ref ?–50)
CARDIAC TROPONIN I PNL SERPL HS: 12 NG/L (ref ?–50)
CHLORIDE SERPL-SCNC: 109 MMOL/L (ref 96–108)
CO2 SERPL-SCNC: 24 MMOL/L (ref 21–32)
CREAT SERPL-MCNC: 1.24 MG/DL (ref 0.6–1.3)
ERYTHROCYTE [DISTWIDTH] IN BLOOD BY AUTOMATED COUNT: 18.1 % (ref 11.6–15.1)
GFR SERPL CREATININE-BSD FRML MDRD: 36 ML/MIN/1.73SQ M
GLUCOSE SERPL-MCNC: 111 MG/DL (ref 65–140)
GLUCOSE SERPL-MCNC: 116 MG/DL (ref 65–140)
HCT VFR BLD AUTO: 34.8 % (ref 34.8–46.1)
HGB BLD-MCNC: 11.1 G/DL (ref 11.5–15.4)
INR PPP: 0.98 (ref 0.85–1.19)
MCH RBC QN AUTO: 26.9 PG (ref 26.8–34.3)
MCHC RBC AUTO-ENTMCNC: 31.9 G/DL (ref 31.4–37.4)
MCV RBC AUTO: 84 FL (ref 82–98)
PLATELET # BLD AUTO: 114 THOUSANDS/UL (ref 149–390)
PMV BLD AUTO: 11.8 FL (ref 8.9–12.7)
POTASSIUM SERPL-SCNC: 4.6 MMOL/L (ref 3.5–5.3)
PROTHROMBIN TIME: 13.2 SECONDS (ref 12.3–15)
RBC # BLD AUTO: 4.13 MILLION/UL (ref 3.81–5.12)
SODIUM SERPL-SCNC: 139 MMOL/L (ref 135–147)
WBC # BLD AUTO: 5.05 THOUSAND/UL (ref 4.31–10.16)

## 2025-06-02 PROCEDURE — 84484 ASSAY OF TROPONIN QUANT: CPT | Performed by: EMERGENCY MEDICINE

## 2025-06-02 PROCEDURE — 70498 CT ANGIOGRAPHY NECK: CPT

## 2025-06-02 PROCEDURE — 80048 BASIC METABOLIC PNL TOTAL CA: CPT | Performed by: EMERGENCY MEDICINE

## 2025-06-02 PROCEDURE — 36415 COLL VENOUS BLD VENIPUNCTURE: CPT | Performed by: EMERGENCY MEDICINE

## 2025-06-02 PROCEDURE — 99285 EMERGENCY DEPT VISIT HI MDM: CPT

## 2025-06-02 PROCEDURE — 71045 X-RAY EXAM CHEST 1 VIEW: CPT

## 2025-06-02 PROCEDURE — 85730 THROMBOPLASTIN TIME PARTIAL: CPT | Performed by: EMERGENCY MEDICINE

## 2025-06-02 PROCEDURE — 82948 REAGENT STRIP/BLOOD GLUCOSE: CPT

## 2025-06-02 PROCEDURE — 85027 COMPLETE CBC AUTOMATED: CPT | Performed by: EMERGENCY MEDICINE

## 2025-06-02 PROCEDURE — 85610 PROTHROMBIN TIME: CPT | Performed by: EMERGENCY MEDICINE

## 2025-06-02 PROCEDURE — 93005 ELECTROCARDIOGRAM TRACING: CPT

## 2025-06-02 PROCEDURE — 99223 1ST HOSP IP/OBS HIGH 75: CPT

## 2025-06-02 PROCEDURE — 70496 CT ANGIOGRAPHY HEAD: CPT

## 2025-06-02 PROCEDURE — 99285 EMERGENCY DEPT VISIT HI MDM: CPT | Performed by: EMERGENCY MEDICINE

## 2025-06-02 RX ORDER — ASPIRIN 81 MG/1
81 TABLET ORAL DAILY
Status: DISCONTINUED | OUTPATIENT
Start: 2025-06-03 | End: 2025-06-06 | Stop reason: HOSPADM

## 2025-06-02 RX ORDER — POLYETHYLENE GLYCOL 3350 17 G/17G
17 POWDER, FOR SOLUTION ORAL DAILY PRN
Status: DISCONTINUED | OUTPATIENT
Start: 2025-06-02 | End: 2025-06-06 | Stop reason: HOSPADM

## 2025-06-02 RX ORDER — DOXEPIN HYDROCHLORIDE 50 MG/1
50 CAPSULE ORAL
Status: DISCONTINUED | OUTPATIENT
Start: 2025-06-02 | End: 2025-06-06 | Stop reason: HOSPADM

## 2025-06-02 RX ORDER — ENOXAPARIN SODIUM 100 MG/ML
30 INJECTION SUBCUTANEOUS DAILY
Status: DISCONTINUED | OUTPATIENT
Start: 2025-06-03 | End: 2025-06-03

## 2025-06-02 RX ORDER — ACETAMINOPHEN 325 MG/1
650 TABLET ORAL EVERY 4 HOURS PRN
Status: DISCONTINUED | OUTPATIENT
Start: 2025-06-02 | End: 2025-06-06 | Stop reason: HOSPADM

## 2025-06-02 RX ORDER — PANTOPRAZOLE SODIUM 40 MG/1
40 TABLET, DELAYED RELEASE ORAL
Status: DISCONTINUED | OUTPATIENT
Start: 2025-06-03 | End: 2025-06-03

## 2025-06-02 RX ORDER — DORZOLAMIDE HYDROCHLORIDE AND TIMOLOL MALEATE 20; 5 MG/ML; MG/ML
1 SOLUTION/ DROPS OPHTHALMIC EVERY 12 HOURS SCHEDULED
Status: DISCONTINUED | OUTPATIENT
Start: 2025-06-03 | End: 2025-06-06 | Stop reason: HOSPADM

## 2025-06-02 RX ORDER — MAGNESIUM HYDROXIDE/ALUMINUM HYDROXICE/SIMETHICONE 120; 1200; 1200 MG/30ML; MG/30ML; MG/30ML
30 SUSPENSION ORAL EVERY 6 HOURS PRN
Status: DISCONTINUED | OUTPATIENT
Start: 2025-06-02 | End: 2025-06-06 | Stop reason: HOSPADM

## 2025-06-02 RX ORDER — ATORVASTATIN CALCIUM 40 MG/1
40 TABLET, FILM COATED ORAL EVERY EVENING
Status: DISCONTINUED | OUTPATIENT
Start: 2025-06-02 | End: 2025-06-06 | Stop reason: HOSPADM

## 2025-06-02 RX ORDER — ONDANSETRON 2 MG/ML
4 INJECTION INTRAMUSCULAR; INTRAVENOUS EVERY 6 HOURS PRN
Status: DISCONTINUED | OUTPATIENT
Start: 2025-06-02 | End: 2025-06-06 | Stop reason: HOSPADM

## 2025-06-02 RX ORDER — GABAPENTIN 300 MG/1
300 CAPSULE ORAL 2 TIMES DAILY
Status: DISCONTINUED | OUTPATIENT
Start: 2025-06-03 | End: 2025-06-06 | Stop reason: HOSPADM

## 2025-06-02 RX ORDER — AMLODIPINE BESYLATE 2.5 MG/1
2.5 TABLET ORAL 2 TIMES DAILY
Status: DISCONTINUED | OUTPATIENT
Start: 2025-06-03 | End: 2025-06-06 | Stop reason: HOSPADM

## 2025-06-02 RX ORDER — ASPIRIN 81 MG/1
243 TABLET, CHEWABLE ORAL ONCE
Status: COMPLETED | OUTPATIENT
Start: 2025-06-02 | End: 2025-06-02

## 2025-06-02 RX ORDER — CLOPIDOGREL BISULFATE 75 MG/1
300 TABLET ORAL ONCE
Status: COMPLETED | OUTPATIENT
Start: 2025-06-02 | End: 2025-06-02

## 2025-06-02 RX ORDER — FUROSEMIDE 20 MG/1
20 TABLET ORAL DAILY
Status: DISCONTINUED | OUTPATIENT
Start: 2025-06-03 | End: 2025-06-06 | Stop reason: HOSPADM

## 2025-06-02 RX ORDER — LOSARTAN POTASSIUM 25 MG/1
25 TABLET ORAL DAILY
Status: DISCONTINUED | OUTPATIENT
Start: 2025-06-03 | End: 2025-06-06 | Stop reason: HOSPADM

## 2025-06-02 RX ADMIN — ASPIRIN 81 MG CHEWABLE TABLET 243 MG: 81 TABLET CHEWABLE at 22:04

## 2025-06-02 RX ADMIN — IOHEXOL 85 ML: 350 INJECTION, SOLUTION INTRAVENOUS at 21:12

## 2025-06-02 RX ADMIN — SODIUM CHLORIDE 500 ML: 0.9 INJECTION, SOLUTION INTRAVENOUS at 21:29

## 2025-06-02 RX ADMIN — ATORVASTATIN CALCIUM 40 MG: 40 TABLET, FILM COATED ORAL at 23:35

## 2025-06-02 RX ADMIN — DOXEPIN HYDROCHLORIDE 50 MG: 50 CAPSULE ORAL at 23:35

## 2025-06-02 RX ADMIN — CLOPIDOGREL 300 MG: 75 TABLET ORAL at 22:05

## 2025-06-03 ENCOUNTER — APPOINTMENT (OUTPATIENT)
Dept: MRI IMAGING | Facility: HOSPITAL | Age: OVER 89
DRG: 065 | End: 2025-06-03
Payer: MEDICARE

## 2025-06-03 ENCOUNTER — APPOINTMENT (OUTPATIENT)
Dept: NON INVASIVE DIAGNOSTICS | Facility: HOSPITAL | Age: OVER 89
DRG: 065 | End: 2025-06-03
Payer: MEDICARE

## 2025-06-03 PROBLEM — D69.6 PLATELETS DECREASED (HCC): Status: RESOLVED | Noted: 2023-08-30 | Resolved: 2025-06-03

## 2025-06-03 PROBLEM — E86.1 HYPOTENSION DUE TO HYPOVOLEMIA: Status: RESOLVED | Noted: 2024-07-07 | Resolved: 2025-06-03

## 2025-06-03 PROBLEM — R01.1 MURMUR, CARDIAC: Status: RESOLVED | Noted: 2019-07-27 | Resolved: 2025-06-03

## 2025-06-03 PROBLEM — N17.9 ACUTE KIDNEY INJURY SUPERIMPOSED ON CHRONIC KIDNEY DISEASE  (HCC): Status: RESOLVED | Noted: 2021-10-20 | Resolved: 2025-06-03

## 2025-06-03 PROBLEM — N18.9 ACUTE KIDNEY INJURY SUPERIMPOSED ON CHRONIC KIDNEY DISEASE  (HCC): Status: RESOLVED | Noted: 2021-10-20 | Resolved: 2025-06-03

## 2025-06-03 LAB
ANION GAP SERPL CALCULATED.3IONS-SCNC: 5 MMOL/L (ref 4–13)
AORTIC ROOT: 3.7 CM
AORTIC VALVE MEAN VELOCITY: 12.3 M/S
ASCENDING AORTA: 3.9 CM
ATRIAL RATE: 60 BPM
ATRIAL RATE: 65 BPM
AV AREA BY CONTINUOUS VTI: 1.4 CM2
AV AREA PEAK VELOCITY: 1.4 CM2
AV LVOT MEAN GRADIENT: 2 MMHG
AV LVOT PEAK GRADIENT: 3 MMHG
AV MEAN PRESS GRAD SYS DOP V1V2: 7 MMHG
AV ORIFICE AREA US: 1.36 CM2
AV PEAK GRADIENT: 12 MMHG
AV REGURGITATION PRESSURE HALF TIME: 482 MS
AV VELOCITY RATIO: 0.48
AV VMAX SYS DOP: 1.74 M/S
BSA FOR ECHO PROCEDURE: 1.64 M2
BUN SERPL-MCNC: 30 MG/DL (ref 5–25)
CALCIUM SERPL-MCNC: 10 MG/DL (ref 8.4–10.2)
CHLORIDE SERPL-SCNC: 111 MMOL/L (ref 96–108)
CHOLEST SERPL-MCNC: 184 MG/DL (ref ?–200)
CO2 SERPL-SCNC: 24 MMOL/L (ref 21–32)
CREAT SERPL-MCNC: 1.07 MG/DL (ref 0.6–1.3)
DOP CALC AO VTI: 40.98 CM
DOP CALC LVOT AREA: 2.83 CM2
DOP CALC LVOT CARDIAC INDEX: 2.22 L/MIN/M2
DOP CALC LVOT CARDIAC OUTPUT: 3.64 L/MIN
DOP CALC LVOT DIAMETER: 1.9 CM
DOP CALC LVOT PEAK VEL VTI: 19.71 CM
DOP CALC LVOT PEAK VEL: 0.84 M/S
DOP CALC LVOT STROKE INDEX: 33.5 ML/M2
DOP CALC LVOT STROKE VOLUME: 55.86
DOP CALC MV VTI: 32.58 CM
E WAVE DECELERATION TIME: 459 MS
E/A RATIO: 0.52
ERYTHROCYTE [DISTWIDTH] IN BLOOD BY AUTOMATED COUNT: 18.3 % (ref 11.6–15.1)
EST. AVERAGE GLUCOSE BLD GHB EST-MCNC: 120 MG/DL
FRACTIONAL SHORTENING: 30 (ref 28–44)
GFR SERPL CREATININE-BSD FRML MDRD: 43 ML/MIN/1.73SQ M
GLUCOSE P FAST SERPL-MCNC: 97 MG/DL (ref 65–99)
GLUCOSE SERPL-MCNC: 97 MG/DL (ref 65–140)
HBA1C MFR BLD: 5.8 %
HCT VFR BLD AUTO: 32.5 % (ref 34.8–46.1)
HDLC SERPL-MCNC: 28 MG/DL
HGB BLD-MCNC: 10.2 G/DL (ref 11.5–15.4)
INTERVENTRICULAR SEPTUM IN DIASTOLE (PARASTERNAL SHORT AXIS VIEW): 1.4 CM
INTERVENTRICULAR SEPTUM: 1.4 CM (ref 0.6–1.1)
LAAS-AP2: 21.9 CM2
LAAS-AP4: 21.3 CM2
LDLC SERPL CALC-MCNC: 113 MG/DL (ref 0–100)
LEFT ATRIUM SIZE: 3.3 CM
LEFT ATRIUM VOLUME (MOD BIPLANE): 66 ML
LEFT ATRIUM VOLUME INDEX (MOD BIPLANE): 40.2 ML/M2
LEFT INTERNAL DIMENSION IN SYSTOLE: 2.3 CM (ref 2.1–4)
LEFT VENTRICULAR INTERNAL DIMENSION IN DIASTOLE: 3.3 CM (ref 3.5–6)
LEFT VENTRICULAR POSTERIOR WALL IN END DIASTOLE: 1.4 CM
LEFT VENTRICULAR STROKE VOLUME: 24 ML
LV EF US.2D.A4C+ESTIMATED: 59 %
LVSV (TEICH): 24 ML
MCH RBC QN AUTO: 26.5 PG (ref 26.8–34.3)
MCHC RBC AUTO-ENTMCNC: 31.4 G/DL (ref 31.4–37.4)
MCV RBC AUTO: 84 FL (ref 82–98)
MV E'TISSUE VEL-LAT: 6 CM/S
MV E'TISSUE VEL-SEP: 4 CM/S
MV MEAN GRADIENT: 3 MMHG
MV PEAK A VEL: 1.41 M/S
MV PEAK E VEL: 74 CM/S
MV PEAK GRADIENT: 11 MMHG
MV STENOSIS PRESSURE HALF TIME: 133 MS
MV VALVE AREA BY CONTINUITY EQUATION: 1.71 CM2
MV VALVE AREA P 1/2 METHOD: 1.65
P AXIS: 70 DEGREES
P AXIS: 80 DEGREES
PLATELET # BLD AUTO: 102 THOUSANDS/UL (ref 149–390)
PMV BLD AUTO: 12.3 FL (ref 8.9–12.7)
POTASSIUM SERPL-SCNC: 4.2 MMOL/L (ref 3.5–5.3)
PR INTERVAL: 176 MS
PR INTERVAL: 184 MS
QRS AXIS: -60 DEGREES
QRS AXIS: -65 DEGREES
QRSD INTERVAL: 118 MS
QRSD INTERVAL: 142 MS
QT INTERVAL: 436 MS
QT INTERVAL: 450 MS
QTC INTERVAL: 450 MS
QTC INTERVAL: 453 MS
RBC # BLD AUTO: 3.85 MILLION/UL (ref 3.81–5.12)
RIGHT ATRIUM AREA SYSTOLE A4C: 14.2 CM2
RIGHT VENTRICLE ID DIMENSION: 4.4 CM
SINOTUBULAR JUNCTION: 2.9 CM
SL CV AV DECELERATION TIME RETROGRADE: 1661 MS
SL CV AV PEAK GRADIENT RETROGRADE: 96 MMHG
SL CV LEFT ATRIUM LENGTH A2C: 5.9 CM
SL CV PED ECHO LEFT VENTRICLE DIASTOLIC VOLUME (MOD BIPLANE) 2D: 43 ML
SL CV PED ECHO LEFT VENTRICLE SYSTOLIC VOLUME (MOD BIPLANE) 2D: 19 ML
SL CV SINUS OF VALSALVA 2D: 3.7 CM
SODIUM SERPL-SCNC: 140 MMOL/L (ref 135–147)
STJ: 2.9 CM
T WAVE AXIS: 29 DEGREES
T WAVE AXIS: 53 DEGREES
TR MAX PG: 37 MMHG
TR PEAK VELOCITY: 3 M/S
TRICUSPID ANNULAR PLANE SYSTOLIC EXCURSION: 2.5 CM
TRICUSPID VALVE PEAK REGURGITATION VELOCITY: 3.04 M/S
TRIGL SERPL-MCNC: 216 MG/DL (ref ?–150)
VENTRICULAR RATE: 60 BPM
VENTRICULAR RATE: 65 BPM
WBC # BLD AUTO: 11.35 THOUSAND/UL (ref 4.31–10.16)

## 2025-06-03 PROCEDURE — 80061 LIPID PANEL: CPT

## 2025-06-03 PROCEDURE — 36415 COLL VENOUS BLD VENIPUNCTURE: CPT

## 2025-06-03 PROCEDURE — 80048 BASIC METABOLIC PNL TOTAL CA: CPT

## 2025-06-03 PROCEDURE — 99232 SBSQ HOSP IP/OBS MODERATE 35: CPT | Performed by: STUDENT IN AN ORGANIZED HEALTH CARE EDUCATION/TRAINING PROGRAM

## 2025-06-03 PROCEDURE — 94664 DEMO&/EVAL PT USE INHALER: CPT

## 2025-06-03 PROCEDURE — 83036 HEMOGLOBIN GLYCOSYLATED A1C: CPT

## 2025-06-03 PROCEDURE — 70551 MRI BRAIN STEM W/O DYE: CPT

## 2025-06-03 PROCEDURE — 93306 TTE W/DOPPLER COMPLETE: CPT

## 2025-06-03 PROCEDURE — 93010 ELECTROCARDIOGRAM REPORT: CPT

## 2025-06-03 PROCEDURE — 97167 OT EVAL HIGH COMPLEX 60 MIN: CPT

## 2025-06-03 PROCEDURE — 94640 AIRWAY INHALATION TREATMENT: CPT

## 2025-06-03 PROCEDURE — 92610 EVALUATE SWALLOWING FUNCTION: CPT

## 2025-06-03 PROCEDURE — 99222 1ST HOSP IP/OBS MODERATE 55: CPT | Performed by: PSYCHIATRY & NEUROLOGY

## 2025-06-03 PROCEDURE — 93306 TTE W/DOPPLER COMPLETE: CPT | Performed by: INTERNAL MEDICINE

## 2025-06-03 PROCEDURE — 85027 COMPLETE CBC AUTOMATED: CPT

## 2025-06-03 PROCEDURE — 97163 PT EVAL HIGH COMPLEX 45 MIN: CPT

## 2025-06-03 PROCEDURE — 94760 N-INVAS EAR/PLS OXIMETRY 1: CPT

## 2025-06-03 RX ORDER — CLOPIDOGREL BISULFATE 75 MG/1
75 TABLET ORAL DAILY
Status: DISCONTINUED | OUTPATIENT
Start: 2025-06-03 | End: 2025-06-06 | Stop reason: HOSPADM

## 2025-06-03 RX ORDER — IPRATROPIUM BROMIDE AND ALBUTEROL SULFATE 2.5; .5 MG/3ML; MG/3ML
3 SOLUTION RESPIRATORY (INHALATION) EVERY 6 HOURS PRN
Status: DISCONTINUED | OUTPATIENT
Start: 2025-06-03 | End: 2025-06-06 | Stop reason: HOSPADM

## 2025-06-03 RX ORDER — HEPARIN SODIUM 5000 [USP'U]/ML
5000 INJECTION, SOLUTION INTRAVENOUS; SUBCUTANEOUS EVERY 8 HOURS SCHEDULED
Status: DISCONTINUED | OUTPATIENT
Start: 2025-06-04 | End: 2025-06-06 | Stop reason: HOSPADM

## 2025-06-03 RX ADMIN — CLOPIDOGREL BISULFATE 75 MG: 75 TABLET, FILM COATED ORAL at 14:32

## 2025-06-03 RX ADMIN — DOXEPIN HYDROCHLORIDE 50 MG: 50 CAPSULE ORAL at 22:20

## 2025-06-03 RX ADMIN — GABAPENTIN 300 MG: 300 CAPSULE ORAL at 08:34

## 2025-06-03 RX ADMIN — DORZOLAMIDE HYDROCHLORIDE AND TIMOLOL MALEATE 1 DROP: 20; 5 SOLUTION OPHTHALMIC at 21:59

## 2025-06-03 RX ADMIN — ENOXAPARIN SODIUM 30 MG: 30 INJECTION SUBCUTANEOUS at 08:36

## 2025-06-03 RX ADMIN — ATORVASTATIN CALCIUM 40 MG: 40 TABLET, FILM COATED ORAL at 17:47

## 2025-06-03 RX ADMIN — DORZOLAMIDE HYDROCHLORIDE AND TIMOLOL MALEATE 1 DROP: 20; 5 SOLUTION OPHTHALMIC at 10:09

## 2025-06-03 RX ADMIN — IPRATROPIUM BROMIDE AND ALBUTEROL SULFATE 3 ML: .5; 3 SOLUTION RESPIRATORY (INHALATION) at 14:37

## 2025-06-03 RX ADMIN — GABAPENTIN 300 MG: 300 CAPSULE ORAL at 17:47

## 2025-06-03 RX ADMIN — ASPIRIN 81 MG: 81 TABLET, COATED ORAL at 08:34

## 2025-06-03 NOTE — ASSESSMENT & PLAN NOTE
Presents with right sided weakness, poor coordination, facial droop. Concern for TIA/CVA  Labs: unremarkable, near baseline  Imaging: CTH without acute bleed or large territorial infarct. CTA without LVO     Plan:  Out of window for TNK  Stroke pathway, neuro checks, permissive hypertension  ASA/plavix load, continue daily  Statin daily   Maintain telemetry  Lipid panel, A1c   MRI brain, TTE  Neurology consult

## 2025-06-03 NOTE — ASSESSMENT & PLAN NOTE
Conservative management of delirium: minimize noise, maintain day/night cycle, provide frequent redirection, avoid restraints if possible, etc.  Supportive care

## 2025-06-03 NOTE — PLAN OF CARE
Problem: PHYSICAL THERAPY ADULT  Goal: Performs mobility at highest level of function for planned discharge setting.  See evaluation for individualized goals.  Description: Treatment/Interventions: LE strengthening/ROM, Functional transfer training, Therapeutic exercise, Cognitive reorientation, Patient/family training, Bed mobility, Equipment eval/education, Gait training, Compensatory technique education, Continued evaluation, Spoke to nursing, OT          See flowsheet documentation for full assessment, interventions and recommendations.  Note: Prognosis: Fair  Problem List: Decreased strength, Impaired balance, Decreased mobility, Impaired judgement, Decreased cognition, Decreased safety awareness, Impaired hearing  Assessment: Tala Coyne is a 89 y.o. female admitted to [facility] on 6/2/2025 for Stroke-like symptoms. PT was consulted and pt was seen on 6/3/2025 for mobility assessment and d/c planning. Pt presents w high fall risk, multiple lines. Unreliable historian however per chart review and pt report, she is indep for ADLs (sponge bathing) and mobility. Initially states using scooter only however later states using RW at times. Per chart her family visits daily. Pt is currently functioning at a min A to get OOB dt weakness and poor trunk support, min Ax2 to transf dt weakness and for safety, min Ax2 to side steps w RW for balance and safety, and max Ax1 to return to bed given elevated bed ht of stretcher and safety concerns. Pt demonstrated deficits related to cognition, strength, balance. Unsteady performing dynamic seated tasks however given ht of stretcher did not have adequate support on the ground. Increased time to achieve upright balance once standing dt retropulsion and poor UE support on walker; in time, with physical support and verbal cuing, pt able to achieve upright balance. Hesitant to walk but was able to take some steps along EOB; further mobility deferred dt safety concerns including  unclear baseline LOF. Pt will benefit from continued skilled IP PT to address the above mentioned impairments  in order to maximize recovery and increase functional independence when completing mobility and ADLS.  Barriers to Discharge: Decreased caregiver support  Barriers to Discharge Comments: concerns for patient ability to cont to live independently  Rehab Resource Intensity Level, PT: II (Moderate Resource Intensity)    See flowsheet documentation for full assessment.

## 2025-06-03 NOTE — PROGRESS NOTES
Progress Note - Hospitalist   Name: Tala Coyne 89 y.o. female I MRN: [id number]  Unit/Bed#: E4 -01 I Date of Admission: 2025   Date of Service: 6/3/2025 I Hospital Day: 0     Assessment & Plan  Stroke-like symptoms  Presents with right sided weakness, poor coordination, facial droop. Concern for TIA/CVA  CT brain, CTA head and neck negative for any acute process  MRI brain confirming small acute infarct involving left precentral gyrus  Neurology consulted  2D echo currently pending  Continue aspirin 81 mg, added Plavix 75 mg daily  Lipid panel, A1c reviewed  PT and OT recommending rehab  Essential hypertension  Permissive hypertension  Hold Lasix, losartan and amlodipine  Mild dementia without behavioral disturbance, psychotic disturbance, mood disturbance, or anxiety (HCC)  Continue supportive care  Iron deficiency anemia  History of iron deficiency  Hemoglobin appears to be at baseline  Repeat iron panel    VTE Pharmacologic Prophylaxis:   Pharmacologic: Enoxaparin (Lovenox)  Mechanical VTE Prophylaxis in Place: Yes    Current Length of Stay: 0 day(s)    Current Patient Status: Inpatient   Certification Statement: The patient will continue to require additional inpatient hospital stay due to further stroke workup    Discharge Plan: Pending    Code Status: Level 3 - DNAR and DNI      Subjective:   Patient reports doing well today.  Reports continue her right arm weakness and numbness.  Denies any chest pain, short breath, nausea or vomiting.  Called and updated son on phone.    Objective:     Vitals:   Temp (24hrs), Av.6 °F (36.4 °C), Min:96.7 °F (35.9 °C), Max:98.6 °F (37 °C)    Temp:  [96.7 °F (35.9 °C)-98.6 °F (37 °C)] (P) 97.4 °F (36.3 °C)  HR:  [58-74] (P) 63  Resp:  [15-32] (P) 18  BP: (119-190)/(60-84) (P) 122/77  SpO2:  [89 %-97 %] (P) 89 %  Body mass index is 25.69 kg/m².     Input and Output Summary (last 24 hours):     No intake or output data in the 24 hours ending 25  1415    Physical Exam:     Physical Exam  Vitals and nursing note reviewed.   Constitutional:       Appearance: Normal appearance.   HENT:      Head: Normocephalic.     Eyes:      Conjunctiva/sclera: Conjunctivae normal.       Cardiovascular:      Rate and Rhythm: Normal rate.   Pulmonary:      Effort: Pulmonary effort is normal.      Breath sounds: No wheezing.   Abdominal:      General: Bowel sounds are normal. There is no distension.      Palpations: Abdomen is soft.     Musculoskeletal:         General: No swelling.     Skin:     General: Skin is warm and dry.     Neurological:      Mental Status: She is alert. Mental status is at baseline.      Cranial Nerves: Cranial nerve deficit (right facial droop) present.       Additional Data:     Labs:    Results from last 7 days   Lab Units 06/03/25  0617   WBC Thousand/uL 11.35*   HEMOGLOBIN g/dL 10.2*   HEMATOCRIT % 32.5*   PLATELETS Thousands/uL 102*     Results from last 7 days   Lab Units 06/03/25  0617   SODIUM mmol/L 140   POTASSIUM mmol/L 4.2   CHLORIDE mmol/L 111*   CO2 mmol/L 24   BUN mg/dL 30*   CREATININE mg/dL 1.07   ANION GAP mmol/L 5   CALCIUM mg/dL 10.0   GLUCOSE RANDOM mg/dL 97     Results from last 7 days   Lab Units 06/02/25  2059   INR  0.98     Results from last 7 days   Lab Units 06/02/25  2104   POC GLUCOSE mg/dl 111     Results from last 7 days   Lab Units 06/03/25  0617   HEMOGLOBIN A1C % 5.8*               * I Have Reviewed All Lab Data Listed Above.  * Additional Pertinent Lab Tests Reviewed: All Labs For Current Hospital Admission Reviewed    Mobility:  Basic Mobility Inpatient Raw Score: 11  -[facility] Goal: 4: Move to chair/commode  -[facility] Achieved: 5: Stand (1 or more minutes)    Lines:     Invasive Devices       Peripheral Intravenous Line  Duration             Peripheral IV 06/03/25 Left;Proximal;Ventral (anterior) Forearm <1 day                       Imaging:    Imaging Reports Reviewed Today Include:     MRI brain wo contrast  Result  Date: 6/3/2025  Impression: 1.  Small acute infarct in the left precentral gyrus. No significant edema or hemorrhagic transformation. 2.  Chronic microangiopathic change. 3.  Mild ethmoidal sinus disease. Workstation performed: [id number]     XR chest portable  Result Date: 6/3/2025  Impression: No acute cardiopulmonary findings. Pacemaker leads intact. Workstation performed: [id number]     CTA stroke alert (head/neck)  Result Date: 6/2/2025  Impression: No large vessel occlusion, aneurysm, dissection, or high-grade stenosis Findings were directly discussed with Aidan Thompson at 9:36 p.m. on 6/2/2025. Workstation performed: [id number]     CT stroke alert brain  Result Date: 6/2/2025  Impression: No acute intracranial hemorrhage or acute territorial infarction. Findings were directly discussed with Aidan Thompson at 9:26 p.m. on 6/2/2025. Workstation performed: [id number]         Recent Cultures (last 7 days):           Last 24 Hours Medication List:   Current Facility-Administered Medications   Medication Dose Route Frequency Provider Last Rate    acetaminophen  650 mg Oral Q4H PRN Jose J Soler PA-C      aluminum-magnesium hydroxide-simethicone  30 mL Oral Q6H PRN Jose J Soler PA-C      [Held by provider] amLODIPine  2.5 mg Oral BID Jose J Soler PA-C      aspirin  81 mg Oral Daily Jose J Soler PA-C      atorvastatin  40 mg Oral QPM Jose J Soler PA-C      clopidogrel  75 mg Oral Daily Denzel Perez MD      dorzolamide-timolol  1 drop Both Eyes Q12H [facility] Jose J Soler PA-C      doxepin  50 mg Oral HS Jose J Soler PA-C      enoxaparin  30 mg Subcutaneous Daily Jose J Soler PA-C      [Held by provider] furosemide  20 mg Oral Daily Jose J Soler PA-C      gabapentin  300 mg Oral BID Jose J Soler PA-C      ipratropium-albuterol  3 mL Nebulization Q6H PRN Denzel Perez MD      [Held by provider] losartan  25 mg Oral Daily Jose J Soler PA-C      ondansetron  4 mg Intravenous Q6H PRN Jose J Soler PA-C      polyethylene glycol  17  g Oral Daily PRN Jose J Soler PA-C          Today, Patient Was Seen By: Denzel Perez MD    ** Please Note: Dictation voice to text software may have been used in the creation of this document. **         no

## 2025-06-03 NOTE — ASSESSMENT & PLAN NOTE
89 y.o. female HTN, PPM (MRI conditional), CKD, Peripheral neuropathy, Macular degeneration, OA, GERD, Gout and Depression who presented to the ED via EMS on 6/2/25 with right facial droop, right hand numbness, mpaired coordination of the RUE prompting a stroke alert.  Patient also with chronic R shoulder pain radiating into the upper arm.   LKW 12noon.   BP as high as 190/84 in the ED  NIHSS 1 in the ED.  Not a TNK nor endovascular candidate.   Patient is maintained on Aspirin at baseline.     Imaging/Work-up:  CTH: No acute intracranial hemorrhage or acute territorial infarction.   CTA H/N: No large vessel occlusion, aneurysm, dissection, or high-grade stenosis     Troponins negative  WBC 11    Impression: Neuro exam revealing R lower facial droop, mild RUE weakness, past-pointing on the right and chronic LLE>RLE weakness. Additionally patient with poor vision and hearing. Continued concern for stroke as etiology for acute symptoms. Patient does report she is compliant with Aspirin 81mg daily at baseline. Proceed with stroke pathway.     Plan:  Acute ischemic stroke protocol  S/p Aspirin and Plavix load  Continue DAPT with Aspirin 81mg and Plavix 75mg daily   Atorvastatin 40mg qhs  MRI brain without contrast   Echo   Telemetry  A1C pending  Secondary stroke risk factor modification  Permissive hypertension with max of /110   Goal of normothermia and euglycemia   PT/OT/ST  Stroke Education  Frequent neurological checks  Stat CT head with worsening in neuro exam  Notify neurology with any changes in exam

## 2025-06-03 NOTE — PLAN OF CARE
Problem: OCCUPATIONAL THERAPY ADULT  Goal: Performs self-care activities at highest level of function for planned discharge setting.  See evaluation for individualized goals.  Description: Treatment Interventions: ADL retraining, Functional transfer training, UE strengthening/ROM, Endurance training, Patient/family training, Equipment evaluation/education, Compensatory technique education, Continued evaluation, Energy conservation, Activityengagement          See flowsheet documentation for full assessment, interventions and recommendations.   Note: Limitation: Decreased ADL status, Decreased UE ROM, Decreased UE strength, Decreased Safe judgement during ADL, Decreased cognition, Decreased endurance, Decreased fine motor control, Decreased self-care trans, Decreased high-level ADLs  Prognosis: Fair  Assessment: Pt is a 89 y.o. female seen for OT evaluation s/p adm to [facility] on 6/2/2025 w/ Stroke-like symptoms . 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 No significant edema or hemorrhagic transformation. Comorbidities affecting pt’s functional performance include a significant PMH of dementia, HTN, SSS s/p PPM, HLD, CKD. Pt with active OT orders and activity orders for OOB to chair. Pt reports living alone in a one level house with stair glide to enter/exit home. At baseline, pt reports I w/ ADLs and requiring assist w/ IADLs. Pt initially reports using electric scooter for mobility, however, later states using RW. (-) . Denies falls PTA. Upon evaluation, pt currently requires Min A for UB ADLs, Max-Mod A for LB ADLs, Max A for toileting, Min A for supine>sit, Max A for sit>supine, and Min A of 2 for functional mobility/transfers 2* the following deficits impacting occupational performance: weakness, decreased UE ROM, decreased strength , decreased balance, decreased activity tolerance, limited functional reach, impaired GMC, impaired FMC, impaired memory, impaired sequencing,  impaired problem solving, impaired coordination, Impaired proprioception , and decreased postural control. These impairments, as well at pt’s personal factors of: limited home support, difficulty performing ADLs, difficulty performing transfers/mobility, limited insight into deficits, decreased initiation and engagement, fall risk , functional decline , increased reliance on DME , access to transportation , and advanced age limit pt’s ability to safely engage in all baseline areas of occupation. Pt to continue to benefit from continued acute OT services during hospital stay to address defined deficits and to maximize level of functional independence in the following Occupational Performance areas: eating, grooming, bathing/shower, toilet hygiene, dressing, health maintenance, functional mobility, community mobility, clothing management, and social participation. The patient's raw score on the -PAC Daily Activity Inpatient Short Form is 14. A raw score of less than 19 suggests the patient may benefit from discharge to post-acute rehabilitation services. Please refer to the recommendation of the Occupational Therapist for safe discharge planning. OT will continue to follow pt 3-5x/wk to address the following goals to  w/in 10-14 days:     Rehab Resource Intensity Level, OT: II (Moderate Resource Intensity)

## 2025-06-03 NOTE — PHYSICAL THERAPY NOTE
PHYSICAL THERAPY EVALUATION          Patient Name: Tala Coyne  Today's Date: 6/3/2025       06/03/25 0959   PT Last Visit   PT Visit Date 06/03/25   Note Type   Note type Evaluation   Pain Assessment   Pain Assessment Tool 0-10   Pain Score No Pain   Restrictions/Precautions   Other Precautions Cognitive;Chair Alarm;Bed Alarm;Multiple lines;Telemetry;Fall Risk;Hard of hearing   Home Living   Type of Home House   Home Layout Able to live on main level with bedroom/bathroom   Home Equipment Walker;Cane;Wheelchair-manual;Electric scooter;Stair glide   Additional Comments per chart review and pt report. stair glide to enter/exit home. first fl set up   Prior Function   Level of [place] Independent with ADLs;Independent with functional mobility;Needs assistance with IADLS   Lives With Alone   Receives Help From Family  (son or daughter visits daily)   IADLs Family/Friend/Other provides transportation;Family/Friend/Other provides meals;Family/Friend/Other provides medication management   Comments per chart review and pt report. pt initially reports using scooter for all mobility however then states using RW at times? sponge bathes   General   Additional Pertinent History pt admitted 6/2/25 for stroke like sx. oob to chair orders. PMHx significant for dementia, neuropathy, gout, OA   Cognition   Arousal/Participation Cooperative   Orientation Level Oriented X4   Memory Decreased short term memory   Following Commands Follows one step commands with increased time or repetition   Comments hx dementia. unclear baseline mentation   RLE Assessment   RLE Assessment WFL   LLE Assessment   LLE Assessment X   Strength LLE   L Ankle Dorsiflexion 0/5  (pt reports this is chronic weakness)   Light Touch   RLE Light Touch Grossly intact   LLE Light Touch Grossly intact   Bed Mobility   Supine to Sit 4  Minimal assistance   Additional items Assist x 1;HOB elevated;Increased time required;Verbal cues   Sit to  Supine 2  Maximal assistance   Additional items Assist x 1;Increased time required;Verbal cues   Additional Comments cues for direction   Transfers   Sit to Stand 4  Minimal assistance   Additional items Assist x 2;Increased time required;Verbal cues;Other  (RW)   Stand to Sit 4  Minimal assistance   Additional items Assist x 2;Increased time required;Verbal cues;Other  (RW)   Additional Comments cues for safety/direction   Ambulation/Elevation   Gait pattern Improper Weight shift;Decreased foot clearance;Short stride;Excessively slow   Gait Assistance 4  Minimal assist   Additional items Assist x 2;Verbal cues;Tactile cues  (cues for direction. assist to manage RW, maintain forward balance)   Assistive Device Rolling walker   Distance 2' side steps   Balance   Static Sitting Fair -   Dynamic Sitting Poor +   Static Standing Poor +   Ambulatory Poor +   Endurance Deficit   Endurance Deficit No  (vitals pre mobility 134/64, 73, 94% on RA. audible wheezing during session)   Activity Tolerance   Activity Tolerance Treatment limited secondary to medical complications (Comment)  (cognition/effort, safety concerns)   Medical Staff Made Aware Vonda OT   Nurse Made Aware yes   Assessment   Prognosis Fair   Problem List Decreased strength;Impaired balance;Decreased mobility;Impaired judgement;Decreased cognition;Decreased safety awareness;Impaired hearing   Assessment Tala Coyne is a 89 y.o. female admitted to [facility] on 6/2/2025 for Stroke-like symptoms. PT was consulted and pt was seen on 6/3/2025 for mobility assessment and d/c planning. Pt presents w high fall risk, multiple lines. Unreliable historian however per chart review and pt report, she is indep for ADLs (sponge bathing) and mobility. Initially states using scooter only however later states using RW at times. Per chart her family visits daily. Pt is currently functioning at a min A to get OOB dt weakness and poor trunk support, min Ax2 to transf dt weakness and  for safety, min Ax2 to side steps w RW for balance and safety, and max Ax1 to return to bed given elevated bed ht of stretcher and safety concerns. Pt demonstrated deficits related to cognition, strength, balance. Unsteady performing dynamic seated tasks however given ht of stretcher did not have adequate support on the ground. Increased time to achieve upright balance once standing dt retropulsion and poor UE support on walker; in time, with physical support and verbal cuing, pt able to achieve upright balance. Hesitant to walk but was able to take some steps along EOB; further mobility deferred dt safety concerns including unclear baseline LOF. Pt will benefit from continued skilled IP PT to address the above mentioned impairments  in order to maximize recovery and increase functional independence when completing mobility and ADLS.   Barriers to Discharge Decreased caregiver support   Barriers to Discharge Comments concerns for patient ability to cont to live independently   Goals   [facility] Expiration Date 06/13/25   Short Term Goal #1 1) Pt will perform bed mobility S demonstrating appropriate technique 100% of the time in order to improve function. 2) Pt will perform all transfers S demonstrating safe technique 100% of the time in order to improve ability to negotiate safely in home environment. 3) Amb with least restrictive AD > 5'x1 with S in order to demonstrate ability to negotiate in home environment. 4) Improve overall strength and balance 1/2 grade in order to optimize ability to perform functional tasks and reduce fall risk. 5) Improve am-pac by 2 to demonstrate functional progress and return to baseline function/reduced caregiver burden.   Plan   Treatment/Interventions LE strengthening/ROM;Functional transfer training;Therapeutic exercise;Cognitive reorientation;Patient/family training;Bed mobility;Equipment eval/education;Gait training;Compensatory technique education;Continued evaluation;Spoke to  nursing;OT   PT Frequency Other (Comment)  (4-5x)   Discharge Recommendation   Rehab Resource Intensity Level, PT II (Moderate Resource Intensity)   AM-PAC Basic Mobility Inpatient   Turning in Flat Bed Without Bedrails 2   Lying on Back to Sitting on Edge of Flat Bed Without Bedrails 2   Moving Bed to Chair 2   Standing Up From Chair Using Arms 2   Walk in Room 2   Climb 3-5 Stairs With Railing 1   Basic Mobility Inpatient Raw Score 11   Basic Mobility Standardized Score 30.25   [facility] Highest Level Of Mobility   -[facility] Goal 4: Move to chair/commode   -[facility] Achieved 5: Stand (1 or more minutes)   End of Consult   Patient Position at End of Consult Supine;All needs within reach   History: co - morbidities including age, social background, use of assistive device, assist for iadl's, cognition, current experience including fall risk, multiple lines  Exam: impairments in systems including multiple body structures involved; musculoskeletal (strength), neuromuscular (balance, gait, transfers, sensation), cardiopulmonary (vitals), cognition; activity limitations  (difficulties executing an action); participation restrictions (problems associated w involvement in life situations), am-pac  Clinical: unstable/unpredictable  Complexity:high    Mary Ann Galeana, PT

## 2025-06-03 NOTE — SPEECH THERAPY NOTE
Speech Language/Pathology  Speech/Language Pathology  Assessment    Patient Name: Tala Coyne  Today's Date: 6/3/2025     Problem List  Principal Problem:    Stroke-like symptoms  Active Problems:    Essential hypertension    Mild dementia without behavioral disturbance, psychotic disturbance, mood disturbance, or anxiety (HCC)    Past Medical History  Past Medical History[1]  Past Surgical History  Past Surgical History[2]    8;38-initiated. Nurse in. Returned 8:55-9:10       Bedside Swallow Evaluation:    Summary:  Pt presented w/ R sided labial droop. Sensation +. Weak w/ cued retraction but symmetrical when she laughed. Speech clear and fluent. [place].  R handed. Reported an old R shoulder issue. Stated she wasn't hungry. Mouth dry. With encouragement she was agreeable to sips of water, apple sauce, and a hard cookie. No anterior spill. Lip seal appeared adequate on straw and spoon.Took small bites. Mastication, bolus formation, control, and transfer were wnl.  Swallows prompt. No cough or wet vocal quality. Oral/pharyngeal stages were WNL  w/ limited sample.     Recommendations:  Diet:Regular, assist w/ set up  Liquid: thin  Meds:as tolerated  Supervision:assist w/ set up, then prn  Positioning:Upright  Strategies: assist w/ set up  Pt to take PO/Meds only when fully alert and upright.   Oral care  Aspiration precautions  Reflux precautions  Therapy Prognosis:favorable  Prognosis considerations: + sensation despite mild weakness. Tolerated trials today.   ST to f/u, ? X 1 w/ larger sample    Consider consult w/:  Rehab  Neurology  Nutrition    Goal(s):  Dysphagia LTG  -Patient will demonstrate safe and effective oral intake (without overt s/s significant oral/pharyngeal dysphagia including s/s penetration or aspiration) for the highest appropriate diet level.     1.Pt will tolerate least restrictive diet w/out s/s aspiration or oral/pharyngeal difficulties.   2.Pt will will effectively manipulate/masticate and  transfer purees/solids w/out s/s dysphagia/aspiration.   3.Pt will tolerate thin liquids w/out s/s aspiration.   -If indicated, patient will comply with a Video/Modified Barium Swallow study for more complete assessment of swallowing anatomy/physiology/aspiration risk and to assess efficacy of treatment techniques so as to best guide treatment plan   H&P/Admit info/ as per pertinent provider notes: (PMH noted above)  Chief Complaint:        Chief Complaint   Patient presents with    Numbness       Lives home with son. C/o right shoulder arm pain/numbess. Chronic right shoulder pain but increasing in pain/numbness to the wrist. Noted right facial droop but denies vision changes, numbness, tinging to face. Hx. HTN. Takes ASA . Left pacer. Denies prior HX       Tala Coyne is a 89 y.o. female with a PMH of dementia, HTN, SSS s/p PPM, HLD, CKD who presents with numbness.              History obtained from patient, chart review and discussion with ED attending. She presents tonight for evaluation of right arm numbness/weakness. On exam she was sitting upright in bed with no acute distress. She states that sometime this afternoon she was having a hard time using her TV remove. Never had this happen to her before. Felt like her right hand couldn't  the remove or coordinate it to use it properly. An ambulance was called due to concern. Nothing made her symptoms worse, noticed gradual improvement while in the ED. Reports compliance with prescribed medications.       Special Studies: (refer to reports for complete details)  6/2 CTA stroke:  No large vessel occlusion, aneurysm, dissection, or high-grade stenosis   CT CT brain:  Neg acute  6/2 CXR:  No acute cardiopulmonary findings.  Pacemaker leads intact.    Sodium 135-147mmol/L  140  6/3         Procalcitonin<=0.25ng/ml    WBC  4.31-10.16 Thousand/uL   11.35  6/3     Previous MBS:  None in epic    Patient's goal: none stated    Did the pt report pain? no  If yes,  was nursing notified/was it addressed?    Reason for consult:  R/o aspiration  Determine safest and least restrictive diet  New neuro event  Stroke protocol    Precautions:  Hand hygeine  Fall    BMI: 25.69  Food Allergies:    Current Diet:    Premorbid diet:    O2 requirement:    Social/Prior living  Stated she lives alone. Son comes every day. To visit.   Voice/Speech:  WNL   Follows commands:  yes   Cognitive status:  Alert, oriented     Oral [facility] exam:  Dentition:natural  Lips (VII):Mild r droop, asymmetric w/ cued retraction. Symmetric when she laughed.   Tongue (XII):+  Mandible (V):+  Face/oral sensation (V):+  Velum (X):+  Secretion management:+    Esophageal stage:  No s/s reported    Results d/w:  Pt, nursing       [1]   Past Medical History:  Diagnosis Date    Arthritis     Depression     Gout     H/O vaginal hysterectomy     resolved-4/17/2015    Hypertensive kidney disease with CKD (chronic kidney disease) 04/19/2024    Macular degeneration 03/02/2015    Osteoarthritis 03/02/2015   [2]   Past Surgical History:  Procedure Laterality Date    CATARACT EXTRACTION      TOTAL ABDOMINAL HYSTERECTOMY      with removal of both ovaries    VAGINAL HYSTERECTOMY      resolved-4/17/2015

## 2025-06-03 NOTE — OCCUPATIONAL THERAPY NOTE
Occupational Therapy Evaluation     Patient Name: Tala Coyne  Today's Date: 6/3/2025  Problem List  Principal Problem:    Stroke-like symptoms  Active Problems:    Essential hypertension    Mild dementia without behavioral disturbance, psychotic disturbance, mood disturbance, or anxiety (HCC)    Iron deficiency anemia    Past Medical History  Past Medical History[1]  Past Surgical History  Past Surgical History[2]        06/03/25 0958   OT Last Visit   OT Visit Date 06/03/25   Note Type   Note type Evaluation   Pain Assessment   Pain Assessment Tool 0-10   Pain Score No Pain   Restrictions/Precautions   Weight Bearing Precautions Per Order No   Other Precautions Cognitive;Chair Alarm;Bed Alarm;Multiple lines;Telemetry;Fall Risk;Hard of hearing   Home Living   Type of Home House   Home Layout One level;Performs ADLs on one level;Able to live on main level with bedroom/bathroom   Bathroom Shower/Tub   (Sponge bathes PTA)   Home Equipment Walker;Cane;Wheelchair-manual;Electric scooter;Stair glide   Additional Comments Pt reports living alone in a one level house with stair glide to enter/exit home.   Prior Function   Level of [place] Independent with ADLs;Independent with functional mobility;Needs assistance with IADLS   Lives With Alone   Receives Help From Family  (son or daughter visits daily)   IADLs Family/Friend/Other provides transportation;Family/Friend/Other provides meals;Family/Friend/Other provides medication management   Falls in the last 6 months   (Pt denies)   Vocational Retired   Comments At baseline, pt reports I w/ ADLs and requiring assist w/ IADLs. Pt initially reports using electric scooter for mobility, however, later states using RW. (-) . Denies falls PTA.   Lifestyle   Autonomy At baseline, pt reports I w/ ADLs and requiring assist w/ IADLs. Pt initially reports using electric scooter for mobility, however, later states using RW. (-) . Denies falls PTA.   Reciprocal  Relationships Son, dtr   Service to Others Retired   ADL   Where Assessed Edge of bed   Eating Assistance 5  Supervision/Setup   Grooming Assistance 4  Minimal Assistance   UB Bathing Assistance 4  Minimal Assistance   LB Bathing Assistance 3  Moderate Assistance   UB Dressing Assistance 3  Moderate Assistance   LB Dressing Assistance 2  Maximal Assistance   Toileting Assistance  2  Maximal Assistance   Bed Mobility   Supine to Sit 4  Minimal assistance   Additional items Assist x 1;HOB elevated;Bedrails;Increased time required;Verbal cues;LE management   Sit to Supine 2  Maximal assistance   Additional items Assist x 1;Increased time required;Verbal cues;LE management  (trunk support)   Additional Comments Pt lying supine at end of session. Call bell and phone within reach. All needs met and pt reports no further questions for OT at this time.   Transfers   Sit to Stand 4  Minimal assistance   Additional items Assist x 2;Increased time required;Verbal cues   Stand to Sit 4  Minimal assistance   Additional items Assist x 2;Increased time required;Verbal cues   Additional Comments Cues for safe technique and hand placement   Functional Mobility   Functional Mobility 4  Minimal assistance   Additional Comments Assist x2; Laterally stepping toward HOB. Max verbal cues for sequencing and safe technique. Assist for placement of R UE on RW   Additional items Rolling walker   Balance   Static Sitting Fair -   Dynamic Sitting Poor +   Static Standing Poor +   Dynamic Standing Poor   Ambulatory Poor   Activity Tolerance   Activity Tolerance Treatment limited secondary to medical complications (Comment)   Medical Staff Made Aware Mary Ann PT   Nurse Made Aware yes   RUE Assessment   RUE Assessment X  (limited shldr ROM (~90*), elbow-distal=WFL)   RUE Strength   R Shoulder Flexion 3-/5   R Shoulder Extension 3-/5   R Elbow Flexion 4-/5   R Elbow Extension 4-/5   LUE Assessment   LUE Assessment WFL  (4/5 throughout)   Hand  Function   Gross Motor Coordination Impaired  (L=functional, R=impaired)   Fine Motor Coordination Impaired  (L=functional, R=impaired)   Sensation   Light Touch No apparent deficits   Proprioception   Proprioception Partial deficits in the RUE   Vision-Basic Assessment   Current Vision Wears glasses all the time   Vision - Complex Assessment   Acuity Able to read clock/calendar on wall without difficulty   Additional Comments Pt reports overall poor acuity- able to read employee name badge   Psychosocial   Psychosocial (WDL) WDL   Perception   Inattention/Neglect Appears intact   Motor Planning Cues to use objects appropriately   Cognition   Overall Cognitive Status Impaired   Arousal/Participation Alert;Cooperative   Attention Difficulty attending to directions   Orientation Level Oriented to person;Oriented to place;Oriented to time   Memory Decreased recall of recent events;Decreased short term memory;Decreased recall of precautions   Following Commands Follows one step commands with increased time or repetition   Comments Hx of Dementia   Assessment   Limitation Decreased ADL status;Decreased UE ROM;Decreased UE strength;Decreased Safe judgement during ADL;Decreased cognition;Decreased endurance;Decreased fine motor control;Decreased self-care trans;Decreased high-level ADLs   Prognosis Fair   Assessment Pt is a 89 y.o. female seen for OT evaluation s/p adm to [facility] on 6/2/2025 w/ Stroke-like symptoms . 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 No significant edema or hemorrhagic transformation. Comorbidities affecting pt’s functional performance include a significant PMH of dementia, HTN, SSS s/p PPM, HLD, CKD. Pt with active OT orders and activity orders for OOB to chair. Pt reports living alone in a one level house with stair glide to enter/exit home. At baseline, pt reports I w/ ADLs and requiring assist w/ IADLs. Pt initially reports using electric scooter for mobility,  however, later states using RW. (-) . Denies falls PTA. Upon evaluation, pt currently requires Min A for UB ADLs, Max-Mod A for LB ADLs, Max A for toileting, Min A for supine>sit, Max A for sit>supine, and Min A of 2 for functional mobility/transfers 2* the following deficits impacting occupational performance: weakness, decreased UE ROM, decreased strength , decreased balance, decreased activity tolerance, limited functional reach, impaired GMC, impaired FMC, impaired memory, impaired sequencing, impaired problem solving, impaired coordination, Impaired proprioception , and decreased postural control. These impairments, as well at pt’s personal factors of: limited home support, difficulty performing ADLs, difficulty performing transfers/mobility, limited insight into deficits, decreased initiation and engagement, fall risk , functional decline , increased reliance on DME , access to transportation , and advanced age limit pt’s ability to safely engage in all baseline areas of occupation. Pt to continue to benefit from continued acute OT services during hospital stay to address defined deficits and to maximize level of functional independence in the following Occupational Performance areas: eating, grooming, bathing/shower, toilet hygiene, dressing, health maintenance, functional mobility, community mobility, clothing management, and social participation. The patient's raw score on the -PAC Daily Activity Inpatient Short Form is 14. A raw score of less than 19 suggests the patient may benefit from discharge to post-acute rehabilitation services. Please refer to the recommendation of the Occupational Therapist for safe discharge planning. OT will continue to follow pt 3-5x/wk to address the following goals to  w/in 10-14 days:   Goals   Patient Goals None stated 2* cognitive deficits   LTG Time Frame 10-14   Long Term Goal Please refer to LTGs listed below   Plan   Treatment Interventions ADL  retraining;Functional transfer training;UE strengthening/ROM;Endurance training;Patient/family training;Equipment evaluation/education;Compensatory technique education;Continued evaluation;Energy conservation;Activityengagement   Goal Expiration Date 06/17/25   OT Treatment Day 0   OT Frequency 3-5x/wk   Discharge Recommendation   Rehab Resource Intensity Level, OT II (Moderate Resource Intensity)   AM-PAC Daily Activity Inpatient   Lower Body Dressing 2   Bathing 2   Toileting 2   Upper Body Dressing 2   Grooming 3   Eating 3   Daily Activity Raw Score 14   Daily Activity Standardized Score (Calc for Raw Score >=11) 33.39   AM-PAC Applied Cognition Inpatient   Following a Speech/Presentation 3   Understanding Ordinary Conversation 3   Taking Medications 2   Remembering Where Things Are Placed or Put Away 2   Remembering List of 4-5 Errands 2   Taking Care of Complicated Tasks 2   Applied Cognition Raw Score 14   Applied Cognition Standardized Score 32.02       GOALS    Pt will improve activity tolerance to G for min 30 min txment sessions for increase engagement in functional tasks    Pt will complete bed mobility at a Supervision level w/ G balance/safety demonstrated to decrease caregiver assistance required     Pt will complete UB dressing/self care w/ Supervision using adaptive device and DME as needed     Pt will complete LB dressing/self care w/ Supervision using adaptive device and DME as needed    Pt will complete toileting w/ Supervision w/ G hygiene/thoroughness using DME as needed    Pt will improve functional transfers to Supervision on/off all surfaces using DME as needed w/ G balance/safety     Pt will improve functional mobility during ADL/IADL/leisure tasks to Supervision using DME as needed w/ G balance/safety     Pt will be attentive 100% of the time during ongoing cognitive assessment w/ G participation to assist w/ safe d/c planning/recommendations    Pt will increase BUE strength by 1MM grade  via AROM/AAROM exercises to increase independence in ADLs and transfers    Pt will increase standing tolerance to 3-5 mins with Fair- dynamic standing balance to increase safety during participation in ADLs     Pt will follow 100% simple one step verbal commands to increase participation in OT treatment sessions        JESSICA Gallegos/L          [1]   Past Medical History:  Diagnosis Date    Arthritis     Depression     Gout     H/O vaginal hysterectomy     resolved-4/17/2015    Hypertensive kidney disease with CKD (chronic kidney disease) 04/19/2024    Macular degeneration 03/02/2015    Osteoarthritis 03/02/2015   [2]   Past Surgical History:  Procedure Laterality Date    CATARACT EXTRACTION      TOTAL ABDOMINAL HYSTERECTOMY      with removal of both ovaries    VAGINAL HYSTERECTOMY      resolved-4/17/2015

## 2025-06-03 NOTE — NURSING NOTE
Pts pacer placed in MRI safe mode for scheduled MRI.  HR pacing at 85 bpm. Vital signs monitored and remained stable throughout MRI.  Once test was completed, pts pacemaker was returned to prior settings.  Pt tolerated procedure well and offered no complaints upon departure from MRI department.

## 2025-06-03 NOTE — ASSESSMENT & PLAN NOTE
Presents with right sided weakness, poor coordination, facial droop. Concern for TIA/CVA  CT brain, CTA head and neck negative for any acute process  MRI brain confirming small acute infarct involving left precentral gyrus  Neurology consulted  2D echo currently pending  Continue aspirin 81 mg, added Plavix 75 mg daily  Lipid panel, A1c reviewed  PT and OT recommending rehab

## 2025-06-03 NOTE — ASSESSMENT & PLAN NOTE
Reports control, hold outpatient regimen to allow permissive hypertension  Outpatient regimen: amlodipine 2.5mg daily, lasix 20mg daily, losartan 25mg daily

## 2025-06-03 NOTE — CONSULTS
Consultation - Neurology   Name: Tala Coyne 89 y.o. female I MRN: [id number]  Unit/Bed#: ED-31 I Date of Admission: 6/2/2025   Date of Service: 6/3/2025 I Hospital Day: 0   Inpatient consult to Neurology  Consult performed by: Nathalia Thomas PA-C  Consult ordered by: Jose J Soler PA-C        Physician Requesting Evaluation: Denzel Perez MD   Reason for Evaluation / Principal Problem: facial droop    Assessment & Plan  Stroke-like symptoms  89 y.o. female HTN, PPM (MRI conditional), CKD, Peripheral neuropathy, Macular degeneration, OA, GERD, Gout and Depression who presented to the ED via EMS on 6/2/25 with right facial droop, right hand numbness, mpaired coordination of the RUE prompting a stroke alert.  Patient also with chronic R shoulder pain radiating into the upper arm.   LKW 12noon.   BP as high as 190/84 in the ED  NIHSS 1 in the ED.  Not a TNK nor endovascular candidate.   Patient is maintained on Aspirin at baseline.     Imaging/Work-up:  CTH: No acute intracranial hemorrhage or acute territorial infarction.   CTA H/N: No large vessel occlusion, aneurysm, dissection, or high-grade stenosis     Troponins negative  WBC 11    Impression: Neuro exam revealing R lower facial droop, mild RUE weakness, past-pointing on the right and chronic LLE>RLE weakness. Additionally patient with poor vision and hearing. Continued concern for stroke as etiology for acute symptoms. Patient does report she is compliant with Aspirin 81mg daily at baseline. Proceed with stroke pathway.     Plan:  Acute ischemic stroke protocol  S/p Aspirin and Plavix load  Continue DAPT with Aspirin 81mg and Plavix 75mg daily   Atorvastatin 40mg qhs  MRI brain without contrast   Echo   Telemetry  A1C pending  Secondary stroke risk factor modification  Permissive hypertension with max of /110   Goal of normothermia and euglycemia   PT/OT/ST  Stroke Education  Frequent neurological checks  Stat CT head with worsening in neuro  "exam  Notify neurology with any changes in exam   Essential hypertension  Permissive HTN  Mild dementia without behavioral disturbance, psychotic disturbance, mood disturbance, or anxiety (HCC)  Conservative management of delirium: minimize noise, maintain day/night cycle, provide frequent redirection, avoid restraints if possible, etc.  Supportive care    Recommendations for outpatient neurological follow up have yet to be determined.    History of Present Illness   Tala Coyne is a 89 y.o. right handed female HTN, PPM (MRI conditional), CKD, Peripheral neuropathy, Macular degeneration, OA, GERD, Gout and Depression who presented to the ED via EMS on 6/2/25 with right hand numbness and decreased coordination. LKW 12noon. Patient unable to provide detailed history. Per son's communication with ED staff, around noon PTA, patient noticed R hand numbness, weak grasp and difficulty coordinating movements when she was trying to use the tv remote. Additionally, in the ED she was noted to have right facial droop as well.  NIHSS 1 in the ED.  Of note, patient does have a history of chronic right shoulder pain.   Patient is maintained on Aspirin at baseline.   BP as high as 190/84 in the ED.   Stroke alert called.   Not a TNK nor endovascular candidate. Patient loaded with DAPT and placed on stroke pathway.     Upon chart review, the patient was seen by the inpatient neuro team in 7/2019 for AMS and aphasia. At that time, there was concern for seizure and she was placed on Keppra 375mg q12hrs. MRI at that time was negative for stroke. vEEG was abnormal with \"FIRDA\" during wakefulness suggesting nonspecific diffuse cerebral dysfunction. Patient does not appear to be taking Keppra at this time, uncertain when it was discontinued. Patient reports she does not take any seizure medications.     Today on exam, patient confirms the above. She reports persistent RUE deficit.     At baseline, she reports she lives independently, " completing ADLs and many IADLs indepedently as well. She notes that she uses a scooter to get around. She is able to transfer herself from chair to scooter, etc. She states that her son, who lives 20 minutes away comes to visit her daily.   In reviewing chronic deficits, she also reports that over time she has had increased difficulty getting her words out.    Review of Systems   Eyes:  Positive for visual disturbance.   Neurological:  Positive for speech difficulty (difficulty getting her words out, ongoing issue), weakness (Chronic BLE weakness) and numbness (RUE).        Medical History Review: I have reviewed the patient's PMH, PSH, Social History, Family History, Meds, and Allergies     Objective :  Temp:  [98.6 °F (37 °C)] 98.6 °F (37 °C)  HR:  [58-72] 70  BP: (127-190)/(60-84) 145/71  Resp:  [15-32] 19  SpO2:  [94 %-97 %] 96 %  O2 Device: None (Room air)    Physical Exam  Constitutional:       General: She is not in acute distress.     Appearance: She is not ill-appearing or toxic-appearing.      Comments: Elderly female, supine in bed   HENT:      Head: Normocephalic and atraumatic.      Right Ear: No hearing     Left Ear: No hearing  Pulmonary:      Effort: No respiratory distress.     Neurological Exam  Mental Status    Alert. Oriented to person, [facility] in [place], and beginning of June. Able to follow commands. Able to spell WORLD forwards but not backwards. Able to repeat examiner correctly..    Cranial Nerves  CN VIII:  Right: Hearing is decreased.  Left: Hearing is decreased.  CN XII: Tongue midline without atrophy or fasciculations.  Able to read very large print. Able to name 3 objects on large stroke cards, otherwise patient unable to see small items due to it being blurry. EOMs intact. Mild saccadic persuit movements. VFs intact. R lower facial asymmetry. Tongue protrudes midline.  Sensation of the face is symmetric. .    Motor    RUE: deltoid 5-, biceps 5, triceps 5, wrist extension  5-, grasp 2  LUE: 5 aside from deltoid 5-  RLE: antigravity x 2 seconds, right dorsi/plantarflexion 5  LUE: proximal 2 - unable to demonstrate antigravity movement, dorsiflexion 0, plantarflexion 5.    Sensory  Pin prick intact throughout  BUEs: temperature and vibratory sense intact bilaterally  BLEs diminished temperature distally.   BLEs diminished vibration distal to the knee.   .    Coordination    SY finger movements impaired on the right  FTN: RUE past-pointing, but not ataxia.        Lab Results: I have reviewed the following results:I have personally reviewed pertinent reports.    Recent Labs     06/03/25  0617   WBC 11.35*   HGB 10.2*   HCT 32.5*   *   SODIUM 140   K 4.2   *   CO2 24   BUN 30*   CREATININE 1.07   GLUC 97     VTE Prophylaxis: Enoxaparin (Lovenox)

## 2025-06-03 NOTE — H&P
H&P - Hospitalist   Name: Tala Coyne 89 y.o. female I MRN: [id number]  Unit/Bed#: ED-31 I Date of Admission: 6/2/2025   Date of Service: 6/2/2025 I Hospital Day: 0     Assessment & Plan  Stroke-like symptoms  Presents with right sided weakness, poor coordination, facial droop. Concern for TIA/CVA  Labs: unremarkable, near baseline  Imaging: CTH without acute bleed or large territorial infarct. CTA without LVO     Plan:  Out of window for TNK  Stroke pathway, neuro checks, permissive hypertension  ASA/plavix load, continue daily  Statin daily   Maintain telemetry  Lipid panel, A1c   MRI brain, TTE  Neurology consult   Essential hypertension  Reports control, hold outpatient regimen to allow permissive hypertension  Outpatient regimen: amlodipine 2.5mg daily, lasix 20mg daily, losartan 25mg daily       VTE Pharmacologic Prophylaxis: VTE Score: 4 Moderate Risk (Score 3-4) - Pharmacological DVT Prophylaxis Ordered: enoxaparin (Lovenox).  Code Status: Prior   Discussion with family:     Anticipated Length of Stay: Patient will be admitted on an observation basis with an anticipated length of stay of less than 2 midnights secondary to stroke like symptoms.    History of Present Illness   Chief Complaint:   Chief Complaint   Patient presents with    Numbness     Lives home with son. C/o right shoulder arm pain/numbess. Chronic right shoulder pain but increasing in pain/numbness to the wrist. Noted right facial droop but denies vision changes, numbness, tinging to face. Hx. HTN. Takes ASA . Left pacer. Denies prior HX      Tala Coyne is a 89 y.o. female with a PMH of dementia, HTN, SSS s/p PPM, HLD, CKD who presents with numbness.   History obtained from patient, chart review and discussion with ED attending. She presents tonight for evaluation of right arm numbness/weakness. On exam she was sitting upright in bed with no acute distress. She states that sometime this afternoon she was having a hard time using her  TV remove. Never had this happen to her before. Felt like her right hand couldn't  the remove or coordinate it to use it properly. An ambulance was called due to concern. Nothing made her symptoms worse, noticed gradual improvement while in the ED. Reports compliance with prescribed medications.     Review of Systems   Constitutional:  Negative for chills and fever.   Respiratory:  Negative for shortness of breath.    Cardiovascular:  Negative for chest pain and palpitations.   Gastrointestinal:  Negative for abdominal pain, diarrhea, nausea and vomiting.   Neurological:  Positive for weakness. Negative for syncope.   All other systems reviewed and are negative.      Historical Information   Past Medical History[1]  Past Surgical History[2]  Social History[3]  E-Cigarette/Vaping    E-Cigarette Use Never User      E-Cigarette/Vaping Substances    Nicotine No     THC No     CBD No     Flavoring No     Other No     Unknown No      Family History[4]  Social History:  Marital Status:    Occupation:   Patient Pre-hospital Living Situation: Home  Patient Pre-hospital Level of Mobility: walks  Patient Pre-hospital Diet Restrictions:     Meds/Allergies   I have reviewed home medications with patient personally.  Prior to Admission medications    Medication Sig Start Date End Date Taking? Authorizing Provider   acetaminophen (TYLENOL) 325 mg tablet Take 2 tablets (650 mg total) by mouth every 6 (six) hours as needed for mild pain, headaches or fever 10/21/23   Christina Middleton PA-C   amLODIPine (NORVASC) 2.5 mg tablet TAKE 1 TABLET BY MOUTH TWO TIMES DAILY 8/15/24   Felipe Doyle DO   ascorbic acid (VITAMIN C) 250 mg tablet Take 500 mg by mouth in the morning.    Historical Provider, MD   aspirin 81 MG tablet Take by mouth in the morning. 3/2/15   Historical Provider, MD   Cholecalciferol (Vitamin D3) 25 MCG (1000 UT) CAPS Take 25 each by mouth in the morning    Historical Provider, MD   dorzolamide-timolol  (Cosopt) 2-0.5 % ophthalmic solution Administer 1 drop to both eyes 2 (two) times a day 5/30/25   Ni Pryblick, DO   doxepin (SINEquan) 50 mg capsule TAKE 1 CAPSULE BY MOUTH EVERY DAY AT BEDTIME 5/20/25   Ni Pryblick, DO   Elastic Bandages & Supports (CARPAL TUNNEL WRIST STABILIZER) MISC by Does not apply route daily 2/12/20   Ni Pryblick, DO   furosemide (LASIX) 20 mg tablet Take 1 tablet (20 mg total) by mouth if needed (Lower extremity edema) May take 3 times weekly as needed for lower extremity swelling. 11/8/22   Ragini Briceño MD   gabapentin (NEURONTIN) 300 mg capsule TAKE 1 CAPSULE BY MOUTH THREE TIMES DAILY 2/13/25   Ni Pryblick, DO   omeprazole (PriLOSEC) 20 mg delayed release capsule TAKE 1 CAPSULE BY MOUTH EVERY DAY 2/13/25   Felipe Doyle,    telmisartan (MICARDIS) 20 MG tablet TAKE 1 TABLET BY MOUTH EVERY DAY 3/11/25   Ni Pryblick, DO   ezetimibe (ZETIA) 10 mg tablet Take 1 tablet (10 mg total) by mouth daily 11/2/20 4/23/21  Ni Pryblick, DO     Allergies   Allergen Reactions    Fish-Derived Products - Food Allergy      GI upset    Allopurinol Rash     Category: Allergy;        Objective :  Temp:  [98.6 °F (37 °C)] 98.6 °F (37 °C)  HR:  [62-72] 72  BP: (179-190)/(77-84) 190/84  Resp:  [18-28] 20  SpO2:  [95 %-96 %] 96 %  O2 Device: None (Room air)    Physical Exam  Vitals and nursing note reviewed.   Constitutional:       General: She is not in acute distress.     Appearance: She is well-developed.   HENT:      Head: Normocephalic and atraumatic.     Eyes:      Conjunctiva/sclera: Conjunctivae normal.       Cardiovascular:      Rate and Rhythm: Normal rate and regular rhythm.      Heart sounds: No murmur heard.  Pulmonary:      Effort: Pulmonary effort is normal. No respiratory distress.      Breath sounds: Normal breath sounds.   Abdominal:      Palpations: Abdomen is soft.      Tenderness: There is no abdominal tenderness.     Musculoskeletal:         General: No swelling.       Cervical back: Neck supple.     Skin:     General: Skin is warm and dry.      Capillary Refill: Capillary refill takes less than 2 seconds.     Neurological:      Mental Status: She is alert.      GCS: GCS eye subscore is 4. GCS verbal subscore is 5. GCS motor subscore is 6.      Cranial Nerves: Facial asymmetry present. No dysarthria.      Sensory: Sensation is intact.      Motor: Weakness present. No pronator drift.      Coordination: Finger-Nose-Finger Test abnormal.      Comments: 3/5 right  strength   Psychiatric:         Mood and Affect: Mood normal.          Lines/Drains:            Lab Results: I have reviewed the following results:  Results from last 7 days   Lab Units 06/02/25 2059   WBC Thousand/uL 5.05   HEMOGLOBIN g/dL 11.1*   HEMATOCRIT % 34.8   PLATELETS Thousands/uL 114*     Results from last 7 days   Lab Units 06/02/25 2059   SODIUM mmol/L 139   POTASSIUM mmol/L 4.6   CHLORIDE mmol/L 109*   CO2 mmol/L 24   BUN mg/dL 31*   CREATININE mg/dL 1.24   ANION GAP mmol/L 6   CALCIUM mg/dL 10.9*   GLUCOSE RANDOM mg/dL 116     Results from last 7 days   Lab Units 06/02/25 2059   INR  0.98     Results from last 7 days   Lab Units 06/02/25  2104   POC GLUCOSE mg/dl 111     Lab Results   Component Value Date    HGBA1C 5.4 07/24/2019           Imaging Results Review: I personally reviewed the following image studies in PACS and associated radiology reports: CT head. My interpretation of the radiology images/reports is: CTH without acute bleed or large territorial infarct. CTA without LVO .  Other Study Results Review: EKG was personally reviewed and my interpretation is: NSR. HR 60..    Administrative Statements     ** Please Note: This note has been constructed using a voice recognition system. **         [1]   Past Medical History:  Diagnosis Date    Arthritis     Depression     Gout     H/O vaginal hysterectomy     resolved-4/17/2015    Hypertensive kidney disease with CKD (chronic kidney disease)  04/19/2024    Macular degeneration 03/02/2015    Osteoarthritis 03/02/2015   [2]   Past Surgical History:  Procedure Laterality Date    CATARACT EXTRACTION      TOTAL ABDOMINAL HYSTERECTOMY      with removal of both ovaries    VAGINAL HYSTERECTOMY      resolved-4/17/2015   [3]   Social History  Tobacco Use    Smoking status: Never    Smokeless tobacco: Never   Vaping Use    Vaping status: Never Used   Substance and Sexual Activity    Alcohol use: Not Currently    Drug use: No    Sexual activity: Not Currently   [4]   Family History  Problem Relation Name Age of Onset    Heart attack Mother          MI    Heart attack Father          MI    Hypertension Sister      Stomach cancer Sister      Hypertension Brother

## 2025-06-03 NOTE — QUICK NOTE
Family has been updated. Stroke alert called at 9:01  Neurology response at immediate   Patient not seen, recommendation based on information provided by ED provider over the phone    89 yo HTN, lives with son. Around noon, patient has numbness in right hand and has issues with using TV remote. She called ambulance since her right hand felt numb, it has resolved now. She now has RFD that she is unaware of. She has right leg numbness as well.  Normal strength and speech. On ASA      LKW: onset noon  NIHSS 1  per ED exam  SBP  179/82 , Glu    CTH without acute bleed or large territorial infarct. CTA without LVO. Imaging reviewed on PACS and discussed with radiologist.       IV TNK was not given due to OOW, onset since noon  Recommend loading with aspirin 325 mg and Plavix 300mg times  once,  Permissive HTN for now, BP no greater than 220/120 mmHg  Admit to stroke pathway

## 2025-06-03 NOTE — ED PROVIDER NOTES
Time reflects when diagnosis was documented in both MDM as applicable and the Disposition within this note       Time User Action Codes Description Comment    6/2/2025  8:58 PM Kelin Mosher Add [R29.810] Facial droop     6/2/2025  9:46 PM Nomi Kelin LAURENT Add [R20.0,  R20.2] Numbness and tingling in right hand     6/2/2025  9:56 PM Nomi Kelin MANCILLA Remove [R20.0,  R20.2] Numbness and tingling in right hand           ED Disposition       ED Disposition   Admit    Condition   Stable    Date/Time   Mon Jun 2, 2025  9:45 PM    Comment   Case was discussed with NASRIN and the patient's admission status was agreed to be Admission Status: observation status to the service of Dr. Salazar .               Assessment & Plan       Medical Decision Making  89 yo F with R facial droop, possible RLE decreased sensation, transient R hand decreased sensation/coordination issue- stroke alert called     CTH to r/o intracranial bleed, CTA to eval for LVO, EKG to r/o STEMI/dysrhythmia/abn intervals/ischemic changes, troponin to eval for ischemia, CBC to assess for leukocytosis/anemia, CMP to assess for elevated LFTs/JEN/electrolyte derangements, CXR to r/o PTX/PNA/effusion/CHF, admit    Given ASA/Plavix and admitted for further evaluation/stroke pathway      Problems Addressed:  Facial droop: acute illness or injury    Amount and/or Complexity of Data Reviewed  External Data Reviewed: labs, radiology, ECG and notes.  Labs: ordered. Decision-making details documented in ED Course.  Radiology: ordered and independent interpretation performed. Decision-making details documented in ED Course.  ECG/medicine tests: ordered and independent interpretation performed. Decision-making details documented in ED Course.    Risk  OTC drugs.  Prescription drug management.  Decision regarding hospitalization.        ED Course as of 06/02/25 2311 Mon Jun 02, 2025 2106 Spoke with Dr. Thompson (Neurology)   2122 Creatinine: 1.24  Prior 1.1- 1.5, no acute change   2133  CXR independently interpreted by myself: mild pulm vascular congestion   2133 hs TnI 0hr: 11   2136 Discussed with neurology, Dual antiplatelet and admit for stroke pathway   2139 EKG independently interpreted by myself:  NSR @ 65 bpm, nonspecific st changes   2154 Pt and pt's son (Kunal) updated regarding results/plan. Facial droop is more pronounced at this time, but otherwise exam stable, no recurrent numbness or new sx       Medications   iohexol (OMNIPAQUE) 350 MG/ML injection (MULTI-DOSE) 85 mL (85 mL Intravenous Given 6/2/25 2112)   aspirin chewable tablet 243 mg (243 mg Oral Given 6/2/25 2204)   clopidogrel (PLAVIX) tablet 300 mg (300 mg Oral Given 6/2/25 2205)       ED Risk Strat Scores   HEART Risk Score      Flowsheet Row Most Recent Value   Heart Score Risk Calculator    History 0 Filed at: 06/02/2025 2133   ECG 1 Filed at: 06/02/2025 2133   Age 2 Filed at: 06/02/2025 2133   Risk Factors 1 Filed at: 06/02/2025 2133   Troponin 0 Filed at: 06/02/2025 2133   HEART Score 4 Filed at: 06/02/2025 2133          HEART Risk Score      Flowsheet Row Most Recent Value   Heart Score Risk Calculator    History 0 Filed at: 06/02/2025 2133   ECG 1 Filed at: 06/02/2025 2133   Age 2 Filed at: 06/02/2025 2133   Risk Factors 1 Filed at: 06/02/2025 2133   Troponin 0 Filed at: 06/02/2025 2133   HEART Score 4 Filed at: 06/02/2025 2133           Stroke Assessment       Row Name 06/02/25 2059             NIH Stroke Scale    Interval Baseline      Level of Consciousness (1a.) 0      LOC Questions (1b.) 0      LOC Commands (1c.) 0      Best Gaze (2.) 0      Visual (3.) 0      Facial Palsy (4.) 1      Motor Arm, Left (5a.) 0      Motor Arm, Right (5b.) 0      Motor Leg, Left (6a.) 0      Motor Leg, Right (6b.) 0      Limb Ataxia (7.) 0      Sensory (8.) 0      Best Language (9.) 0      Dysarthria (10.) 0      Extinction and Inattention (11.) (Formerly Neglect) 0      Total 1                              No data  recorded        SBIRT 22yo+      Flowsheet Row Most Recent Value   Initial Alcohol Screen: US AUDIT-C     1. How often do you have a drink containing alcohol? 0 Filed at: 06/02/2025 2200   2. How many drinks containing alcohol do you have on a typical day you are drinking?  0 Filed at: 06/02/2025 2200   3a. Male UNDER 65: How often do you have five or more drinks on one occasion? 0 Filed at: 06/02/2025 2200   3b. FEMALE Any Age, or MALE 65+: How often do you have 4 or more drinks on one occassion? 0 Filed at: 06/02/2025 2200   Audit-C Score 0 Filed at: 06/02/2025 2200   DALILA: How many times in the past year have you...    Used an illegal drug or used a prescription medication for non-medical reasons? Never Filed at: 06/02/2025 2200                            History of Present Illness       Chief Complaint   Patient presents with    Numbness     Lives home with son. C/o right shoulder arm pain/numbess. Chronic right shoulder pain but increasing in pain/numbness to the wrist. Noted right facial droop but denies vision changes, numbness, tinging to face. Hx. HTN. Takes ASA . Left pacer. Denies prior HX        Past Medical History[1]   Past Surgical History[2]   Family History[3]   Social History[4]   E-Cigarette/Vaping    E-Cigarette Use Never User       E-Cigarette/Vaping Substances    Nicotine No     THC No     CBD No     Flavoring No     Other No     Unknown No       I have reviewed and agree with the history as documented.     89 yo F h/o HTN brought in by ambulance.  Lives at home with son. Difficult to obtain history from pt and spoke with son on phone on arrival.  Pt states that around noon today (9 hours ago) she noticed R hand numbness/difficulty with coordination when she was trying to use TV remote and needed her son's help. She states she called ambulance because of the R hand numbness, but feels this has now resolved.    On exam she was noted to have R facial droop, which both her and her son were unaware  of and do not know timeline for. She reports R lower leg/foot sensation feels less than compared to L, also does not know timeline for this.      Reports chronic R shoulder pain    Denies HA, CP, SOB, abdominal pain, N/V/D/C, vision changes, speech changes            Per independent review of external records:   - October 2022 ECHO: EF 55%, grade 1 DD    Review of Systems        Objective       ED Triage Vitals [06/02/25 2049]   Temperature Pulse Blood Pressure Respirations SpO2 Patient Position - Orthostatic VS   98.6 °F (37 °C) 68 (!) 179/82 18 96 % Lying      Temp Source Heart Rate Source BP Location FiO2 (%) Pain Score    Oral Monitor Left arm -- --      Vitals      Date and Time Temp Pulse SpO2 Resp BP Pain Score FACES Pain Rating User   06/02/25 2230 -- 68 95 % 18 183/78 -- -- TP   06/02/25 2200 -- 72 96 % 20 190/84 -- -- TP   06/02/25 2135 -- 62 95 % 28 -- -- -- TP   06/02/25 2130 -- 63 96 % 18 187/84 -- -- TP   06/02/25 2125 -- 66 96 % 28 -- -- -- TP   06/02/25 2120 -- 66 95 % 19 -- -- -- TP   06/02/25 2115 -- 68 96 % 18 183/84 -- -- TP   06/02/25 2100 -- 64 96 % 18 181/77 -- -- TP   06/02/25 2049 98.6 °F (37 °C) 68 96 % 18 179/82 -- -- TP            Physical Exam  Vitals and nursing note reviewed.   Constitutional:       General: She is not in acute distress.     Appearance: Normal appearance. She is well-developed.   HENT:      Head: Normocephalic and atraumatic.      Nose: Nose normal.     Eyes:      Extraocular Movements: Extraocular movements intact.      Conjunctiva/sclera: Conjunctivae normal.       Cardiovascular:      Rate and Rhythm: Normal rate and regular rhythm.      Heart sounds: Normal heart sounds.   Pulmonary:      Effort: Pulmonary effort is normal. No respiratory distress.      Breath sounds: Normal breath sounds. No stridor. No wheezing or rales.   Chest:      Chest wall: No tenderness.   Abdominal:      General: There is no distension.      Palpations: Abdomen is soft.      Tenderness:  There is no abdominal tenderness. There is no guarding or rebound.     Musculoskeletal:         General: No swelling, tenderness or deformity.      Cervical back: Normal range of motion and neck supple.      Comments: B/l LE nonpitting edema     Skin:     General: Skin is warm and dry.      Findings: No rash.     Neurological:      Mental Status: She is alert and oriented to person, place, and time.      Motor: No weakness or abnormal muscle tone.      Coordination: Coordination normal.      Comments: Mild R facial droop. Speech mostly clear, occasionally some difficulty with word finding/not clear. Able to close eyes against resistance, tongue midline, able to close mouth/puff out cheeks, facial sensation equal bilaterally. Strength 5/5 b/l UE, 4/5 b/l LE. Sensation equal b/l UE/hands. Pt reports sensation feels decreased to R lower leg from mid shin distally to foot compared to L.    Psychiatric:         Thought Content: Thought content normal.         Judgment: Judgment normal.         Results Reviewed       Procedure Component Value Units Date/Time    HS Troponin I 4hr [[id number]]     Lab Status: No result Specimen: Blood     HS Troponin I 2hr [[id number]] Collected: 06/02/25 2255    Lab Status: In process Specimen: Blood from Arm, Right Updated: 06/02/25 2257    Protime-INR [[id number]]  (Normal) Collected: 06/02/25 2059    Lab Status: Final result Specimen: Blood from Arm, Right Updated: 06/02/25 2132     Protime 13.2 seconds      INR 0.98    Narrative:      INR Therapeutic Range    Indication                                             INR Range      Atrial Fibrillation                                               2.0-3.0  Hypercoagulable State                                    2.0.2.3  Left Ventricular Asist Device                            2.0-3.0  Mechanical Heart Valve                                  -    Aortic(with afib, MI, embolism, HF, LA enlargement,    and/or coagulopathy)                                      2.0-3.0 (2.5-3.5)     Mitral                                                             2.5-3.5  Prosthetic/Bioprosthetic Heart Valve               2.0-3.0  Venous thromboembolism (VTE: VT, PE        2.0-3.0    APTT [[id number]]  (Normal) Collected: 06/02/25 2059    Lab Status: Final result Specimen: Blood from Arm, Right Updated: 06/02/25 2132     PTT 29 seconds     HS Troponin 0hr (reflex protocol) [[id number]]  (Normal) Collected: 06/02/25 2059    Lab Status: Final result Specimen: Blood from Arm, Right Updated: 06/02/25 2129     hs TnI 0hr 11 ng/L     Basic metabolic panel [[id number]]  (Abnormal) Collected: 06/02/25 2059    Lab Status: Final result Specimen: Blood from Arm, Right Updated: 06/02/25 2122     Sodium 139 mmol/L      Potassium 4.6 mmol/L      Chloride 109 mmol/L      CO2 24 mmol/L      ANION GAP 6 mmol/L      BUN 31 mg/dL      Creatinine 1.24 mg/dL      Glucose 116 mg/dL      Calcium 10.9 mg/dL      eGFR 36 ml/min/1.73sq m     Narrative:      [employer] Kid[employer] guidelines for Chronic Kidney Disease (CKD):     Stage 1 with normal or high GFR (GFR > 90 mL/min/1.73 square meters)    Stage 2 Mild CKD (GFR = 60-89 mL/min/1.73 square meters)    Stage 3A Moderate CKD (GFR = 45-59 mL/min/1.73 square meters)    Stage 3B Moderate CKD (GFR = 30-44 mL/min/1.73 square meters)    Stage 4 Severe CKD (GFR = 15-29 mL/min/1.73 square meters)    Stage 5 End Stage CKD (GFR <15 mL/min/1.73 square meters)  Note: GFR calculation is accurate only with a steady state creatinine    CBC and Platelet [[id number]]  (Abnormal) Collected: 06/02/25 2059    Lab Status: Final result Specimen: Blood from Arm, Right Updated: 06/02/25 2114     WBC 5.05 Thousand/uL      RBC 4.13 Million/uL      Hemoglobin 11.1 g/dL      Hematocrit 34.8 %      MCV 84 fL      MCH 26.9 pg      MCHC 31.9 g/dL      RDW 18.1 %      Platelets 114 Thousands/uL      MPV 11.8 fL     Fingerstick Glucose (POCT) [[id number]]  (Normal)  Collected: 06/02/25 2104    Lab Status: Final result Specimen: Blood Updated: 06/02/25 2105     POC Glucose 111 mg/dl             CTA stroke alert (head/neck)   ED Interpretation by Kelin Mosher DO (06/02 2142)   IMPRESSION:     No large vessel occlusion, aneurysm, dissection, or high-grade stenosis        Final Interpretation by Kevyn Perez DO (06/02 2138)      No large vessel occlusion, aneurysm, dissection, or high-grade stenosis               Findings were directly discussed with Aidan Thompson at 9:36 p.m. on 6/2/2025.      Workstation performed: [id number]         CT stroke alert brain   ED Interpretation by Kelin Mosher DO (06/02 2142)   IMPRESSION:     No acute intracranial hemorrhage or acute territorial infarction.        Final Interpretation by Kevyn Perez DO (06/02 2139)      No acute intracranial hemorrhage or acute territorial infarction.            Findings were directly discussed with Aidan Thompson at 9:26 p.m. on 6/2/2025.            Workstation performed: [id number]         XR chest portable    (Results Pending)       Procedures    ED Medication and Procedure Management   Prior to Admission Medications   Prescriptions Last Dose Informant Patient Reported? Taking?   Cholecalciferol (Vitamin D3) 25 MCG (1000 UT) CAPS  Self, Child Yes No   Sig: Take 25 each by mouth in the morning   Elastic Bandages & Supports (CARPAL TUNNEL WRIST STABILIZER) MISC  Self, Child No No   Sig: by Does not apply route daily   acetaminophen (TYLENOL) 325 mg tablet   No No   Sig: Take 2 tablets (650 mg total) by mouth every 6 (six) hours as needed for mild pain, headaches or fever   amLODIPine (NORVASC) 2.5 mg tablet   No No   Sig: TAKE 1 TABLET BY MOUTH TWO TIMES DAILY   ascorbic acid (VITAMIN C) 250 mg tablet  Self, Child Yes No   Sig: Take 500 mg by mouth in the morning.   aspirin 81 MG tablet  Self, Child Yes No   Sig: Take by mouth in the morning.   dorzolamide-timolol (Cosopt) 2-0.5 % ophthalmic solution   No  No   Sig: Administer 1 drop to both eyes 2 (two) times a day   doxepin (SINEquan) 50 mg capsule   No No   Sig: TAKE 1 CAPSULE BY MOUTH EVERY DAY AT BEDTIME   furosemide (LASIX) 20 mg tablet  Child No No   Sig: Take 1 tablet (20 mg total) by mouth if needed (Lower extremity edema) May take 3 times weekly as needed for lower extremity swelling.   gabapentin (NEURONTIN) 300 mg capsule   No No   Sig: TAKE 1 CAPSULE BY MOUTH THREE TIMES DAILY   omeprazole (PriLOSEC) 20 mg delayed release capsule   No No   Sig: TAKE 1 CAPSULE BY MOUTH EVERY DAY   telmisartan (MICARDIS) 20 MG tablet   No No   Sig: TAKE 1 TABLET BY MOUTH EVERY DAY      Facility-Administered Medications: None     Patient's Medications   Discharge Prescriptions    No medications on file     No discharge procedures on file.  ED SEPSIS DOCUMENTATION   Time reflects when diagnosis was documented in both MDM as applicable and the Disposition within this note       Time User Action Codes Description Comment    6/2/2025  8:58 PM Kelin Mosher Add [R29.810] Facial droop     6/2/2025  9:46 PM Kelin Mosher Add [R20.0,  R20.2] Numbness and tingling in right hand     6/2/2025  9:56 PM Kelin Mosher Remove [R20.0,  R20.2] Numbness and tingling in right hand                      [1]   Past Medical History:  Diagnosis Date    Arthritis     Depression     Gout     H/O vaginal hysterectomy     resolved-4/17/2015    Hypertensive kidney disease with CKD (chronic kidney disease) 04/19/2024    Macular degeneration 03/02/2015    Osteoarthritis 03/02/2015   [2]   Past Surgical History:  Procedure Laterality Date    CATARACT EXTRACTION      TOTAL ABDOMINAL HYSTERECTOMY      with removal of both ovaries    VAGINAL HYSTERECTOMY      resolved-4/17/2015   [3]   Family History  Problem Relation Name Age of Onset    Heart attack Mother          MI    Heart attack Father          MI    Hypertension Sister      Stomach cancer Sister      Hypertension Brother     [4]   Social History  Tobacco  Use    Smoking status: Never    Smokeless tobacco: Never   Vaping Use    Vaping status: Never Used   Substance Use Topics    Alcohol use: Not Currently    Drug use: No        Kelin Mosher DO  06/02/25 [id number]

## 2025-06-03 NOTE — PLAN OF CARE
Problem: Potential for Falls  Goal: Patient will remain free of falls  Description: INTERVENTIONS:  - Educate patient/family on patient safety including physical limitations  - Instruct patient to call for assistance with activity   - Consider consulting OT/PT to assist with strengthening/mobility based on AM PAC & JH-HLM score  - Consult OT/PT to assist with strengthening/mobility   - Keep Call bell within reach  - Keep bed low and locked with side rails adjusted as appropriate  - Keep care items and personal belongings within reach  - Initiate and maintain comfort rounds  - Make Fall Risk Sign visible to staff  - Offer Toileting every 3 Hours, in advance of need  - Initiate/Maintain bed alarm  - Obtain necessary fall risk management equipment:   - Apply yellow socks and bracelet for high fall risk patients  - Consider moving patient to room near nurses station  Outcome: Progressing     Problem: Neurological Deficit  Goal: Neurological status is stable or improving  Description: Interventions:  - Monitor and assess patient's level of consciousness, motor function, sensory function, and level of assistance needed for ADLs.   - Monitor and report changes from baseline. Collaborate with interdisciplinary team to initiate plan and implement interventions as ordered.   - Provide and maintain a safe environment.  - Consider seizure precautions.  - Consider fall precautions.  - Consider aspiration precautions.  - Consider bleeding precautions.  Outcome: Progressing     Problem: Activity Intolerance/Impaired Mobility  Goal: Mobility/activity is maintained at optimum level for patient  Description: Interventions:  - Assess and monitor patient  barriers to mobility and need for assistive/adaptive devices.  - Assess patient's emotional response to limitations.  - Collaborate with interdisciplinary team and initiate plans and interventions as ordered.  - Encourage independent activity per ability.  - Maintain proper body  alignment.  - Perform active/passive rom as tolerated/ordered.  - Plan activities to conserve energy.  - Turn patient as appropriate  Outcome: Progressing     Problem: Communication Impairment  Goal: Ability to express needs and understand communication  Description: Assess patient's communication skills and ability to understand information.  Patient will demonstrate use of effective communication techniques, alternative methods of communication and understanding even if not able to speak.     - Encourage communication and provide alternate methods of communication as needed.  - Collaborate with case management/ for discharge needs.  - Include patient/family/caregiver in decisions related to communication.  Outcome: Progressing     Problem: Potential for Aspiration  Goal: Non-ventilated patient's risk of aspiration is minimized  Description: Assess and monitor vital signs, respiratory status, and labs (WBC).  Monitor for signs of aspiration (tachypnea, cough, rales, wheezing, cyanosis, fever).    - Assess and monitor patient's ability to swallow.  - Place patient up in chair to eat if possible.  - HOB up at 90 degrees to eat if unable to get patient up into chair.  - Supervise patient during oral intake.   - Instruct patient/ family to take small bites.  - Instruct patient/ family to take small single sips when taking liquids.  - Follow patient-specific strategies generated by speech pathologist.  Outcome: Progressing  Goal: Ventilated patient's risk of aspiration is minimized  Description: Assess and monitor vital signs, respiratory status, airway cuff pressure, and labs (WBC).  Monitor for signs of aspiration (tachypnea, cough, rales, wheezing, cyanosis, fever).    - Elevate head of bed 30 degrees if patient has tube feeding.  - Monitor tube feeding.  Outcome: Progressing     Problem: Nutrition  Goal: Nutrition/Hydration status is improving  Description: Monitor and assess patient's  nutrition/hydration status for malnutrition (ex- brittle hair, bruises, dry skin, pale skin and conjunctiva, muscle wasting, smooth red tongue, and disorientation). Collaborate with interdisciplinary team and initiate plan and interventions as ordered.  Monitor patient's weight and dietary intake as ordered or per policy. Utilize nutrition screening tool and intervene per policy. Determine patient's food preferences and provide high-protein, high-caloric foods as appropriate.     - Assist patient with eating.  - Allow adequate time for meals.  - Encourage patient to take dietary supplement as ordered.  - Collaborate with clinical nutritionist.  - Include patient/family/caregiver in decisions related to nutrition.  Outcome: Progressing     Problem: PAIN - ADULT  Goal: Verbalizes/displays adequate comfort level or baseline comfort level  Description: Interventions:  - Encourage patient to monitor pain and request assistance  - Assess pain using appropriate pain scale  - Administer analgesics as ordered based on type and severity of pain and evaluate response  - Implement non-pharmacological measures as appropriate and evaluate response  - Consider cultural and social influences on pain and pain management  - Notify physician/advanced practitioner if interventions unsuccessful or patient reports new pain  - Educate patient/family on pain management process including their role and importance of  reporting pain   - Provide non-pharmacologic/complimentary pain relief interventions  Outcome: Progressing     Problem: INFECTION - ADULT  Goal: Absence or prevention of progression during hospitalization  Description: INTERVENTIONS:  - Assess and monitor for signs and symptoms of infection  - Monitor lab/diagnostic results  - Monitor all insertion sites, i.e. indwelling lines, tubes, and drains  - Monitor endotracheal if appropriate and nasal secretions for changes in amount and color  - [place] appropriate cooling/warming  therapies per order  - Administer medications as ordered  - Instruct and encourage patient and family to use good hand hygiene technique  - Identify and instruct in appropriate isolation precautions for identified infection/condition  Outcome: Progressing  Goal: Absence of fever/infection during neutropenic period  Description: INTERVENTIONS:  - Monitor WBC  - Perform strict hand hygiene  - Limit to healthy visitors only  - No plants, dried, fresh or silk flowers with cage in patient room  Outcome: Progressing     Problem: SAFETY ADULT  Goal: Patient will remain free of falls  Description: INTERVENTIONS:  - Educate patient/family on patient safety including physical limitations  - Instruct patient to call for assistance with activity   - Consider consulting OT/PT to assist with strengthening/mobility based on AM PAC & JH-HLM score  - Consult OT/PT to assist with strengthening/mobility   - Keep Call bell within reach  - Keep bed low and locked with side rails adjusted as appropriate  - Keep care items and personal belongings within reach  - Initiate and maintain comfort rounds  - Make Fall Risk Sign visible to staff  - Offer Toileting every 3 Hours, in advance of need  - Initiate/Maintain bed alarm  - Obtain necessary fall risk management equipment:   - Apply yellow socks and bracelet for high fall risk patients  - Consider moving patient to room near nurses station  Outcome: Progressing  Goal: Maintain or return to baseline ADL function  Description: INTERVENTIONS:  -  Assess patient's ability to carry out ADLs; assess patient's baseline for ADL function and identify physical deficits which impact ability to perform ADLs (bathing, care of mouth/teeth, toileting, grooming, dressing, etc.)  - Assess/evaluate cause of self-care deficits   - Assess range of motion  - Assess patient's mobility; develop plan if impaired  - Assess patient's need for assistive devices and provide as appropriate  - Encourage maximum  independence but intervene and supervise when necessary  - Involve family in performance of ADLs  - Assess for home care needs following discharge   - Consider OT consult to assist with ADL evaluation and planning for discharge  - Provide patient education as appropriate  - Monitor functional capacity and physical performance, use of AM PAC & JH-HLM   - Monitor gait, balance and fatigue with ambulation    Outcome: Progressing  Goal: Maintains/Returns to pre admission functional level  Description: INTERVENTIONS:  - Perform AM-PAC 6 Click Basic Mobility/ Daily Activity assessment daily.  - Set and communicate daily mobility goal to care team and patient/family/caregiver.   - Collaborate with rehabilitation services on mobility goals if consulted  - Perform Range of Motion 3 times a day.  - Reposition patient every 3 hours.  - Dangle patient 3 times a day  - Stand patient 3 times a day  - Ambulate patient 3 times a day  - Out of bed to chair 3 times a day   - Out of bed for meals 3 times a day  - Out of bed for toileting  - Record patient progress and toleration of activity level   Outcome: Progressing     Problem: DISCHARGE PLANNING  Goal: Discharge to home or other facility with appropriate resources  Description: INTERVENTIONS:  - Identify barriers to discharge w/patient and caregiver  - Arrange for needed discharge resources and transportation as appropriate  - Identify discharge learning needs (meds, wound care, etc.)  - Arrange for interpretive services to assist at discharge as needed  - Refer to Case Management Department for coordinating discharge planning if the patient needs post-hospital services based on physician/advanced practitioner order or complex needs related to functional status, cognitive ability, or social support system  Outcome: Progressing     Problem: Knowledge Deficit  Goal: Patient/family/caregiver demonstrates understanding of disease process, treatment plan, medications, and discharge  instructions  Description: Complete learning assessment and assess knowledge base.  Interventions:  - Provide teaching at level of understanding  - Provide teaching via preferred learning methods  Outcome: Progressing

## 2025-06-04 PROBLEM — I63.9 STROKE (CEREBRUM) (HCC): Status: ACTIVE | Noted: 2025-06-02

## 2025-06-04 LAB
ANION GAP SERPL CALCULATED.3IONS-SCNC: 5 MMOL/L (ref 4–13)
BUN SERPL-MCNC: 29 MG/DL (ref 5–25)
CALCIUM SERPL-MCNC: 9.9 MG/DL (ref 8.4–10.2)
CHLORIDE SERPL-SCNC: 113 MMOL/L (ref 96–108)
CO2 SERPL-SCNC: 26 MMOL/L (ref 21–32)
CREAT SERPL-MCNC: 1.06 MG/DL (ref 0.6–1.3)
ERYTHROCYTE [DISTWIDTH] IN BLOOD BY AUTOMATED COUNT: 18.4 % (ref 11.6–15.1)
FERRITIN SERPL-MCNC: 16 NG/ML (ref 30–307)
FOLATE SERPL-MCNC: 6.7 NG/ML
GFR SERPL CREATININE-BSD FRML MDRD: 44 ML/MIN/1.73SQ M
GLUCOSE SERPL-MCNC: 89 MG/DL (ref 65–140)
HCT VFR BLD AUTO: 30.7 % (ref 34.8–46.1)
HGB BLD-MCNC: 9.5 G/DL (ref 11.5–15.4)
IRON SATN MFR SERPL: 10 % (ref 15–50)
IRON SERPL-MCNC: 31 UG/DL (ref 50–212)
MAGNESIUM SERPL-MCNC: 2 MG/DL (ref 1.9–2.7)
MCH RBC QN AUTO: 26.1 PG (ref 26.8–34.3)
MCHC RBC AUTO-ENTMCNC: 30.9 G/DL (ref 31.4–37.4)
MCV RBC AUTO: 84 FL (ref 82–98)
PLATELET # BLD AUTO: 98 THOUSANDS/UL (ref 149–390)
POTASSIUM SERPL-SCNC: 3.8 MMOL/L (ref 3.5–5.3)
RBC # BLD AUTO: 3.64 MILLION/UL (ref 3.81–5.12)
SODIUM SERPL-SCNC: 144 MMOL/L (ref 135–147)
TIBC SERPL-MCNC: 315 UG/DL (ref 250–450)
TRANSFERRIN SERPL-MCNC: 225 MG/DL (ref 203–362)
UIBC SERPL-MCNC: 284 UG/DL (ref 155–355)
VIT B12 SERPL-MCNC: 3114 PG/ML (ref 180–914)
WBC # BLD AUTO: 4.86 THOUSAND/UL (ref 4.31–10.16)

## 2025-06-04 PROCEDURE — 97530 THERAPEUTIC ACTIVITIES: CPT

## 2025-06-04 PROCEDURE — 82607 VITAMIN B-12: CPT | Performed by: STUDENT IN AN ORGANIZED HEALTH CARE EDUCATION/TRAINING PROGRAM

## 2025-06-04 PROCEDURE — 85027 COMPLETE CBC AUTOMATED: CPT | Performed by: STUDENT IN AN ORGANIZED HEALTH CARE EDUCATION/TRAINING PROGRAM

## 2025-06-04 PROCEDURE — 82728 ASSAY OF FERRITIN: CPT | Performed by: STUDENT IN AN ORGANIZED HEALTH CARE EDUCATION/TRAINING PROGRAM

## 2025-06-04 PROCEDURE — 92526 ORAL FUNCTION THERAPY: CPT

## 2025-06-04 PROCEDURE — 99233 SBSQ HOSP IP/OBS HIGH 50: CPT | Performed by: INTERNAL MEDICINE

## 2025-06-04 PROCEDURE — 97110 THERAPEUTIC EXERCISES: CPT

## 2025-06-04 PROCEDURE — 82746 ASSAY OF FOLIC ACID SERUM: CPT | Performed by: STUDENT IN AN ORGANIZED HEALTH CARE EDUCATION/TRAINING PROGRAM

## 2025-06-04 PROCEDURE — 83540 ASSAY OF IRON: CPT | Performed by: STUDENT IN AN ORGANIZED HEALTH CARE EDUCATION/TRAINING PROGRAM

## 2025-06-04 PROCEDURE — 83550 IRON BINDING TEST: CPT | Performed by: STUDENT IN AN ORGANIZED HEALTH CARE EDUCATION/TRAINING PROGRAM

## 2025-06-04 PROCEDURE — 80048 BASIC METABOLIC PNL TOTAL CA: CPT | Performed by: STUDENT IN AN ORGANIZED HEALTH CARE EDUCATION/TRAINING PROGRAM

## 2025-06-04 PROCEDURE — 83735 ASSAY OF MAGNESIUM: CPT | Performed by: STUDENT IN AN ORGANIZED HEALTH CARE EDUCATION/TRAINING PROGRAM

## 2025-06-04 PROCEDURE — 97116 GAIT TRAINING THERAPY: CPT

## 2025-06-04 PROCEDURE — 99232 SBSQ HOSP IP/OBS MODERATE 35: CPT | Performed by: PSYCHIATRY & NEUROLOGY

## 2025-06-04 RX ADMIN — DORZOLAMIDE HYDROCHLORIDE AND TIMOLOL MALEATE 1 DROP: 20; 5 SOLUTION OPHTHALMIC at 09:00

## 2025-06-04 RX ADMIN — ASPIRIN 81 MG: 81 TABLET, COATED ORAL at 08:59

## 2025-06-04 RX ADMIN — GABAPENTIN 300 MG: 300 CAPSULE ORAL at 08:59

## 2025-06-04 RX ADMIN — GABAPENTIN 300 MG: 300 CAPSULE ORAL at 17:33

## 2025-06-04 RX ADMIN — CLOPIDOGREL BISULFATE 75 MG: 75 TABLET, FILM COATED ORAL at 08:59

## 2025-06-04 RX ADMIN — AMLODIPINE BESYLATE 2.5 MG: 2.5 TABLET ORAL at 17:33

## 2025-06-04 RX ADMIN — IRON SUCROSE 200 MG: 20 INJECTION, SOLUTION INTRAVENOUS at 18:31

## 2025-06-04 RX ADMIN — DOXEPIN HYDROCHLORIDE 50 MG: 50 CAPSULE ORAL at 19:36

## 2025-06-04 RX ADMIN — HEPARIN SODIUM 5000 UNITS: 5000 INJECTION INTRAVENOUS; SUBCUTANEOUS at 05:58

## 2025-06-04 RX ADMIN — ATORVASTATIN CALCIUM 40 MG: 40 TABLET, FILM COATED ORAL at 17:33

## 2025-06-04 RX ADMIN — HEPARIN SODIUM 5000 UNITS: 5000 INJECTION INTRAVENOUS; SUBCUTANEOUS at 21:32

## 2025-06-04 RX ADMIN — HEPARIN SODIUM 5000 UNITS: 5000 INJECTION INTRAVENOUS; SUBCUTANEOUS at 13:55

## 2025-06-04 NOTE — CASE MANAGEMENT
Case Management Assessment & Discharge Planning Note    Patient name Tala Coyne  [facility] -* MRN [id number]  : 10/4/1928 Date 2025       Current Admission Date: 2025  Current Admission Diagnosis:Stroke-like symptoms   Patient Active Problem List    Diagnosis Date Noted    Stroke-like symptoms 2025    Sick sinus syndrome s/p PPM 10/20/2020    Status post placement of cardiac pacemaker 04/10/2020    Nonrheumatic aortic valve stenosis 2020    Mild pulmonary hypertension (HCC) 2020    Seizure disorder (HCC) 2019    Iron deficiency anemia 10/17/2019    Mild dementia without behavioral disturbance, psychotic disturbance, mood disturbance, or anxiety ([facility]) 2019    Depression 2018    Mixed hyperlipidemia 2015    Essential hypertension 2015    Esophageal reflux 2015    Peripheral neuropathy 2015    Vitamin D deficiency 2015      LOS (days): 1  Geometric Mean LOS (GMLOS) (days): 2.8  Days to GMLOS:2     OBJECTIVE:    Risk of Unplanned Readmission Score: 14.43         Current admission status: Inpatient  Referral Reason: Stroke    Preferred Pharmacy:   [id number] - [address] [address]  Phone: [phone] Fax: [phone]    Primary Care Provider: Ni Fisher DO    Primary Insurance: MEDICARE  Secondary Insurance: BLUE CROSS    ASSESSMENT:  Active Health Care Proxies       DorethaKunal mcadams Health Care Agent - Son   Primary Phone: [phone] (Mobile)  Home Phone: [phone]                           Readmission Root Cause  30 Day Readmission: No    Patient Information  Admitted from:: Home  Mental Status: Alert  During Assessment patient was accompanied by: Not accompanied during assessment  Assessment information provided by:: Patient  Primary Caregiver: Self (self and family assists)  Support Systems:  Son, Daughter  County of Residence: [place]  What [facility] do you live in?: [place]  Home entry access options. Select all that apply.: Stairs  Number of steps to enter home.: 3 (patient has stair lift to ground level entry)  Type of Current Residence: 2 story home  Upon entering residence, is there a bedroom on the main floor (no further steps)?: Yes  Upon entering residence, is there a bathroom on the main floor (no further steps)?: Yes  Living Arrangements: Lives Alone  Is patient a ?: No    Activities of Daily Living Prior to Admission  Functional Status: Independent  Completes ADLs independently?: Yes  Ambulates independently?: Yes  Does patient use assisted devices?: Yes  Assisted Devices (DME) used: Other (Comment), Stair Chair/[place] (scooter)  Does patient currently own DME?: Yes  What DME does the patient currently own?: Other (Comment), Stair Chair/Glide, Walker (scooter)  Does patient have a history of Outpatient Therapy (PT/OT)?: No  Does the patient have a history of Short-Term Rehab?: Yes ([facility])  Does patient have a history of HHC?: Yes (through [facility])  Does patient currently have HHC?: No         Patient Information Continued  Income Source: Pension/senior care  Does patient have prescription coverage?: Yes  Can the patient afford their medications and any related supplies (such as glucometers or test strips)?: Yes  Does patient receive dialysis treatments?: No  Does patient have a history of substance abuse?: No  Does patient have a history of Mental Health Diagnosis?: Yes (depression noted in chart but patient denies)  Is patient receiving treatment for mental health?: No. Patient declined treatment information.  Has patient received inpatient treatment related to mental health in the last 2 years?: No         Means of Transportation  Means of Transport to Appts:: Family transport  [place] Application:  (N/A)          DISCHARGE DETAILS:    Discharge planning discussed with:: Patient  Freedom of  Choice: Yes  Comments - Freedom of Choice: PT recommends STR but patient declines - patient open to HHC  CM contacted family/caregiver?: Yes (left message for son)  Were Treatment Team discharge recommendations reviewed with patient/caregiver?: Yes  Did patient/caregiver verbalize understanding of patient care needs?: Yes  Were patient/caregiver advised of the risks associated with not following Treatment Team discharge recommendations?: Yes    Contacts  Patient Contacts: Kunal Coyne, son  Relationship to Patient:: Family  Contact Method: Phone, In Person  Phone Number: [phone]  Reason/Outcome: Continuity of Care, Discharge Planning    Requested Home Health Care         Is the patient interested in HHC at discharge?: Yes  Home Health Discipline requested:: Nursing, Physical Therapy, Occupational Therapy    DME Referral Provided  Referral made for DME?: No              Treatment Team Recommendation: Other (TBD)  Discharge Destination Plan:: Other (TBD)  Transport at Discharge : Family      CM met with patient at bedside. Patient stroke pathway. Patient resides alone/ Son visits daily. Patient reported prior to admission independent with ADLs with some assistance for IADLs. PT recommending STR but patient declined. Patient open to HHC. Patient has scooter and uses stair lift.     CM left message with patient's son. CM to follow through discharge.        Update:  CM spoke with son who is in agreement of not wanting a STR for patient due to previous negative experience. Son is unable to provide direct care support to patient. There is no other family able to consistently assist patient. Son made aware of patient's desire for HHC and agreeable to blanket referrals. Son would like CM to speak with his sister, Bernadette Coyne ([phone]) as well. CM contacted Bernadette who expressed concerns surrounding patient returning home but understands patient's choice. Daughter would like to know patient's LTC plans. JESSICA  agreed to further discuss with patient.     Update:   Per round discussion, patient is an assist of two. Daughter advised that there is no one able to provide that support at home and patient would not allow two people into the home either. Daughter advised that mobility/transfers were a challenge for patient prior to admission. Daughter and son will speak further with patient regarding recommendations, as well.      Update:   CM met with patient and son at bedside and had discussion surrounding STR recommendation and safety risks of returning home. Patient was agreeable to blanket referrals being placed while she considers her options. Referrals placed.

## 2025-06-04 NOTE — SPEECH THERAPY NOTE
Speech Language/Pathology    Speech/Language Pathology Progress Note    Patient Name: Tala Coyne  Today's Date: 6/4/2025     Problem List  Principal Problem:    Stroke-like symptoms  Active Problems:    Essential hypertension    Mild dementia without behavioral disturbance, psychotic disturbance, mood disturbance, or anxiety (HCC)    Iron deficiency anemia       Past Medical History  Past Medical History[1]     Past Surgical History  Past Surgical History[2]      Subjective:  Pt alert. seated on edge of bed eating breakfast.   Objective:  Pt seen for f/u. Mild r droop persists. Pt cued for exaggerated retraction and to continue doing to improve ROM. No anterior spill w/ PO. Had [place] toast w/ adequate mastication, bolus formation, control, and transfer. Drank coffee from cup w/out spill. Prompt transfer and swallow.   Assessment:  Tolerating diet. Needed assist w/ getting the lid off of the cup. Mild R labial droop/weakness. Speech clear and fluent.  Plan/Recommendations:  D/C St . Regular diet. Continue to do exaggerated lip retraction (R side weakness, mild)          [1]   Past Medical History:  Diagnosis Date    Arthritis     Depression     Gout     H/O vaginal hysterectomy     resolved-4/17/2015    Hypertensive kidney disease with CKD (chronic kidney disease) 04/19/2024    Macular degeneration 03/02/2015    Osteoarthritis 03/02/2015   [2]   Past Surgical History:  Procedure Laterality Date    CATARACT EXTRACTION      TOTAL ABDOMINAL HYSTERECTOMY      with removal of both ovaries    VAGINAL HYSTERECTOMY      resolved-4/17/2015

## 2025-06-04 NOTE — ASSESSMENT & PLAN NOTE
89 y.o. female HTN, PPM (MRI conditional), CKD, Peripheral neuropathy, Macular degeneration, OA, GERD, Gout and Depression who presented to the ED via EMS on 6/2/25 with right facial droop, right hand numbness, mpaired coordination of the RUE prompting a stroke alert.  Patient also with chronic R shoulder pain radiating into the upper arm.   LKW 12noon.   BP as high as 190/84 in the ED  NIHSS 1 in the ED.  Not a TNK nor endovascular candidate.   Patient is maintained on Aspirin at baseline.     Imaging/Work-up:  CTH: No acute intracranial hemorrhage or acute territorial infarction.   CTA H/N: No large vessel occlusion, aneurysm, dissection, or high-grade stenosis     A1C 5.8  Troponins negative  WBC 11  Echo: EF 60%, severe left atrial and mild right atrial cavitary enlargement. Mild to moderate AV stenosis.  See full report for additional stable findings.  MRI brain wo:  Small acute infarct in the left precentral gyrus. No significant edema or hemorrhagic transformation.  Chronic microangiopathic change.  Mild ethmoidal sinus disease.      Impression: Neuro exam continues to reveal R lower facial droop, mild RUE weakness, chronic LLE>RLE weakness. Additionally patient with poor vision and hearing at baseline. MRI brain confirms a small acute infarct in the left precentral gyrus. Echo notable for bi-atrial enlargement. Etiology concerning for cardioembolic process. Would treat with DAPT followed by monotherapy with Plavix for now (with a P2Y12 checked in a week); however, would recommend long term cardiac monitoring to evaluate for paroxysmal Afib as a potential etiology especially in like of patient's echo findings. If Afib is captured, then would recommend transitioning to DOAC. Additionally patient will need PT/OT/SLP evaluation. I would expect her to benefit from therapy services, possibly in the home. Additional consideration for home safety eval as patient lives at home alone and prior to this was  independent with ADLs and most IADLs. She does have a son who visits most days, who lives 20 minutes away, who may need to provide some additional assistance for her.   No further inpatient neurologic recommendations at this time.     Plan:  Acute ischemic stroke protocol  S/p Aspirin and Plavix load  Continue DAPT with Aspirin 81mg and Plavix 75mg daily x 21 days, then transition to monotherapy with Plavix  Would recommend P2Y12 in 1 week. This needs to be done at [facility] or [facility].   Atorvastatin 40mg qhs  Telemetry  B12 and folate pending  Secondary stroke risk factor modification  Goal of normotension  Goal of normothermia and euglycemia   PT/OT/ST  Stroke Education  Frequent neurological checks  Stat CT head with worsening in neuro exam  Notify neurology with any changes in exam   Outpatient follow-up with cardiology for long term cardiac monitoring ie. Zio patch vs loop recorder

## 2025-06-04 NOTE — PROGRESS NOTES
Progress Note - Hospitalist   Name: Tala Coyne 89 y.o. female I MRN: [id number]  Unit/Bed#: E4 -01 I Date of Admission: 6/2/2025   Date of Service: 6/4/2025 I Hospital Day: 1    Assessment & Plan  Stroke (cerebrum) (HCC)  Presents with right sided weakness, poor coordination, facial droop. Concern for TIA/CVA  CT brain, CTA head and neck negative for any acute process  MRI brain confirming small acute infarct involving left precentral gyrus  Neurology consulted  Continue dual antiplatelet therapy plus statin  Recommendation for long-term cardiac monitoring such as Zio patch or loop recorder.  Will refer to cardiology on discharge  PT/OT with recommendation for rehab  Case management to discuss with patient and family to offer discharge options  Essential hypertension  Continue amlodipine, Lasix, losartan  Mild dementia without behavioral disturbance, psychotic disturbance, mood disturbance, or anxiety (HCC)  Continue supportive care  Iron deficiency anemia  History of iron deficiency  Hemoglobin at baseline  Will order IV iron while hospitalized    VTE Pharmacologic Prophylaxis: VTE Score: 4 Moderate Risk (Score 3-4) - Pharmacological DVT Prophylaxis Ordered: heparin.    Mobility:   Basic Mobility Inpatient Raw Score: 14  JH-HLM Goal: 4: Move to chair/commode  JH-HLM Achieved: 5: Stand (1 or more minutes)  JH-HLM Goal achieved. Continue to encourage appropriate mobility.    Patient Centered Rounds: I performed bedside rounds with nursing staff today.   Discussions with Specialists or Other Care Team Provider: Independently reviewed with neurology    Education and Discussions with Family / Patient: Updated  (son) at bedside.    Current Length of Stay: 1 day(s)  Current Patient Status: Inpatient   Certification Statement: The patient will continue to require additional inpatient hospital stay due to management of acute stroke, safe discharge planning  Discharge Plan: Anticipate discharge in  24-48 hrs to rehab facility.    Code Status: Level 3 - DNAR and DNI    Subjective   Patient seen and evaluated at bedside.  She is alert and oriented x 3 and reports she is having difficulty with her right hand.    Objective :  Temp:  [97.6 °F (36.4 °C)-98.8 °F (37.1 °C)] 97.7 °F (36.5 °C)  HR:  [65-82] 71  BP: (118-162)/(57-82) 138/78  Resp:  [16-18] 18  SpO2:  [92 %-96 %] 96 %  O2 Device: None (Room air)    Body mass index is 25.61 kg/m².     Input and Output Summary (last 24 hours):     Intake/Output Summary (Last 24 hours) at 6/4/2025 1705  Last data filed at 6/4/2025 1300  Gross per 24 hour   Intake 560 ml   Output 400 ml   Net 160 ml       Physical Exam  Vitals reviewed.   Constitutional:       General: She is not in acute distress.     Appearance: She is well-developed. She is not ill-appearing, toxic-appearing or diaphoretic.   HENT:      Head: Normocephalic and atraumatic.      Mouth/Throat:      Mouth: Mucous membranes are moist.     Eyes:      General: No scleral icterus.     Extraocular Movements: Extraocular movements intact.       Cardiovascular:      Rate and Rhythm: Normal rate and regular rhythm.      Heart sounds: Normal heart sounds.   Pulmonary:      Effort: Pulmonary effort is normal. No respiratory distress.      Breath sounds: Normal breath sounds. No wheezing or rales.   Abdominal:      General: There is no distension.      Palpations: Abdomen is soft.      Tenderness: There is no abdominal tenderness. There is no guarding or rebound.     Musculoskeletal:         General: No swelling, tenderness or deformity.     Skin:     General: Skin is warm and dry.     Neurological:      General: No focal deficit present.      Mental Status: She is alert. Mental status is at baseline.      Comments: Facial droop, right  3 out of 5 compared to left   Psychiatric:         Mood and Affect: Mood normal.         Behavior: Behavior normal.         Thought Content: Thought content normal.         Judgment:  Judgment normal.           Lines/Drains:        Telemetry:  Telemetry Orders (From admission, onward)               24 Hour Telemetry Monitoring  Continuous x 24 Hours (Telem)        Expiring   Question:  Reason for 24 Hour Telemetry  Answer:  TIA/Suspected CVA/ Confirmed CVA                     Telemetry Reviewed: Normal Sinus Rhythm  Indication for Continued Telemetry Use: Acute CVA               Lab Results: I have reviewed the following results:   Results from last 7 days   Lab Units 06/04/25  0609   WBC Thousand/uL 4.86   HEMOGLOBIN g/dL 9.5*   HEMATOCRIT % 30.7*   PLATELETS Thousands/uL 98*     Results from last 7 days   Lab Units 06/04/25  0609   SODIUM mmol/L 144   POTASSIUM mmol/L 3.8   CHLORIDE mmol/L 113*   CO2 mmol/L 26   BUN mg/dL 29*   CREATININE mg/dL 1.06   ANION GAP mmol/L 5   CALCIUM mg/dL 9.9   GLUCOSE RANDOM mg/dL 89     Results from last 7 days   Lab Units 06/02/25  2059   INR  0.98     Results from last 7 days   Lab Units 06/02/25  2104   POC GLUCOSE mg/dl 111     Results from last 7 days   Lab Units 06/03/25  0617   HEMOGLOBIN A1C % 5.8*           Recent Cultures (last 7 days):         Imaging Results Review: No pertinent imaging studies reviewed.  Other Study Results Review: No additional pertinent studies reviewed.    Last 24 Hours Medication List:     Current Facility-Administered Medications:     acetaminophen (TYLENOL) tablet 650 mg, Q4H PRN    aluminum-magnesium hydroxide-simethicone (MAALOX) oral suspension 30 mL, Q6H PRN    amLODIPine (NORVASC) tablet 2.5 mg, BID    aspirin (ECOTRIN LOW STRENGTH) EC tablet 81 mg, Daily    atorvastatin (LIPITOR) tablet 40 mg, QPM    clopidogrel (PLAVIX) tablet 75 mg, Daily    dorzolamide-timolol (COSOPT) 2-0.5 % ophthalmic solution 1 drop, Q12H CHANEL    doxepin (SINEquan) capsule 50 mg, HS    furosemide (LASIX) tablet 20 mg, Daily    gabapentin (NEURONTIN) capsule 300 mg, BID    heparin (porcine) subcutaneous injection 5,000 Units, Q8H CHANEL     ipratropium-albuterol (DUO-NEB) 0.5-2.5 mg/3 mL inhalation solution 3 mL, Q6H PRN    losartan (COZAAR) tablet 25 mg, Daily    ondansetron (ZOFRAN) injection 4 mg, Q6H PRN    polyethylene glycol (MIRALAX) packet 17 g, Daily PRN    Administrative Statements   Today, Patient Was Seen By: Amish Leblanc, DO  I have spent a total time of 57 minutes in caring for this patient on the day of the visit/encounter including Diagnostic results, Impressions, Counseling / Coordination of care, Documenting in the medical record, Reviewing/placing orders in the medical record (including tests, medications, and/or procedures), and Communicating with other healthcare professionals .    **Please Note: This note may have been constructed using a voice recognition system.**

## 2025-06-04 NOTE — PLAN OF CARE
Problem: Potential for Falls  Goal: Patient will remain free of falls  Description: INTERVENTIONS:  - Educate patient/family on patient safety including physical limitations  - Instruct patient to call for assistance with activity   - Consider consulting OT/PT to assist with strengthening/mobility based on AM PAC & JH-HLM score  - Consult OT/PT to assist with strengthening/mobility   - Keep Call bell within reach  - Keep bed low and locked with side rails adjusted as appropriate  - Keep care items and personal belongings within reach  - Initiate and maintain comfort rounds  - Make Fall Risk Sign visible to staff  - Offer Toileting every 2 Hours, in advance of need  - Initiate/Maintain bed alarm  - Obtain necessary fall risk management equipment: alarms  - Apply yellow socks and bracelet for high fall risk patients  - Consider moving patient to room near nurses station  Outcome: Progressing     Problem: Neurological Deficit  Goal: Neurological status is stable or improving  Description: Interventions:  - Monitor and assess patient's level of consciousness, motor function, sensory function, and level of assistance needed for ADLs.   - Monitor and report changes from baseline. Collaborate with interdisciplinary team to initiate plan and implement interventions as ordered.   - Provide and maintain a safe environment.  - Consider seizure precautions.  - Consider fall precautions.  - Consider aspiration precautions.  - Consider bleeding precautions.  Outcome: Progressing     Problem: Activity Intolerance/Impaired Mobility  Goal: Mobility/activity is maintained at optimum level for patient  Description: Interventions:  - Assess and monitor patient  barriers to mobility and need for assistive/adaptive devices.  - Assess patient's emotional response to limitations.  - Collaborate with interdisciplinary team and initiate plans and interventions as ordered.  - Encourage independent activity per ability.  - Maintain proper  body alignment.  - Perform active/passive rom as tolerated/ordered.  - Plan activities to conserve energy.  - Turn patient as appropriate  Outcome: Progressing     Problem: Communication Impairment  Goal: Ability to express needs and understand communication  Description: Assess patient's communication skills and ability to understand information.  Patient will demonstrate use of effective communication techniques, alternative methods of communication and understanding even if not able to speak.     - Encourage communication and provide alternate methods of communication as needed.  - Collaborate with case management/ for discharge needs.  - Include patient/family/caregiver in decisions related to communication.  Outcome: Progressing     Problem: Potential for Aspiration  Goal: Non-ventilated patient's risk of aspiration is minimized  Description: Assess and monitor vital signs, respiratory status, and labs (WBC).  Monitor for signs of aspiration (tachypnea, cough, rales, wheezing, cyanosis, fever).    - Assess and monitor patient's ability to swallow.  - Place patient up in chair to eat if possible.  - HOB up at 90 degrees to eat if unable to get patient up into chair.  - Supervise patient during oral intake.   - Instruct patient/ family to take small bites.  - Instruct patient/ family to take small single sips when taking liquids.  - Follow patient-specific strategies generated by speech pathologist.  Outcome: Progressing  Goal: Ventilated patient's risk of aspiration is minimized  Description: Assess and monitor vital signs, respiratory status, airway cuff pressure, and labs (WBC).  Monitor for signs of aspiration (tachypnea, cough, rales, wheezing, cyanosis, fever).    - Elevate head of bed 30 degrees if patient has tube feeding.  - Monitor tube feeding.  Outcome: Progressing     Problem: Nutrition  Goal: Nutrition/Hydration status is improving  Description: Monitor and assess patient's  nutrition/hydration status for malnutrition (ex- brittle hair, bruises, dry skin, pale skin and conjunctiva, muscle wasting, smooth red tongue, and disorientation). Collaborate with interdisciplinary team and initiate plan and interventions as ordered.  Monitor patient's weight and dietary intake as ordered or per policy. Utilize nutrition screening tool and intervene per policy. Determine patient's food preferences and provide high-protein, high-caloric foods as appropriate.     - Assist patient with eating.  - Allow adequate time for meals.  - Encourage patient to take dietary supplement as ordered.  - Collaborate with clinical nutritionist.  - Include patient/family/caregiver in decisions related to nutrition.  Outcome: Progressing     Problem: PAIN - ADULT  Goal: Verbalizes/displays adequate comfort level or baseline comfort level  Description: Interventions:  - Encourage patient to monitor pain and request assistance  - Assess pain using appropriate pain scale  - Administer analgesics as ordered based on type and severity of pain and evaluate response  - Implement non-pharmacological measures as appropriate and evaluate response  - Consider cultural and social influences on pain and pain management  - Notify physician/advanced practitioner if interventions unsuccessful or patient reports new pain  - Educate patient/family on pain management process including their role and importance of  reporting pain   - Provide non-pharmacologic/complimentary pain relief interventions  Outcome: Progressing     Problem: INFECTION - ADULT  Goal: Absence or prevention of progression during hospitalization  Description: INTERVENTIONS:  - Assess and monitor for signs and symptoms of infection  - Monitor lab/diagnostic results  - Monitor all insertion sites, i.e. indwelling lines, tubes, and drains  - Monitor endotracheal if appropriate and nasal secretions for changes in amount and color  - Harris appropriate cooling/warming  therapies per order  - Administer medications as ordered  - Instruct and encourage patient and family to use good hand hygiene technique  - Identify and instruct in appropriate isolation precautions for identified infection/condition  Outcome: Progressing     Problem: SAFETY ADULT  Goal: Patient will remain free of falls  Description: INTERVENTIONS:  - Educate patient/family on patient safety including physical limitations  - Instruct patient to call for assistance with activity   - Consider consulting OT/PT to assist with strengthening/mobility based on AM PAC & JH-HLM score  - Consult OT/PT to assist with strengthening/mobility   - Keep Call bell within reach  - Keep bed low and locked with side rails adjusted as appropriate  - Keep care items and personal belongings within reach  - Initiate and maintain comfort rounds  - Make Fall Risk Sign visible to staff  - Offer Toileting every 2 Hours, in advance of need  - Initiate/Maintain bed alarm  - Obtain necessary fall risk management equipment: alarms  - Apply yellow socks and bracelet for high fall risk patients  - Consider moving patient to room near nurses station  Outcome: Progressing  Goal: Maintain or return to baseline ADL function  Description: INTERVENTIONS:  -  Assess patient's ability to carry out ADLs; assess patient's baseline for ADL function and identify physical deficits which impact ability to perform ADLs (bathing, care of mouth/teeth, toileting, grooming, dressing, etc.)  - Assess/evaluate cause of self-care deficits   - Assess range of motion  - Assess patient's mobility; develop plan if impaired  - Assess patient's need for assistive devices and provide as appropriate  - Encourage maximum independence but intervene and supervise when necessary  - Involve family in performance of ADLs  - Assess for home care needs following discharge   - Consider OT consult to assist with ADL evaluation and planning for discharge  - Provide patient education as  appropriate  - Monitor functional capacity and physical performance, use of AM PAC & -HLM   - Monitor gait, balance and fatigue with ambulation    Outcome: Progressing  Goal: Maintains/Returns to pre admission functional level  Description: INTERVENTIONS:  - Perform AM-PAC 6 Click Basic Mobility/ Daily Activity assessment daily.  - Set and communicate daily mobility goal to care team and patient/family/caregiver.   - Collaborate with rehabilitation services on mobility goals if consulted  - Perform Range of Motion 2 times a day.  - Reposition patient every 2 hours.  - Dangle patient 2 times a day  - Stand patient 2 times a day  - Ambulate patient 2 times a day  - Out of bed to chair 3 times a day   - Out of bed for meals 3 times a day  - Out of bed for toileting  - Record patient progress and toleration of activity level   Outcome: Progressing     Problem: DISCHARGE PLANNING  Goal: Discharge to home or other facility with appropriate resources  Description: INTERVENTIONS:  - Identify barriers to discharge w/patient and caregiver  - Arrange for needed discharge resources and transportation as appropriate  - Identify discharge learning needs (meds, wound care, etc.)  - Arrange for interpretive services to assist at discharge as needed  - Refer to Case Management Department for coordinating discharge planning if the patient needs post-hospital services based on physician/advanced practitioner order or complex needs related to functional status, cognitive ability, or social support system  Outcome: Progressing     Problem: Knowledge Deficit  Goal: Patient/family/caregiver demonstrates understanding of disease process, treatment plan, medications, and discharge instructions  Description: Complete learning assessment and assess knowledge base.  Interventions:  - Provide teaching at level of understanding  - Provide teaching via preferred learning methods  Outcome: Progressing     Problem: Prexisting or High Potential  for Compromised Skin Integrity  Goal: Skin integrity is maintained or improved  Description: INTERVENTIONS:  - Identify patients at risk for skin breakdown  - Assess and monitor skin integrity including under and around medical devices   - Assess and monitor nutrition and hydration status  - Monitor labs  - Assess for incontinence   - Turn and reposition patient  - Assist with mobility/ambulation  - Relieve pressure over janki prominences   - Avoid friction and shearing  - Provide appropriate hygiene as needed including keeping skin clean and dry  - Evaluate need for skin moisturizer/barrier cream  - Collaborate with interdisciplinary team  - Patient/family teaching  - Consider wound care consult    Assess:  - Review Erlin scale daily  - Clean and moisturize skin every day  - Inspect skin when repositioning, toileting, and assisting with ADLS  - Assess extremities for adequate circulation and sensation     Bed Management:  - Have minimal linens on bed & keep smooth, unwrinkled  - Change linens as needed when moist or perspiring  -  - Toileting:  - Offer bedside commode  - Assess for incontinence every shift  ]\    Activity:  - Mobilize patient 3 times a day  - Encourage activity and walks on unit  - Encourage or provide ROM exercises   - Turn and reposition patient every 2 Hours  - Use appropriate equipment to lift or move patient in bed    Skin Care:  - Avoid use of baby powder, tape, friction and shearing, hot water or constrictive clothing  - Relieve pressure over bony prominences using pillows  - Do not massage red bony areas    Outcome: Progressing

## 2025-06-04 NOTE — ASSESSMENT & PLAN NOTE
Presents with right sided weakness, poor coordination, facial droop. Concern for TIA/CVA  CT brain, CTA head and neck negative for any acute process  MRI brain confirming small acute infarct involving left precentral gyrus  Neurology consulted  Continue dual antiplatelet therapy plus statin  Recommendation for long-term cardiac monitoring such as Zio patch or loop recorder.  Will refer to cardiology on discharge  PT/OT with recommendation for rehab  Case management to discuss with patient and family to offer discharge options

## 2025-06-04 NOTE — RESTORATIVE TECHNICIAN NOTE
Restorative Technician Note      Patient Name: Tala Coyne     Restorative Tech Visit Date: 06/04/25  Note Type: Mobility  Patient Position Upon Consult: Supine  Activity Performed: Ambulated  Assistive Device: Roller walker  Patient Position at End of Consult: Bedside chair; All needs within reach; Bed/Chair alarm activated          ns

## 2025-06-04 NOTE — PROGRESS NOTES
Progress Note - Neurology   Name: Tala Coyne 89 y.o. female I MRN: [id number]  Unit/Bed#: E4 -01 I Date of Admission: 6/2/2025   Date of Service: 6/4/2025 I Hospital Day: 1    Assessment & Plan  Stroke (cerebrum) (HCC)  89 y.o. female HTN, PPM (MRI conditional), CKD, Peripheral neuropathy, Macular degeneration, OA, GERD, Gout and Depression who presented to the ED via EMS on 6/2/25 with right facial droop, right hand numbness, mpaired coordination of the RUE prompting a stroke alert.  Patient also with chronic R shoulder pain radiating into the upper arm.   LKW 12noon.   BP as high as 190/84 in the ED  NIHSS 1 in the ED.  Not a TNK nor endovascular candidate.   Patient is maintained on Aspirin at baseline.     Imaging/Work-up:  CTH: No acute intracranial hemorrhage or acute territorial infarction.   CTA H/N: No large vessel occlusion, aneurysm, dissection, or high-grade stenosis     A1C 5.8  Troponins negative  WBC 11  Echo: EF 60%, severe left atrial and mild right atrial cavitary enlargement. Mild to moderate AV stenosis.  See full report for additional stable findings.  MRI brain wo:  Small acute infarct in the left precentral gyrus. No significant edema or hemorrhagic transformation.  Chronic microangiopathic change.  Mild ethmoidal sinus disease.      Impression: Neuro exam continues to reveal R lower facial droop, mild RUE weakness, chronic LLE>RLE weakness. Additionally patient with poor vision and hearing at baseline. MRI brain confirms a small acute infarct in the left precentral gyrus. Echo notable for bi-atrial enlargement. Etiology concerning for cardioembolic process. Would treat with DAPT followed by monotherapy with Plavix for now (with a P2Y12 checked in a week); however, would recommend long term cardiac monitoring to evaluate for paroxysmal Afib as a potential etiology especially in like of patient's echo findings. If Afib is captured, then would recommend transitioning to DOAC.  Additionally patient will need PT/OT/SLP evaluation. I would expect her to benefit from therapy services, possibly in the home. Additional consideration for home safety eval as patient lives at home alone and prior to this was independent with ADLs and most IADLs. She does have a son who visits most days, who lives 20 minutes away, who may need to provide some additional assistance for her.   No further inpatient neurologic recommendations at this time.     Plan:  Acute ischemic stroke protocol  S/p Aspirin and Plavix load  Continue DAPT with Aspirin 81mg and Plavix 75mg daily x 21 days, then transition to monotherapy with Plavix  Would recommend P2Y12 in 1 week. This needs to be done at [facility] or [facility].   Atorvastatin 40mg qhs  Telemetry  B12 and folate pending  Secondary stroke risk factor modification  Goal of normotension  Goal of normothermia and euglycemia   PT/OT/ST  Stroke Education  Frequent neurological checks  Stat CT head with worsening in neuro exam  Notify neurology with any changes in exam   Outpatient follow-up with cardiology for long term cardiac monitoring ie. Zio patch vs loop recorder  Essential hypertension  Goal of normotension  Mild dementia without behavioral disturbance, psychotic disturbance, mood disturbance, or anxiety (HCC)  Conservative management of delirium: minimize noise, maintain day/night cycle, provide frequent redirection, avoid restraints if possible, etc.  Supportive care  Iron deficiency anemia  Management as per primary team    Tala Coyne will need follow-up in in 6 weeks with neurovascular team for Other in 60 minute appointment. They will not require outpatient neurological testing. Message sent to the schedulers.    Subjective   Patient reports she is doing well though does admit continued difficulty using her R hand especially with fine motor tasks. She denies any difficulty with eating or swallowing and denies drooling/liquids coming out of her mouth when drinking.      Review of Systems   Neurological:  Positive for speech difficulty (slurred) and weakness (RUE).       Objective :  Temp:  [96.7 °F (35.9 °C)-98.8 °F (37.1 °C)] 98.1 °F (36.7 °C)  HR:  [61-82] 76  BP: (118-166)/(57-84) 141/70  Resp:  [16-19] 18  SpO2:  [89 %-98 %] 94 %  O2 Device: None (Room air)    Physical Exam  Constitutional:       General: She is not in acute distress.     Appearance: She is not ill-appearing or toxic-appearing.   HENT:      Head: Normocephalic and atraumatic.   Pulmonary:      Effort: No respiratory distress.     Neurological Exam  Mental Status  Awake, alert and oriented to person, place and time.  Mild dysarthria. No aphasia. .    Cranial Nerves  Chronic impaired visual acuity. EOMs intact. Conjugate gaze. VFs intact. R lower facial droop. Tongue protrudes midline. .    Motor  Normal muscle bulk throughout. Normal muscle tone. Strength is 5/5 in all four extremities except as noted. Right pronator drift.  R deltoid - deferred due to chronic shoulder pain  R grasp and finger abduction 4  R Wrist extension 5-  R Triceps 5    RLE limited by pain but able to lift antigravity and distal strength is intact.  LLE unable to lift antigravity. Known L foot drop. .    Sensory  Light touch is normal in upper and lower extremities.     Coordination    SY finger movements on the right hand are impaired.        Lab Results: I have reviewed the following results:    VTE Pharmacologic Prophylaxis: Heparin

## 2025-06-04 NOTE — PLAN OF CARE
Problem: PHYSICAL THERAPY ADULT  Goal: Performs mobility at highest level of function for planned discharge setting.  See evaluation for individualized goals.  Description: Treatment/Interventions: LE strengthening/ROM, Functional transfer training, Therapeutic exercise, Cognitive reorientation, Patient/family training, Bed mobility, Equipment eval/education, Gait training, Compensatory technique education, Continued evaluation, Spoke to nursing, OT          See flowsheet documentation for full assessment, interventions and recommendations.  Outcome: Progressing  Note: Prognosis: Fair  Problem List: Decreased strength, Decreased range of motion, Impaired balance, Decreased mobility, Decreased coordination, Pain, Impaired hearing  Assessment: Pt. needed extensive A for ambulation this session. Noted L knee to be increasingly flexed and unable to maintain good weight bearing for ambulation. Pt. and son reproted she uses a scooter to gert around the house and does just transfers mostly. BP readings per dynamap 176/82 in supine and 176/82 in sitting and on manual 138/78 in sittin. Pt. reproted no adverse symptoms during the session. Pt. is a high fall risk and needs extensive A for ambulation. Pt. seated on chair post session with all needs withinr each and chair alarm engaged. Will continue to follow epr PT POC  Barriers to Discharge: None  Barriers to Discharge Comments: concerns for patient ability to cont to live independently  Rehab Resource Intensity Level, PT: II (Moderate Resource Intensity)    See flowsheet documentation for full assessment.

## 2025-06-04 NOTE — PLAN OF CARE
Problem: Potential for Falls  Goal: Patient will remain free of falls  Description: INTERVENTIONS:  - Educate patient/family on patient safety including physical limitations  - Instruct patient to call for assistance with activity   - Consider consulting OT/PT to assist with strengthening/mobility based on AM PAC & JH-HLM score  - Consult OT/PT to assist with strengthening/mobility   - Keep Call bell within reach  - Keep bed low and locked with side rails adjusted as appropriate  - Keep care items and personal belongings within reach  - Initiate and maintain comfort rounds  - Make Fall Risk Sign visible to staff  - Offer Toileting every 2 Hours, in advance of need  - Initiate/Maintain bed alarm  - Obtain necessary fall risk management equipment: alarms  - Apply yellow socks and bracelet for high fall risk patients  - Consider moving patient to room near nurses station  6/4/2025 0053 by Sarah Foy RN  Outcome: Progressing  6/4/2025 0051 by Sarah Foy RN  Outcome: Progressing     Problem: Neurological Deficit  Goal: Neurological status is stable or improving  Description: Interventions:  - Monitor and assess patient's level of consciousness, motor function, sensory function, and level of assistance needed for ADLs.   - Monitor and report changes from baseline. Collaborate with interdisciplinary team to initiate plan and implement interventions as ordered.   - Provide and maintain a safe environment.  - Consider seizure precautions.  - Consider fall precautions.  - Consider aspiration precautions.  - Consider bleeding precautions.  6/4/2025 0053 by Sarah Foy RN  Outcome: Progressing  6/4/2025 0051 by Sarah Foy RN  Outcome: Progressing     Problem: Activity Intolerance/Impaired Mobility  Goal: Mobility/activity is maintained at optimum level for patient  Description: Interventions:  - Assess and monitor patient  barriers to mobility and need for assistive/adaptive devices.  - Assess patient's emotional  response to limitations.  - Collaborate with interdisciplinary team and initiate plans and interventions as ordered.  - Encourage independent activity per ability.  - Maintain proper body alignment.  - Perform active/passive rom as tolerated/ordered.  - Plan activities to conserve energy.  - Turn patient as appropriate  6/4/2025 0053 by Sarah Foy RN  Outcome: Progressing  6/4/2025 0051 by Sarah Foy RN  Outcome: Progressing     Problem: Communication Impairment  Goal: Ability to express needs and understand communication  Description: Assess patient's communication skills and ability to understand information.  Patient will demonstrate use of effective communication techniques, alternative methods of communication and understanding even if not able to speak.     - Encourage communication and provide alternate methods of communication as needed.  - Collaborate with case management/ for discharge needs.  - Include patient/family/caregiver in decisions related to communication.  6/4/2025 0053 by Sarah Foy RN  Outcome: Progressing  6/4/2025 0051 by Sarah Foy RN  Outcome: Progressing     Problem: Potential for Aspiration  Goal: Non-ventilated patient's risk of aspiration is minimized  Description: Assess and monitor vital signs, respiratory status, and labs (WBC).  Monitor for signs of aspiration (tachypnea, cough, rales, wheezing, cyanosis, fever).    - Assess and monitor patient's ability to swallow.  - Place patient up in chair to eat if possible.  - HOB up at 90 degrees to eat if unable to get patient up into chair.  - Supervise patient during oral intake.   - Instruct patient/ family to take small bites.  - Instruct patient/ family to take small single sips when taking liquids.  - Follow patient-specific strategies generated by speech pathologist.  6/4/2025 0053 by Sarah Foy RN  Outcome: Progressing  6/4/2025 0051 by Sarah Foy RN  Outcome: Progressing  Goal: Ventilated patient's risk  of aspiration is minimized  Description: Assess and monitor vital signs, respiratory status, airway cuff pressure, and labs (WBC).  Monitor for signs of aspiration (tachypnea, cough, rales, wheezing, cyanosis, fever).    - Elevate head of bed 30 degrees if patient has tube feeding.  - Monitor tube feeding.  6/4/2025 0053 by Sarah Foy RN  Outcome: Progressing  6/4/2025 0051 by Sarah Foy RN  Outcome: Progressing     Problem: Nutrition  Goal: Nutrition/Hydration status is improving  Description: Monitor and assess patient's nutrition/hydration status for malnutrition (ex- brittle hair, bruises, dry skin, pale skin and conjunctiva, muscle wasting, smooth red tongue, and disorientation). Collaborate with interdisciplinary team and initiate plan and interventions as ordered.  Monitor patient's weight and dietary intake as ordered or per policy. Utilize nutrition screening tool and intervene per policy. Determine patient's food preferences and provide high-protein, high-caloric foods as appropriate.     - Assist patient with eating.  - Allow adequate time for meals.  - Encourage patient to take dietary supplement as ordered.  - Collaborate with clinical nutritionist.  - Include patient/family/caregiver in decisions related to nutrition.  6/4/2025 0053 by Sarah Foy RN  Outcome: Progressing  6/4/2025 0051 by Sarah Foy RN  Outcome: Progressing     Problem: PAIN - ADULT  Goal: Verbalizes/displays adequate comfort level or baseline comfort level  Description: Interventions:  - Encourage patient to monitor pain and request assistance  - Assess pain using appropriate pain scale  - Administer analgesics as ordered based on type and severity of pain and evaluate response  - Implement non-pharmacological measures as appropriate and evaluate response  - Consider cultural and social influences on pain and pain management  - Notify physician/advanced practitioner if interventions unsuccessful or patient reports new pain  -  Educate patient/family on pain management process including their role and importance of  reporting pain   - Provide non-pharmacologic/complimentary pain relief interventions  6/4/2025 0053 by Sarah Foy RN  Outcome: Progressing  6/4/2025 0051 by Sarah Foy RN  Outcome: Progressing     Problem: INFECTION - ADULT  Goal: Absence or prevention of progression during hospitalization  Description: INTERVENTIONS:  - Assess and monitor for signs and symptoms of infection  - Monitor lab/diagnostic results  - Monitor all insertion sites, i.e. indwelling lines, tubes, and drains  - Monitor endotracheal if appropriate and nasal secretions for changes in amount and color  - [place] appropriate cooling/warming therapies per order  - Administer medications as ordered  - Instruct and encourage patient and family to use good hand hygiene technique  - Identify and instruct in appropriate isolation precautions for identified infection/condition  6/4/2025 0053 by Sarah Foy RN  Outcome: Progressing  6/4/2025 0051 by Sarah Foy RN  Outcome: Progressing     Problem: SAFETY ADULT  Goal: Patient will remain free of falls  Description: INTERVENTIONS:  - Educate patient/family on patient safety including physical limitations  - Instruct patient to call for assistance with activity   - Consider consulting OT/PT to assist with strengthening/mobility based on AM PAC & JH-HLM score  - Consult OT/PT to assist with strengthening/mobility   - Keep Call bell within reach  - Keep bed low and locked with side rails adjusted as appropriate  - Keep care items and personal belongings within reach  - Initiate and maintain comfort rounds  - Make Fall Risk Sign visible to staff  - Offer Toileting every 2 Hours, in advance of need  - Initiate/Maintain bed alarm  - Obtain necessary fall risk management equipment: alarms  - Apply yellow socks and bracelet for high fall risk patients  - Consider moving patient to room near nurses station  6/4/2025  0053 by Sarah Foy RN  Outcome: Progressing  6/4/2025 0051 by Sarah Foy RN  Outcome: Progressing  Goal: Maintain or return to baseline ADL function  Description: INTERVENTIONS:  -  Assess patient's ability to carry out ADLs; assess patient's baseline for ADL function and identify physical deficits which impact ability to perform ADLs (bathing, care of mouth/teeth, toileting, grooming, dressing, etc.)  - Assess/evaluate cause of self-care deficits   - Assess range of motion  - Assess patient's mobility; develop plan if impaired  - Assess patient's need for assistive devices and provide as appropriate  - Encourage maximum independence but intervene and supervise when necessary  - Involve family in performance of ADLs  - Assess for home care needs following discharge   - Consider OT consult to assist with ADL evaluation and planning for discharge  - Provide patient education as appropriate  - Monitor functional capacity and physical performance, use of AM PAC & JH-HLM   - Monitor gait, balance and fatigue with ambulation    6/4/2025 0053 by Sarah Foy RN  Outcome: Progressing  6/4/2025 0051 by Sarah Foy RN  Outcome: Progressing  Goal: Maintains/Returns to pre admission functional level  Description: INTERVENTIONS:  - Perform AM-PAC 6 Click Basic Mobility/ Daily Activity assessment daily.  - Set and communicate daily mobility goal to care team and patient/family/caregiver.   - Collaborate with rehabilitation services on mobility goals if consulted  - Perform Range of Motion 2 times a day.  - Reposition patient every 2 hours.  - Dangle patient 2 times a day  - Stand patient 2 times a day  - Ambulate patient 2 times a day  - Out of bed to chair 3 times a day   - Out of bed for meals 3 times a day  - Out of bed for toileting  - Record patient progress and toleration of activity level   6/4/2025 0053 by Sarah Foy RN  Outcome: Progressing  6/4/2025 0051 by Sarah Foy RN  Outcome: Progressing     Problem:  DISCHARGE PLANNING  Goal: Discharge to home or other facility with appropriate resources  Description: INTERVENTIONS:  - Identify barriers to discharge w/patient and caregiver  - Arrange for needed discharge resources and transportation as appropriate  - Identify discharge learning needs (meds, wound care, etc.)  - Arrange for interpretive services to assist at discharge as needed  - Refer to Case Management Department for coordinating discharge planning if the patient needs post-hospital services based on physician/advanced practitioner order or complex needs related to functional status, cognitive ability, or social support system  6/4/2025 0053 by Sarah Foy RN  Outcome: Progressing  6/4/2025 0051 by Sarah Foy RN  Outcome: Progressing     Problem: Knowledge Deficit  Goal: Patient/family/caregiver demonstrates understanding of disease process, treatment plan, medications, and discharge instructions  Description: Complete learning assessment and assess knowledge base.  Interventions:  - Provide teaching at level of understanding  - Provide teaching via preferred learning methods  6/4/2025 0053 by Sarah Foy RN  Outcome: Progressing  6/4/2025 0051 by Sarha Foy RN  Outcome: Progressing     Problem: Prexisting or High Potential for Compromised Skin Integrity  Goal: Skin integrity is maintained or improved  Description: INTERVENTIONS:  - Identify patients at risk for skin breakdown  - Assess and monitor skin integrity including under and around medical devices   - Assess and monitor nutrition and hydration status  - Monitor labs  - Assess for incontinence   - Turn and reposition patient  - Assist with mobility/ambulation  - Relieve pressure over janki prominences   - Avoid friction and shearing  - Provide appropriate hygiene as needed including keeping skin clean and dry  - Evaluate need for skin moisturizer/barrier cream  - Collaborate with interdisciplinary team  - Patient/family teaching  - Consider wound  care consult    Assess:  - Review Erlin scale daily  - Clean and moisturize skin every day  - Inspect skin when repositioning, toileting, and assisting with ADLS  - Assess extremities for adequate circulation and sensation     Bed Management:  - Have minimal linens on bed & keep smooth, unwrinkled  - Change linens as needed when moist or perspiring  -  - Toileting:  - Offer bedside commode  - Assess for incontinence every shift  ]\    Activity:  - Mobilize patient 3 times a day  - Encourage activity and walks on unit  - Encourage or provide ROM exercises   - Turn and reposition patient every 2 Hours  - Use appropriate equipment to lift or move patient in bed    Skin Care:  - Avoid use of baby powder, tape, friction and shearing, hot water or constrictive clothing  - Relieve pressure over bony prominences using pillows  - Do not massage red bony areas    6/4/2025 0053 by Sarah Foy RN  Outcome: Progressing  6/4/2025 0051 by Sarah Foy RN  Outcome: Progressing

## 2025-06-04 NOTE — PHYSICAL THERAPY NOTE
Physical Therapy Treatment Note     06/04/25 1438   PT Last Visit   PT Visit Date 06/04/25   Note Type   Note Type Treatment   Pain Assessment   Pain Assessment Tool 0-10   Pain Location/Orientation Orientation: Right;Location: Leg;Location: Knee   Restrictions/Precautions   Weight Bearing Precautions Per Order No   Other Precautions Hard of hearing;Pain;Fall Risk;Bed Alarm;Chair Alarm   General   Chart Reviewed Yes   Family/Caregiver Present Yes   Subjective   Subjective Pt. agreeable to PT   Bed Mobility   Supine to Sit 4  Minimal assistance   Additional items Assist x 1;Increased time required;Bedrails;HOB elevated;Verbal cues;LE management   Transfers   Sit to Stand 4  Minimal assistance   Additional items Assist x 2;Increased time required;Verbal cues   Stand to Sit 3  Moderate assistance   Additional items Assist x 2;Increased time required;Verbal cues   Stand pivot 3  Moderate assistance   Additional items Assist x 2;Increased time required;Verbal cues   Ambulation/Elevation   Gait pattern Improper Weight shift;Inconsistent kaye;Decreased foot clearance;Decreased L stance;Forward Flexion;Decreased toe off;Decreased heel strike  (L knee flexed)   Gait Assistance 3  Moderate assist   Additional items Assist x 2;Verbal cues   Assistive Device Rolling walker   Distance 2ft   Balance   Static Sitting Good   Dynamic Sitting Fair   Static Standing Poor +   Dynamic Standing Poor   Ambulatory Poor -   Endurance Deficit   Endurance Deficit Yes   Endurance Deficit Description pain   Activity Tolerance   Activity Tolerance Patient tolerated treatment well   Nurse Made Aware yes   Exercises   THR Sitting;Supine;Bilateral;AROM;AAROM;20 reps  (Assistance for RLE)   Assessment   Prognosis Fair   Problem List Decreased strength;Decreased range of motion;Impaired balance;Decreased mobility;Decreased coordination;Pain;Impaired hearing   Assessment Pt. needed extensive A for ambulation this session. Noted L knee to be  increasingly flexed and unable to maintain good weight bearing for ambulation. Pt. and son reproted she uses a scooter to gert around the house and does just transfers mostly. BP readings per dynamap 176/82 in supine and 176/82 in sitting and on manual 138/78 in sittin. Pt. reproted no adverse symptoms during the session. Pt. is a high fall risk and needs extensive A for ambulation. Pt. seated on chair post session with all needs withinr each and chair alarm engaged. Will continue to follow epr PT POC   Barriers to Discharge None   Goals   Patient Goals None reported   STG Expiration Date 06/13/25   PT Treatment Day 1   Plan   Treatment/Interventions Functional transfer training;LE strengthening/ROM;Therapeutic exercise;Spoke to nursing;Family;Gait training;Bed mobility;Equipment eval/education;Patient/family training   Progress Progressing toward goals   PT Frequency Other (Comment)  (4-5x/week)   Discharge Recommendation   Rehab Resource Intensity Level, PT II (Moderate Resource Intensity)   AM-PAC Basic Mobility Inpatient   Turning in Flat Bed Without Bedrails 3   Lying on Back to Sitting on Edge of Flat Bed Without Bedrails 3   Moving Bed to Chair 2   Standing Up From Chair Using Arms 2   Walk in Room 2   Climb 3-5 Stairs With Railing 2   Basic Mobility Inpatient Raw Score 14   Basic Mobility Standardized Score 35.55   [facility] Highest Level Of Mobility   -HL Goal 4: Move to chair/commode   -HLM Achieved 5: Stand (1 or more minutes)   End of Consult   Patient Position at End of Consult All needs within reach;Bed/Chair alarm activated;Bedside chair           Everardo Peraza PTA    An AM-PAC basic mobility standardized score less than 42.9 suggest the patient may benefit from discharge to post-acute rehab services.

## 2025-06-05 PROCEDURE — 97530 THERAPEUTIC ACTIVITIES: CPT

## 2025-06-05 PROCEDURE — 97116 GAIT TRAINING THERAPY: CPT

## 2025-06-05 PROCEDURE — 97110 THERAPEUTIC EXERCISES: CPT

## 2025-06-05 PROCEDURE — 97535 SELF CARE MNGMENT TRAINING: CPT

## 2025-06-05 RX ADMIN — GABAPENTIN 300 MG: 300 CAPSULE ORAL at 17:15

## 2025-06-05 RX ADMIN — CLOPIDOGREL BISULFATE 75 MG: 75 TABLET, FILM COATED ORAL at 09:15

## 2025-06-05 RX ADMIN — GABAPENTIN 300 MG: 300 CAPSULE ORAL at 09:15

## 2025-06-05 RX ADMIN — LOSARTAN POTASSIUM 25 MG: 25 TABLET, FILM COATED ORAL at 09:15

## 2025-06-05 RX ADMIN — HEPARIN SODIUM 5000 UNITS: 5000 INJECTION INTRAVENOUS; SUBCUTANEOUS at 21:18

## 2025-06-05 RX ADMIN — ASPIRIN 81 MG: 81 TABLET, COATED ORAL at 09:15

## 2025-06-05 RX ADMIN — FUROSEMIDE 20 MG: 20 TABLET ORAL at 09:15

## 2025-06-05 RX ADMIN — HEPARIN SODIUM 5000 UNITS: 5000 INJECTION INTRAVENOUS; SUBCUTANEOUS at 05:17

## 2025-06-05 RX ADMIN — DOXEPIN HYDROCHLORIDE 50 MG: 50 CAPSULE ORAL at 20:16

## 2025-06-05 RX ADMIN — IRON SUCROSE 200 MG: 20 INJECTION, SOLUTION INTRAVENOUS at 17:41

## 2025-06-05 RX ADMIN — DORZOLAMIDE HYDROCHLORIDE AND TIMOLOL MALEATE 1 DROP: 20; 5 SOLUTION OPHTHALMIC at 09:16

## 2025-06-05 RX ADMIN — AMLODIPINE BESYLATE 2.5 MG: 2.5 TABLET ORAL at 09:15

## 2025-06-05 RX ADMIN — HEPARIN SODIUM 5000 UNITS: 5000 INJECTION INTRAVENOUS; SUBCUTANEOUS at 13:39

## 2025-06-05 RX ADMIN — AMLODIPINE BESYLATE 2.5 MG: 2.5 TABLET ORAL at 17:15

## 2025-06-05 RX ADMIN — ATORVASTATIN CALCIUM 40 MG: 40 TABLET, FILM COATED ORAL at 17:15

## 2025-06-05 NOTE — PROGRESS NOTES
Progress Note - Hospitalist   Name: Tala Coyne 89 y.o. female I MRN: [id number]  Unit/Bed#: E4 -01 I Date of Admission: 6/2/2025   Date of Service: 6/5/2025 I Hospital Day: 2    Assessment & Plan  Stroke (cerebrum) (HCC)  Presents with right sided weakness, poor coordination, facial droop. Concern for TIA/CVA  CT brain, CTA head and neck negative for any acute process  MRI brain confirming small acute infarct involving left precentral gyrus  Neurology consulted  Continue dual antiplatelet therapy plus statin  Recommendation for long-term cardiac monitoring such as Zio patch or loop recorder. 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 Will interrogate  PT/OT with recommendation for rehab  Awaiting placement to rehab  Medically stable for discharge  Essential hypertension  Continue amlodipine, Lasix, losartan  Mild dementia without behavioral disturbance, psychotic disturbance, mood disturbance, or anxiety (HCC)  Continue supportive care  Iron deficiency anemia  History of iron deficiency  Hemoglobin at baseline  Will order IV iron while hospitalized    VTE Pharmacologic Prophylaxis: VTE Score: 4 Moderate Risk (Score 3-4) - Pharmacological DVT Prophylaxis Ordered: heparin.    Mobility:   Basic Mobility Inpatient Raw Score: 14  JH-HLM Goal: 4: Move to chair/commode  JH-HLM Achieved: 5: Stand (1 or more minutes)  JH-HLM Goal achieved. Continue to encourage appropriate mobility.    Patient Centered Rounds: I performed bedside rounds with nursing staff today.   Discussions with Specialists or Other Care Team Provider:     Education and Discussions with Family / Patient:     Current Length of Stay: 2 day(s)  Current Patient Status: Inpatient   Certification Statement: The patient will continue to require additional inpatient hospital stay due to need for placement   Discharge Plan: Anticipate discharge in 24-48 hrs to rehab facility.    Code Status: Level 3 - DNAR and DNI    Subjective   No complaints.    Objective  :  Temp:  [97.4 °F (36.3 °C)-98 °F (36.7 °C)] 98 °F (36.7 °C)  HR:  [61-86] 79  BP: (128-161)/(65-83) 156/83  Resp:  [18-20] 20  SpO2:  [90 %-98 %] 96 %  O2 Device: None (Room air)    Body mass index is 25.61 kg/m².     Input and Output Summary (last 24 hours):     Intake/Output Summary (Last 24 hours) at 6/5/2025 1615  Last data filed at 6/5/2025 1601  Gross per 24 hour   Intake 780 ml   Output 1950 ml   Net -1170 ml       Physical Exam  Vitals reviewed.   Constitutional:       General: She is not in acute distress.     Appearance: She is well-developed. She is not ill-appearing, toxic-appearing or diaphoretic.   HENT:      Head: Normocephalic and atraumatic.      Mouth/Throat:      Mouth: Mucous membranes are moist.     Eyes:      General: No scleral icterus.     Extraocular Movements: Extraocular movements intact.       Cardiovascular:      Rate and Rhythm: Normal rate and regular rhythm.      Heart sounds: Normal heart sounds.   Pulmonary:      Effort: Pulmonary effort is normal. No respiratory distress.      Breath sounds: Normal breath sounds. No wheezing or rales.   Abdominal:      General: There is no distension.      Palpations: Abdomen is soft.      Tenderness: There is no abdominal tenderness. There is no guarding or rebound.     Musculoskeletal:         General: No swelling, tenderness or deformity.     Skin:     General: Skin is warm and dry.     Neurological:      General: No focal deficit present.      Mental Status: She is alert.     Psychiatric:         Mood and Affect: Mood normal.         Behavior: Behavior normal.         Thought Content: Thought content normal.         Judgment: Judgment normal.           Lines/Drains:        Telemetry:  Telemetry Orders (From admission, onward)               24 Hour Telemetry Monitoring  Continuous x 24 Hours (Telem)        Expiring   Question:  Reason for 24 Hour Telemetry  Answer:  TIA/Suspected CVA/ Confirmed CVA                     Telemetry Reviewed:  vpaced  Indication for Continued Telemetry Use: Acute CVA               Lab Results: I have reviewed the following results:   Results from last 7 days   Lab Units 06/04/25  0609   WBC Thousand/uL 4.86   HEMOGLOBIN g/dL 9.5*   HEMATOCRIT % 30.7*   PLATELETS Thousands/uL 98*     Results from last 7 days   Lab Units 06/04/25  0609   SODIUM mmol/L 144   POTASSIUM mmol/L 3.8   CHLORIDE mmol/L 113*   CO2 mmol/L 26   BUN mg/dL 29*   CREATININE mg/dL 1.06   ANION GAP mmol/L 5   CALCIUM mg/dL 9.9   GLUCOSE RANDOM mg/dL 89     Results from last 7 days   Lab Units 06/02/25  2059   INR  0.98     Results from last 7 days   Lab Units 06/02/25  2104   POC GLUCOSE mg/dl 111     Results from last 7 days   Lab Units 06/03/25  0617   HEMOGLOBIN A1C % 5.8*           Recent Cultures (last 7 days):         Imaging Results Review: No pertinent imaging studies reviewed.  Other Study Results Review: No additional pertinent studies reviewed.    Last 24 Hours Medication List:     Current Facility-Administered Medications:     acetaminophen (TYLENOL) tablet 650 mg, Q4H PRN    aluminum-magnesium hydroxide-simethicone (MAALOX) oral suspension 30 mL, Q6H PRN    amLODIPine (NORVASC) tablet 2.5 mg, BID    aspirin (ECOTRIN LOW STRENGTH) EC tablet 81 mg, Daily    atorvastatin (LIPITOR) tablet 40 mg, QPM    clopidogrel (PLAVIX) tablet 75 mg, Daily    dorzolamide-timolol (COSOPT) 2-0.5 % ophthalmic solution 1 drop, Q12H CHANEL    doxepin (SINEquan) capsule 50 mg, HS    furosemide (LASIX) tablet 20 mg, Daily    gabapentin (NEURONTIN) capsule 300 mg, BID    heparin (porcine) subcutaneous injection 5,000 Units, Q8H CHANEL    ipratropium-albuterol (DUO-NEB) 0.5-2.5 mg/3 mL inhalation solution 3 mL, Q6H PRN    iron sucrose (VENOFER) 200 mg in sodium chloride 0.9 % 100 mL IVPB, Q24H, Last Rate: Stopped (06/04/25 2101)    losartan (COZAAR) tablet 25 mg, Daily    ondansetron (ZOFRAN) injection 4 mg, Q6H PRN    polyethylene glycol (MIRALAX) packet 17 g, Daily  PRN    Administrative Statements   Today, Patient Was Seen By: Amish Leblanc, DO  I have spent a total time of 35 minutes in caring for this patient on the day of the visit/encounter including Impressions, Counseling / Coordination of care, Documenting in the medical record, Reviewing/placing orders in the medical record (including tests, medications, and/or procedures), and Communicating with other healthcare professionals .    **Please Note: This note may have been constructed using a voice recognition system.**

## 2025-06-05 NOTE — RESTORATIVE TECHNICIAN NOTE
Restorative Technician Note      Patient Name: Tala Coyne     Restorative Tech Visit Date: 06/05/25  Note Type: Mobility  Patient Position Upon Consult: Bedside chair  Activity Performed: Ambulated  Assistive Device: Roller walker  Patient Position at End of Consult: Bedside chair; All needs within reach; Bed/Chair alarm activated            ns

## 2025-06-05 NOTE — PLAN OF CARE
Problem: Potential for Falls  Goal: Patient will remain free of falls  Description: INTERVENTIONS:  - Educate patient/family on patient safety including physical limitations  - Instruct patient to call for assistance with activity   - Consider consulting OT/PT to assist with strengthening/mobility based on AM PAC & JH-HLM score  - Consult OT/PT to assist with strengthening/mobility   - Keep Call bell within reach  - Keep bed low and locked with side rails adjusted as appropriate  - Keep care items and personal belongings within reach  - Initiate and maintain comfort rounds  - Make Fall Risk Sign visible to staff  - Offer Toileting every 2 Hours, in advance of need  - Initiate/Maintain bed alarm  - Obtain necessary fall risk management equipment: alarms  - Apply yellow socks and bracelet for high fall risk patients  - Consider moving patient to room near nurses station  Outcome: Progressing     Problem: Neurological Deficit  Goal: Neurological status is stable or improving  Description: Interventions:  - Monitor and assess patient's level of consciousness, motor function, sensory function, and level of assistance needed for ADLs.   - Monitor and report changes from baseline. Collaborate with interdisciplinary team to initiate plan and implement interventions as ordered.   - Provide and maintain a safe environment.  - Consider seizure precautions.  - Consider fall precautions.  - Consider aspiration precautions.  - Consider bleeding precautions.  Outcome: Progressing     Problem: Activity Intolerance/Impaired Mobility  Goal: Mobility/activity is maintained at optimum level for patient  Description: Interventions:  - Assess and monitor patient  barriers to mobility and need for assistive/adaptive devices.  - Assess patient's emotional response to limitations.  - Collaborate with interdisciplinary team and initiate plans and interventions as ordered.  - Encourage independent activity per ability.  - Maintain proper  body alignment.  - Perform active/passive rom as tolerated/ordered.  - Plan activities to conserve energy.  - Turn patient as appropriate  Outcome: Progressing     Problem: Communication Impairment  Goal: Ability to express needs and understand communication  Description: Assess patient's communication skills and ability to understand information.  Patient will demonstrate use of effective communication techniques, alternative methods of communication and understanding even if not able to speak.     - Encourage communication and provide alternate methods of communication as needed.  - Collaborate with case management/ for discharge needs.  - Include patient/family/caregiver in decisions related to communication.  Outcome: Progressing     Problem: Potential for Aspiration  Goal: Non-ventilated patient's risk of aspiration is minimized  Description: Assess and monitor vital signs, respiratory status, and labs (WBC).  Monitor for signs of aspiration (tachypnea, cough, rales, wheezing, cyanosis, fever).    - Assess and monitor patient's ability to swallow.  - Place patient up in chair to eat if possible.  - HOB up at 90 degrees to eat if unable to get patient up into chair.  - Supervise patient during oral intake.   - Instruct patient/ family to take small bites.  - Instruct patient/ family to take small single sips when taking liquids.  - Follow patient-specific strategies generated by speech pathologist.  Outcome: Progressing  Goal: Ventilated patient's risk of aspiration is minimized  Description: Assess and monitor vital signs, respiratory status, airway cuff pressure, and labs (WBC).  Monitor for signs of aspiration (tachypnea, cough, rales, wheezing, cyanosis, fever).    - Elevate head of bed 30 degrees if patient has tube feeding.  - Monitor tube feeding.  Outcome: Progressing     Problem: Nutrition  Goal: Nutrition/Hydration status is improving  Description: Monitor and assess patient's  nutrition/hydration status for malnutrition (ex- brittle hair, bruises, dry skin, pale skin and conjunctiva, muscle wasting, smooth red tongue, and disorientation). Collaborate with interdisciplinary team and initiate plan and interventions as ordered.  Monitor patient's weight and dietary intake as ordered or per policy. Utilize nutrition screening tool and intervene per policy. Determine patient's food preferences and provide high-protein, high-caloric foods as appropriate.     - Assist patient with eating.  - Allow adequate time for meals.  - Encourage patient to take dietary supplement as ordered.  - Collaborate with clinical nutritionist.  - Include patient/family/caregiver in decisions related to nutrition.  Outcome: Progressing     Problem: PAIN - ADULT  Goal: Verbalizes/displays adequate comfort level or baseline comfort level  Description: Interventions:  - Encourage patient to monitor pain and request assistance  - Assess pain using appropriate pain scale  - Administer analgesics as ordered based on type and severity of pain and evaluate response  - Implement non-pharmacological measures as appropriate and evaluate response  - Consider cultural and social influences on pain and pain management  - Notify physician/advanced practitioner if interventions unsuccessful or patient reports new pain  - Educate patient/family on pain management process including their role and importance of  reporting pain   - Provide non-pharmacologic/complimentary pain relief interventions  Outcome: Progressing     Problem: INFECTION - ADULT  Goal: Absence or prevention of progression during hospitalization  Description: INTERVENTIONS:  - Assess and monitor for signs and symptoms of infection  - Monitor lab/diagnostic results  - Monitor all insertion sites, i.e. indwelling lines, tubes, and drains  - Monitor endotracheal if appropriate and nasal secretions for changes in amount and color  - [place] appropriate cooling/warming  therapies per order  - Administer medications as ordered  - Instruct and encourage patient and family to use good hand hygiene technique  - Identify and instruct in appropriate isolation precautions for identified infection/condition  Outcome: Progressing     Problem: SAFETY ADULT  Goal: Patient will remain free of falls  Description: INTERVENTIONS:  - Educate patient/family on patient safety including physical limitations  - Instruct patient to call for assistance with activity   - Consider consulting OT/PT to assist with strengthening/mobility based on AM PAC & JH-HLM score  - Consult OT/PT to assist with strengthening/mobility   - Keep Call bell within reach  - Keep bed low and locked with side rails adjusted as appropriate  - Keep care items and personal belongings within reach  - Initiate and maintain comfort rounds  - Make Fall Risk Sign visible to staff  - Offer Toileting every 2 Hours, in advance of need  - Initiate/Maintain bed alarm  - Obtain necessary fall risk management equipment: alarms  - Apply yellow socks and bracelet for high fall risk patients  - Consider moving patient to room near nurses station  Outcome: Progressing  Goal: Maintain or return to baseline ADL function  Description: INTERVENTIONS:  -  Assess patient's ability to carry out ADLs; assess patient's baseline for ADL function and identify physical deficits which impact ability to perform ADLs (bathing, care of mouth/teeth, toileting, grooming, dressing, etc.)  - Assess/evaluate cause of self-care deficits   - Assess range of motion  - Assess patient's mobility; develop plan if impaired  - Assess patient's need for assistive devices and provide as appropriate  - Encourage maximum independence but intervene and supervise when necessary  - Involve family in performance of ADLs  - Assess for home care needs following discharge   - Consider OT consult to assist with ADL evaluation and planning for discharge  - Provide patient education as  appropriate  - Monitor functional capacity and physical performance, use of AM PAC & -HLM   - Monitor gait, balance and fatigue with ambulation    Outcome: Progressing  Goal: Maintains/Returns to pre admission functional level  Description: INTERVENTIONS:  - Perform AM-PAC 6 Click Basic Mobility/ Daily Activity assessment daily.  - Set and communicate daily mobility goal to care team and patient/family/caregiver.   - Collaborate with rehabilitation services on mobility goals if consulted  - Perform Range of Motion 2 times a day.  - Reposition patient every 2 hours.  - Dangle patient 2 times a day  - Stand patient 2 times a day  - Ambulate patient 2 times a day  - Out of bed to chair 3 times a day   - Out of bed for meals 3 times a day  - Out of bed for toileting  - Record patient progress and toleration of activity level   Outcome: Progressing     Problem: DISCHARGE PLANNING  Goal: Discharge to home or other facility with appropriate resources  Description: INTERVENTIONS:  - Identify barriers to discharge w/patient and caregiver  - Arrange for needed discharge resources and transportation as appropriate  - Identify discharge learning needs (meds, wound care, etc.)  - Arrange for interpretive services to assist at discharge as needed  - Refer to Case Management Department for coordinating discharge planning if the patient needs post-hospital services based on physician/advanced practitioner order or complex needs related to functional status, cognitive ability, or social support system  Outcome: Progressing     Problem: Knowledge Deficit  Goal: Patient/family/caregiver demonstrates understanding of disease process, treatment plan, medications, and discharge instructions  Description: Complete learning assessment and assess knowledge base.  Interventions:  - Provide teaching at level of understanding  - Provide teaching via preferred learning methods  Outcome: Progressing     Problem: Prexisting or High Potential  for Compromised Skin Integrity  Goal: Skin integrity is maintained or improved  Description: INTERVENTIONS:  - Identify patients at risk for skin breakdown  - Assess and monitor skin integrity including under and around medical devices   - Assess and monitor nutrition and hydration status  - Monitor labs  - Assess for incontinence   - Turn and reposition patient  - Assist with mobility/ambulation  - Relieve pressure over janki prominences   - Avoid friction and shearing  - Provide appropriate hygiene as needed including keeping skin clean and dry  - Evaluate need for skin moisturizer/barrier cream  - Collaborate with interdisciplinary team  - Patient/family teaching  - Consider wound care consult    Assess:  - Review [place] scale daily  - Clean and moisturize skin every day  - Inspect skin when repositioning, toileting, and assisting with ADLS  - Assess extremities for adequate circulation and sensation     Bed Management:  - Have minimal linens on bed & keep smooth, unwrinkled  - Change linens as needed when moist or perspiring  -  - Toileting:  - Offer bedside commode  - Assess for incontinence every shift  ]\    Activity:  - Mobilize patient 3 times a day  - Encourage activity and walks on unit  - Encourage or provide ROM exercises   - Turn and reposition patient every 2 Hours  - Use appropriate equipment to lift or move patient in bed    Skin Care:  - Avoid use of baby powder, tape, friction and shearing, hot water or constrictive clothing  - Relieve pressure over bony prominences using pillows  - Do not massage red bony areas    Outcome: Progressing

## 2025-06-05 NOTE — PROGRESS NOTES
Patient:  SURYA BRADY    MRN:  [id number]    Aidin Request ID:  [id number]    Level of care reserved:  Skilled Nursing Facility    Partner Reserved:  [facility], [place] [address] [phone]    Clinical needs requested:    Geography searched:  10 miles around [phone]    Start of Service:    Request sent:  1:50pm EDT on 6/4/2025 by Chely Staples    Partner reserved:  2:54pm EDT on 6/5/2025 by Chely Staples    Choice list shared:  9:01am EDT on 6/5/2025 by Chely Staples

## 2025-06-05 NOTE — ASSESSMENT & PLAN NOTE
Presents with right sided weakness, poor coordination, facial droop. Concern for TIA/CVA  CT brain, CTA head and neck negative for any acute process  MRI brain confirming small acute infarct involving left precentral gyrus  Neurology consulted  Continue dual antiplatelet therapy plus statin  Recommendation for long-term cardiac monitoring such as Zio patch or loop recorder. 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 Will interrogate  PT/OT with recommendation for rehab  Awaiting placement to rehab  Medically stable for discharge

## 2025-06-05 NOTE — PLAN OF CARE
Problem: Potential for Falls  Goal: Patient will remain free of falls  Description: INTERVENTIONS:  - Educate patient/family on patient safety including physical limitations  - Instruct patient to call for assistance with activity   - Consider consulting OT/PT to assist with strengthening/mobility based on AM PAC & JH-HLM score  - Consult OT/PT to assist with strengthening/mobility   - Keep Call bell within reach  - Keep bed low and locked with side rails adjusted as appropriate  - Keep care items and personal belongings within reach  - Initiate and maintain comfort rounds  - Make Fall Risk Sign visible to staff  - Offer Toileting every 2 Hours, in advance of need  - Initiate/Maintain bed alarm  - Obtain necessary fall risk management equipment: alarms  - Apply yellow socks and bracelet for high fall risk patients  - Consider moving patient to room near nurses station  Outcome: Progressing     Problem: Neurological Deficit  Goal: Neurological status is stable or improving  Description: Interventions:  - Monitor and assess patient's level of consciousness, motor function, sensory function, and level of assistance needed for ADLs.   - Monitor and report changes from baseline. Collaborate with interdisciplinary team to initiate plan and implement interventions as ordered.   - Provide and maintain a safe environment.  - Consider seizure precautions.  - Consider fall precautions.  - Consider aspiration precautions.  - Consider bleeding precautions.  Outcome: Progressing     Problem: Activity Intolerance/Impaired Mobility  Goal: Mobility/activity is maintained at optimum level for patient  Description: Interventions:  - Assess and monitor patient  barriers to mobility and need for assistive/adaptive devices.  - Assess patient's emotional response to limitations.  - Collaborate with interdisciplinary team and initiate plans and interventions as ordered.  - Encourage independent activity per ability.  - Maintain proper  body alignment.  - Perform active/passive rom as tolerated/ordered.  - Plan activities to conserve energy.  - Turn patient as appropriate  Outcome: Progressing     Problem: Communication Impairment  Goal: Ability to express needs and understand communication  Description: Assess patient's communication skills and ability to understand information.  Patient will demonstrate use of effective communication techniques, alternative methods of communication and understanding even if not able to speak.     - Encourage communication and provide alternate methods of communication as needed.  - Collaborate with case management/ for discharge needs.  - Include patient/family/caregiver in decisions related to communication.  Outcome: Progressing     Problem: Potential for Aspiration  Goal: Non-ventilated patient's risk of aspiration is minimized  Description: Assess and monitor vital signs, respiratory status, and labs (WBC).  Monitor for signs of aspiration (tachypnea, cough, rales, wheezing, cyanosis, fever).    - Assess and monitor patient's ability to swallow.  - Place patient up in chair to eat if possible.  - HOB up at 90 degrees to eat if unable to get patient up into chair.  - Supervise patient during oral intake.   - Instruct patient/ family to take small bites.  - Instruct patient/ family to take small single sips when taking liquids.  - Follow patient-specific strategies generated by speech pathologist.  Outcome: Progressing  Goal: Ventilated patient's risk of aspiration is minimized  Description: Assess and monitor vital signs, respiratory status, airway cuff pressure, and labs (WBC).  Monitor for signs of aspiration (tachypnea, cough, rales, wheezing, cyanosis, fever).    - Elevate head of bed 30 degrees if patient has tube feeding.  - Monitor tube feeding.  Outcome: Progressing     Problem: Nutrition  Goal: Nutrition/Hydration status is improving  Description: Monitor and assess patient's  nutrition/hydration status for malnutrition (ex- brittle hair, bruises, dry skin, pale skin and conjunctiva, muscle wasting, smooth red tongue, and disorientation). Collaborate with interdisciplinary team and initiate plan and interventions as ordered.  Monitor patient's weight and dietary intake as ordered or per policy. Utilize nutrition screening tool and intervene per policy. Determine patient's food preferences and provide high-protein, high-caloric foods as appropriate.     - Assist patient with eating.  - Allow adequate time for meals.  - Encourage patient to take dietary supplement as ordered.  - Collaborate with clinical nutritionist.  - Include patient/family/caregiver in decisions related to nutrition.  Outcome: Progressing     Problem: PAIN - ADULT  Goal: Verbalizes/displays adequate comfort level or baseline comfort level  Description: Interventions:  - Encourage patient to monitor pain and request assistance  - Assess pain using appropriate pain scale  - Administer analgesics as ordered based on type and severity of pain and evaluate response  - Implement non-pharmacological measures as appropriate and evaluate response  - Consider cultural and social influences on pain and pain management  - Notify physician/advanced practitioner if interventions unsuccessful or patient reports new pain  - Educate patient/family on pain management process including their role and importance of  reporting pain   - Provide non-pharmacologic/complimentary pain relief interventions  Outcome: Progressing     Problem: INFECTION - ADULT  Goal: Absence or prevention of progression during hospitalization  Description: INTERVENTIONS:  - Assess and monitor for signs and symptoms of infection  - Monitor lab/diagnostic results  - Monitor all insertion sites, i.e. indwelling lines, tubes, and drains  - Monitor endotracheal if appropriate and nasal secretions for changes in amount and color  - [place] appropriate cooling/warming  therapies per order  - Administer medications as ordered  - Instruct and encourage patient and family to use good hand hygiene technique  - Identify and instruct in appropriate isolation precautions for identified infection/condition  Outcome: Progressing     Problem: SAFETY ADULT  Goal: Patient will remain free of falls  Description: INTERVENTIONS:  - Educate patient/family on patient safety including physical limitations  - Instruct patient to call for assistance with activity   - Consider consulting OT/PT to assist with strengthening/mobility based on AM PAC & JH-HLM score  - Consult OT/PT to assist with strengthening/mobility   - Keep Call bell within reach  - Keep bed low and locked with side rails adjusted as appropriate  - Keep care items and personal belongings within reach  - Initiate and maintain comfort rounds  - Make Fall Risk Sign visible to staff  - Offer Toileting every 2 Hours, in advance of need  - Initiate/Maintain bed alarm  - Obtain necessary fall risk management equipment: alarms  - Apply yellow socks and bracelet for high fall risk patients  - Consider moving patient to room near nurses station  Outcome: Progressing  Goal: Maintain or return to baseline ADL function  Description: INTERVENTIONS:  -  Assess patient's ability to carry out ADLs; assess patient's baseline for ADL function and identify physical deficits which impact ability to perform ADLs (bathing, care of mouth/teeth, toileting, grooming, dressing, etc.)  - Assess/evaluate cause of self-care deficits   - Assess range of motion  - Assess patient's mobility; develop plan if impaired  - Assess patient's need for assistive devices and provide as appropriate  - Encourage maximum independence but intervene and supervise when necessary  - Involve family in performance of ADLs  - Assess for home care needs following discharge   - Consider OT consult to assist with ADL evaluation and planning for discharge  - Provide patient education as  appropriate  - Monitor functional capacity and physical performance, use of AM PAC & -HLM   - Monitor gait, balance and fatigue with ambulation    Outcome: Progressing  Goal: Maintains/Returns to pre admission functional level  Description: INTERVENTIONS:  - Perform AM-PAC 6 Click Basic Mobility/ Daily Activity assessment daily.  - Set and communicate daily mobility goal to care team and patient/family/caregiver.   - Collaborate with rehabilitation services on mobility goals if consulted  - Perform Range of Motion 2 times a day.  - Reposition patient every 2 hours.  - Dangle patient 2 times a day  - Stand patient 2 times a day  - Ambulate patient 2 times a day  - Out of bed to chair 3 times a day   - Out of bed for meals 3 times a day  - Out of bed for toileting  - Record patient progress and toleration of activity level   Outcome: Progressing     Problem: DISCHARGE PLANNING  Goal: Discharge to home or other facility with appropriate resources  Description: INTERVENTIONS:  - Identify barriers to discharge w/patient and caregiver  - Arrange for needed discharge resources and transportation as appropriate  - Identify discharge learning needs (meds, wound care, etc.)  - Arrange for interpretive services to assist at discharge as needed  - Refer to Case Management Department for coordinating discharge planning if the patient needs post-hospital services based on physician/advanced practitioner order or complex needs related to functional status, cognitive ability, or social support system  Outcome: Progressing     Problem: Knowledge Deficit  Goal: Patient/family/caregiver demonstrates understanding of disease process, treatment plan, medications, and discharge instructions  Description: Complete learning assessment and assess knowledge base.  Interventions:  - Provide teaching at level of understanding  - Provide teaching via preferred learning methods  Outcome: Progressing     Problem: Prexisting or High Potential  for Compromised Skin Integrity  Goal: Skin integrity is maintained or improved  Description: INTERVENTIONS:  - Identify patients at risk for skin breakdown  - Assess and monitor skin integrity including under and around medical devices   - Assess and monitor nutrition and hydration status  - Monitor labs  - Assess for incontinence   - Turn and reposition patient  - Assist with mobility/ambulation  - Relieve pressure over janki prominences   - Avoid friction and shearing  - Provide appropriate hygiene as needed including keeping skin clean and dry  - Evaluate need for skin moisturizer/barrier cream  - Collaborate with interdisciplinary team  - Patient/family teaching  - Consider wound care consult    Assess:  - Review Erlin scale daily  - Clean and moisturize skin every day  - Inspect skin when repositioning, toileting, and assisting with ADLS  - Assess extremities for adequate circulation and sensation     Bed Management:  - Have minimal linens on bed & keep smooth, unwrinkled  - Change linens as needed when moist or perspiring  -  - Toileting:  - Offer bedside commode  - Assess for incontinence every shift  ]\    Activity:  - Mobilize patient 3 times a day  - Encourage activity and walks on unit  - Encourage or provide ROM exercises   - Turn and reposition patient every 2 Hours  - Use appropriate equipment to lift or move patient in bed    Skin Care:  - Avoid use of baby powder, tape, friction and shearing, hot water or constrictive clothing  - Relieve pressure over bony prominences using pillows  - Do not massage red bony areas    Outcome: Progressing

## 2025-06-05 NOTE — PHYSICAL THERAPY NOTE
Physical Therapy Treatment Note     06/05/25 1530   PT Last Visit   PT Visit Date 06/05/25   Note Type   Note Type Treatment   Pain Assessment   Pain Assessment Tool 0-10   Pain Score 5   Pain Location/Orientation Orientation: Right;Location: Knee   Restrictions/Precautions   Weight Bearing Precautions Per Order Yes   Other Precautions Chair Alarm;Fall Risk;Pain;Hard of hearing   General   Chart Reviewed Yes   Family/Caregiver Present Yes   Cognition   Orientation Level Oriented X4   Subjective   Subjective Pt. agreeable to PT   Transfers   Sit to Stand 4  Minimal assistance   Additional items Assist x 2;Armrests;Increased time required;Verbal cues   Stand to Sit 4  Minimal assistance   Additional items Assist x 2;Increased time required;Verbal cues;Armrests   Stand pivot 4  Minimal assistance   Additional items Assist x 1;Increased time required;Verbal cues;Armrests   Toilet transfer 4  Minimal assistance   Additional items Assist x 1;Increased time required;Commode;Verbal cues;Armrests   Ambulation/Elevation   Gait pattern Improper Weight shift;Decreased foot clearance;Decreased L stance;Foward flexed;Excessively slow;Inconsistent kaye;Forward Flexion;Decreased toe off;Decreased heel strike   Gait Assistance 4  Minimal assist   Additional items Assist x 2;Other (Comment)  (3rd A for chair follow)   Assistive Device Rolling walker   Distance 6ft, 10ft x 2   Balance   Static Sitting Good   Dynamic Sitting Fair +   Static Standing Poor +   Dynamic Standing Poor   Ambulatory Poor   Endurance Deficit   Endurance Deficit Yes   Endurance Deficit Description pain   Activity Tolerance   Activity Tolerance Patient tolerated treatment well   Nurse Made Aware yes   Assessment   Prognosis Fair   Problem List Decreased strength;Decreased range of motion;Decreased endurance;Impaired balance;Decreased mobility;Decreased coordination;Pain;Impaired hearing   Assessment Pt. noted with improved mobility this session. Pt. able to  ambulate longer distance this session. pt. reported she does walk around the house with RW for practice. Pt. noted with no knee buckling but L knee noted to deformed and unable to stand on it for long. Pt. needed only Min A for SPT with RW and A in pericare. Max A x 1 for static standing once with RW as pt. was   pushing back when the other therapist was changing the chair behind the patient. pt. seated on chair post session with all needs within reach and chair alarm engaged. Will continue to follow epr PT POC.   Barriers to Discharge None   Goals   Patient Goals Non reported   STG Expiration Date 06/13/25   PT Treatment Day 2   Plan   Treatment/Interventions Functional transfer training;Spoke to nursing;Gait training;Bed mobility;Equipment eval/education;Patient/family training   Progress Progressing toward goals   PT Frequency Other (Comment)  (4-5x/week)   Discharge Recommendation   Rehab Resource Intensity Level, PT II (Moderate Resource Intensity)   AM-PAC Basic Mobility Inpatient   Turning in Flat Bed Without Bedrails 3   Lying on Back to Sitting on Edge of Flat Bed Without Bedrails 3   Moving Bed to Chair 3   Standing Up From Chair Using Arms 2   Walk in Room 2   Climb 3-5 Stairs With Railing 2   Basic Mobility Inpatient Raw Score 15   Basic Mobility Standardized Score 36.97   [facility] Highest Level Of Mobility   -[facility] Goal 4: Move to chair/commode   -[facility] Achieved 6: Walk 10 steps or more   End of Consult   Patient Position at End of Consult All needs within reach;Bed/Chair alarm activated;Bedside chair           Everardo Peraza PTA    An AM-PAC basic mobility standardized score less than 42.9 suggest the patient may benefit from discharge to post-acute rehab services.

## 2025-06-05 NOTE — PLAN OF CARE
Problem: PHYSICAL THERAPY ADULT  Goal: Performs mobility at highest level of function for planned discharge setting.  See evaluation for individualized goals.  Description: Treatment/Interventions: LE strengthening/ROM, Functional transfer training, Therapeutic exercise, Cognitive reorientation, Patient/family training, Bed mobility, Equipment eval/education, Gait training, Compensatory technique education, Continued evaluation, Spoke to nursing, OT          See flowsheet documentation for full assessment, interventions and recommendations.  Outcome: Progressing  Note: Prognosis: Fair  Problem List: Decreased strength, Decreased range of motion, Decreased endurance, Impaired balance, Decreased mobility, Decreased coordination, Pain, Impaired hearing  Assessment: Pt. noted with improved mobility this session. Pt. able to ambulate longer distance this session. pt. reported she does walk around the house with RW for practice. Pt. noted with no knee buckling but L knee noted to deformed and unable to stand on it for long. Pt. needed only Min A for SPT with RW and A in pericare. Max A x 1 for static standing once with RW as pt. was   pushing back when the other therapist was changing the chair behind the patient. pt. seated on chair post session with all needs within reach and chair alarm engaged. Will continue to follow epr PT POC.  Barriers to Discharge: None  Barriers to Discharge Comments: concerns for patient ability to cont to live independently  Rehab Resource Intensity Level, PT: II (Moderate Resource Intensity)    See flowsheet documentation for full assessment.

## 2025-06-05 NOTE — OCCUPATIONAL THERAPY NOTE
Occupational Therapy Progress Note     Patient Name: Tala Coyne  Today's Date: 6/5/2025  Problem List  Principal Problem:    Stroke (cerebrum) (HCC)  Active Problems:    Essential hypertension    Mild dementia without behavioral disturbance, psychotic disturbance, mood disturbance, or anxiety (HCC)    Iron deficiency anemia            06/05/25 1438   OT Last Visit   OT Visit Date 06/05/25   Note Type   Note Type Treatment   Pain Assessment   Pain Assessment Tool 0-10   Pain Score 5   Pain Location/Orientation Orientation: Right;Location: Knee  (only with movement)   Hospital Pain Intervention(s) Repositioned;Ambulation/increased activity   Restrictions/Precautions   Weight Bearing Precautions Per Order No   Other Precautions Hard of hearing;Pain;Bed Alarm;Chair Alarm;Fall Risk   Lifestyle   Autonomy At baseline, pt reports I w/ ADLs and requiring assist w/ IADLs. Pt initially reports using electric scooter for mobility, however, later states using RW. (-) . Denies falls PTA.   Reciprocal Relationships Son, dtr   Service to Others Retired   ADL   Where Assessed Commode   Eating Assistance 5  Supervision/Setup   Eating Deficit Beverage management   Toileting Assistance  2  Maximal Assistance   Toileting Deficit Setup;Increased time to complete;Supervison/safety;Bedside commode   Toileting Comments SPT to BSC with min AX1 with RW and attempted hygiene seated at S level, however, required max A christoph care upon standing.   Bed Mobility   Additional Comments Pt greeted supine in bed.   Transfers   Sit to Stand 4  Minimal assistance   Additional items Assist x 2;Increased time required;Verbal cues   Stand to Sit 4  Minimal assistance   Additional items Assist x 2;Increased time required;Verbal cues   Stand pivot 4  Minimal assistance   Additional items Assist x 1;Increased time required;Verbal cues   Toilet transfer 4  Minimal assistance   Additional items Assist x 1;Increased time required;Verbal cues;Standard  toilet   Additional Comments with RW- Pt continues to require increased time/cues.   Functional Mobility   Functional Mobility 4  Minimal assistance   Additional Comments Min Ax2 with RW- within room distances x3 trials with SBAx1 for chair follow. Pt fatigued s/p 1st walk with B/L LE buckling requiring therapist to provide max A in standing and lower Pt to chair. Pt continued to be eager to walk throughout session and motivated.   Additional items Rolling walker   Therapeutic Exercise - ROM   UE-ROM Yes   ROM- Right Upper Extremities   R Shoulder AROM;Flexion   ROM - Left Upper Extremities    L Shoulder AROM;Flexion   LUE ROM Comment Pt engages in HEP seated in recliner chair: B/L shoulder flexion 1x10 with hands clasped to assist RUE. Pt with use of R squeeze ball to strengthen . Pt reports understanding.   Cognition   Overall Cognitive Status Impaired   Arousal/Participation Alert;Cooperative   Attention Difficulty dividing attention   Orientation Level Oriented X4   Memory Decreased short term memory   Following Commands Follows one step commands with increased time or repetition   Comments Pt [place] and benefits from one-step simple commands. Pt h/o dementia, however, AAOx4 during this session.   Activity Tolerance   Activity Tolerance Patient tolerated treatment well   Medical Staff Made Aware RN cleared/updated. Portions of tx completed with DORIS Mcgee 2* to Pt's medical complexity, and decreased endurance. OT focus on maximizing independence with ADLs.   Assessment   Assessment Pt greeted up in recliner chair for OT treatment on 06/05/25 focusing on maximizing independence with ADLs and functional cognition. Pt is highly motivated and making great progress towards meeting functional goals. Pt completes HEP seated in recliner chair with G carryover. Pt then engages in multiple functional STS with min AX2 and functional mobility with min AX2 with RW and SBAx1 for safety. Pt engages in functional mobility x3  times throughout session with seated rest breaks. Pt then completes toileting routine at max A level (min A x1 with RW for SPT and max A hygiene in standing). Pt reports she was happy to have therapy today, however, was very tired. Limitations that impact functional performance include decreased ADL status, UE ROM, UE strength, safe judgement during ADLs, cognition, endurance, self care transfers, and high level ADLs. Occupational performance areas to address ADL retraining, functional transfer training, UE strengthening/ROM, endurance training, cognitive reorientation, Pt/caregiver education, equipment evaluation/education, compensatory technique education, energy conservation, and activity engagement . Pt would benefit from continued skilled OT services while in hospital to maximize independence with ADLs. Will continue to follow Pt's goals and progress. Pt would benefit from level II resources (moderate intensity) upon DC to maximize safety and independence with ADLs and functional tasks of choice.   Plan   Treatment Interventions ADL retraining;Functional transfer training;UE strengthening/ROM;Endurance training;Cognitive reorientation;Patient/family training;Equipment evaluation/education;Compensatory technique education;Activityengagement;Energy conservation   Goal Expiration Date 06/17/25   OT Treatment Day 1   OT Frequency 3-5x/wk   Discharge Recommendation   Rehab Resource Intensity Level, OT II (Moderate Resource Intensity)   Additional Comments  The patient's raw score on the -PAC Daily Activity Inpatient Short Form is 14. A raw score of greater than or equal to 19 suggests the patient may benefit from discharge to home. Please refer to the recommendation of the Occupational Therapist for safe discharge planning.   AM-PAC Daily Activity Inpatient   Lower Body Dressing 2   Bathing 2   Toileting 2   Upper Body Dressing 2   Grooming 3   Eating 3   Daily Activity Raw Score 14   Daily Activity Standardized  Score (Calc for Raw Score >=11) 33.39   AM-PAC Applied Cognition Inpatient   Following a Speech/Presentation 3   Understanding Ordinary Conversation 4   Taking Medications 2   Remembering Where Things Are Placed or Put Away 2   Remembering List of 4-5 Errands 2   Taking Care of Complicated Tasks 2   Applied Cognition Raw Score 15   Applied Cognition Standardized Score 33.54   End of Consult   Education Provided Yes   Patient Position at End of Consult Bedside chair;All needs within reach;Bed/Chair alarm activated   Nurse Communication Nurse aware of consult       Henny Miller MS, OTR/L

## 2025-06-05 NOTE — CASE MANAGEMENT
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    Sick sinus syndrome s/p PPM 10/20/2020    Status post placement of cardiac pacemaker 04/10/2020    Nonrheumatic aortic valve stenosis 2020    Mild pulmonary hypertension (HCC) 2020    Seizure disorder ([facility]) 2019    Iron deficiency anemia 10/17/2019    Mild dementia without behavioral disturbance, psychotic disturbance, mood disturbance, or anxiety ([facility]) 2019    Depression 2018    Mixed hyperlipidemia 2015    Essential hypertension 2015    Esophageal reflux 2015    Peripheral neuropathy 2015    Vitamin D deficiency 2015      LOS (days): 2  Geometric Mean LOS (GMLOS) (days): 2.8  Days to GMLOS:0.7     OBJECTIVE:  Risk of Unplanned Readmission Score: 15.11         Current admission status: Inpatient   Preferred Pharmacy:   [id number] - [place], [address] [address]  Phone: [phone] Fax: [phone]    Primary Care Provider: Ni Fisher DO    Primary Insurance: MEDICARE  Secondary Insurance: BLUE CROSS    DISCHARGE DETAILS:    Discharge planning discussed with:: son  Freedom of Choice: Yes  Comments - Freedom of Choice: Pt choice is [facility]  CM contacted family/caregiver?: Yes  Were Treatment Team discharge recommendations reviewed with patient/caregiver?: Yes  Did patient/caregiver verbalize understanding of patient care needs?: Yes  Were patient/caregiver advised of the risks associated with not following Treatment Team discharge recommendations?: Yes    Contacts  Patient Contacts: Kunal Coyne  son  Relationship to Patient:: Family  Contact Method: In Person  Phone Number: [phone]  Reason/Outcome: Continuity of Care, Emergency Contact, Discharge Planning    Requested Home Health Care         Is the patient interested in C at discharge?: No    DME Referral Provided  Referral made for DME?: No    Other Referral/Resources/Interventions Provided:  Interventions: Short Term Rehab    Would you like to participate in our [facility] service program?  : No - Declined    Treatment Team Recommendation: Short Term Rehab  Discharge Destination Plan:: Short Term Rehab  Transport at Discharge : Family         CM met with patient at bedside who expressed willingness to go to STR. CM provided patient choice list. Patient would like family to choose.     CM call daughter and reviewed plan for STR. CM emailed patient choice list.     Later CM met with son in patient room and advised of choice fro [facility] for STR. CM reserved [facility] and advised of discharge plan.     IMM reviewed with patient and caregiver, patient and caregiver agrees with discharge determination.

## 2025-06-05 NOTE — PLAN OF CARE
Problem: OCCUPATIONAL THERAPY ADULT  Goal: Performs self-care activities at highest level of function for planned discharge setting.  See evaluation for individualized goals.  Description: Treatment Interventions: ADL retraining, Functional transfer training, UE strengthening/ROM, Endurance training, Patient/family training, Equipment evaluation/education, Compensatory technique education, Continued evaluation, Energy conservation, Activityengagement          See flowsheet documentation for full assessment, interventions and recommendations.   Outcome: Progressing  Note: Limitation: Decreased ADL status, Decreased UE ROM, Decreased UE strength, Decreased Safe judgement during ADL, Decreased cognition, Decreased endurance, Decreased fine motor control, Decreased self-care trans, Decreased high-level ADLs  Prognosis: Fair  Assessment: Pt greeted up in recliner chair for OT treatment on 06/05/25 focusing on maximizing independence with ADLs and functional cognition. Pt is highly motivated and making great progress towards meeting functional goals. Pt completes HEP seated in recliner chair with G carryover. Pt then engages in multiple functional STS with min AX2 and functional mobility with min AX2 with RW and SBAx1 for safety. Pt engages in functional mobility x3 times throughout session with seated rest breaks. Pt then completes toileting routine at max A level (min A x1 with RW for SPT and max A hygiene in standing). Pt reports she was happy to have therapy today, however, was very tired. Limitations that impact functional performance include decreased ADL status, UE ROM, UE strength, safe judgement during ADLs, cognition, endurance, self care transfers, and high level ADLs. Occupational performance areas to address ADL retraining, functional transfer training, UE strengthening/ROM, endurance training, cognitive reorientation, Pt/caregiver education, equipment evaluation/education, compensatory technique education,  energy conservation, and activity engagement . Pt would benefit from continued skilled OT services while in hospital to maximize independence with ADLs. Will continue to follow Pt's goals and progress. Pt would benefit from level II resources (moderate intensity) upon DC to maximize safety and independence with ADLs and functional tasks of choice.     Rehab Resource Intensity Level, OT: II (Moderate Resource Intensity)

## 2025-06-06 ENCOUNTER — TRANSITIONAL CARE MANAGEMENT (OUTPATIENT)
Dept: FAMILY MEDICINE CLINIC | Facility: CLINIC | Age: OVER 89
End: 2025-06-06

## 2025-06-06 VITALS
DIASTOLIC BLOOD PRESSURE: 67 MMHG | SYSTOLIC BLOOD PRESSURE: 96 MMHG | WEIGHT: 140 LBS | OXYGEN SATURATION: 93 % | TEMPERATURE: 97.5 F | HEART RATE: 70 BPM | HEIGHT: 62 IN | BODY MASS INDEX: 25.76 KG/M2 | RESPIRATION RATE: 20 BRPM

## 2025-06-06 LAB — SARS-COV-2 RNA RESP QL NAA+PROBE: NEGATIVE

## 2025-06-06 PROCEDURE — 87635 SARS-COV-2 COVID-19 AMP PRB: CPT | Performed by: INTERNAL MEDICINE

## 2025-06-06 PROCEDURE — 99239 HOSP IP/OBS DSCHRG MGMT >30: CPT | Performed by: INTERNAL MEDICINE

## 2025-06-06 RX ORDER — CLOPIDOGREL BISULFATE 75 MG/1
75 TABLET ORAL DAILY
Start: 2025-06-07

## 2025-06-06 RX ORDER — FERROUS SULFATE 325(65) MG
325 TABLET ORAL EVERY OTHER DAY
Status: DISCONTINUED | OUTPATIENT
Start: 2025-06-07 | End: 2025-06-06 | Stop reason: HOSPADM

## 2025-06-06 RX ORDER — ATORVASTATIN CALCIUM 40 MG/1
40 TABLET, FILM COATED ORAL EVERY EVENING
Start: 2025-06-06

## 2025-06-06 RX ORDER — FERROUS SULFATE 325(65) MG
325 TABLET ORAL EVERY OTHER DAY
Start: 2025-06-07

## 2025-06-06 RX ADMIN — GABAPENTIN 300 MG: 300 CAPSULE ORAL at 09:11

## 2025-06-06 RX ADMIN — HEPARIN SODIUM 5000 UNITS: 5000 INJECTION INTRAVENOUS; SUBCUTANEOUS at 13:29

## 2025-06-06 RX ADMIN — DORZOLAMIDE HYDROCHLORIDE AND TIMOLOL MALEATE 1 DROP: 20; 5 SOLUTION OPHTHALMIC at 09:12

## 2025-06-06 RX ADMIN — ASPIRIN 81 MG: 81 TABLET, COATED ORAL at 09:11

## 2025-06-06 RX ADMIN — LOSARTAN POTASSIUM 25 MG: 25 TABLET, FILM COATED ORAL at 09:12

## 2025-06-06 RX ADMIN — HEPARIN SODIUM 5000 UNITS: 5000 INJECTION INTRAVENOUS; SUBCUTANEOUS at 06:22

## 2025-06-06 RX ADMIN — CLOPIDOGREL BISULFATE 75 MG: 75 TABLET, FILM COATED ORAL at 09:11

## 2025-06-06 RX ADMIN — AMLODIPINE BESYLATE 2.5 MG: 2.5 TABLET ORAL at 09:12

## 2025-06-06 NOTE — NURSING NOTE
IV removed. AVS given to transport team and sent with patient to receiving facility. Report called to receiving facility. Patient discharged.     Dottie ZARAGOZAN, RN

## 2025-06-06 NOTE — ASSESSMENT & PLAN NOTE
Presents with right sided weakness, poor coordination, facial droop. Concern for TIA/CVA  CT brain, CTA head and neck negative for any acute process  MRI brain confirming small acute infarct involving left precentral gyrus  Pacemaker interrogated no episodes of atrial fibrillation  No episodes of A-fib on telemetry  Echocardiogram unremarkable  Neurology consulted  Continue dual antiplatelet therapy plus statin  PT/OT with recommendation for rehab  Medically stable for discharge

## 2025-06-06 NOTE — CASE MANAGEMENT
Case Management Discharge Planning Note    Patient name Tala Coyne  Location [facility] 4 /E4 -* MRN [id number]  : 10/4/1928 Date 2025       Current Admission Date: 2025  Current Admission Diagnosis:Stroke (cerebrum) (HCC)   Patient Active Problem List    Diagnosis Date Noted    Stroke (cerebrum) (HCC) 2025    Sick sinus syndrome s/p PPM 10/20/2020    Status post placement of cardiac pacemaker 04/10/2020    Nonrheumatic aortic valve stenosis 2020    Mild pulmonary hypertension (HCC) 2020    Seizure disorder ([facility]) 2019    Iron deficiency anemia 10/17/2019    Mild dementia without behavioral disturbance, psychotic disturbance, mood disturbance, or anxiety ([facility]) 2019    Depression 2018    Mixed hyperlipidemia 2015    Essential hypertension 2015    Esophageal reflux 2015    Peripheral neuropathy 2015    Vitamin D deficiency 2015      LOS (days): 3  Geometric Mean LOS (GMLOS) (days): 2.8  Days to GMLOS:-0.1     OBJECTIVE:  Risk of Unplanned Readmission Score: 16         Current admission status: Inpatient   Preferred Pharmacy:   [id number] - [place] [address]  Phone: [phone] Fax: [phone]    Primary Care Provider: Ni Fisher DO    Primary Insurance: MEDICARE  Secondary Insurance: BLUE CROSS    DISCHARGE DETAILS:        CM arranged transport for 4 PM via [facility]. CM confirmed time with [facility] and son made aware. Covid test ordered. CM to follow through discharge.                                                                                               Accepting Facility Name, City & State : [facility] [place] [address]  Receiving Facility/Agency Phone Number: [phone]  Facility/Agency Fax Number: [phone]

## 2025-06-06 NOTE — PLAN OF CARE
Problem: Potential for Falls  Goal: Patient will remain free of falls  Description: INTERVENTIONS:  - Educate patient/family on patient safety including physical limitations  - Instruct patient to call for assistance with activity   - Consider consulting OT/PT to assist with strengthening/mobility based on AM PAC & JH-HLM score  - Consult OT/PT to assist with strengthening/mobility   - Keep Call bell within reach  - Keep bed low and locked with side rails adjusted as appropriate  - Keep care items and personal belongings within reach  - Initiate and maintain comfort rounds  - Make Fall Risk Sign visible to staff  - Offer Toileting every 2 Hours, in advance of need  - Initiate/Maintain bed alarm  - Obtain necessary fall risk management equipment: alarms  - Apply yellow socks and bracelet for high fall risk patients  - Consider moving patient to room near nurses station  Outcome: Adequate for Discharge     Problem: Neurological Deficit  Goal: Neurological status is stable or improving  Description: Interventions:  - Monitor and assess patient's level of consciousness, motor function, sensory function, and level of assistance needed for ADLs.   - Monitor and report changes from baseline. Collaborate with interdisciplinary team to initiate plan and implement interventions as ordered.   - Provide and maintain a safe environment.  - Consider seizure precautions.  - Consider fall precautions.  - Consider aspiration precautions.  - Consider bleeding precautions.  Outcome: Adequate for Discharge     Problem: Activity Intolerance/Impaired Mobility  Goal: Mobility/activity is maintained at optimum level for patient  Description: Interventions:  - Assess and monitor patient  barriers to mobility and need for assistive/adaptive devices.  - Assess patient's emotional response to limitations.  - Collaborate with interdisciplinary team and initiate plans and interventions as ordered.  - Encourage independent activity per  ability.  - Maintain proper body alignment.  - Perform active/passive rom as tolerated/ordered.  - Plan activities to conserve energy.  - Turn patient as appropriate  Outcome: Adequate for Discharge     Problem: Communication Impairment  Goal: Ability to express needs and understand communication  Description: Assess patient's communication skills and ability to understand information.  Patient will demonstrate use of effective communication techniques, alternative methods of communication and understanding even if not able to speak.     - Encourage communication and provide alternate methods of communication as needed.  - Collaborate with case management/ for discharge needs.  - Include patient/family/caregiver in decisions related to communication.  Outcome: Adequate for Discharge     Problem: Potential for Aspiration  Goal: Non-ventilated patient's risk of aspiration is minimized  Description: Assess and monitor vital signs, respiratory status, and labs (WBC).  Monitor for signs of aspiration (tachypnea, cough, rales, wheezing, cyanosis, fever).    - Assess and monitor patient's ability to swallow.  - Place patient up in chair to eat if possible.  - HOB up at 90 degrees to eat if unable to get patient up into chair.  - Supervise patient during oral intake.   - Instruct patient/ family to take small bites.  - Instruct patient/ family to take small single sips when taking liquids.  - Follow patient-specific strategies generated by speech pathologist.  Outcome: Adequate for Discharge  Goal: Ventilated patient's risk of aspiration is minimized  Description: Assess and monitor vital signs, respiratory status, airway cuff pressure, and labs (WBC).  Monitor for signs of aspiration (tachypnea, cough, rales, wheezing, cyanosis, fever).    - Elevate head of bed 30 degrees if patient has tube feeding.  - Monitor tube feeding.  Outcome: Adequate for Discharge     Problem: Nutrition  Goal: Nutrition/Hydration  status is improving  Description: Monitor and assess patient's nutrition/hydration status for malnutrition (ex- brittle hair, bruises, dry skin, pale skin and conjunctiva, muscle wasting, smooth red tongue, and disorientation). Collaborate with interdisciplinary team and initiate plan and interventions as ordered.  Monitor patient's weight and dietary intake as ordered or per policy. Utilize nutrition screening tool and intervene per policy. Determine patient's food preferences and provide high-protein, high-caloric foods as appropriate.     - Assist patient with eating.  - Allow adequate time for meals.  - Encourage patient to take dietary supplement as ordered.  - Collaborate with clinical nutritionist.  - Include patient/family/caregiver in decisions related to nutrition.  Outcome: Adequate for Discharge     Problem: PAIN - ADULT  Goal: Verbalizes/displays adequate comfort level or baseline comfort level  Description: Interventions:  - Encourage patient to monitor pain and request assistance  - Assess pain using appropriate pain scale  - Administer analgesics as ordered based on type and severity of pain and evaluate response  - Implement non-pharmacological measures as appropriate and evaluate response  - Consider cultural and social influences on pain and pain management  - Notify physician/advanced practitioner if interventions unsuccessful or patient reports new pain  - Educate patient/family on pain management process including their role and importance of  reporting pain   - Provide non-pharmacologic/complimentary pain relief interventions  Outcome: Adequate for Discharge     Problem: INFECTION - ADULT  Goal: Absence or prevention of progression during hospitalization  Description: INTERVENTIONS:  - Assess and monitor for signs and symptoms of infection  - Monitor lab/diagnostic results  - Monitor all insertion sites, i.e. indwelling lines, tubes, and drains  - Monitor endotracheal if appropriate and nasal  secretions for changes in amount and color  - [place] appropriate cooling/warming therapies per order  - Administer medications as ordered  - Instruct and encourage patient and family to use good hand hygiene technique  - Identify and instruct in appropriate isolation precautions for identified infection/condition  Outcome: Adequate for Discharge     Problem: SAFETY ADULT  Goal: Patient will remain free of falls  Description: INTERVENTIONS:  - Educate patient/family on patient safety including physical limitations  - Instruct patient to call for assistance with activity   - Consider consulting OT/PT to assist with strengthening/mobility based on AM PAC & -HL score  - Consult OT/PT to assist with strengthening/mobility   - Keep Call bell within reach  - Keep bed low and locked with side rails adjusted as appropriate  - Keep care items and personal belongings within reach  - Initiate and maintain comfort rounds  - Make Fall Risk Sign visible to staff  - Offer Toileting every 2 Hours, in advance of need  - Initiate/Maintain bed alarm  - Obtain necessary fall risk management equipment: alarms  - Apply yellow socks and bracelet for high fall risk patients  - Consider moving patient to room near nurses station  Outcome: Adequate for Discharge  Goal: Maintain or return to baseline ADL function  Description: INTERVENTIONS:  -  Assess patient's ability to carry out ADLs; assess patient's baseline for ADL function and identify physical deficits which impact ability to perform ADLs (bathing, care of mouth/teeth, toileting, grooming, dressing, etc.)  - Assess/evaluate cause of self-care deficits   - Assess range of motion  - Assess patient's mobility; develop plan if impaired  - Assess patient's need for assistive devices and provide as appropriate  - Encourage maximum independence but intervene and supervise when necessary  - Involve family in performance of ADLs  - Assess for home care needs following discharge   -  Consider OT consult to assist with ADL evaluation and planning for discharge  - Provide patient education as appropriate  - Monitor functional capacity and physical performance, use of AM PAC & JH-HLM   - Monitor gait, balance and fatigue with ambulation    Outcome: Adequate for Discharge  Goal: Maintains/Returns to pre admission functional level  Description: INTERVENTIONS:  - Perform AM-PAC 6 Click Basic Mobility/ Daily Activity assessment daily.  - Set and communicate daily mobility goal to care team and patient/family/caregiver.   - Collaborate with rehabilitation services on mobility goals if consulted  - Perform Range of Motion 2 times a day.  - Reposition patient every 2 hours.  - Dangle patient 2 times a day  - Stand patient 2 times a day  - Ambulate patient 2 times a day  - Out of bed to chair 3 times a day   - Out of bed for meals 3 times a day  - Out of bed for toileting  - Record patient progress and toleration of activity level   Outcome: Adequate for Discharge     Problem: DISCHARGE PLANNING  Goal: Discharge to home or other facility with appropriate resources  Description: INTERVENTIONS:  - Identify barriers to discharge w/patient and caregiver  - Arrange for needed discharge resources and transportation as appropriate  - Identify discharge learning needs (meds, wound care, etc.)  - Arrange for interpretive services to assist at discharge as needed  - Refer to Case Management Department for coordinating discharge planning if the patient needs post-hospital services based on physician/advanced practitioner order or complex needs related to functional status, cognitive ability, or social support system  Outcome: Adequate for Discharge     Problem: Knowledge Deficit  Goal: Patient/family/caregiver demonstrates understanding of disease process, treatment plan, medications, and discharge instructions  Description: Complete learning assessment and assess knowledge base.  Interventions:  - Provide teaching  at level of understanding  - Provide teaching via preferred learning methods  Outcome: Adequate for Discharge     Problem: Prexisting or High Potential for Compromised Skin Integrity  Goal: Skin integrity is maintained or improved  Description: INTERVENTIONS:  - Identify patients at risk for skin breakdown  - Assess and monitor skin integrity including under and around medical devices   - Assess and monitor nutrition and hydration status  - Monitor labs  - Assess for incontinence   - Turn and reposition patient  - Assist with mobility/ambulation  - Relieve pressure over janki prominences   - Avoid friction and shearing  - Provide appropriate hygiene as needed including keeping skin clean and dry  - Evaluate need for skin moisturizer/barrier cream  - Collaborate with interdisciplinary team  - Patient/family teaching  - Consider wound care consult    Assess:  - Review Erlin scale daily  - Clean and moisturize skin every day  - Inspect skin when repositioning, toileting, and assisting with ADLS  - Assess extremities for adequate circulation and sensation     Bed Management:  - Have minimal linens on bed & keep smooth, unwrinkled  - Change linens as needed when moist or perspiring  -  - Toileting:  - Offer bedside commode  - Assess for incontinence every shift  ]\    Activity:  - Mobilize patient 3 times a day  - Encourage activity and walks on unit  - Encourage or provide ROM exercises   - Turn and reposition patient every 2 Hours  - Use appropriate equipment to lift or move patient in bed    Skin Care:  - Avoid use of baby powder, tape, friction and shearing, hot water or constrictive clothing  - Relieve pressure over bony prominences using pillows  - Do not massage red bony areas    Outcome: Adequate for Discharge     Problem: Nutrition/Hydration-ADULT  Goal: Nutrient/Hydration intake appropriate for improving, restoring or maintaining nutritional needs  Description: Monitor and assess patient's nutrition/hydration  status for malnutrition. Collaborate with interdisciplinary team and initiate plan and interventions as ordered.  Monitor patient's weight and dietary intake as ordered or per policy. Utilize nutrition screening tool and intervene as necessary. Determine patient's food preferences and provide high-protein, high-caloric foods as appropriate.     INTERVENTIONS:  - Monitor oral intake, urinary output, labs, and treatment plans  - Assess nutrition and hydration status and recommend course of action  - Evaluate amount of meals eaten  - Assist patient with eating if necessary   - Allow adequate time for meals  - Recommend/ encourage appropriate diets, oral nutritional supplements, and vitamin/mineral supplements  - Order, calculate, and assess calorie counts as needed  - Recommend, monitor, and adjust tube feedings and TPN/PPN based on assessed needs  - Assess need for intravenous fluids  - Provide specific nutrition/hydration education as appropriate  - Include patient/family/caregiver in decisions related to nutrition  Outcome: Adequate for Discharge

## 2025-06-06 NOTE — DISCHARGE SUMMARY
Discharge Summary - Hospitalist   Name: Tala Coyne 89 y.o. female I MRN: [id number]  Unit/Bed#: E4 -01 I Date of Admission: 6/2/2025   Date of Service: 6/6/2025 I Hospital Day: 3     Assessment & Plan  Stroke (cerebrum) (HCC)  Presents with right sided weakness, poor coordination, facial droop. Concern for TIA/CVA  CT brain, CTA head and neck negative for any acute process  MRI brain confirming small acute infarct involving left precentral gyrus  Pacemaker interrogated no episodes of atrial fibrillation  No episodes of A-fib on telemetry  Echocardiogram unremarkable  Neurology consulted  Continue dual antiplatelet therapy plus statin  PT/OT with recommendation for rehab  Medically stable for discharge  Essential hypertension  Continue home regimen  Mild dementia without behavioral disturbance, psychotic disturbance, mood disturbance, or anxiety (HCC)  Continue supportive care  Iron deficiency anemia  History of iron deficiency  Hemoglobin at baseline  Continue oral iron supplementation     Medical Problems       Resolved Problems  Date Reviewed: 6/2/2025   None       Discharging Physician / Practitioner: Amish Leblanc DO  PCP: Ni Fisher DO  Admission Date:   Admission Orders (From admission, onward)       Ordered        06/03/25 1417  INPATIENT ADMISSION  Once            06/02/25 2157  Place in Observation  Once                          Discharge Date: 06/06/25    Next Steps for Physician/AP Assuming Care:  Routine follow-up    Test Results Pending at Discharge (will require follow up):  None    Medication Changes for Discharge & Rationale:   Addition of Plavix and atorvastatin  See after visit summary for reconciled discharge medications provided to patient and/or family.     Consultations During Hospital Stay:  Neurology    Procedures Performed:   None    Significant Findings / Test Results:   MRI brain wo contrast  Result Date: 6/3/2025  Impression: 1.  Small acute infarct in the left  "precentral gyrus. No significant edema or hemorrhagic transformation. 2.  Chronic microangiopathic change. 3.  Mild ethmoidal sinus disease. Workstation performed: [id number]     XR chest portable  Result Date: 6/3/2025  Impression: No acute cardiopulmonary findings. Pacemaker leads intact. Workstation performed: [id number]     CTA stroke alert (head/neck)  Result Date: 6/2/2025  Impression: No large vessel occlusion, aneurysm, dissection, or high-grade stenosis Findings were directly discussed with Aidan Thompson at 9:36 p.m. on 6/2/2025. Workstation performed: [id number]     CT stroke alert brain  Result Date: 6/2/2025  Impression: No acute intracranial hemorrhage or acute territorial infarction. Findings were directly discussed with Aidan Thompson at 9:26 p.m. on 6/2/2025. Workstation performed: [id number]       Incidental Findings:   None    Hospital Course:   Tala Coyne is a 89 y.o. female With past medical history of hypertension, sick sinus syndrome status post dual-chamber pacemaker, hyperlipidemia, CKD presents to the hospital with right hand numbness and facial droop.  Patient was admitted under stroke pathway.  She was out of window for TNK.  MRI confirmed acute CVA.  Patient was recommended for dual antiplatelet therapy plus statin.  Additional workup was unrevealing.  Patient remained clinically stable.  PT/OT recommendation for rehab and patient discharged to acute rehab.    Please see above list of diagnoses and related plan for additional information.     Discharge Day Visit / Exam:   Subjective: Patient reports no complaints.  Vitals: Blood Pressure: 96/67 (06/06/25 1042)  Pulse: 70 (06/06/25 1042)  Temperature: 97.5 °F (36.4 °C) (06/06/25 1042)  Temp Source: Temporal (06/06/25 1042)  Respirations: 20 (06/06/25 1042)  Height: 5' 2\" (157.5 cm) (06/03/25 1439)  Weight - Scale: 63.5 kg (140 lb) (06/03/25 1439)  SpO2: 93 % (06/06/25 1042)  Physical Exam  Vitals reviewed.   Constitutional:       General: " She is not in acute distress.     Appearance: She is well-developed. She is not ill-appearing, toxic-appearing or diaphoretic.   HENT:      Head: Normocephalic and atraumatic.      Mouth/Throat:      Mouth: Mucous membranes are moist.     Eyes:      General: No scleral icterus.     Extraocular Movements: Extraocular movements intact.       Cardiovascular:      Rate and Rhythm: Normal rate and regular rhythm.      Heart sounds: Normal heart sounds.   Pulmonary:      Effort: Pulmonary effort is normal. No respiratory distress.      Breath sounds: Normal breath sounds. No wheezing or rales.   Abdominal:      General: There is no distension.      Palpations: Abdomen is soft.      Tenderness: There is no abdominal tenderness. There is no guarding or rebound.     Musculoskeletal:         General: No swelling, tenderness or deformity.     Skin:     General: Skin is warm and dry.     Neurological:      Mental Status: She is alert.      Comments: Right  3/5, facial droop    Psychiatric:         Mood and Affect: Mood normal.         Behavior: Behavior normal.         Thought Content: Thought content normal.         Judgment: Judgment normal.          Discussion with Family:     Discharge instructions/Information to patient and family:   See after visit summary for information provided to patient and family.      Provisions for Follow-Up Care:  See after visit summary for information related to follow-up care and any pertinent home health orders.      Mobility at time of Discharge:   Basic Mobility Inpatient Raw Score: 15  JH-HLM Goal: 4: Move to chair/commode  JH-HLM Achieved: 4: Move to chair/commode  HLM Goal NOT achieved. Continue to encourage mobility in post discharge setting.     Disposition:   Other: Acute rehab    Planned Readmission: No    Administrative Statements   Discharge Statement:  I have spent a total time of 60 minutes in caring for this patient on the day of the visit/encounter. >30 minutes of time was  spent on: Diagnostic results, Impressions, Counseling / Coordination of care, Documenting in the medical record, Reviewing / ordering tests, medicine, procedures  , and Communicating with other healthcare professionals .    **Please Note: This note may have been constructed using a voice recognition system**

## 2025-06-09 ENCOUNTER — TELEPHONE (OUTPATIENT)
Dept: NEUROLOGY | Facility: CLINIC | Age: OVER 89
End: 2025-06-09

## 2025-06-09 ENCOUNTER — TELEPHONE (OUTPATIENT)
Age: OVER 89
End: 2025-06-09

## 2025-06-09 ENCOUNTER — PATIENT OUTREACH (OUTPATIENT)
Dept: CASE MANAGEMENT | Facility: OTHER | Age: OVER 89
End: 2025-06-09

## 2025-06-09 NOTE — TELEPHONE ENCOUNTER
Accepting Facility Name, City & State : [facility]/ [place] [address] [address]  Receiving Facility/Agency Phone Number: [phone]  Facility/Agency Fax Number: [phone]      L/m with nurse station to complete 5 day neuro follow up call

## 2025-06-09 NOTE — TELEPHONE ENCOUNTER
Sadie called from [facility] in [place] to schedule pt HFU appt.                6/2/25- 6/6/25 4 days   HFU/ [facility]/ Stroke (cerebrum) (HCC)  Principal problem    DC- 6/6/25 Acxute Rehab     Tala Coyne will need follow-up in in 6 weeks with neurovascular team for (Other=Attending only) in 60 minute appointment. They will not require outpatient neurological testing. Message sent to the schedulers.     Patient is now scheduled 11/26/25 at 12:00 pm with Dr. Rasheed- [facility] which was the 1st available.   Sadie said ok to go to [place] too for appt.   Added pt to wait list for both [facility]/ [facility] office. Adjusted wait list .     She asked to please call [phone] is the unit pt is on to r/s appt.     Any way to offer sooner appt for pt?

## 2025-06-10 ENCOUNTER — PATIENT OUTREACH (OUTPATIENT)
Dept: CASE MANAGEMENT | Facility: OTHER | Age: OVER 89
End: 2025-06-10

## 2025-06-10 NOTE — PROGRESS NOTES
Outpatient care management referral via HRR report. Discharged to [facility] 6/6/25. I have removed myself off of the care team and sent a inbasket to the appropriate care  to notifying them of the SNF discharge and HRR Referal.   This Admin Coordinator will continue to monitor via chart review.

## 2025-06-17 PROBLEM — G40.909 SEIZURE DISORDER (HCC): Status: RESOLVED | Noted: 2019-12-21 | Resolved: 2025-06-17

## 2025-06-17 NOTE — ASSESSMENT & PLAN NOTE
Advised that consideration to minimizing medications that would be can attributing to cognitive function her doxepin that she has been taking for years at bedtime.  (Although she is adamant about staying on it)..  The risk and benefits of the gabapentin for her peripheral neuropathy should continue to be discussed.

## 2025-06-17 NOTE — ASSESSMENT & PLAN NOTE
Patient does need updated office visit with cardiology for this diagnosis as well.  Will make sure to call to schedule same.

## 2025-06-18 ENCOUNTER — PATIENT OUTREACH (OUTPATIENT)
Dept: CASE MANAGEMENT | Facility: OTHER | Age: OVER 89
End: 2025-06-18

## 2025-06-24 ENCOUNTER — PATIENT OUTREACH (OUTPATIENT)
Dept: CASE MANAGEMENT | Facility: OTHER | Age: OVER 89
End: 2025-06-24

## 2025-06-24 NOTE — PROGRESS NOTES
Chart review complete update obtained from [facility] the patient is currently admitted to SNF for STR. I have removed myself from the care team, updated the Care Coordination note, and closed the Care Transitions program.

## 2025-08-07 ENCOUNTER — TELEPHONE (OUTPATIENT)
Dept: CARDIOLOGY CLINIC | Facility: CLINIC | Age: OVER 89
End: 2025-08-07

## 2025-08-12 ENCOUNTER — REMOTE DEVICE CLINIC VISIT (OUTPATIENT)
Dept: CARDIOLOGY CLINIC | Facility: CLINIC | Age: OVER 89
End: 2025-08-12
Payer: MEDICARE